# Patient Record
Sex: FEMALE | Race: WHITE | NOT HISPANIC OR LATINO | Employment: OTHER | URBAN - METROPOLITAN AREA
[De-identification: names, ages, dates, MRNs, and addresses within clinical notes are randomized per-mention and may not be internally consistent; named-entity substitution may affect disease eponyms.]

---

## 2017-01-01 ENCOUNTER — COMMUNICATION - HEALTHEAST (OUTPATIENT)
Dept: FAMILY MEDICINE | Facility: CLINIC | Age: 62
End: 2017-01-01

## 2017-01-01 ENCOUNTER — COMMUNICATION - HEALTHEAST (OUTPATIENT)
Dept: SCHEDULING | Facility: CLINIC | Age: 62
End: 2017-01-01

## 2017-01-03 ENCOUNTER — AMBULATORY - HEALTHEAST (OUTPATIENT)
Dept: PULMONOLOGY | Facility: OTHER | Age: 62
End: 2017-01-03

## 2017-01-03 ENCOUNTER — COMMUNICATION - HEALTHEAST (OUTPATIENT)
Dept: FAMILY MEDICINE | Facility: CLINIC | Age: 62
End: 2017-01-03

## 2017-01-04 ENCOUNTER — COMMUNICATION - HEALTHEAST (OUTPATIENT)
Dept: FAMILY MEDICINE | Facility: CLINIC | Age: 62
End: 2017-01-04

## 2017-01-05 ENCOUNTER — AMBULATORY - HEALTHEAST (OUTPATIENT)
Dept: PULMONOLOGY | Facility: OTHER | Age: 62
End: 2017-01-05

## 2017-01-05 ENCOUNTER — COMMUNICATION - HEALTHEAST (OUTPATIENT)
Dept: FAMILY MEDICINE | Facility: CLINIC | Age: 62
End: 2017-01-05

## 2017-01-06 ENCOUNTER — OFFICE VISIT - HEALTHEAST (OUTPATIENT)
Dept: PULMONOLOGY | Facility: OTHER | Age: 62
End: 2017-01-06

## 2017-01-06 DIAGNOSIS — J44.9 CHRONIC OBSTRUCTIVE PULMONARY DISEASE, UNSPECIFIED COPD TYPE (H): ICD-10-CM

## 2017-01-06 DIAGNOSIS — R06.09 DYSPNEA ON EXERTION: ICD-10-CM

## 2017-01-06 DIAGNOSIS — G47.34 NOCTURNAL HYPOXIA: ICD-10-CM

## 2017-01-06 DIAGNOSIS — K21.9 GASTROESOPHAGEAL REFLUX DISEASE WITHOUT ESOPHAGITIS: ICD-10-CM

## 2017-01-12 ENCOUNTER — COMMUNICATION - HEALTHEAST (OUTPATIENT)
Dept: FAMILY MEDICINE | Facility: CLINIC | Age: 62
End: 2017-01-12

## 2017-01-17 ENCOUNTER — COMMUNICATION - HEALTHEAST (OUTPATIENT)
Dept: RESPIRATORY THERAPY | Facility: HOSPITAL | Age: 62
End: 2017-01-17

## 2017-02-16 ENCOUNTER — COMMUNICATION - HEALTHEAST (OUTPATIENT)
Dept: RESPIRATORY THERAPY | Facility: HOSPITAL | Age: 62
End: 2017-02-16

## 2017-03-06 ENCOUNTER — OFFICE VISIT - HEALTHEAST (OUTPATIENT)
Dept: FAMILY MEDICINE | Facility: CLINIC | Age: 62
End: 2017-03-06

## 2017-03-06 ENCOUNTER — HOSPITAL ENCOUNTER (OUTPATIENT)
Dept: LAB | Age: 62
Setting detail: SPECIMEN
Discharge: HOME | End: 2017-03-06

## 2017-03-06 DIAGNOSIS — R35.0 URINARY FREQUENCY: ICD-10-CM

## 2017-03-06 DIAGNOSIS — N39.41 URGE INCONTINENCE: ICD-10-CM

## 2017-03-06 DIAGNOSIS — E11.9 TYPE 2 DIABETES MELLITUS (H): ICD-10-CM

## 2017-03-06 ASSESSMENT — MIFFLIN-ST. JEOR: SCORE: 1228.83

## 2017-03-08 ENCOUNTER — RECORDS - HEALTHEAST (OUTPATIENT)
Dept: ADMINISTRATIVE | Facility: OTHER | Age: 62
End: 2017-03-08

## 2017-03-17 ENCOUNTER — OFFICE VISIT - HEALTHEAST (OUTPATIENT)
Dept: FAMILY MEDICINE | Facility: CLINIC | Age: 62
End: 2017-03-17

## 2017-03-17 ENCOUNTER — COMMUNICATION - HEALTHEAST (OUTPATIENT)
Dept: RESPIRATORY THERAPY | Facility: HOSPITAL | Age: 62
End: 2017-03-17

## 2017-03-17 DIAGNOSIS — R39.15 URINARY URGENCY: ICD-10-CM

## 2017-03-17 DIAGNOSIS — B07.0 PLANTAR WART: ICD-10-CM

## 2017-03-17 DIAGNOSIS — E55.9 VITAMIN D DEFICIENCY: ICD-10-CM

## 2017-03-17 DIAGNOSIS — E11.9 TYPE 2 DIABETES MELLITUS WITHOUT COMPLICATION, WITHOUT LONG-TERM CURRENT USE OF INSULIN (H): ICD-10-CM

## 2017-03-17 DIAGNOSIS — Z00.00 HEALTHCARE MAINTENANCE: ICD-10-CM

## 2017-03-17 LAB — HBA1C MFR BLD: 6.3 % (ref 3.5–6)

## 2017-03-21 ENCOUNTER — COMMUNICATION - HEALTHEAST (OUTPATIENT)
Dept: FAMILY MEDICINE | Facility: CLINIC | Age: 62
End: 2017-03-21

## 2017-03-23 ENCOUNTER — HOSPITAL ENCOUNTER (OUTPATIENT)
Dept: MAMMOGRAPHY | Facility: HOSPITAL | Age: 62
Discharge: HOME | End: 2017-03-23
Attending: FAMILY MEDICINE

## 2017-03-23 DIAGNOSIS — Z00.00 HEALTHCARE MAINTENANCE: ICD-10-CM

## 2017-04-17 ENCOUNTER — COMMUNICATION - HEALTHEAST (OUTPATIENT)
Dept: RESPIRATORY THERAPY | Facility: HOSPITAL | Age: 62
End: 2017-04-17

## 2017-04-18 ENCOUNTER — COMMUNICATION - HEALTHEAST (OUTPATIENT)
Dept: RESPIRATORY THERAPY | Facility: HOSPITAL | Age: 62
End: 2017-04-18

## 2017-04-18 ENCOUNTER — COMMUNICATION - HEALTHEAST (OUTPATIENT)
Dept: SCHEDULING | Facility: CLINIC | Age: 62
End: 2017-04-18

## 2017-04-18 ENCOUNTER — COMMUNICATION - HEALTHEAST (OUTPATIENT)
Dept: FAMILY MEDICINE | Facility: CLINIC | Age: 62
End: 2017-04-18

## 2017-04-26 ENCOUNTER — OFFICE VISIT - HEALTHEAST (OUTPATIENT)
Dept: FAMILY MEDICINE | Facility: CLINIC | Age: 62
End: 2017-04-26

## 2017-04-26 DIAGNOSIS — J44.89 CHRONIC AIRWAY OBSTRUCTION, NOT ELSEWHERE CLASSIFIED: ICD-10-CM

## 2017-04-26 DIAGNOSIS — J44.9 COPD (CHRONIC OBSTRUCTIVE PULMONARY DISEASE) (H): ICD-10-CM

## 2017-05-08 ENCOUNTER — COMMUNICATION - HEALTHEAST (OUTPATIENT)
Dept: FAMILY MEDICINE | Facility: CLINIC | Age: 62
End: 2017-05-08

## 2017-05-17 ENCOUNTER — OFFICE VISIT - HEALTHEAST (OUTPATIENT)
Dept: FAMILY MEDICINE | Facility: CLINIC | Age: 62
End: 2017-05-17

## 2017-05-17 ENCOUNTER — COMMUNICATION - HEALTHEAST (OUTPATIENT)
Dept: SCHEDULING | Facility: CLINIC | Age: 62
End: 2017-05-17

## 2017-05-17 ENCOUNTER — COMMUNICATION - HEALTHEAST (OUTPATIENT)
Dept: RESPIRATORY THERAPY | Facility: HOSPITAL | Age: 62
End: 2017-05-17

## 2017-05-17 ENCOUNTER — RECORDS - HEALTHEAST (OUTPATIENT)
Dept: GENERAL RADIOLOGY | Facility: CLINIC | Age: 62
End: 2017-05-17

## 2017-05-17 DIAGNOSIS — J44.89 CHRONIC AIRWAY OBSTRUCTION, NOT ELSEWHERE CLASSIFIED: ICD-10-CM

## 2017-05-17 DIAGNOSIS — R53.83 OTHER FATIGUE: ICD-10-CM

## 2017-05-17 DIAGNOSIS — D72.829 LEUKOCYTOSIS: ICD-10-CM

## 2017-05-17 DIAGNOSIS — R13.10 DYSPHAGIA: ICD-10-CM

## 2017-05-17 DIAGNOSIS — R53.83 FATIGUE: ICD-10-CM

## 2017-05-17 DIAGNOSIS — I95.9 HYPOTENSION: ICD-10-CM

## 2017-05-17 DIAGNOSIS — I95.9 HYPOTENSION, UNSPECIFIED: ICD-10-CM

## 2017-05-17 DIAGNOSIS — R14.0 ABDOMINAL DISTENTION: ICD-10-CM

## 2017-05-17 ASSESSMENT — MIFFLIN-ST. JEOR: SCORE: 1244.43

## 2017-05-18 LAB
ATRIAL RATE - MUSE: 79 BPM
DIASTOLIC BLOOD PRESSURE - MUSE: NORMAL MMHG
INTERPRETATION ECG - MUSE: NORMAL
P AXIS - MUSE: 62 DEGREES
PR INTERVAL - MUSE: 142 MS
QRS DURATION - MUSE: 72 MS
QT - MUSE: 394 MS
QTC - MUSE: 451 MS
R AXIS - MUSE: 58 DEGREES
SYSTOLIC BLOOD PRESSURE - MUSE: NORMAL MMHG
T AXIS - MUSE: 56 DEGREES
VENTRICULAR RATE- MUSE: 79 BPM

## 2017-05-22 ENCOUNTER — AMBULATORY - HEALTHEAST (OUTPATIENT)
Dept: FAMILY MEDICINE | Facility: CLINIC | Age: 62
End: 2017-05-22

## 2017-05-22 DIAGNOSIS — J44.9 COPD (CHRONIC OBSTRUCTIVE PULMONARY DISEASE) (H): ICD-10-CM

## 2017-05-25 ENCOUNTER — COMMUNICATION - HEALTHEAST (OUTPATIENT)
Dept: SCHEDULING | Facility: CLINIC | Age: 62
End: 2017-05-25

## 2017-05-26 ENCOUNTER — COMMUNICATION - HEALTHEAST (OUTPATIENT)
Dept: FAMILY MEDICINE | Facility: CLINIC | Age: 62
End: 2017-05-26

## 2017-05-31 ENCOUNTER — COMMUNICATION - HEALTHEAST (OUTPATIENT)
Dept: SCHEDULING | Facility: CLINIC | Age: 62
End: 2017-05-31

## 2017-06-05 ENCOUNTER — RECORDS - HEALTHEAST (OUTPATIENT)
Dept: GENERAL RADIOLOGY | Facility: CLINIC | Age: 62
End: 2017-06-05

## 2017-06-05 ENCOUNTER — OFFICE VISIT - HEALTHEAST (OUTPATIENT)
Dept: FAMILY MEDICINE | Facility: CLINIC | Age: 62
End: 2017-06-05

## 2017-06-05 DIAGNOSIS — R14.0 ABDOMINAL BLOATING: ICD-10-CM

## 2017-06-05 DIAGNOSIS — R13.10 DYSPHAGIA: ICD-10-CM

## 2017-06-05 DIAGNOSIS — M54.50 LOW BACK PAIN: ICD-10-CM

## 2017-06-05 DIAGNOSIS — J44.9 COPD (CHRONIC OBSTRUCTIVE PULMONARY DISEASE) (H): ICD-10-CM

## 2017-06-07 ENCOUNTER — COMMUNICATION - HEALTHEAST (OUTPATIENT)
Dept: FAMILY MEDICINE | Facility: CLINIC | Age: 62
End: 2017-06-07

## 2017-06-09 ENCOUNTER — COMMUNICATION - HEALTHEAST (OUTPATIENT)
Dept: FAMILY MEDICINE | Facility: CLINIC | Age: 62
End: 2017-06-09

## 2017-06-09 DIAGNOSIS — R13.10 DYSPHAGIA: ICD-10-CM

## 2017-06-16 ENCOUNTER — COMMUNICATION - HEALTHEAST (OUTPATIENT)
Dept: FAMILY MEDICINE | Facility: CLINIC | Age: 62
End: 2017-06-16

## 2017-06-17 ENCOUNTER — COMMUNICATION - HEALTHEAST (OUTPATIENT)
Dept: SCHEDULING | Facility: CLINIC | Age: 62
End: 2017-06-17

## 2017-06-17 DIAGNOSIS — E11.9 TYPE 2 DIABETES MELLITUS (H): ICD-10-CM

## 2017-06-20 ENCOUNTER — COMMUNICATION - HEALTHEAST (OUTPATIENT)
Dept: SCHEDULING | Facility: CLINIC | Age: 62
End: 2017-06-20

## 2017-06-20 ENCOUNTER — AMBULATORY - HEALTHEAST (OUTPATIENT)
Dept: FAMILY MEDICINE | Facility: CLINIC | Age: 62
End: 2017-06-20

## 2017-06-20 DIAGNOSIS — E11.9 TYPE 2 DIABETES MELLITUS (H): ICD-10-CM

## 2017-06-21 ENCOUNTER — COMMUNICATION - HEALTHEAST (OUTPATIENT)
Dept: RESPIRATORY THERAPY | Facility: HOSPITAL | Age: 62
End: 2017-06-21

## 2017-06-23 ENCOUNTER — COMMUNICATION - HEALTHEAST (OUTPATIENT)
Dept: SCHEDULING | Facility: CLINIC | Age: 62
End: 2017-06-23

## 2017-06-23 ENCOUNTER — COMMUNICATION - HEALTHEAST (OUTPATIENT)
Dept: FAMILY MEDICINE | Facility: CLINIC | Age: 62
End: 2017-06-23

## 2017-06-25 ENCOUNTER — COMMUNICATION - HEALTHEAST (OUTPATIENT)
Dept: SCHEDULING | Facility: CLINIC | Age: 62
End: 2017-06-25

## 2017-06-30 ENCOUNTER — OFFICE VISIT - HEALTHEAST (OUTPATIENT)
Dept: PULMONOLOGY | Facility: OTHER | Age: 62
End: 2017-06-30

## 2017-06-30 DIAGNOSIS — K21.9 GASTROESOPHAGEAL REFLUX DISEASE WITHOUT ESOPHAGITIS: ICD-10-CM

## 2017-06-30 DIAGNOSIS — G47.33 OSA (OBSTRUCTIVE SLEEP APNEA): ICD-10-CM

## 2017-06-30 DIAGNOSIS — J44.9 CHRONIC OBSTRUCTIVE PULMONARY DISEASE, UNSPECIFIED COPD TYPE (H): ICD-10-CM

## 2017-07-06 ENCOUNTER — AMBULATORY - HEALTHEAST (OUTPATIENT)
Dept: PULMONOLOGY | Facility: OTHER | Age: 62
End: 2017-07-06

## 2017-07-07 ENCOUNTER — RECORDS - HEALTHEAST (OUTPATIENT)
Dept: ADMINISTRATIVE | Facility: OTHER | Age: 62
End: 2017-07-07

## 2017-07-07 ENCOUNTER — AMBULATORY - HEALTHEAST (OUTPATIENT)
Dept: INTENSIVE CARE | Facility: CLINIC | Age: 62
End: 2017-07-07

## 2017-07-07 ENCOUNTER — COMMUNICATION - HEALTHEAST (OUTPATIENT)
Dept: FAMILY MEDICINE | Facility: CLINIC | Age: 62
End: 2017-07-07

## 2017-07-08 ENCOUNTER — COMMUNICATION - HEALTHEAST (OUTPATIENT)
Dept: SCHEDULING | Facility: CLINIC | Age: 62
End: 2017-07-08

## 2017-07-10 ENCOUNTER — RECORDS - HEALTHEAST (OUTPATIENT)
Dept: ADMINISTRATIVE | Facility: OTHER | Age: 62
End: 2017-07-10

## 2017-07-10 ENCOUNTER — OFFICE VISIT - HEALTHEAST (OUTPATIENT)
Dept: FAMILY MEDICINE | Facility: CLINIC | Age: 62
End: 2017-07-10

## 2017-07-10 DIAGNOSIS — B07.0 PLANTAR WART: ICD-10-CM

## 2017-07-10 DIAGNOSIS — R73.09 ELEVATED GLUCOSE: ICD-10-CM

## 2017-07-10 DIAGNOSIS — N39.0 UTI (URINARY TRACT INFECTION): ICD-10-CM

## 2017-07-10 DIAGNOSIS — J44.9 COPD (CHRONIC OBSTRUCTIVE PULMONARY DISEASE) (H): ICD-10-CM

## 2017-07-10 DIAGNOSIS — N90.7 LABIAL CYST: ICD-10-CM

## 2017-07-10 DIAGNOSIS — K59.00 CONSTIPATION: ICD-10-CM

## 2017-07-11 LAB — HBA1C MFR BLD: 7 % (ref 4.2–6.1)

## 2017-07-14 ENCOUNTER — RECORDS - HEALTHEAST (OUTPATIENT)
Dept: ADMINISTRATIVE | Facility: OTHER | Age: 62
End: 2017-07-14

## 2017-07-14 ENCOUNTER — AMBULATORY - HEALTHEAST (OUTPATIENT)
Dept: PULMONOLOGY | Facility: OTHER | Age: 62
End: 2017-07-14

## 2017-07-15 ENCOUNTER — COMMUNICATION - HEALTHEAST (OUTPATIENT)
Dept: SCHEDULING | Facility: CLINIC | Age: 62
End: 2017-07-15

## 2017-07-16 ENCOUNTER — COMMUNICATION - HEALTHEAST (OUTPATIENT)
Dept: SCHEDULING | Facility: CLINIC | Age: 62
End: 2017-07-16

## 2017-07-21 ENCOUNTER — COMMUNICATION - HEALTHEAST (OUTPATIENT)
Dept: FAMILY MEDICINE | Facility: CLINIC | Age: 62
End: 2017-07-21

## 2017-07-25 ENCOUNTER — COMMUNICATION - HEALTHEAST (OUTPATIENT)
Dept: PULMONOLOGY | Facility: OTHER | Age: 62
End: 2017-07-25

## 2017-07-25 ENCOUNTER — RECORDS - HEALTHEAST (OUTPATIENT)
Dept: ADMINISTRATIVE | Facility: OTHER | Age: 62
End: 2017-07-25

## 2017-07-28 ENCOUNTER — AMBULATORY - HEALTHEAST (OUTPATIENT)
Dept: PULMONOLOGY | Facility: OTHER | Age: 62
End: 2017-07-28

## 2017-08-01 ENCOUNTER — RECORDS - HEALTHEAST (OUTPATIENT)
Dept: ADMINISTRATIVE | Facility: OTHER | Age: 62
End: 2017-08-01

## 2017-08-02 ENCOUNTER — AMBULATORY - HEALTHEAST (OUTPATIENT)
Dept: PULMONOLOGY | Facility: OTHER | Age: 62
End: 2017-08-02

## 2017-08-10 ENCOUNTER — COMMUNICATION - HEALTHEAST (OUTPATIENT)
Dept: OBGYN | Facility: HOSPITAL | Age: 62
End: 2017-08-10

## 2017-08-10 ENCOUNTER — COMMUNICATION - HEALTHEAST (OUTPATIENT)
Dept: SCHEDULING | Facility: CLINIC | Age: 62
End: 2017-08-10

## 2017-08-10 ENCOUNTER — RECORDS - HEALTHEAST (OUTPATIENT)
Dept: ADMINISTRATIVE | Facility: OTHER | Age: 62
End: 2017-08-10

## 2017-08-13 ENCOUNTER — COMMUNICATION - HEALTHEAST (OUTPATIENT)
Dept: SCHEDULING | Facility: CLINIC | Age: 62
End: 2017-08-13

## 2017-08-14 ENCOUNTER — RECORDS - HEALTHEAST (OUTPATIENT)
Dept: ADMINISTRATIVE | Facility: OTHER | Age: 62
End: 2017-08-14

## 2017-08-15 ENCOUNTER — COMMUNICATION - HEALTHEAST (OUTPATIENT)
Dept: FAMILY MEDICINE | Facility: CLINIC | Age: 62
End: 2017-08-15

## 2017-08-17 ENCOUNTER — COMMUNICATION - HEALTHEAST (OUTPATIENT)
Dept: RESPIRATORY THERAPY | Facility: CLINIC | Age: 62
End: 2017-08-17

## 2017-08-20 ENCOUNTER — RECORDS - HEALTHEAST (OUTPATIENT)
Dept: ADMINISTRATIVE | Facility: OTHER | Age: 62
End: 2017-08-20

## 2017-08-21 ENCOUNTER — COMMUNICATION - HEALTHEAST (OUTPATIENT)
Dept: RESPIRATORY THERAPY | Facility: HOSPITAL | Age: 62
End: 2017-08-21

## 2017-08-29 ENCOUNTER — OFFICE VISIT - HEALTHEAST (OUTPATIENT)
Dept: PULMONOLOGY | Facility: OTHER | Age: 62
End: 2017-08-29

## 2017-08-29 DIAGNOSIS — G47.33 OSA ON CPAP: ICD-10-CM

## 2017-08-29 DIAGNOSIS — J44.1 COPD EXACERBATION (H): ICD-10-CM

## 2017-08-29 DIAGNOSIS — R53.81 PHYSICAL DECONDITIONING: ICD-10-CM

## 2017-08-29 DIAGNOSIS — E66.9 OBESITY (BMI 30-39.9): ICD-10-CM

## 2017-09-07 ENCOUNTER — OFFICE VISIT - HEALTHEAST (OUTPATIENT)
Dept: PHYSICAL THERAPY | Facility: REHABILITATION | Age: 62
End: 2017-09-07

## 2017-09-07 DIAGNOSIS — R53.81 PHYSICAL DECONDITIONING: ICD-10-CM

## 2017-09-07 DIAGNOSIS — M62.81 MUSCLE WEAKNESS (GENERALIZED): ICD-10-CM

## 2017-09-10 ENCOUNTER — COMMUNICATION - HEALTHEAST (OUTPATIENT)
Dept: SCHEDULING | Facility: CLINIC | Age: 62
End: 2017-09-10

## 2017-09-12 ENCOUNTER — RECORDS - HEALTHEAST (OUTPATIENT)
Dept: GENERAL RADIOLOGY | Facility: CLINIC | Age: 62
End: 2017-09-12

## 2017-09-12 ENCOUNTER — OFFICE VISIT - HEALTHEAST (OUTPATIENT)
Dept: FAMILY MEDICINE | Facility: CLINIC | Age: 62
End: 2017-09-12

## 2017-09-12 ENCOUNTER — COMMUNICATION - HEALTHEAST (OUTPATIENT)
Dept: FAMILY MEDICINE | Facility: CLINIC | Age: 62
End: 2017-09-12

## 2017-09-12 DIAGNOSIS — J44.9 CHRONIC OBSTRUCTIVE PULMONARY DISEASE, UNSPECIFIED (H): ICD-10-CM

## 2017-09-12 DIAGNOSIS — R06.02 SHORTNESS OF BREATH: ICD-10-CM

## 2017-09-12 DIAGNOSIS — R53.83 FATIGUE: ICD-10-CM

## 2017-09-12 DIAGNOSIS — J44.9 COPD (CHRONIC OBSTRUCTIVE PULMONARY DISEASE) (H): ICD-10-CM

## 2017-09-12 DIAGNOSIS — R53.83 OTHER FATIGUE: ICD-10-CM

## 2017-09-12 LAB — HBA1C MFR BLD: 6.6 % (ref 3.5–6)

## 2017-09-13 LAB
ATRIAL RATE - MUSE: 87 BPM
DIASTOLIC BLOOD PRESSURE - MUSE: NORMAL MMHG
INTERPRETATION ECG - MUSE: NORMAL
P AXIS - MUSE: 56 DEGREES
PR INTERVAL - MUSE: 136 MS
QRS DURATION - MUSE: 68 MS
QT - MUSE: 362 MS
QTC - MUSE: 435 MS
R AXIS - MUSE: 58 DEGREES
SYSTOLIC BLOOD PRESSURE - MUSE: NORMAL MMHG
T AXIS - MUSE: 58 DEGREES
VENTRICULAR RATE- MUSE: 87 BPM

## 2017-09-15 ENCOUNTER — OFFICE VISIT - HEALTHEAST (OUTPATIENT)
Dept: PHYSICAL THERAPY | Facility: REHABILITATION | Age: 62
End: 2017-09-15

## 2017-09-15 DIAGNOSIS — R53.81 PHYSICAL DECONDITIONING: ICD-10-CM

## 2017-09-15 DIAGNOSIS — M62.81 MUSCLE WEAKNESS (GENERALIZED): ICD-10-CM

## 2017-09-18 ENCOUNTER — AMBULATORY - HEALTHEAST (OUTPATIENT)
Dept: FAMILY MEDICINE | Facility: CLINIC | Age: 62
End: 2017-09-18

## 2017-09-18 ENCOUNTER — COMMUNICATION - HEALTHEAST (OUTPATIENT)
Dept: FAMILY MEDICINE | Facility: CLINIC | Age: 62
End: 2017-09-18

## 2017-09-18 DIAGNOSIS — R31.9 HEMATURIA: ICD-10-CM

## 2017-09-18 DIAGNOSIS — R53.83 FATIGUE: ICD-10-CM

## 2017-09-19 ENCOUNTER — OFFICE VISIT - HEALTHEAST (OUTPATIENT)
Dept: FAMILY MEDICINE | Facility: CLINIC | Age: 62
End: 2017-09-19

## 2017-09-19 DIAGNOSIS — R53.83 FATIGUE: ICD-10-CM

## 2017-09-19 DIAGNOSIS — Z00.00 HEALTHCARE MAINTENANCE: ICD-10-CM

## 2017-09-19 ASSESSMENT — MIFFLIN-ST. JEOR: SCORE: 1215.73

## 2017-09-26 ENCOUNTER — OFFICE VISIT - HEALTHEAST (OUTPATIENT)
Dept: PULMONOLOGY | Facility: OTHER | Age: 62
End: 2017-09-26

## 2017-09-26 ENCOUNTER — OFFICE VISIT - HEALTHEAST (OUTPATIENT)
Dept: CARDIOLOGY | Facility: CLINIC | Age: 62
End: 2017-09-26

## 2017-09-26 ENCOUNTER — AMBULATORY - HEALTHEAST (OUTPATIENT)
Dept: PULMONOLOGY | Facility: OTHER | Age: 62
End: 2017-09-26

## 2017-09-26 ENCOUNTER — HOSPITAL ENCOUNTER (OUTPATIENT)
Dept: CT IMAGING | Facility: HOSPITAL | Age: 62
Discharge: HOME | End: 2017-09-26
Attending: INTERNAL MEDICINE

## 2017-09-26 DIAGNOSIS — G47.33 OBSTRUCTIVE SLEEP APNEA SYNDROME: ICD-10-CM

## 2017-09-26 DIAGNOSIS — E11.9 TYPE 2 DIABETES MELLITUS (H): ICD-10-CM

## 2017-09-26 DIAGNOSIS — R06.02 SOB (SHORTNESS OF BREATH): ICD-10-CM

## 2017-09-26 DIAGNOSIS — I42.9 CARDIOMYOPATHY (H): ICD-10-CM

## 2017-09-26 DIAGNOSIS — M79.89 LEG SWELLING: ICD-10-CM

## 2017-09-26 DIAGNOSIS — R07.89 ATYPICAL CHEST PAIN: ICD-10-CM

## 2017-09-26 DIAGNOSIS — G47.33 OSA ON CPAP: ICD-10-CM

## 2017-09-26 DIAGNOSIS — E66.9 OBESITY: ICD-10-CM

## 2017-09-26 DIAGNOSIS — J96.21 ACUTE ON CHRONIC RESPIRATORY FAILURE WITH HYPOXIA (H): ICD-10-CM

## 2017-09-26 DIAGNOSIS — R60.9 EDEMA, UNSPECIFIED TYPE: ICD-10-CM

## 2017-09-26 DIAGNOSIS — J44.9 CHRONIC OBSTRUCTIVE PULMONARY DISEASE, UNSPECIFIED COPD TYPE (H): ICD-10-CM

## 2017-09-26 ASSESSMENT — MIFFLIN-ST. JEOR: SCORE: 1216.64

## 2017-10-02 ENCOUNTER — COMMUNICATION - HEALTHEAST (OUTPATIENT)
Dept: PULMONOLOGY | Facility: OTHER | Age: 62
End: 2017-10-02

## 2017-10-04 ENCOUNTER — COMMUNICATION - HEALTHEAST (OUTPATIENT)
Dept: PULMONOLOGY | Facility: OTHER | Age: 62
End: 2017-10-04

## 2017-10-04 ENCOUNTER — AMBULATORY - HEALTHEAST (OUTPATIENT)
Dept: PULMONOLOGY | Facility: OTHER | Age: 62
End: 2017-10-04

## 2017-10-04 ENCOUNTER — OFFICE VISIT - HEALTHEAST (OUTPATIENT)
Dept: PHYSICAL THERAPY | Facility: REHABILITATION | Age: 62
End: 2017-10-04

## 2017-10-04 DIAGNOSIS — R53.81 PHYSICAL DECONDITIONING: ICD-10-CM

## 2017-10-04 DIAGNOSIS — M62.81 MUSCLE WEAKNESS (GENERALIZED): ICD-10-CM

## 2017-10-05 ENCOUNTER — COMMUNICATION - HEALTHEAST (OUTPATIENT)
Dept: PULMONOLOGY | Facility: OTHER | Age: 62
End: 2017-10-05

## 2017-10-06 ENCOUNTER — AMBULATORY - HEALTHEAST (OUTPATIENT)
Dept: PULMONOLOGY | Facility: OTHER | Age: 62
End: 2017-10-06

## 2017-10-10 ENCOUNTER — RECORDS - HEALTHEAST (OUTPATIENT)
Dept: ADMINISTRATIVE | Facility: OTHER | Age: 62
End: 2017-10-10

## 2017-10-12 ENCOUNTER — COMMUNICATION - HEALTHEAST (OUTPATIENT)
Dept: CARDIOLOGY | Facility: CLINIC | Age: 62
End: 2017-10-12

## 2017-10-13 ENCOUNTER — COMMUNICATION - HEALTHEAST (OUTPATIENT)
Dept: SCHEDULING | Facility: CLINIC | Age: 62
End: 2017-10-13

## 2017-10-16 ENCOUNTER — RECORDS - HEALTHEAST (OUTPATIENT)
Dept: ADMINISTRATIVE | Facility: OTHER | Age: 62
End: 2017-10-16

## 2017-10-17 ENCOUNTER — COMMUNICATION - HEALTHEAST (OUTPATIENT)
Dept: PULMONOLOGY | Facility: OTHER | Age: 62
End: 2017-10-17

## 2017-10-17 ENCOUNTER — HOSPITAL ENCOUNTER (OUTPATIENT)
Dept: CT IMAGING | Facility: CLINIC | Age: 62
Discharge: HOME | End: 2017-10-17
Attending: INTERNAL MEDICINE

## 2017-10-17 ENCOUNTER — AMBULATORY - HEALTHEAST (OUTPATIENT)
Dept: PULMONOLOGY | Facility: OTHER | Age: 62
End: 2017-10-17

## 2017-10-17 ENCOUNTER — HOSPITAL ENCOUNTER (OUTPATIENT)
Dept: CARDIOLOGY | Facility: CLINIC | Age: 62
Discharge: HOME | End: 2017-10-17
Attending: INTERNAL MEDICINE

## 2017-10-17 DIAGNOSIS — R07.89 ATYPICAL CHEST PAIN: ICD-10-CM

## 2017-10-17 LAB
AORTIC ROOT: 2.8 CM
AORTIC VALVE MEAN VELOCITY: 93.8 CM/S
AV DIMENSIONLESS INDEX VTI: 0.8
AV MEAN GRADIENT: 4 MMHG
AV PEAK GRADIENT: 8.6 MMHG
AV VALVE AREA: 2.2 CM2
AV VELOCITY RATIO: 0.8
BSA FOR ECHO PROCEDURE: 1.79 M2
BSA FOR ECHO PROCEDURE: 1.8 M2
CV BLOOD PRESSURE: NORMAL MMHG
CV CALCIUM SCORE AGATSTON LM: 0
CV CALCIUM SCORING AGATSON LAD: 0
CV CALCIUM SCORING AGATSTON CX: 0
CV CALCIUM SCORING AGATSTON RCA: 0
CV CALCIUM SCORING AGATSTON TOTAL: 0
CV ECHO HEIGHT: 55.3 IN
CV ECHO WEIGHT: 181 LBS
DOP CALC AO PEAK VEL: 147 CM/S
DOP CALC AO VTI: 30 CM
DOP CALC LVOT AREA: 2.54 CM2
DOP CALC LVOT DIAMETER: 1.8 CM
DOP CALC LVOT PEAK VEL: 116 CM/S
DOP CALC LVOT STROKE VOLUME: 64.6 CM3
DOP CALCLVOT PEAK VEL VTI: 25.4 CM
EJECTION FRACTION: 63 % (ref 55–75)
FRACTIONAL SHORTENING: 30.4 % (ref 28–44)
INTERVENTRICULAR SEPTUM IN END DIASTOLE: 0.87 CM (ref 0.6–0.9)
IVS/PW RATIO: 0.9
LA AREA 1: 9.38 CM2
LA AREA 2: 12.7 CM2
LEFT ATRIUM LENGTH: 3.88 CM
LEFT ATRIUM SIZE: 2.8 CM
LEFT ATRIUM VOLUME INDEX: 14.6 ML/M2
LEFT ATRIUM VOLUME: 26.1 CM3
LEFT VENTRICLE CARDIAC INDEX: 2.6 L/MIN/M2
LEFT VENTRICLE CARDIAC OUTPUT: 4.7 L/MIN
LEFT VENTRICLE DIASTOLIC VOLUME INDEX: 19.6 CM3/M2 (ref 34–74)
LEFT VENTRICLE DIASTOLIC VOLUME: 35 CM3 (ref 46–106)
LEFT VENTRICLE HEART RATE: 70 BPM
LEFT VENTRICLE HEART RATE: 72 BPM
LEFT VENTRICLE MASS INDEX: 74.3 G/M2
LEFT VENTRICLE SYSTOLIC VOLUME INDEX: 7.3 CM3/M2 (ref 11–31)
LEFT VENTRICLE SYSTOLIC VOLUME: 13 CM3 (ref 14–42)
LEFT VENTRICULAR INTERNAL DIMENSION IN DIASTOLE: 4.41 CM (ref 3.8–5.2)
LEFT VENTRICULAR INTERNAL DIMENSION IN SYSTOLE: 3.07 CM (ref 2.2–3.5)
LEFT VENTRICULAR MASS: 133.1 G
LEFT VENTRICULAR OUTFLOW TRACT MEAN GRADIENT: 3 MMHG
LEFT VENTRICULAR OUTFLOW TRACT MEAN VELOCITY: 73.1 CM/S
LEFT VENTRICULAR OUTFLOW TRACT PEAK GRADIENT: 5 MMHG
LEFT VENTRICULAR POSTERIOR WALL IN END DIASTOLE: 0.97 CM (ref 0.6–0.9)
LV STROKE VOLUME INDEX: 36.1 ML/M2
MITRAL VALVE E/A RATIO: 1
MV AVERAGE E/E' RATIO: 7.9 CM/S
MV DECELERATION TIME: 211 MS
MV E'TISSUE VEL-LAT: 11.6 CM/S
MV E'TISSUE VEL-MED: 7.7 CM/S
MV LATERAL E/E' RATIO: 6.6
MV MEDIAL E/E' RATIO: 9.9
MV PEAK A VELOCITY: 79.5 CM/S
MV PEAK E VELOCITY: 76 CM/S
NUC REST DIASTOLIC VOLUME INDEX: 2896 LBS
NUC REST SYSTOLIC VOLUME INDEX: 55.25 IN
TRICUSPID REGURGITATION PEAK PRESSURE GRADIENT: 23 MMHG
TRICUSPID VALVE ANULAR PLANE SYSTOLIC EXCURSION: 2.4 CM
TRICUSPID VALVE PEAK REGURGITANT VELOCITY: 240 CM/S

## 2017-10-17 ASSESSMENT — MIFFLIN-ST. JEOR
SCORE: 1220.04
SCORE: 1212.1

## 2017-10-19 ENCOUNTER — COMMUNICATION - HEALTHEAST (OUTPATIENT)
Dept: FAMILY MEDICINE | Facility: CLINIC | Age: 62
End: 2017-10-19

## 2017-11-05 ENCOUNTER — COMMUNICATION - HEALTHEAST (OUTPATIENT)
Dept: SCHEDULING | Facility: CLINIC | Age: 62
End: 2017-11-05

## 2017-11-06 ENCOUNTER — RECORDS - HEALTHEAST (OUTPATIENT)
Dept: ADMINISTRATIVE | Facility: OTHER | Age: 62
End: 2017-11-06

## 2017-11-13 ENCOUNTER — OFFICE VISIT - HEALTHEAST (OUTPATIENT)
Dept: FAMILY MEDICINE | Facility: CLINIC | Age: 62
End: 2017-11-13

## 2017-11-13 DIAGNOSIS — J44.1 COPD EXACERBATION (H): ICD-10-CM

## 2017-12-06 ENCOUNTER — COMMUNICATION - HEALTHEAST (OUTPATIENT)
Dept: SCHEDULING | Facility: CLINIC | Age: 62
End: 2017-12-06

## 2017-12-06 ENCOUNTER — OFFICE VISIT - HEALTHEAST (OUTPATIENT)
Dept: FAMILY MEDICINE | Facility: CLINIC | Age: 62
End: 2017-12-06

## 2017-12-06 DIAGNOSIS — M54.50 LUMBAGO: ICD-10-CM

## 2017-12-06 DIAGNOSIS — E66.9 OBESITY (BMI 30-39.9): ICD-10-CM

## 2017-12-06 DIAGNOSIS — F31.62 BIPOLAR DISORDER, CURRENT EPISODE MIXED, MODERATE (H): ICD-10-CM

## 2017-12-06 DIAGNOSIS — G47.33 OBSTRUCTIVE SLEEP APNEA SYNDROME: ICD-10-CM

## 2017-12-06 DIAGNOSIS — J44.9 COPD (CHRONIC OBSTRUCTIVE PULMONARY DISEASE) (H): ICD-10-CM

## 2017-12-06 DIAGNOSIS — E11.9 TYPE 2 DIABETES MELLITUS (H): ICD-10-CM

## 2017-12-08 ENCOUNTER — COMMUNICATION - HEALTHEAST (OUTPATIENT)
Dept: NURSING | Facility: CLINIC | Age: 62
End: 2017-12-08

## 2017-12-17 ENCOUNTER — COMMUNICATION - HEALTHEAST (OUTPATIENT)
Dept: SCHEDULING | Facility: CLINIC | Age: 62
End: 2017-12-17

## 2017-12-20 ENCOUNTER — COMMUNICATION - HEALTHEAST (OUTPATIENT)
Dept: NURSING | Facility: CLINIC | Age: 62
End: 2017-12-20

## 2017-12-31 ENCOUNTER — COMMUNICATION - HEALTHEAST (OUTPATIENT)
Dept: SCHEDULING | Facility: CLINIC | Age: 62
End: 2017-12-31

## 2018-01-05 ENCOUNTER — COMMUNICATION - HEALTHEAST (OUTPATIENT)
Dept: NURSING | Facility: CLINIC | Age: 63
End: 2018-01-05

## 2018-01-07 ENCOUNTER — COMMUNICATION - HEALTHEAST (OUTPATIENT)
Dept: SCHEDULING | Facility: CLINIC | Age: 63
End: 2018-01-07

## 2018-01-08 ENCOUNTER — RECORDS - HEALTHEAST (OUTPATIENT)
Dept: ADMINISTRATIVE | Facility: OTHER | Age: 63
End: 2018-01-08

## 2018-01-08 ENCOUNTER — AMBULATORY - HEALTHEAST (OUTPATIENT)
Dept: CARE COORDINATION | Facility: CLINIC | Age: 63
End: 2018-01-08

## 2018-01-08 ENCOUNTER — COMMUNICATION - HEALTHEAST (OUTPATIENT)
Dept: NURSING | Facility: CLINIC | Age: 63
End: 2018-01-08

## 2018-01-13 ENCOUNTER — COMMUNICATION - HEALTHEAST (OUTPATIENT)
Dept: SCHEDULING | Facility: CLINIC | Age: 63
End: 2018-01-13

## 2018-01-13 ENCOUNTER — OFFICE VISIT - HEALTHEAST (OUTPATIENT)
Dept: FAMILY MEDICINE | Facility: CLINIC | Age: 63
End: 2018-01-13

## 2018-01-13 DIAGNOSIS — R45.89 EMOTIONAL UPSET: ICD-10-CM

## 2018-01-13 DIAGNOSIS — M79.605 LEFT LEG PAIN: ICD-10-CM

## 2018-01-16 ENCOUNTER — COMMUNICATION - HEALTHEAST (OUTPATIENT)
Dept: FAMILY MEDICINE | Facility: CLINIC | Age: 63
End: 2018-01-16

## 2018-01-18 ENCOUNTER — COMMUNICATION - HEALTHEAST (OUTPATIENT)
Dept: SCHEDULING | Facility: CLINIC | Age: 63
End: 2018-01-18

## 2018-01-23 ENCOUNTER — COMMUNICATION - HEALTHEAST (OUTPATIENT)
Dept: NURSING | Facility: CLINIC | Age: 63
End: 2018-01-23

## 2018-02-05 ENCOUNTER — COMMUNICATION - HEALTHEAST (OUTPATIENT)
Dept: SCHEDULING | Facility: CLINIC | Age: 63
End: 2018-02-05

## 2018-02-06 ENCOUNTER — OFFICE VISIT - HEALTHEAST (OUTPATIENT)
Dept: FAMILY MEDICINE | Facility: CLINIC | Age: 63
End: 2018-02-06
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
## 2018-02-06 DIAGNOSIS — M54.6 THORACIC BACK PAIN: ICD-10-CM

## 2018-02-06 DIAGNOSIS — S43.402A SPRAIN OF LEFT SHOULDER: ICD-10-CM

## 2018-02-09 ENCOUNTER — COMMUNICATION - HEALTHEAST (OUTPATIENT)
Dept: NURSING | Facility: CLINIC | Age: 63
End: 2018-02-09

## 2018-02-12 ENCOUNTER — COMMUNICATION - HEALTHEAST (OUTPATIENT)
Dept: SCHEDULING | Facility: CLINIC | Age: 63
End: 2018-02-12

## 2018-02-12 ENCOUNTER — COMMUNICATION - HEALTHEAST (OUTPATIENT)
Dept: NURSING | Facility: CLINIC | Age: 63
End: 2018-02-12

## 2018-02-12 ENCOUNTER — COMMUNICATION - HEALTHEAST (OUTPATIENT)
Dept: CARE COORDINATION | Facility: CLINIC | Age: 63
End: 2018-02-12

## 2018-02-13 ENCOUNTER — COMMUNICATION - HEALTHEAST (OUTPATIENT)
Dept: SCHEDULING | Facility: CLINIC | Age: 63
End: 2018-02-13

## 2018-02-15 ENCOUNTER — COMMUNICATION - HEALTHEAST (OUTPATIENT)
Dept: NURSING | Facility: CLINIC | Age: 63
End: 2018-02-15

## 2018-02-19 ENCOUNTER — AMBULATORY - HEALTHEAST (OUTPATIENT)
Dept: CARE COORDINATION | Facility: CLINIC | Age: 63
End: 2018-02-19

## 2018-02-19 ENCOUNTER — COMMUNICATION - HEALTHEAST (OUTPATIENT)
Dept: FAMILY MEDICINE | Facility: CLINIC | Age: 63
End: 2018-02-19

## 2018-02-19 ENCOUNTER — COMMUNICATION - HEALTHEAST (OUTPATIENT)
Dept: CARE COORDINATION | Facility: CLINIC | Age: 63
End: 2018-02-19

## 2018-02-19 ENCOUNTER — OFFICE VISIT - HEALTHEAST (OUTPATIENT)
Dept: FAMILY MEDICINE | Facility: CLINIC | Age: 63
End: 2018-02-19

## 2018-02-19 DIAGNOSIS — J18.9 PNEUMONIA: ICD-10-CM

## 2018-02-19 DIAGNOSIS — E11.9 TYPE 2 DIABETES MELLITUS (H): ICD-10-CM

## 2018-02-19 DIAGNOSIS — D72.829 LEUKOCYTOSIS: ICD-10-CM

## 2018-02-19 LAB
BASOPHILS # BLD AUTO: 0.1 THOU/UL (ref 0–0.2)
BASOPHILS NFR BLD AUTO: 1 % (ref 0–2)
CHOLEST SERPL-MCNC: 151 MG/DL
CREAT UR-MCNC: 81.2 MG/DL
EOSINOPHIL # BLD AUTO: 0.3 THOU/UL (ref 0–0.4)
EOSINOPHIL NFR BLD AUTO: 2 % (ref 0–6)
ERYTHROCYTE [DISTWIDTH] IN BLOOD BY AUTOMATED COUNT: 12 % (ref 11–14.5)
FASTING STATUS PATIENT QL REPORTED: YES
HBA1C MFR BLD: 7 % (ref 3.5–6)
HCT VFR BLD AUTO: 48.2 % (ref 35–47)
HDLC SERPL-MCNC: 32 MG/DL
HGB BLD-MCNC: 15.3 G/DL (ref 12–16)
LDLC SERPL CALC-MCNC: 48 MG/DL
LYMPHOCYTES # BLD AUTO: 3.2 THOU/UL (ref 0.8–4.4)
LYMPHOCYTES NFR BLD AUTO: 24 % (ref 20–40)
MCH RBC QN AUTO: 29.4 PG (ref 27–34)
MCHC RBC AUTO-ENTMCNC: 31.8 G/DL (ref 32–36)
MCV RBC AUTO: 92 FL (ref 80–100)
MICROALBUMIN UR-MCNC: 1.15 MG/DL (ref 0–1.99)
MICROALBUMIN/CREAT UR: 14.2 MG/G
MONOCYTES # BLD AUTO: 0.9 THOU/UL (ref 0–0.9)
MONOCYTES NFR BLD AUTO: 6 % (ref 2–10)
NEUTROPHILS # BLD AUTO: 8.9 THOU/UL (ref 2–7.7)
NEUTROPHILS NFR BLD AUTO: 67 % (ref 50–70)
PLATELET # BLD AUTO: 223 THOU/UL (ref 140–440)
PMV BLD AUTO: 8.7 FL (ref 7–10)
RBC # BLD AUTO: 5.22 MILL/UL (ref 3.8–5.4)
TRIGL SERPL-MCNC: 356 MG/DL
WBC: 13.4 THOU/UL (ref 4–11)

## 2018-02-20 ENCOUNTER — COMMUNICATION - HEALTHEAST (OUTPATIENT)
Dept: NURSING | Facility: CLINIC | Age: 63
End: 2018-02-20

## 2018-02-27 ENCOUNTER — COMMUNICATION - HEALTHEAST (OUTPATIENT)
Dept: NURSING | Facility: CLINIC | Age: 63
End: 2018-02-27

## 2018-02-28 ENCOUNTER — COMMUNICATION - HEALTHEAST (OUTPATIENT)
Dept: FAMILY MEDICINE | Facility: CLINIC | Age: 63
End: 2018-02-28

## 2018-03-05 ENCOUNTER — OFFICE VISIT - HEALTHEAST (OUTPATIENT)
Dept: FAMILY MEDICINE | Facility: CLINIC | Age: 63
End: 2018-03-05

## 2018-03-05 DIAGNOSIS — Z12.31 VISIT FOR SCREENING MAMMOGRAM: ICD-10-CM

## 2018-03-05 DIAGNOSIS — Z12.11 COLON CANCER SCREENING: ICD-10-CM

## 2018-03-05 DIAGNOSIS — Z78.0 POST-MENOPAUSAL: ICD-10-CM

## 2018-03-05 DIAGNOSIS — E11.9 DM2 (DIABETES MELLITUS, TYPE 2) (H): ICD-10-CM

## 2018-03-05 DIAGNOSIS — Z00.00 HEALTH MAINTENANCE EXAMINATION: ICD-10-CM

## 2018-03-05 ASSESSMENT — MIFFLIN-ST. JEOR: SCORE: 1201.56

## 2018-03-06 LAB
HPV SOURCE: ABNORMAL
HUMAN PAPILLOMA VIRUS 16 DNA: NEGATIVE
HUMAN PAPILLOMA VIRUS 18 DNA: NEGATIVE
HUMAN PAPILLOMA VIRUS FINAL DIAGNOSIS: ABNORMAL
HUMAN PAPILLOMA VIRUS OTHER HR: POSITIVE
SPECIMEN DESCRIPTION: ABNORMAL

## 2018-03-13 ENCOUNTER — RECORDS - HEALTHEAST (OUTPATIENT)
Dept: ADMINISTRATIVE | Facility: OTHER | Age: 63
End: 2018-03-13

## 2018-03-15 LAB
BKR LAB AP ABNORMAL BLEEDING: NO
BKR LAB AP BIRTH CONTROL/HORMONES: NORMAL
BKR LAB AP CERVICAL APPEARANCE: NORMAL
BKR LAB AP GYN ADEQUACY: NORMAL
BKR LAB AP GYN INTERPRETATION: NORMAL
BKR LAB AP HPV REFLEX: NORMAL
BKR LAB AP LMP: NORMAL
BKR LAB AP PATIENT STATUS: NORMAL
BKR LAB AP PREVIOUS ABNORMAL: NO
BKR LAB AP PREVIOUS NORMAL: NORMAL
HIGH RISK?: NO
PATH REPORT.COMMENTS IMP SPEC: NORMAL
RESULT FLAG (HE HISTORICAL CONVERSION): NORMAL

## 2018-03-18 ENCOUNTER — AMBULATORY - HEALTHEAST (OUTPATIENT)
Dept: FAMILY MEDICINE | Facility: CLINIC | Age: 63
End: 2018-03-18

## 2018-03-18 DIAGNOSIS — R87.810 CERVICAL HIGH RISK HPV (HUMAN PAPILLOMAVIRUS) TEST POSITIVE: ICD-10-CM

## 2018-03-19 ENCOUNTER — COMMUNICATION - HEALTHEAST (OUTPATIENT)
Dept: FAMILY MEDICINE | Facility: CLINIC | Age: 63
End: 2018-03-19

## 2018-03-20 ENCOUNTER — COMMUNICATION - HEALTHEAST (OUTPATIENT)
Dept: FAMILY MEDICINE | Facility: CLINIC | Age: 63
End: 2018-03-20

## 2018-03-27 ENCOUNTER — COMMUNICATION - HEALTHEAST (OUTPATIENT)
Dept: NURSING | Facility: CLINIC | Age: 63
End: 2018-03-27

## 2018-03-28 ENCOUNTER — COMMUNICATION - HEALTHEAST (OUTPATIENT)
Dept: SCHEDULING | Facility: CLINIC | Age: 63
End: 2018-03-28

## 2018-03-28 ENCOUNTER — AMBULATORY - HEALTHEAST (OUTPATIENT)
Dept: PULMONOLOGY | Facility: OTHER | Age: 63
End: 2018-03-28

## 2018-03-28 DIAGNOSIS — J44.1 COPD EXACERBATION (H): ICD-10-CM

## 2018-03-29 ENCOUNTER — OFFICE VISIT - HEALTHEAST (OUTPATIENT)
Dept: FAMILY MEDICINE | Facility: CLINIC | Age: 63
End: 2018-03-29

## 2018-03-29 DIAGNOSIS — S39.012A LOW BACK STRAIN, INITIAL ENCOUNTER: ICD-10-CM

## 2018-04-06 ENCOUNTER — COMMUNICATION - HEALTHEAST (OUTPATIENT)
Dept: SCHEDULING | Facility: CLINIC | Age: 63
End: 2018-04-06

## 2018-04-10 ENCOUNTER — COMMUNICATION - HEALTHEAST (OUTPATIENT)
Dept: FAMILY MEDICINE | Facility: CLINIC | Age: 63
End: 2018-04-10

## 2018-04-17 ENCOUNTER — OFFICE VISIT - HEALTHEAST (OUTPATIENT)
Dept: FAMILY MEDICINE | Facility: CLINIC | Age: 63
End: 2018-04-17

## 2018-04-17 ENCOUNTER — COMMUNICATION - HEALTHEAST (OUTPATIENT)
Dept: SCHEDULING | Facility: CLINIC | Age: 63
End: 2018-04-17

## 2018-04-17 ENCOUNTER — COMMUNICATION - HEALTHEAST (OUTPATIENT)
Dept: FAMILY MEDICINE | Facility: CLINIC | Age: 63
End: 2018-04-17

## 2018-04-17 DIAGNOSIS — L08.9 SKIN INFECTION: ICD-10-CM

## 2018-04-17 DIAGNOSIS — T14.8XXA PUNCTURE WOUND: ICD-10-CM

## 2018-04-27 ENCOUNTER — COMMUNICATION - HEALTHEAST (OUTPATIENT)
Dept: NURSING | Facility: CLINIC | Age: 63
End: 2018-04-27

## 2018-05-04 ENCOUNTER — COMMUNICATION - HEALTHEAST (OUTPATIENT)
Dept: SCHEDULING | Facility: CLINIC | Age: 63
End: 2018-05-04

## 2018-05-14 ENCOUNTER — COMMUNICATION - HEALTHEAST (OUTPATIENT)
Dept: SCHEDULING | Facility: CLINIC | Age: 63
End: 2018-05-14

## 2018-05-22 ENCOUNTER — OFFICE VISIT - HEALTHEAST (OUTPATIENT)
Dept: PULMONOLOGY | Facility: OTHER | Age: 63
End: 2018-05-22

## 2018-05-22 DIAGNOSIS — J44.9 CHRONIC OBSTRUCTIVE PULMONARY DISEASE, UNSPECIFIED COPD TYPE (H): ICD-10-CM

## 2018-05-22 DIAGNOSIS — R06.09 DYSPNEA ON EXERTION: ICD-10-CM

## 2018-05-22 DIAGNOSIS — R53.81 PHYSICAL DECONDITIONING: ICD-10-CM

## 2018-05-22 DIAGNOSIS — J96.11 CHRONIC RESPIRATORY FAILURE WITH HYPOXIA (H): ICD-10-CM

## 2018-05-22 DIAGNOSIS — G47.33 OSA ON CPAP: ICD-10-CM

## 2018-05-22 DIAGNOSIS — E66.813 CLASS 3 OBESITY WITH ALVEOLAR HYPOVENTILATION, SERIOUS COMORBIDITY, AND BODY MASS INDEX (BMI) OF 40.0 TO 44.9 IN ADULT (H): ICD-10-CM

## 2018-05-22 DIAGNOSIS — E66.2 CLASS 3 OBESITY WITH ALVEOLAR HYPOVENTILATION, SERIOUS COMORBIDITY, AND BODY MASS INDEX (BMI) OF 40.0 TO 44.9 IN ADULT (H): ICD-10-CM

## 2018-06-03 ENCOUNTER — COMMUNICATION - HEALTHEAST (OUTPATIENT)
Dept: SCHEDULING | Facility: CLINIC | Age: 63
End: 2018-06-03

## 2018-06-04 ENCOUNTER — OFFICE VISIT - HEALTHEAST (OUTPATIENT)
Dept: FAMILY MEDICINE | Facility: CLINIC | Age: 63
End: 2018-06-04

## 2018-06-04 DIAGNOSIS — E11.9 TYPE 2 DIABETES MELLITUS WITHOUT COMPLICATION, WITHOUT LONG-TERM CURRENT USE OF INSULIN (H): ICD-10-CM

## 2018-06-04 DIAGNOSIS — G47.00 INSOMNIA: ICD-10-CM

## 2018-06-04 DIAGNOSIS — H91.90 HEARING LOSS: ICD-10-CM

## 2018-06-04 DIAGNOSIS — R42 VERTIGO: ICD-10-CM

## 2018-06-04 DIAGNOSIS — E11.9 DM2 (DIABETES MELLITUS, TYPE 2) (H): ICD-10-CM

## 2018-06-04 DIAGNOSIS — H93.19 TINNITUS: ICD-10-CM

## 2018-06-04 LAB — HBA1C MFR BLD: 7.2 % (ref 3.5–6)

## 2018-06-05 ENCOUNTER — COMMUNICATION - HEALTHEAST (OUTPATIENT)
Dept: FAMILY MEDICINE | Facility: CLINIC | Age: 63
End: 2018-06-05

## 2018-06-06 ENCOUNTER — COMMUNICATION - HEALTHEAST (OUTPATIENT)
Dept: SCHEDULING | Facility: CLINIC | Age: 63
End: 2018-06-06

## 2018-06-10 ENCOUNTER — COMMUNICATION - HEALTHEAST (OUTPATIENT)
Dept: SCHEDULING | Facility: CLINIC | Age: 63
End: 2018-06-10

## 2018-06-12 ENCOUNTER — COMMUNICATION - HEALTHEAST (OUTPATIENT)
Dept: SCHEDULING | Facility: CLINIC | Age: 63
End: 2018-06-12

## 2018-06-20 ENCOUNTER — COMMUNICATION - HEALTHEAST (OUTPATIENT)
Dept: FAMILY MEDICINE | Facility: CLINIC | Age: 63
End: 2018-06-20

## 2018-06-20 ENCOUNTER — COMMUNICATION - HEALTHEAST (OUTPATIENT)
Dept: SCHEDULING | Facility: CLINIC | Age: 63
End: 2018-06-20

## 2018-06-20 DIAGNOSIS — J30.89 ENVIRONMENTAL AND SEASONAL ALLERGIES: ICD-10-CM

## 2018-06-24 ENCOUNTER — COMMUNICATION - HEALTHEAST (OUTPATIENT)
Dept: SCHEDULING | Facility: CLINIC | Age: 63
End: 2018-06-24

## 2018-06-26 ENCOUNTER — COMMUNICATION - HEALTHEAST (OUTPATIENT)
Dept: SCHEDULING | Facility: CLINIC | Age: 63
End: 2018-06-26

## 2018-06-26 DIAGNOSIS — J44.1 COPD WITH EXACERBATION (H): ICD-10-CM

## 2018-06-27 ENCOUNTER — OFFICE VISIT - HEALTHEAST (OUTPATIENT)
Dept: FAMILY MEDICINE | Facility: CLINIC | Age: 63
End: 2018-06-27

## 2018-06-27 DIAGNOSIS — J44.1 COPD EXACERBATION (H): ICD-10-CM

## 2018-06-27 DIAGNOSIS — R05.9 COUGH: ICD-10-CM

## 2018-06-28 ENCOUNTER — COMMUNICATION - HEALTHEAST (OUTPATIENT)
Dept: FAMILY MEDICINE | Facility: CLINIC | Age: 63
End: 2018-06-28

## 2018-07-04 ENCOUNTER — COMMUNICATION - HEALTHEAST (OUTPATIENT)
Dept: SCHEDULING | Facility: CLINIC | Age: 63
End: 2018-07-04

## 2018-07-10 ENCOUNTER — COMMUNICATION - HEALTHEAST (OUTPATIENT)
Dept: ADMINISTRATIVE | Facility: CLINIC | Age: 63
End: 2018-07-10

## 2018-08-09 ENCOUNTER — COMMUNICATION - HEALTHEAST (OUTPATIENT)
Dept: SCHEDULING | Facility: CLINIC | Age: 63
End: 2018-08-09

## 2018-08-09 ENCOUNTER — RECORDS - HEALTHEAST (OUTPATIENT)
Dept: GENERAL RADIOLOGY | Facility: CLINIC | Age: 63
End: 2018-08-09

## 2018-08-09 ENCOUNTER — OFFICE VISIT - HEALTHEAST (OUTPATIENT)
Dept: FAMILY MEDICINE | Facility: CLINIC | Age: 63
End: 2018-08-09

## 2018-08-09 DIAGNOSIS — R06.02 SOB (SHORTNESS OF BREATH): ICD-10-CM

## 2018-08-09 DIAGNOSIS — F31.62 BIPOLAR DISORDER, CURRENT EPISODE MIXED, MODERATE (H): ICD-10-CM

## 2018-08-09 DIAGNOSIS — B37.31 YEAST VAGINITIS: ICD-10-CM

## 2018-08-09 DIAGNOSIS — E11.9 TYPE 2 DIABETES MELLITUS (H): ICD-10-CM

## 2018-08-09 DIAGNOSIS — M54.6 MIDLINE THORACIC BACK PAIN: ICD-10-CM

## 2018-08-09 DIAGNOSIS — I42.9 CARDIOMYOPATHY (H): ICD-10-CM

## 2018-08-09 DIAGNOSIS — I42.9 CARDIOMYOPATHY, UNSPECIFIED TYPE (H): ICD-10-CM

## 2018-08-09 DIAGNOSIS — Z76.89 ESTABLISHING CARE WITH NEW DOCTOR, ENCOUNTER FOR: ICD-10-CM

## 2018-08-09 DIAGNOSIS — M41.20 SCOLIOSIS (AND KYPHOSCOLIOSIS), IDIOPATHIC: ICD-10-CM

## 2018-08-09 DIAGNOSIS — J44.1 COPD WITH EXACERBATION (H): ICD-10-CM

## 2018-08-09 DIAGNOSIS — M54.6 PAIN IN THORACIC SPINE: ICD-10-CM

## 2018-08-09 DIAGNOSIS — J44.9 COPD (CHRONIC OBSTRUCTIVE PULMONARY DISEASE) (H): ICD-10-CM

## 2018-08-15 ENCOUNTER — OFFICE VISIT - HEALTHEAST (OUTPATIENT)
Dept: FAMILY MEDICINE | Facility: CLINIC | Age: 63
End: 2018-08-15

## 2018-08-15 ENCOUNTER — COMMUNICATION - HEALTHEAST (OUTPATIENT)
Dept: FAMILY MEDICINE | Facility: CLINIC | Age: 63
End: 2018-08-15

## 2018-08-15 DIAGNOSIS — E11.9 TYPE 2 DIABETES MELLITUS WITHOUT COMPLICATION, WITHOUT LONG-TERM CURRENT USE OF INSULIN (H): ICD-10-CM

## 2018-08-15 DIAGNOSIS — F06.8 COGNITIVE DYSFUNCTION IN BIPOLAR DISORDER (H): ICD-10-CM

## 2018-08-15 DIAGNOSIS — E66.01 MORBID OBESITY (H): ICD-10-CM

## 2018-08-15 DIAGNOSIS — J44.9 CHRONIC OBSTRUCTIVE PULMONARY DISEASE, UNSPECIFIED COPD TYPE (H): ICD-10-CM

## 2018-08-15 DIAGNOSIS — F31.9 COGNITIVE DYSFUNCTION IN BIPOLAR DISORDER (H): ICD-10-CM

## 2018-08-15 DIAGNOSIS — I42.9 CARDIOMYOPATHY, UNSPECIFIED TYPE (H): ICD-10-CM

## 2018-08-15 DIAGNOSIS — Z91.148 NONCOMPLIANCE WITH MEDICATIONS: ICD-10-CM

## 2018-08-15 ASSESSMENT — MIFFLIN-ST. JEOR: SCORE: 1162.1

## 2018-08-17 ENCOUNTER — COMMUNICATION - HEALTHEAST (OUTPATIENT)
Dept: FAMILY MEDICINE | Facility: CLINIC | Age: 63
End: 2018-08-17

## 2018-08-17 DIAGNOSIS — J44.9 CHRONIC OBSTRUCTIVE PULMONARY DISEASE, UNSPECIFIED COPD TYPE (H): ICD-10-CM

## 2018-08-17 DIAGNOSIS — R26.81 UNSTABLE GAIT: ICD-10-CM

## 2018-08-17 DIAGNOSIS — M54.50 LUMBAGO: ICD-10-CM

## 2018-08-17 DIAGNOSIS — E11.9 TYPE 2 DIABETES MELLITUS (H): ICD-10-CM

## 2018-08-17 DIAGNOSIS — I42.9 CARDIOMYOPATHY, UNSPECIFIED TYPE (H): ICD-10-CM

## 2018-08-20 ENCOUNTER — RECORDS - HEALTHEAST (OUTPATIENT)
Dept: ADMINISTRATIVE | Facility: OTHER | Age: 63
End: 2018-08-20

## 2018-08-23 ENCOUNTER — COMMUNICATION - HEALTHEAST (OUTPATIENT)
Dept: SCHEDULING | Facility: CLINIC | Age: 63
End: 2018-08-23

## 2018-08-23 ENCOUNTER — OFFICE VISIT - HEALTHEAST (OUTPATIENT)
Dept: FAMILY MEDICINE | Facility: CLINIC | Age: 63
End: 2018-08-23

## 2018-08-23 DIAGNOSIS — F31.62 BIPOLAR DISORDER, CURRENT EPISODE MIXED, MODERATE (H): ICD-10-CM

## 2018-08-23 DIAGNOSIS — F31.9 COGNITIVE DYSFUNCTION IN BIPOLAR DISORDER (H): ICD-10-CM

## 2018-08-23 DIAGNOSIS — Z99.81 DEPENDENCE ON CONTINUOUS SUPPLEMENTAL OXYGEN: ICD-10-CM

## 2018-08-23 DIAGNOSIS — F06.8 COGNITIVE DYSFUNCTION IN BIPOLAR DISORDER (H): ICD-10-CM

## 2018-08-23 DIAGNOSIS — E66.01 MORBID OBESITY (H): ICD-10-CM

## 2018-08-23 DIAGNOSIS — M54.50 LUMBAGO: ICD-10-CM

## 2018-08-23 DIAGNOSIS — R26.81 UNSTABLE GAIT: ICD-10-CM

## 2018-08-23 DIAGNOSIS — J44.9 CHRONIC OBSTRUCTIVE PULMONARY DISEASE, UNSPECIFIED COPD TYPE (H): ICD-10-CM

## 2018-08-23 DIAGNOSIS — I42.9 CARDIOMYOPATHY, UNSPECIFIED TYPE (H): ICD-10-CM

## 2018-09-07 ENCOUNTER — COMMUNICATION - HEALTHEAST (OUTPATIENT)
Dept: FAMILY MEDICINE | Facility: CLINIC | Age: 63
End: 2018-09-07

## 2018-09-09 ENCOUNTER — COMMUNICATION - HEALTHEAST (OUTPATIENT)
Dept: SCHEDULING | Facility: CLINIC | Age: 63
End: 2018-09-09

## 2018-09-11 ENCOUNTER — COMMUNICATION - HEALTHEAST (OUTPATIENT)
Dept: SCHEDULING | Facility: CLINIC | Age: 63
End: 2018-09-11

## 2018-09-11 ENCOUNTER — COMMUNICATION - HEALTHEAST (OUTPATIENT)
Dept: FAMILY MEDICINE | Facility: CLINIC | Age: 63
End: 2018-09-11

## 2018-10-09 ENCOUNTER — COMMUNICATION - HEALTHEAST (OUTPATIENT)
Dept: FAMILY MEDICINE | Facility: CLINIC | Age: 63
End: 2018-10-09

## 2018-10-10 ENCOUNTER — COMMUNICATION - HEALTHEAST (OUTPATIENT)
Dept: SCHEDULING | Facility: CLINIC | Age: 63
End: 2018-10-10

## 2018-10-11 ENCOUNTER — COMMUNICATION - HEALTHEAST (OUTPATIENT)
Dept: FAMILY MEDICINE | Facility: CLINIC | Age: 63
End: 2018-10-11

## 2018-10-17 ENCOUNTER — COMMUNICATION - HEALTHEAST (OUTPATIENT)
Dept: SCHEDULING | Facility: CLINIC | Age: 63
End: 2018-10-17

## 2018-10-19 ENCOUNTER — OFFICE VISIT - HEALTHEAST (OUTPATIENT)
Dept: FAMILY MEDICINE | Facility: CLINIC | Age: 63
End: 2018-10-19

## 2018-10-19 DIAGNOSIS — F31.62 BIPOLAR DISORDER, CURRENT EPISODE MIXED, MODERATE (H): ICD-10-CM

## 2018-10-19 DIAGNOSIS — J44.1 COPD EXACERBATION (H): ICD-10-CM

## 2018-10-19 DIAGNOSIS — M54.9 BACK PAIN: ICD-10-CM

## 2018-10-19 DIAGNOSIS — E11.9 TYPE 2 DIABETES MELLITUS (H): ICD-10-CM

## 2018-10-19 LAB — HBA1C MFR BLD: 6.9 % (ref 3.5–6)

## 2018-10-19 ASSESSMENT — MIFFLIN-ST. JEOR: SCORE: 1198.39

## 2018-10-25 ENCOUNTER — COMMUNICATION - HEALTHEAST (OUTPATIENT)
Dept: SCHEDULING | Facility: CLINIC | Age: 63
End: 2018-10-25

## 2018-10-27 ENCOUNTER — COMMUNICATION - HEALTHEAST (OUTPATIENT)
Dept: SCHEDULING | Facility: CLINIC | Age: 63
End: 2018-10-27

## 2018-11-02 ENCOUNTER — COMMUNICATION - HEALTHEAST (OUTPATIENT)
Dept: SCHEDULING | Facility: CLINIC | Age: 63
End: 2018-11-02

## 2018-11-05 ENCOUNTER — COMMUNICATION - HEALTHEAST (OUTPATIENT)
Dept: FAMILY MEDICINE | Facility: CLINIC | Age: 63
End: 2018-11-05

## 2018-11-05 DIAGNOSIS — J44.9 COPD (CHRONIC OBSTRUCTIVE PULMONARY DISEASE) (H): ICD-10-CM

## 2018-11-05 DIAGNOSIS — Z99.81 CHRONIC RESPIRATORY FAILURE WITH HYPOXIA, ON HOME OXYGEN THERAPY (H): ICD-10-CM

## 2018-11-05 DIAGNOSIS — J96.11 CHRONIC RESPIRATORY FAILURE WITH HYPOXIA, ON HOME OXYGEN THERAPY (H): ICD-10-CM

## 2018-11-05 DIAGNOSIS — R26.81 UNSTABLE GAIT: ICD-10-CM

## 2018-11-08 ENCOUNTER — COMMUNICATION - HEALTHEAST (OUTPATIENT)
Dept: SCHEDULING | Facility: CLINIC | Age: 63
End: 2018-11-08

## 2018-11-08 ENCOUNTER — OFFICE VISIT - HEALTHEAST (OUTPATIENT)
Dept: FAMILY MEDICINE | Facility: CLINIC | Age: 63
End: 2018-11-08

## 2018-11-08 ENCOUNTER — COMMUNICATION - HEALTHEAST (OUTPATIENT)
Dept: FAMILY MEDICINE | Facility: CLINIC | Age: 63
End: 2018-11-08

## 2018-11-08 DIAGNOSIS — E11.9 TYPE 2 DIABETES MELLITUS (H): ICD-10-CM

## 2018-11-08 DIAGNOSIS — F31.62 BIPOLAR DISORDER, CURRENT EPISODE MIXED, MODERATE (H): ICD-10-CM

## 2018-11-08 DIAGNOSIS — J44.9 CHRONIC OBSTRUCTIVE PULMONARY DISEASE, UNSPECIFIED COPD TYPE (H): ICD-10-CM

## 2018-11-08 DIAGNOSIS — G47.00 INSOMNIA: ICD-10-CM

## 2018-11-10 ENCOUNTER — COMMUNICATION - HEALTHEAST (OUTPATIENT)
Dept: SCHEDULING | Facility: CLINIC | Age: 63
End: 2018-11-10

## 2018-11-13 ENCOUNTER — COMMUNICATION - HEALTHEAST (OUTPATIENT)
Dept: SCHEDULING | Facility: CLINIC | Age: 63
End: 2018-11-13

## 2018-11-13 ENCOUNTER — COMMUNICATION - HEALTHEAST (OUTPATIENT)
Dept: FAMILY MEDICINE | Facility: CLINIC | Age: 63
End: 2018-11-13

## 2018-11-14 ENCOUNTER — COMMUNICATION - HEALTHEAST (OUTPATIENT)
Dept: FAMILY MEDICINE | Facility: CLINIC | Age: 63
End: 2018-11-14

## 2018-11-14 ENCOUNTER — COMMUNICATION - HEALTHEAST (OUTPATIENT)
Dept: SCHEDULING | Facility: CLINIC | Age: 63
End: 2018-11-14

## 2018-11-15 ENCOUNTER — OFFICE VISIT - HEALTHEAST (OUTPATIENT)
Dept: FAMILY MEDICINE | Facility: CLINIC | Age: 63
End: 2018-11-15

## 2018-11-15 DIAGNOSIS — E66.01 MORBID OBESITY (H): ICD-10-CM

## 2018-11-15 DIAGNOSIS — F31.62 BIPOLAR DISORDER, CURRENT EPISODE MIXED, MODERATE (H): ICD-10-CM

## 2018-11-15 DIAGNOSIS — R11.0 NAUSEA: ICD-10-CM

## 2018-11-15 DIAGNOSIS — M79.89 LEG SWELLING: ICD-10-CM

## 2018-11-15 DIAGNOSIS — R68.89 FEELING UNWELL: ICD-10-CM

## 2018-11-15 DIAGNOSIS — F41.1 ANXIETY STATE: ICD-10-CM

## 2018-11-17 ENCOUNTER — COMMUNICATION - HEALTHEAST (OUTPATIENT)
Dept: SCHEDULING | Facility: CLINIC | Age: 63
End: 2018-11-17

## 2018-11-24 ENCOUNTER — COMMUNICATION - HEALTHEAST (OUTPATIENT)
Dept: SCHEDULING | Facility: CLINIC | Age: 63
End: 2018-11-24

## 2018-11-25 ENCOUNTER — OFFICE VISIT - HEALTHEAST (OUTPATIENT)
Dept: FAMILY MEDICINE | Facility: CLINIC | Age: 63
End: 2018-11-25

## 2018-11-25 DIAGNOSIS — F31.4 BIPOLAR DISORDER, CURRENT EPISODE DEPRESSED, SEVERE, WITHOUT PSYCHOTIC FEATURES (H): ICD-10-CM

## 2018-11-26 ENCOUNTER — OFFICE VISIT - HEALTHEAST (OUTPATIENT)
Dept: FAMILY MEDICINE | Facility: CLINIC | Age: 63
End: 2018-11-26

## 2018-11-26 DIAGNOSIS — L72.11 PILAR CYST: ICD-10-CM

## 2018-11-26 DIAGNOSIS — E11.9 TYPE 2 DIABETES MELLITUS (H): ICD-10-CM

## 2018-11-26 DIAGNOSIS — F31.62 BIPOLAR DISORDER, CURRENT EPISODE MIXED, MODERATE (H): ICD-10-CM

## 2018-11-27 ENCOUNTER — RECORDS - HEALTHEAST (OUTPATIENT)
Dept: ADMINISTRATIVE | Facility: OTHER | Age: 63
End: 2018-11-27

## 2018-11-27 ENCOUNTER — COMMUNICATION - HEALTHEAST (OUTPATIENT)
Dept: SCHEDULING | Facility: CLINIC | Age: 63
End: 2018-11-27

## 2018-11-30 ENCOUNTER — COMMUNICATION - HEALTHEAST (OUTPATIENT)
Dept: FAMILY MEDICINE | Facility: CLINIC | Age: 63
End: 2018-11-30

## 2018-12-03 ENCOUNTER — OFFICE VISIT - HEALTHEAST (OUTPATIENT)
Dept: FAMILY MEDICINE | Facility: CLINIC | Age: 63
End: 2018-12-03

## 2018-12-03 ENCOUNTER — COMMUNICATION - HEALTHEAST (OUTPATIENT)
Dept: FAMILY MEDICINE | Facility: CLINIC | Age: 63
End: 2018-12-03

## 2018-12-03 DIAGNOSIS — F31.62 BIPOLAR DISORDER, CURRENT EPISODE MIXED, MODERATE (H): ICD-10-CM

## 2018-12-03 DIAGNOSIS — F41.1 ANXIETY STATE: ICD-10-CM

## 2018-12-03 DIAGNOSIS — R53.83 FATIGUE, UNSPECIFIED TYPE: ICD-10-CM

## 2018-12-03 DIAGNOSIS — E66.01 MORBID OBESITY (H): ICD-10-CM

## 2018-12-05 ENCOUNTER — COMMUNICATION - HEALTHEAST (OUTPATIENT)
Dept: SCHEDULING | Facility: CLINIC | Age: 63
End: 2018-12-05

## 2018-12-09 ENCOUNTER — COMMUNICATION - HEALTHEAST (OUTPATIENT)
Dept: SCHEDULING | Facility: CLINIC | Age: 63
End: 2018-12-09

## 2018-12-12 ENCOUNTER — COMMUNICATION - HEALTHEAST (OUTPATIENT)
Dept: FAMILY MEDICINE | Facility: CLINIC | Age: 63
End: 2018-12-12

## 2018-12-12 ENCOUNTER — COMMUNICATION - HEALTHEAST (OUTPATIENT)
Dept: SCHEDULING | Facility: CLINIC | Age: 63
End: 2018-12-12

## 2018-12-13 ENCOUNTER — COMMUNICATION - HEALTHEAST (OUTPATIENT)
Dept: SCHEDULING | Facility: CLINIC | Age: 63
End: 2018-12-13

## 2018-12-14 ENCOUNTER — COMMUNICATION - HEALTHEAST (OUTPATIENT)
Dept: SCHEDULING | Facility: CLINIC | Age: 63
End: 2018-12-14

## 2018-12-14 ENCOUNTER — OFFICE VISIT - HEALTHEAST (OUTPATIENT)
Dept: FAMILY MEDICINE | Facility: CLINIC | Age: 63
End: 2018-12-14

## 2018-12-14 DIAGNOSIS — M54.50 ACUTE RIGHT-SIDED LOW BACK PAIN WITHOUT SCIATICA: ICD-10-CM

## 2018-12-14 DIAGNOSIS — T88.7XXA MEDICATION SIDE EFFECTS: ICD-10-CM

## 2018-12-15 ENCOUNTER — COMMUNICATION - HEALTHEAST (OUTPATIENT)
Dept: SCHEDULING | Facility: CLINIC | Age: 63
End: 2018-12-15

## 2018-12-16 ENCOUNTER — COMMUNICATION - HEALTHEAST (OUTPATIENT)
Dept: SCHEDULING | Facility: CLINIC | Age: 63
End: 2018-12-16

## 2018-12-16 DIAGNOSIS — F41.1 ANXIETY STATE: ICD-10-CM

## 2018-12-16 DIAGNOSIS — F31.62 BIPOLAR DISORDER, CURRENT EPISODE MIXED, MODERATE (H): ICD-10-CM

## 2018-12-16 DIAGNOSIS — F31.9 COGNITIVE DYSFUNCTION IN BIPOLAR DISORDER (H): ICD-10-CM

## 2018-12-16 DIAGNOSIS — F06.8 COGNITIVE DYSFUNCTION IN BIPOLAR DISORDER (H): ICD-10-CM

## 2018-12-17 ENCOUNTER — COMMUNICATION - HEALTHEAST (OUTPATIENT)
Dept: SCHEDULING | Facility: CLINIC | Age: 63
End: 2018-12-17

## 2018-12-18 ENCOUNTER — COMMUNICATION - HEALTHEAST (OUTPATIENT)
Dept: SCHEDULING | Facility: CLINIC | Age: 63
End: 2018-12-18

## 2018-12-19 ENCOUNTER — COMMUNICATION - HEALTHEAST (OUTPATIENT)
Dept: NURSING | Facility: CLINIC | Age: 63
End: 2018-12-19

## 2018-12-20 ENCOUNTER — OFFICE VISIT - HEALTHEAST (OUTPATIENT)
Dept: FAMILY MEDICINE | Facility: CLINIC | Age: 63
End: 2018-12-20

## 2018-12-20 ENCOUNTER — COMMUNICATION - HEALTHEAST (OUTPATIENT)
Dept: SCHEDULING | Facility: CLINIC | Age: 63
End: 2018-12-20

## 2018-12-20 DIAGNOSIS — F31.62 BIPOLAR DISORDER, CURRENT EPISODE MIXED, MODERATE (H): ICD-10-CM

## 2018-12-21 ENCOUNTER — AMBULATORY - HEALTHEAST (OUTPATIENT)
Dept: FAMILY MEDICINE | Facility: CLINIC | Age: 63
End: 2018-12-21

## 2018-12-21 DIAGNOSIS — F41.1 ANXIETY STATE: ICD-10-CM

## 2018-12-21 DIAGNOSIS — Z71.89 ENCOUNTER FOR COUNSELING FOR CARE MANAGEMENT OF PATIENT WITH CHRONIC CONDITIONS AND COMPLEX HEALTH NEEDS USING NURSE-BASED MODEL: ICD-10-CM

## 2018-12-21 DIAGNOSIS — F31.62 BIPOLAR DISORDER, CURRENT EPISODE MIXED, MODERATE (H): ICD-10-CM

## 2018-12-26 ENCOUNTER — COMMUNICATION - HEALTHEAST (OUTPATIENT)
Dept: NURSING | Facility: CLINIC | Age: 63
End: 2018-12-26

## 2018-12-26 ENCOUNTER — COMMUNICATION - HEALTHEAST (OUTPATIENT)
Dept: SCHEDULING | Facility: CLINIC | Age: 63
End: 2018-12-26

## 2018-12-26 ENCOUNTER — COMMUNICATION - HEALTHEAST (OUTPATIENT)
Dept: FAMILY MEDICINE | Facility: CLINIC | Age: 63
End: 2018-12-26

## 2018-12-27 ENCOUNTER — COMMUNICATION - HEALTHEAST (OUTPATIENT)
Dept: PHYSICAL MEDICINE AND REHAB | Facility: CLINIC | Age: 63
End: 2018-12-27

## 2018-12-30 ENCOUNTER — OFFICE VISIT - HEALTHEAST (OUTPATIENT)
Dept: FAMILY MEDICINE | Facility: CLINIC | Age: 63
End: 2018-12-30

## 2018-12-30 ENCOUNTER — COMMUNICATION - HEALTHEAST (OUTPATIENT)
Dept: SCHEDULING | Facility: CLINIC | Age: 63
End: 2018-12-30

## 2018-12-30 DIAGNOSIS — F41.9 ANXIETY: ICD-10-CM

## 2018-12-30 DIAGNOSIS — F31.70 BIPOLAR AFFECTIVE DISORDER IN REMISSION (H): ICD-10-CM

## 2019-01-02 ENCOUNTER — COMMUNICATION - HEALTHEAST (OUTPATIENT)
Dept: NURSING | Facility: CLINIC | Age: 64
End: 2019-01-02

## 2019-01-04 ENCOUNTER — COMMUNICATION - HEALTHEAST (OUTPATIENT)
Dept: NURSING | Facility: CLINIC | Age: 64
End: 2019-01-04

## 2019-01-11 ENCOUNTER — COMMUNICATION - HEALTHEAST (OUTPATIENT)
Dept: SCHEDULING | Facility: CLINIC | Age: 64
End: 2019-01-11

## 2019-01-14 ENCOUNTER — OFFICE VISIT - HEALTHEAST (OUTPATIENT)
Dept: FAMILY MEDICINE | Facility: CLINIC | Age: 64
End: 2019-01-14

## 2019-01-14 ENCOUNTER — COMMUNICATION - HEALTHEAST (OUTPATIENT)
Dept: SCHEDULING | Facility: CLINIC | Age: 64
End: 2019-01-14

## 2019-01-14 DIAGNOSIS — F31.62 BIPOLAR DISORDER, CURRENT EPISODE MIXED, MODERATE (H): ICD-10-CM

## 2019-01-16 ENCOUNTER — COMMUNICATION - HEALTHEAST (OUTPATIENT)
Dept: FAMILY MEDICINE | Facility: CLINIC | Age: 64
End: 2019-01-16

## 2019-01-16 DIAGNOSIS — F31.62 BIPOLAR DISORDER, CURRENT EPISODE MIXED, MODERATE (H): ICD-10-CM

## 2019-01-21 ENCOUNTER — COMMUNICATION - HEALTHEAST (OUTPATIENT)
Dept: SCHEDULING | Facility: CLINIC | Age: 64
End: 2019-01-21

## 2019-01-21 ENCOUNTER — RECORDS - HEALTHEAST (OUTPATIENT)
Dept: SCHEDULING | Facility: CLINIC | Age: 64
End: 2019-01-21

## 2019-01-24 ENCOUNTER — COMMUNICATION - HEALTHEAST (OUTPATIENT)
Dept: NURSING | Facility: CLINIC | Age: 64
End: 2019-01-24

## 2019-01-28 ENCOUNTER — COMMUNICATION - HEALTHEAST (OUTPATIENT)
Dept: FAMILY MEDICINE | Facility: CLINIC | Age: 64
End: 2019-01-28

## 2019-01-28 ENCOUNTER — COMMUNICATION - HEALTHEAST (OUTPATIENT)
Dept: NURSING | Facility: CLINIC | Age: 64
End: 2019-01-28

## 2019-02-05 ENCOUNTER — COMMUNICATION - HEALTHEAST (OUTPATIENT)
Dept: NURSING | Facility: CLINIC | Age: 64
End: 2019-02-05

## 2019-02-19 ENCOUNTER — COMMUNICATION - HEALTHEAST (OUTPATIENT)
Dept: SCHEDULING | Facility: CLINIC | Age: 64
End: 2019-02-19

## 2019-03-02 ENCOUNTER — COMMUNICATION - HEALTHEAST (OUTPATIENT)
Dept: SCHEDULING | Facility: CLINIC | Age: 64
End: 2019-03-02

## 2019-03-15 ENCOUNTER — AMBULATORY - HEALTHEAST (OUTPATIENT)
Dept: PHARMACY | Facility: CLINIC | Age: 64
End: 2019-03-15

## 2019-03-15 DIAGNOSIS — J45.909 ASTHMA: ICD-10-CM

## 2019-03-15 DIAGNOSIS — E11.9 TYPE 2 DIABETES MELLITUS WITHOUT COMPLICATION, WITHOUT LONG-TERM CURRENT USE OF INSULIN (H): ICD-10-CM

## 2019-03-15 DIAGNOSIS — F31.30 BIPOLAR I DISORDER, MOST RECENT EPISODE DEPRESSED (H): ICD-10-CM

## 2019-03-15 DIAGNOSIS — J45.901 ASTHMA WITH ACUTE EXACERBATION, UNSPECIFIED ASTHMA SEVERITY, UNSPECIFIED WHETHER PERSISTENT: ICD-10-CM

## 2019-03-15 DIAGNOSIS — K59.01 SLOW TRANSIT CONSTIPATION: ICD-10-CM

## 2019-03-18 ENCOUNTER — OFFICE VISIT - HEALTHEAST (OUTPATIENT)
Dept: FAMILY MEDICINE | Facility: CLINIC | Age: 64
End: 2019-03-18

## 2019-03-18 DIAGNOSIS — M54.6 CHRONIC MIDLINE THORACIC BACK PAIN: ICD-10-CM

## 2019-03-18 DIAGNOSIS — F31.9 COGNITIVE DYSFUNCTION IN BIPOLAR DISORDER (H): ICD-10-CM

## 2019-03-18 DIAGNOSIS — E66.01 MORBID OBESITY (H): ICD-10-CM

## 2019-03-18 DIAGNOSIS — J44.9 CHRONIC OBSTRUCTIVE PULMONARY DISEASE, UNSPECIFIED COPD TYPE (H): ICD-10-CM

## 2019-03-18 DIAGNOSIS — Z12.31 ENCOUNTER FOR SCREENING MAMMOGRAM FOR BREAST CANCER: ICD-10-CM

## 2019-03-18 DIAGNOSIS — G89.29 CHRONIC MIDLINE THORACIC BACK PAIN: ICD-10-CM

## 2019-03-18 DIAGNOSIS — F06.8 COGNITIVE DYSFUNCTION IN BIPOLAR DISORDER (H): ICD-10-CM

## 2019-03-18 DIAGNOSIS — E11.9 TYPE 2 DIABETES MELLITUS WITHOUT COMPLICATION, WITHOUT LONG-TERM CURRENT USE OF INSULIN (H): ICD-10-CM

## 2019-03-18 DIAGNOSIS — M41.20 SCOLIOSIS (AND KYPHOSCOLIOSIS), IDIOPATHIC: ICD-10-CM

## 2019-03-18 DIAGNOSIS — Z09 HOSPITAL DISCHARGE FOLLOW-UP: ICD-10-CM

## 2019-03-18 DIAGNOSIS — Z99.81 DEPENDENCE ON CONTINUOUS SUPPLEMENTAL OXYGEN: ICD-10-CM

## 2019-03-18 DIAGNOSIS — F31.30 BIPOLAR I DISORDER, MOST RECENT EPISODE DEPRESSED (H): ICD-10-CM

## 2019-03-18 DIAGNOSIS — R09.89 RUNNY NOSE: ICD-10-CM

## 2019-03-25 ENCOUNTER — COMMUNICATION - HEALTHEAST (OUTPATIENT)
Dept: FAMILY MEDICINE | Facility: CLINIC | Age: 64
End: 2019-03-25

## 2019-03-25 DIAGNOSIS — J43.2 CENTRILOBULAR EMPHYSEMA (H): ICD-10-CM

## 2019-03-25 DIAGNOSIS — M41.20 SCOLIOSIS (AND KYPHOSCOLIOSIS), IDIOPATHIC: ICD-10-CM

## 2019-03-25 DIAGNOSIS — G89.29 CHRONIC LOW BACK PAIN, UNSPECIFIED BACK PAIN LATERALITY, WITH SCIATICA PRESENCE UNSPECIFIED: ICD-10-CM

## 2019-03-25 DIAGNOSIS — M54.5 CHRONIC LOW BACK PAIN, UNSPECIFIED BACK PAIN LATERALITY, WITH SCIATICA PRESENCE UNSPECIFIED: ICD-10-CM

## 2019-03-25 DIAGNOSIS — E66.01 MORBID OBESITY (H): ICD-10-CM

## 2019-03-25 DIAGNOSIS — F31.30 BIPOLAR I DISORDER, MOST RECENT EPISODE DEPRESSED (H): ICD-10-CM

## 2019-03-30 ENCOUNTER — COMMUNICATION - HEALTHEAST (OUTPATIENT)
Dept: SCHEDULING | Facility: CLINIC | Age: 64
End: 2019-03-30

## 2019-03-30 DIAGNOSIS — R09.89 RUNNY NOSE: ICD-10-CM

## 2019-04-01 ENCOUNTER — RECORDS - HEALTHEAST (OUTPATIENT)
Dept: ADMINISTRATIVE | Facility: OTHER | Age: 64
End: 2019-04-01

## 2019-04-16 ENCOUNTER — COMMUNICATION - HEALTHEAST (OUTPATIENT)
Dept: FAMILY MEDICINE | Facility: CLINIC | Age: 64
End: 2019-04-16

## 2019-04-24 ENCOUNTER — COMMUNICATION - HEALTHEAST (OUTPATIENT)
Dept: FAMILY MEDICINE | Facility: CLINIC | Age: 64
End: 2019-04-24

## 2019-04-29 ENCOUNTER — RECORDS - HEALTHEAST (OUTPATIENT)
Dept: ADMINISTRATIVE | Facility: OTHER | Age: 64
End: 2019-04-29

## 2019-05-22 ENCOUNTER — COMMUNICATION - HEALTHEAST (OUTPATIENT)
Dept: FAMILY MEDICINE | Facility: CLINIC | Age: 64
End: 2019-05-22

## 2019-05-22 ENCOUNTER — OFFICE VISIT - HEALTHEAST (OUTPATIENT)
Dept: FAMILY MEDICINE | Facility: CLINIC | Age: 64
End: 2019-05-22

## 2019-05-22 DIAGNOSIS — M54.6 ACUTE LEFT-SIDED THORACIC BACK PAIN: ICD-10-CM

## 2019-05-23 ENCOUNTER — HOSPITAL ENCOUNTER (OUTPATIENT)
Dept: PHYSICAL MEDICINE AND REHAB | Facility: CLINIC | Age: 64
Discharge: HOME | End: 2019-05-23
Attending: PAIN MEDICINE

## 2019-05-23 DIAGNOSIS — R26.9 GAIT ABNORMALITY: ICD-10-CM

## 2019-05-23 DIAGNOSIS — M79.18 MYOFASCIAL PAIN: ICD-10-CM

## 2019-05-23 DIAGNOSIS — M41.25 OTHER IDIOPATHIC SCOLIOSIS, THORACOLUMBAR REGION: ICD-10-CM

## 2019-05-23 DIAGNOSIS — R29.2 HYPERREFLEXIA: ICD-10-CM

## 2019-05-23 ASSESSMENT — MIFFLIN-ST. JEOR: SCORE: 1142.13

## 2019-06-11 ENCOUNTER — RECORDS - HEALTHEAST (OUTPATIENT)
Dept: ADMINISTRATIVE | Facility: OTHER | Age: 64
End: 2019-06-11

## 2019-06-17 ENCOUNTER — AMBULATORY - HEALTHEAST (OUTPATIENT)
Dept: CARE COORDINATION | Facility: CLINIC | Age: 64
End: 2019-06-17

## 2019-06-17 ENCOUNTER — COMMUNICATION - HEALTHEAST (OUTPATIENT)
Dept: FAMILY MEDICINE | Facility: CLINIC | Age: 64
End: 2019-06-17

## 2019-06-17 ENCOUNTER — COMMUNICATION - HEALTHEAST (OUTPATIENT)
Dept: NURSING | Facility: CLINIC | Age: 64
End: 2019-06-17

## 2019-06-17 DIAGNOSIS — Z91.148 NONCOMPLIANCE WITH MEDICATIONS: ICD-10-CM

## 2019-06-17 DIAGNOSIS — J18.9 PNEUMONIA OF RIGHT LOWER LOBE DUE TO INFECTIOUS ORGANISM: ICD-10-CM

## 2019-06-17 DIAGNOSIS — E11.9 TYPE 2 DIABETES MELLITUS WITHOUT COMPLICATION, WITHOUT LONG-TERM CURRENT USE OF INSULIN (H): ICD-10-CM

## 2019-06-18 ENCOUNTER — AMBULATORY - HEALTHEAST (OUTPATIENT)
Dept: PHARMACY | Facility: CLINIC | Age: 64
End: 2019-06-18

## 2019-06-18 DIAGNOSIS — F31.62 BIPOLAR DISORDER, CURRENT EPISODE MIXED, MODERATE (H): ICD-10-CM

## 2019-06-18 DIAGNOSIS — J44.9 CHRONIC OBSTRUCTIVE PULMONARY DISEASE, UNSPECIFIED COPD TYPE (H): ICD-10-CM

## 2019-06-18 DIAGNOSIS — E11.9 TYPE 2 DIABETES MELLITUS WITHOUT COMPLICATION, WITHOUT LONG-TERM CURRENT USE OF INSULIN (H): ICD-10-CM

## 2019-06-18 DIAGNOSIS — F31.30 BIPOLAR I DISORDER, MOST RECENT EPISODE DEPRESSED (H): ICD-10-CM

## 2019-06-19 ENCOUNTER — COMMUNICATION - HEALTHEAST (OUTPATIENT)
Dept: FAMILY MEDICINE | Facility: CLINIC | Age: 64
End: 2019-06-19

## 2019-06-19 ENCOUNTER — OFFICE VISIT - HEALTHEAST (OUTPATIENT)
Dept: FAMILY MEDICINE | Facility: CLINIC | Age: 64
End: 2019-06-19

## 2019-06-19 DIAGNOSIS — J44.1 COPD EXACERBATION (H): ICD-10-CM

## 2019-06-19 DIAGNOSIS — J18.9 PNEUMONIA OF RIGHT LOWER LOBE DUE TO INFECTIOUS ORGANISM: ICD-10-CM

## 2019-06-19 LAB
ANION GAP SERPL CALCULATED.3IONS-SCNC: 14 MMOL/L (ref 5–18)
BASOPHILS # BLD AUTO: 0.1 THOU/UL (ref 0–0.2)
BASOPHILS NFR BLD AUTO: 0 % (ref 0–2)
BUN SERPL-MCNC: 9 MG/DL (ref 8–22)
CALCIUM SERPL-MCNC: 9.4 MG/DL (ref 8.5–10.5)
CHLORIDE BLD-SCNC: 100 MMOL/L (ref 98–107)
CO2 SERPL-SCNC: 27 MMOL/L (ref 22–31)
CREAT SERPL-MCNC: 0.77 MG/DL (ref 0.6–1.1)
EOSINOPHIL # BLD AUTO: 0.2 THOU/UL (ref 0–0.4)
EOSINOPHIL NFR BLD AUTO: 1 % (ref 0–6)
ERYTHROCYTE [DISTWIDTH] IN BLOOD BY AUTOMATED COUNT: 13.1 % (ref 11–14.5)
GFR SERPL CREATININE-BSD FRML MDRD: >60 ML/MIN/1.73M2
GLUCOSE BLD-MCNC: 132 MG/DL (ref 70–125)
HCT VFR BLD AUTO: 50.4 % (ref 35–47)
HGB BLD-MCNC: 15.8 G/DL (ref 12–16)
LYMPHOCYTES # BLD AUTO: 2.8 THOU/UL (ref 0.8–4.4)
LYMPHOCYTES NFR BLD AUTO: 15 % (ref 20–40)
MCH RBC QN AUTO: 29.6 PG (ref 27–34)
MCHC RBC AUTO-ENTMCNC: 31.3 G/DL (ref 32–36)
MCV RBC AUTO: 94 FL (ref 80–100)
MONOCYTES # BLD AUTO: 0.9 THOU/UL (ref 0–0.9)
MONOCYTES NFR BLD AUTO: 5 % (ref 2–10)
NEUTROPHILS # BLD AUTO: 14.4 THOU/UL (ref 2–7.7)
NEUTROPHILS NFR BLD AUTO: 79 % (ref 50–70)
PLATELET # BLD AUTO: 280 THOU/UL (ref 140–440)
PMV BLD AUTO: 10.5 FL (ref 8.5–12.5)
POTASSIUM BLD-SCNC: 4.3 MMOL/L (ref 3.5–5)
RBC # BLD AUTO: 5.34 MILL/UL (ref 3.8–5.4)
SODIUM SERPL-SCNC: 141 MMOL/L (ref 136–145)
WBC: 18.5 THOU/UL (ref 4–11)

## 2019-06-19 ASSESSMENT — MIFFLIN-ST. JEOR: SCORE: 1126.49

## 2019-06-20 ENCOUNTER — COMMUNICATION - HEALTHEAST (OUTPATIENT)
Dept: FAMILY MEDICINE | Facility: CLINIC | Age: 64
End: 2019-06-20

## 2019-06-24 ENCOUNTER — COMMUNICATION - HEALTHEAST (OUTPATIENT)
Dept: NURSING | Facility: CLINIC | Age: 64
End: 2019-06-24

## 2019-06-27 ENCOUNTER — COMMUNICATION - HEALTHEAST (OUTPATIENT)
Dept: FAMILY MEDICINE | Facility: CLINIC | Age: 64
End: 2019-06-27

## 2019-06-27 ENCOUNTER — COMMUNICATION - HEALTHEAST (OUTPATIENT)
Dept: NURSING | Facility: CLINIC | Age: 64
End: 2019-06-27

## 2019-07-01 ENCOUNTER — COMMUNICATION - HEALTHEAST (OUTPATIENT)
Dept: SCHEDULING | Facility: CLINIC | Age: 64
End: 2019-07-01

## 2019-07-01 ENCOUNTER — COMMUNICATION - HEALTHEAST (OUTPATIENT)
Dept: FAMILY MEDICINE | Facility: CLINIC | Age: 64
End: 2019-07-01

## 2019-07-01 DIAGNOSIS — J44.9 CHRONIC OBSTRUCTIVE PULMONARY DISEASE, UNSPECIFIED COPD TYPE (H): ICD-10-CM

## 2019-07-03 ENCOUNTER — COMMUNICATION - HEALTHEAST (OUTPATIENT)
Dept: NURSING | Facility: CLINIC | Age: 64
End: 2019-07-03

## 2019-07-03 ENCOUNTER — OFFICE VISIT - HEALTHEAST (OUTPATIENT)
Dept: FAMILY MEDICINE | Facility: CLINIC | Age: 64
End: 2019-07-03

## 2019-07-03 DIAGNOSIS — E11.9 TYPE 2 DIABETES MELLITUS WITHOUT COMPLICATION, WITHOUT LONG-TERM CURRENT USE OF INSULIN (H): ICD-10-CM

## 2019-07-03 DIAGNOSIS — J44.9 CHRONIC OBSTRUCTIVE PULMONARY DISEASE, UNSPECIFIED COPD TYPE (H): ICD-10-CM

## 2019-07-03 DIAGNOSIS — F31.30 BIPOLAR I DISORDER, MOST RECENT EPISODE DEPRESSED (H): ICD-10-CM

## 2019-07-03 DIAGNOSIS — Z87.01 HISTORY OF PNEUMONIA: ICD-10-CM

## 2019-07-03 LAB
BASOPHILS # BLD AUTO: 0.1 THOU/UL (ref 0–0.2)
BASOPHILS NFR BLD AUTO: 1 % (ref 0–2)
EOSINOPHIL # BLD AUTO: 0.3 THOU/UL (ref 0–0.4)
EOSINOPHIL NFR BLD AUTO: 2 % (ref 0–6)
ERYTHROCYTE [DISTWIDTH] IN BLOOD BY AUTOMATED COUNT: 12.4 % (ref 11–14.5)
HCT VFR BLD AUTO: 47 % (ref 35–47)
HGB BLD-MCNC: 15.2 G/DL (ref 12–16)
LYMPHOCYTES # BLD AUTO: 3.1 THOU/UL (ref 0.8–4.4)
LYMPHOCYTES NFR BLD AUTO: 26 % (ref 20–40)
MCH RBC QN AUTO: 29.6 PG (ref 27–34)
MCHC RBC AUTO-ENTMCNC: 32.4 G/DL (ref 32–36)
MCV RBC AUTO: 91 FL (ref 80–100)
MONOCYTES # BLD AUTO: 0.8 THOU/UL (ref 0–0.9)
MONOCYTES NFR BLD AUTO: 7 % (ref 2–10)
NEUTROPHILS # BLD AUTO: 7.6 THOU/UL (ref 2–7.7)
NEUTROPHILS NFR BLD AUTO: 65 % (ref 50–70)
PLATELET # BLD AUTO: 209 THOU/UL (ref 140–440)
PMV BLD AUTO: 7.9 FL (ref 7–10)
RBC # BLD AUTO: 5.15 MILL/UL (ref 3.8–5.4)
WBC: 11.8 THOU/UL (ref 4–11)

## 2019-07-03 ASSESSMENT — MIFFLIN-ST. JEOR: SCORE: 1144.64

## 2019-07-05 ENCOUNTER — COMMUNICATION - HEALTHEAST (OUTPATIENT)
Dept: FAMILY MEDICINE | Facility: CLINIC | Age: 64
End: 2019-07-05

## 2019-07-12 ENCOUNTER — RECORDS - HEALTHEAST (OUTPATIENT)
Dept: ADMINISTRATIVE | Facility: OTHER | Age: 64
End: 2019-07-12

## 2019-07-15 ENCOUNTER — COMMUNICATION - HEALTHEAST (OUTPATIENT)
Dept: NURSING | Facility: CLINIC | Age: 64
End: 2019-07-15

## 2019-07-17 ENCOUNTER — COMMUNICATION - HEALTHEAST (OUTPATIENT)
Dept: NURSING | Facility: CLINIC | Age: 64
End: 2019-07-17

## 2019-07-17 ENCOUNTER — RECORDS - HEALTHEAST (OUTPATIENT)
Dept: ADMINISTRATIVE | Facility: OTHER | Age: 64
End: 2019-07-17

## 2019-08-16 ENCOUNTER — RECORDS - HEALTHEAST (OUTPATIENT)
Dept: ADMINISTRATIVE | Facility: OTHER | Age: 64
End: 2019-08-16

## 2019-08-20 ENCOUNTER — RECORDS - HEALTHEAST (OUTPATIENT)
Dept: ADMINISTRATIVE | Facility: OTHER | Age: 64
End: 2019-08-20

## 2019-08-28 ENCOUNTER — COMMUNICATION - HEALTHEAST (OUTPATIENT)
Dept: SCHEDULING | Facility: CLINIC | Age: 64
End: 2019-08-28

## 2019-08-30 ENCOUNTER — OFFICE VISIT - HEALTHEAST (OUTPATIENT)
Dept: PULMONOLOGY | Facility: OTHER | Age: 64
End: 2019-08-30

## 2019-08-30 DIAGNOSIS — E66.01 MORBID OBESITY (H): ICD-10-CM

## 2019-08-30 DIAGNOSIS — R53.81 PHYSICAL DECONDITIONING: ICD-10-CM

## 2019-08-30 DIAGNOSIS — K21.9 GASTROESOPHAGEAL REFLUX DISEASE WITHOUT ESOPHAGITIS: ICD-10-CM

## 2019-08-30 DIAGNOSIS — R06.00 DYSPNEA, UNSPECIFIED TYPE: ICD-10-CM

## 2019-08-30 DIAGNOSIS — J44.9 CHRONIC OBSTRUCTIVE PULMONARY DISEASE, UNSPECIFIED COPD TYPE (H): ICD-10-CM

## 2019-08-30 DIAGNOSIS — G47.33 OSA (OBSTRUCTIVE SLEEP APNEA): ICD-10-CM

## 2019-10-28 ENCOUNTER — COMMUNICATION - HEALTHEAST (OUTPATIENT)
Dept: SCHEDULING | Facility: CLINIC | Age: 64
End: 2019-10-28

## 2019-10-28 DIAGNOSIS — E11.69 TYPE 2 DIABETES MELLITUS WITH OTHER SPECIFIED COMPLICATION, WITHOUT LONG-TERM CURRENT USE OF INSULIN (H): ICD-10-CM

## 2019-11-21 ENCOUNTER — OFFICE VISIT - HEALTHEAST (OUTPATIENT)
Dept: FAMILY MEDICINE | Facility: CLINIC | Age: 64
End: 2019-11-21

## 2019-11-21 DIAGNOSIS — J44.9 CHRONIC OBSTRUCTIVE PULMONARY DISEASE, UNSPECIFIED COPD TYPE (H): ICD-10-CM

## 2019-11-21 DIAGNOSIS — R05.9 COUGH: ICD-10-CM

## 2019-11-25 ENCOUNTER — RECORDS - HEALTHEAST (OUTPATIENT)
Dept: ADMINISTRATIVE | Facility: OTHER | Age: 64
End: 2019-11-25

## 2019-11-25 LAB — RETINOPATHY: NEGATIVE

## 2019-12-26 ENCOUNTER — RECORDS - HEALTHEAST (OUTPATIENT)
Dept: HEALTH INFORMATION MANAGEMENT | Facility: CLINIC | Age: 64
End: 2019-12-26

## 2020-02-28 ENCOUNTER — OFFICE VISIT - HEALTHEAST (OUTPATIENT)
Dept: PULMONOLOGY | Facility: OTHER | Age: 65
End: 2020-02-28

## 2020-02-28 DIAGNOSIS — G47.33 OSA (OBSTRUCTIVE SLEEP APNEA): ICD-10-CM

## 2020-02-28 DIAGNOSIS — E66.01 MORBID OBESITY (H): ICD-10-CM

## 2020-02-28 DIAGNOSIS — N39.0 URINARY TRACT INFECTION WITHOUT HEMATURIA, SITE UNSPECIFIED: ICD-10-CM

## 2020-02-28 DIAGNOSIS — R53.81 PHYSICAL DECONDITIONING: ICD-10-CM

## 2020-02-28 DIAGNOSIS — J44.9 CHRONIC OBSTRUCTIVE PULMONARY DISEASE, UNSPECIFIED COPD TYPE (H): ICD-10-CM

## 2020-02-28 ASSESSMENT — MIFFLIN-ST. JEOR: SCORE: 1167.32

## 2020-03-11 ENCOUNTER — OFFICE VISIT - HEALTHEAST (OUTPATIENT)
Dept: PHYSICAL THERAPY | Facility: REHABILITATION | Age: 65
End: 2020-03-11

## 2020-03-11 ENCOUNTER — AMBULATORY - HEALTHEAST (OUTPATIENT)
Dept: LAB | Facility: CLINIC | Age: 65
End: 2020-03-11

## 2020-03-11 DIAGNOSIS — F31.30 BIPOLAR I DISORDER, MOST RECENT EPISODE DEPRESSED (H): ICD-10-CM

## 2020-03-11 DIAGNOSIS — M62.81 GENERALIZED MUSCLE WEAKNESS: ICD-10-CM

## 2020-03-11 DIAGNOSIS — R53.81 PHYSICAL DECONDITIONING: ICD-10-CM

## 2020-03-11 LAB
ALBUMIN SERPL-MCNC: 3.8 G/DL (ref 3.5–5)
ALP SERPL-CCNC: 83 U/L (ref 45–120)
ALT SERPL W P-5'-P-CCNC: 21 U/L (ref 0–45)
ANION GAP SERPL CALCULATED.3IONS-SCNC: 11 MMOL/L (ref 5–18)
AST SERPL W P-5'-P-CCNC: 17 U/L (ref 0–40)
BASOPHILS # BLD AUTO: 0.1 THOU/UL (ref 0–0.2)
BASOPHILS NFR BLD AUTO: 1 % (ref 0–2)
BILIRUB SERPL-MCNC: 0.5 MG/DL (ref 0–1)
BUN SERPL-MCNC: 9 MG/DL (ref 8–22)
CALCIUM SERPL-MCNC: 9.1 MG/DL (ref 8.5–10.5)
CHLORIDE BLD-SCNC: 106 MMOL/L (ref 98–107)
CO2 SERPL-SCNC: 24 MMOL/L (ref 22–31)
CREAT SERPL-MCNC: 0.74 MG/DL (ref 0.6–1.1)
EOSINOPHIL # BLD AUTO: 0.2 THOU/UL (ref 0–0.4)
EOSINOPHIL NFR BLD AUTO: 2 % (ref 0–6)
ERYTHROCYTE [DISTWIDTH] IN BLOOD BY AUTOMATED COUNT: 12.1 % (ref 11–14.5)
GFR SERPL CREATININE-BSD FRML MDRD: >60 ML/MIN/1.73M2
GLUCOSE BLD-MCNC: 99 MG/DL (ref 70–125)
HBA1C MFR BLD: 6.3 %
HCT VFR BLD AUTO: 48.7 % (ref 35–47)
HGB BLD-MCNC: 16.1 G/DL (ref 12–16)
LYMPHOCYTES # BLD AUTO: 2.7 THOU/UL (ref 0.8–4.4)
LYMPHOCYTES NFR BLD AUTO: 26 % (ref 20–40)
MCH RBC QN AUTO: 30.1 PG (ref 27–34)
MCHC RBC AUTO-ENTMCNC: 33.1 G/DL (ref 32–36)
MCV RBC AUTO: 91 FL (ref 80–100)
MONOCYTES # BLD AUTO: 0.5 THOU/UL (ref 0–0.9)
MONOCYTES NFR BLD AUTO: 5 % (ref 2–10)
NEUTROPHILS # BLD AUTO: 6.8 THOU/UL (ref 2–7.7)
NEUTROPHILS NFR BLD AUTO: 66 % (ref 50–70)
PLATELET # BLD AUTO: 218 THOU/UL (ref 140–440)
PMV BLD AUTO: 8.7 FL (ref 7–10)
POTASSIUM BLD-SCNC: 4.6 MMOL/L (ref 3.5–5)
PROT SERPL-MCNC: 6.5 G/DL (ref 6–8)
RBC # BLD AUTO: 5.35 MILL/UL (ref 3.8–5.4)
SODIUM SERPL-SCNC: 141 MMOL/L (ref 136–145)
WBC: 10.3 THOU/UL (ref 4–11)

## 2020-03-18 ENCOUNTER — COMMUNICATION - HEALTHEAST (OUTPATIENT)
Dept: PHYSICAL THERAPY | Facility: REHABILITATION | Age: 65
End: 2020-03-18

## 2020-04-15 ENCOUNTER — COMMUNICATION - HEALTHEAST (OUTPATIENT)
Dept: PHYSICAL THERAPY | Facility: REHABILITATION | Age: 65
End: 2020-04-15

## 2020-07-23 ENCOUNTER — COMMUNICATION - HEALTHEAST (OUTPATIENT)
Dept: SCHEDULING | Facility: CLINIC | Age: 65
End: 2020-07-23

## 2020-07-23 ENCOUNTER — OFFICE VISIT - HEALTHEAST (OUTPATIENT)
Dept: FAMILY MEDICINE | Facility: CLINIC | Age: 65
End: 2020-07-23

## 2020-07-23 DIAGNOSIS — M75.82 TENDINITIS OF LEFT ROTATOR CUFF: ICD-10-CM

## 2020-07-23 DIAGNOSIS — R35.0 URINE FREQUENCY: ICD-10-CM

## 2020-07-23 LAB
ALBUMIN UR-MCNC: NEGATIVE MG/DL
AMORPH CRY #/AREA URNS HPF: ABNORMAL /[HPF]
APPEARANCE UR: CLEAR
BACTERIA #/AREA URNS HPF: ABNORMAL HPF
BILIRUB UR QL STRIP: NEGATIVE
COLOR UR AUTO: YELLOW
GLUCOSE UR STRIP-MCNC: NEGATIVE MG/DL
HGB UR QL STRIP: NEGATIVE
KETONES UR STRIP-MCNC: NEGATIVE MG/DL
LEUKOCYTE ESTERASE UR QL STRIP: ABNORMAL
NITRATE UR QL: NEGATIVE
PH UR STRIP: 7 [PH] (ref 5–8)
RBC #/AREA URNS AUTO: ABNORMAL HPF
SP GR UR STRIP: 1.02 (ref 1–1.03)
SQUAMOUS #/AREA URNS AUTO: ABNORMAL LPF
TRANS CELLS #/AREA URNS HPF: ABNORMAL LPF
UROBILINOGEN UR STRIP-ACNC: ABNORMAL
WBC #/AREA URNS AUTO: ABNORMAL HPF

## 2020-07-23 ASSESSMENT — PATIENT HEALTH QUESTIONNAIRE - PHQ9: SUM OF ALL RESPONSES TO PHQ QUESTIONS 1-9: 12

## 2020-07-23 ASSESSMENT — MIFFLIN-ST. JEOR: SCORE: 1186.59

## 2020-07-24 ENCOUNTER — COMMUNICATION - HEALTHEAST (OUTPATIENT)
Dept: FAMILY MEDICINE | Facility: CLINIC | Age: 65
End: 2020-07-24

## 2020-07-24 LAB — BACTERIA SPEC CULT: NO GROWTH

## 2020-08-07 ENCOUNTER — AMBULATORY - HEALTHEAST (OUTPATIENT)
Dept: LAB | Facility: CLINIC | Age: 65
End: 2020-08-07

## 2020-08-07 DIAGNOSIS — F06.8 COGNITIVE DYSFUNCTION IN BIPOLAR DISORDER (H): ICD-10-CM

## 2020-08-07 DIAGNOSIS — F31.9 COGNITIVE DYSFUNCTION IN BIPOLAR DISORDER (H): ICD-10-CM

## 2020-08-13 ENCOUNTER — OFFICE VISIT - HEALTHEAST (OUTPATIENT)
Dept: PHYSICAL THERAPY | Facility: REHABILITATION | Age: 65
End: 2020-08-13

## 2020-08-13 DIAGNOSIS — M77.8 SHOULDER TENDONITIS, LEFT: ICD-10-CM

## 2020-08-13 DIAGNOSIS — M25.512 ACUTE PAIN OF LEFT SHOULDER: ICD-10-CM

## 2020-08-25 ENCOUNTER — OFFICE VISIT - HEALTHEAST (OUTPATIENT)
Dept: PULMONOLOGY | Facility: OTHER | Age: 65
End: 2020-08-25

## 2020-08-25 DIAGNOSIS — R09.02 HYPOXIA: ICD-10-CM

## 2020-08-25 DIAGNOSIS — J44.9 CHRONIC OBSTRUCTIVE PULMONARY DISEASE, UNSPECIFIED COPD TYPE (H): ICD-10-CM

## 2020-08-25 DIAGNOSIS — R06.00 DYSPNEA, UNSPECIFIED TYPE: ICD-10-CM

## 2020-08-25 DIAGNOSIS — G47.33 OSA (OBSTRUCTIVE SLEEP APNEA): ICD-10-CM

## 2020-08-25 DIAGNOSIS — J30.9 ALLERGIC RHINITIS, UNSPECIFIED SEASONALITY, UNSPECIFIED TRIGGER: ICD-10-CM

## 2020-08-25 ASSESSMENT — MIFFLIN-ST. JEOR: SCORE: 1185.46

## 2020-11-18 ENCOUNTER — COMMUNICATION - HEALTHEAST (OUTPATIENT)
Dept: FAMILY MEDICINE | Facility: CLINIC | Age: 65
End: 2020-11-18

## 2020-11-18 DIAGNOSIS — E11.9 TYPE 2 DIABETES MELLITUS WITHOUT COMPLICATION, WITHOUT LONG-TERM CURRENT USE OF INSULIN (H): ICD-10-CM

## 2020-11-23 ENCOUNTER — OFFICE VISIT - HEALTHEAST (OUTPATIENT)
Dept: FAMILY MEDICINE | Facility: CLINIC | Age: 65
End: 2020-11-23

## 2020-11-23 DIAGNOSIS — R53.83 FATIGUE, UNSPECIFIED TYPE: ICD-10-CM

## 2020-11-23 DIAGNOSIS — G89.29 CHRONIC LEFT SHOULDER PAIN: ICD-10-CM

## 2020-11-23 DIAGNOSIS — J96.11 CHRONIC RESPIRATORY FAILURE WITH HYPOXIA (H): ICD-10-CM

## 2020-11-23 DIAGNOSIS — F06.8 COGNITIVE DYSFUNCTION IN BIPOLAR DISORDER (H): ICD-10-CM

## 2020-11-23 DIAGNOSIS — Z12.31 VISIT FOR SCREENING MAMMOGRAM: ICD-10-CM

## 2020-11-23 DIAGNOSIS — M25.512 CHRONIC LEFT SHOULDER PAIN: ICD-10-CM

## 2020-11-23 DIAGNOSIS — R35.0 INCREASED URINARY FREQUENCY: ICD-10-CM

## 2020-11-23 DIAGNOSIS — F31.9 COGNITIVE DYSFUNCTION IN BIPOLAR DISORDER (H): ICD-10-CM

## 2020-11-23 DIAGNOSIS — F31.30 BIPOLAR I DISORDER, MOST RECENT EPISODE DEPRESSED (H): ICD-10-CM

## 2020-11-23 DIAGNOSIS — E11.8 TYPE 2 DIABETES MELLITUS WITH UNSPECIFIED COMPLICATIONS (H): ICD-10-CM

## 2020-11-23 DIAGNOSIS — Z11.59 ENCOUNTER FOR HEPATITIS C SCREENING TEST FOR LOW RISK PATIENT: ICD-10-CM

## 2020-11-23 LAB
ALBUMIN SERPL-MCNC: 3.9 G/DL (ref 3.5–5)
ALBUMIN UR-MCNC: NEGATIVE MG/DL
ALP SERPL-CCNC: 85 U/L (ref 45–120)
ALT SERPL W P-5'-P-CCNC: 18 U/L (ref 0–45)
ANION GAP SERPL CALCULATED.3IONS-SCNC: 9 MMOL/L (ref 5–18)
APPEARANCE UR: CLEAR
AST SERPL W P-5'-P-CCNC: 15 U/L (ref 0–40)
BACTERIA #/AREA URNS HPF: ABNORMAL HPF
BASOPHILS # BLD AUTO: 0.1 THOU/UL (ref 0–0.2)
BASOPHILS NFR BLD AUTO: 1 % (ref 0–2)
BILIRUB SERPL-MCNC: 0.7 MG/DL (ref 0–1)
BILIRUB UR QL STRIP: NEGATIVE
BUN SERPL-MCNC: 10 MG/DL (ref 8–22)
CALCIUM SERPL-MCNC: 9.2 MG/DL (ref 8.5–10.5)
CHLORIDE BLD-SCNC: 103 MMOL/L (ref 98–107)
CHOLEST SERPL-MCNC: 163 MG/DL
CO2 SERPL-SCNC: 29 MMOL/L (ref 22–31)
COLOR UR AUTO: YELLOW
CREAT SERPL-MCNC: 0.76 MG/DL (ref 0.6–1.1)
CREAT UR-MCNC: 72.6 MG/DL
EOSINOPHIL # BLD AUTO: 0.2 THOU/UL (ref 0–0.4)
EOSINOPHIL NFR BLD AUTO: 2 % (ref 0–6)
ERYTHROCYTE [DISTWIDTH] IN BLOOD BY AUTOMATED COUNT: 12.6 % (ref 11–14.5)
FASTING STATUS PATIENT QL REPORTED: YES
FERRITIN SERPL-MCNC: 284 NG/ML (ref 10–130)
GFR SERPL CREATININE-BSD FRML MDRD: >60 ML/MIN/1.73M2
GLUCOSE BLD-MCNC: 139 MG/DL (ref 70–125)
GLUCOSE UR STRIP-MCNC: NEGATIVE MG/DL
HBA1C MFR BLD: 6.3 %
HCT VFR BLD AUTO: 45.7 % (ref 35–47)
HDLC SERPL-MCNC: 38 MG/DL
HGB BLD-MCNC: 14.6 G/DL (ref 12–16)
HGB UR QL STRIP: NEGATIVE
IRON SATN MFR SERPL: 36 % (ref 20–50)
IRON SERPL-MCNC: 90 UG/DL (ref 42–175)
KETONES UR STRIP-MCNC: NEGATIVE MG/DL
LDLC SERPL CALC-MCNC: 84 MG/DL
LEUKOCYTE ESTERASE UR QL STRIP: ABNORMAL
LYMPHOCYTES # BLD AUTO: 2.2 THOU/UL (ref 0.8–4.4)
LYMPHOCYTES NFR BLD AUTO: 20 % (ref 20–40)
MCH RBC QN AUTO: 29.8 PG (ref 27–34)
MCHC RBC AUTO-ENTMCNC: 32 G/DL (ref 32–36)
MCV RBC AUTO: 93 FL (ref 80–100)
MICROALBUMIN UR-MCNC: 1.21 MG/DL (ref 0–1.99)
MICROALBUMIN/CREAT UR: 16.7 MG/G
MONOCYTES # BLD AUTO: 0.5 THOU/UL (ref 0–0.9)
MONOCYTES NFR BLD AUTO: 5 % (ref 2–10)
NEUTROPHILS # BLD AUTO: 7.7 THOU/UL (ref 2–7.7)
NEUTROPHILS NFR BLD AUTO: 73 % (ref 50–70)
NITRATE UR QL: NEGATIVE
PH UR STRIP: 7 [PH] (ref 5–8)
PLATELET # BLD AUTO: 161 THOU/UL (ref 140–440)
PMV BLD AUTO: 8.8 FL (ref 7–10)
POTASSIUM BLD-SCNC: 4.2 MMOL/L (ref 3.5–5)
PROT SERPL-MCNC: 6.4 G/DL (ref 6–8)
RBC # BLD AUTO: 4.92 MILL/UL (ref 3.8–5.4)
RBC #/AREA URNS AUTO: ABNORMAL HPF
SODIUM SERPL-SCNC: 141 MMOL/L (ref 136–145)
SP GR UR STRIP: 1.02 (ref 1–1.03)
SQUAMOUS #/AREA URNS AUTO: ABNORMAL LPF
TIBC SERPL-MCNC: 247 UG/DL (ref 313–563)
TRANSFERRIN SERPL-MCNC: 197 MG/DL (ref 212–360)
TRIGL SERPL-MCNC: 205 MG/DL
TSH SERPL DL<=0.005 MIU/L-ACNC: 0.92 UIU/ML (ref 0.3–5)
UROBILINOGEN UR STRIP-ACNC: ABNORMAL
WBC #/AREA URNS AUTO: ABNORMAL HPF
WBC: 10.6 THOU/UL (ref 4–11)

## 2020-11-23 ASSESSMENT — MIFFLIN-ST. JEOR: SCORE: 1180.02

## 2020-11-24 LAB
BACTERIA SPEC CULT: NO GROWTH
HCV AB SERPL QL IA: NEGATIVE

## 2020-11-27 ENCOUNTER — COMMUNICATION - HEALTHEAST (OUTPATIENT)
Dept: FAMILY MEDICINE | Facility: CLINIC | Age: 65
End: 2020-11-27

## 2020-11-30 ENCOUNTER — RECORDS - HEALTHEAST (OUTPATIENT)
Dept: ADMINISTRATIVE | Facility: OTHER | Age: 65
End: 2020-11-30

## 2020-12-04 ENCOUNTER — RECORDS - HEALTHEAST (OUTPATIENT)
Dept: ADMINISTRATIVE | Facility: OTHER | Age: 65
End: 2020-12-04

## 2021-02-26 ENCOUNTER — OFFICE VISIT - HEALTHEAST (OUTPATIENT)
Dept: PULMONOLOGY | Facility: OTHER | Age: 66
End: 2021-02-26

## 2021-02-26 DIAGNOSIS — E66.01 CLASS 3 SEVERE OBESITY DUE TO EXCESS CALORIES WITH SERIOUS COMORBIDITY AND BODY MASS INDEX (BMI) OF 40.0 TO 44.9 IN ADULT (H): ICD-10-CM

## 2021-02-26 DIAGNOSIS — E66.813 CLASS 3 SEVERE OBESITY DUE TO EXCESS CALORIES WITH SERIOUS COMORBIDITY AND BODY MASS INDEX (BMI) OF 40.0 TO 44.9 IN ADULT (H): ICD-10-CM

## 2021-02-26 DIAGNOSIS — J44.9 CHRONIC OBSTRUCTIVE PULMONARY DISEASE, UNSPECIFIED COPD TYPE (H): ICD-10-CM

## 2021-02-26 DIAGNOSIS — R53.81 PHYSICAL DECONDITIONING: ICD-10-CM

## 2021-02-26 DIAGNOSIS — J30.9 ALLERGIC RHINITIS, UNSPECIFIED SEASONALITY, UNSPECIFIED TRIGGER: ICD-10-CM

## 2021-02-26 DIAGNOSIS — G89.29 CHRONIC LEFT-SIDED LOW BACK PAIN WITHOUT SCIATICA: ICD-10-CM

## 2021-02-26 DIAGNOSIS — M54.50 CHRONIC LEFT-SIDED LOW BACK PAIN WITHOUT SCIATICA: ICD-10-CM

## 2021-02-26 RX ORDER — ALBUTEROL SULFATE 90 UG/1
2 AEROSOL, METERED RESPIRATORY (INHALATION) EVERY 6 HOURS PRN
Qty: 1 INHALER | Refills: 11 | Status: SHIPPED | OUTPATIENT
Start: 2021-02-26 | End: 2022-03-28

## 2021-02-26 ASSESSMENT — MIFFLIN-ST. JEOR: SCORE: 1194.53

## 2021-03-01 ENCOUNTER — AMBULATORY - HEALTHEAST (OUTPATIENT)
Dept: PULMONOLOGY | Facility: OTHER | Age: 66
End: 2021-03-01

## 2021-03-01 DIAGNOSIS — J44.9 CHRONIC OBSTRUCTIVE PULMONARY DISEASE, UNSPECIFIED COPD TYPE (H): ICD-10-CM

## 2021-04-15 ENCOUNTER — RECORDS - HEALTHEAST (OUTPATIENT)
Dept: ADMINISTRATIVE | Facility: OTHER | Age: 66
End: 2021-04-15

## 2021-04-19 ENCOUNTER — COMMUNICATION - HEALTHEAST (OUTPATIENT)
Dept: FAMILY MEDICINE | Facility: CLINIC | Age: 66
End: 2021-04-19

## 2021-04-23 ENCOUNTER — OFFICE VISIT - HEALTHEAST (OUTPATIENT)
Dept: FAMILY MEDICINE | Facility: CLINIC | Age: 66
End: 2021-04-23

## 2021-04-23 DIAGNOSIS — J44.1 COPD EXACERBATION (H): ICD-10-CM

## 2021-04-24 ENCOUNTER — COMMUNICATION - HEALTHEAST (OUTPATIENT)
Dept: SCHEDULING | Facility: CLINIC | Age: 66
End: 2021-04-24

## 2021-04-26 ENCOUNTER — OFFICE VISIT - HEALTHEAST (OUTPATIENT)
Dept: FAMILY MEDICINE | Facility: CLINIC | Age: 66
End: 2021-04-26

## 2021-04-26 DIAGNOSIS — J18.9 PNEUMONIA OF RIGHT LOWER LOBE DUE TO INFECTIOUS ORGANISM: ICD-10-CM

## 2021-04-26 DIAGNOSIS — E11.8 TYPE 2 DIABETES MELLITUS WITH UNSPECIFIED COMPLICATIONS (H): ICD-10-CM

## 2021-04-26 DIAGNOSIS — J44.1 COPD EXACERBATION (H): ICD-10-CM

## 2021-04-26 DIAGNOSIS — Z09 HOSPITAL DISCHARGE FOLLOW-UP: ICD-10-CM

## 2021-04-26 DIAGNOSIS — F31.30 BIPOLAR I DISORDER, MOST RECENT EPISODE DEPRESSED (H): ICD-10-CM

## 2021-04-26 DIAGNOSIS — R35.0 INCREASED URINARY FREQUENCY: ICD-10-CM

## 2021-04-26 DIAGNOSIS — J96.11 CHRONIC RESPIRATORY FAILURE WITH HYPOXIA (H): ICD-10-CM

## 2021-04-26 DIAGNOSIS — N39.41 URGE INCONTINENCE: ICD-10-CM

## 2021-04-26 LAB
ALBUMIN SERPL-MCNC: 3.7 G/DL (ref 3.5–5)
ALBUMIN UR-MCNC: NEGATIVE G/DL
ALP SERPL-CCNC: 80 U/L (ref 45–120)
ALT SERPL W P-5'-P-CCNC: 10 U/L (ref 0–45)
ANION GAP SERPL CALCULATED.3IONS-SCNC: 12 MMOL/L (ref 5–18)
APPEARANCE UR: CLEAR
AST SERPL W P-5'-P-CCNC: 10 U/L (ref 0–40)
BACTERIA #/AREA URNS HPF: ABNORMAL /[HPF]
BASOPHILS # BLD AUTO: 0 THOU/UL (ref 0–0.2)
BASOPHILS NFR BLD AUTO: 0 % (ref 0–2)
BILIRUB SERPL-MCNC: 0.3 MG/DL (ref 0–1)
BILIRUB UR QL STRIP: NEGATIVE
BUN SERPL-MCNC: 16 MG/DL (ref 8–22)
CALCIUM SERPL-MCNC: 9 MG/DL (ref 8.5–10.5)
CHLORIDE BLD-SCNC: 105 MMOL/L (ref 98–107)
CO2 SERPL-SCNC: 25 MMOL/L (ref 22–31)
COLOR UR AUTO: YELLOW
CREAT SERPL-MCNC: 0.71 MG/DL (ref 0.6–1.1)
EOSINOPHIL # BLD AUTO: 0 THOU/UL (ref 0–0.4)
EOSINOPHIL NFR BLD AUTO: 0 % (ref 0–6)
ERYTHROCYTE [DISTWIDTH] IN BLOOD BY AUTOMATED COUNT: 12.9 % (ref 11–14.5)
GFR SERPL CREATININE-BSD FRML MDRD: >60 ML/MIN/1.73M2
GLUCOSE BLD-MCNC: 75 MG/DL (ref 70–125)
GLUCOSE UR STRIP-MCNC: NEGATIVE MG/DL
HCT VFR BLD AUTO: 46.5 % (ref 35–47)
HGB BLD-MCNC: 14.4 G/DL (ref 12–16)
HGB UR QL STRIP: ABNORMAL
IMM GRANULOCYTES # BLD: 0 THOU/UL
IMM GRANULOCYTES NFR BLD: 0 %
KETONES UR STRIP-MCNC: NEGATIVE MG/DL
LEUKOCYTE ESTERASE UR QL STRIP: ABNORMAL
LYMPHOCYTES # BLD AUTO: 2.5 THOU/UL (ref 0.8–4.4)
LYMPHOCYTES NFR BLD AUTO: 14 % (ref 20–40)
MCH RBC QN AUTO: 29.4 PG (ref 27–34)
MCHC RBC AUTO-ENTMCNC: 31 G/DL (ref 32–36)
MCV RBC AUTO: 95 FL (ref 80–100)
MONOCYTES # BLD AUTO: 1.1 THOU/UL (ref 0–0.9)
MONOCYTES NFR BLD AUTO: 6 % (ref 2–10)
NEUTROPHILS # BLD AUTO: 14 THOU/UL (ref 2–7.7)
NEUTROPHILS NFR BLD AUTO: 80 % (ref 50–70)
NITRATE UR QL: NEGATIVE
PH UR STRIP: 6 [PH] (ref 5–8)
PLATELET # BLD AUTO: 212 THOU/UL (ref 140–440)
PMV BLD AUTO: 10.9 FL (ref 7–10)
POTASSIUM BLD-SCNC: 4.1 MMOL/L (ref 3.5–5)
PROT SERPL-MCNC: 6.3 G/DL (ref 6–8)
RBC # BLD AUTO: 4.89 MILL/UL (ref 3.8–5.4)
RBC #/AREA URNS AUTO: ABNORMAL HPF
SODIUM SERPL-SCNC: 142 MMOL/L (ref 136–145)
SP GR UR STRIP: 1.02 (ref 1–1.03)
SQUAMOUS #/AREA URNS AUTO: ABNORMAL LPF
UROBILINOGEN UR STRIP-ACNC: ABNORMAL
WBC #/AREA URNS AUTO: ABNORMAL HPF
WBC: 17.6 THOU/UL (ref 4–11)
YEAST #/AREA URNS HPF: ABNORMAL HPF

## 2021-04-26 ASSESSMENT — ANXIETY QUESTIONNAIRES
4. TROUBLE RELAXING: SEVERAL DAYS
7. FEELING AFRAID AS IF SOMETHING AWFUL MIGHT HAPPEN: NOT AT ALL
6. BECOMING EASILY ANNOYED OR IRRITABLE: NOT AT ALL
3. WORRYING TOO MUCH ABOUT DIFFERENT THINGS: NOT AT ALL
IF YOU CHECKED OFF ANY PROBLEMS ON THIS QUESTIONNAIRE, HOW DIFFICULT HAVE THESE PROBLEMS MADE IT FOR YOU TO DO YOUR WORK, TAKE CARE OF THINGS AT HOME, OR GET ALONG WITH OTHER PEOPLE: SOMEWHAT DIFFICULT
2. NOT BEING ABLE TO STOP OR CONTROL WORRYING: NOT AT ALL
1. FEELING NERVOUS, ANXIOUS, OR ON EDGE: SEVERAL DAYS
5. BEING SO RESTLESS THAT IT IS HARD TO SIT STILL: SEVERAL DAYS
GAD7 TOTAL SCORE: 3

## 2021-04-26 ASSESSMENT — PATIENT HEALTH QUESTIONNAIRE - PHQ9: SUM OF ALL RESPONSES TO PHQ QUESTIONS 1-9: 5

## 2021-04-27 LAB — BACTERIA SPEC CULT: NORMAL

## 2021-04-30 ENCOUNTER — OFFICE VISIT - HEALTHEAST (OUTPATIENT)
Dept: FAMILY MEDICINE | Facility: CLINIC | Age: 66
End: 2021-04-30

## 2021-04-30 DIAGNOSIS — H81.12 BENIGN PAROXYSMAL POSITIONAL VERTIGO OF LEFT EAR: ICD-10-CM

## 2021-04-30 ASSESSMENT — MIFFLIN-ST. JEOR: SCORE: 1241.02

## 2021-05-17 ENCOUNTER — OFFICE VISIT - HEALTHEAST (OUTPATIENT)
Dept: FAMILY MEDICINE | Facility: CLINIC | Age: 66
End: 2021-05-17

## 2021-05-17 DIAGNOSIS — J44.9 CHRONIC OBSTRUCTIVE PULMONARY DISEASE, UNSPECIFIED COPD TYPE (H): ICD-10-CM

## 2021-05-17 DIAGNOSIS — R53.83 OTHER FATIGUE: ICD-10-CM

## 2021-05-17 DIAGNOSIS — G47.01 INSOMNIA DUE TO MEDICAL CONDITION: ICD-10-CM

## 2021-05-17 DIAGNOSIS — Z65.9: ICD-10-CM

## 2021-05-21 ENCOUNTER — OFFICE VISIT - HEALTHEAST (OUTPATIENT)
Dept: FAMILY MEDICINE | Facility: CLINIC | Age: 66
End: 2021-05-21

## 2021-05-21 ENCOUNTER — COMMUNICATION - HEALTHEAST (OUTPATIENT)
Dept: CARE COORDINATION | Facility: CLINIC | Age: 66
End: 2021-05-21

## 2021-05-21 DIAGNOSIS — E11.8 TYPE 2 DIABETES MELLITUS WITH UNSPECIFIED COMPLICATIONS (H): ICD-10-CM

## 2021-05-21 DIAGNOSIS — Z99.81 DEPENDENCE ON CONTINUOUS SUPPLEMENTAL OXYGEN: ICD-10-CM

## 2021-05-21 DIAGNOSIS — Z12.31 VISIT FOR SCREENING MAMMOGRAM: ICD-10-CM

## 2021-05-21 DIAGNOSIS — Z12.4 SCREENING FOR CERVICAL CANCER: ICD-10-CM

## 2021-05-21 DIAGNOSIS — G47.30 SLEEP APNEA, UNSPECIFIED TYPE: ICD-10-CM

## 2021-05-21 DIAGNOSIS — J96.11 CHRONIC RESPIRATORY FAILURE WITH HYPOXIA (H): ICD-10-CM

## 2021-05-21 DIAGNOSIS — Z00.01 ENCOUNTER FOR GENERAL ADULT MEDICAL EXAMINATION WITH ABNORMAL FINDINGS: ICD-10-CM

## 2021-05-21 DIAGNOSIS — E66.01 MORBID OBESITY (H): ICD-10-CM

## 2021-05-21 DIAGNOSIS — Z78.0 POSTMENOPAUSAL STATUS: ICD-10-CM

## 2021-05-21 DIAGNOSIS — Z13.5 SCREENING FOR EYE CONDITION: ICD-10-CM

## 2021-05-21 DIAGNOSIS — J44.9 CHRONIC OBSTRUCTIVE PULMONARY DISEASE, UNSPECIFIED COPD TYPE (H): ICD-10-CM

## 2021-05-21 DIAGNOSIS — B37.31 YEAST INFECTION OF THE VAGINA: ICD-10-CM

## 2021-05-21 DIAGNOSIS — Z12.11 SCREEN FOR COLON CANCER: ICD-10-CM

## 2021-05-21 DIAGNOSIS — F31.30 BIPOLAR I DISORDER, MOST RECENT EPISODE DEPRESSED (H): ICD-10-CM

## 2021-05-21 RX ORDER — ALBUTEROL SULFATE 0.83 MG/ML
2.5 SOLUTION RESPIRATORY (INHALATION) EVERY 4 HOURS PRN
Qty: 30 VIAL | Refills: 0 | Status: SHIPPED | OUTPATIENT
Start: 2021-05-21 | End: 2021-08-12

## 2021-05-21 ASSESSMENT — PATIENT HEALTH QUESTIONNAIRE - PHQ9: SUM OF ALL RESPONSES TO PHQ QUESTIONS 1-9: 12

## 2021-05-24 LAB
HPV SOURCE: NORMAL
HUMAN PAPILLOMA VIRUS 16 DNA: NEGATIVE
HUMAN PAPILLOMA VIRUS 18 DNA: NEGATIVE
HUMAN PAPILLOMA VIRUS FINAL DIAGNOSIS: NORMAL
HUMAN PAPILLOMA VIRUS OTHER HR: NEGATIVE
SPECIMEN DESCRIPTION: NORMAL

## 2021-05-26 ENCOUNTER — HOSPITAL ENCOUNTER (OUTPATIENT)
Dept: MAMMOGRAPHY | Facility: CLINIC | Age: 66
Discharge: HOME | End: 2021-05-26
Attending: FAMILY MEDICINE
Payer: MEDICARE

## 2021-05-26 ENCOUNTER — RECORDS - HEALTHEAST (OUTPATIENT)
Dept: ADMINISTRATIVE | Facility: CLINIC | Age: 66
End: 2021-05-26

## 2021-05-26 DIAGNOSIS — Z12.31 VISIT FOR SCREENING MAMMOGRAM: ICD-10-CM

## 2021-05-27 ENCOUNTER — RECORDS - HEALTHEAST (OUTPATIENT)
Dept: ADMINISTRATIVE | Facility: CLINIC | Age: 66
End: 2021-05-27

## 2021-05-27 ENCOUNTER — RECORDS - HEALTHEAST (OUTPATIENT)
Dept: ADMINISTRATIVE | Facility: OTHER | Age: 66
End: 2021-05-27

## 2021-05-27 VITALS
HEART RATE: 82 BPM | DIASTOLIC BLOOD PRESSURE: 76 MMHG | SYSTOLIC BLOOD PRESSURE: 120 MMHG | TEMPERATURE: 98.1 F | RESPIRATION RATE: 32 BRPM | OXYGEN SATURATION: 88 %

## 2021-05-27 LAB — RETINOPATHY: NEGATIVE

## 2021-05-27 ASSESSMENT — PATIENT HEALTH QUESTIONNAIRE - PHQ9
SUM OF ALL RESPONSES TO PHQ QUESTIONS 1-9: 12
SUM OF ALL RESPONSES TO PHQ QUESTIONS 1-9: 5

## 2021-05-27 NOTE — TELEPHONE ENCOUNTER
Who is calling:  Patient   Reason for Call:  The patient is waiting for nasal spray that has not arrived at [facility]? Please advise at the number provided.  Okay to leave a detailed message: Yes

## 2021-05-27 NOTE — TELEPHONE ENCOUNTER
Orders being requested:  Occupational Therapy    Reason service is needed/diagnosis: Evaluate & treat,    When are orders needed by: TODAY- asap  Where to send Orders: Fax: ATTN_ SUNNY   [phone] _  [facility]  Okay to leave detailed message?  Yes      PLEASE FAX below ORDERS OT order TO SUNNY  @ [facility].

## 2021-05-27 NOTE — TELEPHONE ENCOUNTER
Orders being requested: skilled nursing  Reason service is needed/diagnosis: assessment  When are orders needed by: ASAP  Where to send Orders: [facility]  Okay to leave detailed message?  No

## 2021-05-27 NOTE — TELEPHONE ENCOUNTER
OT order faxed to Christa at [facility] at 10:27 am.  KAYCEE TovarA

## 2021-05-27 NOTE — TELEPHONE ENCOUNTER
Pt calling that she was seen in clinic on 3/18/19 and for runny nose given Rx Fluticasone.  Pt states this has not helped, her runny nose continues.    Is there something different that she can take.  Please advise.  Hilda Macedo RN, [facility] RN Triage      Reason for Disposition    [1] Nasal discharge AND [2] present > 10 days    Protocols used: COMMON COLD-A-AH

## 2021-05-27 NOTE — TELEPHONE ENCOUNTER
Medication Question or Clarification  Who is calling: Patient  What medication are you calling about? (include dose and sig)    Disp Refills Start End    azelastine (ASTEPRO) 0.15 % (205.5 mcg) Spry nasal spray 30 mL 2 4/1/2019     Sig - Route: Apply 1 spray into each nostril 2 (two) times a day. - Each Nare    Sent to pharmacy as: azelastine (ASTEPRO) 0.15 % (205.5 mcg) Spry nasal spray    E-Prescribing Status: Receipt confirmed by pharmacy (4/1/2019  1:09 PM CDT)      Who prescribed the medication?: Rema Whiting MD  What is your question/concern?: Patient stated this is the second nasal spray she has tried that is not helping with her runny nose. Patient stated the azelestine nasal spray is making the runny nose worse. Patient would like an alternative.  Pharmacy: [facility]a to leave a detailed message?: Yes  [phone]  Site CMT - Please call the pharmacy to obtain any additional needed information.

## 2021-05-27 NOTE — TELEPHONE ENCOUNTER
Orders being requested: Occupational therapy   Reason service is needed/diagnosis: evaluate and treat  When are orders needed by: ASAP  Where to send Orders: Fax: [phone]  Okay to leave detailed message?  No

## 2021-05-27 NOTE — TELEPHONE ENCOUNTER
Please call patient: I have given her antihistamine nasal spray azelastine 1 spray each nostril two times a day. She can use azelastine instead of Flonase.

## 2021-05-27 NOTE — TELEPHONE ENCOUNTER
Left message to call back for: Orders  Information to relay to patient:  LM for [facility] at 9:07 am giving verbal order from Dr Whiting ok 'ing skilled nursing order.  Mary Velasco, KAYCEEA

## 2021-05-27 NOTE — TELEPHONE ENCOUNTER
Who is calling:  Patient  Reason for Call: checking status on below messages. Please call patient and advise    Date of last appointment with primary care:   Has the patient been recently seen:  Yes  Okay to leave a detailed message: Yes

## 2021-05-27 NOTE — TELEPHONE ENCOUNTER
Left message to call back for: Orders  Information to relay to patient:  LM for [facility] at 9:07 am giving verbal order ok' ing OT per Dr Whiting.  Mary Velasco, NRCHARLOTTEA

## 2021-05-28 LAB
BKR LAB AP ABNORMAL BLEEDING: NO
BKR LAB AP BIRTH CONTROL/HORMONES: NORMAL
BKR LAB AP CERVICAL APPEARANCE: NORMAL
BKR LAB AP GYN ADEQUACY: NORMAL
BKR LAB AP GYN INTERPRETATION: NORMAL
BKR LAB AP HPV REFLEX: NORMAL
BKR LAB AP LMP: NORMAL
BKR LAB AP PATIENT STATUS: NORMAL
BKR LAB AP PREVIOUS ABNORMAL: NORMAL
BKR LAB AP PREVIOUS NORMAL: NORMAL
HIGH RISK?: YES
PATH REPORT.COMMENTS IMP SPEC: NORMAL
RESULT FLAG (HE HISTORICAL CONVERSION): NORMAL

## 2021-05-28 ASSESSMENT — ANXIETY QUESTIONNAIRES: GAD7 TOTAL SCORE: 3

## 2021-05-28 ASSESSMENT — ASTHMA QUESTIONNAIRES
ACT_TOTALSCORE: 17
ACT_TOTALSCORE: 17

## 2021-05-28 NOTE — TELEPHONE ENCOUNTER
Left message to call back for: Message from PCP  Information to relay to patient:  Attempted to call patient back and phone number listed is currently not in service.  [employer] message sent to patient with Dr. Whiting' recommendations.

## 2021-05-28 NOTE — TELEPHONE ENCOUNTER
Please call patient:  She can try to add on Zyrtec or Allegra from over the counter to see if this helps her runny nose  Continue using azelastin I prescribed  Rema Whiting

## 2021-05-29 NOTE — PROGRESS NOTES
MTM Transition of Care Encounter  Assessment & Plan                                                     Post Discharge Medication Reconciliation Status: discharge medications reconciled and changed, per note/orders (see AVS)    1. Chronic obstructive pulmonary disease  Recently hospitalized for exacerbation and pneumonia.  Finishing treatment with antibiotic and steroids.  Noncompliance with ICS/LABA inhaler, but so education provided and refill sent for Symbicort as well as rescue inhaler albuterol.  Also sent refill of her previous albuterol nebulizer dose.  Inhaler education provided over the phone, but did also recommend patient review with pharmacist at her pharmacy regarding dust inhalation technique.  Refills:   - budesonide-formoterol (SYMBICORT) 160-4.5 mcg/actuation inhaler; Inhale 2 puffs 2 (two) times a day.  Dispense: 1 Inhaler; Refill: 12  - albuterol (PROAIR HFA;PROVENTIL HFA;VENTOLIN HFA) 90 mcg/actuation inhaler; Inhale 2 puffs every 6 (six) hours as needed for wheezing or shortness of breath.  Dispense: 1 Inhaler; Refill: 11  - albuterol (PROVENTIL) 2.5 mg /3 mL (0.083 %) nebulizer solution; Take 3 mL (2.5 mg total) by nebulization every 4 (four) hours as needed for wheezing or shortness of breath.  Dispense: 90 mL; Refill: 5    2. Type 2 diabetes mellitus   Most recent A1c well controlled at 6.2%, within goal of less than 7% per ADA guidelines.  Questionable compliance with metformin.  Patient is requesting refill, which was sent to her pharmacy.  Blood pressure is well controlled and meeting goal of <140/90 mm Hg per JNC-8 hypertension guidelines. Not on medication. No microalbuminuria.   Not currently on statin, but most recent LDL well controlled at 48 mg/dl.   Refill:  - metFORMIN (GLUCOPHAGE XR) 500 MG 24 hr tablet; Take 2 tablets (1,000 mg total) by mouth daily with breakfast.  Dispense: 180 tablet; Refill: 3    3. Bipolar I disorder, most recent episode depressed   Stable, but questionable  medication compliance.  Reviewed medications with patient that she is prescribed for mood disorder and refills sent to her pharmacy.  Refills:  - buPROPion (WELLBUTRIN) 75 MG tablet; Take 1 tablet (75 mg total) by mouth daily.  Dispense: 90 tablet; Refill: 3  - ARIPiprazole (ABILIFY) 20 MG tablet; Take 1 tablet (20 mg total) by mouth daily.  Dispense: 90 tablet; Refill: 3      Follow Up  PRN with MTM    Subjective & Objective                                                       Arvind Leong is a 64 y.o. female called for a transitions of care visit. she was discharged from [facility] on 6/15/19 for pneumonia; COPD exacerbation.     Chief Complaint: Hospital discharge med review  Medication Adherence/Access: History of medication non-compliance. Out of several medications today and in need of refills.     Patient was recently hospitalized for COPD exacerbation and community-acquired pneumonia.  She is finishing course of Augmentin as well as prednisone.  Some loose stool with Augmentin, but otherwise tolerating. At home she does use oxygen.  Quit smoking several years ago.  She is complaining about the refill of albuterol nebulizer that was sent to her pharmacy, but the amount of liquid is much smaller than she used previously.  She states she used to be on an inhaler currently.  Does not have an albuterol rescue inhaler on hand either.  Overall she states she is feeling better, but just tired.    For bipolar, she takes Abilify 20 mg daily and bupropion 75 mg daily, although she is requesting refills of these medications.    For diabetes, she is prescribed metformin extended release 1000 mg daily, but again compliance is questionable and when I brought up the medication she said she is out and needs a refill.      Lab Results   Component Value Date    HGBA1C 6.2 (H) 06/12/2019    HGBA1C 6.6 (H) 03/04/2019    HGBA1C 6.9 (H) 10/19/2018     Lab Results   Component Value Date    MICROALBUR 1.15 02/19/2018     LDLCALC 48 2018    CREATININE 0.84 06/15/2019     The 10-year ASCVD risk score (Rapeljetaylor JIMÉNEZ Jr., et al., 2013) is: 6%    Values used to calculate the score:      Age: 64 years      Sex: Female      Is Non- : No      Diabetic: Yes      Tobacco smoker: No      Systolic Blood Pressure: 100 mmHg      Is BP treated: No      HDL Cholesterol: 32 mg/dL      Total Cholesterol: 151 mg/dL    PMH: reviewed in EPIC   Allergies/ADRs: reviewed in EPIC   Alcohol: yes, occasional   Tobacco:   Social History     Tobacco Use   Smoking Status Former Smoker     Packs/day: 1.00     Years: 40.00     Pack years: 40.00     Last attempt to quit: 2017     Years since quittin.3   Smokeless Tobacco Never Used     Recent Vitals:   BP Readings from Last 3 Encounters:   06/15/19 100/56   19 113/54   19 106/70      Wt Readings from Last 3 Encounters:   19 166 lb 3.2 oz (75.4 kg)   19 160 lb (72.6 kg)   19 164 lb 11.2 oz (74.7 kg)     ----------------    The patient was given a summary of these recommendations via [employer]    I spent 30 minutes with this patient today;  . All changes were made via collaborative practice agreement with Rema Whiting MD. A copy of the visit note was provided to the patient's provider.     Ashely Cota, PharmD, BCACP  Medication Management Pharmacist  [facility]          Current Outpatient Medications   Medication Sig Dispense Refill     albuterol (PROVENTIL) 5 mg/mL nebulizer solution Take 0.5 mL (2.5 mg total) by nebulization every 6 (six) hours as needed for wheezing. 20 mL 0     amoxicillin-clavulanate (AUGMENTIN) 875-125 mg per tablet Take 1 tablet by mouth 2 (two) times a day for 6 days. 12 tablet 0     ARIPiprazole (ABILIFY) 20 MG tablet Take 1 tablet (20 mg total) by mouth daily. 30 tablet 0     azelastine (ASTEPRO) 0.15 % (205.5 mcg) Spry nasal spray Apply 1 spray into each nostril 2 (two) times a day. (Patient  taking differently: Apply 1 spray into each nostril 2 (two) times a day as needed.       ) 30 mL 2     blood glucose test (CONTOUR NEXT TEST STRIPS) strips Use to check blood sugar once daily. 100 strip 1     BLOOD-GLUCOSE METER (CONTOUR NEXT METER MISC) Use As Directed.       buPROPion (WELLBUTRIN) 75 MG tablet Take 1 tablet (75 mg total) by mouth daily. 30 tablet 0     metFORMIN (GLUCOPHAGE XR) 500 MG 24 hr tablet Take 2 tablets (1,000 mg total) by mouth daily with breakfast. 180 tablet 3     predniSONE (DELTASONE) 10 mg tablet Take 20 mg by mouth daily with breakfast for 2 days, THEN 10 mg daily with breakfast for 2 days. 6 tablet 0     senna (SENOKOT) 8.6 mg tablet Take 1 tablet by mouth as needed for constipation. 30 tablet 0     sodium chloride (OCEAN) 0.65 % nasal spray As needed four times a day, each nare (Patient taking differently: 1 spray into each nostril 4 (four) times a day as needed. As needed four times a day, each nare      ) 15 mL 1     No current facility-administered medications for this visit.

## 2021-05-29 NOTE — TELEPHONE ENCOUNTER
----- Message from Jamilah Quintana CNP sent at 6/20/2019  5:38 AM CDT -----  Please call and let her know her WBC is high again. I don't think her pneomonia is gone yet. I want her to stop taking augmentin and start taking Levofloxacin once daily 750 for 5 days. Then follow up with PCP early next week for close monitoring.     Also I had initially ordered 500 mg of Levofloxacin but changed it to 750 mg once daily for 5 days. Can you call pharmacy and cancel the 500 mg dosing. Thanks Jamilah

## 2021-05-29 NOTE — TELEPHONE ENCOUNTER
Patient Returning Call  Reason for call:  Return call  Information relayed to patient:  There is no information in the chart to relay.  Patient has additional questions:  No  If YES, what are your questions/concerns:  n/a  Okay to leave a detailed message?: No   [phone]

## 2021-05-29 NOTE — PATIENT INSTRUCTIONS - HE
Discussed the importance of core strengthening, ROM, stretching exercises with the patient and how each of these entities is important in decreasing pain.  Explained to the patient that the purpose of physical therapy is to teach the patient a home exercise program.  These exercises need to be performed every day in order to decrease pain and prevent future occurrences of pain.        I have ordered an MRI of your cervical spine(neck).  Once I reviewed the images I will have 1 of our nurses give you a call with results and recommendations.    Please continue to wear your lidocaine patches.    ~Please call Nurse Navigation line [phone] with any questions or concerns about your treatment plan, if symptoms worsen and you would like to be seen urgently, or if you have problems controlling bladder and bowel function.

## 2021-05-29 NOTE — PROGRESS NOTES
ASSESSMENT: Arvind Leong is a 64 y.o. female who presents for consultation at the request of HE PCP Rema Whiting MD, with a past medical history significant for scoliosis, morbid obesity, lower back pain, asthma, urge incontinence of urine, COPD, hyperlipidemia, adult sleep apnea, anxiety, vertigo, type 2 diabetes, cognitive dysfunction and bipolar disorder, noncompliant with medications, dependence of continuous supplemental oxygen, who presents today for new patient evaluation of left-sided thoracic back pain:    --Does appear that much of her pain is muscular or myofascial in nature.  She is seeing great improvement with lidocaine patch that she first started using today.  She does have hyperreflexia in her upper and lower extremities as well as gait imbalance.  I have ordered a cervical MRI to further investigate.    Patient is neurologically intact on exam. No myelopathic or red flag symptoms.     CALIXTO Score: 36    WHO 5: 10       Diagnoses and all orders for this visit:    Myofascial pain  -     Ambulatory referral to Physical Therapy    Other idiopathic scoliosis, thoracolumbar region  -     Ambulatory referral to Physical Therapy    Hyperreflexia  -     MR Cervical Spine With Without Contrast  -     diazePAM (VALIUM) 5 MG tablet; Take 1-2 tablets (5-10 mg total) by mouth once in imaging for anxiety.  Dispense: 2 tablet; Refill: 0    Gait abnormality  -     MR Cervical Spine With Without Contrast  -     diazePAM (VALIUM) 5 MG tablet; Take 1-2 tablets (5-10 mg total) by mouth once in imaging for anxiety.  Dispense: 2 tablet; Refill: 0      PLAN:  Reviewed spine anatomy and disease process. Discussed diagnosis and treatment options with the patient today. A shared decision making model was used.  The patient's values and choices were respected. The following represents what was discussed and decided upon by the provider and the patient.      -DIAGNOSTIC TESTS:  Images were personally reviewed and  interpreted and explained to patient today using spine model.   --Chest x-ray dated 5/22/2019 personally reviewed and images interpreted and shown to the patient.  It does show a significant S shaped thoracolumbar scoliosis.  --Ordered an MRI of her cervical spine secondary to hyperreflexia and gait imbalance.    -PHYSICAL THERAPY: Ordered physical therapy for her to work on core strengthening as well as relieving type exercises.  I like her to have an exercise program that she can do on a daily basis.    Discussed the importance of core strengthening, ROM, stretching exercises with the patient and how each of these entities is important in decreasing pain.  Explained to the patient that the purpose of physical therapy is to teach the patient a home exercise program.  These exercises need to be performed every day in order to decrease pain and prevent future occurrences of pain.        -MEDICATIONS: I recommend that she continue with her lidocaine patches.  She can continue with naproxen if she would like as needed.  -  Discussed multiple medication options today with patient. Discussed risks, side effects, and proper use of medications. Patient verbalized understanding.    -INTERVENTIONS: No interventions at this time.  Discussed risks and benefits of injections with patient today.    -PATIENT EDUCATION: We discussed treatment of pain in a multimodal fashion including physical therapy, medication management.  We also discussed reasoning for cervical MRI.    -FOLLOW-UP:   Patient will follow-up in 4 weeks.    Advised patient to call the [facility] if symptoms worsen or you have problems controlling bladder and bowel function.   ______________________________________________________________________    SUBJECTIVE:  HPI:  Arvind Leong  Is a 64 y.o. female who presents today for new patient evaluation of low back pain on the left.  Patient reports that she has had left-sided low back pain for 4 weeks.  Pain is  worse with twisting.  Pain is improved with lidocaine patch.  She started using a lidocaine patch this morning and found that it took her pain from a 5/10 to a 0/10.  She denies any fevers or chills or any unintentional weight loss.  She does note some urinary and bowel retention.  She also notes some imbalance with gait as well as mild hand weakness.  She describes the pain in her low back as achy in nature.  There is no radiation down her legs.    -Treatment to Date: Chest x-ray dated 5/22/2019.    -Medications:    Current Outpatient Medications on File Prior to Encounter   Medication Sig Dispense Refill     albuterol (PROVENTIL) 2.5 mg /3 mL (0.083 %) nebulizer solution Take 3 mL (2.5 mg total) by nebulization every 4 (four) hours as needed. 60 vial 0     ARIPiprazole (ABILIFY) 20 MG tablet Take 1 tablet (20 mg total) by mouth daily. 30 tablet 0     azelastine (ASTEPRO) 0.15 % (205.5 mcg) Spry nasal spray Apply 1 spray into each nostril 2 (two) times a day. 30 mL 2     blood glucose test (CONTOUR NEXT TEST STRIPS) strips Use to check blood sugar once daily. 100 strip 1     BLOOD-GLUCOSE METER (CONTOUR NEXT METER MISC) Use As Directed.       buPROPion (WELLBUTRIN) 75 MG tablet Take 1 tablet (75 mg total) by mouth daily. 30 tablet 0     metFORMIN (GLUCOPHAGE XR) 500 MG 24 hr tablet Take 2 tablets (1,000 mg total) by mouth daily with breakfast. 180 tablet 3     naproxen (NAPROSYN) 500 MG tablet Take 1 tablet (500 mg total) by mouth 2 (two) times a day with meals. 30 tablet 0     sodium chloride (OCEAN) 0.65 % nasal spray As needed four times a day, each nare 15 mL 1     traZODone (DESYREL) 50 MG tablet Take 0.5 tablets (25 mg total) by mouth at bedtime as needed, may repeat once for sleep. 30 tablet 0     senna (SENOKOT) 8.6 mg tablet Take 1 tablet by mouth as needed for constipation. 30 tablet 0     tiZANidine (ZANAFLEX) 2 MG tablet Take 1 tablet (2 mg total) by mouth every 6 (six) hours as needed. 30 tablet 0      No current facility-administered medications on file prior to encounter.        Allergies   Allergen Reactions     Lurasidone Unknown and Other (See Comments)     Face turned red  (Brand name = Latuda) Face turned red  Face turned red       Past Medical History:   Diagnosis Date     Acute on chronic respiratory failure with hypoxia (H) 11/15/2016     Bipolar 1 disorder (H)      CAP (community acquired pneumonia) 11/15/2016     COPD with exacerbation (H) 11/15/2016     Diabetes mellitus, type II (H)      Hearing loss      Meniere's disease         Patient Active Problem List   Diagnosis     Scoliosis     Morbid obesity (H)     Lower Back Pain     Asthma     Urge Incontinence Of Urine     COPD (chronic obstructive pulmonary disease) (H)     Hyperlipidemia     Adult Sleep Apnea     Type 2 diabetes mellitus (H)     Anxiety     Vertigo     Cognitive dysfunction in bipolar disorder (H)     Noncompliance with medications     Dependence on continuous supplemental oxygen     Bipolar I disorder, most recent episode depressed (H)     Runny nose       Past Surgical History:   Procedure Laterality Date     CYST REMOVAL  2001     MS REMOVAL ANAL FISTULA,SUBCUTANEOUS      Description: Fistula-in-ano Repair;  Recorded: 2010; x2     TONSILLECTOMY         Family History   Problem Relation Age of Onset     Aneurysm Father      Heart disease Father 70        bypass in 70s, AAA rupture at age 77      Ulcers Father 76     Pacemaker Mother      Bipolar disorder Brother      Breast cancer Maternal Grandmother 51     Kidney failure Maternal Grandmother      Cancer Paternal Grandmother         ?? lung     Cancer Paternal Uncle         ?? lung     Mental illness Maternal Grandfather      Heart disease Other         uncle     No Medical Problems Sister         two siblings abuse chemicals     No Medical Problems Brother      Bipolar disorder Nephew        Reviewed past medical, surgical, and family history with patient  "found on new patient intake packet located in EMR Media tab.     SOCIAL HX: She denies smoking or drinking alcohol or using recreational drugs.    ROS: Specifically negative for headache, dizziness, foot drop, fevers, chills, appetite changes, nausea/vomiting, unexplained weight loss. Otherwise 13 systems reviewed are negative. Please see the patient's intake questionnaire from today for details.    OBJECTIVE:  /70 (Patient Site: Right Arm, Patient Position: Sitting, Cuff Size: Adult Regular)   Pulse 73   Temp 97.7  F (36.5  C) (Oral)   Resp 19   Ht 4' 7.5\" (1.41 m)   Wt 164 lb 11.2 oz (74.7 kg)   SpO2 93%   BMI 37.59 kg/m      PHYSICAL EXAMINATION:    --CONSTITUTIONAL:  Vital signs as above.  No acute distress.  The patient is well nourished and well groomed.  --PSYCHIATRIC:  Appropriate mood and affect. The patient is awake, alert, oriented to person, place, time and answering questions appropriately with clear speech.    --SKIN:  Skin over the face, bilateral lower extremities, and posterior torso is clean, dry, intact without rashes.    --RESPIRATORY: Normal rhythm and effort. No abnormal accessory muscle breathing patterns noted.   --ABDOMINAL:  Soft, non-distended, non-tender throughout all quadrants.   --STANDING EXAMINATION: Lumbar scoliosis.  --MUSCULOSKELETAL:  Range of motion is not limited in lumbar flexion, extension, lateral rotation.  She has tenderness palpation over the left quadratus lumborum.  Straight leg raising in the supine position is negative to radicular pain.   --SACROILIAC JOINT: Negative distraction.  Negative Deondre's with reproduction of pain to affected extremity.  One Finger point test negative.  --GROSS MOTOR: Gait is non-antalgic. Easily arises from a seated position. Toe walking and heel walking are normal without significant difficulty.    --LOWER EXTREMITY MOTOR TESTING:  Plantar flexion left 5/5, right 5/5   Dorsiflexion left 5/5, right 5/5   Great toe MTP extension " left 5/5, right 5/5  Knee flexion left 5/5, right 5/5  Knee extension left 5/5, right 5/5   Hip flexion left 5/5, right 5/5  Hip abduction left 5/5, right 5/5  Hip adduction left 5/5, right 5/5   --HIPS: Full range of motion bilaterally.  Central test is negative bilaterally.  --NEUROLOGICAL: 3/4 patellar, medial hamstring, and achilles reflexes bilaterally.  Biceps, brachioradialis, triceps is also 3/4 sensation to light touch is intact in the bilateral L4, L5, and S1 dermatomes. Babinski is negative. No clonus.  Negative Akhil reflex bilaterally.  --VASCULAR:  2/4 dorsalis pedis and posterior tibialsi pulses bilaterally.  Bilateral lower extremities are warm.  There is no pitting edema of the bilateral lower extremities.    RESULTS: Prior medical records from [facility] primary care provider were reviewed today.    Imaging: Lumbar spine Imaging was personally reviewed and interpreted today. The images were shown to the patient and the findings were explained using a spine model.  Thanks appreciate    Xr Chest 2 Views    Result Date: 5/22/2019  EXAM: XR CHEST 2 VIEWS LOCATION: [facility] DATE/TIME: 5/22/2019 11:11 AM INDICATION: Pain in thoracic spine COMPARISON: 02/07/2019 and older studies FINDINGS: Lungs are clear. Heart and pulmonary vascularity are normal. No signs of acute disease. Significant S-shaped thoracolumbar scoliosis with the thoracic curve convex to the right.

## 2021-05-29 NOTE — TELEPHONE ENCOUNTER
Reason contacted:  Results  Information relayed:  Informed patient that Jamilah had left her a detailed voicemail that her XR showed scoliosis, so she has placed a referral for patient to see spine care to discuss if this is what is causing her back pain. Pt voiced understanding.  Additional questions:  No  Further follow-up needed:  No

## 2021-05-29 NOTE — TELEPHONE ENCOUNTER
Prior Authorization Request  Who s requesting:  Patient  Pharmacy Name and Location: [facility]  Medication Name: budesonide-formoterol (SYMBICORT) 160-4.5 mcg/actuation inhaler  Insurance Plan: [employer]  Insurance Member ID Number: [id number]  Informed patient that prior authorizations can take up to 10 business days for response:   Yes  Okay to leave a detailed message: Yes

## 2021-05-29 NOTE — PROGRESS NOTES
Assessment/Plan:   Arvind was seen today for left sided mid thoracic back pain.    Diagnoses and all orders for this visit:    Acute left-sided thoracic back pain  -     XR Chest 2 Views  -     tiZANidine (ZANAFLEX) 2 MG tablet; Take 1 tablet (2 mg total) by mouth every 6 (six) hours as needed.  -     naproxen (NAPROSYN) 500 MG tablet; Take 1 tablet (500 mg total) by mouth 2 (two) times a day with meals.       DD: Chest wall pain, rib pain, myofascial pain, respiratory etiology, consider spine care referral for her pain and x-ray. Called patient with results of x-ray; likely the scoliosis is not a new issue so I am not sure it it would be causing her left sided thoracic pain.     EXAM: XR CHEST 2 VIEWS  LOCATION: [facility]  DATE/TIME: 5/22/2019 11:11 AM     INDICATION: Pain in thoracic spine  COMPARISON: 02/07/2019 and older studies     FINDINGS: Lungs are clear. Heart and pulmonary vascularity are normal. No signs of acute disease. Significant S-shaped thoracolumbar scoliosis with the thoracic curve convex to the right    Return in about 2 weeks (around 6/5/2019) for Recheck.    Jamilah Randall, APRN, CNP    Subjective:  Arvind Leong is a 64 y.o. year old female who presents with left mid thoroacic back pain. The pain started 1 month(s) ago none known. No known trauma or injury. 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 She is tired after she works but states that it is an easy job.  Since then the pain has worsened slightly. The pain is located in the left mid back and does not radiate. Pain is rated 7/10 and is described at aching and dull.  Pain is associated with no other symptoms. Alleviating factors include: none. Aggravating factors include: lying down. Denies paresthesias, radiculopathy, loss of bowel or bladder function, recent fevers, history of cancer.     History of COPD, denies that it is worse today. No worsened cough or  shortness of breath, no chest pain  Or lower leg edema.    Home treatment of symptoms includes none.    History of back injury: none recalled by the patient.     Social History     Tobacco Use   Smoking Status Former Smoker     Packs/day: 1.00     Years: 40.00     Pack years: 40.00     Last attempt to quit: 2017     Years since quittin.3   Smokeless Tobacco Never Used       Patient Active Problem List   Diagnosis     Scoliosis     Morbid obesity (H)     Lower Back Pain     Asthma     Urge Incontinence Of Urine     COPD (chronic obstructive pulmonary disease) (H)     Hyperlipidemia     Adult Sleep Apnea     Type 2 diabetes mellitus (H)     Anxiety     Vertigo     Cognitive dysfunction in bipolar disorder (H)     Noncompliance with medications     Dependence on continuous supplemental oxygen     Bipolar I disorder, most recent episode depressed (H)     Runny nose     Past Medical History:   Diagnosis Date     Acute on chronic respiratory failure with hypoxia (H) 11/15/2016     Bipolar 1 disorder (H)      CAP (community acquired pneumonia) 11/15/2016     COPD with exacerbation (H) 11/15/2016     Diabetes mellitus, type II (H)      Hearing loss      Meniere's disease      Allergies   Allergen Reactions     Lurasidone Unknown and Other (See Comments)     Face turned red  (Brand name = Latuda) Face turned red  Face turned red     Current Outpatient Medications on File Prior to Visit   Medication Sig Dispense Refill     albuterol (PROVENTIL) 2.5 mg /3 mL (0.083 %) nebulizer solution Take 3 mL (2.5 mg total) by nebulization every 4 (four) hours as needed. 60 vial 0     ARIPiprazole (ABILIFY) 20 MG tablet Take 1 tablet (20 mg total) by mouth daily. 30 tablet 0     azelastine (ASTEPRO) 0.15 % (205.5 mcg) Spry nasal spray Apply 1 spray into each nostril 2 (two) times a day. 30 mL 2     blood glucose test (CONTOUR NEXT TEST STRIPS) strips Use to check blood sugar once daily. 100 strip 1     BLOOD-GLUCOSE METER (CONTOUR  NEXT METER MISC) Use As Directed.       buPROPion (WELLBUTRIN) 75 MG tablet Take 1 tablet (75 mg total) by mouth daily. 30 tablet 0     metFORMIN (GLUCOPHAGE XR) 500 MG 24 hr tablet Take 2 tablets (1,000 mg total) by mouth daily with breakfast. 180 tablet 3     senna (SENOKOT) 8.6 mg tablet Take 1 tablet by mouth as needed for constipation. 30 tablet 0     sodium chloride (OCEAN) 0.65 % nasal spray As needed four times a day, each nare 15 mL 1     traZODone (DESYREL) 50 MG tablet Take 0.5 tablets (25 mg total) by mouth at bedtime as needed, may repeat once for sleep. 30 tablet 0     No current facility-administered medications on file prior to visit.        Objective: /64   Pulse 76   Temp 97.9  F (36.6  C) (Oral)   Resp 24   Wt 165 lb 3.2 oz (74.9 kg)   BMI 38.40 kg/m    General: Patient appears to be in no acute distress.  Lungs: Unlabored. clear breath sounds heard throughout lung fields.   Heart: Regular rate and rhythm.  Back: gait normal for patient. Spine is non tender; left midthoroacic paraspinal muscles are  tender to palpation vs rib pain. No ICS rib pain. Sensation intact at medial dorsal and lateral aspects of the feet. Strength equal 5/5 bilaterally in Upper and lower extremities. Range of motion is painful with extension, flexion, side bending and rotating in the left mid thoracic  back. DTRs + 2, bilateral brachioradialis and brachial bilateral knees and ankles.

## 2021-05-29 NOTE — TELEPHONE ENCOUNTER
New Appointment Needed  What is the reason for the visit:    Inpatient/ED Follow Up Appt Request  At what hospital or facility were you seen?: [facility]  What is the reason you were seen?: Pneumonia   What date were you admitted?: date: 6/11/19  What date were you discharged?: date: 6/15/19  What was the recommended timeframe for your follow up appointment?: 3 - 5 days  Provider Preference: The patients she will see anyone but Dr. Pittman.  How soon do you need to be seen?: 3-5 days   Waitlist offered?: No  Okay to leave a detailed message:  Yes

## 2021-05-29 NOTE — PROGRESS NOTES
Assessment/Plan:        Diagnoses and all orders for this visit:    COPD exacerbation (H)  -     HM1(CBC and Differential)  -     Basic Metabolic Panel  -     predniSONE (DELTASONE) 20 MG tablet; Take 20 mg by mouth daily.  Dispense: 10 tablet; Refill: 0  -     HM1 (CBC with Diff)    Pneumonia of right lower lobe due to infectious organism (H)  -     levoFLOXacin (LEVAQUIN) 750 MG tablet; Take 1 tablet (750 mg total) by mouth daily for 5 days.  Dispense: 5 tablet; Refill: 0   -discontinue Augmentin and start Levofloxacin.    -wbc is elevated 18 again with an elevated ANC level of 14. Concern for pneumonia returning while she is taking Augmentin. Clinically she looks pretty well today. Oxygen satuation is 95% on 2 L; has some wheezing in lungs and are diminished but ok. She is still fatigued. I will change to Levofloxacin and have her return with close follow up early next week for further evaluation.    Follow up with PCP or partner early next week continued follow up.          Subjective:    Patient ID: Arvind Leong is a 64 y.o. female.    HPI is here for hospital follow-up.  She was admitted into the hospital on June 11, 2018 For difficulty breathing, COPD exacerbation and right lower lobe pneumonia.  She was discharged on Светлана 15, 2019.  While in the hospital her labs are monitored she is found to have an elevated CBC and negative blood cultures and right lower lobe pneumonia found on x-ray negative swallow study which she was not aspirating.  While in the hospital she received steroids for her COPD exacerbation and then changed to p.o. she was on Zosyn while in the hospital and then changed to p.o. Augmentin.  And sent home on oxygen at 2 L.  Blood sugars were elevated while in the hospital likely due to both illness and the steroid medication she would been given.  They restarted her metformin which she states she is taking today.  She has bipolar and is taking her medications for that.  They are  "concerned about deconditioning and started PT OT while in the hospital.  Mischel states that she feels better however she is still tired.  She complains of continued wheezing.  Although she does not have a cough and does not feel short of breath is what she did.  Patient states that her oxygen saturation of 85% that they checked  she did not have her oxygen on at that time and was on room air.  We rechecked her oxygen saturation while she was on 2 L of oxygen and it was 95%.    The following portions of the patient's history were reviewed and updated as appropriate: allergies, current medications, past family history, past medical history, past social history, past surgical history and problem list.    Review of Systems   Constitutional: Positive for fatigue.   Respiratory: Positive for wheezing. Negative for cough, chest tightness and shortness of breath.    Cardiovascular: Negative.    All other systems reviewed and are negative.            Objective:    Physical Exam  /76 (Patient Site: Left Arm, Patient Position: Sitting, Cuff Size: Adult Regular)   Pulse 84   Ht 4' 7\" (1.397 m)   Wt 163 lb (73.9 kg)   SpO2 95% Comment: 2 L  BMI 37.88 kg/m    General: Patient appears to be in no acute distress.  Ears: No nodules, lesions, masses, discharge or tenderness in auricles or mastoid area. No cerumen in ear canals. Tympanic membranes pearly gray, normal bony landmarks and cone of light bilaterally, no bulges or perforations.   Oropharynx: Buccal mucosa pink, moist, no lesions. Tongue midline, spongy, pink. Uvula midline. Pharynx with out erythema, no exudates.  Lymph: bilateral submandibular Lymph nodes in neck,  are palpable and are not tender.  Lungs: Unlabored. Soft wheezing breath sounds heard throughout lung fields.   Heart: Regular rate and rhythm. No lower leg edema.          "

## 2021-05-29 NOTE — PROGRESS NOTES
Attempt 1: Care Guide called patient.  If this patient is returning my call, please transfer to Bhargavi at ext [phone].  LMTCB: New [facility] Referral/[employer] paperwork given to cg before Dr. Whiting went on maternity leave      New Referral Entered: 6/17/19    Provider: Dr. Whiting     DX:  Noncompliance with medications  Type 2 diabetes mellitus without complication, without long-term current use of insulin (H)  Pneumonia of right lower lobe due to infectious organism (H)    Comments from Provider:  Ptient was recently hospitalized at [facility] for treatment of a COPD exacerbation and pneumonia of right lower lobe. Patient was discharged to her home in fair condition. She declined home care. Patient has a history of COPD, home O2, CHF, ischemic cardiomyopathy, NSTEMI, chronic atrial fibrillation, S/P CABG, short of breath on exertion    Dr. Whiting left [employer] paperwork on cg desk before she went on maternity leave    Insurance: [employer]    Resources Needed:  SW/RN    Outreach Attempts: 6/17/19    Next Outreach: 6/21/19

## 2021-05-29 NOTE — TELEPHONE ENCOUNTER
Central PA team  [phone]  Pool: HE PA MED ([id number])          PA has been initiated.       PA form completed and faxed insurance via Cover My Meds     Key:  [id number]     Medication:  Symbicort 160-4.5MCG/ACT aerosol    Insurance:  [employer] [place]        Response will be received via fax and may take up to 5-10 business days depending on plan

## 2021-05-29 NOTE — TELEPHONE ENCOUNTER
Reason contacted:  Results  Information relayed:  Informed patient of Jamilah's recommendations below.  Pt voiced understanding. Scheduled with Jamilah 06/26/19 for follow up.  Additional questions:  No  Further follow-up needed:  Yes

## 2021-05-29 NOTE — PROGRESS NOTES
Attempt 2: Care Guide called patient.  If this patient is returning my call, please transfer to Bhargavi at ext [phone].  LMTCB: New [facility] Referral, st  social work appointment ([facility] Mob[facility] etc.)    Next Outreach: if no call back- meet with patient in clinic after Jamilah's appointment at 920am  
none

## 2021-05-30 ENCOUNTER — RECORDS - HEALTHEAST (OUTPATIENT)
Dept: ADMINISTRATIVE | Facility: CLINIC | Age: 66
End: 2021-05-30

## 2021-05-30 VITALS — WEIGHT: 177.4 LBS | BODY MASS INDEX: 38.39 KG/M2

## 2021-05-30 VITALS — HEIGHT: 57 IN | WEIGHT: 178.56 LBS | BODY MASS INDEX: 38.52 KG/M2

## 2021-05-30 VITALS — BODY MASS INDEX: 39.22 KG/M2 | WEIGHT: 181.25 LBS

## 2021-05-30 VITALS — WEIGHT: 180.6 LBS | BODY MASS INDEX: 39.08 KG/M2

## 2021-05-30 NOTE — TELEPHONE ENCOUNTER
----- Message from Jamilah Quintana CNP sent at 7/5/2019 11:35 AM CDT -----  Please call patient and let her know that her chest x-ray was clear, her WBC count has dropped to almost normal. May need to continue to monitor this; if she starts feeling worse again let us know, as she continues taking Symbacort 2 times a day; Her COPD should be under better control.

## 2021-05-30 NOTE — TELEPHONE ENCOUNTER
Pt asks that her currently refilled inhalers (Symbicort, albuterol and albuterol nebs) be re-transmitted to alternate pharmacy -> [facility] instead of [facility].  Done now.

## 2021-05-30 NOTE — PROGRESS NOTES
"The clinic Community Health Worker Bhargavi spoke with Arvind today at the request of the  Her PCP (Dr. Whiting)to discuss possible Clinic Care Coordination enrollment.  The service was described to the patient and immediate needs were discussed.   The patient declined enrollement at this time.      CHW reminded patient that I do have completed [employer] paperwork for her from Dr. Whiting-  Patient denies any struggles at this time. Declined [employer] \"I don't need that right now\"    CHW informed patient that CCC was always an option if she changed her mind in the future, and provided contact info.    *patient will be removed from WQ*  The PCP is encouraged to refer in the future if the patient's needs change.  "

## 2021-05-30 NOTE — TELEPHONE ENCOUNTER
Reason contacted:  Results  Information relayed:  MD message below  Additional questions:  No  Further follow-up needed:  No  Okay to leave a detailed message:  No

## 2021-05-30 NOTE — TELEPHONE ENCOUNTER
Orders being requested: OT evaluate and treat  Reason service is needed/diagnosis: Decline in ADL's/ increase endurance and activity level  When are orders needed by: As soon as provider can do  Where to send Orders: Verbal is ok  Okay to leave detailed message?  Yes

## 2021-05-30 NOTE — PROGRESS NOTES
Assessment/Plan:        Diagnoses and all orders for this visit:    History of pneumonia  -     XR Chest 2 Views  -     HM1(CBC and Differential)  -     HM1 (CBC with Diff)   Has finished taking abx; will recheck x-ray    Bipolar I disorder, most recent episode depressed (H)  -     buPROPion (WELLBUTRIN) 75 MG tablet; Take 1 tablet (75 mg total) by mouth daily.  Dispense: 90 tablet; Refill: 3    Type 2 diabetes mellitus without complication, without long-term current use of insulin (H)   Recommend LCHF diet for DMT2 and weight loss   Patient can follow up with me or referral for weight loss for better knowledge of what foods she can eat and what she should avoid   Hemoglobin A1c was 6.2 in June; pretty well controlled on Metformin, should consider LCHF diet for weight loss of central obesity.   info on resources given  Chronic obstructive pulmonary disease, unspecified COPD type (H)   Continue taking Symbicort 2 sprays 2 times a day (patient had been taking only 1/day)   Use albuterol as needed   2L of Oyxgen     Follow up in 1-3 months.            Subjective:    Patient ID: Arvind Leong is a 64 y.o. female.    HPI patient is here for follow-up of pneumonia.  And seen her several weeks ago for hospital follow-up after having an episode of pneumonia and being hospitalized for it.  At that time when I saw her she seemed to be doing better however her CBC had increased again her white count and so I switched her from oral Augmentin until levofloxacin and asked her to return early the next week for follow-up however she did not do that.  Today her oxygen saturation is 96-97% on her 2 L she did finally  her Symbicort and states that she has been using it for the past couple of days.  She complains of fatigue but this is a common complaint for her.  She states her lungs feel better to her.  Does say that she uses her CPAP at nighttime and states that she sleeps pretty hard.  We talked about how fatigue could  "be related to COPD, poor dietary habits, deconditioning.    She also asked about what types of food she should be eating.  We talked at length on eating a low carbohydrate/high fat diet to improve her diabetes.  She states that she felt that she is lost track of the types of foods she should be eating.  She eats a lot of sugar throughout her day.  She has concern for the weight that she has gained especially around her abdomen.  She was on several weeks of prednisone while she was sick with her COPD/Pnemonia.    The following portions of the patient's history were reviewed and updated as appropriate: allergies, current medications, past family history, past medical history, past social history, past surgical history and problem list.    Review of Systems   Constitutional: Positive for fatigue.   Respiratory: Positive for wheezing.    Cardiovascular: Negative for chest pain and leg swelling.   All other systems reviewed and are negative.            Objective:    Physical Exam  /74 (Patient Site: Left Arm, Patient Position: Sitting, Cuff Size: Adult Large)   Pulse 90   Ht 4' 7\" (1.397 m)   Wt 167 lb (75.8 kg)   SpO2 96%   BMI 38.81 kg/m    General: Patient appears to be in no acute distress.  Ears: No nodules, lesions, masses, discharge or tenderness in auricles or mastoid area. No cerumen in ear canals. Tympanic membranes pearly gray, normal bony landmarks and cone of light bilaterally, no bulges or perforations.   Oropharynx: Buccal mucosa pink, moist, no lesions. Tongue midline, spongy, pink. Uvula midline. Pharynx with out erythema, non exudates. Tonsils + 1.  Lymph:  Lymph nodes in neck, supraclavicular, and infraclavicular are not palpable and are not tender.  Lungs: Unlabored. clear breath sounds but diminished heard throughout lung fields. No wheezing or crackles  Heart: Regular rate and rhythm.          "

## 2021-05-30 NOTE — PROGRESS NOTES
Spoke with patient's mother today, was asked to call back tomorrow when patient is at home and available to discuss referral.  Mother was unsure when patient would be home today    Next attempt to discuss CCC Referral 7/16/19

## 2021-05-30 NOTE — TELEPHONE ENCOUNTER
"Pt states \"Why am I feeling so tired?\"  Discussed recent pneumonia episode -> resolved after Augmentin and Levaquin.  No current pneumonia symptoms.    Pt states \"I turned up my oxygen to 4 L.\"  Advised to immediately turn back to 2 L per her pulmonologist advice.  Discussed avoiding O2 toxicity.    When reviewing pt's medications, pt states \"I never got any of my inhalers.\"  States \"I have no inhalers in the house.\"  Needs to  Symbicort, albuterol nebs and albuterol inhaler and use these asap.  Asks that these be transmitted to alternate pharmacy ([facility] instead of [facility]).  This is completed in a separate 'Refill Med' encounter.    Pt agrees to schedule clinical f/u for current fatigue symptoms and medication review.  Pt specifically requests Dr Jamilah Quintana.  Warm transferred to a  for this purpose now.    Juliette Troncoso RN BSBA  Care Connection RN Triage     Reason for Disposition    Fatigue (i.e., tires easily, decreased energy) and persists > 1 week     Apparent COPD Flare    Protocols used: WEAKNESS (GENERALIZED) AND FATIGUE-A-OH      "

## 2021-05-30 NOTE — PROGRESS NOTES
Attempt 2: Care Guide called patient.  If this patient is returning my call, please transfer to Bhargavi at ext [phone].  LMTCB: [facility] Referral      Next Outreach planned 7/1/19

## 2021-05-30 NOTE — TELEPHONE ENCOUNTER
SURINDER for Lily at [facility] at 9:23 am and gave verbal order to evaluate and treat for OT per Dr Griggs.  Mary Velasco, KRISTINAA

## 2021-05-30 NOTE — PROGRESS NOTES
Care guide reached out to patient this morning to discuss [facility] referral that we have received. Arvind was not home at the time of call, Care guide spoke with patient's mother.  Patient will be in clinic today 7/3 @ 11 am to see Jamilah for a follow up visit. Care guide will not be available at that time to talk with patient due to another appointment.  Aster ortega will be calling patient back today this afternoon to discuss scheduling a time to meet for an assessment and to discuss her [employer] paperwork that Dr. Whiting had left with care guide before her leave.    Plan time for assessment with  and/or RN for 7/11    Next Outreach: 7/3 afternoon

## 2021-05-31 ENCOUNTER — RECORDS - HEALTHEAST (OUTPATIENT)
Dept: ADMINISTRATIVE | Facility: CLINIC | Age: 66
End: 2021-05-31

## 2021-05-31 VITALS — WEIGHT: 181.8 LBS | BODY MASS INDEX: 42.07 KG/M2 | HEIGHT: 55 IN

## 2021-05-31 VITALS — WEIGHT: 181 LBS | BODY MASS INDEX: 41.89 KG/M2 | HEIGHT: 55 IN

## 2021-05-31 VITALS — WEIGHT: 184 LBS | BODY MASS INDEX: 42.77 KG/M2

## 2021-05-31 VITALS — WEIGHT: 182 LBS | HEIGHT: 55 IN | BODY MASS INDEX: 42.12 KG/M2 | WEIGHT: 182 LBS | BODY MASS INDEX: 41.92 KG/M2

## 2021-05-31 VITALS — WEIGHT: 182 LBS | HEIGHT: 57 IN | BODY MASS INDEX: 39.26 KG/M2

## 2021-05-31 VITALS — BODY MASS INDEX: 41.51 KG/M2 | WEIGHT: 183.5 LBS

## 2021-05-31 VITALS — BODY MASS INDEX: 41.41 KG/M2 | WEIGHT: 183.06 LBS

## 2021-05-31 VITALS — WEIGHT: 184 LBS | BODY MASS INDEX: 39.82 KG/M2

## 2021-05-31 VITALS — WEIGHT: 182.6 LBS | BODY MASS INDEX: 39.51 KG/M2

## 2021-05-31 VITALS — BODY MASS INDEX: 39.6 KG/M2 | WEIGHT: 183 LBS

## 2021-05-31 VITALS — WEIGHT: 183 LBS | BODY MASS INDEX: 42.53 KG/M2

## 2021-05-31 VITALS — WEIGHT: 181 LBS | HEIGHT: 56 IN | BODY MASS INDEX: 40.72 KG/M2

## 2021-05-31 VITALS — BODY MASS INDEX: 39.17 KG/M2 | WEIGHT: 181 LBS

## 2021-05-31 NOTE — PATIENT INSTRUCTIONS - HE
1. Continue O2 supplementation 2 LPM with activities.   2. Sleep hygiene  3. Attempt to use CPAP  4. Continue Symbicort two puffs twice a daily , rinse mouth with water after each use  5. Albuterol inhaler 2 puffs up to 6 times a day as needed  6. Continue physical therapy  7. Weight loss, current weight 172 lbs (down 3 lbs)   8. Decrease salt intake.  9. Ranitidine 150 mg daily as needed for acid reflux symptoms  10. Follow up in 6 months

## 2021-05-31 NOTE — TELEPHONE ENCOUNTER
RN Triage:     Patient is calling back stating that she was having problems breathing last night she has a cough and feeling a gurgling in her chest. She is having a hard time breathing per patient report. She has COPD. Patient advised ED for cough with difficulty breathing. She stated she will go to [facility].   Lana Waldrop RN, BSN Care Connection Triage Nurse      Reason for Disposition    MODERATE difficulty breathing (e.g., speaks in phrases, SOB even at rest, pulse 100-120) of new onset or worse than normal    Protocols used: BREATHING DIFFICULTY-A-OH

## 2021-05-31 NOTE — TELEPHONE ENCOUNTER
Reason for Disposition    MODERATE difficulty breathing (e.g., speaks in phrases, SOB even at rest, pulse 100-120) of new onset or worse than normal    Protocols used: BREATHING DIFFICULTY-A-OH

## 2021-05-31 NOTE — PROGRESS NOTES
Oxygen saturation walk test    Patient oxygen saturation on RA at rest is 94%.  Oxygen saturation while ambulating 150ft on RA is 88%.    Oxygen pulse dose testing  While ambulating 150ft on 2LPM at pulse dose, oxygen saturation is 92%.      DME Provider: [facility]    Patient is ambulatory within his/her home.

## 2021-05-31 NOTE — PROGRESS NOTES
PULMONARY OUTPATIENT FOLLOW UP NOTE    Assessment:   1. Respiratory failure  Secondary to COPD, ABDIFATAH, morbid obesity  Re-testing O2 needs, adequate SpO2 at rest on RA but significant desaturation with activities. Continue O2 2 LPM with activities.   2. COPD on home O2  Recently started on treatment for COPD exacerbation.   Previous spirometry showed FEV1 0.52 L (31%), no significant post bronchodilator response.   Finish systemic steroids.   Continue LABA/ICS, albuterol HFA as needed.  O2 supplementation 2 LPM with activities.   3. ABDIFATAH  Sleep Study done on 2/17/2014 showed AHI 14.7, RDI 14.7, lateral , REM AHI 45, Supine AHI 14.3. PML index 21.7. Mean O2 sat 87%, desaturation heide 61%, Time of SpO2 < 89% 44 minutes out of 138 minutes of diagnostic time. CPAP 7 cmH20 was therapeutic.   Unfortunately, patient was not able to tolerating CPAP machine. Sleep hygiene was discussed. Patient will re-attempt to use CPAP.   4. GERD  5. Bipolar / Anxiety disorder  6. Physical deconditioning   7. Morbid obesity, BMI 40    Plan:   1. Continue O2 supplementation 2 LPM with activities.   2. Finish systemic steroids.   3. Continue Symbicort two puffs twice a daily , rinse mouth with water after each use  4. Albuterol inhaler 2 puffs up to 6 times a day as needed  5. Sleep hygiene  6. Attempt to use CPAP  7. Increase activity level   8. Weight loss, current weight 172 lbs  9. Ranitidine 150 mg daily as needed for acid reflux symptoms  10. Follow up in 6 months    Arnoldo Mansfield  Pulmonary / Critical Care  8/30/2019    CC:      Chief Complaint   Patient presents with     Follow Up     COPD/ABDIFATAH, she has good and bad days with sob       HPI:       Arvind Leong is an 64 y.o. female who presents for follow up.   Patient has history of severe COPD on home O2, ABDIFATAH, bipolar / anxiety disorder, Meniere disease.   Patient was recently seen by PCP for worsening shortness of breath, referred to ED for further  evaluation, CXR showed no pulmonary infiltrates, treated for COPD exacerbation and acute bronchitis. Reports feeling better since last visit. Has good and bad days.   Exertional dyspnea, able to walk over 1/2 block before stopping. Uses her O2 with activities.   Improvement of cough, occasional wheezes. No chest pain, decrease swelling of LEs. No orthopnea or PND.   Uses symbicort BID, not using albuterol.  Stopped using CPAP machine. But she is willing to start using it again.   Reports occasional heartburn, takes ranitidine as needed.   Increase weight over the last few months, up 10 lbs, unchanged weight when compared to a year ago.     Previous tobacco use 1/2 ppd for 47 years. Quit 2/2017.    Past Medical History:   Diagnosis Date     Acute on chronic respiratory failure with hypoxia (H) 11/15/2016     Bipolar 1 disorder (H)      CAP (community acquired pneumonia) 11/15/2016     COPD with exacerbation (H) 11/15/2016     Diabetes mellitus, type II (H)      Hearing loss      Meniere's disease      Medications:     Prior to Admission medications    Medication Sig Start Date End Date Taking? Authorizing Provider   albuterol (PROVENTIL HFA;VENTOLIN HFA) 90 mcg/actuation inhaler Inhale 2 puffs every 4 (four) hours as needed for wheezing or shortness of breath. 11/17/16 11/17/17 Yes Batsheva Day MD   albuterol (PROVENTIL) 2.5 mg /3 mL (0.083 %) nebulizer solution Take 3 mL (2.5 mg total) by nebulization every 4 (four) hours as needed. 2/16/16  Yes TOSHIA Grady   blood glucose test (CONTOUR NEXT STRIPS) strips Use to check blood sugar twice daily. 9/12/16  Yes Lily Alejandro MD   BLOOD-GLUCOSE METER (CONTOUR NEXT METER MISC) Use As Directed.   Yes PROVIDER, HISTORICAL   budesonide-formoterol (SYMBICORT) 160-4.5 mcg/actuation inhaler Inhale 2 puffs 2 (two) times a day. 11/17/16 11/17/17 Yes Batsheva Day MD   LANCETS MISC Use As Directed.   Yes PROVIDER, HISTORICAL   oxybutynin  (DITROPAN XL) 10 MG ER tablet Take 1 tablet (10 mg total) by mouth daily. 16  Yes Lily Alejandro MD   QUEtiapine (SEROQUEL) 25 MG tablet  17  Yes PROVIDER, HISTORICAL   QUEtiapine (SEROQUEL) 50 MG tablet 1 tablet daily. 16  Yes PROVIDER, HISTORICAL   risperiDONE (RISPERDAL) 1 MG tablet Take 1 mg by mouth 2 (two) times a day.    Yes PROVIDER, HISTORICAL   ranitidine (ZANTAC) 150 MG tablet Take 1 tablet (150 mg total) by mouth daily as needed for heartburn. 17   Arnoldo Mansfield MD     Social History     Socioeconomic History     Marital status:      Spouse name: Not on file     Number of children: Not on file     Years of education: Not on file     Highest education level: Not on file   Occupational History     Occupation: /[employer]- quit 2019   Social Needs     Financial resource strain: Not on file     Food insecurity:     Worry: Not on file     Inability: Not on file     Transportation needs:     Medical: Not on file     Non-medical: Not on file   Tobacco Use     Smoking status: Former Smoker     Packs/day: 1.00     Years: 40.00     Pack years: 40.00     Last attempt to quit: 2017     Years since quittin.5     Smokeless tobacco: Never Used   Substance and Sexual Activity     Alcohol use: Yes     Comment: Occasional      Drug use: No     Sexual activity: Never   Lifestyle     Physical activity:     Days per week: Not on file     Minutes per session: Not on file     Stress: Not on file   Relationships     Social connections:     Talks on phone: Not on file     Gets together: Not on file     Attends Taoist service: Not on file     Active member of club or organization: Not on file     Attends meetings of clubs or organizations: Not on file     Relationship status: Not on file     Intimate partner violence:     Fear of current or ex partner: Not on file     Emotionally abused: Not on file     Physically abused: Not on file     Forced sexual activity: Not  on file   Other Topics Concern     Not on file   Social History Narrative    2019The patient lives with her mother.; had worked at [employer] as  but quit 2019.     Quit smoking ; no etoh problems    / x 2 ; no kids     Family History   Problem Relation Age of Onset     Aneurysm Father      Heart disease Father 70        bypass in 70s, AAA rupture at age 77      Ulcers Father 76     Pacemaker Mother      Bipolar disorder Brother      Breast cancer Maternal Grandmother 51     Kidney failure Maternal Grandmother      Cancer Paternal Grandmother         ?? lung     Cancer Paternal Uncle         ?? lung     Mental illness Maternal Grandfather      Heart disease Other         uncle     No Medical Problems Sister         two siblings abuse chemicals     No Medical Problems Brother      Bipolar disorder Nephew      Review of Systems  A 12 point comprehensive review of systems was negative except as noted.      Objective:     Vitals:    19 0804   BP: 108/72   Pulse: 68   Resp: 24   SpO2: 97%   Weight: 172 lb 6.4 oz (78.2 kg)     Wt Readings from Last 3 Encounters:   19 172 lb 6.4 oz (78.2 kg)   19 160 lb (72.6 kg)   19 167 lb (75.8 kg)     Patient oxygen saturation on RA at rest is 94%.  Oxygen saturation while ambulating 150ft on RA is 88%.    Gen: obese, awake, alert, no distress  HEENT: pink conjunctiva, moist mucosa, Mallampati III/IV  Neck: no thyromegaly, masses or JVD  Lungs: clear  CV: regular, no murmurs or gallops appreciated  Abdomen: soft, distended, NT, BS wnl  Ext: trace edema  Neuro: CN II-XII intact, non focal      Diagnostic tests:     Sleep Study (2014)  AHI 14.7, RDI 14.7, lateral , REM AHI 45, Supine   PML index 21.7  Mean O2 sat 87%, desaturation heide 61%, Time of SpO2 < 89% 44 minutes out of 138 minutes of diagnostic time.   CPAP 7 cmH20 was therapeutic     PFT's 3/8/16  FEV1/FVC is 81  FEV1 is 0.52L (31%) predicted and is  reduced.  FVC is 0.64L (30%) predicted and reduced.  There was no improvement in spirometry after a single inhaled dose of bronchodilator.  TLC is 4.05L (118%) predicted and is normal.  RV is 3.26L (226%) predicted and is increased.  Flow volume loops indicate scooped expiratory limb.    XR CHEST PA AND LATERAL  5/17/2017 4:54 PM  INDICATION: Hypotension, unspecified  COMPARISON: 11/2/2016  FINDINGS: Marked scoliosis of the thoracic spine. Heart size upper limits of normal. Pulmonary vascular is normal. The lungs and pleural spaces are clear. Little change from previous.    CTA CHEST PE RUN 9/26/2017 12:03 PM  INDICATION: acute on chronic respiratory failure  COMPARISON: 10/31/2013  ANGIOGRAM CHEST: There is moderate motion artifact which results in regions of modeled vascular enhancement. In the highest degree of vascular blurring and mottled enhancement, it is impossible to exclude small emboli but no convincing evidence for emboli present. The overall appearance is very similar to the previous exam.  LUNGS AND PLEURA: Mild peripheral bilateral atelectasis or scar also similar to previous exam, no new pneumonia.  MEDIASTINUM: Small reactive lymph nodes within anterior mediastinum unchanged, no suspicious adenopathy. Heart size upper limits of normal. Tortuous thoracic aorta.  LIMITED UPPER ABDOMEN: Negative.  MUSCULOSKELETAL: S-shaped scoliosis of thoracic and lumbar spine unchanged.  CONCLUSION:  1.  No significant change. No convincing evidence of pulmonary embolism.  2.  Stable atelectasis or scar, left lung base. Scoliosis.    XR CHEST 2 VIEWS  DATE/TIME: 8/28/2019 3:12 PM  INDICATION: cough  COMPARISON: 7/3/2019    FINDINGS: No pleural fluid or pneumothorax. No consolidative airspace disease identified. There is interstitial edema. The cardiomediastinal silhouette is not well assessed. There is lateral curvature of the spine.     Echocardiogram 1/3/2014  EF 75%, IVSd 1.3  Normal RV  No significant valvular  disease.

## 2021-05-31 NOTE — TELEPHONE ENCOUNTER
"Has COPD    Thinks she has pneumonia, and feels gurgling in her chest.    Has been hospitalized before.    Does nebs when she can.  \"cannot be doing them 24 hours a day\".  Uses 2 neb vials as 1 does not work.  Suppose to do every 4 hours.  Gets congested, and cannot lay down and breathe now.  When she \"gulps she has a scratching sensation in throat\".    Has been feeling ill x 1.5 hours.  Went to [facility] yesterday evening.    Last neb this morning.  Uses oxygen.    Just got out of hospital for pneumonia in June.    Does not feel weak or tired.    Does not want to go to bed because she knows she cannot get comfortable.    Hurts the most in her back.    No fever.    \"what should I do\"?     Go to ED.  If your nebs are not lasting 4 hours and you cannot get comfortable you need to be seen.    Caller agrees with care advice given. Agreed to call back if patient has additional symptoms or questions.    Jacy Forbes, RN, [facility] Connection Nurse Triage/Med Refills RN           "

## 2021-06-01 ENCOUNTER — RECORDS - HEALTHEAST (OUTPATIENT)
Dept: ADMINISTRATIVE | Facility: CLINIC | Age: 66
End: 2021-06-01

## 2021-06-01 VITALS — WEIGHT: 175 LBS | BODY MASS INDEX: 41.42 KG/M2

## 2021-06-01 VITALS — BODY MASS INDEX: 41.96 KG/M2 | HEIGHT: 55 IN | WEIGHT: 181.3 LBS

## 2021-06-01 VITALS — HEIGHT: 55 IN | BODY MASS INDEX: 39.94 KG/M2 | WEIGHT: 172.6 LBS

## 2021-06-01 VITALS — BODY MASS INDEX: 38.52 KG/M2 | WEIGHT: 178 LBS

## 2021-06-01 VITALS — BODY MASS INDEX: 41.42 KG/M2 | WEIGHT: 175 LBS

## 2021-06-01 VITALS — BODY MASS INDEX: 41.85 KG/M2 | WEIGHT: 176.8 LBS

## 2021-06-01 VITALS — BODY MASS INDEX: 42.49 KG/M2 | WEIGHT: 179.5 LBS

## 2021-06-01 VITALS — WEIGHT: 171.8 LBS | BODY MASS INDEX: 40.67 KG/M2

## 2021-06-01 VITALS — BODY MASS INDEX: 43.15 KG/M2 | WEIGHT: 182.3 LBS

## 2021-06-02 ENCOUNTER — RECORDS - HEALTHEAST (OUTPATIENT)
Dept: ADMINISTRATIVE | Facility: CLINIC | Age: 66
End: 2021-06-02

## 2021-06-02 ENCOUNTER — RECORDS - HEALTHEAST (OUTPATIENT)
Dept: HEALTH INFORMATION MANAGEMENT | Facility: CLINIC | Age: 66
End: 2021-06-02

## 2021-06-02 VITALS — BODY MASS INDEX: 43.4 KG/M2 | WEIGHT: 180 LBS

## 2021-06-02 VITALS — BODY MASS INDEX: 39.2 KG/M2 | WEIGHT: 162.6 LBS

## 2021-06-02 VITALS — WEIGHT: 182.6 LBS | BODY MASS INDEX: 43.22 KG/M2

## 2021-06-02 VITALS — WEIGHT: 177.4 LBS | BODY MASS INDEX: 41.99 KG/M2

## 2021-06-02 VITALS — HEIGHT: 55 IN | BODY MASS INDEX: 41.8 KG/M2 | WEIGHT: 180.6 LBS

## 2021-06-02 VITALS — WEIGHT: 182.4 LBS | BODY MASS INDEX: 43.18 KG/M2

## 2021-06-02 VITALS — WEIGHT: 179 LBS | BODY MASS INDEX: 43.16 KG/M2

## 2021-06-02 VITALS — BODY MASS INDEX: 40.34 KG/M2 | WEIGHT: 167.31 LBS

## 2021-06-02 VITALS — WEIGHT: 181.6 LBS | BODY MASS INDEX: 43.79 KG/M2

## 2021-06-02 VITALS — BODY MASS INDEX: 36.86 KG/M2 | WEIGHT: 158.6 LBS

## 2021-06-02 VITALS — WEIGHT: 164.7 LBS | BODY MASS INDEX: 39.71 KG/M2

## 2021-06-02 NOTE — TELEPHONE ENCOUNTER
Referral Request  Type of referral: Eye exam, diabetic  Who s requesting: Patient  Why the request: is due to have exam  Have you been seen for this request: Yes  Does patient have a preference on a group/provider? [facility]  Okay to leave a detailed message?  Yes

## 2021-06-03 ENCOUNTER — RECORDS - HEALTHEAST (OUTPATIENT)
Dept: ADMINISTRATIVE | Facility: CLINIC | Age: 66
End: 2021-06-03

## 2021-06-03 VITALS — HEIGHT: 56 IN | BODY MASS INDEX: 37.05 KG/M2 | WEIGHT: 164.7 LBS

## 2021-06-03 VITALS — WEIGHT: 167 LBS | BODY MASS INDEX: 38.65 KG/M2 | HEIGHT: 55 IN

## 2021-06-03 VITALS — BODY MASS INDEX: 40.07 KG/M2 | WEIGHT: 172.4 LBS

## 2021-06-03 VITALS — HEIGHT: 55 IN | WEIGHT: 163 LBS | BODY MASS INDEX: 37.72 KG/M2

## 2021-06-03 VITALS — BODY MASS INDEX: 38.4 KG/M2 | WEIGHT: 165.2 LBS

## 2021-06-03 NOTE — PROGRESS NOTES
Walk In Care Note                                                                                 Date of Visit: 11/21/2019     Chief Complaint   Arvind Leong is a(n) 64 y.o. White or  female who presents to Walk [facility] with the following complaint(s):  Back Pain (lower back pains x 1 week )       Assessment and Plan   1. Chronic obstructive pulmonary disease, unspecified COPD type (H)  - predniSONE (DELTASONE) 20 MG tablet; Take 40 mg by mouth daily for 5 days.  Dispense: 10 tablet; Refill: 0  - albuterol (PROVENTIL) 2.5 mg /3 mL (0.083 %) nebulizer solution; Take 3 mL (2.5 mg total) by nebulization every 4 (four) hours as needed for wheezing or shortness of breath (or cough).  Dispense: 75 mL; Refill: 5    2. Cough  - XR Chest 2 Views      Patient with underlying asthma and COPD with oxygen dependence presenting with 1-month history of cough. Patient specifically requests that a chest x-ray be completed to rule out pneumonia. Chest x-ray shows no signs of pneumonia, fluid overload, or pneumothorax. Treating mild COPD exacerbation with prednisone as listed above. Patient verifies that she has tolerated this medication well in the past and denies experiencing significant mood disturbances with it. Refilled albuterol neb solution to be used as needed. Instructed patient to continue using Symbicort as directed.     Counseled patient regarding assessment and plan for evaluation and treatment. Questions were answered. See AVS for the specific written instructions and educational handout(s) regarding COPD flare that were provided at the conclusion of the visit.     Discussed signs / symptoms that warrant urgent / emergent medical attention.     Follow up within 1 week if symptoms have not improved.      History of Present Illness   Primary symptom: Cough  Onset: 1 month ago  Progression: Persisting  Description of cough: Dry  Sputum production: No  Hemoptysis: No  Shortness of breath: Stable  Chest wall  pain: No, but reports low back. Back pain is not related to coughing. Low back is painful when she lays down.   Upper respiratory symptoms: Some nasal congestion. Reports clear rhinorrhea when outdoors. No sore throat. No ear pain. Having bloody noses.   Fevers: No  Chills: No  Additional symptoms: Denies dysuria, polyuria, nocturia, and hematuria.   Home therapies utilized: Using Symbicort as directed. Using an albuterol neb at least once daily. Uses supplemental oxygen at 2 L/min.   History of asthma: Yes  History of COPD: Yes  Recent travel: No  Ill contacts: No  Tobacco use / exposure: Former     Review of Systems   Review of Systems   All other systems reviewed and are negative.       Physical Exam   Vitals:    11/21/19 1105   BP: 101/66   Pulse: 71   Resp: 16   Temp: 98.4  F (36.9  C)   TempSrc: Oral   SpO2: 91%   Weight: 167 lb 4.8 oz (75.9 kg)     Physical Exam  Vitals signs and nursing note reviewed.   Constitutional:       General: She is not in acute distress.     Appearance: She is well-developed and overweight. She is not ill-appearing or toxic-appearing.   HENT:      Head: Normocephalic and atraumatic.      Right Ear: Tympanic membrane, ear canal and external ear normal.      Left Ear: Tympanic membrane, ear canal and external ear normal.      Nose: No mucosal edema or rhinorrhea.      Mouth/Throat:      Mouth: Mucous membranes are moist. No oral lesions.      Pharynx: Uvula midline. No oropharyngeal exudate or posterior oropharyngeal erythema.   Eyes:      General: Lids are normal.      Conjunctiva/sclera: Conjunctivae normal.   Neck:      Musculoskeletal: Neck supple. No edema or erythema.   Cardiovascular:      Rate and Rhythm: Normal rate and regular rhythm.      Heart sounds: S1 normal and S2 normal. No murmur. No friction rub. No gallop.    Pulmonary:      Effort: Pulmonary effort is normal.      Breath sounds: Normal breath sounds. No stridor. No wheezing, rhonchi or rales.   Lymphadenopathy:       Cervical: No cervical adenopathy.   Skin:     General: Skin is warm and dry.      Coloration: Skin is not pale.      Findings: No rash.   Neurological:      General: No focal deficit present.      Mental Status: She is alert and oriented to person, place, and time.   Psychiatric:         Attention and Perception: Attention normal.         Mood and Affect: Mood normal.         Speech: Speech normal.         Behavior: Behavior normal.         Thought Content: Thought content normal.         Judgment: Judgment normal.          Diagnostic Studies   Laboratory:  N/A    Radiology:  EXAM DATE:         11/21/2019  EXAM: X-RAY CHEST, 2 VIEWS, FRONTAL AND LATERAL  LOCATION: [facility]  DATE/TIME: 11/21/2019 12:00 PM  INDICATION: Cough, COPD, R/O pneumonia  COMPARISON: 10/8/19.  IMPRESSION: No pleural fluid or pneumothorax. No airspace disease or edema.  Normal size of the heart. There is prominent mediastinal fat. There is lateral  curvature of the spine.    Electrocardiogram:  N/A     Procedure Note   N/A     Pertinent History   The following portions of the patient's history were reviewed and updated as appropriate: allergies, current medications, past family history, past medical history, past social history, past surgical history and problem list.    Patient has Scoliosis; Morbid obesity (H); Lower Back Pain; Asthma; Urge Incontinence Of Urine; COPD (chronic obstructive pulmonary disease) (H); Hyperlipidemia; Adult Sleep Apnea; Type 2 diabetes mellitus (H); Anxiety; Vertigo; Cognitive dysfunction in bipolar disorder (H); Noncompliance with medications; Dependence on continuous supplemental oxygen; Bipolar I disorder, most recent episode depressed (H); Runny nose; CAP (community acquired pneumonia) due to Chlamydia species; and Pneumonia of right lower lobe due to infectious organism (H) on their problem list.    Patient has a past medical history of Acute on chronic respiratory failure with hypoxia (H)  (11/15/2016), Bipolar 1 disorder (H), CAP (community acquired pneumonia) (11/15/2016), COPD with exacerbation (H) (11/15/2016), Diabetes mellitus, type II (H), Hearing loss, and Meniere's disease.    Patient has a past surgical history that includes pr removal anal fistula,subcutaneous; Tonsillectomy; and Cyst Removal (01/23/2001).    Patient's family history includes Aneurysm in her father; Bipolar disorder in her brother and nephew; Breast cancer (age of onset: 51) in her maternal grandmother; Cancer in her paternal grandmother and paternal uncle; Heart disease in an other family member; Heart disease (age of onset: 70) in her father; Kidney failure in her maternal grandmother; Mental illness in her maternal grandfather; No Medical Problems in her brother and sister; Pacemaker in her mother; Ulcers (age of onset: 76) in her father.    Patient reports that she quit smoking about 2 years ago. She has a 40.00 pack-year smoking history. She has never used smokeless tobacco. She reports current alcohol use. She reports that she does not use drugs.     Portions of this note have been dictated using voice recognition software. Any grammatical or context distortions are unintentional and inherent to the software.     Toby Shankar MD  [facility]

## 2021-06-04 VITALS
DIASTOLIC BLOOD PRESSURE: 69 MMHG | HEART RATE: 77 BPM | BODY MASS INDEX: 40.79 KG/M2 | WEIGHT: 176.25 LBS | TEMPERATURE: 97.9 F | HEIGHT: 55 IN | SYSTOLIC BLOOD PRESSURE: 127 MMHG | OXYGEN SATURATION: 91 %

## 2021-06-04 VITALS
HEIGHT: 55 IN | OXYGEN SATURATION: 92 % | BODY MASS INDEX: 40.73 KG/M2 | RESPIRATION RATE: 13 BRPM | HEART RATE: 73 BPM | DIASTOLIC BLOOD PRESSURE: 80 MMHG | WEIGHT: 176 LBS | SYSTOLIC BLOOD PRESSURE: 128 MMHG

## 2021-06-04 VITALS
DIASTOLIC BLOOD PRESSURE: 80 MMHG | SYSTOLIC BLOOD PRESSURE: 128 MMHG | BODY MASS INDEX: 39.81 KG/M2 | RESPIRATION RATE: 12 BRPM | HEIGHT: 55 IN | OXYGEN SATURATION: 90 % | WEIGHT: 172 LBS

## 2021-06-04 VITALS
OXYGEN SATURATION: 91 % | HEART RATE: 71 BPM | BODY MASS INDEX: 38.88 KG/M2 | SYSTOLIC BLOOD PRESSURE: 101 MMHG | DIASTOLIC BLOOD PRESSURE: 66 MMHG | RESPIRATION RATE: 16 BRPM | TEMPERATURE: 98.4 F | WEIGHT: 167.3 LBS

## 2021-06-05 VITALS
DIASTOLIC BLOOD PRESSURE: 72 MMHG | OXYGEN SATURATION: 94 % | SYSTOLIC BLOOD PRESSURE: 98 MMHG | HEIGHT: 55 IN | BODY MASS INDEX: 40.45 KG/M2 | HEART RATE: 77 BPM | WEIGHT: 174.8 LBS

## 2021-06-05 VITALS
OXYGEN SATURATION: 97 % | SYSTOLIC BLOOD PRESSURE: 112 MMHG | TEMPERATURE: 97.3 F | RESPIRATION RATE: 28 BRPM | BODY MASS INDEX: 42.07 KG/M2 | HEART RATE: 84 BPM | WEIGHT: 181 LBS | DIASTOLIC BLOOD PRESSURE: 77 MMHG

## 2021-06-05 VITALS
DIASTOLIC BLOOD PRESSURE: 86 MMHG | HEART RATE: 98 BPM | OXYGEN SATURATION: 86 % | TEMPERATURE: 98.2 F | BODY MASS INDEX: 41.43 KG/M2 | SYSTOLIC BLOOD PRESSURE: 122 MMHG | WEIGHT: 178.25 LBS

## 2021-06-05 VITALS
HEART RATE: 70 BPM | BODY MASS INDEX: 41.19 KG/M2 | HEIGHT: 55 IN | DIASTOLIC BLOOD PRESSURE: 62 MMHG | OXYGEN SATURATION: 98 % | SYSTOLIC BLOOD PRESSURE: 96 MMHG | WEIGHT: 178 LBS

## 2021-06-05 VITALS
DIASTOLIC BLOOD PRESSURE: 70 MMHG | WEIGHT: 187.38 LBS | BODY MASS INDEX: 43.36 KG/M2 | HEART RATE: 83 BPM | SYSTOLIC BLOOD PRESSURE: 119 MMHG | HEIGHT: 55 IN | OXYGEN SATURATION: 9 %

## 2021-06-06 NOTE — PROGRESS NOTES
PULMONARY OUTPATIENT FOLLOW UP NOTE    Assessment:   1. Respiratory failure  Secondary to COPD, ABDIFATAH, morbid obesity  Re-testing O2 needs, adequate SpO2 at rest on RA but significant desaturation with activities. Continue O2 2 LPM with activities.   2. COPD on home O2  Previous spirometry showed FEV1 0.52 L (31%), no significant post bronchodilator response.   Continue LABA/ICS, albuterol HFA as needed.  O2 supplementation 2 LPM with activities.   3. ABDIFATAH  Sleep Study done on 2/17/2014 showed AHI 14.7, RDI 14.7, lateral , REM AHI 45, Supine AHI 14.3. PML index 21.7. Mean O2 sat 87%, desaturation heide 61%, Time of SpO2 < 89% 44 minutes out of 138 minutes of diagnostic time. CPAP 7 cmH20 was therapeutic.   Unfortunately, patient was not able to tolerating CPAP machine. Sleep hygiene was discussed. Patient will re-attempt to use CPAP.   4. UTI  Bactrim for 5 days.   5. GERD  6. Bipolar / Anxiety disorder  7. Physical deconditioning   8. Morbid obesity    Plan:   1. Continue O2 supplementation 2 LPM with activities.   2. Sleep hygiene  3. Attempt to use CPAP  4. Bactrim 400/80 one tab two times a day for 5 days  5. Continue Symbicort two puffs twice a daily , rinse mouth with water after each use  6. Albuterol inhaler 2 puffs up to 6 times a day as needed  7. Continue physical therapy  8. Weight loss  9. Decrease salt intake.  10. Referral to physical therapy  11. Follow up in 6 months    Arnoldo Mansfield  Pulmonary / Critical Care  2/28/2020    CC:      Chief Complaint   Patient presents with     COPD     Follow Up       HPI:       Arvind Leong is an 64 y.o. female who presents for follow up.   Patient has history of severe COPD on home O2, ABDIFATAH, bipolar / anxiety disorder, Meniere disease.   Doing well since last visit, able to walk over 1/2 block before stopping. Uses her O2 with activities.   Improvement of cough, occasional wheezes. No chest pain, decrease swelling of LEs. No orthopnea or PND.    Uses symbicort BID, not using albuterol.  Stopped using CPAP machine. But she is willing to start using it again.   No acid reflux symptoms.    Reports increase urinary frequency, burning sensation while urinating. Denies fever, chills, night sweats. No back pain. No blood in urine.   Feels tired and fatigue in AM, she is willing to try to use CPAP machine.   Previous tobacco use 1/2 ppd for 47 years. Quit 2/2017.    Past Medical History:   Diagnosis Date     Acute on chronic respiratory failure with hypoxia (H) 11/15/2016     Bipolar 1 disorder (H)      CAP (community acquired pneumonia) 11/15/2016     COPD with exacerbation (H) 11/15/2016     Diabetes mellitus, type II (H)      Hearing loss      Meniere's disease      Medications:     Current Outpatient Medications:      albuterol (PROAIR HFA;PROVENTIL HFA;VENTOLIN HFA) 90 mcg/actuation inhaler, Inhale 2 puffs every 6 (six) hours as needed for wheezing or shortness of breath., Disp: 1 Inhaler, Rfl: 11     albuterol (PROVENTIL) 2.5 mg /3 mL (0.083 %) nebulizer solution, Take 3 mL (2.5 mg total) by nebulization every 4 (four) hours as needed for wheezing or shortness of breath (or cough)., Disp: 75 mL, Rfl: 5     ARIPiprazole (ABILIFY) 20 MG tablet, Take 1 tablet (20 mg total) by mouth daily., Disp: 90 tablet, Rfl: 3     blood glucose test (CONTOUR NEXT TEST STRIPS) strips, Use to check blood sugar once daily., Disp: 100 strip, Rfl: 1     budesonide-formoterol (SYMBICORT) 160-4.5 mcg/actuation inhaler, Inhale 2 puffs 2 (two) times a day., Disp: 1 Inhaler, Rfl: 12     buPROPion (WELLBUTRIN) 75 MG tablet, Take 1 tablet (75 mg total) by mouth daily., Disp: 90 tablet, Rfl: 3     metFORMIN (GLUCOPHAGE XR) 500 MG 24 hr tablet, Take 2 tablets (1,000 mg total) by mouth daily with breakfast., Disp: 180 tablet, Rfl: 3     OXYGEN-AIR DELIVERY SYSTEMS [facility], Use 2 L As Directed. 2L pulsing [facility], Disp: , Rfl:      senna (SENOKOT) 8.6 mg tablet, Take 1 tablet by mouth as  needed for constipation., Disp: 30 tablet, Rfl: 0    Social History     Socioeconomic History     Marital status:      Spouse name: Not on file     Number of children: Not on file     Years of education: Not on file     Highest education level: Not on file   Occupational History     Occupation: /[employer]- quit 2019   Social Needs     Financial resource strain: Not on file     Food insecurity     Worry: Not on file     Inability: Not on file     Transportation needs     Medical: Not on file     Non-medical: Not on file   Tobacco Use     Smoking status: Former Smoker     Packs/day: 1.00     Years: 40.00     Pack years: 40.00     Last attempt to quit: 2017     Years since quitting: 3.1     Smokeless tobacco: Never Used   Substance and Sexual Activity     Alcohol use: Yes     Comment: Occasional      Drug use: No     Sexual activity: Never   Lifestyle     Physical activity     Days per week: Not on file     Minutes per session: Not on file     Stress: Not on file   Relationships     Social connections     Talks on phone: Not on file     Gets together: Not on file     Attends Rastafarian service: Not on file     Active member of club or organization: Not on file     Attends meetings of clubs or organizations: Not on file     Relationship status: Not on file     Intimate partner violence     Fear of current or ex partner: Not on file     Emotionally abused: Not on file     Physically abused: Not on file     Forced sexual activity: Not on file   Other Topics Concern     Not on file   Social History Narrative    2019The patient lives with her mother.; had worked at [employer] as  but quit 2019.     Quit smoking ; no etoh problems    / x 2 ; no kids     Family History   Problem Relation Age of Onset     Aneurysm Father      Heart disease Father 70        bypass in 70s, AAA rupture at age 77      Ulcers Father 76     Pacemaker Mother      Bipolar disorder Brother      Breast  "cancer Maternal Grandmother 51     Kidney failure Maternal Grandmother      Cancer Paternal Grandmother         ?? lung     Cancer Paternal Uncle         ?? lung     Mental illness Maternal Grandfather      Heart disease Other         uncle     No Medical Problems Sister         two siblings abuse chemicals     No Medical Problems Brother      Bipolar disorder Nephew      Review of Systems  A 12 point comprehensive review of systems was negative except as noted.      Objective:     Vitals:    02/28/20 0904   BP: 128/80   Resp: 12   SpO2: 90%   Weight: 172 lb (78 kg)   Height: 4' 7\" (1.397 m)     Wt Readings from Last 3 Encounters:   02/28/20 172 lb (78 kg)   11/21/19 167 lb 4.8 oz (75.9 kg)   10/08/19 180 lb (81.6 kg)     Gen: obese, awake, alert, no distress  HEENT: pink conjunctiva, moist mucosa, Mallampati III/IV  Neck: no thyromegaly, masses or JVD  Lungs: clear  CV: regular, no murmurs or gallops appreciated  Abdomen: soft, distended, NT, BS wnl  Ext: trace edema  Neuro: CN II-XII intact, non focal      Diagnostic tests:     Sleep Study (2/17/2014)  AHI 14.7, RDI 14.7, lateral , REM AHI 45, Supine   PML index 21.7  Mean O2 sat 87%, desaturation heide 61%, Time of SpO2 < 89% 44 minutes out of 138 minutes of diagnostic time.   CPAP 7 cmH20 was therapeutic     PFT's 3/8/16  FEV1/FVC is 81  FEV1 is 0.52L (31%) predicted and is reduced.  FVC is 0.64L (30%) predicted and reduced.  There was no improvement in spirometry after a single inhaled dose of bronchodilator.  TLC is 4.05L (118%) predicted and is normal.  RV is 3.26L (226%) predicted and is increased.  Flow volume loops indicate scooped expiratory limb.    XR CHEST PA AND LATERAL  5/17/2017 4:54 PM  INDICATION: Hypotension, unspecified  COMPARISON: 11/2/2016  FINDINGS: Marked scoliosis of the thoracic spine. Heart size upper limits of normal. Pulmonary vascular is normal. The lungs and pleural spaces are clear. Little change from previous.    CTA " CHEST PE RUN 9/26/2017 12:03 PM  INDICATION: acute on chronic respiratory failure  COMPARISON: 10/31/2013  ANGIOGRAM CHEST: There is moderate motion artifact which results in regions of modeled vascular enhancement. In the highest degree of vascular blurring and mottled enhancement, it is impossible to exclude small emboli but no convincing evidence for emboli present. The overall appearance is very similar to the previous exam.  LUNGS AND PLEURA: Mild peripheral bilateral atelectasis or scar also similar to previous exam, no new pneumonia.  MEDIASTINUM: Small reactive lymph nodes within anterior mediastinum unchanged, no suspicious adenopathy. Heart size upper limits of normal. Tortuous thoracic aorta.  LIMITED UPPER ABDOMEN: Negative.  MUSCULOSKELETAL: S-shaped scoliosis of thoracic and lumbar spine unchanged.  CONCLUSION:  1.  No significant change. No convincing evidence of pulmonary embolism.  2.  Stable atelectasis or scar, left lung base. Scoliosis.    XR CHEST 2 VIEWS  DATE/TIME: 8/28/2019 3:12 PM  INDICATION: cough  COMPARISON: 7/3/2019    FINDINGS: No pleural fluid or pneumothorax. No consolidative airspace disease identified. There is interstitial edema. The cardiomediastinal silhouette is not well assessed. There is lateral curvature of the spine.     Echocardiogram 1/3/2014  EF 75%, IVSd 1.3  Normal RV  No significant valvular disease.

## 2021-06-06 NOTE — PATIENT INSTRUCTIONS - HE
1. Continue O2 supplementation 2 LPM with activities.   2. Sleep hygiene  3. Attempt to use CPAP  4. Bactrim 400/80 one tab two times a day for 5 days  5. Continue Symbicort two puffs twice a daily , rinse mouth with water after each use  6. Albuterol inhaler 2 puffs up to 6 times a day as needed  7. Continue physical therapy  8. Weight loss  9. Decrease salt intake.  10. Referral to physical therapy  11. Follow up in 6 months

## 2021-06-07 ENCOUNTER — COMMUNICATION - HEALTHEAST (OUTPATIENT)
Dept: FAMILY MEDICINE | Facility: CLINIC | Age: 66
End: 2021-06-07

## 2021-06-07 ENCOUNTER — COMMUNICATION - HEALTHEAST (OUTPATIENT)
Dept: OBGYN | Facility: CLINIC | Age: 66
End: 2021-06-07

## 2021-06-07 ENCOUNTER — HOSPITAL ENCOUNTER (OUTPATIENT)
Dept: MAMMOGRAPHY | Facility: CLINIC | Age: 66
Discharge: HOME | End: 2021-06-07
Attending: FAMILY MEDICINE
Payer: MEDICARE

## 2021-06-07 DIAGNOSIS — N64.89 BREAST ASYMMETRY: ICD-10-CM

## 2021-06-07 NOTE — TELEPHONE ENCOUNTER
Called patient and notified that her PT appointment on April 27th will be canceled due to covid-19. Notified patient that clinic will be closed until June and she should call back at that time to reschedule if she's still interested in PT.    Radha Townsend, PT, DPT

## 2021-06-08 NOTE — PROGRESS NOTES
Call from Arvind this morning asking for a return to work note.  She intends to return to work tomorrow.  Has been out over the weekend with possible exacerbation of COPD/head cold.   Not able to reach Arvind to let her know note is here in clinic for her.  Left VM messages on both home phone and mobile.

## 2021-06-08 NOTE — PROGRESS NOTES
PULMONARY OUTPATIENT FOLLOW UP NOTE    Assessment:   1. COPD on home O2  Previous spirometry showed FEV1 0.52 L (31%), no significant post bronchodilator response.   Stable, continue LABA/ICS, albuterol HFA as needed.  O2 supplementation 2 LPM with activities and at night  2. Mild ABDIFATAH  Unable to tolerate CPAP, on nocturnal O2  3. Cardiomyopathy  4. GERD  5. Bipolar / Anxiety disorder  6. Tobacco user  1/2 ppd for 47 years. Recently quit, smoking cessation counseling  7. Obesity     Plan:   1. Continue O2 supplementation 2 LPM at night and with activities.   2. Continue Symbicort two puffs twice a daily , rinse mouth with water after each use  3. Albuterol inhaler 2 puffs up to 6 times a day as needed  4. Increase physical therapy as tolerated  5. Weight loss  6. Ranitidine 150 mg daily as needed for acid reflux symptoms  7. Follow up in 6 months     Arnoldo Mansfield  Pulmonary / Critical Care  1/6/2017    CC:      Chief Complaint   Patient presents with     Follow Up     pt thinks she is here due to the cold weather, she is afraid to go outside because of her COPD-pt needs a date change on her letter to go back to work,       HPI:       Arvind Leong is an 61 y.o. female who presents for follow up.   Patient has history of severe COPD on home O2, ABDIFATAH, cardiomyopathy, bipolar / anxiety disorder, Meniere disease.   Patient was seen a month ago in the pulmonary clinic.   Patient contacted clinic and asked for an urgent appointment, states that her pulse oximetry showed low numbers. SpO2 in the low 80's. 82 to 84% on her oxygen 2 LPM . She will be returning to work and she is very anxious.   Patient denies any changes in her mild exertional dyspnea, no changes in her mild dry cough, no wheezes. No fever or chills.   Uses her O2 at night and with activities.   Reports occasional heartburn, takes ranitidine as needed.   Tobacco use 1/2 ppd for 47 years. Quit tobacco use.     Past Medical History    Diagnosis Date     Anxiety      Asthma      Bipolar 1 disorder      COPD (chronic obstructive pulmonary disease)      Diabetes mellitus      Hearing loss      Hyperlipemia      Meniere's disease      Sleep apnea      Medications:     Prior to Admission medications    Medication Sig Start Date End Date Taking? Authorizing Provider   albuterol (PROVENTIL HFA;VENTOLIN HFA) 90 mcg/actuation inhaler Inhale 2 puffs every 4 (four) hours as needed for wheezing or shortness of breath. 11/17/16 11/17/17 Yes Batsheva Day MD   albuterol (PROVENTIL) 2.5 mg /3 mL (0.083 %) nebulizer solution Take 3 mL (2.5 mg total) by nebulization every 4 (four) hours as needed. 2/16/16  Yes TOSHIA Grady   blood glucose test (CONTOUR NEXT STRIPS) strips Use to check blood sugar twice daily. 9/12/16  Yes Lily Alejandro MD   BLOOD-GLUCOSE METER (CONTOUR NEXT METER MISC) Use As Directed.   Yes PROVIDER, HISTORICAL   budesonide-formoterol (SYMBICORT) 160-4.5 mcg/actuation inhaler Inhale 2 puffs 2 (two) times a day. 11/17/16 11/17/17 Yes Batsheva Day MD   LANCETS MISC Use As Directed.   Yes PROVIDER, HISTORICAL   oxybutynin (DITROPAN XL) 10 MG ER tablet Take 1 tablet (10 mg total) by mouth daily. 12/8/16  Yes Lily Alejandro MD   QUEtiapine (SEROQUEL) 25 MG tablet  1/2/17  Yes PROVIDER, HISTORICAL   QUEtiapine (SEROQUEL) 50 MG tablet 1 tablet daily. 12/13/16  Yes PROVIDER, HISTORICAL   risperiDONE (RISPERDAL) 1 MG tablet Take 1 mg by mouth 2 (two) times a day.    Yes PROVIDER, HISTORICAL   ranitidine (ZANTAC) 150 MG tablet Take 1 tablet (150 mg total) by mouth daily as needed for heartburn. 1/6/17   Arnoldo Mansfield MD     Social History     Social History     Marital status:      Spouse name: N/A     Number of children: N/A     Years of education: N/A     Occupational History     Not on file.     Social History Main Topics     Smoking status: Current Some Day Smoker     Packs/day: 0.00      Last attempt to quit: 2/9/2016     Smokeless tobacco: Not on file     Alcohol use Yes      Comment: Occasional      Drug use: No     Sexual activity: No     Other Topics Concern     Not on file     Social History Narrative    07/17/2015: The patient lives with her mother.     Family History   Problem Relation Age of Onset     Aneurysm Father      Mental illness Brother      Breast cancer Maternal Grandmother 51     Cancer Paternal Grandmother      ?? lung     Cancer Paternal Uncle      ?? lung     Mental illness Maternal Grandfather      Review of Systems  A 12 point comprehensive review of systems was negative except as noted.      Objective:     Vitals:    01/06/17 1308   BP: 116/66   Pulse: 81   Resp: 16   SpO2: (!) 89%   Oxygen saturation on RA at rest is 93%.  Oxygen saturation after ambulating 150ft on RA is 85%.    Gen: obese, awake, alert, no distress  HEENT: pink conjunctiva, moist mucosa, Mallampati II/IV  Neck: no thyromegaly, masses or JVD  Lungs: clear  CV: regular, no murmurs or gallops appreciated  Abdomen: soft, NT, BS wnl  Ext: no edema  Neuro: CN II-XII intact, non focal      Diagnostic tests:     PFT's 3/8/16 (unchanged from before)  FEV1/FVC is 81% and is normal.  FEV1 is 0.52L (31%) predicted and is reduced.  FVC is 0.64L (30%) predicted and reduced.  There was no improvement in spirometry after a single inhaled dose of bronchodilator.  TLC is 4.05L (118%) predicted and is normal.  RV is 3.26L (226%) predicted and is increased.  Flow volume loops indicate scooped expiratory limb.    XR CHEST PA AND LATERAL 11/15/2016 10:44 AM  INDICATION: SOB, cough  COMPARISON: 6/27/2016.  FINDINGS:   Compared to the previous study there appears to be a new patchy alveolar type infiltrate in the right lung base. It is difficult to exclude a superimposed small pleural effusion.  Otherwise no significant interval change. Thoracolumbar scoliosis. Degenerative change involving the spine

## 2021-06-08 NOTE — PROGRESS NOTES
Multiple calls form Arvind this week.  She having difficulty keeping her saturations up--running 82-84% on RA when up.  With oxygen her sats are running 90-94%.  She has no other complaints such as fever, coughing, wheezing, congestion.  Has called several times this week and has not been able to work.  Is very anxious.  Does not know what to do.  Transferred to scheduling to get her in the schedule tomorrow to be seen in clinic.

## 2021-06-08 NOTE — PROGRESS NOTES
Patient oxygen saturation on RA at rest is 93%.  Oxygen saturation after ambulating 150ft on RA is 85%.

## 2021-06-09 NOTE — PROGRESS NOTES
[facility] Note    Name: Arvind Leong  : 1955   MRN: [id number]    Arvind Leong is a 65 y.o. female presenting to discuss the following:     CC:   Chief Complaint   Patient presents with     Shoulder Pain     Left shoulder       HPI:  URINE: Arvind is concerned about a bladder infection. Has been drinking too much caffeine and it goes right through her. Has frequency. No dysuria or hematuria. Can't stop peeing. Has leaking with coughing, laughing, sneezing, and walking.     SHOULDER: Not sure what happened. Has pain at lateral aspect of shoulder Hard to put on seatbelt due to pain. Only notices the pain with supination of left hand. Also notices when she lifts things above her head. Arvind works for [employer]. States she has been taking Aleve, twice daily     BREATHING: Hx of asthma and COPD, ACT . Trying to not use her oxygen but drops down. Sees Dr. Balbuena, margareth for appointment in 1 month.    Mental health prescribed by psychiatry.     ROS:   See HPI above.    PMH:   Patient Active Problem List   Diagnosis     Scoliosis     Morbid obesity (H)     Lower Back Pain     Asthma     Urge Incontinence Of Urine     COPD (chronic obstructive pulmonary disease) (H)     Hyperlipidemia     Adult Sleep Apnea     Type 2 diabetes mellitus (H)     Anxiety     Vertigo     Cognitive dysfunction in bipolar disorder (H)     Noncompliance with medications     Dependence on continuous supplemental oxygen     Bipolar I disorder, most recent episode depressed (H)     Runny nose     CAP (community acquired pneumonia) due to Chlamydia species     Pneumonia of right lower lobe due to infectious organism       Past Medical History:   Diagnosis Date     Acute on chronic respiratory failure with hypoxia (H) 11/15/2016     Bipolar 1 disorder (H)      CAP (community acquired pneumonia) 11/15/2016     COPD with exacerbation (H) 11/15/2016     Diabetes mellitus, type II (H)      Hearing loss      Meniere's  disease        PSH:   Past Surgical History:   Procedure Laterality Date     CYST REMOVAL  01/23/2001     NM REMOVAL ANAL FISTULA,SUBCUTANEOUS      Description: Fistula-in-ano Repair;  Recorded: 01/08/2010; x2     TONSILLECTOMY           MEDICATIONS:   Current Outpatient Medications on File Prior to Visit   Medication Sig Dispense Refill     albuterol (PROVENTIL) 2.5 mg /3 mL (0.083 %) nebulizer solution Take 3 mL (2.5 mg total) by nebulization every 4 (four) hours as needed for wheezing or shortness of breath (or cough). 75 mL 5     ARIPiprazole (ABILIFY) 20 MG tablet Take 1 tablet (20 mg total) by mouth daily. 90 tablet 3     blood glucose test (CONTOUR NEXT TEST STRIPS) strips Use to check blood sugar once daily. 100 strip 1     budesonide-formoterol (SYMBICORT) 160-4.5 mcg/actuation inhaler Inhale 2 puffs 2 (two) times a day. 1 Inhaler 12     buPROPion (WELLBUTRIN) 75 MG tablet Take 1 tablet (75 mg total) by mouth daily. 90 tablet 3     metFORMIN (GLUCOPHAGE XR) 500 MG 24 hr tablet Take 2 tablets (1,000 mg total) by mouth daily with breakfast. 180 tablet 3     OXYGEN-AIR DELIVERY SYSTEMS MISC Use 2 L As Directed. 2L pulsing  [facility]       albuterol (PROAIR HFA;PROVENTIL HFA;VENTOLIN HFA) 90 mcg/actuation inhaler Inhale 2 puffs every 6 (six) hours as needed for wheezing or shortness of breath. 1 Inhaler 11     senna (SENOKOT) 8.6 mg tablet Take 1 tablet by mouth as needed for constipation. 30 tablet 0     No current facility-administered medications on file prior to visit.        ALLERGIES:  Allergies   Allergen Reactions     Lurasidone Other (See Comments)     Face turned red  (Brand name = Latuda) Face turned red  Face turned red         SOCIAL HISTORY:   Social History     Tobacco Use     Smoking status: Former Smoker     Packs/day: 1.00     Years: 40.00     Pack years: 40.00     Last attempt to quit: 2/1/2017     Years since quitting: 3.4     Smokeless tobacco: Never Used   Substance Use Topics     Alcohol  "use: Yes     Comment: Occasional      Drug use: No         PHYSICAL EXAM:   /69   Pulse 77   Temp 97.9  F (36.6  C) (Oral)   Ht 4' 7\" (1.397 m)   Wt 176 lb 4 oz (79.9 kg)   BMI 40.96 kg/m     GENERAL: Patient is a pleasant, elderly, morbidly obese female, in no acute distress.  HEART: Regular rate and rhythm, no murmurs.  LUNGS: Clear to auscultation bilaterally, no wheezing.  Breath sounds are diminished bilaterally.  She is not tachypneic.  ABDOMEN: Soft, nontender to palpation.  No suprapubic pain with palpation.  MSK: Bilateral shoulder range of motion is decreased.  Pain encountered with active left shoulder abduction above 90 degrees.  Decreased flexion in left shoulder as well due to pain.  She has pain with empty can test.  No pain with resisted internal or external rotation of shoulder.    LABS:   Recent Results (from the past 24 hour(s))   Urinalysis-UC if Indicated   Result Value Ref Range    Color, UA Yellow Colorless, Yellow, Straw, Light Yellow    Clarity, UA Clear Clear    Glucose, UA Negative Negative    Bilirubin, UA Negative Negative    Ketones, UA Negative Negative    Specific Gravity, UA 1.025 1.005 - 1.030    Blood, UA Negative Negative    pH, UA 7.0 5.0 - 8.0    Protein, UA Negative Negative mg/dL    Urobilinogen, UA 0.2 E.U./dL 0.2 E.U./dL, 1.0 E.U./dL    Nitrite, UA Negative Negative    Leukocytes, UA Small (!) Negative    Bacteria, UA Few (!) None Seen hpf    RBC, UA None Seen None Seen, 0-2 hpf    WBC, UA 0-5 None Seen, 0-5 hpf    Squam Epithel, UA 0-5 None Seen, 0-5 lpf    Trans Epithel, UA 0-5 (!) None Seen lpf    Amorphous, UA Few (!) None Seen        ASSESSMENT & PLAN:   Arvind Leong is a 65 y.o. female presenting today for evaluation of left shoulder pain and urinary frequency.    1. Tendinitis of left rotator cuff  - Ambulatory referral to Adult PT- Internal  - methylPREDNISolone acetate injection 40 mg (DEPO-MEDROL)  - naproxen sodium (ALEVE) 220 MG tablet; Take 1 " tablet (220 mg total) by mouth 2 (two) times a day with meals.  Dispense: 60 tablet; Refill: 1    Merlyn reports left shoulder pain, worse with movement and work.  Denies any acute injury.  Exam is suspicious for rotator cuff tendinitis.  Discussed recommendation for scheduled NSAIDs.  Her kidney function is normal per last labs in March 2020.  Recommend physical therapy as well and decreasing activities that are bothersome to her shoulder.  She requests a steroid injection today, her that this may be helpful.  We discussed that this will not treat the underlying cause, but may help with some pain and inflammation.  Reinforced that the most important treatment is physical therapy and NSAIDs.  Obtained verbal consent to proceed with injection as she is interested in this option today.    PROCEDURE: Cleansed skin overlying the inferior lateral aspect of the acromion process with an alcohol swab.  Inserted needle and directed towards lateral shoulder.  Upon aspiration, no blood flash in syringe.  I was easily able to inject 1 cc Depo-Medrol 40 mg along with 1 cc lidocaine 1% without epinephrine without any resistance.  I withdrew the needle.  No bleeding post procedure.  Patient tolerated procedure well.    I provided anticipatory guidance that she may experience acute relief with lidocaine, and this pain relief may go away when lidocaine wears off.  I anticipate steroid to take effect within the next few days.    2. Urine frequency  - Urinalysis-UC if Indicated  - Culture, Urine     Patient was concerned about possible urinary frequency, however denies any burning or hematuria and states this may be due to drinking a lot of caffeine.  She states she is not able to decrease her caffeine intake.  Discussed natural diuresis of caffeine and this is anticipated effect.  Urine is not overwhelming for infection.  We will await culture results and decide whether or not to treat when we check back in with  patient.    HM:  Merlyn completed PHQ 9.  She does follow with a mental health specialist.  She will also completed ACT.  Breathing is not adequately controlled, however she has oxygen dependent COPD and follows with pulmonology.  She states she is taking her inhalers as prescribed.  She is not using her oxygen with activity as instructed.  I did encourage her to use her oxygen as prescribed as well.  She is due for follow-up with pulmonology in 1 month.    RTC: September 2020 - annual physical exam + diabetes follow up / sooner as needed    Conchita Saenz, DO

## 2021-06-09 NOTE — TELEPHONE ENCOUNTER
"\"My arm hurts really bad.\" Patient reporting left shoulder pain starting 1 month ago. Pain increases with movement and \"putting seat belt on.\" Reporting pain level is a \"10\" when putting seat belt on. \"Ok\" at rest.   Afebrile. Denies visible change to arm. Patient denies previous history. Denies injury.  Per guidelines to be seen with in 3 days.    Warm transferred to Central Scheduling.    COVID 19 Nurse Triage Plan/Patient Instructions    Please be aware that novel coronavirus (COVID-19) may be circulating in the community. If you develop symptoms such as fever, cough, or SOB or if you have concerns about the presence of another infection including coronavirus (COVID-19), please contact your health care provider or visit [email or web address].     Disposition/Instructions    In-Person Visit with provider recommended. Reference Visit Selection Guide.    Thank you for taking steps to prevent the spread of this virus.  o Limit your contact with others.  o Wear a simple mask to cover your cough.  o Wash your hands well and often.    Resources    [facility]: About COVID-19: [email or web address]    CDC: What to Do If You're Sick: www.cdc.gov/coronavirus/2019-ncov/about/steps-when-sick.html    CDC: Ending Home Isolation: www.cdc.gov/coronavirus/2019-ncov/hcp/disposition-in-home-patients.html     CDC: Caring for Someone: www.cdc.gov/coronavirus/2019-ncov/if-you-are-sick/care-for-someone.html     [facility]: Interim Guidance for Hospital Discharge to Home: www.health.[facility].[place]/diseases/coronavirus/hcp/hospdischarge.pdf    [facility] clinical trials (COVID-19 research studies): [email or web address]     Below are the COVID-19 hotlines at the [place] Department of Health ([facility]). Interpreters are available.   o For health questions: Call 690-256-6296 or 1-225.782.8407 (7 a.m. to 7 p.m.)  o For questions about schools and childcare: Call 265-434-3306 or 1-138.461.4636 (7 a.m. to 7 " p.m.)     Reason for Disposition    MODERATE pain (e.g., interferes with normal activities) and present > 3 days    Additional Information    Negative: Shock suspected (e.g., cold/pale/clammy skin, too weak to stand, low BP, rapid pulse)    Negative: Similar pain previously and it was from 'heart attack'    Negative: Similar pain previously and it was from 'angina' and not relieved by nitroglycerin    Negative: Sounds like a life-threatening emergency to the triager    Negative: Chest pain    Negative: Followed an injury to shoulder    Negative: Difficulty breathing or unusual sweating (e.g., sweating without exertion)    Negative: Pain lasting > 5 minutes and pain also present in chest (Exception: pain is clearly made worse by movement)    Negative: Age > 40 and no obvious cause and pain even when not moving the arm (Exception: pain is clearly made worse by moving arm or bending neck)    Negative: Red area or streak and fever    Negative: Swollen joint and fever    Negative: Entire arm is swollen    Negative: Patient sounds very sick or weak to the triager    Negative: SEVERE pain (e.g., excruciating, unable to do any normal activities)    Negative: Shoulder pains with exertion (e.g., walking) and pain goes away on resting and not present now    Negative: Painful rash with multiple small blisters grouped together (i.e., dermatomal distribution or 'band' or 'stripe')    Negative: Looks like a boil, infected sore, deep ulcer or other infected rash (spreading redness, pus)    Negative: Localized rash is very painful (no fever)    Negative: Weakness (i.e., loss of strength) in hand or fingers (Exception: not truly weak; hand feels weak because of pain)    Negative: Numbness (i.e., loss of sensation) in hand or fingers    Negative: Unable to use arm at all and because of shoulder pain or stiffness    Negative: Patient wants to be seen    Protocols used: SHOULDER PAIN-A-OH

## 2021-06-09 NOTE — PROGRESS NOTES
Please call Arvind. Her urine did not show an infection on culture so I do not think she needs antibiotics. I hope her shoulder starts to feel better.  Conchita Saenz, DO

## 2021-06-09 NOTE — TELEPHONE ENCOUNTER
Reason contacted:  Results  Information relayed:  Dr. Saenz's message  Additional questions:  No  Further follow-up needed:  No

## 2021-06-09 NOTE — TELEPHONE ENCOUNTER
----- Message from Conchita Saenz DO sent at 7/24/2020  2:10 PM CDT -----  Please call Arvind. Her urine did not show an infection on culture so I do not think she needs antibiotics. I hope her shoulder starts to feel better.  Conchita Saenz, DO

## 2021-06-09 NOTE — PROGRESS NOTES
SUBJECTIVE:   Chief Complaint   Patient presents with     Follow-up     OV on 3/6 at [facility]; pt reports she was seen for a runny nose and was prescribed flonase and claritin     Arvind Leong 61 y.o. female    Current Outpatient Prescriptions   Medication Sig Dispense Refill     albuterol (PROVENTIL HFA;VENTOLIN HFA) 90 mcg/actuation inhaler Inhale 2 puffs every 4 (four) hours as needed for wheezing or shortness of breath. 1 Inhaler 0     albuterol (PROVENTIL) 2.5 mg /3 mL (0.083 %) nebulizer solution Take 3 mL (2.5 mg total) by nebulization every 4 (four) hours as needed. 60 vial 0     blood glucose test (CONTOUR NEXT STRIPS) strips Use to check blood sugar twice daily. 200 each 2     BLOOD-GLUCOSE METER (CONTOUR NEXT METER MISC) Use As Directed.       budesonide-formoterol (SYMBICORT) 160-4.5 mcg/actuation inhaler Inhale 2 puffs 2 (two) times a day. 1 Inhaler 12     LANCETS MISC Use As Directed.       oxybutynin (DITROPAN XL) 10 MG ER tablet Take 1 tablet (10 mg total) by mouth daily. 30 tablet 2     QUEtiapine (SEROQUEL) 25 MG tablet   10     QUEtiapine (SEROQUEL) 50 MG tablet 1 tablet daily.  1     ranitidine (ZANTAC) 150 MG tablet Take 1 tablet (150 mg total) by mouth daily as needed for heartburn. 30 tablet 12     risperiDONE (RISPERDAL) 1 MG tablet Take 1 mg by mouth 2 (two) times a day.        No current facility-administered medications for this visit.      Allergies: Lurasidone   No LMP recorded. Patient is postmenopausal.    HPI:   Pleasant 61-year-old here for diabetic follow-up, also follow-up of urinary urgency    Type 2 diabetes: Patient is not taking any medication, states she has been off metformin for probably a year.  Reports blood sugars under 130 generally.  Due for urine microalbumin screen.  Last A1c in December was 6.8%, but was increasing compared to previous check.  Lipids up-to-date in July, at goal.  Renal function normal in November.    Urinary urgency and  incontinence: She was recently started on oxybutynin and she feels this is working.  She has noted that cutting out caffeine helps as well.  UTI was ruled out at her recent visit with negative culture.    Obesity: She wants to lose weight, wonders if slim fast is a good option    Wart: Wart on the bottom of the left foot, with significant overlying callus causing pressure and pain with walking.  She would like this treated today.  Has had it treated in the past with cryotherapy.  Tries to use a pumice stone to keep callus down    COPD: Follows with pulmonology, up-to-date, was seen there in January and recommended follow-up in 6 months.  She is supposed to wear oxygen, not wearing today.  She states she is generally compliant with this.  She states she is smoking occasionally.  Nose not to smoke while wearing oxygen.    ROS: as per HPI    OBJECTIVE:   Visit Vitals     BP 94/70 (Patient Site: Left Arm, Patient Position: Sitting, Cuff Size: Adult Large)     Pulse 72     Temp 98.6  F (37  C) (Oral)     Resp 16     Wt 180 lb 9.6 oz (81.9 kg)     BMI 39.08 kg/m2       General: Pleasant, well-appearing, in no acute distress.  HEENT: Pupils equal, round, reactive to light.  Oropharynx clear with moist mucous membranes.  Neck supple without lymphadenopathy.  Cardiovascular: Heart regular rate and rhythm, normal S1-S2, no murmurs, rubs, or gallops  Respiratory: Lungs are clear to auscultation bilaterally without wheezes or crackles.  Good air movement throughout.  No increased work of breathing.  Extremities: Warm and well perfused without edema.  Pedal pulses palpable.  Feet without deformities.  Nails are long.  Significant callus buildup over a plantars wart on the left plantar aspect of the foot.  Decreased monofilament sensation bilaterally, unclear if she understands testing  Skin: No jaundice or pallor.      ASSESSMENT/PLAN  1. Healthcare maintenance  She should check with insurance on Cologuard coverage, and if  positive test results, needs colonoscopy and she was informed of this  - Mammo Screening Bilateral; Future  - Cologuard    2. Type 2 diabetes mellitus without complication, without long-term current use of insulin  A1c today and follow-up based on results.  Urine for microalbumin screen.  Discussed monofilament sensation results of the feet appears abnormal.  Discussed the importance of good nail care, inspecting feet daily, risk of ulceration, infection.  - Glycosylated Hemoglobin A1c  - Microalbumin, Random Urine    3. Vitamin D deficiency  Recheck vitamin D level with her labs today  - Vitamin D, Total (25-Hydroxy)     4.  Urinary urgency  She states the oxybutynin helps.  Continue as prescribed.    5.  Plantars wart, left foot, cryotherapy treatment  She would like this treated today with callus removal and cryotherapy.  Significant amount of callus was removed with a 15 blade scalpel.  3 freeze thaw cycles of cryotherapy with nitrogen.  Aftercare discussed.

## 2021-06-09 NOTE — TELEPHONE ENCOUNTER
New Appointment Needed  What is the reason for the visit:    Same Date/Next Day Appt Request  What is the reason for your visit?: left shoulder pain x1 mo.    Provider Preference: PCP only or any available  How soon do you need to be seen?: today or tomorrow. Please call to go over options.  Waitlist offered?: No  Okay to leave a detailed message:  Yes

## 2021-06-09 NOTE — PROGRESS NOTES
Assessment / Impression     1. Urinary frequency  Urinalysis-UC if Indicated    Culture, Urine   2. Type 2 diabetes mellitus     3. Urge Incontinence Of Urine           Plan:     I reviewed the urinalysis results with her today in the clinic which did not clearly show evidence of a urinary tract infection.  We are going to send this for culture.  I recommended that she restart the oxybutynin to see if this will help improve her symptoms.  It sounds like her diabetes continues to be under good control based on the home blood sugar readings she has been given.  I recommended she schedule a follow-up appointment with her primary care provider as she is now due for her next diabetes follow-up exam.  She agrees with the plan.    Subjective:      HPI: Arvind Leong is a 61 y.o. female who presents to the clinic concerned that she has a urinary tract infection.  She describes having increase in urinary frequency over the past few weeks.  She denies dysuria or hematuria.  She is not having fevers.  She is not having abdominal or flank pain.  She describes having occasional lower back pain which she attributes to being overweight.  She denies having vaginal discharge or odor.  She feels like her diabetes is under good control.  She reports that her a.m. fasting readings are always under 1:30 and after meals her blood glucose readings are always under 160.  Her hemoglobin A1c was 6.8% on 12/5/16.        Review of Systems  All other systems reviewed and are negative.     History   Smoking Status     Former Smoker     Packs/day: 0.00     Quit date: 1/1/2017   Smokeless Tobacco     Not on file       Family History   Problem Relation Age of Onset     Aneurysm Father      Mental illness Brother      Breast cancer Maternal Grandmother 51     Cancer Paternal Grandmother      ?? lung     Cancer Paternal Uncle      ?? lung     Mental illness Maternal Grandfather        Objective:     Visit Vitals     /76 (Patient Site:  "Right Arm, Patient Position: Sitting, Cuff Size: Adult Large)     Pulse 72     Temp 97.7  F (36.5  C) (Oral)     Resp 20     Ht 4' 9\" (1.448 m)     Wt 178 lb 9 oz (81 kg)     BMI 38.64 kg/m2     Physical Examination: General appearance - alert, well appearing, and in no distress  Eyes: pupils equal and reactive, extraocular eye movements intact  Mouth: mucous membranes moist, pharynx normal without lesions  Neck: supple, no significant adenopathy  Lungs: clear to auscultation, no wheezes, rales or rhonchi, symmetric air entry  Heart: normal rate, regular rhythm, normal S1, S2, no murmurs, rubs, clicks or gallops  Back: No CVA tenderness  Abdomen: soft, nontender, nondistended, no masses or organomegaly  Neurological: alert, oriented, normal speech, no focal findings or movement disorder noted.    Extremities: No edema, no clubbing or cyanosis      Recent Results (from the past 168 hour(s))   Urinalysis-UC if Indicated   Result Value Ref Range    Color, UA Yellow Colorless, Yellow, Straw, Light Yellow    Clarity, UA Clear Clear    Glucose, UA Negative Negative    Bilirubin, UA Negative Negative    Ketones, UA Negative Negative    Specific Gravity, UA 1.015 1.005 - 1.030    Blood, UA Negative Negative    pH, UA 7.0 5.0 - 8.0    Protein, UA Negative Negative mg/dL    Urobilinogen, UA 0.2 E.U./dL 0.2 E.U./dL, 1.0 E.U./dL    Nitrite, UA Negative Negative    Leukocytes, UA Trace (!) Negative    Bacteria, UA Moderate (!) None Seen hpf    RBC, UA 0-2 None Seen, 0-2 hpf    WBC, UA 0-5 None Seen, 0-5 hpf    Squam Epithel, UA 0-5 None Seen, 0-5 lpf    WBC Clumps Present (!) None Seen    Mucus, UA Few (!) None Seen lpf       Current Outpatient Prescriptions   Medication Sig Note     albuterol (PROVENTIL HFA;VENTOLIN HFA) 90 mcg/actuation inhaler Inhale 2 puffs every 4 (four) hours as needed for wheezing or shortness of breath.      albuterol (PROVENTIL) 2.5 mg /3 mL (0.083 %) nebulizer solution Take 3 mL (2.5 mg total) by " nebulization every 4 (four) hours as needed.      blood glucose test (CONTOUR NEXT STRIPS) strips Use to check blood sugar twice daily.      BLOOD-GLUCOSE METER (CONTOUR NEXT METER MISC) Use As Directed.      budesonide-formoterol (SYMBICORT) 160-4.5 mcg/actuation inhaler Inhale 2 puffs 2 (two) times a day.      LANCETS MISC Use As Directed.      oxybutynin (DITROPAN XL) 10 MG ER tablet Take 1 tablet (10 mg total) by mouth daily.      QUEtiapine (SEROQUEL) 25 MG tablet  1/6/2017: Received from: External Pharmacy     QUEtiapine (SEROQUEL) 50 MG tablet 1 tablet daily. 12/15/2016: Received from: External Pharmacy Received Sig:      ranitidine (ZANTAC) 150 MG tablet Take 1 tablet (150 mg total) by mouth daily as needed for heartburn.      risperiDONE (RISPERDAL) 1 MG tablet Take 1 mg by mouth 2 (two) times a day.

## 2021-06-10 NOTE — PROGRESS NOTES
Oxygen saturation walk test    Patient oxygen saturation on RA at rest is 91%.  Oxygen saturation while ambulating 150ft on RA is 85%.       Oxygen continuous dose testing  While ambulating 150ft on 2LPM continuous dose, oxygen saturation is 92%.      DME Provider: [facility]     Patient is ambulatory within his/her home.

## 2021-06-10 NOTE — PATIENT INSTRUCTIONS - HE
1. Continue O2 supplementation 2 LPM with activities.   2. Sleep hygiene  3. CPAP at night and as needed  4. Continue Symbicort two puffs twice a daily , rinse mouth with water after each use  5. Albuterol inhaler 2 puffs up to 6 times a day as needed  6. Continue physical therapy  7. Weight loss  8. Decrease salt intake.  9. Follow up in 6 months

## 2021-06-10 NOTE — PROGRESS NOTES
Assessment/ Plan  1. Fatigue  Unclear etiology.  May be multifactorial with underlying severe COPD, obesity, sleep apnea.  Await additional labs.  Patient sure that we do not see dangerous cause currently.  Unfortunately I do not have a suggestion as to what to do.  - HM2(CBC w/o Differential)  - Comprehensive Metabolic Panel  - Thyroid Stimulating Hormone (TSH)  - Electrocardiogram Perform - Clinic  - Erythrocyte Sedimentation Rate  - Urinalysis-UC if Indicated  - XR Chest PA and Lateral; Future  - Culture, Urine    2. Hypotension  I was quite concerned about this initially but not appear to be in shock.  She has warm extremities, is alert and oriented with good color.  Blood pressures came up with recheck, see orthostatic.  - HM2(CBC w/o Differential)  - Comprehensive Metabolic Panel  - Thyroid Stimulating Hormone (TSH)  - Electrocardiogram Perform - Clinic  - Erythrocyte Sedimentation Rate  - Urinalysis-UC if Indicated  - XR Chest PA and Lateral; Future  - Culture, Urine    3. Dysphagia  For solid foods in an ex-smoker.  I think she needs an upper endoscopy and will be referred for this.    4. Chronic Obstructive Pulmonary Disease  Appears relatively well compensated at this point.  Await CO2 on CMP.    5. Abdominal distention  Did have imaging in 7/17/15.  No ascites is mentioned.?  Changed ?  I will leave this to her primary.      6. Leukocytosis  Appears to be chronic.  Possibly related to recent corticosteroid use    Body mass index is 39.38 kg/(m^2).    Subjective  CC:  Chief Complaint   Patient presents with     Fatigue     weakness,      HPI:  Fatigue  [id number]-old female with history of severe COPD, obesity, type 2 diabetes, cognitive dysfunction, bipolar disorder and urge incontinence presents for fatigue.  Dysphagia  Also complains of not eating well- hard time swallowing certain foods, especially meats.  Liquids are going down well.  1 month ago first episode- things got stuck and had to drink  to get the meat down.    ___________________________  Notes  This is copied from triage nurse  Phone note- endorsed by patient  ---------------------------------------  Patient complains of feeling tired and not wanting to do anything.  She has felt this way for a month.  Patient is diabetic, she is not on insulins or medications, and has not checked her blood sugar.  Denies weakness or numbness of the face, arm, or leg on one side of the body.  She has generalized weakness and numbness all over if sitting for a long time.  She has COPD, denies dyspnea outside her normal, but admits to being short of breath.  O2 91% when getting home from work  O2 goes up to 97% with o2 on.  Has palpitations occurring off and on when doing strenuous activities.  No fever.  Eats and drinks normally.  She is sleeping more.  She feels tired talking to me.  Everything is an effort for her.  Showering is an effort.  She is unstable at times.  I advised she go to ED now and she refuses. She does not think it's an emergency situation.  OV scheduled at patient's request only for today (5.17.17)  ---------------------------------------------  My notes  Duration/ Timing/ context-1 month- fairly sudden   difficulty getting started doing things? Yes,  Severity- severe- takes all day to get going    Associated symptoms or life context? irritable  Anything make it better?-no  Anything make it worse?-no  Comments? Pt continues to work as  and is doing fine    Wt stable  Using oxygen for 2 years- 2 l  Doesn't feel like getting anything out fo the machine  Works as a -     ROS  -neg constitutional  -card- no, some mild dizziness- had  Resp Sob- but not worse, feels like O2 not helping  2 times in past month had vomiting and diarrhea after eating  No melana.  Urinary- no- but h/o uti  No        Quit smoking about 3 months ago.    PFSH:  Current medications reviewed as follows:  Current Outpatient Prescriptions on File Prior to Visit    Medication Sig     albuterol (PROVENTIL HFA;VENTOLIN HFA) 90 mcg/actuation inhaler Inhale 2 puffs every 4 (four) hours as needed for wheezing or shortness of breath.     albuterol (PROVENTIL) 2.5 mg /3 mL (0.083 %) nebulizer solution Take 3 mL (2.5 mg total) by nebulization every 4 (four) hours as needed.     blood glucose test (CONTOUR NEXT STRIPS) strips Use to check blood sugar twice daily.     BLOOD-GLUCOSE METER (CONTOUR NEXT METER MISC) Use As Directed.     budesonide-formoterol (SYMBICORT) 160-4.5 mcg/actuation inhaler Inhale 2 puffs 2 (two) times a day.     oxybutynin (DITROPAN XL) 10 MG ER tablet Take 1 tablet (10 mg total) by mouth daily.     QUEtiapine (SEROQUEL) 25 MG tablet      QUEtiapine (SEROQUEL) 50 MG tablet 1 tablet daily.     risperiDONE (RISPERDAL) 1 MG tablet Take 1 mg by mouth 2 (two) times a day.      [DISCONTINUED] LANCETS MISC Use As Directed.     ranitidine (ZANTAC) 150 MG tablet Take 1 tablet (150 mg total) by mouth daily as needed for heartburn.     No current facility-administered medications on file prior to visit.      Patient Active Problem List    Diagnosis Date Noted     Cognitive dysfunction in bipolar disorder 11/17/2016     Bipolar disorder, current episode mixed, moderate 11/17/2016     Noncompliance with medications 11/17/2016     Acute on chronic respiratory failure with hypoxia 11/15/2016     COPD with exacerbation 11/15/2016     Tachycardia 11/15/2016     CAP (community acquired pneumonia) 11/15/2016     Smoking 06/27/2016     Bipolar affective disorder      Medication monitoring encounter 10/26/2015     Anxiety      Overview Note:     Created by Conversion    Replacement Utility updated for latest IMO load       Vertigo      Overview Note:     Created by Conversion         Bipolar Disorder      Overview Note:     Created by Conversion    Replacement Utility updated for latest IMO load       Scoliosis      Overview Note:     Created by Conversion         Warts       "Overview Note:     Created by Conversion    Replacement Utility updated for latest IMO load       Obesity      Overview Note:     Created by Conversion         Lower Back Pain      Overview Note:     Created by Conversion         Asthma      Overview Note:     Created by Conversion         Urge Incontinence Of Urine      Overview Note:     Created by Conversion         Nicotine Dependence      Overview Note:     Created by Conversion         Chronic Obstructive Pulmonary Disease      Overview Note:     Created by Conversion  [facility] Annotation: Jan 29 2013  1:24PM - Lily Alejandro: acute   respiratory failure 1/20/2013 (hospitalized 5 days at [facility])--discharged   with supplemental oxygen         Hyperlipidemia      Overview Note:     Created by Conversion         Adult Sleep Apnea      Overview Note:     Created by Conversion    Replacement Utility updated for latest IMO load       Type 2 diabetes mellitus      Overview Note:     Created by Conversion  [employer] Annotation: Dec 18 2012 11:25AM - Lily Alejandro: diagnosed with   a1c 6.7% 11/28/2012         History   Smoking Status     Former Smoker     Packs/day: 0.00     Quit date: 1/1/2017   Smokeless Tobacco     Former User     Social History     Social History Narrative    07/17/2015: The patient lives with her mother.     Patient Care Team:  Lily Alejandro MD as PCP - General (Family Medicine)  ROS  Full 10 system review including constitutional, respiratory, cardiac, gi, urinary, rheumatologic, neurologic, reproductive, dermatologic psychiatric is  performed (via questionnaire) and is negative except for what is mentioned above    Objective  Physical Exam  Vitals:    05/17/17 1612 05/17/17 1619   BP: (!) 76/60 (!) 68/52   Patient Site: Left Arm Right Arm   Patient Position: Sitting Sitting   Cuff Size: Adult Regular Adult Large   Pulse: 76    Resp: 20    Temp: 97.7  F (36.5  C)    TempSrc: Oral    Weight: 182 lb (82.6 kg)    Height: 4' 9\" (1.448 " m)     Lying down  -Blood pressure 103/72  -Pulse 72    Standing for 1 minute  -Blood pressure 106/72  -Pulse 78    Standing for 3 minutes  -Blood pressure 107/74  -Pulse 77  O2 sat 92%  Gen- alert, oriented/ appropriately responsive  HEENT- normal cephalic, atraumatic.   Chest- Normal inspiration and expiration.  Clear to ascultation.  No chest wall deformity or scar.  CV- Heart regular rate and rhythm, normal tones, no murmurs gallops or rubs.  Abdomen very distended and almost tense.  Dull to percussion.  Normal bowel sounds.  Nontender.  Ext- appear well perfused, 1+ lower extremity edema  Skin- warm and dry, no visualized rash    Diagnostics  Chest x-ray is personally reviewed and shows bibasilar fibrosis and scoliosis.  I do not see any acute infiltrates.  Marked improvement compared to last chest x-ray  Results for orders placed or performed in visit on 05/17/17   HM2(CBC w/o Differential)   Result Value Ref Range    WBC 12.2 (H) 4.0 - 11.0 thou/uL    RBC 5.18 3.80 - 5.40 mill/uL    Hemoglobin 15.4 12.0 - 16.0 g/dL    Hematocrit 46.3 35.0 - 47.0 %    MCV 89 80 - 100 fL    MCH 29.7 27.0 - 34.0 pg    MCHC 33.2 32.0 - 36.0 g/dL    RDW 11.7 11.0 - 14.5 %    Platelets 197 140 - 440 thou/uL    MPV 8.3 7.0 - 10.0 fL   Urinalysis-UC if Indicated   Result Value Ref Range    Color, UA Yellow Colorless, Yellow, Straw, Light Yellow    Clarity, UA Clear Clear    Glucose, UA Negative Negative    Bilirubin, UA Negative Negative    Ketones, UA Negative Negative    Specific Gravity, UA 1.015 1.005 - 1.030    Blood, UA Negative Negative    pH, UA 7.0 5.0 - 8.0    Protein, UA Negative Negative mg/dL    Urobilinogen, UA 0.2 E.U./dL 0.2 E.U./dL, 1.0 E.U./dL    Nitrite, UA Negative Negative    Leukocytes, UA Trace (!) Negative    Bacteria, UA Few (!) None Seen hpf    RBC, UA 0-2 None Seen, 0-2 hpf    WBC, UA 0-5 None Seen, 0-5 hpf    Squam Epithel, UA 0-5 None Seen, 0-5 lpf   Electrocardiogram Perform - Clinic   Result Value Ref  Range    SYSTOLIC BLOOD PRESSURE  mmHg    DIASTOLIC BLOOD PRESSURE  mmHg    VENTRICULAR RATE 79 BPM    ATRIAL RATE 79 BPM    P-R INTERVAL 142 ms    QRS DURATION 72 ms    Q-T INTERVAL 394 ms    QTC CALCULATION (BEZET) 451 ms    P Axis 62 degrees    R AXIS 58 degrees    T AXIS 56 degrees    MUSE DIAGNOSIS       Sinus rhythm with Premature atrial complexes  Low voltage QRS  Borderline ECG  When compared with ECG of 27-JUN-2016 14:15,  Premature atrial complexes are now Present      agree with this reading.  Patient continues to have quite low  Voltages globally    Please note: Voice recognition software was used in this dictation.  It may therefore contain typographical errors.

## 2021-06-10 NOTE — PROGRESS NOTES
ASSESSMENT AND PLAN:  1. Chronic Obstructive Pulmonary Disease wheezings noted.  Prednisone burst started she is a medication before side effects noted.      2. COPD (chronic obstructive pulmonary disease) she was unaware of the use of a spacer have given her one today.  Refilled her Symbicort she is used a total of 1 prescription last 6 months.  She needs to follow-up with her primary within 3 weeks.   budesonide-formoterol (SYMBICORT) 160-4.5 mcg/actuation inhaler       CHIEF COMPLAINT:  Chief Complaint   Patient presents with     running nose     ongoing for awhile per pt.       HISTORY OF PRESENT ILLNESS:  Arvind is a 62 y.o. female presenting with runny nose. She states that her runny nose has been present for the past month. She notes that she used Claritin for a short duration but did not improve her runny nose. She has intermittent headaches and itching over the ear.     Asthma: She states her breathing is not good and she becomes fatigued after walking a short distance. She has not been taking her Symbicort for the past few weeks but she is taking her albuterol nebulizer intermittently.     Diabetes: She states that she has not checked her fasting blood sugars for the past few months. She has not been taking metformin. Her last A1c from 3/17/2017 on 6.3    REVIEW OF SYSTEMS:   She cannot eat certain foods because they cause her to gag or choke.    She notes that she does not hear well.    All other 10 point review of systems are negative.    PFSH:  She has not smoked for the past month. She works at [employer].  Reviewed as below.     TOBACCO USE:  History   Smoking Status     Former Smoker     Packs/day: 0.00     Quit date: 1/1/2017   Smokeless Tobacco     Former User       VITALS:  Vitals:    04/26/17 1336   BP: 114/78   Patient Site: Left Arm   Patient Position: Sitting   Cuff Size: Adult Large   Pulse: 76   Resp: 20   Temp: 97.7  F (36.5  C)   TempSrc: Oral   Weight: 181 lb 4 oz (82.2 kg)     Wt Readings from  Last 3 Encounters:   04/26/17 181 lb 4 oz (82.2 kg)   03/17/17 180 lb 9.6 oz (81.9 kg)   03/06/17 178 lb 9 oz (81 kg)     Body mass index is 39.22 kg/(m^2).    PHYSICAL EXAM:  General: Alert, cooperative, no distress, appears stated age  Head: Normocephalic, without obvious abnormality, atraumatic  Ears: Normal TM's and external ear canals, both ears  Nose: Mild Left inferior turbinate hypertrophy present.  Throat: Lips, mucosa, and tongue normal; teeth and gums normal  Lungs: Expiratory wheezing over all lung fields.   Chest wall: No tenderness or deformity  Heart: Regular rate and rhythm, S1 and S2 normal, no murmur, rub, or gallop  Neurologic:  A & O x 3.  No tremor, no focal findings.      DATA REVIEWED:  Additional History from Old Records Summarized (2): None  Decision to Obtain Records (1): None  Radiology Tests Summarized or Ordered (1): None  Labs Reviewed or Ordered (1): Reviewed A1c from 3/17/2017.   Medicine Test Summarized or Ordered (1): None  Independent Review of EKG or X-RAY(2 each): None    The visit lasted a total of 13 minutes face to face with the patient. Over 50% of the time was spent counseling and educating the patient about asthma.     IJose Luis, am scribing for and in the presence of, Dr. Hannah.    IDr. Hannah, personally performed the services described in this documentation, as scribed by Jose Luis Griffin in my presence, and it is both accurate and complete.      MEDICATIONS:  Current Outpatient Prescriptions   Medication Sig Dispense Refill     albuterol (PROVENTIL HFA;VENTOLIN HFA) 90 mcg/actuation inhaler Inhale 2 puffs every 4 (four) hours as needed for wheezing or shortness of breath. 1 Inhaler 0     albuterol (PROVENTIL) 2.5 mg /3 mL (0.083 %) nebulizer solution Take 3 mL (2.5 mg total) by nebulization every 4 (four) hours as needed. 60 vial 0     blood glucose test (CONTOUR NEXT STRIPS) strips Use to check blood sugar twice daily. 200 each 2     BLOOD-GLUCOSE METER  (CONTOUR NEXT METER MISC) Use As Directed.       budesonide-formoterol (SYMBICORT) 160-4.5 mcg/actuation inhaler Inhale 2 puffs 2 (two) times a day. 1 Inhaler 12     LANCETS MISC Use As Directed.       oxybutynin (DITROPAN XL) 10 MG ER tablet Take 1 tablet (10 mg total) by mouth daily. 30 tablet 2     QUEtiapine (SEROQUEL) 25 MG tablet   10     QUEtiapine (SEROQUEL) 50 MG tablet 1 tablet daily.  1     ranitidine (ZANTAC) 150 MG tablet Take 1 tablet (150 mg total) by mouth daily as needed for heartburn. 30 tablet 12     risperiDONE (RISPERDAL) 1 MG tablet Take 1 mg by mouth 2 (two) times a day.        No current facility-administered medications for this visit.        Total Data Points: 1

## 2021-06-11 NOTE — PROGRESS NOTES
ASSESSMENT & PLAN:  1. Elevated glucose  - Glycosylated Hemoglobin A1c    2.  COPD and obstructive sleep apnea  Encouraged her to wear her oxygen.  She was given oxygen while in the clinic due to oxygen saturations 86-8% today.  Discussed her issues with CPAP over the weekend and she understands that she needs to contact the medical supplier to get this straightened out.  She will continue to follow with sleep medicine and pulmonology for ABDIFATAH and COPD    3.  Constipation  Currently controlled with MiraLAX as needed    4.  Genital cyst  No worrisome findings.  Patient was again advised that this would be better left alone.    5.  Bipolar disorder  She is more tearful today than she has been recently.  She reports compliance with her psychiatric medications and with therapy visits.  She states she had trouble scheduling her next therapy appointment due to work conflicts, but she was instructed to make the appointment a priority, and work could be excused if needed.    6.  Urinary urgency, recent UTI  She has followed with urology.  Recheck UA today.    We had also discussed potentially a left knee x-ray, although she does not have worrisome findings on exam and I suspect it would just show some arthritis.  I inadvertently forgot to place the order for the x-ray and so she left without having it done.  I do not think she needs to return specifically for this, as she had no injury or worrisome findings.     Patient Instructions   You can take Miralax every day; if your stool becomes too loose, you can decrease use.        Orders Placed This Encounter   Procedures     Glycosylated Hemoglobin A1c     Medications Discontinued During This Encounter   Medication Reason     predniSONE (DELTASONE) 20 MG tablet Therapy completed     oxybutynin (DITROPAN XL) 10 MG ER tablet Therapy completed       No Follow-up on file.     CHIEF COMPLAINT:  Chief Complaint   Patient presents with     Hospital Visit Follow Up     NO  6/17--Constipation?; patient is feeling very tired/worn out        HISTORY OF PRESENT ILLNESS:  Arvind is a 62 y.o. female presenting to the clinic today for an ED follow up, for evaluation of a urethral abscess, and for a wart treatment.     ED Visit/UTI/Genital Abscess: She was seen at [facility] ED on 6/17/2017 and she was diagnosed with a UTI; she notes she has them frequently and she does not know why. She has been seen by urology before. She is bothered by a pus-filled cyst around her urethral opening. She wanted to have it drained but no one would drain it. The doctor she was seen by at [facility] could not see the lesion. The lesion is itchy. Dr. Lemus drained the lesion once many years ago and then said she would leave it to Dr. Alejandro. Dr. Lemus numbed her up and squeezed one of the cysts, but there was a smaller one that has grown since. She would feel better if the cyst was drained or removed.  Not currently painful or draining, but itches    COPD/Hypoxia: She notes she is hypoxic. Her CPAP is not running the way she wants it to run; she called and she will need to deal with her medical supply company. She picked up the machine three days ago and she felt she was rushed out of there. She was told if she did not use the CPAP at least 4 hours per day for the first 8 weeks, then insurance would not pay for it. She was told by her pulmonologist that she would walk better and faster with weight loss. She has been wearing oxygen at home 16 hours per day. She is still using Symbicort, but not the other inhalers. She has not been smoking; she is wondering why her oxygen levels are so low or if her lungs will get better at all. Her lower back feels tight intermittently. She walks slowly and she cannot keep up with her mother.     Wart: She has a painful wart on the bottom of her left foot. She would like it treated again today.     Left Knee Pain: When she kneels on her left knee, it causes a lot of pain. Her  left knee is not as round as her right knee. She did not injure her knee that she is aware of. She is wondering if she needs a knee replacement. Her left knee hurts intermittently.     REVIEW OF SYSTEMS:   Mental Health: She is tolerating Seroquel 50 mg and risperidone 1 mg daily; her therapist said she needs to take the medications every day. She was last seen for therapy last week. She is going to [place] next week. She has a new boyfriend; he is a  from her place of employment. Her mother's friend told her that she does not want her to live with her mother anymore. She has not talked to her mother about this yet. She is not allowed to answer her bedroom phone at all because her mother thinks she is on the phone too much. She is very excited about her new boyfriend. She is upset today because of her mother and her friend. She is taking her medications regularly and they have been the same; she has not taken her morning medication yet.     Constipation: She had a bowel movement today. She is using Miralax with orange juice, but not every day.     Diabetes: She was last on prednisone about 1 month ago. Her blood sugar was 182 last night.     All other systems are negative.     PFSH:    History   Smoking Status     Former Smoker     Packs/day: 0.00     Quit date: 2/1/2017   Smokeless Tobacco     Former User     Family History   Problem Relation Age of Onset     Aneurysm Father      Mental illness Brother      Breast cancer Maternal Grandmother 51     Cancer Paternal Grandmother      ?? lung     Cancer Paternal Uncle      ?? lung     Mental illness Maternal Grandfather      Social History     Social History     Marital status:      Spouse name: N/A     Number of children: N/A     Years of education: N/A     Occupational History     Not on file.     Social History Main Topics     Smoking status: Former Smoker     Packs/day: 0.00     Quit date: 2/1/2017     Smokeless tobacco: Former User      Alcohol use Yes      Comment: Occasional      Drug use: No     Sexual activity: No     Other Topics Concern     Not on file     Social History Narrative    07/17/2015: The patient lives with her mother.     Past Surgical History:   Procedure Laterality Date     LA REMOVAL ANAL FISTULA,SUBCUTANEOUS      Description: Fistula-in-ano Repair;  Recorded: 01/08/2010;     TONSILLECTOMY       Allergies   Allergen Reactions     Lurasidone Unknown     (Brand name = Latuda) Face turned red     Active Ambulatory Problems     Diagnosis Date Noted     Bipolar Disorder      Scoliosis      Warts      Obesity      Lower Back Pain      Asthma      Urge Incontinence Of Urine      Nicotine Dependence      Chronic Obstructive Pulmonary Disease      Hyperlipidemia      Adult Sleep Apnea      Type 2 diabetes mellitus      Anxiety      Vertigo      Medication monitoring encounter 10/26/2015     Bipolar affective disorder      Smoking 06/27/2016     Acute on chronic respiratory failure with hypoxia 11/15/2016     COPD with exacerbation 11/15/2016     Tachycardia 11/15/2016     CAP (community acquired pneumonia) 11/15/2016     Cognitive dysfunction in bipolar disorder 11/17/2016     Bipolar disorder, current episode mixed, moderate 11/17/2016     Noncompliance with medications 11/17/2016     Resolved Ambulatory Problems     Diagnosis Date Noted     Cough      Urinary Incontinence      Upper Respiratory Infection      COPD exacerbation 06/09/2014     SOB (shortness of breath) 02/19/2016     Acute respiratory failure with hypoxia      COPD with acute exacerbation 06/27/2016     Obesity 06/27/2016     Hyperglycemia 06/27/2016     Uncontrolled diabetes mellitus      SIRS (systemic inflammatory response syndrome) 11/15/2016     Past Medical History:   Diagnosis Date     Anxiety      Asthma      Bipolar 1 disorder      COPD (chronic obstructive pulmonary disease)      Diabetes mellitus      Hearing loss      Hyperlipemia      Meniere's disease       Sleep apnea         VITALS:  Vitals:    07/10/17 0837   BP: 112/62   Patient Site: Left Arm   Patient Position: Sitting   Cuff Size: Adult Large   Pulse: 68   Resp: 24   SpO2: (!) 87%   Weight: 182 lb 9.6 oz (82.8 kg)     Wt Readings from Last 3 Encounters:   07/10/17 182 lb 9.6 oz (82.8 kg)   06/30/17 181 lb (82.1 kg)   06/05/17 184 lb (83.5 kg)     Body mass index is 39.51 kg/(m^2).  No LMP recorded. Patient is postmenopausal.      PHYSICAL EXAM:  GENERAL:  Reveals an alert 62 y.o. female in NAD.  Vitals:  Per nursing notes.  CARDIAC:  Regular rate and rhythm without murmurs, rubs, or gallops. Legs without edema. Carotids without bruits.   LUNGS: Clear.  Respiratory effort normal.  SKIN:   Left foot plantar wart with callus.  Callus pared down with a 15 blade scalpel.  Wart: See procedure note.   Genitalia: Normal external genitalia.  Lateral to the right labia minora, she has a very small firm nodule palpated just under the skin, initially difficult to find, without any overlying erythema, no tenderness, no skin changes  PSYCH: Frequently tearful.  Tangential.  Musculoskeletal: Exam of the left knee reveals no swelling, no focal warmth, no erythema.  Full range of motion.  ACL, PCL, MCL, LCL stable and intact.  No significant joint line tenderness.  No deformity.    QUALITY MEASURES:  The following are part of a depression follow up plan for the patient:  seen by mental health counselor, psychiatric follow-up and patient follow-up to return when and if necessary  The following high BMI interventions were performed this visit: encouragement to exercise and weight loss from baseline weight     DATA REVIEWED:  ADDITIONAL HISTORY SUMMARIZED (2): Reviewed ED note 6/17/2017.  DECISION TO OBTAIN EXTRA INFORMATION (1): None.   RADIOLOGY TESTS (1): None.  LABS (1): Reviewed and ordered labs.  MEDICINE TESTS (1): None.  INDEPENDENT REVIEW (2 each): None.      The visit lasted a total of 32 minutes face to face with the  patient. Over 50% of the time was spent counseling and educating the patient about abscesses, COPD, mental health, weight loss, and treating a wart per the procedure note.     I, Malaika Vasquez, am scribing for and in the presence of Dr. Alejandro.  I, Dr. Alejandro, personally performed the services described in this documentation, as scribed by Malaika Vasquez in my presence, and it is both accurate and complete.     MEDICATIONS:  Current Outpatient Prescriptions   Medication Sig Dispense Refill     albuterol (PROVENTIL HFA;VENTOLIN HFA) 90 mcg/actuation inhaler Inhale 2 puffs every 4 (four) hours as needed for wheezing or shortness of breath. 1 Inhaler 0     albuterol (PROVENTIL) 2.5 mg /3 mL (0.083 %) nebulizer solution Take 3 mL (2.5 mg total) by nebulization every 4 (four) hours as needed. 60 vial 0     blood glucose test (CONTOUR NEXT STRIPS) strips Use to check blood sugar once daily. 200 each 2     BLOOD-GLUCOSE METER (CONTOUR NEXT METER MISC) Use As Directed.       budesonide-formoterol (SYMBICORT) 160-4.5 mcg/actuation inhaler Inhale 2 puffs 2 (two) times a day. 1 Inhaler 12     generic lancets Use 1 each As Directed as needed. 100 each 11     polyethylene glycol (GLYCOLAX) 17 gram/dose powder Take 17 g by mouth daily as needed. 850 g 0     QUEtiapine (SEROQUEL) 50 MG tablet 1 tablet daily.  1     ranitidine (ZANTAC) 150 MG tablet Take 1 tablet (150 mg total) by mouth daily as needed for heartburn. 30 tablet 12     risperiDONE (RISPERDAL) 1 MG tablet Take 1 mg by mouth 2 (two) times a day.        No current facility-administered medications for this visit.         Total data points: 3

## 2021-06-11 NOTE — PROGRESS NOTES
PULMONARY OUTPATIENT FOLLOW UP NOTE    Assessment:   1. COPD on home O2  Previous spirometry showed FEV1 0.52 L (31%), no significant post bronchodilator response.   Stable, continue LABA/ICS, albuterol HFA as needed.  O2 supplementation 2 LPM with activities  2. ABDIFATAH  Sleep Study done on 2/17/2014 showed AHI 14.7, RDI 14.7, lateral , REM AHI 45, Supine AHI 14.3. PML index 21.7. Mean O2 sat 87%, desaturation heide 61%, Time of SpO2 < 89% 44 minutes out of 138 minutes of diagnostic time. CPAP 7 cmH20 was therapeutic.    Patient had difficulty tolerating CPAP machine in the past.   Reports increase daytime fatigue, [place] sleepiness scale 16/34. Unchanged weight since sleep study.   Recommend to resume CPAP machine 7 cmH20  3. GERD  4. Bipolar / Anxiety disorder  5. Tobacco user  1/2 ppd for 45 years, quit 2/2017. Smoking cessation counseling  6. Obesity     Plan:   1. Start CPAP 7 cmH2O at night  2. Continue O2 supplementation 2 LPM with activities.   3. Continue Symbicort two puffs twice a daily , rinse mouth with water after each use  4. Albuterol inhaler 2 puffs up to 6 times a day as needed  5. Increase physical therapy as tolerated  6. Weight loss  7. Ranitidine 150 mg daily as needed for acid reflux symptoms  8. Download CPAP compliance report in 6 weeks  9. Follow up in 3 months     Arnoldo Mansfield  Pulmonary / Critical Care  6/30/2017    CC:      Chief Complaint   Patient presents with     Follow Up     would like to be taken off O2     Fatigue     Sinus Problem     nasal discharge       HPI:       Arvind Leong is an 62 y.o. female who presents for follow up.   Patient has history of severe COPD on home O2, ABDIFATAH, bipolar / anxiety disorder, Meniere disease.   Patient reports unchanged exertional dyspnea, uses O2 with activity and at night, mild dry cough, occasional wheezes. Uses her LABA/ICS and occasionally albuterol HFA.  Complaints of worsening daytime fatigue, pt was diagnosed  with sleep apnea three years ago, had difficulty tolerating CPAP machine, she is willing to try CPAP.   Reports occasional heartburn, takes ranitidine as needed.   Tobacco use 1/2 ppd for 47 years. Quit 2/2017.    Past Medical History:   Diagnosis Date     Anxiety      Asthma      Bipolar 1 disorder      COPD (chronic obstructive pulmonary disease)      Diabetes mellitus      Hearing loss      Hyperlipemia      Meniere's disease      Sleep apnea      Medications:     Prior to Admission medications    Medication Sig Start Date End Date Taking? Authorizing Provider   albuterol (PROVENTIL HFA;VENTOLIN HFA) 90 mcg/actuation inhaler Inhale 2 puffs every 4 (four) hours as needed for wheezing or shortness of breath. 11/17/16 11/17/17 Yes Batsheva Day MD   albuterol (PROVENTIL) 2.5 mg /3 mL (0.083 %) nebulizer solution Take 3 mL (2.5 mg total) by nebulization every 4 (four) hours as needed. 2/16/16  Yes TOSHIA Grady   blood glucose test (CONTOUR NEXT STRIPS) strips Use to check blood sugar twice daily. 9/12/16  Yes Lily Alejandro MD   BLOOD-GLUCOSE METER (CONTOUR NEXT METER MISC) Use As Directed.   Yes PROVIDER, HISTORICAL   budesonide-formoterol (SYMBICORT) 160-4.5 mcg/actuation inhaler Inhale 2 puffs 2 (two) times a day. 11/17/16 11/17/17 Yes Batsheva Day MD   LANCETS MISC Use As Directed.   Yes PROVIDER, HISTORICAL   oxybutynin (DITROPAN XL) 10 MG ER tablet Take 1 tablet (10 mg total) by mouth daily. 12/8/16  Yes Lily Alejandro MD   QUEtiapine (SEROQUEL) 25 MG tablet  1/2/17  Yes PROVIDER, HISTORICAL   QUEtiapine (SEROQUEL) 50 MG tablet 1 tablet daily. 12/13/16  Yes PROVIDER, HISTORICAL   risperiDONE (RISPERDAL) 1 MG tablet Take 1 mg by mouth 2 (two) times a day.    Yes PROVIDER, HISTORICAL   ranitidine (ZANTAC) 150 MG tablet Take 1 tablet (150 mg total) by mouth daily as needed for heartburn. 1/6/17   Arnoldo Mansfield MD     Social History     Social History      Marital status:      Spouse name: N/A     Number of children: N/A     Years of education: N/A     Occupational History     Not on file.     Social History Main Topics     Smoking status: Former Smoker     Packs/day: 0.00     Quit date: 2/1/2017     Smokeless tobacco: Former User     Alcohol use Yes      Comment: Occasional      Drug use: No     Sexual activity: No     Other Topics Concern     Not on file     Social History Narrative    07/17/2015: The patient lives with her mother.     Family History   Problem Relation Age of Onset     Aneurysm Father      Mental illness Brother      Breast cancer Maternal Grandmother 51     Cancer Paternal Grandmother      ?? lung     Cancer Paternal Uncle      ?? lung     Mental illness Maternal Grandfather      Review of Systems  A 12 point comprehensive review of systems was negative except as noted.      Objective:     Vitals:    06/30/17 0828   BP: 108/68   Pulse: 81   Resp: 17   SpO2: 90%     Gen: obese, awake, alert, no distress  HEENT: pink conjunctiva, moist mucosa, Mallampati III/IV  Neck: no thyromegaly, masses or JVD  Lungs: decrease breath sounds at the bases otherwise clear  CV: regular, no murmurs or gallops appreciated  Abdomen: soft, NT, BS wnl  Ext: trace edema  Neuro: CN II-XII intact, non focal      Diagnostic tests:     Sleep Study (2/17/2014)  AHI 14.7, RDI 14.7, lateral , REM AHI 45, Supine   PML index 21.7  Mean O2 sat 87%, desaturation heide 61%, Time of SpO2 < 89% 44 minutes out of 138 minutes of diagnostic time.   CPAP 7 cmH20 was therapeutic     PFT's 3/8/16  FEV1/FVC is 81  FEV1 is 0.52L (31%) predicted and is reduced.  FVC is 0.64L (30%) predicted and reduced.  There was no improvement in spirometry after a single inhaled dose of bronchodilator.  TLC is 4.05L (118%) predicted and is normal.  RV is 3.26L (226%) predicted and is increased.  Flow volume loops indicate scooped expiratory limb.    XR CHEST PA AND LATERAL  5/17/2017  4:54 PM  INDICATION: Hypotension, unspecified  COMPARISON: 11/2/2016  FINDINGS: Marked scoliosis of the thoracic spine. Heart size upper limits of normal. Pulmonary vascular is normal. The lungs and pleural spaces are clear. Little change from previous.    Echocardiogram 1/3/2014  EF 75%, IVSd 1.3  Normal RV  No significant valvular disease.

## 2021-06-11 NOTE — PROGRESS NOTES
PULMONARY OUTPATIENT FOLLOW UP NOTE    Assessment:   1. COPD on home O2  Previous spirometry showed FEV1 0.52 L (31%), no significant post bronchodilator response.   Continue LABA/ICS, albuterol HFA as needed.  Significant desaturation with activities.   Continue O2 2 LPM with activities.   Continue PT.   2. ABDIFATAH  Sleep Study done on 2/17/2014 showed AHI 14.7, RDI 14.7, lateral , REM AHI 45, Supine AHI 14.3. PML index 21.7. Mean O2 sat 87%, desaturation heide 61%, Time of SpO2 < 89% 44 minutes out of 138 minutes of diagnostic time. CPAP 7 cmH20 was therapeutic.   Patent is using her  CPAP machine intermittently. Sleep hygiene was discussed. Sleep hygiene was discussed, CPAP at night.   3. GERD  4. Bipolar / Anxiety disorder  5. Physical deconditioning   6. Morbid obesity    Plan:   1. Continue O2 supplementation 2 LPM with activities.   2. Sleep hygiene  3. CPAP at night and as needed  4. Continue Symbicort two puffs twice a daily , rinse mouth with water after each use  5. Albuterol inhaler 2 puffs up to 6 times a day as needed  6. Continue physical therapy  7. Weight loss  8. Decrease salt intake.  9. Follow up in 6 months    Arnoldo Mansfield  Pulmonary / Critical Care  8/25/2020    CC:      Chief Complaint   Patient presents with     COPD     Follow Up       HPI:       Arvind Leong is an 65 y.o. female who presents for follow up.   Patient has history of severe COPD on home O2, ABDIFATAH, bipolar / anxiety disorder, Meniere disease.   Doing well since last visit, able to walk less than a block before stopping. Not using O2 with activities.    Occasional cough and wheezes. No chest pain, decrease swelling of LEs. No orthopnea or PND.   Intermittent use of symbicort, occasional use of albuterol.   Stopped using CPAP machine. But she is willing to start using it again.   No acid reflux symptoms.    Reports increase urinary frequency, burning sensation while urinating. Denies fever, chills, night  sweats. No back pain. No blood in urine.   Feels tired and fatigue in AM, uses intermittently her CPAP machine.   Previous tobacco use 1/2 ppd for 47 years. Quit 2/2017.    Past Medical History:   Diagnosis Date     Acute on chronic respiratory failure with hypoxia (H) 11/15/2016     Bipolar 1 disorder (H)      CAP (community acquired pneumonia) 11/15/2016     COPD with exacerbation (H) 11/15/2016     Diabetes mellitus, type II (H)      Hearing loss      Meniere's disease      Medications:     Current Outpatient Medications:      albuterol (PROAIR HFA;PROVENTIL HFA;VENTOLIN HFA) 90 mcg/actuation inhaler, Inhale 2 puffs every 6 (six) hours as needed for wheezing or shortness of breath., Disp: 1 Inhaler, Rfl: 11     albuterol (PROVENTIL) 2.5 mg /3 mL (0.083 %) nebulizer solution, Take 3 mL (2.5 mg total) by nebulization every 4 (four) hours as needed for wheezing or shortness of breath (or cough)., Disp: 75 mL, Rfl: 5     ARIPiprazole (ABILIFY) 20 MG tablet, Take 1 tablet (20 mg total) by mouth daily., Disp: 90 tablet, Rfl: 3     blood glucose test (CONTOUR NEXT TEST STRIPS) strips, Use to check blood sugar once daily., Disp: 100 strip, Rfl: 1     budesonide-formoterol (SYMBICORT) 160-4.5 mcg/actuation inhaler, Inhale 2 puffs 2 (two) times a day., Disp: 1 Inhaler, Rfl: 12     buPROPion (WELLBUTRIN) 75 MG tablet, Take 1 tablet (75 mg total) by mouth daily., Disp: 90 tablet, Rfl: 3     metFORMIN (GLUCOPHAGE XR) 500 MG 24 hr tablet, Take 2 tablets (1,000 mg total) by mouth daily with breakfast., Disp: 180 tablet, Rfl: 3     naproxen sodium (ALEVE) 220 MG tablet, Take 1 tablet (220 mg total) by mouth 2 (two) times a day with meals., Disp: 60 tablet, Rfl: 1     OXYGEN-AIR DELIVERY SYSTEMS [facility], Use 2 L As Directed. 2L pulsing [facility], Disp: , Rfl:      senna (SENOKOT) 8.6 mg tablet, Take 1 tablet by mouth as needed for constipation., Disp: 30 tablet, Rfl: 0     azelastine (ASTELIN) 137 mcg (0.1 %) nasal spray, 2 sprays into  each nostril 2 (two) times a day. Use in each nostril as directed, Disp: 30 mL, Rfl: 12    Social History     Socioeconomic History     Marital status:      Spouse name: Not on file     Number of children: Not on file     Years of education: Not on file     Highest education level: Not on file   Occupational History     Occupation: [employer]- quit 2019   Social Needs     Financial resource strain: Not on file     Food insecurity     Worry: Not on file     Inability: Not on file     Transportation needs     Medical: Not on file     Non-medical: Not on file   Tobacco Use     Smoking status: Former Smoker     Packs/day: 1.00     Years: 40.00     Pack years: 40.00     Last attempt to quit: 2017     Years since quitting: 3.6     Smokeless tobacco: Never Used   Substance and Sexual Activity     Alcohol use: Yes     Comment: Occasional      Drug use: No     Sexual activity: Never   Lifestyle     Physical activity     Days per week: Not on file     Minutes per session: Not on file     Stress: Not on file   Relationships     Social connections     Talks on phone: Not on file     Gets together: Not on file     Attends Gnosticist service: Not on file     Active member of club or organization: Not on file     Attends meetings of clubs or organizations: Not on file     Relationship status: Not on file     Intimate partner violence     Fear of current or ex partner: Not on file     Emotionally abused: Not on file     Physically abused: Not on file     Forced sexual activity: Not on file   Other Topics Concern     Not on file   Social History Narrative    2019The patient lives with her mother.; had worked at [employer] as [employer] but quit 2019.     Quit smoking ; no etoh problems    / x 2 ; no kids     Family History   Problem Relation Age of Onset     Aneurysm Father      Heart disease Father 70        bypass in 70s, AAA rupture at age 77      Ulcers Father 76     Pacemaker Mother       "Bipolar disorder Brother      Breast cancer Maternal Grandmother 51     Kidney failure Maternal Grandmother      Cancer Paternal Grandmother         ?? lung     Cancer Paternal Uncle         ?? lung     Mental illness Maternal Grandfather      Heart disease Other         uncle     No Medical Problems Sister         two siblings abuse chemicals     No Medical Problems Brother      Bipolar disorder Nephew      Review of Systems  A 12 point comprehensive review of systems was negative except as noted.      Objective:     Vitals:    08/25/20 0837   BP: 128/80   Pulse: 73   Resp: 13   SpO2: 92%   Weight: 176 lb (79.8 kg)   Height: 4' 7\" (1.397 m)     Wt Readings from Last 3 Encounters:   08/25/20 176 lb (79.8 kg)   07/23/20 176 lb 4 oz (79.9 kg)   02/28/20 172 lb (78 kg)   Patient oxygen saturation on RA at rest is 91%.  Oxygen saturation while ambulating 150ft on RA is 85%.  While ambulating 150ft on 2LPM continuous dose, SpO2 is 92%.    Gen: obese, awake, alert, no distress  HEENT: pink conjunctiva, moist mucosa, Mallampati III/IV  Neck: no thyromegaly, masses or JVD  Lungs: clear  CV: regular, no murmurs or gallops appreciated  Abdomen: soft, distended, NT, BS wnl  Ext: trace edema  Neuro: CN II-XII intact, non focal      Diagnostic tests:     Sleep Study (2/17/2014)  AHI 14.7, RDI 14.7, lateral , REM AHI 45, Supine   PML index 21.7  Mean O2 sat 87%, desaturation heide 61%, Time of SpO2 < 89% 44 minutes out of 138 minutes of diagnostic time.   CPAP 7 cmH20 was therapeutic     PFT's 3/8/16  FEV1/FVC is 81  FEV1 is 0.52L (31%) predicted and is reduced.  FVC is 0.64L (30%) predicted and reduced.  There was no improvement in spirometry after a single inhaled dose of bronchodilator.  TLC is 4.05L (118%) predicted and is normal.  RV is 3.26L (226%) predicted and is increased.  Flow volume loops indicate scooped expiratory limb.    XR CHEST PA AND LATERAL  5/17/2017 4:54 PM  INDICATION: Hypotension, " unspecified  COMPARISON: 11/2/2016  FINDINGS: Marked scoliosis of the thoracic spine. Heart size upper limits of normal. Pulmonary vascular is normal. The lungs and pleural spaces are clear. Little change from previous.    CTA CHEST PE RUN 9/26/2017 12:03 PM  INDICATION: acute on chronic respiratory failure  COMPARISON: 10/31/2013  ANGIOGRAM CHEST: There is moderate motion artifact which results in regions of modeled vascular enhancement. In the highest degree of vascular blurring and mottled enhancement, it is impossible to exclude small emboli but no convincing evidence for emboli present. The overall appearance is very similar to the previous exam.  LUNGS AND PLEURA: Mild peripheral bilateral atelectasis or scar also similar to previous exam, no new pneumonia.  MEDIASTINUM: Small reactive lymph nodes within anterior mediastinum unchanged, no suspicious adenopathy. Heart size upper limits of normal. Tortuous thoracic aorta.  LIMITED UPPER ABDOMEN: Negative.  MUSCULOSKELETAL: S-shaped scoliosis of thoracic and lumbar spine unchanged.  CONCLUSION:  1.  No significant change. No convincing evidence of pulmonary embolism.  2.  Stable atelectasis or scar, left lung base. Scoliosis.    XR CHEST 2 VIEWS  DATE/TIME: 8/28/2019 3:12 PM  INDICATION: cough  COMPARISON: 7/3/2019    FINDINGS: No pleural fluid or pneumothorax. No consolidative airspace disease identified. There is interstitial edema. The cardiomediastinal silhouette is not well assessed. There is lateral curvature of the spine.     Echocardiogram 1/3/2014  EF 75%, IVSd 1.3  Normal RV  No significant valvular disease.

## 2021-06-11 NOTE — PROGRESS NOTES
Called by triage nurse.  Arvind has called in 5 times due to CPAP concerns.  She had a sleep study in 2014.  She was prescribed a CPAP by Dr. Balbuena and received it today.  She could not get it to work and so she took it apart and now it continues to not function.    As she has not been on CPAP for several years, it is not an emergency for her to start today.  I advised the triage nurse to have her go to a [employer] company tomorrow to have them check it.  If she goes to an ER they will not have CPAP supplies.  Of course if there is a breathing emergency she should come in to be evaluated, but her CPAP will not be serviced at any ER or urgent care.    I will forward this to our clinic staff for Monday.    Duy Parish MD  ICU

## 2021-06-11 NOTE — PROGRESS NOTES
Wart Treatment Procedure Note     Assessment:      Warts (Verruca Vulgaris)      Plan:       1. Liquid nitrogen was applied to 1 wart(s) for three 10 second freeze/thaw cycles.  2. The patient will return at 2-4 week intervals for retreatment's as needed.     Subjective:       Arvind Leong is a 62 y.o. female who needs retreatment of warts.      Objective:      Skin: 1 wart(s) noted on left plantar foot. Size range is 1 cm.

## 2021-06-11 NOTE — PROGRESS NOTES
[facility] Discharge Summary  Patient Name: Arvind Leong  Date: 9/9/2020  Referral Diagnosis:Tendinitis of left rotator cuff    Referring provider: Conchita Saenz DO  Visit Diagnosis:   1. Acute pain of left shoulder     2. Shoulder tendonitis, left         Goals:  Pt. will demonstrate/verbalize independence in self-management of condition in : 12 weeks  Pt. will be independent with home exercise program in : 6 weeks  Patient will perform repetitive movement in : arm;for work;with no pain;in 12 weeks;Comment  Comment: able to perform tasks including reaching, bagging items for customers at work    Pt will: be able to dress lower body without shoulder pain in 8 weeks.  Pt will: be able to reach above shoulder height to reach into cupboard, wash hair without pain in 8 weeks.      Patient was seen for 1 visit on 8/13/20 with no missed appointments.  Patient received a home program for ROM.   The patient discontinued therapy, did not return.  Patient failed to schedule follow up appointments.    Patient's current objective and goal status is not known.       Therapy will be discontinued at this time.  The patient will need a new referral to resume.    Thank you for your referral.  Valentina Dickens  9/9/2020  10:13 AM

## 2021-06-11 NOTE — PROGRESS NOTES
SUBJECTIVE:   Chief Complaint   Patient presents with     Fatigue     c/o extreme fatigue; per pt she has no motivation to do anything     Arvind Lorenzoino 62 y.o. female    Current Outpatient Prescriptions   Medication Sig Dispense Refill     albuterol (PROVENTIL HFA;VENTOLIN HFA) 90 mcg/actuation inhaler Inhale 2 puffs every 4 (four) hours as needed for wheezing or shortness of breath. 1 Inhaler 0     albuterol (PROVENTIL) 2.5 mg /3 mL (0.083 %) nebulizer solution Take 3 mL (2.5 mg total) by nebulization every 4 (four) hours as needed. 60 vial 0     blood glucose test (CONTOUR NEXT STRIPS) strips Use to check blood sugar twice daily. 200 each 2     BLOOD-GLUCOSE METER (CONTOUR NEXT METER MISC) Use As Directed.       budesonide-formoterol (SYMBICORT) 160-4.5 mcg/actuation inhaler Inhale 2 puffs 2 (two) times a day. 1 Inhaler 12     generic lancets Use 1 each As Directed as needed. 100 each 11     QUEtiapine (SEROQUEL) 25 MG tablet   10     QUEtiapine (SEROQUEL) 50 MG tablet 1 tablet daily.  1     ranitidine (ZANTAC) 150 MG tablet Take 1 tablet (150 mg total) by mouth daily as needed for heartburn. 30 tablet 12     risperiDONE (RISPERDAL) 1 MG tablet Take 1 mg by mouth 2 (two) times a day.        oxybutynin (DITROPAN XL) 10 MG ER tablet Take 1 tablet (10 mg total) by mouth daily. 30 tablet 2     No current facility-administered medications for this visit.      Allergies: Lurasidone   No LMP recorded. Patient is postmenopausal.    HPI:   Pleasant 62-year-old with bipolar disorder here for multiple symptoms.  Her biggest concern is extreme fatigue.  Does not feel like doing anything.  She does have severe COPD, follows with pulmonology, last seen there in January.  She is on supplemental oxygen, states the recommendation was to wear it at least 16 hours a day but she is not wearing it to work.  She thinks this is fine because she wears it the rest of the day and night which is close to 16 hours.  Also not using  "Symbicort because she states it is \"not working\".  At best she takes it once to twice per week.  She thinks she has an appointment coming up with pulmonology July.  Patient was seen on April 26 for respiratory symptoms and diagnosed with COPD exacerbation, treated with prednisone.    She was seen a couple of weeks ago at another clinic for her fatigue, and lab work included negative/normal urine culture, thyroid cascade, sed rate, CRP, CMP except bicarb was elevated, once he was 6.4%, vitamin D mildly low at 28, white count slightly elevated at 12.2 but CBC otherwise normal.  She had mentioned food getting stuck when swallowing, which was not initially brought up today by the patient but when questioned, she states that meat and peanut butter often gets stuck in her chest when swallowing.    Patient is complaining of back pain.  Extremely difficult to get her to provide history.  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  Pain in the low back.  No pain radiating into the legs and no numbness tingling or weakness of the legs.  It sounds like this is relatively new pain but difficult to get her to give a clear timeline.    Abdominal pain and bloating.  Also complains of some right upper quadrant abdominal pain.  She has a very large, full appearing belly, she is not certain whether it has changed, but does think it feels more full.  Uncertain if related to meals or certain types of food.  Uncertain if related to bowel movements.  No change in urine or bowel movements.  Had colon cancer screening in 2000, but not since.  ROS: as per HPI    OBJECTIVE:   BP 90/62 (Patient Site: Right Arm, Patient Position: Sitting, Cuff Size: Adult Large)  Pulse 72  Temp 96.8  F (36  C) (Oral)   Resp 20  Wt 184 lb (83.5 kg)  BMI 39.82 kg/m2    General: Pleasant, well-appearing, in no acute distress.  HEENT: Pupils equal, round, reactive to light.  Oropharynx clear with moist " mucous membranes.   Neck supple without lymphadenopathy.  Cardiovascular: Heart regular rate and rhythm, normal S1-S2, no murmurs, rubs, or gallops  Respiratory: On auscultation, expiratory wheezes throughout.  No crackles, no focal findings.  Fair air movement throughout.  No increased work of breathing.  Abdomen: Soft, nontender, obese, protuberant, no masses appreciated, nonreproducible tenderness.  No guarding or rebound.  Extremities: Warm and well perfused without edema  Skin: No jaundice or pallor.  Musculoskeletal: Patient over the lumbar spine does elicit some discomfort but not reproducible consistently when retested  Psychiatric: Presents on time.  Well-groomed.  Tangential.  She is not tearful or crying today.      ASSESSMENT/PLAN  1. Abdominal bloating  - CT Abdomen Pelvis With Oral With IV Contrast; Future  - Comprehensive Metabolic Panel    2. Dysphagia  - Ambulatory Referral for Upper GI Endoscopy    3. Low back pain  - XR Lumbar Spine 2 or 3 VWS; Future    4.  Severe COPD  Instructed patient to wear her oxygen with activity, which would include at work.  She states she is not allowed.  When asked why she is not allowed she responds because she would look stupid.  I instructed her to wear her oxygen at work, instructed her on the importance of compliance with Symbicort, and follow-up with pulmonology.     25 minutes spent, greater than 50% in discussion and counseling regarding the above.

## 2021-06-11 NOTE — PROGRESS NOTES
Received information back from [facility]. They are not able to supply Arvind's CPAP.  They do not take her insurance and because she has not used her CPAP in the past she would require a new sleep study be done.   Faxed orders to [facility], she receives her O2 from them and should take her insurance.   Will update Arvind.

## 2021-06-12 NOTE — PROGRESS NOTES
PULMONARY OUTPATIENT FOLLOW UP NOTE    Assessment:   1. COPD on home O2, with acute exacerbation   Previous spirometry showed FEV1 0.52 L (31%), no significant post bronchodilator response.   Systemic steroids, continue LABA/ICS, albuterol HFA as needed.  O2 supplementation 2 LPM with activities  2. ABDIFATAH  Sleep Study done on 2/17/2014 showed AHI 14.7, RDI 14.7, lateral , REM AHI 45, Supine AHI 14.3. PML index 21.7. Mean O2 sat 87%, desaturation heide 61%, Time of SpO2 < 89% 44 minutes out of 138 minutes of diagnostic time. CPAP 7 cmH20 was therapeutic.    Compliance report 7/7 - 8/28/2017 , usage > 4 HR was 100%, residual AHI 2.2.   3. GERD  4. Bipolar / Anxiety disorder  5. Tobacco user  1/2 ppd for 45 years, quit 2/2017. Smoking cessation counseling  6. Deconditioning   Referral to physical therapy  7. Obesity  Weight is up in 8 lbs over one year.     Plan:   1. Prednisone 40 mg daily for 5 days   2. CPAP 7 cmH2O at night  3. Continue O2 supplementation 2 LPM with activities.   4. Continue Symbicort two puffs twice a daily , rinse mouth with water after each use  5. Albuterol inhaler 2 puffs up to 6 times a day as needed  6. Referral to physical therapy  7. Increase physical therapy as tolerated  8. Weight loss, (weight is up in 8 lbs over one year)  9. Ranitidine 150 mg daily as needed for acid reflux symptoms  10. Follow up in 6 months    Arnoldo Mansfield  Pulmonary / Critical Care  8/29/2017    CC:      Chief Complaint   Patient presents with     Follow Up     c-pap       HPI:       Arvind Leong is an 62 y.o. female who presents for follow up.   Patient has history of severe COPD on home O2, ABDIFATAH, bipolar / anxiety disorder, Meniere disease.   Reports increase shortness of breath, wheezes, mild dry cough, no fever or chills. Uses symbicort BID, albuterol 3 to 4 times a day.   Uses CPAP every day, refresh in AM.   Reports occasional heartburn, takes ranitidine as needed.   Weight is up in 8  lbs over one year.   Activity level has decreased.   Tobacco use 1/2 ppd for 47 years. Quit 2/2017.    Past Medical History:   Diagnosis Date     Anxiety      Asthma      Bipolar 1 disorder      COPD (chronic obstructive pulmonary disease)      Diabetes mellitus      Hearing loss      Hyperlipemia      Meniere's disease      Sleep apnea      Medications:     Prior to Admission medications    Medication Sig Start Date End Date Taking? Authorizing Provider   albuterol (PROVENTIL HFA;VENTOLIN HFA) 90 mcg/actuation inhaler Inhale 2 puffs every 4 (four) hours as needed for wheezing or shortness of breath. 11/17/16 11/17/17 Yes Batsheva Day MD   albuterol (PROVENTIL) 2.5 mg /3 mL (0.083 %) nebulizer solution Take 3 mL (2.5 mg total) by nebulization every 4 (four) hours as needed. 2/16/16  Yes TOSHIA Grady   blood glucose test (CONTOUR NEXT STRIPS) strips Use to check blood sugar twice daily. 9/12/16  Yes Lily Alejandro MD   BLOOD-GLUCOSE METER (CONTOUR NEXT METER MISC) Use As Directed.   Yes PROVIDER, HISTORICAL   budesonide-formoterol (SYMBICORT) 160-4.5 mcg/actuation inhaler Inhale 2 puffs 2 (two) times a day. 11/17/16 11/17/17 Yes Batsheva Day MD   LANCETS MISC Use As Directed.   Yes PROVIDER, HISTORICAL   oxybutynin (DITROPAN XL) 10 MG ER tablet Take 1 tablet (10 mg total) by mouth daily. 12/8/16  Yes Lily Alejandro MD   QUEtiapine (SEROQUEL) 25 MG tablet  1/2/17  Yes PROVIDER, HISTORICAL   QUEtiapine (SEROQUEL) 50 MG tablet 1 tablet daily. 12/13/16  Yes PROVIDER, HISTORICAL   risperiDONE (RISPERDAL) 1 MG tablet Take 1 mg by mouth 2 (two) times a day.    Yes PROVIDER, HISTORICAL   ranitidine (ZANTAC) 150 MG tablet Take 1 tablet (150 mg total) by mouth daily as needed for heartburn. 1/6/17   Arnoldo Mansfield MD     Social History     Social History     Marital status:      Spouse name: N/A     Number of children: N/A     Years of education: N/A      Occupational History     Not on file.     Social History Main Topics     Smoking status: Former Smoker     Packs/day: 0.00     Quit date: 2/1/2017     Smokeless tobacco: Former User     Alcohol use Yes      Comment: Occasional      Drug use: No     Sexual activity: No     Other Topics Concern     Not on file     Social History Narrative    07/17/2015: The patient lives with her mother.     Family History   Problem Relation Age of Onset     Aneurysm Father      Mental illness Brother      Breast cancer Maternal Grandmother 51     Cancer Paternal Grandmother      ?? lung     Cancer Paternal Uncle      ?? lung     Mental illness Maternal Grandfather      Review of Systems  A 12 point comprehensive review of systems was negative except as noted.      Objective:     Vitals:    08/29/17 0850   BP: 128/80   Pulse: 61   SpO2: 96%     Gen: obese, awake, alert, no distress  HEENT: pink conjunctiva, moist mucosa, Mallampati III/IV  Neck: no thyromegaly, masses or JVD  Lungs: discrete wheezes both HT  CV: regular, no murmurs or gallops appreciated  Abdomen: soft, NT, BS wnl  Ext: trace edema  Neuro: CN II-XII intact, non focal      Diagnostic tests:     Sleep Study (2/17/2014)  AHI 14.7, RDI 14.7, lateral , REM AHI 45, Supine   PML index 21.7  Mean O2 sat 87%, desaturation heide 61%, Time of SpO2 < 89% 44 minutes out of 138 minutes of diagnostic time.   CPAP 7 cmH20 was therapeutic     PFT's 3/8/16  FEV1/FVC is 81  FEV1 is 0.52L (31%) predicted and is reduced.  FVC is 0.64L (30%) predicted and reduced.  There was no improvement in spirometry after a single inhaled dose of bronchodilator.  TLC is 4.05L (118%) predicted and is normal.  RV is 3.26L (226%) predicted and is increased.  Flow volume loops indicate scooped expiratory limb.    XR CHEST PA AND LATERAL  5/17/2017 4:54 PM  INDICATION: Hypotension, unspecified  COMPARISON: 11/2/2016  FINDINGS: Marked scoliosis of the thoracic spine. Heart size upper limits  of normal. Pulmonary vascular is normal. The lungs and pleural spaces are clear. Little change from previous.    Echocardiogram 1/3/2014  EF 75%, IVSd 1.3  Normal RV  No significant valvular disease.

## 2021-06-12 NOTE — PROGRESS NOTES
[facility]   Initial Evaluation    Patient Name: Arvind Leong  Date of evaluation: 9/7/2017  Referral Diagnosis: Physical deconditioning  Referring provider: Arnoldo Russo  Visit Diagnosis:     ICD-10-CM    1. Muscle weakness (generalized) M62.81    2. Physical deconditioning R53.81        Assessment:     Arvind Leong is a 62 y.o. female who presents to therapy today with chief complaints of deconditioning since exacerbations of COPD and an increase in weight that she is concerned about. She has some pain generally but that is not her reason for coming to therapy. The patient presents late to her appointment and therefore there was limited test and measures taken but she did test below her age gender norms for gait speed, 30 sec sit to stands and 2 minute walk test. She did maintain her O2 sats above 90% with the activities throughout the session on 2L of O2. Patient will benefit from skilled PT intervention to increase strength, endurance to increase functional mobility and overall wellbeing.     Impairments in  pain, posture, ROM, joint mobility, strength  The POC is dynamic and will be modified on an ongoing basis.  Barriers to achieving goals as noted in the assessment section may affect outcome.  Prognosis to achieve goals is  fair   Pt. is appropriate for skilled PT intervention as outlined in the Plan of Care (POC).  Pt. is a good candidate for skilled PT services to improve pain levels and function.    Goals:  Pt. will be independent with home exercise program in : 6 weeks  Pt. will be able to walk : 20 minutes;30 minutes;with less difficulty;for household mobility;for community mobility;for work;in 6 weeks;for exercise/recreation  Pt will: increase distance on 2 minute walk test to >120 meters for increase in community ambulation and decrease in falls risk in 8 weeks  Pt will: increase number of reps with sit to stands to 15 reps for decrease in falls risk and increase in  overall functional strength and endurance in 8 weeks  Pt will: increase comfortable gait speed to >1.4 m/s for increase ease in community ambulation and decrease risk for falls in 8 weeks    Patient's expectations/goals are realistic.    Barriers to Learning or Achieving Goals:  Chronicity of the problem.  Co-morbidities or other medical factors.  COPD, DM type II - not well controlled per patinet report       Plan / Patient Instructions:        Plan of Care:   Authorization / Certification Start Date: 09/07/17  Communication with: Referral Source  Patient Related Instruction: Nature of Condition;Treatment plan and rationale;Self Care instruction;Basis of treatment;Body mechanics;Posture;Precautions;Next steps;Expected outcome  Times per Week: 1-2  Number of Weeks: 6-8  Number of Visits: up to 12 sessions   Therapeutic Exercise: ROM;Stretching;Strengthening  Neuromuscular Reeducation: posture;balance/proprioception;core  Manual Therapy: soft tissue mobilization;myofascial release;joint mobilization;muscle energy;strain counterstrain    Plan for next visit: NuStep, walking, begin LE strengthening, monitor HR, BP and O2 sats as able      Subjective:         Social information:   Living Situation:single family home and lives with others    Occupation:[employer]   Work Status:Working part time   Equipment Available: None    History of Present Illness:    Arvind is a 62 y.o. female who presents to therapy today with complaints of deconditioning  She was diagnosed with COPD recently but feels she had it for a long time. She states when she lived in [place] and was very sick all of the time in 2004 but she wasn't diagnosed with it until recently. She comes to PT because she doesn't like herself that much, she has gained weight and feels weak and out of breath. She states this has been ongoing for a while and feels like it is getting worse. She did want to go to the weight loss management clinic in [place] but states it  was too much money out of pocket for her. She is interested in getting stronger but also overall losing weight. She has multiple concerns about other medical issues and she is a little teary when talking about some frustrations she has when trying to see her primary MD and not being able to. She wanted to talk to the therapist about how been having a stomach - states she had a warm sensation in her abdomen and then had to use the restroom right away and also felt nauseous. She talks then a lot about the food she had been eating and then moves away from it as a cause of her discomfort - concerned generally as she has been looking up things on the internet and talking to people in the family about family medical history      Pain Rating:denies}  Pain rating at best: 0  Pain rating at worst: 0  Pain description: none    Functional limitations are described as occurring with:   ascending and descending stairs or curbs  performing routine daily activities  walking for more than a couple of minutes - did better at fair with 4WW  work standing as  - tired/fatigued       Objective:      Examination  1. Muscle weakness (generalized)     2. Physical deconditioning       Involved side: Bilateral  Posture Observation:      General sitting posture is  fair.  General standing posture is normal.    At rest: 93% O2 sats with 2L of O2  122/74 BP with HR 77 bpm    30 sec sit to stands: 11.5 reps  Age Gender Norms: 15 reps  O2 sats stayed at 92% during this       2 minute walk test: 90.5 meters  Age Gender Norms: 166.4 meters    O2 levels stayed 92-93 % during this task      Treatment Today   9/7/2017  TREATMENT MINUTES COMMENTS   Evaluation 40 Patient 15 min late to appointment and with paperwork time   Self-care/ Home management     Manual therapy     Neuromuscular Re-education     Therapeutic Activity     Therapeutic Exercises     Gait training     Modality__________________                Total 40    Blank areas are  intentional and mean the treatment did not include these items.   PT Evaluation Code: (Please list factors)  Patient History/Comorbidities: as above   Examination: as above   Clinical Presentation: stable   Clinical Decision Making: low     Patient History/  Comorbidities Examination  (body structures and functions, activity limitations, and/or participation restrictions) Clinical Presentation Clinical Decision Making (Complexity)   No documented Comorbidities or personal factors 1-2 Elements Stable and/or uncomplicated Low   1-2 documented comorbidities or personal factor 3 Elements Evolving clinical presentation with changing characteristics Moderate   3-4 documented comorbidities or personal factors 4 or more Unstable and unpredictable High   Bridgette Alvarez, PT, DPT   9/7/2017  8:14 AM

## 2021-06-12 NOTE — PROGRESS NOTES
Called Esa at [facility] and he said that the order for a portable oxygen concentrator was denied due to there not being an order to titrate oxygen into the patients CPAP machine.  A new order was written that included the titration of oxygen into the patients CPAP machine and has been re-faxed to [facility].  Currently the order for a portable oxygen concentrator has been denied by insurance.

## 2021-06-12 NOTE — PROGRESS NOTES
Spoke with Esa at [facility], he received and email on 08/21/17 that stated it had been ordered and should ship to [facility] in approximately 15-20 business days.  Once they get the portable oxygen concentrator they will be contacting the patient to set up a  time.

## 2021-06-12 NOTE — PROGRESS NOTES
Spoke with [employer] to make sure that they had received the new order that included the titration of oxygen into the patients CPAP machine, and they have.  They will re-submit the order to corporate to try and get approval.

## 2021-06-12 NOTE — PROGRESS NOTES
Spoke with Arvind. She was wanting clarification as to when she needs to wear her oxygen.  Is still battling with [employer] as to her POC.  Suggested she call her insurance to ask about what coverage she has with them for a POC since [employer] is not being helpful.  Instructed her that as of her last office visit with dr. Balbuena she was to use the oxygen at 2 LPM/NC with any activities.  If she is at rest (for example, just sitting and watching TV) she does not need the oxygen.  This can be re-evaluated when she comes back to see Dr. Balbuena in f/u.

## 2021-06-12 NOTE — PROGRESS NOTES
ASSESSMENT & PLAN:    1. Shortness of breath  Chronically short of breath, but she thinks this may have worsened over the last month.  No increased cough, no sputum production.  No fevers or chills.  X-ray negative for acute infiltrate by my review, radiology review pending.  She has some scattered wheezes on lung exam, and she describes tightness similar to previous COPD exacerbations.  Prednisone 40 mg daily for 5 days.  If not improved with this, needs to follow-up with pulmonology.  If worsening, recommend to be seen right away.  - XR Chest PA and Lateral; Future    2. Fatigue  Could be related to underlying COPD, depression, medications.  Labs as below to look for other causes.  Review of her chart shows evaluation in the ER for chest pain, possible EKG changes on the EKG report, but the provider had compared to previous and did not see significant changes.  EKG today by my review appears unchanged compared to July.  Official EKG report is pending.  She does have risk factors of smoking and type 2 diabetes, and would recommend additional cardiac workup if labs do not show cause for her symptoms.  - XR Chest PA and Lateral; Future  - Vitamin D, Total (25-Hydroxy)  - Comprehensive Metabolic Panel  - Glycosylated Hemoglobin A1c  - Thyroid Cascade  - HM1(CBC and Differential)  - HM1 (CBC with Diff)  - Urinalysis-UC if Indicated  - Electrocardiogram Perform and Read  - Culture, Urine        There are no Patient Instructions on file for this visit.    Orders Placed This Encounter   Procedures     Culture, Urine     XR Chest PA and Lateral     Standing Status:   Future     Number of Occurrences:   1     Standing Expiration Date:   9/13/2018     Order Specific Question:   Can the procedure be changed per Radiologist protocol?     Answer:   Yes     Vitamin D, Total (25-Hydroxy)     Comprehensive Metabolic Panel     Glycosylated Hemoglobin A1c     Thyroid Cascade     HM1 (CBC with Diff)     Urinalysis-UC if Indicated      Electrocardiogram Perform and Read     There are no discontinued medications.    No Follow-up on file.    CHIEF COMPLAINT:  Chief Complaint   Patient presents with     COPD       HISTORY OF PRESENT ILLNESS:  Arvind is a 62 y.o. female presenting to the clinic today with complaints of shortness of breath.     Shortness of Breath: She has had worsening fatigue and shortness of breath lately, which she has attributed to her COPD. She is on supplemental oxygen and brought in a portable tank today, but she does not think her oxygen level is as good on her portable tank as compared to her larger home tank. She is currently on 2 liters of oxygen. She last saw her pulmonologist two weeks ago, and she was given prednisone at that appointment because she was tight. Her chest feels tight again now, but it is not as bad as when she was at that appointment. She does not have much of a cough currently, but she does have a post nasal drip. She does not recall ever having seasonal allergies. She thinks she is using her Symbicort as prescribed, but she is using her albuterol inhaler infrequently. She thinks she needs to use her supplemental oxygen 24 hours per day. Her pulmonologist thought she could get away with only using it 16 hours per day, but she disagrees. She thinks her oxygen levels are fine when standing still, but she struggles any time she starts moving. She brings her oxygen to work. She does not want to do anything because she is too tired. She has not had a stress test in quite a while.     Diabetes: She has been checking her blood sugars on occasion but not as regularly as she should. The last time she checked her blood sugar, it was 157. That was prior to eating a meal. Her last hemoglobin A1c was 7.0% on 7/10/2017.     Bipolar Disorder: None of her psychiatric medications have changed recently. She saw her therapist yesterday. Follows with psychiatry as well for med management.     REVIEW OF SYSTEMS:   She is not  currently taking a vitamin D supplement. She has a history of recurrent UTI's, but she denies burning or pain with urination. All other systems are negative.    PFSH:  Her father had heart disease and underwent bypass surgery in his 70's. Her uncle also had heart disease.     TOBACCO USE:  History   Smoking Status     Former Smoker     Packs/day: 0.00     Quit date: 2/1/2017   Smokeless Tobacco     Former User       VITALS:  Vitals:    09/12/17 0934   BP: 104/70   Patient Site: Right Arm   Patient Position: Sitting   Cuff Size: Adult Regular   Pulse: 72   Resp: 24   Temp: 97.8  F (36.6  C)   TempSrc: Oral   SpO2: (!) 88%   Weight: 182 lb 9.6 oz (82.8 kg)     Wt Readings from Last 3 Encounters:   09/12/17 182 lb 9.6 oz (82.8 kg)   08/29/17 183 lb (83 kg)   07/22/17 180 lb (81.6 kg)       PHYSICAL EXAM:  GENERAL: Pleasant, well-appearing patient in no acute distress.  HEENT: Pupils equal round reactive to light.  Sclerae and conjunctivae clear. TMs are clear bilaterally.  Oropharynx is clear with  moist mucous membranes.  NECK: Supple without lymphadenopathy, no carotid bruits   CARDIOVASCULAR: Heart regular rate and rhythm without murmur normal S1-S2   LUNGS: Lungs fairly clear, no crackles, occasional mild scattered wheezes  EXTREMITIES Warm and well-perfused without edema. Pedal pulses palpable and symmetric bilaterally  NEURO: Alert and oriented. Grossly nonfocal.  PSYCHIATRIC: Presents on time and well groomed.  Normal speech and thought content.  Full affect.  No abnormal movements or behaviors noted.     DATA REVIEWED:   ADDITIONAL HISTORY SUMMARIZED (2): Reviewed 8/29/2017 pulmonology note regarding COPD. Reviewed 9/10/2017 nurse triage regarding shortness of breath.  DECISION TO OBTAIN EXTRA INFORMATION (1): None.   RADIOLOGY TESTS SUMMARIZED OR ORDERED (1): Chest X-ray ordered.   LABS REVIEWED OR ORDERED (1): Labs from 7/22/2017 reviewed. Labs ordered.   MEDICINE TESTS SUMMARIZED OR ORDERED (1): EKG from  7/22/2016 reviewed. EKG ordered.   INDEPENDENT REVIEW OF EKG OR X-RAY (2 each): None.     The visit lasted a total of 20 minutes face to face with the patient. Over 50% of the time was spent counseling and educating the patient about her shortness of breath.    IKayden, am scribing for and in the presence of, Dr. Alejandro.    I, Dr. Alejandro, personally performed the services described in this documentation, as scribed by Kayden Hairston in my presence, and it is both accurate and complete.    MEDICATIONS:  Current Outpatient Prescriptions   Medication Sig Dispense Refill     albuterol (PROVENTIL HFA;VENTOLIN HFA) 90 mcg/actuation inhaler Inhale 2 puffs every 4 (four) hours as needed for wheezing or shortness of breath. 1 Inhaler 0     albuterol (PROVENTIL) 2.5 mg /3 mL (0.083 %) nebulizer solution Take 3 mL (2.5 mg total) by nebulization every 4 (four) hours as needed. 60 vial 0     blood glucose test (CONTOUR NEXT STRIPS) strips Use to check blood sugar once daily. 200 each 2     BLOOD-GLUCOSE METER (CONTOUR NEXT METER MISC) Use As Directed.       budesonide-formoterol (SYMBICORT) 160-4.5 mcg/actuation inhaler Inhale 2 puffs 2 (two) times a day. 1 Inhaler 12     generic lancets Use 1 each As Directed as needed. 100 each 11     polyethylene glycol (GLYCOLAX) 17 gram/dose powder Take 17 g by mouth daily as needed. 850 g 0     QUEtiapine (SEROQUEL) 50 MG tablet 1 tablet daily.  1     ranitidine (ZANTAC) 150 MG tablet Take 1 tablet (150 mg total) by mouth daily as needed for heartburn. 30 tablet 12     risperiDONE (RISPERDAL) 1 MG tablet Take 1 mg by mouth 2 (two) times a day.        No current facility-administered medications for this visit.        Total Data Points: 5

## 2021-06-12 NOTE — PROGRESS NOTES
Spoke with Esa at [facility] and he said that it has been approved and it was just ordered on 08/14/17

## 2021-06-13 NOTE — TELEPHONE ENCOUNTER
----- Message from Rema Whiting MD sent at 11/25/2020 11:26 AM CST -----  Please call patient: I don't think she checks mychart. Lab looks good overall. Not sure why she was not feeling good. Iron study looks fine. Thyroid is normal. Nothing to explain her low BP. Make sure she stays well hydrated. Please update me on how she's feeling

## 2021-06-13 NOTE — PROGRESS NOTES
Talked to a Rep. from [facility], DME Will continue working with him to try and get a potable O2 concentrator for Arvind.

## 2021-06-13 NOTE — PROGRESS NOTES
Arvind walked over after her appointment in cardiology. Said she needed her lung Dr's permission to drive.  O2 sats at rest and on 2 liters = 86 % pulsating. Increased O2 to 3 liters pulsating and sats increased to 94 % at rest.  Ambulated 100 feet on the 3 liters and sats decreased to 85 %. Rested and increased O2 to 4 liters walked 150 feet and O2 sats = 93 %.  Will leave O2 at 4 liters with activity, Ct scan with contrast R/O PE, and will evaluate again after CT complete. Instructed to return to   Clinic after Ct scan,

## 2021-06-13 NOTE — TELEPHONE ENCOUNTER
Refill Request  Did you contact pharmacy: Yes  Medication name:   Requested Prescriptions     Pending Prescriptions Disp Refills     blood glucose test (CONTOUR NEXT TEST STRIPS) strips 100 strip 1     Sig: Use to check blood sugar once daily.     Who prescribed the medication: Rema Whiting MD    Requested Pharmacy: [facility]  Is patient out of medication: Yes  Patient notified refills processed in 3 business days:  yes  Okay to leave a detailed message: yes

## 2021-06-13 NOTE — PROGRESS NOTES
[facility] Discharge Summary  Patient Name: Arvind Leong  Date: 11/7/2017  Date of evaluation: 9/7/2017  Referral Diagnosis: Physical deconditioning  Referring provider: Arnoldo Russo  Visit Diagnosis:   1. Muscle weakness (generalized)     2. Physical deconditioning         Goals:  Pt. will be independent with home exercise program in : 6 weeks  Pt. will be able to walk : 20 minutes;30 minutes;with less difficulty;for household mobility;for community mobility;for work;in 6 weeks;for exercise/recreation  Pt will: increase distance on 2 minute walk test to >120 meters for increase in community ambulation and decrease in falls risk in 8 weeks  Pt will: increase number of reps with sit to stands to 15 reps for decrease in falls risk and increase in overall functional strength and endurance in 8 weeks  Pt will: increase comfortable gait speed to >1.4 m/s for increase ease in community ambulation and decrease risk for falls in 8 weeks    Patient was seen for 3 visits from 9/7/17 to 10/4/17 with 2 missed appointments.  The patient attended therapy initially, but did not finish the therapy sessions prescribed.  Goals were not fully achieved. Explanation for goals not achieved: Patient has not followed up in clinic since last session, she has beeen seen by multiple providers for fatigue but has not returned to therapy. Patient will now be discharged from therapy, see below for last known status.   The patient has not shown for their scheduled appointment(s) and has not called ot reschedule.  Goals were not met.  The patient discontinued therapy, did not return.  No further therapy is required at this time.    Therapy will be discontinued at this time.  The patient will need a new referral to resume.    Thank you for your referral.  Bridgette Alvarez  11/7/2017  2:17 PM      [facility] Daily Progress     Patient Name: Arvind Leong  Date: 10/4/2017  Visit #: 3  Date of evaluation:  9/7/2017  Referral Diagnosis: Physical deconditioning  Referring provider: Arnoldo Russo*  Visit Diagnosis:     ICD-10-CM    1. Muscle weakness (generalized) M62.81    2. Physical deconditioning R53.81      Initial Assessment:   Arvind Leong is a 62 y.o. female who presents to therapy today with chief complaints of deconditioning since exacerbations of COPD and an increase in weight that she is concerned about. She has some pain generally but that is not her reason for coming to therapy. The patient presents late to her appointment and therefore there was limited test and measures taken but she did test below her age gender norms for gait speed, 30 sec sit to stands and 2 minute walk test. She did maintain her O2 sats above 90% with the activities throughout the session on 2L of O2. Patient will benefit from skilled PT intervention to increase strength, endurance to increase functional mobility and overall wellbeing.    Assessment:     HEP/POC compliance is  good .  Patient demonstrates understanding/independence with home program.  Patient is benefitting from skilled physical therapy and is making steady progress toward functional goals.  Patient is appropriate to continue with skilled physical therapy intervention, as indicated by initial plan of care.    Goal Status:  Pt. will be independent with home exercise program in : 6 weeks  Pt. will be able to walk : 20 minutes;30 minutes;with less difficulty;for household mobility;for community mobility;for work;in 6 weeks;for exercise/recreation  Pt will: increase distance on 2 minute walk test to >120 meters for increase in community ambulation and decrease in falls risk in 8 weeks  Pt will: increase number of reps with sit to stands to 15 reps for decrease in falls risk and increase in overall functional strength and endurance in 8 weeks  Pt will: increase comfortable gait speed to >1.4 m/s for increase ease in community ambulation and decrease risk  "for falls in 8 weeks    Plan / Patient Education:     Continue with initial plan of care.  Progress with home program as tolerated.  Plan for next visit: continue with NuStep, walking, begin LE strengthening, monitor HR, BP and O2 sats as able   Subjective:     Pain Rating: denies pain   Patient reports she has been given some water pills recently, she has not been taking her metformin for her diabetes and wonders if she has to be taking it. Her sugars are running 150-175 or so, then goes up when she eats. She knows she is eating more ice cream and not eating natural sugars like apples and bananas.   She has come angio tests scheduled for next week. She states she has not been doing any of the exercises. She has started working  7pm - 1:!5 am at work. She was concerned and wanted to come back to PT due to the pulmonologist stating that her stomach \"was all fat\" and it hurt her, she wants to do PT to help loose the weight.       Objective:     Therapeutic exercise:   NuStep WL 3 x 10 minutes seat 1, arms 1 average spm 54,  Sit to stands 1 x 16 reps from standard chair  Step ups low step fwd x 10 reps each side   Side step ups on low step x 15 reps each side  Standing hip abduction 2 x 10 reps B  Standing hip extension 2 x 10 reps B  Heel raises 1 x 15 reps        Treatment Today   10/4/2017  TREATMENT MINUTES COMMENTS   Evaluation     Self-care/ Home management     Manual therapy     Neuromuscular Re-education     Therapeutic Activity     Therapeutic Exercises 30 See above   Gait training     Modality__________________                Total 30    Blank areas are intentional and mean the treatment did not include these items.     Bridgette Alvarez, PT, DPT   10/4/2017  "

## 2021-06-13 NOTE — PROGRESS NOTES
SUBJECTIVE:   Chief Complaint   Patient presents with     Follow-up     fatigue; 'feeling really tired'     Arvind Leong 62 y.o. female    Current Outpatient Prescriptions   Medication Sig Dispense Refill     albuterol (PROVENTIL HFA;VENTOLIN HFA) 90 mcg/actuation inhaler Inhale 2 puffs every 4 (four) hours as needed for wheezing or shortness of breath. 1 Inhaler 0     albuterol (PROVENTIL) 2.5 mg /3 mL (0.083 %) nebulizer solution Take 3 mL (2.5 mg total) by nebulization every 4 (four) hours as needed. 60 vial 0     blood glucose test (CONTOUR NEXT STRIPS) strips Use to check blood sugar once daily. 200 each 2     BLOOD-GLUCOSE METER (CONTOUR NEXT METER MISC) Use As Directed.       budesonide-formoterol (SYMBICORT) 160-4.5 mcg/actuation inhaler Inhale 2 puffs 2 (two) times a day. 1 Inhaler 12     generic lancets Use 1 each As Directed as needed. 100 each 11     polyethylene glycol (GLYCOLAX) 17 gram/dose powder Take 17 g by mouth daily as needed. 850 g 0     QUEtiapine (SEROQUEL) 50 MG tablet 1 tablet daily.  1     ranitidine (ZANTAC) 150 MG tablet Take 1 tablet (150 mg total) by mouth daily as needed for heartburn. 30 tablet 12     risperiDONE (RISPERDAL) 1 MG tablet Take 1 mg by mouth 2 (two) times a day.        No current facility-administered medications for this visit.      Allergies: Lurasidone   No LMP recorded. Patient is postmenopausal.    HPI:   Here for follow-up of fatigue.  Many visits over the last couple of months for fatigue.  Lab work, chest x-ray, EKG have revealed significant cause.  She feels that this fatigue is new over the summer and worsening.  She does have severe COPD, follows with pulmonology.  She reports compliance with oxygen and CPAP.  Former smoker, quit in February.  Borderline cardiomegaly on previous chest x-rays.  Recently treated for a COPD exacerbation, feels her breathing and tightness in her lungs has improved, but fatigue remains the same.  She also has a long history  "of significant mental health symptoms, struggles with bipolar disorder, treated by an outside therapist and psychiatrist.  Is tearful today, reporting that she thinks her fatigue is from a broken heart.  When asked, initially reports she is taking her Seroquel, but quickly admits that she has not been taking it consistently recently.  Also has not seen her therapist for 2 weeks, typically would be seen there weekly    ROS: as per HPI    OBJECTIVE:   /70 (Patient Site: Right Arm, Patient Position: Sitting, Cuff Size: Adult Regular)  Pulse 68  Temp 97.6  F (36.4  C) (Oral)   Ht 4' 7.25\" (1.403 m) Comment: with shoes on  Wt 181 lb 12.8 oz (82.5 kg)  BMI 41.87 kg/m2    General: Pleasant, well-appearing, in no acute distress.  HEENT: Sclerae clear.  Oropharynx with moist mucous membranes.  Cardiovascular: Heart regular rate and rhythm, normal S1-S2, no murmurs, rubs, or gallops  Respiratory: End expiratory wheezes, otherwise clear.  Good air movement throughout.  No increased work of breathing.  Abdomen: Soft, nontender, nondistended.  No guarding or rebound.  Extremities: Warm and well perfused without edema  Skin: No jaundice or pallor.  Psychiatric: Presents on time, well-groomed.  Tearful today about a personal relationship, only focused on this during the visit      ASSESSMENT/PLAN  1.  Immunization need  - Influenza, Seasonal,Quad Inj, 36+ MOS  - Td, Preservative Free (green label)     2.  Ongoing fatigue  Referral to cardiology to evaluate for potential cardiac cause.  We will schedule for patient    3.  Microscopic hematuria  Noted on recent UA.  Referral to urology placed.  We will schedule for patient.  Explained reasoning to patient why she needs to see urology    Discussed the importance of taking her psychiatric medications and following closely with her therapist.  Recommend she call right away to schedule with her therapist since she is overdue, typically seen there weekly and has not been there " a couple of weeks.

## 2021-06-13 NOTE — TELEPHONE ENCOUNTER
RN cannot approve Refill Request    RN can NOT refill this medication Protocol failed and NO refill given. Last office visit: 3/18/2019 Rema Whiting MD Last Physical: Visit date not found Last MTM visit: Visit date not found Last visit same specialty: 7/23/2020 Conchita Saenz DO.  Next visit within 3 mo: Visit date not found  Next physical within 3 mo: Visit date not found      Yeimi Cuellar, [facility] Triage/Med Refill 11/19/2020    Requested Prescriptions   Pending Prescriptions Disp Refills     blood glucose test (CONTOUR NEXT TEST STRIPS) strips 100 strip 1     Sig: Use to check blood sugar once daily.       Diabetic Supplies Refill Protocol Failed - 11/18/2020 12:30 PM        Failed - A1C in last 6 months     Hemoglobin A1c   Date Value Ref Range Status   03/11/2020 6.3 (H) <=5.6 % Final     Comment:     Prediabetes:   HBA1c       5.7 to 6.4%        Diabetes:        HBA1c        >=6.5%   Patients with Hgb F >5%, total bilirubin >10.0 mg/dL, abnormal red cell turnover, severe renal or hepatic disease or malignancy should not have this A1C method used to diagnose or monitor diabetes.                   Passed - Visit with PCP or prescribing provider visit in last 6 months     Last office visit with prescriber/PCP: 3/18/2019 Rema Whiting MD OR same dept: 7/23/2020 Conchita Saenz DO OR same specialty: 7/23/2020 Conchita Saenz DO  Last physical: Visit date not found Last MTM visit: Visit date not found   Next visit within 3 mo: Visit date not found  Next physical within 3 mo: Visit date not found  Prescriber OR PCP: Rema Whiting MD  Last diagnosis associated with med order: 1. Type 2 diabetes mellitus without complication, without long-term current use of insulin (H)  - blood glucose test (CONTOUR NEXT TEST STRIPS) strips; Use to check blood sugar once daily.  Dispense: 100 strip; Refill: 1    If protocol passes may refill for 12 months if within 3 months of last provider visit (or a total of  15 months).

## 2021-06-13 NOTE — PROGRESS NOTES
[facility] Daily Progress     Patient Name: Arvind Leong  Date: 9/15/2017  Visit #: 2  Date of evaluation: 9/7/2017  Referral Diagnosis: Physical deconditioning  Referring provider: Arnoldo Russo  Visit Diagnosis:     ICD-10-CM    1. Muscle weakness (generalized) M62.81    2. Physical deconditioning R53.81      Initial Assessment:   Arvind Leong is a 62 y.o. female who presents to therapy today with chief complaints of deconditioning since exacerbations of COPD and an increase in weight that she is concerned about. She has some pain generally but that is not her reason for coming to therapy. The patient presents late to her appointment and therefore there was limited test and measures taken but she did test below her age gender norms for gait speed, 30 sec sit to stands and 2 minute walk test. She did maintain her O2 sats above 90% with the activities throughout the session on 2L of O2. Patient will benefit from skilled PT intervention to increase strength, endurance to increase functional mobility and overall wellbeing.    Assessment:     HEP/POC compliance is  good .  Patient demonstrates understanding/independence with home program.  Patient is benefitting from skilled physical therapy and is making steady progress toward functional goals.  Patient is appropriate to continue with skilled physical therapy intervention, as indicated by initial plan of care.    Goal Status:  Pt. will be independent with home exercise program in : 6 weeks  Pt. will be able to walk : 20 minutes;30 minutes;with less difficulty;for household mobility;for community mobility;for work;in 6 weeks;for exercise/recreation  Pt will: increase distance on 2 minute walk test to >120 meters for increase in community ambulation and decrease in falls risk in 8 weeks  Pt will: increase number of reps with sit to stands to 15 reps for decrease in falls risk and increase in overall functional strength and endurance in  8 weeks  Pt will: increase comfortable gait speed to >1.4 m/s for increase ease in community ambulation and decrease risk for falls in 8 weeks    Plan / Patient Education:     Continue with initial plan of care.  Progress with home program as tolerated.  Plan for next visit: continue with NuStep, walking, begin LE strengthening, monitor HR, BP and O2 sats as able   Subjective:     Pain Rating: denies pain  Feeling ok, she is on predisone again and it does seem to help with tightness in her chest. She is on her third day. She is still fatigued but the medication seems to have made her feel a lot better, not all of the way better.       Objective:     Therapeutic exercise:   NuStep WL 3 x 8 minutes seat 1, arms 1 average spm 54, 433 total steps today  Sit to stands 2 x 10 reps  Standing hip abduction 2 x 10 reps B  Standing hip extension 2 x 10 reps B  Heel raises 2 x 15 reps        Treatment Today   9/15/2017  TREATMENT MINUTES COMMENTS   Evaluation     Self-care/ Home management     Manual therapy     Neuromuscular Re-education     Therapeutic Activity     Therapeutic Exercises 28 See above   Gait training     Modality__________________                Total 28    Blank areas are intentional and mean the treatment did not include these items.     Bridgette Alvarez, PT, DPT   9/15/2017

## 2021-06-13 NOTE — PROGRESS NOTES
ASSESSMENT/ PLAN    Arvind was seen today for follow-up.    Fatigue, unspecified type  -     Thyroid Cascade  -     XR Chest 2 Views  -     Iron and Transferrin Iron Binding Capacity  -     Ferritin    Bipolar I disorder, most recent episode depressed (H)  -     Thyroid Cascade    Type 2 diabetes mellitus with unspecified complications (H)  -     Thyroid Cascade  -     Microalbumin, Random Urine    Chronic respiratory failure with hypoxia (H)  -     Thyroid Cascade    Increased urinary frequency  -     Urinalysis-UC if Indicated    Encounter for hepatitis C screening test for low risk patient  -     Hepatitis C Antibody (Anti-HCV)    Visit for screening mammogram  -     Mammo Screening Bilateral; Future    Chronic left shoulder pain  -     Ambulatory referral to Orthopedics    Cognitive dysfunction in bipolar disorder (H)  -     Comprehensive Metabolic Panel  -     Glycosylated Hemoglobin A1C  -     Lipid Cascade  -     HM1 (CBC and Differential)    65-year-old female with a complex past medical history including diabetes, COPD on O2, ABDIFATAH on CPAP, bipolar morbid obesity who is here for a complaint of not feeling good and fatigue and frequent urination.  She appears well on exam except for mildly low blood pressure but otherwise pulmonary cardiac examination is unremarkable.  Obtained basic lab work as well as chest x-ray. CXR looks good per my review and I discussed finding with patient. Formal radiology also normal. WBC not elevated. Total neurophil % mildly elevated but ANC normal. Not sure why her BP mildly low. A1C stable at 6.3. Will await rest of other labs. Will send her to ortho as well for left shoulder pain - she got cortisone injection in 7/2020 and this did not help at all, she hasn't had any left shoulder XR done however ortho can do it there when she sees them.       This note was created using Dragon dictation software, spelling errors may occur.     MD ROSALIE Gage  Salo is a 65 y.o. old female who presented to clinic today for further evaluation of fatigue, feeling tired, going to bathroom a lot. Concerned about diabetes. H/o asthma/copd on O2 with activities seeing pulmonology, nicole on cpap., bipolar, diabetes and morbid obesity. She's not feeling good. No coughing, wheezing, fever. Does have a runny nose. Reports stress incontinence. Weight has been fluctuating 160-170's lbs. Works at [employer] as . Complains about left shoulder pain and limited ROM. Got cortisone injection in July which didn't work. Didn't go to PT. BP is low today.  She is also complaining of left shoulder pain.    Review of Systems:  Negative except as noted in HPI    The following portions of the patient's history were reviewed and updated as appropriate: past medical history, past surgical history, family history, allergies, current medications and problem list.    Medical History  Active Ambulatory (Non-Hospital) Problems    Diagnosis     CAP (community acquired pneumonia) due to Chlamydia species     Pneumonia of right lower lobe due to infectious organism     Runny nose     Bipolar I disorder, most recent episode depressed (H)     Dependence on continuous supplemental oxygen     Cognitive dysfunction in bipolar disorder (H)     Noncompliance with medications     Anxiety     Vertigo     Scoliosis     Morbid obesity (H)     Lower Back Pain     Asthma     Urge Incontinence Of Urine     COPD (chronic obstructive pulmonary disease) (H)     Hyperlipidemia     Adult Sleep Apnea     Type 2 diabetes mellitus (H)     Past Medical History:   Diagnosis Date     Acute on chronic respiratory failure with hypoxia (H) 11/15/2016     Bipolar 1 disorder (H)      CAP (community acquired pneumonia) 11/15/2016     COPD with exacerbation (H) 11/15/2016     Diabetes mellitus, type II (H)      Hearing loss      Meniere's disease        Surgical History  She  has a past surgical history that includes pr removal  "anal fistula,subcutaneous; Tonsillectomy; and Cyst Removal (01/23/2001).    Social History  Reviewed, and she  reports that she quit smoking about 3 years ago. She has a 40.00 pack-year smoking history. She has never used smokeless tobacco. She reports current alcohol use. She reports that she does not use drugs.    Family History  Reviewed, and family history includes Aneurysm in her father; Bipolar disorder in her brother and nephew; Breast cancer (age of onset: 51) in her maternal grandmother; Cancer in her paternal grandmother and paternal uncle; Heart disease in an other family member; Heart disease (age of onset: 70) in her father; Kidney failure in her maternal grandmother; Mental illness in her maternal grandfather; No Medical Problems in her brother and sister; Pacemaker in her mother; Ulcers (age of onset: 76) in her father.    Medications  Reviewed and reconciled    Allergies  Allergies   Allergen Reactions     Lurasidone Other (See Comments)     Face turned red  (Brand name = Latuda) Face turned red  Face turned red         OBJECTIVE  Physical Exam:  Vital signs: BP 98/72 (Patient Site: Right Arm, Patient Position: Sitting, Cuff Size: Adult Large)   Pulse 77   Ht 4' 7\" (1.397 m)   Wt 174 lb 12.8 oz (79.3 kg)   SpO2 94%   BMI 40.63 kg/m    Weight: 174 lb 12.8 oz (79.3 kg)    General appearance: pleasant, appears stated age, cooperative and in no distress  Respiratory: clear to auscultation bilaterally, good air movement throughout, no wheezing or crackles, speaking full sentences without difficulty, wearing oxygen cannula.   Cardiovascular: regular rate and rhythm, no murmur appreciated, no leg edema  Abdomen: active bowel sounds, soft, non-tender, non-distended  Skin: no rashes  MSK: Left Shoulder: skin without erythema, swelling or ecchymosis. No atrophy noted. tender to palpation over the SC joint, clavicle, AC joint, or bicipital groove. Decreased ROM. Rotator cuff strength testing 4/5 " bilaterally (abduction, external and internal rotation). Positive Neer's and Hawkin's.  Neuro: alert oriented x 3, grossly normal otherwise  Psych: normal affect, appropriate conversation

## 2021-06-13 NOTE — PROGRESS NOTES
PULMONARY OUTPATIENT FOLLOW UP NOTE    Assessment:   1. Acute on chronic respiratory failure  Increase O2 requirements. Stable COPD symptoms, increase swelling of LEs, weight is unchanged from last visit, but it is increased when compared to a year ago.    Chest CT scan PE protocol was negative for PE.   Likely volume up is a contributor in her increased O2 needs.   Start diuresis. Increase O2 supplementation in the meantime to 2 LPM continuously and 4 LPM with activities.   2. COPD on home O2  Previous spirometry showed FEV1 0.52 L (31%), no significant post bronchodilator response.   Continue LABA/ICS, albuterol HFA as needed.  O2 supplementation 2 LPM at rest and increase to 4 L with activities. Pt will benefit of a portable concentrator.   3. ABDIFATAH  Sleep Study done on 2/17/2014 showed AHI 14.7, RDI 14.7, lateral , REM AHI 45, Supine AHI 14.3. PML index 21.7. Mean O2 sat 87%, desaturation heide 61%, Time of SpO2 < 89% 44 minutes out of 138 minutes of diagnostic time. CPAP 7 cmH20 was therapeutic.    Good compliance with CPAP machine   4. GERD  5. Bipolar / Anxiety disorder  6. Tobacco user  1/2 ppd for 45 years, quit 2/2017. Smoking cessation counseling  7. Deconditioning   Referral to physical therapy  8. Obesity    Plan:   1. Continue O2 supplementation 2 LPM at rest and increase to 4 LPM with activities, keep O2 sat above 90%  2. CPAP 7 cmH2O at night  3. Continue Symbicort two puffs twice a daily , rinse mouth with water after each use  4. Albuterol inhaler 2 puffs up to 6 times a day as needed  5. Referral to physical therapy  6. Increase physical therapy as tolerated  7. Weight loss  8. Decrease salt intake.  9. Add lasix 10 mg every day    10. Ranitidine 150 mg daily as needed for acid reflux symptoms  11. Follow up in 1 month    Arnoldo Mansfield  Pulmonary / Critical Care  9/26/2017    CC:      Chief Complaint   Patient presents with     Follow Up     pt here for a f/u CT scan       HPI:        Arvind Leong is an 62 y.o. female who presents for follow up.   Patient has history of severe COPD on home O2, ABDIFATAH, bipolar / anxiety disorder, Meniere disease.   Patient was seen by cardiology for evaluation of exertional dyspnea and atypical chest pain.   Patient was hypoxic at rest, referred to pulmonary clinic for further evaluation.   Patient reports exertional dyspnea, able to walk less than 1/2 block, denies wheezes, no cough.   Reports 2 pillows orthopnea, no PND, increased swelling of LEs.   Uses symbicort BID, albuterol 3 times a day.   Uses CPAP every day, but feels tired in AM.   Reports occasional heartburn, takes ranitidine as needed.   Activity level has decreased. Weight is up   Tobacco use 1/2 ppd for 47 years. Quit 2/2017.    Past Medical History:   Diagnosis Date     Anxiety      Asthma      Bipolar 1 disorder      COPD (chronic obstructive pulmonary disease)      Diabetes mellitus      Hearing loss      Hyperlipemia      Meniere's disease      Sleep apnea      Medications:     Prior to Admission medications    Medication Sig Start Date End Date Taking? Authorizing Provider   albuterol (PROVENTIL HFA;VENTOLIN HFA) 90 mcg/actuation inhaler Inhale 2 puffs every 4 (four) hours as needed for wheezing or shortness of breath. 11/17/16 11/17/17 Yes Batsheva Day MD   albuterol (PROVENTIL) 2.5 mg /3 mL (0.083 %) nebulizer solution Take 3 mL (2.5 mg total) by nebulization every 4 (four) hours as needed. 2/16/16  Yes TOSHIA Grady   blood glucose test (CONTOUR NEXT STRIPS) strips Use to check blood sugar twice daily. 9/12/16  Yes Lily Alejandro MD   BLOOD-GLUCOSE METER (CONTOUR NEXT METER MISC) Use As Directed.   Yes PROVIDER, HISTORICAL   budesonide-formoterol (SYMBICORT) 160-4.5 mcg/actuation inhaler Inhale 2 puffs 2 (two) times a day. 11/17/16 11/17/17 Yes Batsheva Day MD   LANCETS MISC Use As Directed.   Yes PROVIDER, HISTORICAL   oxybutynin (DITROPAN XL) 10  MG ER tablet Take 1 tablet (10 mg total) by mouth daily. 12/8/16  Yes Lily Alejandro MD   QUEtiapine (SEROQUEL) 25 MG tablet  1/2/17  Yes PROVIDER, HISTORICAL   QUEtiapine (SEROQUEL) 50 MG tablet 1 tablet daily. 12/13/16  Yes PROVIDER, HISTORICAL   risperiDONE (RISPERDAL) 1 MG tablet Take 1 mg by mouth 2 (two) times a day.    Yes PROVIDER, HISTORICAL   ranitidine (ZANTAC) 150 MG tablet Take 1 tablet (150 mg total) by mouth daily as needed for heartburn. 1/6/17   Arnoldo Mansfield MD     Social History     Social History     Marital status:      Spouse name: N/A     Number of children: N/A     Years of education: N/A     Occupational History     Not on file.     Social History Main Topics     Smoking status: Former Smoker     Packs/day: 0.00     Quit date: 2/1/2017     Smokeless tobacco: Former User     Alcohol use Yes      Comment: Occasional      Drug use: No     Sexual activity: No     Other Topics Concern     Not on file     Social History Narrative    07/17/2015: The patient lives with her mother.     Family History   Problem Relation Age of Onset     Aneurysm Father      Mental illness Brother      Breast cancer Maternal Grandmother 51     Cancer Paternal Grandmother      ?? lung     Cancer Paternal Uncle      ?? lung     Mental illness Maternal Grandfather      Review of Systems  A 12 point comprehensive review of systems was negative except as noted.      Objective:     Vitals:    09/26/17 1310   BP: 108/62   Pulse: 83   Resp: 18   SpO2: (!) 89%     Gen: obese, awake, alert, no distress  HEENT: pink conjunctiva, moist mucosa, Mallampati III/IV  Neck: no thyromegaly, masses or JVD  Lungs: discrete wheezes both HT  CV: regular, no murmurs or gallops appreciated  Abdomen: soft, NT, BS wnl  Ext: trace edema  Neuro: CN II-XII intact, non focal      Diagnostic tests:     Sleep Study (2/17/2014)  AHI 14.7, RDI 14.7, lateral , REM AHI 45, Supine   PML index 21.7  Mean O2 sat 87%,  desaturation heide 61%, Time of SpO2 < 89% 44 minutes out of 138 minutes of diagnostic time.   CPAP 7 cmH20 was therapeutic     PFT's 3/8/16  FEV1/FVC is 81  FEV1 is 0.52L (31%) predicted and is reduced.  FVC is 0.64L (30%) predicted and reduced.  There was no improvement in spirometry after a single inhaled dose of bronchodilator.  TLC is 4.05L (118%) predicted and is normal.  RV is 3.26L (226%) predicted and is increased.  Flow volume loops indicate scooped expiratory limb.    XR CHEST PA AND LATERAL  5/17/2017 4:54 PM  INDICATION: Hypotension, unspecified  COMPARISON: 11/2/2016  FINDINGS: Marked scoliosis of the thoracic spine. Heart size upper limits of normal. Pulmonary vascular is normal. The lungs and pleural spaces are clear. Little change from previous.    CTA CHEST PE RUN 9/26/2017 12:03 PM  INDICATION: acute on chronic respiratory failure  COMPARISON: 10/31/2013  ANGIOGRAM CHEST: There is moderate motion artifact which results in regions of modeled vascular enhancement. In the highest degree of vascular blurring and mottled enhancement, it is impossible to exclude small emboli but no convincing evidence for emboli present. The overall appearance is very similar to the previous exam.  LUNGS AND PLEURA: Mild peripheral bilateral atelectasis or scar also similar to previous exam, no new pneumonia.  MEDIASTINUM: Small reactive lymph nodes within anterior mediastinum unchanged, no suspicious adenopathy. Heart size upper limits of normal. Tortuous thoracic aorta.  LIMITED UPPER ABDOMEN: Negative.  MUSCULOSKELETAL: S-shaped scoliosis of thoracic and lumbar spine unchanged.  CONCLUSION:  1.  No significant change. No convincing evidence of pulmonary embolism.  2.  Stable atelectasis or scar, left lung base. Scoliosis.    Echocardiogram 1/3/2014  EF 75%, IVSd 1.3  Normal RV  No significant valvular disease.

## 2021-06-13 NOTE — PROGRESS NOTES
CARDIOLOGY CLINIC CONSULT NOTE     Assessment/Plan:   1. Sleep apnea: she has significant fatigue and daytime sleepiness with episodes of difficulty staying awake while driving. She has tried using a CPAP machine for multiple months without improvement. Currently, it is dangerous for her to drive with her symptoms, and she is advised not to drive until she can see her pulmonologist again.   2. Atypical chest pain: She has had chest pain that she describes as chest tightness, it radiates to the right arm, and is not exacerbated by exertion. She does have significant risk factors for CAD, with a history of diabetes, COPD, and she is a former smoker. Due to her significant COPD, best assessment will be an echocardiogram as well as a CT angiogram calcium score.   3. Fatigue: Likely multifactorial. She has had fatigue for multiple months. She has a history of atypical chest pain, however the fatigue is less likely to be of cardiac etiology. She has COPD which is uncontrolled with at home oxygen therapy and Symbicort along with uncontrolled sleep apnea.     Follow up dependent upon testing results.      History of Present Illness:     It is my pleasure to see Arvind Leong at the [facility] clinic for evaluation of fatigue    Arvind Leong is a 62 y.o. female with a past medical history of COPD, sleep apnea, and diabetes. She comes to clinic today after having several months of fatigue. Along with this fatigue she is experiencing lightheadedness, and shortness of breath. She states that this fatigue started at the beginning of the summer, around 7/2017. Since then she has been diagnosed with sleep apnea and was started on a CPAP machine about two months ago. Since then she has been using the CPAP machine and her fatigue and daytime tiredness has not improved. She reports episodes of sleepiness while driving. She has been evaluated by her primary care provider for this fatigue, at that time she had an  ECG which showed multiple premature supraventricular complexes. Arvind also mentions that she is on home oxygen for her COPD, she uses it 16 hours per day, including during her job as she works as a  at [employer]. She states that she does not believe that her Symbicort helps to relieve her symptoms, although she does not use it everyday. Additionally, Arvind mentions that she has had intermittent episodes of chest pain. She describes her pain as chest tightness. This is similar to the chest tightness she has had in the past due to her COPD. She does not notice anything that makes it better or worse. She has had no exacerbation of her chest pain with exertion. She noticed chest pain that radiates to her right arm while driving as well. Arvind mentions that she has had diabetes for ten years, she was started on Metformin. However, around three years ago she stopped taking Metformin as she and her diabetes educator concluded that it was not necessary. She does still monitor her blood glucose levels regularly, her last one being 151. She is a former smoker and she reports that she quit smoking three years ago. She has also had a small weight gain in the past year, she denies any fever, vision changes, nausea, vomiting, or abdominal pain.     Past Medical History:     Patient Active Problem List   Diagnosis     Bipolar Disorder     Scoliosis     Warts     Obesity     Lower Back Pain     Asthma     Urge Incontinence Of Urine     Nicotine Dependence     Chronic Obstructive Pulmonary Disease     Hyperlipidemia     Adult Sleep Apnea     Type 2 diabetes mellitus     Anxiety     Vertigo     Medication monitoring encounter     Bipolar affective disorder     Smoking     Acute on chronic respiratory failure with hypoxia     COPD with exacerbation     Tachycardia     CAP (community acquired pneumonia)     Cognitive dysfunction in bipolar disorder     Bipolar disorder, current episode mixed, moderate     Noncompliance with  medications       Past Surgical History:     Past Surgical History:   Procedure Laterality Date     [place] REMOVAL ANAL FISTULA,SUBCUTANEOUS      Description: Fistula-in-ano Repair;  Recorded: 01/08/2010;     TONSILLECTOMY         Family History:     Family History   Problem Relation Age of Onset     Aneurysm Father      Mental illness Brother      Breast cancer Maternal Grandmother 51     Cancer Paternal Grandmother      ?? lung     Cancer Paternal Uncle      ?? lung     Mental illness Maternal Grandfather        Social History:    reports that she quit smoking about 3 years ago. She smoked 0.00 packs per day. She has quit using smokeless tobacco. She reports that she drinks alcohol. She reports that she does not use illicit drugs.      Sleep:  She snores and does not feel rested after sleeping.    Meds:     Current Outpatient Prescriptions   Medication Sig Dispense Refill     albuterol (PROVENTIL HFA;VENTOLIN HFA) 90 mcg/actuation inhaler Inhale 2 puffs every 4 (four) hours as needed for wheezing or shortness of breath. 1 Inhaler 0     albuterol (PROVENTIL) 2.5 mg /3 mL (0.083 %) nebulizer solution Take 3 mL (2.5 mg total) by nebulization every 4 (four) hours as needed. 60 vial 0     blood glucose test (CONTOUR NEXT STRIPS) strips Use to check blood sugar once daily. 200 each 2     BLOOD-GLUCOSE METER (CONTOUR NEXT METER MISC) Use As Directed.       budesonide-formoterol (SYMBICORT) 160-4.5 mcg/actuation inhaler Inhale 2 puffs 2 (two) times a day. 1 Inhaler 12     generic lancets Use 1 each As Directed as needed. 100 each 11     QUEtiapine (SEROQUEL) 50 MG tablet 1 tablet daily.  1     ranitidine (ZANTAC) 150 MG tablet Take 1 tablet (150 mg total) by mouth daily as needed for heartburn. 30 tablet 12     risperiDONE (RISPERDAL) 1 MG tablet Take 1 mg by mouth 2 (two) times a day.        polyethylene glycol (GLYCOLAX) 17 gram/dose powder Take 17 g by mouth daily as needed. 850 g 0     No current facility-administered  "medications for this visit.        Allergies:   Lurasidone    Review of Systems:     General: Weight Loss, Weight Gain  Eyes: Visual Distubance  Ears/Nose/Throat: Hearing Loss  Lungs: Shortness of Breath, Snoring  Heart: Chest Pain, Arm Pain, Irregular Heartbeat  Stomach: Constipation, Diarrhea, Heartburn, Nausea  Bladder: WNL  Muscle/Joints: WNL  Skin: WNL  Nervous System: WNL  Mental Health: Depression, Anxiety, Confusion     Blood: WNL        Objective:      Physical Exam  182 lb (82.6 kg)  4' 7.25\" (1.403 m)  Body mass index is 41.92 kg/(m^2).  BP (!) 82/58 (Patient Site: Left Arm, Patient Position: Sitting, Cuff Size: Adult Large)  Pulse 72 Comment: Irregalar  Resp 12  Ht 4' 7.25\" (1.403 m)  Wt 182 lb (82.6 kg)  BMI 41.92 kg/m2      General Appearance : Tearful, Awake, Alert, No acute distress  HEENT: No Scleral icterus; the mucous membranes were pink and moist.  Conjunctivae injected  Neck:  No cervical bruits, jugular venous distention, or thyromegaly  Chest: The spine was straight  Lungs: Respirations are labored. Decreased lung sounds bilaterally. No wheezing or crackles.   Cardiovascular:  Normal point of maximal impulse.  Auscultation reveals normal first and second heart sounds with no murmurs, rubs, or gallops.  Carotid, radial, and dorsalis pedal pulses and intact.  Abdomen: No organomegaly, masses, bruits, or tenderness. Bowels sounds are present. Obese, firm.   Extremities:  No extremity edema.   Skin: No xanthelasma. Warm, Dry.  Musculoskeletal: No tenderness.  Neurologic:  No tenderness.      EKG:  EKG from 9/10/17: Sinus rhythm with Premature supraventricular complexes and with occasional Premature ventricular complexes, low voltage.     Cardiac Imaging Studies:  No prior cardiac imaging studies.     Lab Review   Lab Results   Component Value Date     09/12/2017    K 4.1 09/12/2017     09/12/2017    CO2 34 (H) 09/12/2017    BUN 9 09/12/2017    CREATININE 0.83 09/12/2017    CALCIUM " 9.0 09/12/2017     Lab Results   Component Value Date    WBC 10.9 09/12/2017    WBC 11.3 (H) 06/10/2014    HGB 15.1 09/12/2017    HCT 48.1 (H) 09/12/2017    MCV 95 09/12/2017     09/12/2017     Lab Results   Component Value Date    CHOL 153 07/08/2016    TRIG 191 (H) 07/08/2016    HDL 36 (L) 07/08/2016    LDLCALC 79 07/08/2016     Lab Results   Component Value Date    TROPONINI <0.01 02/18/2016     Lab Results   Component Value Date    BNP 30 02/18/2016     Lab Results   Component Value Date    TSH 0.78 09/12/2017       Sylvain Pablo MD [facility]    His note created using Dragon voice recognition software. Sound alike errors may have escaped editing.

## 2021-06-13 NOTE — TELEPHONE ENCOUNTER
Informed patient of result message below from Dr. Whiting. Patient voiced understanding.  Patient states she is feeling better today.

## 2021-06-13 NOTE — PROGRESS NOTES
Call from Arvind stating that she received a call from [facility] stating that they were going to  her oxygen on Monday.    Placed call to [facility].  Was not able to reach.  Left VM message to please NOT  patient's oxygen on Monday. She is switching oxygen providers to [facility] and has NOT received her [facility] oxygen as of yet.  Arvind is requesting a pickup of her old oxygen by [facility] for October 19.  Stated in VM message that they could call Arvind to coordinate date/time of oxygen pickup.

## 2021-06-14 NOTE — PROGRESS NOTES
Subjective:      Patient ID: Arvind Leong is a 62 y.o. female.    Chief Complaint:    HPI Arvind Leong is a 62 y.o. female with COPD who presents today complaining of cough and chest tightness x 2 weeks. Patient states this time she has had a low grade 99 fever. Her cough productivity has increased dramatically and is thick and yellow. She is on 4L of oxygen at all times. She has not been taking her Symbicort regularly because she doesn't understand why she needs to take it every day. She also is not taking Lasix because she does not understand why she has to take it. She has been using her albuterol neb every once in a while.       Past Medical History:   Diagnosis Date     Anxiety      Asthma      Bipolar 1 disorder      COPD (chronic obstructive pulmonary disease)      Diabetes mellitus      Hearing loss      Hyperlipemia      Meniere's disease      Sleep apnea        Past Surgical History:   Procedure Laterality Date     [place] REMOVAL ANAL FISTULA,SUBCUTANEOUS      Description: Fistula-in-ano Repair;  Recorded: 01/08/2010;     TONSILLECTOMY         Family History   Problem Relation Age of Onset     Aneurysm Father      Mental illness Brother      Breast cancer Maternal Grandmother 51     Cancer Paternal Grandmother      ?? lung     Cancer Paternal Uncle      ?? lung     Mental illness Maternal Grandfather        Social History   Substance Use Topics     Smoking status: Former Smoker     Packs/day: 0.00     Quit date: 2/1/2017     Smokeless tobacco: Former User     Alcohol use Yes      Comment: Occasional      Clinical Decision Making:  Patient patient is not currently compliant with most of her medications.  Her symptoms are consistent with a COPD exacerbation.  I recommend that she follow-up with her primary care provider because she does not have a good understanding of why she needs to be taking her medications and what they are for.  I did spend time educating the patient on why her Lasix are  important and why Symbicort only works if she is taking it daily. Patient was started on Doxy and Prednisone for her exacerbation. She was in NAD and was vitally stable. At the end of the encounter, I discussed results, diagnosis, medications. Discussed red flags for immediate return to clinic/ER, as well as indications for follow up if no improvement. Patient understood and agreed to plan. Patient was stable for discharge.      Review of Systems    Objective:     /70  Temp 99  F (37.2  C) (Oral)   Resp 16  Wt 183 lb 8 oz (83.2 kg)  SpO2 98% Comment: 4 LITER OF O2  Breastfeeding? No  BMI 41.51 kg/m2    Physical Exam   Constitutional: Nasal cannula in place.   Patient uses a walker to ambulate, has an obese body habitus and is currently using supplemental oxygen.   HENT:   Head: Normocephalic and atraumatic.   Right Ear: External ear normal.   Left Ear: External ear normal.   Eyes: Conjunctivae are normal.   Cardiovascular: Normal rate, regular rhythm and normal heart sounds.  Exam reveals no gallop and no friction rub.    No murmur heard.  Pulmonary/Chest: Effort normal. No respiratory distress. She has wheezes.   Psychiatric: She has a normal mood and affect. Her speech is normal and behavior is normal. Thought content normal. Cognition and memory are impaired.   Patient had difficult time understanding the concept of her medications and what they are for. It took multiple time explaining why they are important and what they do.       Assessment:     Procedures    1. COPD exacerbation  predniSONE (DELTASONE) 20 MG tablet    doxycycline (MONODOX) 100 MG capsule    albuterol (ACCUNEB) 1.25 mg/3 mL nebulizer solution         Patient Instructions   1. Follow up with your primary care to ensure you are improving and to discuss your urinary issues and discuss which meds you are taking.  2. Take Two tablets of Prednisone daily for 5 days.   3. Take one tablet of Doxycycline twice daily for 10 days.   4. Use  albuterol neb as needed.   5. Use you Symbicort daily.

## 2021-06-14 NOTE — PROGRESS NOTES
ASSESSMENT/PLAN  1. Type 2 diabetes mellitus  Well-controlled per last A1c of 6.6%.  Encourage patient to monitor diet and work on decreasing carbs, increasing protein.  She should do Accu-Cheks routinely and schedule follow-up with Dr. Alejandro in March--sooner if concerns about elevated blood sugars.    2. Obesity (BMI 30-39.9)  Patient with overall deconditioning.  She is not following through in PT or home exercises we discussed the importance of this.    3. Lumbago  No change in symptoms, follow through on PT recommended--- will have /care guide help assist patient in follow through with this.    4. COPD (chronic obstructive pulmonary disease)  Continue with O2 and f/u with pulmonary--- use Symbicort and albuterol as prescribed.  She congratulated on smoking cessation earlier this year    5. Obstructive sleep apnea syndrome  Inconsistent use of CPAP may be contributing to patient's overall fatigue.  Strongly encourage her to use this nightly and follow-up with pulmonary if having struggles with that.    6. Bipolar disorder, current episode mixed, moderate  Patient unclear when next follow-up with psychiatry is.  No notes in chart for review.  We did try to Surgeon website for [facility] to find her practitioner she can only remember their first name, but this was not successful.  She reports she has the number at home.  Patient is not suicidal or homicidal.  Strongly encourage her to follow-up with psychiatry given significant depressive symptoms.      Review with patient her fatigue is likely multifactorial including her lower vitamin D, lack of regular use of her CPAP, chronic obstructive pulmonary disease.    The following high BMI interventions were performed this visit: encouragement to exercise and weight monitoring    Pt states an understanding and agrees to the above plan.          SUBJECTIVE:   Chief Complaint   Patient presents with     Back Pain     was seen at [facility] and  patient states she was given predisone.      Depression     feeling really run down and fatigued, not sure if it is because she is depressed or the weather      Blood Sugar Problem     high readings per patient 178 this morning. Pt states she was concerned since it was over 130      Arvind Leong 62 y.o. female    Current Outpatient Prescriptions   Medication Sig Dispense Refill     albuterol (ACCUNEB) 1.25 mg/3 mL nebulizer solution Take 3 mL (1.25 mg total) by nebulization every 6 (six) hours as needed for wheezing. 30 vial 1     blood glucose test (CONTOUR NEXT STRIPS) strips Use to check blood sugar once daily. 200 each 2     BLOOD-GLUCOSE METER (CONTOUR NEXT METER MISC) Use As Directed.       budesonide-formoterol (SYMBICORT) 160-4.5 mcg/actuation inhaler Inhale 2 puffs 2 (two) times a day. 1 Inhaler 12     fluticasone (FLONASE) 50 mcg/actuation nasal spray 2 sprays in each nostril nightly 16 g 12     generic lancets Use 1 each As Directed as needed. 100 each 11     polyethylene glycol (GLYCOLAX) 17 gram/dose powder Take 17 g by mouth daily as needed. 850 g 0     QUEtiapine (SEROQUEL) 50 MG tablet 1 tablet daily.  1     risperiDONE (RISPERDAL) 1 MG tablet Take 1 mg by mouth 2 (two) times a day.        albuterol (PROVENTIL HFA;VENTOLIN HFA) 90 mcg/actuation inhaler Inhale 2 puffs every 4 (four) hours as needed for wheezing or shortness of breath. 1 Inhaler 0     albuterol (PROVENTIL) 2.5 mg /3 mL (0.083 %) nebulizer solution Take 3 mL (2.5 mg total) by nebulization every 4 (four) hours as needed. 60 vial 0     furosemide (LASIX) 20 MG tablet Take 0.5 tablets (10 mg total) by mouth daily. 30 tablet 2     No current facility-administered medications for this visit.      Allergies: Lurasidone   No LMP recorded. Patient is postmenopausal.    HPI:   Merlyn is a 62-year-old female who comes in today at the urging of the triage nurses after calling in with several complaints.  Patient struggling with significant  fatigue, depressed mood, and complaint of back pain.  She reports history of elevated blood sugar this morning (179).  She is not routinely checking this and brings no sugar records with her.  Last A1c was done with Dr. Alejandro 9/17 and was 6.6%.  She is on no current diabetes medications.  She reports she is eating routinely.  Patient has history of bipolar disorder.  It is unclear whether follow-up with psychiatry is and she cannot recall exactly when her last visit was.  She reports no recent medication changes and compliance with her current meds.  She is not suicidal or homicidal but PHQ 9 today is 22.  Patient thinks she goes to the [facility] on [facility] but cannot recall her providers name.  We try to research this but cannot find her provider online.  Patient also has back pain.  This is somewhat chronic.  She stated stands for her work as a  at [employer].  She reports she has pain primarily at the end of the day.  Review of her chart shows that she was referred to physical therapy and last seen her in October.  Somewhat inconsistent with follow-through.  They diagnosed her with overall deconditioning and weakness.  Patient is not doing home exercises.  She denies the back pain is changed--no radiation of pain into her lower extremities, no change in bowel or bladder function, no numbness, tingling, or weakness of her lower extremities.  She denies urinary symptoms.    ROS: negative except as per HPI    OBJECTIVE:   The patient appears well, alert, oriented x 3, in no distress--- some difficulty with hearing.  /72 (Patient Site: Right Arm, Patient Position: Sitting, Cuff Size: Adult Large)  Pulse (!) 58  Temp 98.6  F (37  C) (Oral)   Resp 16  Wt 183 lb 1 oz (83 kg)  BMI 41.41 kg/m2    See PHQ 9=22    Labs reviewed from 10/14/2017 including normal CBC and BMP (mildly elevated white count)--done in ER when seen for fatigue, as well as labs from 9/12/17 showing normal  CMP, CBC, A1c and a slightly low vitamin D for which she was advised to take additional D.  Had an echocardiogram completed 9/17:    When compared to the previous study dated 1/3/2014, no significant change    Left ventricle ejection fraction is normal. The calculated left ventricular ejection fraction is 63%.    No significant valvular abnormalities

## 2021-06-14 NOTE — PROGRESS NOTES
The Clinic Care Guide called the patient  today at the request of the PCP to discuss possible clinic care coordination enrollment. Clinic care coordination was described to the patient and immediate needs were discussed. The patient agreed to enrollment in clinic care coordination and future appointments were scheduled for an RN care coordination assessment and an enrollment visit with the primary care physician and care guide. The patient was provided with contact information for the clinic care guide.    PCAM date 01/08/18 @8:00am      Patient FYI:  Oxygen tank ([employer] portable one)   Works @ [employer]  Fired from [employer] (will make apt in Jan)

## 2021-06-14 NOTE — PROGRESS NOTES
Attempt 1: Care Guide called patient.  If this patient is returning my call, please transfer to Heather at ext [phone].

## 2021-06-15 ENCOUNTER — OFFICE VISIT - HEALTHEAST (OUTPATIENT)
Dept: PULMONOLOGY | Facility: OTHER | Age: 66
End: 2021-06-15

## 2021-06-15 DIAGNOSIS — G47.30 SLEEP APNEA, UNSPECIFIED TYPE: ICD-10-CM

## 2021-06-15 DIAGNOSIS — J44.9 CHRONIC OBSTRUCTIVE PULMONARY DISEASE, UNSPECIFIED COPD TYPE (H): ICD-10-CM

## 2021-06-15 DIAGNOSIS — R06.00 DYSPNEA, UNSPECIFIED TYPE: ICD-10-CM

## 2021-06-15 DIAGNOSIS — R53.81 PHYSICAL DECONDITIONING: ICD-10-CM

## 2021-06-15 RX ORDER — TIOTROPIUM BROMIDE 18 UG/1
18 CAPSULE ORAL; RESPIRATORY (INHALATION) DAILY
Qty: 30 CAPSULE | Refills: 11 | Status: SHIPPED | OUTPATIENT
Start: 2021-06-15 | End: 2021-07-30

## 2021-06-15 ASSESSMENT — MIFFLIN-ST. JEOR: SCORE: 1193.96

## 2021-06-15 NOTE — PATIENT INSTRUCTIONS - HE
1. Continue O2 supplementation 2 LPM with activities.   2. Sleep hygiene  3. CPAP at night and as needed  4. Start INCRUSE one puff daily   5. Albuterol inhaler 2 puffs up to 6 times a day as needed  6. Astelin nasal spray , 2 sprays each nostril daily   7. Referral to physical therapy  8. Weight loss  9. Decrease salt intake.  10. Follow up in 6 months

## 2021-06-15 NOTE — PROGRESS NOTES
PULMONARY OUTPATIENT FOLLOW UP NOTE    Assessment:   1. COPD on home O2  Previous spirometry showed FEV1 0.52 L (31%), no significant post bronchodilator response.   Denies any benefit from LABA/ICS and stopped using it.   Trial of LAMA. Continue albuterol HFA as needed  Continue O2 2 LPM with activities.   Referral to PT/pulmonary rehab.   2. ABDIFATAH  Sleep Study done on 2/17/2014 showed AHI 14.7, RDI 14.7, lateral , REM AHI 45, Supine AHI 14.3. PML index 21.7. Mean O2 sat 87%, desaturation heide 61%, Time of SpO2 < 89% 44 minutes out of 138 minutes of diagnostic time. CPAP 7 cmH20 was therapeutic.   Patent is using her  CPAP machine intermittently. Sleep hygiene was discussed. Sleep hygiene was discussed, CPAP at night.   3. Allergic rhinitis  No benefit from nasal steroid spray. Start astelin   4. Bipolar / Anxiety disorder  5. Back pain  Secondary to thoracolumbar scoliosis and obesity.   Weight loss was discussed.   6. Physical deconditioning   7. Morbid obesity    Plan:   1. Continue O2 supplementation 2 LPM with activities.   2. Sleep hygiene  3. CPAP at night and as needed  4. Start INCRUSE one puff daily   5. Albuterol inhaler 2 puffs up to 6 times a day as needed  6. Astelin nasal spray , 2 sprays each nostril daily   7. Referral to physical therapy  8. Weight loss  9. Decrease salt intake.  10. Follow up in 6 months    Arnold[facility]  Pulmonary / Critical Care  2/26/2021    CC:      Chief Complaint   Patient presents with     COPD     Follow Up       HPI:       Arvind Leong is an 65 y.o. female who presents for follow up.   Patient has history of severe COPD on home O2, ABDIFATAH, bipolar / anxiety disorder, Meniere disease.   Doing well since last visit, major complaint is her back pain.   Unchanged exertional dyspnea, able to walk over one block, denies cough, occasional wheezes. Stopped using LABA/ICS, denies any changes in her breathing, reports occasional use of rescue inhaler.   No  chest pain, decrease swelling of LEs. No orthopnea or PND.   Stopped using CPAP machine. But she is willing to start using it again.   Reports nasal congestion and post nasal drip, denies any benefit from flonase.  No acid reflux symptoms.    Weight gain. 4 lbs since last visit.   Stop doing physical therapy.   Reports back pain worse with activities. Denies tingling or numbness sensation in legs. Denies skin rashes.   Feels tired and fatigue in AM, uses intermittently her CPAP machine.   Previous tobacco use 1/2 ppd for 47 years. Quit 2/2017.    Past Medical History:   Diagnosis Date     Acute on chronic respiratory failure with hypoxia (H) 11/15/2016     Bipolar 1 disorder (H)      CAP (community acquired pneumonia) 11/15/2016     COPD with exacerbation (H) 11/15/2016     Diabetes mellitus, type II (H)      Hearing loss      Meniere's disease      Medications:     Current Outpatient Medications:      ARIPiprazole (ABILIFY) 20 MG tablet, Take 1 tablet (20 mg total) by mouth daily., Disp: 90 tablet, Rfl: 3     OXYGEN-AIR DELIVERY SYSTEMS [facility], Use 2 L As Directed. 2L pulsing [facility], Disp: , Rfl:      albuterol (PROAIR HFA;PROVENTIL HFA;VENTOLIN HFA) 90 mcg/actuation inhaler, Inhale 2 puffs every 6 (six) hours as needed for wheezing or shortness of breath., Disp: 1 Inhaler, Rfl: 11     albuterol (PROVENTIL) 2.5 mg /3 mL (0.083 %) nebulizer solution, Take 3 mL (2.5 mg total) by nebulization every 4 (four) hours as needed for wheezing or shortness of breath (or cough)., Disp: 75 mL, Rfl: 5     azelastine (ASTELIN) 137 mcg (0.1 %) nasal spray, 2 sprays into each nostril 2 (two) times a day. Use in each nostril as directed, Disp: 30 mL, Rfl: 12     blood glucose test (CONTOUR NEXT TEST STRIPS) strips, Use to check blood sugar once daily., Disp: 100 strip, Rfl: 1     budesonide-formoterol (SYMBICORT) 160-4.5 mcg/actuation inhaler, Inhale 2 puffs 2 (two) times a day., Disp: 1 Inhaler, Rfl: 12     buPROPion (WELLBUTRIN) 75  MG tablet, Take 1 tablet (75 mg total) by mouth daily., Disp: 90 tablet, Rfl: 3     metFORMIN (GLUCOPHAGE XR) 500 MG 24 hr tablet, Take 2 tablets (1,000 mg total) by mouth daily with breakfast., Disp: 180 tablet, Rfl: 3     naproxen sodium (ALEVE) 220 MG tablet, Take 1 tablet (220 mg total) by mouth 2 (two) times a day with meals., Disp: 60 tablet, Rfl: 1     senna (SENOKOT) 8.6 mg tablet, Take 1 tablet by mouth as needed for constipation., Disp: 30 tablet, Rfl: 0    Social History     Socioeconomic History     Marital status:      Spouse name: Not on file     Number of children: Not on file     Years of education: Not on file     Highest education level: Not on file   Occupational History     Occupation: /[employer]- quit 2019   Social Needs     Financial resource strain: Not on file     Food insecurity     Worry: Not on file     Inability: Not on file     Transportation needs     Medical: Not on file     Non-medical: Not on file   Tobacco Use     Smoking status: Former Smoker     Packs/day: 1.00     Years: 40.00     Pack years: 40.00     Quit date: 2017     Years since quittin.0     Smokeless tobacco: Never Used   Substance and Sexual Activity     Alcohol use: Yes     Comment: Occasional      Drug use: No     Sexual activity: Never   Lifestyle     Physical activity     Days per week: Not on file     Minutes per session: Not on file     Stress: Not on file   Relationships     Social connections     Talks on phone: Not on file     Gets together: Not on file     Attends Sabianist service: Not on file     Active member of club or organization: Not on file     Attends meetings of clubs or organizations: Not on file     Relationship status: Not on file     Intimate partner violence     Fear of current or ex partner: Not on file     Emotionally abused: Not on file     Physically abused: Not on file     Forced sexual activity: Not on file   Other Topics Concern     Not on file   Social History  "Narrative    2019The patient lives with her mother.; had worked at [employer] as  but quit 2019.     Quit smoking ; no etoh problems    / x 2 ; no kids     Family History   Problem Relation Age of Onset     Aneurysm Father      Heart disease Father 70        bypass in 70s, AAA rupture at age 77      Ulcers Father 76     Pacemaker Mother      Bipolar disorder Brother      Breast cancer Maternal Grandmother 51     Kidney failure Maternal Grandmother      Cancer Paternal Grandmother         ?? lung     Cancer Paternal Uncle         ?? lung     Mental illness Maternal Grandfather      Heart disease Other         uncle     No Medical Problems Sister         two siblings abuse chemicals     No Medical Problems Brother      Bipolar disorder Nephew      Review of Systems  A 12 point comprehensive review of systems was negative except as noted.      Objective:     Vitals:    21 0842   BP: 96/62   Pulse: 70   SpO2: 98%   Weight: 178 lb (80.7 kg)   Height: 4' 7\" (1.397 m)     Wt Readings from Last 3 Encounters:   21 178 lb (80.7 kg)   20 174 lb 12.8 oz (79.3 kg)   20 176 lb (79.8 kg)       Gen: obese, awake, alert, no distress  HEENT: pink conjunctiva, moist mucosa, Mallampati III/IV  Neck: no thyromegaly, masses or JVD  Lungs: clear  CV: regular, no murmurs or gallops appreciated  Back scoliosis, pain paraspinal muscle close to T2/L1 area  Abdomen: soft, distended, NT, BS wnl  Ext: trace edema  Neuro: CN II-XII intact, non focal      Diagnostic tests:     Sleep Study (2014)  AHI 14.7, RDI 14.7, lateral , REM AHI 45, Supine   PML index 21.7  Mean O2 sat 87%, desaturation heide 61%, Time of SpO2 < 89% 44 minutes out of 138 minutes of diagnostic time.   CPAP 7 cmH20 was therapeutic     PFT's 3/8/16  FEV1/FVC is 81  FEV1 is 0.52L (31%) predicted and is reduced.  FVC is 0.64L (30%) predicted and reduced.  There was no improvement in spirometry after a " single inhaled dose of bronchodilator.  TLC is 4.05L (118%) predicted and is normal.  RV is 3.26L (226%) predicted and is increased.  Flow volume loops indicate scooped expiratory limb.    XR CHEST PA AND LATERAL  5/17/2017 4:54 PM  INDICATION: Hypotension, unspecified  COMPARISON: 11/2/2016  FINDINGS: Marked scoliosis of the thoracic spine. Heart size upper limits of normal. Pulmonary vascular is normal. The lungs and pleural spaces are clear. Little change from previous.    CTA CHEST PE RUN 9/26/2017 12:03 PM  INDICATION: acute on chronic respiratory failure  COMPARISON: 10/31/2013  ANGIOGRAM CHEST: There is moderate motion artifact which results in regions of modeled vascular enhancement. In the highest degree of vascular blurring and mottled enhancement, it is impossible to exclude small emboli but no convincing evidence for emboli present. The overall appearance is very similar to the previous exam.  LUNGS AND PLEURA: Mild peripheral bilateral atelectasis or scar also similar to previous exam, no new pneumonia.  MEDIASTINUM: Small reactive lymph nodes within anterior mediastinum unchanged, no suspicious adenopathy. Heart size upper limits of normal. Tortuous thoracic aorta.  LIMITED UPPER ABDOMEN: Negative.  MUSCULOSKELETAL: S-shaped scoliosis of thoracic and lumbar spine unchanged.  CONCLUSION:  1.  No significant change. No convincing evidence of pulmonary embolism.  2.  Stable atelectasis or scar, left lung base. Scoliosis.    XR CHEST 2 VIEWS  DATE/TIME: 8/28/2019 3:12 PM  INDICATION: cough  COMPARISON: 7/3/2019    FINDINGS: No pleural fluid or pneumothorax. No consolidative airspace disease identified. There is interstitial edema. The cardiomediastinal silhouette is not well assessed. There is lateral curvature of the spine.     Echocardiogram 1/3/2014  EF 75%, IVSd 1.3  Normal RV  No significant valvular disease.
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
## 2021-06-15 NOTE — PROGRESS NOTES
RN Recommendations and Referrals  Pharm-D consult/follow up: CG encourage and offer to schedule a visit for MTM.   [facility] : Social resources, community services ( [facility], ILS, Housing/).   [facility] RN: MEV scheduled.   Dental exam: CG encourage and offer to schedule exam.   Eye exam: CG encourage and offer to schedule exam.    Diabetes Nurse Educator- Care guide to assist with scheduling a visit.     Action Plan    RN Will  Schedule MEV (Medication Evaluation Visit)  Will add the patient to [facility] RN tracking list  Be available to the patient as nursing needs arise    Care Guide Will  Within 2 weeks from the RN assessment visit, by 1/25/18 : Help the patient coordinate goal setting visit if not already coordinated., At goal setting visit : Review goals set at PCAM visit and create or add to action plan. and update care plan ( [facility] staff name, number, and type of services and frequency.     Goals  Goals       Medication            Pt agreed to take Metformin 1000 mg bid as prescribed        Patient will have improve understanding with symptom management of her mental illness.             Action steps to achieve this goal  1.  Patient will attend Medication Education Visit with [facility] RN.   2.  Care guide will schedule an appointment for patient with [facility] SW.   3.  Patient will continue with MH support/ therapy at [facility] ([facility]) in [facility].   4. Care guide will discuss of patient's current community resources, services and frequency, and provide contact name and numbers for patient use. ( Update care plan).     Date goal set:  1/8/18        Diabetic population of intervention patient  A1C:6.6 on 9/18/17  Last Diabetic Eye Exam:Not sure  Last Diabetic Foot Exam: Not sure    1. What have blood sugars been running? 100's- 170's.   2. How often does the patient check blood sugars? Few times a week  3. Has the patient felt symptomatic with low readings?  Not sure  4. What does the patient do to feel better or  "raise blood sugars? Not sure.     Clinic Care Coordination RN Assessed Needs  Patient here for RN assessment for enrolling into Clinic Care Coordination (CCC). Patient did not put in her bilateral hearing aids. Working part time at [employer] in [facility] and planning to retire in April this year at the age of 63 years old. [place] mom [place]. States on waiting list for [facility] for senior apartment buildings in [place]. She states she is able to do her own and her mom's annual MA paper work. Patient discussed multiple concerns from not getting along with her mom and job stress with a \" aaron\" at work, recent phone chat line and talked with a aaron who \" he wanted phone sex\", questions about oxygen causing weight gain and whether or not she need to contact a union rep for recent \" missed work\" and did not get a written notice from the supervisor. Patient unable to tell me name of provider she sees,  frequency of visits, and last time she was at [facility] in [facility]. Patient signed LEMUEL for [facility] in [facility] and met with Care Guide.     Patient Centered Assessment Method-PCAM TOTAL SCORE: 35 (1/8/2018 11:20 AM)  Level 2:  A score of 25-36 indicates that the patient has a moderate initial need for RN or SW intervention at the discretion of the .  The RN will add this patient to her panel and follow closely in partnership with the care guide until stable.  She will reach out to the care guide for support in care coordination needs and graduate the patient to standard care guide outreach when appropriate.    PCAM (Patient Centered Assessment Method) HEALTH AND WELL-BEING  Physical Health Concerns: Diabetes  Other Physical Health Concerns:: COPD- oxygen dependent, Obese, sleep apnea, hypoxia.   RN Assessment: Physical Health Needs: Mod to severe symptoms or problems that impact on daily life  RN Assessment: Physical Health Problems: Mod to severe impact upon mental well-being and preventing enjoyment of usual activities  Mental " "Health Concerns: Severe Persistent Mental Illness, Anxiety, Depression  Other Mental Health Concerns:: Conencted with [facility] in [facility].   RN Assessment:Other Mental Well-Being Concern: Mild problems- don't interfere with function  Lifestyle/Habit Concerns: Diet, Exercise/Activity  Other Lifestyle/Habit Concerns:: Quit smoking feb [id number]  RN Assessment: Lifestyle Behaviors: Mod to severe impact on client's well-being, preventing enjoyment of usual activities  SOCIAL ENVIRONMENT  Other Home Environment Concerns:: Lives with he rmom in a trailer home. Ongoing issues with he rmom and them not getting along.   RN Assessment: Home Environment: Safe, stable, but with some inconsistency  Other Daily Activities Concerns:: Working part time at [employer] in [facility]. States not getting along with a \" aaron\" supervisor there. Planning to retire at age 63, in April 2018.   RN Assessment: Daily Activites: Restricted participation with some degree of social isolation  Other Social Network Concerns:: Have a a GF who lives at a [facility]. Patient is on waiting list with [facility]. [facility] and have missed work.   RN Assessment: Social Network: Restricted participation with some degree of social isolation  Other Financial Status and Service Concerns:: Have own care and drive. Have MA.   RN Assessment: Financial Resources: Financially insecure, some resource challenges  HEALTH LITERACY AND COMMUNICATION  Understanding of Health and Wellbeing Concerns: Little understanding which impacts on their ability to undertake better management  RN Assessment: Health Literacy: Little understanding which impacts on their ability to undertake better management  Engagement Concerns: Some difficulties in communication with or without moderate barriers  RN Assessment: Engagement: Some difficulties in communication with or without moderate barriers  Barriers to Compliance with Medical Recommendations: Literacy, Mental Illness  SERVICE " "COORDINATION  Other Services: Other care/services missing and/or fragmented  Coordination of Services: Required care/services in place with some coordination barriers  PCAM TOTAL SCORE: 35    Emergency Plan  Knowing When to Seek Treatment for Mental Health Concerns    What are the symptoms of a potential problem in an adult?  The following are the most common symptoms of a potential emotional, behavioral, and/or developmental problem in an adult, which necessitates a psychiatric evaluation. However, each individual may experience symptoms differently. Symptoms may include:  Significant decline in work performance, poor work attendance, and/or lack of productivity  Social withdrawal from activities, friends, and/or family  Substance (alcohol and drugs) abuse  Sleep disturbances (like persistent nightmares, insomnia, hypersomnia, or flashbacks)  Depression (poor mood, negativity, or mood swings)  Appetite changes (like significant weight gain or loss)  Continuous or frequent aggression  Continuous or frequent anger (for periods longer than 6 months)  Excessive worry and/or anxiety  Threats to self or others  Thoughts of death  Thoughts and/or talk of suicide  Destructive behaviors (like criminal activity, or stealing)  Sexually \"acting out\"  Lying and/or cheating  Many physical complaints, including being constantly tense and/or frequent aches and pains that cannot be traced to a physical cause or injury  Sudden feelings of panic, dizziness, or increased heartbeat  Increased feelings of guilt, helplessness, and/or hopelessness  Decreased energy  The symptoms of a potential emotional, behavioral, and/or developmental problem may look like other conditions. Always talk with your child's health care provider for a diagnosis.    [facility] Mental Health Crisis Lines:  [facility] [phone]  [facility] [phone]  [facility] [phone]  [facility] [phone]  Psychiatric, [facility] " [phone]  [facility], Children [phone]  [facility], Adults [phone]  [facility] [phone]    COPD    You have been diagnosed with chronic obstructive pulmonary disease (COPD). This is a name given to a group of diseases that limit the flow of air in and out of your lungs. This makes it harder to breathe. With COPD, you are also more likely to get lung infections. COPD includes chronic bronchitis and emphysema. COPD is most often caused by heavy, long-term cigarette smoking.    When to call your health care provider  Call your provider immediately if you have any of the following:   Shortness of breath, wheezing, or coughing   Increased mucus   Yellow, green, bloody, or smelly mucus   Fever or chills   Tightness in your chest that does not go away with rest or medication   An irregular heartbeat or a feeling that your heart is beating very fast   Swollen ankles

## 2021-06-16 ENCOUNTER — RECORDS - HEALTHEAST (OUTPATIENT)
Dept: FAMILY MEDICINE | Facility: CLINIC | Age: 66
End: 2021-06-16

## 2021-06-16 PROBLEM — E11.8 TYPE 2 DIABETES MELLITUS WITH UNSPECIFIED COMPLICATIONS (H): Status: ACTIVE | Noted: 2020-11-23

## 2021-06-16 PROBLEM — F31.30 BIPOLAR I DISORDER, MOST RECENT EPISODE DEPRESSED (H): Status: ACTIVE | Noted: 2019-03-04

## 2021-06-16 PROBLEM — J18.9 PNEUMONIA: Status: ACTIVE | Noted: 2021-04-23

## 2021-06-16 PROBLEM — J44.1 COPD EXACERBATION (H): Status: ACTIVE | Noted: 2021-04-24

## 2021-06-16 PROBLEM — J18.9 PNEUMONIA OF RIGHT LOWER LOBE DUE TO INFECTIOUS ORGANISM: Status: ACTIVE | Noted: 2019-06-11

## 2021-06-16 PROBLEM — J96.11 CHRONIC RESPIRATORY FAILURE WITH HYPOXIA (H): Status: ACTIVE | Noted: 2020-11-23

## 2021-06-16 PROBLEM — R09.89 RUNNY NOSE: Status: ACTIVE | Noted: 2019-03-18

## 2021-06-16 PROBLEM — Z99.81 DEPENDENCE ON CONTINUOUS SUPPLEMENTAL OXYGEN: Status: ACTIVE | Noted: 2018-08-23

## 2021-06-16 PROBLEM — J16.0 CAP (COMMUNITY ACQUIRED PNEUMONIA) DUE TO CHLAMYDIA SPECIES: Status: ACTIVE | Noted: 2019-06-11

## 2021-06-16 NOTE — PROGRESS NOTES
Patient recently in ED, follow up is already scheduled. Called patient to remind of CCC RN Medication Education Visit after appointment.

## 2021-06-16 NOTE — PROGRESS NOTES
Arvind called with complaint of having no energy and feeling run down. Asking for a pill for her COPD.  Will start her action plan, a Z -pac and prednisone ordered. Will call with an update next week.   Complaint of increased back pain as well, instructed to follow up with her primary Md.  Said she would.

## 2021-06-16 NOTE — PROGRESS NOTES
Care Guide Delegation:   1.  Due Date: Next outreach or by 3/1/18       Delegation: Assist with scheduling a Pharm-D appointment for MTM.   Attempt 2: Care Guide called patient.  If this patient is returning my call, please transfer to Heather at ext [phone].

## 2021-06-16 NOTE — PROGRESS NOTES
ASSESSMENT & PLAN:    1. Visit for screening mammogram  Advised screening mammogram  - Mammo Screening Bilateral; Future    2. Colon cancer screening  Strongly advised colon cancer screening. Refuses colonoscopy. Denies personal or family history of colon cancer or polyps. She is willing to do cologuard. Advised to check with insurance for coverage.  - Cologuard    3. Health maintenance examination  Pap with HPV today, although unable to isolate full cervix in view on exam due to patient discomfort. She agrees to hep c screen, recommended based on age.  - Hepatitis C Antibody (Anti-HCV); Future  - Gynecologic Cytology (PAP Smear)  - HPV High Risk DNA Cervical    4. DM2 (diabetes mellitus, type 2)  DM not addressed today, other than patient advised to schedule diabetic eye exam  - Ambulatory referral to Ophthalmology    5. Post-menopausal  Advised dexa  - DXA Bone Density Scan; Future      Patient Instructions   Check with insurance to see if cologuard is covered for colon cancer screening      Orders Placed This Encounter   Procedures     Mammo Screening Bilateral     Standing Status:   Future     Standing Expiration Date:   6/5/2019     Order Specific Question:   Patient's previous breast density:     Answer:   Scattered fibroglandular density [2]     Order Specific Question:   Reason for Exam (Describe Symptoms):     Answer:   Screening     Order Specific Question:   Can the procedure be changed per Radiologist protocol?     Answer:   Yes     Hepatitis C Antibody (Anti-HCV)     Standing Status:   Future     Standing Expiration Date:   3/5/2019     Cologuard     There are no discontinued medications.    No Follow-up on file.    CHIEF COMPLAINT:  Chief Complaint   Patient presents with     Annual Exam     fasting labs today; no concerns per pt       HISTORY OF PRESENT ILLNESS:  Arvind is a 62 y.o. female presenting to the clinic today for a physical examination.    Health Maintenance: She reports only ever having  one pap in the past with Dr. Lemus. After much discussion, she agrees to try a pap today. She declines to a colonoscopy; last was 5/23/2000, unable to complete - patient was agitated and pulling at scope. She denies any colon cancer in her family. She is due for a mammogram. She reports a family history of breast cancer in her aunt and grandmother.     Asthma: She continues to follow with pulmonology. She has not yet scheduled an appointment. She notes that she has been walking slower than normal. She has also been feeling very fatigued lately.    REVIEW OF SYSTEMS:   She reports urinary incontinence, she has not followed with urology. She also notes that she recently gained 9-10 lbs. Denies any vaginal bleeding. All other systems are negative.    PFSH:  Social: Not currently sexually active. Recently met someone online.    Social History     Social History     Marital status:      Spouse name: N/A     Number of children: N/A     Years of education: N/A     Occupational History     Not on file.     Social History Main Topics     Smoking status: Former Smoker     Packs/day: 1.00     Years: 40.00     Quit date: 2/1/2017     Smokeless tobacco: Never Used     Alcohol use Yes      Comment: Occasional      Drug use: No     Sexual activity: No     Other Topics Concern     Not on file     Social History Narrative    07/17/2015: The patient lives with her mother.       Past Medical History:   Diagnosis Date     Bipolar 1 disorder      Hearing loss      Meniere's disease        Past Surgical History:   Procedure Laterality Date     CYST REMOVAL  01/23/2001     WY REMOVAL ANAL FISTULA,SUBCUTANEOUS      Description: Fistula-in-ano Repair;  Recorded: 01/08/2010; x2     TONSILLECTOMY         Allergies   Allergen Reactions     Lurasidone Unknown     (Brand name = Latuda) Face turned red       Active Ambulatory Problems     Diagnosis Date Noted     Scoliosis      Obesity (BMI 30-39.9)      Lower Back Pain      Asthma       Urge Incontinence Of Urine      COPD (chronic obstructive pulmonary disease)      Hyperlipidemia      Adult Sleep Apnea      Type 2 diabetes mellitus      Anxiety      Vertigo      Acute on chronic respiratory failure with hypoxia 11/15/2016     COPD with exacerbation 11/15/2016     Tachycardia 11/15/2016     CAP (community acquired pneumonia) 11/15/2016     Cognitive dysfunction in bipolar disorder 2016     Bipolar disorder, current episode mixed, moderate 2016     Noncompliance with medications 2016     Atypical chest pain 2017     Resolved Ambulatory Problems     Diagnosis Date Noted     Bipolar Disorder      Warts      Nicotine Dependence      Cough      Urinary Incontinence      Upper Respiratory Infection      COPD exacerbation 2014     Medication monitoring encounter 10/26/2015     SOB (shortness of breath) 2016     Bipolar affective disorder      Acute respiratory failure with hypoxia      COPD with acute exacerbation 2016     Smoking 2016     Obesity 2016     Hyperglycemia 2016     Uncontrolled diabetes mellitus      SIRS (systemic inflammatory response syndrome) 11/15/2016     Past Medical History:   Diagnosis Date     Bipolar 1 disorder      Hearing loss      Meniere's disease        Family History   Problem Relation Age of Onset     Aneurysm Father      Heart disease Father 70     bypass in 70s, AAA rupture at age 77      Ulcers Father 76     Pacemaker Mother      Bipolar disorder Brother      Breast cancer Maternal Grandmother 51     Kidney failure Maternal Grandmother      Cancer Paternal Grandmother      ?? lung     Cancer Paternal Uncle      ?? lung     Mental illness Maternal Grandfather      Heart disease Other      uncle     No Medical Problems Sister      two siblings abuse chemicals     No Medical Problems Brother      Bipolar disorder Nephew        VITALS:  Vitals:    18 0826   BP: 116/58   Patient Site: Left Arm   Patient  "Position: Sitting   Cuff Size: Adult Large   Pulse: 76   Resp: 20   Temp: 98.5  F (36.9  C)   TempSrc: Oral   Weight: 181 lb 4.8 oz (82.2 kg)   Height: 4' 6.5\" (1.384 m)     Wt Readings from Last 3 Encounters:   03/05/18 181 lb 4.8 oz (82.2 kg)   02/19/18 178 lb (80.7 kg)   02/14/18 181 lb (82.1 kg)     PHYSICAL EXAM:  GENERAL: Pleasant, well-appearing woman in no acute distress.  VITAL SIGNS:  Reviewed.  HEENT: Pupils are equal, round, and reactive to light. Sclerae and  conjunctivae clear. TMs are clear bilaterally. Oropharynx is clear with moist mucous membranes.  NECK: Supple without lymphadenopathy or thyromegaly. No carotid bruits.  CARDIOVASCULAR:  Heart regular rate and rhythm without murmur. Normal S1 and S2.  LUNGS: Clear to auscultation bilaterally without wheezes or crackles. Decreased air movement.  BREASTS: Normal contours. No skin dimpling, nipple retraction or nipple discharge. Palpation reveals no masses, no supraclavicular or axillary lymphadenopathy.    ABDOMEN:  Soft, nontender, and nondistended without guarding or rebound. Obese.  PELVIS: Normal female external genitalia.  No skin lesions.  Speculum exam difficult for patient to tolerate. Due to patient discomfort, unable to isolate the cervix completely in view, but able to visualize a portion of the anterior lip.  Pap smear collected. No bimanual exam done, as patient refused.  EXTREMITIES: Warm and well perfused without edema. Dorsalis pedis pulses easily palpable and symmetric bilaterally.  NEURO: Alert and oriented. Grossly nonfocal.  PSYCHIATRIC: Presents on time and well groomed.  Normal speech and thought content. Full affect. No abnormal movements or behaviors noted.     DATA REVIEWED:  ADDITIONAL HISTORY SUMMARIZED (2): Reviewed 5/23/00 colonoscopy, unable to complete.  DECISION TO OBTAIN EXTRA INFORMATION (1): None.   RADIOLOGY TESTS SUMMARIZED OR ORDERED (1): Ordered mammogram and DXA today.  LABS REVIEWED OR ORDERED (1): Reviewed " labs from 2/19/18 and 2/14/18; A1c, BPM. Labs were ordered today.  MEDICINE TESTS SUMMARIZED OR ORDERED (1): None.  INDEPENDENT REVIEW OF EKG OR X-RAY (2 EACH): None.       The visit lasted a total of 40 minutes face to face with the patient. Over 50% of the time was spent counseling and educating the patient about health maintenance.    ILissa, am scribing for and in the presence of, Dr. Alejandro.    I, Dr. Alejandro, personally performed the services described in this documentation, as scribed by Lissa Reyes in my presence, and it is both accurate and complete.    MEDICATIONS:  Current Outpatient Prescriptions   Medication Sig Dispense Refill     blood glucose test (CONTOUR NEXT STRIPS) strips Use to check blood sugar once daily. 200 each 2     BLOOD-GLUCOSE METER (CONTOUR NEXT METER MISC) Use As Directed.       budesonide-formoterol (SYMBICORT) 160-4.5 mcg/actuation inhaler Inhale 2 puffs 2 (two) times a day. 1 Inhaler 12     furosemide (LASIX) 20 MG tablet Take 0.5 tablets (10 mg total) by mouth daily. 30 tablet 2     generic lancets Use 1 each As Directed as needed. 100 each 11     polyethylene glycol (GLYCOLAX) 17 gram/dose powder Take 17 g by mouth daily as needed. 850 g 0     risperiDONE (RISPERDAL) 1 MG tablet Take 1 mg by mouth 2 (two) times a day.        albuterol (PROAIR HFA;PROVENTIL HFA;VENTOLIN HFA) 90 mcg/actuation inhaler Inhale 2 puffs every 4 (four) hours as needed for wheezing or shortness of breath. 1 Inhaler 0     albuterol (PROVENTIL) 2.5 mg /3 mL (0.083 %) nebulizer solution Take 3 mL (2.5 mg total) by nebulization every 4 (four) hours as needed. 60 vial 0     fluticasone (FLONASE) 50 mcg/actuation nasal spray 2 sprays in each nostril nightly 16 g 12     meclizine (ANTIVERT) 25 mg tablet Take 1 tablet (25 mg total) by mouth 4 (four) times a day. 28 tablet 0     QUEtiapine (SEROQUEL) 50 MG tablet 1 tablet daily.  1     No current facility-administered medications for this visit.          Total  Data Points: 4

## 2021-06-16 NOTE — PROGRESS NOTES
Care Guide called patient.  If this patient is returning my call, please transfer to [facility] at ext [phone].  I have called Arvind and have been unsuccessful in reaching this patient for 2 months now.  This patient has also not returned any of my messages.  I will continue attempting to reach out to this patient in one month.  I will also check this patient's chart for upcoming appointments, ER reports that may contain a new phone number, or any other recent activity.         Left patient a message to call back, also left the phone number for [facility] for her to schedule asap. #[phone]

## 2021-06-16 NOTE — PROGRESS NOTES
"Impression:  COPD exacerbation    Plan:  Patient will need bronchodilators, steroids, and further evaluation.  Since she needs oxygen and nebulized bronchodilators we will have to send her over to the ER at [facility] for further treatment.  I called the emergency department and they will be expecting her      Chief Complaint:  Chief Complaint   Patient presents with     COPD     States that she is \"gurgling\" since this morning         HPI:   Arvind Leong is a 66 y.o. female who presents to this clinic for the evaluation of shortness of breath.  Patient complains of a 1 day history of increasing shortness of breath, wheezing and gurgling when she breathes, and cough.  No fever or chest pain.      PMH:   Past Medical History:   Diagnosis Date     Acute on chronic respiratory failure with hypoxia (H) 11/15/2016     Bipolar 1 disorder (H)      CAP (community acquired pneumonia) 11/15/2016     COPD with exacerbation (H) 11/15/2016     Diabetes mellitus, type II (H)      Hearing loss      Meniere's disease      Past Surgical History:   Procedure Laterality Date     CYST REMOVAL  01/23/2001     [place] REMOVAL ANAL FISTULA,SUBCUTANEOUS      Description: Fistula-in-ano Repair;  Recorded: 01/08/2010; x2     TONSILLECTOMY           ROS:  All other systems negative    Meds:    Current Outpatient Medications:      albuterol (PROAIR HFA;PROVENTIL HFA;VENTOLIN HFA) 90 mcg/actuation inhaler, Inhale 2 puffs every 6 (six) hours as needed for wheezing or shortness of breath., Disp: 1 Inhaler, Rfl: 11     albuterol (PROVENTIL) 2.5 mg /3 mL (0.083 %) nebulizer solution, Take 3 mL (2.5 mg total) by nebulization every 4 (four) hours as needed for wheezing or shortness of breath (or cough)., Disp: 75 mL, Rfl: 5     ARIPiprazole (ABILIFY) 20 MG tablet, Take 1 tablet (20 mg total) by mouth daily., Disp: 90 tablet, Rfl: 3     azelastine (ASTELIN) 137 mcg (0.1 %) nasal spray, 2 sprays into each nostril 2 (two) times a day. Use in each " nostril as directed, Disp: 30 mL, Rfl: 12     blood glucose test (CONTOUR NEXT TEST STRIPS) strips, Use to check blood sugar once daily., Disp: 100 strip, Rfl: 1     buPROPion (WELLBUTRIN) 75 MG tablet, Take 1 tablet (75 mg total) by mouth daily., Disp: 90 tablet, Rfl: 3     metFORMIN (GLUCOPHAGE XR) 500 MG 24 hr tablet, Take 2 tablets (1,000 mg total) by mouth daily with breakfast., Disp: 180 tablet, Rfl: 3     naproxen sodium (ALEVE) 220 MG tablet, Take 1 tablet (220 mg total) by mouth 2 (two) times a day with meals., Disp: 60 tablet, Rfl: 1     OXYGEN-AIR DELIVERY SYSTEMS [facility] Use 2 L As Directed. 2L pulsing [facility], Disp: , Rfl:      senna (SENOKOT) 8.6 mg tablet, Take 1 tablet by mouth as needed for constipation., Disp: 30 tablet, Rfl: 0     tiotropium (SPIRIVA) 18 mcg inhalation capsule, Place 1 capsule (2 puffs total) into inhaler and inhale daily., Disp: 30 capsule, Rfl: 11        Social:  Social History     Socioeconomic History     Marital status:      Spouse name: Not on file     Number of children: Not on file     Years of education: Not on file     Highest education level: Not on file   Occupational History     Occupation: /[employer]- quit 2019   Social Needs     Financial resource strain: Not on file     Food insecurity     Worry: Not on file     Inability: Not on file     Transportation needs     Medical: Not on file     Non-medical: Not on file   Tobacco Use     Smoking status: Former Smoker     Packs/day: 1.00     Years: 40.00     Pack years: 40.00     Quit date: 2017     Years since quittin.2     Smokeless tobacco: Never Used   Substance and Sexual Activity     Alcohol use: Yes     Comment: Occasional      Drug use: No     Sexual activity: Never   Lifestyle     Physical activity     Days per week: Not on file     Minutes per session: Not on file     Stress: Not on file   Relationships     Social connections     Talks on phone: Not on file     Gets together: Not on file      Attends Zoroastrianism service: Not on file     Active member of club or organization: Not on file     Attends meetings of clubs or organizations: Not on file     Relationship status: Not on file     Intimate partner violence     Fear of current or ex partner: Not on file     Emotionally abused: Not on file     Physically abused: Not on file     Forced sexual activity: Not on file   Other Topics Concern     Not on file   Social History Narrative    03/2019The patient lives with her mother.; had worked at [employer] as  but quit March 2019.     Quit smoking 2015; no etoh problems    / x 2 ; no kids         Physical Exam:  Vitals:    04/23/21 1826   BP: 122/86   Patient Site: Right Arm   Patient Position: Sitting   Cuff Size: Adult Large   Pulse: 98   Temp: 98.2  F (36.8  C)   TempSrc: Oral   SpO2: (!) 86%   Weight: 178 lb 4 oz (80.9 kg)      Patient is sitting in a chair appears in mild respiratory distress, slow to answer questions, obese, oxygen saturation is 85% on room air, she usually uses home oxygen but did not bring it with her  Eyes: PERRL, EOMI  Head: Atraumatic and normocephalic  Lungs: Diminished breath sounds bilaterally, prolonged expiratory phase with a few faint wheezes but not moving much air  Abdomen: Obese  Extremities: No tenderness or deformity  Skin: No lesions or rash  Neuro: Normal motor and sensory function in all extremities  Psych: Awake, alert, slow to answer questions      Results:    No results found for this or any previous visit (from the past 24 hour(s)).    No results found.      Chance Medina MD

## 2021-06-16 NOTE — TELEPHONE ENCOUNTER
Telephone Encounter by Nhi Bee at 6/20/2019  3:13 PM     Author: Nhi Bee Service: -- Author Type: --    Filed: 6/20/2019  3:13 PM Encounter Date: 6/19/2019 Status: Signed    : Nhi Bee APPROVED:    Approval start date:03/20/2019  Approval end date:06/20/2021    Pharmacy has been notified of approval and will contact patient when medication is ready for pickup.

## 2021-06-16 NOTE — PROGRESS NOTES
Patient did not bring her medications with her to appointment today. Discussed MTM with patient and she is agreeing for care guide to outreach and assist with scheduling a Pharm-D visit for MTM and return to see me for MEV as needed.    Care Guide Delegation:   1.  Due Date: Next outreach or by 3/1/18       Delegation: Assist with scheduling a Pharm-D appointment for MTM.

## 2021-06-16 NOTE — PROGRESS NOTES
ASSESSMENT & PLAN:  1. Type 2 diabetes mellitus  A1c discussed. Recheck in 3 months.  - Glycosylated Hemoglobin A1c  - Microalbumin, Random Urine  - Lipid Cascade    2. Leukocytosis  Recheck CBC today  - HM1(CBC and Differential)  - HM1 (CBC with Diff)    3. Bipolar  Follows with psychiatry and psychology    4. COPD, ABDIFATAH  Follows with pulmonology. Encouraged to wear CPAP.     5. Recent pneumonia  Resolved. Patient is back to baseline.     There are no Patient Instructions on file for this visit.    Orders Placed This Encounter   Procedures     Glycosylated Hemoglobin A1c     Microalbumin, Random Urine     There are no discontinued medications.    No Follow-up on file.    CHIEF COMPLAINT:  Chief Complaint   Patient presents with     Medication Management     Diabetes       HISTORY OF PRESENT ILLNESS:  Arvind is a 62 y.o. female presenting to the clinic today for medication check.     Pneumonia: She was seen on 2/14/18 in the ED for pneumonia and placed on prednisone and doxycycline. She reports being on oxygen 24/7. She continues to follows pulmonology; was last seen on 9/26/17 and prescribed Lasix and Zantac. She does not use Zantac as she does not have heartburn very often. She is frustrated that she gets pneumonia often. She reports using Symbicort and albuterol daily.    Sleep Apnea: She does not use the CPAP. She reports that it scares her and falls off her face at night. She explains that she was told to wear it during the day and night to get used to the mask. She explains that she does not get parts from the company in a timely manner.     Diabetes: Her A1c today is 7. She has been watching her sugar intake. She recalls that a few years ago getting sick from eating too much food. She can tell when she has eaten too much food.     Bipolar Disorder: She continues to follow a therapist and psychiatrist for bipolar disorder. She notes that her last therapist no longer sees adult patients.    REVIEW OF SYSTEMS:    She thinks the skin around her eyes is darker than usual. She notes that she is normally irritated and tired. She has not had an endoscopy before. All other systems are negative.    PFSH:  Social: She works at [employer]. She will be retiring in June.    TOBACCO USE:  History   Smoking Status     Former Smoker     Packs/day: 1.00     Years: 20.00     Quit date: 2/1/2017   Smokeless Tobacco     Never Used       VITALS:  Vitals:    02/19/18 0833   BP: 128/72   Patient Site: Right Arm   Patient Position: Sitting   Cuff Size: Adult Regular   Pulse: 68   Resp: 22   Temp: 98.5  F (36.9  C)   TempSrc: Oral   SpO2: 97%   Weight: 178 lb (80.7 kg)     Wt Readings from Last 3 Encounters:   02/19/18 178 lb (80.7 kg)   02/14/18 181 lb (82.1 kg)   02/13/18 182 lb (82.6 kg)     Body mass index is 38.52 kg/(m^2).    PHYSICAL EXAM:  GENERAL:  Pleasant, well-appearing patient in no acute distress.  VITAL SIGNS:  Reviewed.  HEENT:  Sclerae and conjunctivae clear.  Oropharynx is clear with moist mucous membranes.  NECK:  Supple without lymphadenopathy or thyromegaly.  No carotid bruits.  CARDIOVASCULAR:  Heart regular rate and rhythm without murmur.  Normal S1 and S2.  LUNGS:  Clear to auscultation bilaterally without wheezes or crackles.  Good air movement throughout.  FEET:  warm, without edema, no deformities, no skin lesions or skin breakdown, monofilament testing abnormal and pedal pulses palpable.  Previous wart of the left foot is gone.  NEURO: Alert and oriented.   PSYCHIATRIC: Presents on time and well groomed.  Normal speech and thought content.  Full affect.  No abnormal movements or behaviors noted.      ADDITIONAL HISTORY SUMMARIZED (2): Reviewed 2/14/18 note regarding discharge summary for pneumonia. Reviewed 9/26/17 pulmonology note regarding COPD.  DECISION TO OBTAIN EXTRA INFORMATION (1): None.   RADIOLOGY TESTS (1): None.  LABS (1): Labs were ordered today.  MEDICINE TESTS (1): None.  INDEPENDENT REVIEW (2 each): None.      The visit lasted a total of 21 minutes face to face with the patient. Over 50% of the time was spent counseling and educating the patient about diabetes and pneumonia.    I, Lissa Reyes, am scribing for and in the presence of, Dr. Alejandro.    I, Dr. Alejandro, personally performed the services described in this documentation, as scribed by Lissa Reyes in my presence, and it is both accurate and complete.    MEDICATIONS:  Current Outpatient Prescriptions   Medication Sig Dispense Refill     albuterol (PROAIR HFA;PROVENTIL HFA;VENTOLIN HFA) 90 mcg/actuation inhaler Inhale 2 puffs every 4 (four) hours as needed for wheezing or shortness of breath. 1 Inhaler 0     albuterol (PROVENTIL) 2.5 mg /3 mL (0.083 %) nebulizer solution Take 3 mL (2.5 mg total) by nebulization every 4 (four) hours as needed. 60 vial 0     blood glucose test (CONTOUR NEXT STRIPS) strips Use to check blood sugar once daily. 200 each 2     BLOOD-GLUCOSE METER (CONTOUR NEXT METER MISC) Use As Directed.       budesonide-formoterol (SYMBICORT) 160-4.5 mcg/actuation inhaler Inhale 2 puffs 2 (two) times a day. 1 Inhaler 12     fluticasone (FLONASE) 50 mcg/actuation nasal spray 2 sprays in each nostril nightly 16 g 12     furosemide (LASIX) 20 MG tablet Take 0.5 tablets (10 mg total) by mouth daily. 30 tablet 2     generic lancets Use 1 each As Directed as needed. 100 each 11     meclizine (ANTIVERT) 25 mg tablet Take 1 tablet (25 mg total) by mouth 4 (four) times a day. 28 tablet 0     polyethylene glycol (GLYCOLAX) 17 gram/dose powder Take 17 g by mouth daily as needed. 850 g 0     QUEtiapine (SEROQUEL) 50 MG tablet 1 tablet daily.  1     risperiDONE (RISPERDAL) 1 MG tablet Take 1 mg by mouth 2 (two) times a day.        doxycycline (VIBRA-TABS) 100 MG tablet Take 1 tablet (100 mg total) by mouth 2 (two) times a day for 10 days. 20 tablet 0     No current facility-administered medications for this visit.        Total data points: 3

## 2021-06-16 NOTE — PROGRESS NOTES
Care Guide Delegation:   1.  Due Date: Next outreach or by 3/1/18       Delegation: Assist with scheduling a Pharm-D appointment for MTM.   Attempt 3: Care Guide called patient.  If this patient is returning my call, please transfer to [place] at ext [phone].  I have called this patient 3 times over the past two weeks and have been unsuccessful in reaching her.  This patient has also not returned any of my messages.  I will continue attempting to reach out to this patient in one month.  I will also check this patient's chart for upcoming appointments, ER reports that may contain a new phone number, or any other recent activity.

## 2021-06-17 NOTE — PROGRESS NOTES
ASSESSMENT:  1. Skin infection  Tdap vaccine,  6yo or older,  IM    cephalexin (KEFLEX) 500 MG capsule   2. Puncture wound  Tdap vaccine,  6yo or older,  IM    cephalexin (KEFLEX) 500 MG capsule       PLAN:  Wound looks slightly infected today, swollen, red, warm.  We will trial Keflex.  Patient also due for tetanus shot today and that was given by nursing.  Follow-up if not improving as expected on antibiotics.    No problem-specific Assessment & Plan notes found for this encounter.      There are no Patient Instructions on file for this visit.    Orders Placed This Encounter   Procedures     Tdap vaccine,  6yo or older,  IM     There are no discontinued medications.    No Follow-up on file.    CHIEF COMPLAINT:  Chief Complaint   Patient presents with     Wound Check     c/o wound on right index finger. finger puncture. Red and swollen.        HISTORY OF PRESENT ILLNESS:  Arvind is a 63 y.o. female here today for a wound on the right index finger.  Patient was using a  on some cardboard, forgotten the  in the box and was punctured by it at the base of her right index finger.  She did clean the area very well and was unsure if the  was cleaned.  Now it is red and swollen.  Has been this way for a couple of days and is getting worse.  No drainage from the site.  She is also due for a tetanus shot.    REVIEW OF SYSTEMS:      Pertinent items are noted in HPI.  All other systems are negative  PFSH:  Reviewed, no changes      TOBACCO USE:  History   Smoking Status     Former Smoker     Packs/day: 1.00     Years: 40.00     Quit date: 2/1/2017   Smokeless Tobacco     Never Used       VITALS:  Vitals:    04/17/18 1110   BP: 132/72   Patient Site: Left Arm   Patient Position: Sitting   Cuff Size: Adult Regular   Pulse: 78   Resp: 18   SpO2: 98%   Weight: 179 lb 8 oz (81.4 kg)     Wt Readings from Last 3 Encounters:   04/17/18 179 lb 8 oz (81.4 kg)   03/29/18 182 lb 4.8 oz (82.7 kg)   03/05/18  181 lb 4.8 oz (82.2 kg)       PHYSICAL EXAM:   /72 (Patient Site: Left Arm, Patient Position: Sitting, Cuff Size: Adult Regular)  Pulse 78  Resp 18  Wt 179 lb 8 oz (81.4 kg)  SpO2 98% Comment: 4 L of Oxygen  BMI 42.49 kg/m2  General appearance: alert, appears stated age and cooperative  Extremities: Right hand and index finger slightly swollen.  There is a puncture present at the base of the right index finger from cuts, area around the site appears swollen, red, warm to touch.  Pain with movement of the finger.  Skin: Skin color, texture, turgor normal. No rashes or lesions  Psychologic: Mood and affect normal.      DATA REVIEWED:  Additional History from Old Records Summarized (2): 0  Decision to Obtain Records (1): 0  Radiology Tests Summarized or Ordered (1): 0  Labs Reviewed or Ordered (1): 0  Medicine Test Summarized or Ordered (1): 0  Independent Review of EKG or X-RAY(2 each): 0    The visit lasted a total of 20 minutes face to face with the patient. Over 50% of the time was spent counseling and educating the patient about plan of care.    MEDICATIONS:  Current Outpatient Prescriptions   Medication Sig Dispense Refill     albuterol (PROAIR HFA;PROVENTIL HFA;VENTOLIN HFA) 90 mcg/actuation inhaler Inhale 2 puffs every 4 (four) hours as needed for wheezing or shortness of breath. 1 Inhaler 0     albuterol (PROVENTIL) 2.5 mg /3 mL (0.083 %) nebulizer solution Take 3 mL (2.5 mg total) by nebulization every 4 (four) hours as needed. 60 vial 0     blood glucose test (CONTOUR NEXT STRIPS) strips Use to check blood sugar once daily. 200 each 2     BLOOD-GLUCOSE METER (CONTOUR NEXT METER MISC) Use As Directed.       budesonide-formoterol (SYMBICORT) 160-4.5 mcg/actuation inhaler Inhale 2 puffs 2 (two) times a day. 1 Inhaler 12     fluticasone (FLONASE) 50 mcg/actuation nasal spray 2 sprays in each nostril nightly 16 g 12     furosemide (LASIX) 20 MG tablet Take 0.5 tablets (10 mg total) by mouth daily. 30  tablet 2     generic lancets Use 1 each As Directed as needed. 100 each 11     meclizine (ANTIVERT) 25 mg tablet Take 1 tablet (25 mg total) by mouth 4 (four) times a day. 28 tablet 0     polyethylene glycol (GLYCOLAX) 17 gram/dose powder Take 17 g by mouth daily as needed. 850 g 0     QUEtiapine (SEROQUEL) 50 MG tablet 1 tablet daily.  1     risperiDONE (RISPERDAL) 1 MG tablet Take 1 mg by mouth 2 (two) times a day.        cephalexin (KEFLEX) 500 MG capsule Take 1 capsule (500 mg total) by mouth 2 (two) times a day for 10 days. 40 capsule 0     No current facility-administered medications for this visit.        This note has been dictated using voice recognition software. Any grammatical or context distortions are unintentional and inherent to the software

## 2021-06-17 NOTE — PROGRESS NOTES
Chief Complaint   Patient presents with     Fatigue     x3 days ago        ASSESSMENT & PLAN:   Diagnoses and all orders for this visit:    Insomnia due to medical condition    Chronic obstructive pulmonary disease, unspecified COPD type (H)    Impaired psychosocial skills    Other fatigue        Patient with a history of COPD.  Noted to be tachypneic at 32 breaths/min with wheezing.  Has not been taking any rescue inhalers does not think she has one at home.  Is not wearing her oxygen upon entry to the clinic.    Recommend 4 times daily albuterol inhalers with facemask spacer-patient informed she has to refill her own inhaler because she has 11 refills.  Patient seemed to have difficulty understanding that she had to do this herself, but did reiterate that I was not sending any additional orders to the pharmacy.    CPAP at night as she has not been using.    Hopefully she will sleep better once her breathing is under better control.  She has no acute pneumonia symptoms and no productive cough today.    I will CC her primary care provider and pulmonologist on this chart as she would likely benefit from a  or home care nurse to check in. Patient's mother was here and basic instructions including need for albuterol inhaler refill also discussed with her mother.    Patient informed that if she is not feeling much better with this plan that she should follow-up with pulmonology or her PCP in a few days.  As she has not been using albuterol and we cannot do nebs in the clinic due to Covid document and evaluate response, I will have her do a trial of albuterol and CPAP and oxygen to keep sats above 92% at home with close follow-up.     Had fatigue work-up with normal TSH and iron studies about 6 months ago.  Will not repeat today.  No other symptoms to explain fatigue other than uncontrolled COPD and potentially some associated insomnia.      Supportive care discussed.  See discharge instructions below for  specific recommendations given.    At the end of the encounter, I discussed results, diagnosis, medications with the patient and/or caregivers. Discussed red flags for immediate return to clinic/ER, as well as indications for follow up if no improvement. Patient and/or caregiver understood and agreed to plan. Patient was stable for discharge.    25 minutes spent on the date of the encounter doing chart review, review of test results, interpretation of tests, patient visit and documentation       SUBJECTIVE    HPI:  HPI  Arvind Leong presents to the walk-in clinic with   Chief Complaint   Patient presents with     Fatigue     x3 days ago      Patient with a history of COPD and recent admission for left lower lobe pneumonia presents to the walk-in clinic with fatigue with SPO2 found to be 88% on room air. Her oxygen tank is in the car.  Has a sat monitor at home.  Doesn't sleep well at night.  Doesn't use her CPAP mask, just nasal cannula.      Uses 2 L of O2 as needed. Took cefdinir and azithromycin for pneumonia after admission.     For her COPD, she is taking Spiriva only. Hasn't been using albuterol.  Has a face mask spacer.   Had a round of prednisone after leaving hospital.      Has not had any fevers recently.  No chest pain.     Had a fatigue work-up November 23, 2020.  Chart indicates difficulty with medication compliance.  Had typical labs drawn at this visit such as TSH and iron study.    Associated with: Quit smoking about 3 years ago.    See ROS for additional symptoms and/or pertinent negatives.       History obtained from the patient.      Review of Systems   Constitutional: Positive for fatigue. Negative for chills and fever.   Respiratory: Positive for shortness of breath and wheezing.    Cardiovascular: Negative for chest pain.   Neurological: Positive for weakness.       OBJECTIVE    Vitals:    05/17/21 1746 05/17/21 1809   BP: 120/76    Patient Site: Right Arm    Patient Position: Sitting     Cuff Size: Adult Regular    Pulse: 82    Resp: 10 (!) 32   Temp: 98.1  F (36.7  C)    TempSrc: Oral    SpO2: (!) 88% (!) 88%       Physical Exam  Constitutional:       General: She is not in acute distress.     Appearance: She is well-developed.   HENT:      Right Ear: External ear normal.      Left Ear: External ear normal.   Eyes:      General:         Right eye: No discharge.         Left eye: No discharge.      Conjunctiva/sclera: Conjunctivae normal.   Pulmonary:      Effort: Pulmonary effort is normal.      Breath sounds: Wheezing (diminished with exp wheezing, worse in upper lung fields.  No cough ) present. No rhonchi.      Comments: Tachypneic at 32 breaths/min  Musculoskeletal: Normal range of motion.   Skin:     General: Skin is warm and dry.      Capillary Refill: Capillary refill takes less than 2 seconds.   Neurological:      Mental Status: She is alert and oriented to person, place, and time.   Psychiatric:         Mood and Affect: Mood normal.         Behavior: Behavior normal.         Thought Content: Thought content normal.         Judgment: Judgment normal.         Labs/EKG:          Radiology:        PATIENT INSTRUCTIONS:   Patient Instructions   1. Wear your CPAP at night - this will keep you from waking up.    2. Refill your albuterol inhaler at your pharmacy - use with spacer   Use this inhaler 2 puffs up to 4 times daily as needed for wheezing and shortness of breath.    3. Wear your oxygen unless oxygen level is 92% or higher.      Recheck in your clinic later this week

## 2021-06-17 NOTE — PROGRESS NOTES
Assessment and Plan:     Patient has been advised of split billing requirements and indicates understanding: Yes  Arvind was seen today for annual wellness visit.    Encounter for general adult medical examination with abnormal findings  Immunizations reviewed --- needs PCV13   Colonosocopy reviewed-- attempted 2000 but was not done, discussed Cologuard.   Mammography reviewed--normal 2017, repeat this year  Pap reviewed --normal 2018 with positive HPV and was recommended colposcopy but didn't go.   Routine Dental and Eye care recommended  Discussed importance of regular exercise and appropriate calcium intake  Discussed Advance Directives--pt given copy of honoring choices.     -     Ambulatory referral to Care Management (Primary Care)    Type 2 diabetes mellitus with unspecified complications (H)  Recent a1c in good range, 6.3. not on any diabetes medications.  We will send patient to [facility] for annual eye exam.  Advised her to eat more fruits and vegetables.  She eats [employer] 3 times a day.  -     Ambulatory referral to Ophthalmology - [facility]  -     Ambulatory referral to Care Management (Primary Care)    Chronic respiratory failure with hypoxia (H)  Chronic obstructive pulmonary disease, unspecified COPD type (H)  Follows with pulmonary.  Updated PCV 13 today.  -     albuterol (PROVENTIL) 2.5 mg /3 mL (0.083 %) nebulizer solution; Take 3 mL (2.5 mg total) by nebulization every 4 (four) hours as needed for wheezing or shortness of breath (or cough).  -     Ambulatory referral to Care Management (Primary Care)    Dependence on continuous supplemental oxygen  -     Ambulatory referral to Care Management (Primary Care)    Morbid obesity (H)  Discussed eating more fruits and vegetables.  -     Ambulatory referral to Care Management (Primary Care)    Visit for screening mammogram  -     Mammo Screening Bilateral; Future    Postmenopausal status  -     DXA Bone Density Scan; Future    Screen  for colon cancer  -     Cologuard    Screening for eye condition  -     Ambulatory referral to Ophthalmology - [facility]    Screening for cervical cancer  -     Gynecologic Cytology (PAP Smear)    Yeast infection of the vagina  -     fluconazole (DIFLUCAN) 150 MG tablet; Take 1 tablet (150 mg total) by mouth once for 1 dose. Repeat the second dose in a week if symptoms persist    Sleep apnea, unspecified type  She says she is afraid of wearing the CPAP.  She thinks that the mask does not fit her well.  We will send her back to sleep medicine for further adjustment.  -     Ambulatory referral to Sleep Medicine    Bipolar I disorder, most recent episode depressed (H)  Seeing psychiatry.  -     Ambulatory referral to Care Management (Primary Care)    Other orders  -     Pneumococcal conjugate vaccine 13-valent 6wks-17yrs; >50yrs      The patient's current medical problems were reviewed.    I have had an Advance Directives discussion with the patient.  The following health maintenance schedule was reviewed with the patient and provided in printed form in the after visit summary:   Health Maintenance   Topic Date Due     COPD ACTION PLAN  Never done     DEXA SCAN  Never done     HIV SCREENING  Never done     ZOSTER VACCINES (1 of 2) Never done     DIABETIC FOOT EXAM  02/19/2019     MAMMOGRAM  03/23/2019     DIABETIC EYE EXAM  11/25/2020     Pneumococcal Vaccine: Pediatrics (0 to 5 Years) and At-Risk Patients (6 to 64 Years) (2 of 2) 04/12/2021     Pneumococcal Vaccine: 65+ Years (2 of 2) 04/12/2021     INFLUENZA VACCINE RULE BASED (Season Ended) 08/01/2021     A1C  10/24/2021     LIPID  11/23/2021     MICROALBUMIN  11/23/2021     BMP  04/26/2022     MEDICARE ANNUAL WELLNESS VISIT  05/21/2022     FALL RISK ASSESSMENT  05/21/2022     COLORECTAL CANCER SCREENING  09/03/2025     ADVANCE CARE PLANNING  05/21/2026     TD 18+ HE  04/17/2028     HEPATITIS C SCREENING  Completed     SPIROMETRY  Completed     TDAP  ADULT ONE TIME DOSE  Completed     COVID-19 Vaccine  Completed       Subjective:   Chief Complaint: Arvind Leong is an 66 y.o. female here for an Annual Wellness visit.   HPI:    Chief Complaint   Patient presents with     Annual Wellness Visit     Not fasting      She forgot her hearing aids.     Review of Systems:  negative  Please see above.  The rest of the review of systems are negative for all systems.    Patient Care Team:  Rema Whiting MD as PCP - General (Family Medicine)  Arnoldo Russo MD as Physician (Pulmonary Disease)  Ashely Cota, PharmD as Pharmacist (Pharmacist)  Rema Whiting MD as Assigned PCP  Arnoldo Russo MD as Assigned Pulmonology Provider     Patient Active Problem List   Diagnosis     Scoliosis     Morbid obesity (H)     Lower Back Pain     Asthma     Urge Incontinence Of Urine     COPD (chronic obstructive pulmonary disease) (H)     Hyperlipidemia     Adult Sleep Apnea     Type 2 diabetes mellitus (H)     Anxiety     Vertigo     Cognitive dysfunction in bipolar disorder (H)     Noncompliance with medications     Dependence on continuous supplemental oxygen     Bipolar I disorder, most recent episode depressed (H)     Runny nose     CAP (community acquired pneumonia) due to Chlamydia species     Pneumonia of right lower lobe due to infectious organism     Type 2 diabetes mellitus with unspecified complications (H)     Chronic respiratory failure with hypoxia (H)     Pneumonia     COPD exacerbation (H)     Past Medical History:   Diagnosis Date     Acute on chronic respiratory failure with hypoxia (H) 11/15/2016     Bipolar 1 disorder (H)      CAP (community acquired pneumonia) 11/15/2016     COPD with exacerbation (H) 11/15/2016     Diabetes mellitus, type II (H)      Hearing loss      Meniere's disease       Past Surgical History:   Procedure Laterality Date     CYST REMOVAL  01/23/2001     DC REMOVAL ANAL FISTULA,SUBCUTANEOUS       Description: Fistula-in-ano Repair;  Recorded: 2010; x2     TONSILLECTOMY        Family History   Problem Relation Age of Onset     Aneurysm Father      Heart disease Father 70        bypass in 70s, AAA rupture at age 77      Ulcers Father 76     Pacemaker Mother      Bipolar disorder Brother      Breast cancer Maternal Grandmother 51     Kidney failure Maternal Grandmother      Cancer Paternal Grandmother         ?? lung     Cancer Paternal Uncle         ?? lung     Mental illness Maternal Grandfather      Heart disease Other         uncle     No Medical Problems Sister         two siblings abuse chemicals     No Medical Problems Brother      Bipolar disorder Nephew       Social History     Socioeconomic History     Marital status:      Spouse name: Not on file     Number of children: Not on file     Years of education: Not on file     Highest education level: Not on file   Occupational History     Occupation: /[employer]- quit 2019   Social Needs     Financial resource strain: Not on file     Food insecurity     Worry: Not on file     Inability: Not on file     Transportation needs     Medical: Not on file     Non-medical: Not on file   Tobacco Use     Smoking status: Former Smoker     Packs/day: 1.00     Years: 40.00     Pack years: 40.00     Quit date: 2017     Years since quittin.3     Smokeless tobacco: Never Used   Substance and Sexual Activity     Alcohol use: Yes     Comment: Occasional      Drug use: No     Sexual activity: Never   Lifestyle     Physical activity     Days per week: Not on file     Minutes per session: Not on file     Stress: Not on file   Relationships     Social connections     Talks on phone: Not on file     Gets together: Not on file     Attends Gnosticist service: Not on file     Active member of club or organization: Not on file     Attends meetings of clubs or organizations: Not on file     Relationship status: Not on file     Intimate partner violence      Fear of current or ex partner: Not on file     Emotionally abused: Not on file     Physically abused: Not on file     Forced sexual activity: Not on file   Other Topics Concern     Not on file   Social History Narrative    03/2019The patient lives with her mother.; had worked at "[employer] as  but quit March 2019.     Quit smoking 2015; no etoh problems    / x 2 ; no kids      Current Outpatient Medications   Medication Sig Dispense Refill     albuterol (PROAIR HFA;PROVENTIL HFA;VENTOLIN HFA) 90 mcg/actuation inhaler Inhale 2 puffs every 6 (six) hours as needed for wheezing or shortness of breath. 1 Inhaler 11     albuterol (PROVENTIL) 2.5 mg /3 mL (0.083 %) nebulizer solution Take 3 mL (2.5 mg total) by nebulization every 4 (four) hours as needed for wheezing or shortness of breath (or cough). 30 vial 0     ARIPiprazole (ABILIFY) 10 MG tablet Take 10 mg by mouth daily.       azithromycin (ZITHROMAX) 250 MG tablet Take 1 tablet (250 mg total) by mouth daily. 3 tablet 0     buPROPion (WELLBUTRIN) 75 MG tablet Take 75 mg by mouth 2 (two) times a day.       meclizine (ANTIVERT) 25 mg tablet Take 1 tablet (25 mg total) by mouth 3 (three) times a day as needed for dizziness. 30 tablet 1     OXYGEN-AIR DELIVERY SYSTEMS MISC Use 2 L As Directed. 2L pulsing  [facility]       tiotropium (SPIRIVA) 18 mcg inhalation capsule Place 1 capsule (2 puffs total) into inhaler and inhale daily. 30 capsule 11     blood glucose test (CONTOUR NEXT TEST STRIPS) strips Use to check blood sugar once daily. 100 strip 1     No current facility-administered medications for this visit.       Objective:   Vital Signs:   Visit Vitals  /69 (Patient Site: Left Arm, Patient Position: Sitting, Cuff Size: Adult Large)   Pulse 78   Resp 16   Wt 178 lb (80.7 kg)   BMI 41.00 kg/m           VisionScreening:  No exam data present     PHYSICAL EXAM  GENERAL: Healthy, alert and no distress  EYES: Eyes grossly normal to inspection. No  discharge or erythema, or obvious scleral/conjunctival abnormalities.  RESP: No audible wheeze, cough, or visible cyanosis.  No visible retractions or increased work of breathing.     (female): vaginal skin findings: erythematous, speculum exam unremarkable, pap smear done.   NEURO: Cranial nerves grossly intact. Mentation and speech appropriate for age.  PSYCH: Mentation appears normal, affect normal/bright, judgement and insight intact, normal speech and appearance well-groomed      Assessment Results 5/21/2021   Activities of Daily Living No help needed   Instrumental Activities of Daily Living No help needed   Get Up and Go Score Less than 12 seconds   Mini Cog Total Score 5   Some recent data might be hidden     A Mini-Cog score of 0-2 suggests the possibility of dementia, score of 3-5 suggests no dementia    Identified Health Risks:     The patient was provided with suggestions to help her develop a healthy physical lifestyle.   She is at risk for lack of exercise and has been provided with information to increase physical activity for the benefit of her well-being.  The patient was counseled and encouraged to consider modifying their diet and eating habits. She was provided with information on recommended healthy diet options.  The patient was provided with written information regarding signs of hearing loss.  Information on urinary incontinence and treatment options given to patient.  The patient was provided with suggestions to help her develop a healthy emotional lifestyle.   The patient s PHQ-9 score is consistent with moderate depression.  She was provided with information regarding depression and was advised to schedule a follow up appointment in 12 weeks to further address this issue. She's seeing psychiatry.   Information regarding advance directives (living [place]), including where she can download the appropriate form, was provided to the patient via the AVS.

## 2021-06-17 NOTE — PROGRESS NOTES
Clinic Care Coordination Contact  [facility]/Voicemail       Clinical Data: Care Coordinator Outreach  Outreach attempted x 1.  Left message on patient's voicemail with call back information and requested return call.  Plan: are Coordinator will try to reach patient again in 1-2 business days.

## 2021-06-17 NOTE — PATIENT INSTRUCTIONS - HE
Patient Instructions by Toby Shankar MD at 11/21/2019 10:40 AM     Author: Toby Shankar MD Service: -- Author Type: Physician    Filed: 11/21/2019 12:46 PM Encounter Date: 11/21/2019 Status: Addendum    : Tboy Shankar MD (Physician)    Related Notes: Original Note by Toby Shankar MD (Physician) filed at 11/21/2019 12:45 PM       -Your chest x-ray shows no signs of pneumonia.  -Take prednisone as directed.  -Continue Symbicort as directed.  -Use albuterol as needed.  This was refilled today.  Patient Education     COPD Flare    You have had a flare-up of your COPD.  COPD, or chronic obstructive pulmonary disease, is a common lung disease. It causes your airways to become irritated and narrower. This makes it harder for you to breathe. Emphysema and chronic bronchitis are both types of COPD. This is a chronic condition, which means you always have it. Sometimes it gets worse. When this happens, it is called a flare-up.  Symptoms of COPD  People with COPD may have symptoms most of the time. In a flare-up, your symptoms get worse. These symptoms may mean you are having a flare-up:    Shortness of breath, shallow or rapid breathing, or wheezing that gets worse    Lung infection    Cough that gets worse    More mucus, thicker mucus or mucus of a different color    Tiredness, decreased energy, or trouble doing your usual activities    Fever    Chest tightness    Your symptoms dont get better even when you use your usual medicines, inhalers, and nebulizer    Trouble talking    You feel confused  Causes of flare-ups  Unfortunately, a flare-up can happen even though you did everything right, and you followed your doctors instructions. Some causes of flare-ups are:    Smoking or secondhand smoke    Colds, the flu, or respiratory infections    Air pollution    Sudden change in the weather    Dust, irritating chemicals, or strong fumes    Not taking your medicines as prescribed  Home care  Here  are some things you can do at home to treat a flare-up:    Try not to panic. This makes it harder to breathe, and keeps you from doing the right things.    Dont smoke or be around others who are smoking.    Try to drink more fluids than usual during a flare-up, unless your doctor has told you not to because of heart and kidney problems. More fluids can help loosen the mucus.    Use your inhalers and nebulizer, if you have one, as you have been told to.    If you were given antibiotics, take them until they are used up or your doctor tells you to stop. Its important to finish the antibiotics, even though you feel better. This will make sure the infection has cleared.    If you were given prednisone or another steroid, finish it even if you feel better.  Preventing a flare-up  Even though flare-ups happen, the best way to treat one is to prevent it before it starts. Here are some pointers:    Dont smoke or be around others who are smoking.    Take your medicines as you have been told.    Talk with your doctor about getting a flu shot every year. Also find out if you need a pneumonia shot.    If there is a weather advisory warning to stay indoors, try to stay inside when possible.    Try to eat healthy and get plenty of sleep.    Try to avoid things that usually set you off, like dust, chemical fumes, hairsprays, or strong perfumes.  Follow-up care  Follow up with your healthcare provider, or as advised.  If a culture was done, you will be told if your treatment needs to be changed. You can call as directed for the results.  If X-rays were done, you will be notified of any new findings that may affect your care.  Call 911  Call 911 if any of these occur:    You have trouble breathing    You feel confused or its difficult to wake you up    You faint or lose consciousness    You have a rapid heart rate    You have new pain in your chest, arm, shoulder, neck or upper back  When to seek medical advice  Call your healthcare  provider right away if any of these occur:    Wheezing or shortness of breath gets worse    You need to use your inhalers more often than usual without relief    Fever of 100.4 F (38 C) or higher, or as directed by your healthcare provider    Coughing up lots of dark-colored or bloody mucus (sputum)    Chest pain with each breath    You do not start to get better within 24 hours    Swelling of your ankles gets worse    Dizziness or weakness  Date Last Reviewed: 9/1/2016 2000-2017 [employer] [address] [place] [address]. All rights reserved. This information is not intended as a substitute for professional medical care. Always follow your healthcare professional's instructions.

## 2021-06-17 NOTE — PROGRESS NOTES
CC: Low back pain    HPI    Patient is here for 1.5 wks of moderate constant left lower back pain, aggravated by position changes (twisting of her back), without radiation. No injury. No fever, chills, bowel and bladder incontinence. No home remedies so far.       Note: she reported baseline respiratory status and denied any new respiratory symptoms. She was given Zithromax and Prednisone by Pulm yesterday. No cough, fever, more shortness of breath than baseline. She uses home O2 both at nigth and during the day 4 L.      ROS: Pertinent ROS noted in HPI.     Allergies   Allergen Reactions     Lurasidone Unknown     (Brand name = Latuda) Face turned red       Patient Active Problem List   Diagnosis     Scoliosis     Obesity (BMI 30-39.9)     Lower Back Pain     Asthma     Urge Incontinence Of Urine     COPD (chronic obstructive pulmonary disease)     Hyperlipidemia     Adult Sleep Apnea     Type 2 diabetes mellitus     Anxiety     Vertigo     Acute on chronic respiratory failure with hypoxia     COPD with exacerbation     Tachycardia     CAP (community acquired pneumonia)     Cognitive dysfunction in bipolar disorder     Bipolar disorder, current episode mixed, moderate     Noncompliance with medications     Atypical chest pain       Family History   Problem Relation Age of Onset     Aneurysm Father      Heart disease Father 70     bypass in 70s, AAA rupture at age 77      Ulcers Father 76     Pacemaker Mother      Bipolar disorder Brother      Breast cancer Maternal Grandmother 51     Kidney failure Maternal Grandmother      Cancer Paternal Grandmother      ?? lung     Cancer Paternal Uncle      ?? lung     Mental illness Maternal Grandfather      Heart disease Other      uncle     No Medical Problems Sister      two siblings abuse chemicals     No Medical Problems Brother      Bipolar disorder Nephew      Social History     Social History     Marital status:      Spouse name: N/A     Number of children:  N/A     Years of education: N/A     Occupational History     Not on file.     Social History Main Topics     Smoking status: Former Smoker     Packs/day: 1.00     Years: 40.00     Quit date: 2/1/2017     Smokeless tobacco: Never Used     Alcohol use Yes      Comment: Occasional      Drug use: No     Sexual activity: No     Other Topics Concern     Not on file     Social History Narrative    07/17/2015: The patient lives with her mother.       Objective:    Vitals:    03/29/18 1637   BP: 118/72   Pulse: 87   Resp: 18   SpO2: 93%       Objective:    Vitals:    03/29/18 1637   BP: 118/72   Pulse: 87   Resp: 18   SpO2: 93%       Gen: well appearing, speaking in complete sentences.   CV: RRR, normal S1S2, no M, R, G  Pulm: CTAB, normal effort  MSK: normal inspection of back. Mild tenderness to palpation at left lower para-thoracic area without midline tenderness.Full flexion of back. Full ROM of bilateral lower extremities with 5/5 strength. Negative straight leg raising bilaterally. Normal gait.   Neuro: 2+ patellar DTRs bilaterally.     Impression:    Low back strain (thoracic level) - no red flags. No imaging study recommended this time.     Plan:    OTC Acetaminophen PRN  F/u with PCP next week if no improvement

## 2021-06-17 NOTE — PROGRESS NOTES
Care Guide called patient.  If this patient is returning my call, please transfer to Heather at ext [phone].  I have called Arvind and have been unsuccessful in reaching this patient for 3 months now.  This patient has also not returned any of my messages. This patient has been sent a letter and unenrolled from the patient outreach list. Patient can be referred again if there is a need for care coordination in the future.

## 2021-06-17 NOTE — PATIENT INSTRUCTIONS - HE
1. Wear your CPAP at night - this will keep you from waking up.    2. Refill your albuterol inhaler at your pharmacy - use with spacer   Use this inhaler 2 puffs up to 4 times daily as needed for wheezing and shortness of breath.    3. Wear your oxygen unless oxygen level is 92% or higher.      Recheck in your clinic later this week

## 2021-06-17 NOTE — PROGRESS NOTES
Name: Arvind Leong  : 1955   MRN: [id number]    ASSESSMENT & PLAN:   Arvind Leong is a 66 y.o. female presenting today for evaluation of dizziness.     1. Benign paroxysmal positional vertigo of left ear  - meclizine (ANTIVERT) 25 mg tablet; Take 1 tablet (25 mg total) by mouth 3 (three) times a day as needed for dizziness.  Dispense: 30 tablet; Refill: 1  - Ambulatory referral to Adult PT- Internal    Symptoms are consistent with BPPV. Symptoms exacerbated by lateral eye movement. Recommend trial of meclizine and referral for Epley maneuvers.      Return in about 1 week (around 2021) for follow up if not improving.    Conchita Saenz DO  [facility]        Arvind Leong is a 66 y.o. female presenting to discuss the following:     CC:   Chief Complaint   Patient presents with     Dizziness       HPI:  Was hospitalized at  - for COPD exacerbation and pneumonia. Saw Dr. Whiting on  for hospital follow up. Just completed steroids and antibiotics.     Woke up this morning and felt dizzy, felt like she was unstable, felt like she was rocking back and forth.   Felt good last night night when she went to bed. Woke up at 7:45 AM. Symptoms come and go. Lasts for a few minutes per episode. Not sure of any trigger.     Thinks something is wrong with her breathing, gasping for cool air. This has been going on for a long time.     Using 2 L/min oxygen.     Denies palpitations or tachycardia.    ROS:   See HPI above.     PMH:   Patient Active Problem List   Diagnosis     Scoliosis     Morbid obesity (H)     Lower Back Pain     Asthma     Urge Incontinence Of Urine     COPD (chronic obstructive pulmonary disease) (H)     Hyperlipidemia     Adult Sleep Apnea     Type 2 diabetes mellitus (H)     Anxiety     Vertigo     Cognitive dysfunction in bipolar disorder (H)     Noncompliance with medications     Dependence on continuous supplemental oxygen     Bipolar I  disorder, most recent episode depressed (H)     Runny nose     CAP (community acquired pneumonia) due to Chlamydia species     Pneumonia of right lower lobe due to infectious organism     Type 2 diabetes mellitus with unspecified complications (H)     Chronic respiratory failure with hypoxia (H)     Pneumonia     COPD exacerbation (H)       Past Medical History:   Diagnosis Date     Acute on chronic respiratory failure with hypoxia (H) 11/15/2016     Bipolar 1 disorder (H)      CAP (community acquired pneumonia) 11/15/2016     COPD with exacerbation (H) 11/15/2016     Diabetes mellitus, type II (H)      Hearing loss      Meniere's disease        PSH:   Past Surgical History:   Procedure Laterality Date     CYST REMOVAL  01/23/2001     OK REMOVAL ANAL FISTULA,SUBCUTANEOUS      Description: Fist[place]-in-[place] Repair;  Recorded: 01/08/2010; x2     TONSILLECTOMY           MEDICATIONS:   Current Outpatient Medications on File Prior to Visit   Medication Sig Dispense Refill     albuterol (PROAIR HFA;PROVENTIL HFA;VENTOLIN HFA) 90 mcg/actuation inhaler Inhale 2 puffs every 6 (six) hours as needed for wheezing or shortness of breath. 1 Inhaler 11     albuterol (PROVENTIL) 2.5 mg /3 mL (0.083 %) nebulizer solution Take 3 mL (2.5 mg total) by nebulization every 4 (four) hours as needed for wheezing or shortness of breath (or cough). 30 vial 0     ARIPiprazole (ABILIFY) 10 MG tablet Take 10 mg by mouth daily.       azithromycin (ZITHROMAX) 250 MG tablet Take 1 tablet (250 mg total) by mouth daily. 3 tablet 0     blood glucose test (CONTOUR NEXT TEST STRIPS) strips Use to check blood sugar once daily. 100 strip 1     buPROPion (WELLBUTRIN) 75 MG tablet Take 75 mg by mouth 2 (two) times a day.       cefdinir (OMNICEF) 300 MG capsule Take 1 capsule (300 mg total) by mouth 2 (two) times a day for 6 days. 12 capsule 0     cefdinir (OMNICEF) 300 MG capsule Take 1 capsule (300 mg total) by mouth 2 (two) times a day for 6 days. 12 capsule  "0     OXYGEN-AIR DELIVERY SYSTEMS [facility] Use 2 L As Directed. 2L pulsing  [facility]       [] predniSONE (DELTASONE) 20 MG tablet Take 40 mg by mouth daily with breakfast for 4 days. 8 tablet 0     tiotropium (SPIRIVA) 18 mcg inhalation capsule Place 1 capsule (2 puffs total) into inhaler and inhale daily. 30 capsule 11     No current facility-administered medications on file prior to visit.        ALLERGIES:  Allergies   Allergen Reactions     Lurasidone Other (See Comments)     Face turned red  (Brand name = Latuda) Face turned red  Face turned red       FAMHx:  Family History   Problem Relation Age of Onset     Aneurysm Father      Heart disease Father 70        bypass in 70s, AAA rupture at age 77      Ulcers Father 76     Pacemaker Mother      Bipolar disorder Brother      Breast cancer Maternal Grandmother 51     Kidney failure Maternal Grandmother      Cancer Paternal Grandmother         ?? lung     Cancer Paternal Uncle         ?? lung     Mental illness Maternal Grandfather      Heart disease Other         uncle     No Medical Problems Sister         two siblings abuse chemicals     No Medical Problems Brother      Bipolar disorder Nephew        SOCIAL HISTORY:   Social History     Tobacco Use     Smoking status: Former Smoker     Packs/day: 1.00     Years: 40.00     Pack years: 40.00     Quit date: 2017     Years since quittin.2     Smokeless tobacco: Never Used   Substance Use Topics     Alcohol use: Yes     Comment: Occasional      Drug use: No         PHYSICAL EXAM:   /70   Pulse 83   Ht 4' 7.25\" (1.403 m)   Wt 187 lb 6 oz (85 kg)   BMI 43.16 kg/m     GENERAL: Arvind is a pleasant, well appearing female, in no acute distress.   HEENT: Sclera white, no cervical lymphadenopathy. Nystagmus present with lateral eye movements to the left.   HEART: Regular rate and rhythm, no murmurs.   LUNGS: On supplemental oxygen, crackles or wheezes, not tachypneic.       "

## 2021-06-17 NOTE — PROGRESS NOTES
Hospital Follow-up Visit:    Assessment/Plan:     Arvind was seen today for hospital visit follow up.    Hospital discharge follow-up  Reviewed chart. F/u in a month with me.   -     Comprehensive Metabolic Panel  -     HM1(CBC and Differential)    Pneumonia of right lower lobe due to infectious organism  Not feeling good. Hasn't picked up any meds on discharge. She appears well, stable VS. encouraged her to  her meds which my staff has called into the correct pharmacy for her. Lab today for monitoring. No indication to go back to ED.   -     HM1(CBC and Differential)    COPD exacerbation (H)  Chronic respiratory failure with hypoxia (H)  Vs stable. encouraged her to  meds (2 antbx and prednisone)  She needs to f/u with Dr. Balbuena with pulmonary    Bipolar I disorder, most recent episode depressed (H)  Stable.    Type 2 diabetes mellitus with unspecified complications (H)  a1c checked during hospitalization, a1c 6.3.     Increased urinary frequency  Urge Incontinence Of Urine  Ua/uc negative during hospitalization. She said she urinates all the time but no burning/dysuria. Recheck ua today.   -     Urinalysis-UC if Indicated           Subjective:     Arvind Leong is a 66 y.o. female who presents for a hospital discharge follow up.  Chief Complaint   Patient presents with     Hospital Visit Follow Up     [facility] 04/23/21-04/24/21 Pneumonia of left lower lobe due to infectious organism [facility] 04/24/21-04/25/21 COPD Exacerbation         Hospital/Nursing Home/IP Rehab Facility: [facility]  Date of Admission: 4/23/21  Date of Discharge:4/25/21  Reason(s) for Admission:sob, cough, found to have pneumonia and COPD exercerbation            Do you have any problems taking your medication regularly?  None       Have you had any changes in your medication since discharge? Discharged with z-pack and cefdinir and prednisone.        Have you had any difficulty following your  discharge or treatment plan?  YES - HAS NOT PICKED UP ANY OF HER ANTIBIOTICS/PREDNISONE.     Summary of hospitalization:  Hospital discharge summary reviewed     ED visit on 4/23/21: 66-year-old female with a history of COPD, hypertension, hyperlipidemia and chronic O2 supplementation presented to the ED with cough and shortness of breath worsened over the last few days.  Patient seen in clinic and referred here for evaluation due to worsening O2 demands.  Patient does use 2 L at baseline still on 2 L and O2 saturations in the mid 90s.  Patient with very labored breathing with coarse breath sounds diffusely suggesting infection possible viral or bacterial infectious process versus fluid overload.  Additionally lower concern acutely for COPD flare.  Patient has not noted on any diuretics at this time.     EKG on arrival noted sinus rhythm rate of 84, QRS 72, QTc 446.  Nonspecific findings of active acute ischemic evidence.  No significant change of concern compared to prior EKGs.     Lab studies noted with leukocytosis and mild acidemia with hypercarbia likely chronic to some degree.  Blood culture sent and pending.       Portable chest x-ray suggest pneumonia which is consistent with the patient's presenting symptoms.  Patient covered with Zosyn.      Patient given nebulizers with minimal improvement.    Ultimately she will require hospitalization due to work of breathing and increased O2 demands.      Discharge summary on 4/25/21: Arvind Leong is 66 y.o. female with past medical history of COPD on home oxygen 2 lit , diabetes mellitus type 2, bipolar disorder presented with shortness of breath, hypoxemia 85% room air, who was admitted on 4/24/2021 for COPD exacerbation. Chest x-ray showed left basilar atelectasis versus pneumonia.  She had leukocytosis of 14. She was treated with nebs, solumedrol, ceftriaxone, azithromycin. She was feeling improved. Her oxygen needs are at baseline. She is discharged home in  stable condition.     Diagnostic Tests/Treatments reviewed.  Follow up needed: None  Other Healthcare Providers Involved in Patient's Care: Patient Care Team:  Rema Whiting MD as PCP - General (Family Medicine)  Arnoldo Russo MD as Physician (Pulmonary Disease)  Ashely Cota, PharmD as Pharmacist (Pharmacist)  Rema Whiting MD as Assigned PCP  Arnoldo Russo MD as Assigned Pulmonology Provider      Update since discharge: NOT FEELING TOO GOOD TODAY. DID FEEL BETTER YESTERDAY ON DISCHARGE BUT TODAY ISN'T TOO GOOD. NOT TAKING ANY MEDS FROM HOSPITAL YET BECAUSE MEDS WERE SENT TO THE WRONG PHARMACY. FEELS THAT SHE CAN'T BREATHE AND SHE THINKS IT'S DUE TO THE CAT HER MOTHER HAS. SHE LIVES WITH HER MOTHER NOW. STILL WORKING AT [employer]. DENIES FEVER/CHILLS. DENIES COUGH, shortness of breath, CHEST PAIN, ABDOMINAL PAIN. HAS TO MAKE APPOINTMENT F/U WITH PULMONOLOGY. HER MOOD IS GOOD, DX OF BIPOBLAR, DENIES SI/HI.  .   Information from family, SNF, care coordination: patient     Post Discharge Medication Reconciliation: discharge medications reconciled, continue medications without change  Plan of care communicated with: patient    Objective:     Vitals:    04/26/21 1038   BP: 112/77   Pulse: 84   Resp: 28   Temp: 97.3  F (36.3  C)   TempSrc: Oral   SpO2: 97%   Weight: 181 lb (82.1 kg)     Physical Exam:  GENERAL: Healthy, alert and no distress  NECK: No asymmetry, masses or scars  RESP: expiratory wheezes bilaterally and inspiratory wheezes bilaterally  CV: regular rates and rhythm  NEURO: Cranial nerves grossly intact. Mentation and speech appropriate for age.  PSYCH: Mentation appears normal, affect normal/bright, judgement and insight intact, normal speech and appearance well-groomed        Coding guidelines for this visit:  Type of Medical   Decision Making Face-to-Face Visit       within 7 Days of discharge Face-to-Face Visit        within 14 days of discharge    Moderate Complexity [id number]   High Complexity [id number]       Electronically signed by Rema Whiting MD 04/26/21 10:36 AM

## 2021-06-17 NOTE — PROGRESS NOTES
Clinic Care Coordination Contact  [facility]/Voicemail    Clinical Data: Care Coordinator Outreach  Outreach attempted x 2.  Left message on patient's voicemail with call back information and requested return call.  Plan: Care Coordinator will send unable to contact letter with care coordinator contact information via [employer] Care Coordinator will do no further outreaches at this time.

## 2021-06-18 NOTE — PATIENT INSTRUCTIONS - HE
Patient Instructions by Radha Townsend PT at 3/11/2020  8:30 AM     Author: Radha Townsend PT Service: -- Author Type: Physical Therapist    Filed: 3/11/2020  9:06 AM Encounter Date: 3/11/2020 Status: Signed    : Radha Townsend PT (Physical Therapist)          Toe/Heel Raises    Gently rise up on toes and roll back on heels.    Perform       15-20 Reps     1-2 Times per day      Marching    Holding onto a heavy chair or counter, alternately lift knees, taking high steps.    Perform      1-2 Reps, hold for 5 seconds     1-2 Times per day        Side Leg Kicks    Kick leg out to the side and slowly bring back to center.    Perform       15-20 Reps, hold for 5 seconds     1-2 Times per day      Back Leg Kicks    Kick leg back as far as possible standing tall with your back upright.    Perform       15-20 Reps, hold for 5 seconds     1-2 Times per day      SIT TO STAND    From a regular chair, sit to stand as many times as you can without using your hands. Perform until fatigue. Do 1-2x/day

## 2021-06-18 NOTE — LETTER
Letter by Rema Whiting MD at      Author: Rema Whiting MD Service: -- Author Type: --    Filed:  Encounter Date: 1/28/2019 Status: (Other)       January 28, 2019     Patient: Arvind Leong   YOB: 1955   Date of Visit: 1/28/2019       To Whom It May Concern:    It is my medical opinion that Arvind Leong can return to work Friday 2/1/2019 without any restriction. Please excuse her from work on 01/26/19, 01/27/19 and 01/29/19 due to exacerbation of her medical condition.     If you have any questions or concerns, please don't hesitate to call.    Sincerely,        Electronically signed by Rema Whiting MD

## 2021-06-18 NOTE — PROGRESS NOTES
"Subjective: Arvind Leong is a 63 y.o. year old female who presents with cough. Symptoms started 4 days ago. Patient states that she has COPD, started with a cough,shortness of breath, occasional wheezing which feels like an exacerbation of her COPD several days ago. She states that she bought \"equaline\" over the counter medicine from [employer] and drank too much of it. She has been drowsy and out of it, she is unable to tell me what exactly \"equaline\" is but it is some sort of OTC cough syrup. She says she needs a note to be able to return to work; she is a  at [employer]. She denies malaise, fevers, body aches. She states that she did start taking prednisone yesterday; usually this is enough to improve her COPD.  Patient has a history of bipolar, cognitive dysfunction, COPD. She is home dependent on oxygen and states that 93% is typical for her.     ROS  General: No weight changes, fatigue, weaknesses, chills, fever, night sweats.  Ears: No hearing loss, tinnitus, vertigo (spinning), discharge, ear pain, ear pressure or fullness.   Nose/Sinuses: No rhinorrhea, nasal congestion, sneezing, itching, allergy, epistaxis, sinus pain/pressure.  Mouth/throat/neck: No bleeding gums, hoarseness, sore throat, swollen neck.   Resp: No ,hemoptysis, or pain with respiration.     History   Smoking Status     Former Smoker     Packs/day: 1.00     Years: 40.00     Quit date: 2/1/2017   Smokeless Tobacco     Never Used       Patient Active Problem List   Diagnosis     Scoliosis     Obesity (BMI 30-39.9)     Lower Back Pain     Asthma     Urge Incontinence Of Urine     COPD (chronic obstructive pulmonary disease) (H)     Hyperlipidemia     Adult Sleep Apnea     Type 2 diabetes mellitus (H)     Anxiety     Vertigo     Acute on chronic respiratory failure with hypoxia (H)     COPD with exacerbation (H)     Tachycardia     CAP (community acquired pneumonia)     Cognitive dysfunction in bipolar disorder (H)     Bipolar " disorder, current episode mixed, moderate (H)     Noncompliance with medications     Atypical chest pain     Past Medical History:   Diagnosis Date     Bipolar 1 disorder (H)      Hearing loss      Meniere's disease      Current Outpatient Prescriptions on File Prior to Visit   Medication Sig Dispense Refill     albuterol (PROAIR HFA;PROVENTIL HFA;VENTOLIN HFA) 90 mcg/actuation inhaler Inhale 2 puffs every 4 (four) hours as needed for wheezing or shortness of breath. 1 Inhaler 0     albuterol (PROVENTIL) 2.5 mg /3 mL (0.083 %) nebulizer solution Take 3 mL (2.5 mg total) by nebulization every 4 (four) hours as needed. 60 vial 0     blood glucose test (CONTOUR NEXT TEST STRIPS) strips Use to check blood sugar once daily. 100 strip 4     BLOOD-GLUCOSE METER (CONTOUR NEXT METER MISC) Use As Directed.       generic lancets Use 1 each As Directed as needed. 100 each 11     loratadine (CLARITIN) 10 mg tablet Take 10 mg by mouth daily.       predniSONE (DELTASONE) 20 MG tablet Take 1 tablet (20 mg total) by mouth 2 (two) times a day. 10 tablet 0     fluticasone (FLONASE) 50 mcg/actuation nasal spray 2 sprays in each nostril nightly 48 g 3     furosemide (LASIX) 20 MG tablet Take 0.5 tablets (10 mg total) by mouth daily. 30 tablet 2     No current facility-administered medications on file prior to visit.      Allergies   Allergen Reactions     Lurasidone Unknown     (Brand name = Latuda) Face turned red       Objective: /82  Pulse 76  Resp 22  Wt 171 lb 12.8 oz (77.9 kg)  SpO2 93% Comment: 3L O2  BMI 40.67 kg/m2  General: Patient appears to be in no acute distress. Patient is not short of breath or wheezing, she is awake from her story is convoluted and all over the place.  Appears to be a poor historian but is able to answer my questions fairly well when asked direct questions.   Ears: No nodules, lesions, masses, discharge or tenderness in auricles or mastoid area. No cerumen in ear canals. Tympanic membranes  pearly gray, normal bony landmarks and cone of light bilaterally, no bulges or perforations.   Oropharynx: Buccal mucosa pink, moist, no lesions. Tongue midline, spongy, pink. Uvula midline. Pharynx without erythema, without exudates. Tonsils + 1.   Lymph:  Lymph nodes in neck are not palpable and are not tender.  Lungs:  Unlabored, Clear but diminished breath sounds heard throughout lung fields.   Heart:  Regular rate and rhythm.     Assessment/Plan:  Arvind was seen today for estimated return to work.    Diagnoses and all orders for this visit:    COPD exacerbation (H)   Continue to take Prednisone   Do not take anymore Over the counter cough syrup   Guaifenesin (Muscinex) 600 mg every 12 hours for cough or congestion. Is ok to take   Follow up in clinic in a few days to recheck on your lungs.  -     benzonatate (TESSALON PERLES) 100 MG capsule; Take 1 capsule (100 mg total) by mouth every 8 (eight) hours as needed for cough.   Work letter written.     Follow up late this week or next week if no improvement in symptoms.    Jamilah Randall, DEANNE, CNP

## 2021-06-18 NOTE — PROGRESS NOTES
Oxygen saturation walk test    Oxygen pulse dose testing  While ambulating 150ft on 2LPM at pulse dose, oxygen saturation is 84-85%.  While ambulating 150ft on 3LPM at pulse dose, oxygen saturation is 85%.   While ambulating 150ft on 4LPM at pulse dose, oxygen saturation is 87%.  While ambulating 150ft on 5LPM at pulse dose, oxygen saturation is 88%.    Oxygen continuous dose testing  While ambulating 150ft on 2LPM continuous dose, oxygen saturation is 86%.  While ambulating 150ft on 3LPM continuous dose, oxygen saturation is 93%.    DME Provider: [facility] [employer]    Patient is ambulatory within his/her home.

## 2021-06-18 NOTE — PROGRESS NOTES
"ASSESSMENT & PLAN:  1. DM2 (diabetes mellitus, type 2)  Noted to be due for A1c, so obtained after visit today.  - Glycosylated Hemoglobin A1c  - blood glucose test (CONTOUR NEXT TEST STRIPS) strips; Use to check blood sugar once daily.  Dispense: 100 strip; Refill: 4    2. Tinnitus, hearing loss, vertigo  - Ambulatory referral to ENT    3. Fatigue, insomnia  Improved on its own. She thinks her insomnia improved after having someone to talk to (RN triage). She has established care with new therapist and has follow up appt scheduled in about 1-1/2 weeks. Reports compliance with antipsychotic medication.     There are no Patient Instructions on file for this visit.    Orders Placed This Encounter   Procedures     Glycosylated Hemoglobin A1c     Ambulatory referral to ENT     Referral Priority:   Routine     Referral Type:   Consultation     Referral Reason:   Evaluation and Treatment     Requested Specialty:   Otolaryngology     Number of Visits Requested:   1     Medications Discontinued During This Encounter   Medication Reason     blood glucose test (CONTOUR NEXT STRIPS) strips Reorder       No Follow-up on file.    CHIEF COMPLAINT:  Chief Complaint   Patient presents with     Fatigue     Medication Refill     Shortness of Breath     Nasal Congestion     wants to try something else for nasal congestion       HISTORY OF PRESENT ILLNESS:  Arvind is a 63 y.o. female presenting to the clinic today for fatigue and shortness of breath. She called triage on 6/3 as she was feeling \"closed in\" and could not sleep, also describing her diabetes as out of wack. Notes this is difficult to explain. She recently moved away from her mother into a new apartment. She reports sleeping better last night. Her FBG this morning was 151. She notes that she can control her blood sugar well at home. She has established care with a new therapist.    Vertigo/ Ear Issue: She notes that she was diagnosed with vertigo approximately 1 year ago. " Last episode in January or February. She notes that her ears are constantly ringing, painful, and itchy. She thinks she was told in the past she has Ménière's disease. Would like a referral to have her ears checked.    REVIEW OF SYSTEMS:   She is drinking plenty of fluids. She takes daily vitamins. She has been seeing a chiropractor every other week which has been helping with her back pain. All other systems are negative.    PFSH:  Reviewed as below.     TOBACCO USE:  History   Smoking Status     Former Smoker     Packs/day: 1.00     Years: 40.00     Quit date: 2/1/2017   Smokeless Tobacco     Never Used       VITALS:  Vitals:    06/04/18 0833   BP: 100/62   Patient Site: Left Arm   Patient Position: Sitting   Cuff Size: Adult Regular   Pulse: 72   Resp: 16   Temp: 98.2  F (36.8  C)   TempSrc: Oral   Weight: 176 lb 12.8 oz (80.2 kg)     Wt Readings from Last 3 Encounters:   06/04/18 176 lb 12.8 oz (80.2 kg)   05/22/18 175 lb (79.4 kg)   04/17/18 179 lb 8 oz (81.4 kg)     Body mass index is 41.85 kg/(m^2).    PHYSICAL EXAM:  GENERAL: Pleasant, well-appearing patient in no acute distress.   CARDIOVASCULAR: Heart regular rate and rhythm without murmur normal S1-S2   LUNGS: Clear to auscultation bilaterally, decreased air movement throughout.  NEURO: Alert and oriented. Grossly nonfocal.   PSYCHIATRIC: Presents on time and well groomed. Normal speech and thought content. Somewhat labile affect, upbeat one minutes, tearful the next. No abnormal movements or behaviors noted.      ADDITIONAL HISTORY SUMMARIZED (2): None.  DECISION TO OBTAIN EXTRA INFORMATION (1): None.   RADIOLOGY TESTS (1): None.  LABS (1): Labs were ordered today.   MEDICINE TESTS (1): None.  INDEPENDENT REVIEW (2 each): None.       The visit lasted a total of 15 minutes face to face with the patient. Over 50% of the time was spent counseling and educating the patient about insomnia and diabetes.    ILissa, am scribing for and in the presence of,  Dr. Alejandro.    I, Dr. Alejandro, personally performed the services described in this documentation, as scribed by Lissa Reyes in my presence, and it is both accurate and complete.    MEDICATIONS:  Current Outpatient Prescriptions   Medication Sig Dispense Refill     albuterol (PROAIR HFA;PROVENTIL HFA;VENTOLIN HFA) 90 mcg/actuation inhaler Inhale 2 puffs every 4 (four) hours as needed for wheezing or shortness of breath. 1 Inhaler 0     albuterol (PROVENTIL) 2.5 mg /3 mL (0.083 %) nebulizer solution Take 3 mL (2.5 mg total) by nebulization every 4 (four) hours as needed. 60 vial 0     blood glucose test (CONTOUR NEXT TEST STRIPS) strips Use to check blood sugar once daily. 100 strip 4     BLOOD-GLUCOSE METER (CONTOUR NEXT METER MISC) Use As Directed.       fluticasone (FLONASE) 50 mcg/actuation nasal spray 2 sprays in each nostril nightly 16 g 12     furosemide (LASIX) 20 MG tablet Take 0.5 tablets (10 mg total) by mouth daily. 30 tablet 2     generic lancets Use 1 each As Directed as needed. 100 each 11     loratadine (CLARITIN) 10 mg tablet Take 10 mg by mouth daily.       polyethylene glycol (GLYCOLAX) 17 gram/dose powder Take 17 g by mouth daily as needed. 850 g 0     No current facility-administered medications for this visit.        Total data points: 1

## 2021-06-18 NOTE — PATIENT INSTRUCTIONS - HE
Patient Instructions by Conchita Saenz DO at 4/30/2021 10:40 AM     Author: Conchita Saenz DO Service: -- Author Type: Physician    Filed: 4/30/2021 11:49 AM Encounter Date: 4/30/2021 Status: Signed    : Conchita Saenz DO (Physician)         Patient Education     Benign Paroxysmal Positional Vertigo    Benign paroxysmal positional vertigo is a common condition. You feel as if the room is spinning after changing position, moving your head quickly, or even just rolling over in bed.  Vertigo is a false feeling of motion plus disorientation that makes it seem as though the room is spinning. A vertigo attack may cause sudden nausea, vomiting, and heavy sweating. Severe vertigo causes a loss of balance. You may even fall down.  Vertigo is caused by a problem with the inner ear. The inner ear is located behind the middle ear. It is a part of the balance center of the body. It contains small calcium particles within fluid-filled canals (semi-circular canals). These particles can move out of position. This may happen as a result of aging, head injury, or disease of the inner ear. Once that happens, moving your head in certain ways may cause the particles to stimulate the inner ear. This creates the feeling of vertigo.  An episode of vertigo may last seconds, minutes, or hours. Once you are over the first episode of vertigo, it may never return. Sometimes symptoms return off and on for several weeks or longer.  Home care  Follow these guidelines when caring for yourself at home:    Rest quietly in bed if your symptoms are severe. Change position slowly. There is usually 1 position that will feel best. This might be lying on 1 side or lying on your back with your head slightly raised on pillows. Until you have no symptoms, you are at a higher risk of falling. Let someone help you when you get up. Get rid of home hazards such as loose electrical cords and throw rugs. Dont walk in unfamiliar areas that are not lighted.  Use night lights in bathrooms and kitchen areas.    Do not drive or work with dangerous machinery for 1 week after symptoms go away. This is in case symptoms return suddenly.    Take medicine as prescribed to relieve your symptoms. Unless another medicine was prescribed for nausea, vomiting, and vertigo, you may use over-the-counter motion sickness medicine. Examples of this include meclizine and dimenhydrinate.  Follow-up care  Follow up with your healthcare provider, or as directed. Tell your provider about any ringing in your ear or hearing loss.  If you had a CT or MRI scan, a specialist will review it. You will be told of any new findings that may affect your care.  When to seek medical advice  Call your healthcare provider right away if any of these occur:    Vertigo gets worse even after taking prescribed medicine    Repeated vomiting even after taking prescribed medicine    Weakness that gets worse    Fainting    Severe headache or unusual drowsiness or confusion    Weakness of an arm or leg or 1 side of the face    Trouble walking    Trouble with speech or vision    Seizure    Trouble hearing    Fever of 100.4 F (38 C) or higher, or as directed by your healthcare provider    Fast heart rate    Chest pain   Date Last Reviewed: 11/1/2017 2000-2017 [employer]. [address] [place] [address], [place] [address]. All rights reserved. This information is not intended as a substitute for professional medical care. Always follow your healthcare professional's instructions.

## 2021-06-18 NOTE — PROGRESS NOTES
PULMONARY OUTPATIENT FOLLOW UP NOTE    Assessment:   1. Chronic respiratory failure  Secondary to COPD, ABDIFATAH.   Continue O2 supplementation 2 LPM at rest and increase to 3 LPM with activities   2. COPD on home O2  Previous spirometry showed FEV1 0.52 L (31%), no significant post bronchodilator response.   Continue LABA/ICS, albuterol HFA as needed.  O2 supplementation 2 LPM at rest and increase to 3 LPM with activities.   3. ABDIFATAH  Sleep Study done on 2/17/2014 showed AHI 14.7, RDI 14.7, lateral , REM AHI 45, Supine AHI 14.3. PML index 21.7. Mean O2 sat 87%, desaturation heide 61%, Time of SpO2 < 89% 44 minutes out of 138 minutes of diagnostic time. CPAP 7 cmH20 was therapeutic.    Sleep hygiene was discussed. Resume CPAP machine   4. GERD  5. Bipolar / Anxiety disorder  6. Deconditioning   7. Obesity  Lost 7 lbs over the last 2 months.     Plan:   1. Continue O2 supplementation 2 LPM at rest and increase to 3 LPM with activities.   2. Sleep hygiene, CPAP 7 cmH2O at night  3. Continue Symbicort two puffs twice a daily , rinse mouth with water after each use  4. Albuterol inhaler 2 puffs up to 6 times a day as needed  5. Continue physical therapy  6. Weight loss, current weight 175 lbs (down 7 lbs)   7. Decrease salt intake.  8. Ranitidine 150 mg daily as needed for acid reflux symptoms  9. Follow up in 6 months    Arnoldo Mansfield  Pulmonary / Critical Care  5/22/2018    CC:      Chief Complaint   Patient presents with     Follow Up       HPI:       Arvind Leong is an 63 y.o. female who presents for follow up.   Patient has history of severe COPD on home O2, ABDIFATAH, bipolar / anxiety disorder, Meniere disease.   Patient is doing ok since las visit. Activity level has improved with PT. Patient is thinking about joining a GYM.   She wants to do pool therapy exercises.   Denies cough, wheezes. No chest pain, decrease swelling of LEs. No orthopnea or PND.   Uses symbicort BID, not using  albuterol.  Stopped using CPAP machine. But she is planning to start using it again.   Reports occasional heartburn, takes ranitidine as needed.   Lost 7 lbs over the last 2 months.   Previous tobacco use 1/2 ppd for 47 years. Quit 2/2017.    Past Medical History:   Diagnosis Date     Bipolar 1 disorder      Hearing loss      Meniere's disease      Medications:     Prior to Admission medications    Medication Sig Start Date End Date Taking? Authorizing Provider   albuterol (PROVENTIL HFA;VENTOLIN HFA) 90 mcg/actuation inhaler Inhale 2 puffs every 4 (four) hours as needed for wheezing or shortness of breath. 11/17/16 11/17/17 Yes Batsheva Day MD   albuterol (PROVENTIL) 2.5 mg /3 mL (0.083 %) nebulizer solution Take 3 mL (2.5 mg total) by nebulization every 4 (four) hours as needed. 2/16/16  Yes TOSHIA Grady   blood glucose test (CONTOUR NEXT STRIPS) strips Use to check blood sugar twice daily. 9/12/16  Yes Lily Alejandro MD   BLOOD-GLUCOSE METER (CONTOUR NEXT METER MISC) Use As Directed.   Yes PROVIDER, HISTORICAL   budesonide-formoterol (SYMBICORT) 160-4.5 mcg/actuation inhaler Inhale 2 puffs 2 (two) times a day. 11/17/16 11/17/17 Yes Batsheva Day MD   LANCETS MISC Use As Directed.   Yes PROVIDER, HISTORICAL   oxybutynin (DITROPAN XL) 10 MG ER tablet Take 1 tablet (10 mg total) by mouth daily. 12/8/16  Yes Lily Alejandro MD   QUEtiapine (SEROQUEL) 25 MG tablet  1/2/17  Yes PROVIDER, HISTORICAL   QUEtiapine (SEROQUEL) 50 MG tablet 1 tablet daily. 12/13/16  Yes PROVIDER, HISTORICAL   risperiDONE (RISPERDAL) 1 MG tablet Take 1 mg by mouth 2 (two) times a day.    Yes PROVIDER, HISTORICAL   ranitidine (ZANTAC) 150 MG tablet Take 1 tablet (150 mg total) by mouth daily as needed for heartburn. 1/6/17   Arnoldo Mansfield MD     Social History     Social History     Marital status:      Spouse name: N/A     Number of children: N/A     Years of education: N/A      Occupational History     Not on file.     Social History Main Topics     Smoking status: Former Smoker     Packs/day: 1.00     Years: 40.00     Quit date: 2017     Smokeless tobacco: Never Used     Alcohol use Yes      Comment: Occasional      Drug use: No     Sexual activity: No     Other Topics Concern     Not on file     Social History Narrative    2015: The patient lives with her mother.     Family History   Problem Relation Age of Onset     Aneurysm Father      Heart disease Father 70     bypass in 70s, AAA rupture at age 77      Ulcers Father 76     Pacemaker Mother      Bipolar disorder Brother      Breast cancer Maternal Grandmother 51     Kidney failure Maternal Grandmother      Cancer Paternal Grandmother      ?? lung     Cancer Paternal Uncle      ?? lung     Mental illness Maternal Grandfather      Heart disease Other      uncle     No Medical Problems Sister      two siblings abuse chemicals     No Medical Problems Brother      Bipolar disorder Nephew      Review of Systems  A 12 point comprehensive review of systems was negative except as noted.      Objective:     Vitals:    18 0818   BP: 108/66   Pulse: 78   Resp: 17   SpO2: 96%   Weight: 175 lb (79.4 kg)     Wt Readings from Last 3 Encounters:   18 175 lb (79.4 kg)   18 179 lb 8 oz (81.4 kg)   18 182 lb 4.8 oz (82.7 kg)     While ambulating 150ft on 2LPM continuous dose, oxygen saturation is 86%.  While ambulating 150ft on 3LPM continuous dose, oxygen saturation is 93%.    Gen: obese, awake, alert, no distress  HEENT: pink conjunctiva, moist mucosa, Mallampati III/IV  Neck: no thyromegaly, masses or JVD  Lungs: discrete wheezes both HT  CV: regular, no murmurs or gallops appreciated  Abdomen: soft, NT, BS wnl  Ext: trace edema  Neuro: CN II-XII intact, non focal      Diagnostic tests:     Sleep Study (2014)  AHI 14.7, RDI 14.7, lateral , REM AHI 45, Supine   PML index 21.7  Mean O2 sat  87%, desaturation heide 61%, Time of SpO2 < 89% 44 minutes out of 138 minutes of diagnostic time.   CPAP 7 cmH20 was therapeutic     PFT's 3/8/16  FEV1/FVC is 81  FEV1 is 0.52L (31%) predicted and is reduced.  FVC is 0.64L (30%) predicted and reduced.  There was no improvement in spirometry after a single inhaled dose of bronchodilator.  TLC is 4.05L (118%) predicted and is normal.  RV is 3.26L (226%) predicted and is increased.  Flow volume loops indicate scooped expiratory limb.    XR CHEST PA AND LATERAL  5/17/2017 4:54 PM  INDICATION: Hypotension, unspecified  COMPARISON: 11/2/2016  FINDINGS: Marked scoliosis of the thoracic spine. Heart size upper limits of normal. Pulmonary vascular is normal. The lungs and pleural spaces are clear. Little change from previous.    CTA CHEST PE RUN 9/26/2017 12:03 PM  INDICATION: acute on chronic respiratory failure  COMPARISON: 10/31/2013  ANGIOGRAM CHEST: There is moderate motion artifact which results in regions of modeled vascular enhancement. In the highest degree of vascular blurring and mottled enhancement, it is impossible to exclude small emboli but no convincing evidence for emboli present. The overall appearance is very similar to the previous exam.  LUNGS AND PLEURA: Mild peripheral bilateral atelectasis or scar also similar to previous exam, no new pneumonia.  MEDIASTINUM: Small reactive lymph nodes within anterior mediastinum unchanged, no suspicious adenopathy. Heart size upper limits of normal. Tortuous thoracic aorta.  LIMITED UPPER ABDOMEN: Negative.  MUSCULOSKELETAL: S-shaped scoliosis of thoracic and lumbar spine unchanged.  CONCLUSION:  1.  No significant change. No convincing evidence of pulmonary embolism.  2.  Stable atelectasis or scar, left lung base. Scoliosis.    Echocardiogram 1/3/2014  EF 75%, IVSd 1.3  Normal RV  No significant valvular disease.

## 2021-06-19 NOTE — PROGRESS NOTES
ASSESSMENT/ PLAN    1. Establishing care with new doctor, encounter for  Chart review. Previous patient of Dr. Alejandro. Patient is new to me.     2. COPD (chronic obstructive pulmonary disease) (H)  3. SOB (shortness of breath)  4. COPD with exacerbation (H)  Seeing pulmonology. Reviewed last visit on 5/22/18. On Symbicort. I think she has exacerbation of her COPD today based on exam and history. Will give her 5 day course of prednisone 40 mg daily. Patient is a former smoker and did quit.     5. Type 2 diabetes mellitus (H)  Last a1c 7.2 in 6/2018. Not on any diabetes medication. Will need to check in a month.    6. Bipolar disorder, current episode mixed, moderate (H)  Not on any psych medication. Seeing counselor    7. Cardiomyopathy, unspecified type (H)  Recent echo 9/2017 with normal EF. Continue with Lasix.   - furosemide (LASIX) 20 MG tablet; Take 0.5 tablets (10 mg total) by mouth daily.  Dispense: 30 tablet; Refill: 2    9. Yeast vaginitis  Did not appreciate any cyst or lump but external genitalia was erythematous and pruritic.   - nystatin (MYCOSTATIN) powder; Apply topically 3 (three) times a day.  Dispense: 60 g; Refill: 3    10. Midline thoracic back pain  11. Scoliosis  XR obtained today with scoliosis and DJD but otherwise unremarkable. Will have her see spine.   - Ambulatory referral to Spine Care      Greater than 45 minutes was spent today in interview and examination with patient with more than 50% of that time in counseling and coordination of care and chart review.      This note was created using Dragon dictation software, spelling errors may occur.     Rema Whiting MD        SUBJECTIVE   Arvind Leong is a 63 y.o. old female with a past medical history of bipolar disorder, COPD, CAD, type 2 diabetes, sleep apnea who presented to clinic today for further evaluation of cough and shortness of breath.  She is also here to establish care with a new physician as her primary care physician  previously with Dr. Alejandro who is no longer at the clinic.  She is requesting prednisone today for her COPD.  Feels that her COPD is getting worse.  She is on oxygen supplementation.  She is also concerned about back pain.  She has a history of scoliosis.  She feels that COPD exacerbation gives her some back pain when she lies down. She has not been sleeping well because of shortness of breath and has to wake up at night time for albuterol inhaler. Symptoms have been going on for a month. She feels good during the day but at night time is when she doesn't feel good and she thinks it's due to shortness of breath and back pain. Her pulmonologist with Dr. Balbuena and last visit was 5/22/2018. She denies any leg swelling. Her exercise capacity is worsening. She quit smoking 3 years ago but she is around smoke still at her apartment complex.  Also she wants me to look at a pustule in her vaginal area. She has been seen by cardiology 9/2017 and normal cardiac work up. She denies any fever or chills. She denies cough. She uses the rescue inhaler every night to help with her shortness of breath. She denies fever/chills, unintentional weight loss, night sweat.       Review of Systems:  Negative except as noted in HPI    The following portions of the patient's history were reviewed and updated as appropriate: past medical history, past surgical history, family history, allergies, current medications and problem list.    Medical History  Active Ambulatory (Non-Hospital) Problems    Diagnosis     Atypical chest pain     Cognitive dysfunction in bipolar disorder (H)     Bipolar disorder, current episode mixed, moderate (H)     Noncompliance with medications     Acute on chronic respiratory failure with hypoxia (H)     COPD with exacerbation (H)     Tachycardia     CAP (community acquired pneumonia)     Anxiety     Vertigo     Scoliosis     Obesity (BMI 30-39.9)     Lower Back Pain     Asthma     Urge Incontinence Of Urine      COPD (chronic obstructive pulmonary disease) (H)     Hyperlipidemia     Adult Sleep Apnea     Type 2 diabetes mellitus (H)     Past Medical History:   Diagnosis Date     Bipolar 1 disorder (H)      Hearing loss      Meniere's disease        Surgical History  She  has a past surgical history that includes pr removal anal fistula,subcutaneous; Tonsillectomy; and Cyst Removal (01/23/2001).    Social History  Reviewed, and she  reports that she quit smoking about 18 months ago. She has a 40.00 pack-year smoking history. She has never used smokeless tobacco. She reports that she drinks alcohol. She reports that she does not use illicit drugs.    Family History  Reviewed, and family history includes Aneurysm in her father; Bipolar disorder in her brother and nephew; Breast cancer (age of onset: 51) in her maternal grandmother; Cancer in her paternal grandmother and paternal uncle; Heart disease in her other; Heart disease (age of onset: 70) in her father; Kidney failure in her maternal grandmother; Mental illness in her maternal grandfather; No Medical Problems in her brother and sister; Pacemaker in her mother; Ulcers (age of onset: 76) in her father.    Medications  Reviewed and reconciled    Allergies  Allergies   Allergen Reactions     Lurasidone Unknown     (Brand name = Latuda) Face turned red         OBJECTIVE  Physical Exam:  Vital signs: /68 (Patient Site: Left Arm, Patient Position: Sitting, Cuff Size: Adult Regular)  Pulse 69  Temp 97.5  F (36.4  C) (Oral)   Resp 20  Wt 175 lb (79.4 kg)  SpO2 94%  BMI 41.42 kg/m2  Weight: 175 lb (79.4 kg)    General appearance: pleasant, appears stated age, cooperative and in no distress, on nasal cannula for oxygen, morbidly obese.   Eyes: EOMs intact, PERRL, conjunctivae normal.   ENT: moist oral mucosa, posterior oropharynx normal,   Lymph: no cervical/supraclavicular adenopathy  Respiratory: reduced air movement throughout all lung fields, no wheezing or  crackles.   Cardiovascular: regular rate and rhythm, no murmur appreciated, no leg edema  Abdomen: active bowel sounds, soft, non-tender, non-distended  : erythematous external genitalia, no cyst or lump or lesion appreciated.   Skin: no rashes  MSK: scoliosis appreciated, midline tenderness appreciated in the thoracic region  Neuro: alert oriented x 3, grossly normal otherwise  Psych: normal affect, appropriate conversation
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
## 2021-06-21 NOTE — PROGRESS NOTES
ASSESSMENT/ PLAN    1. Insomnia  2. Bipolar disorder, current episode mixed, moderate (H)  Insomnia related to bipolar. Can start OTC melatonin for her sleep. She's starting Risperdal. Encouraged patient to keep her psychiatry appointment on 11/20/18.   - melatonin 3 mg Tab tablet; Take 1 tablet (3 mg total) by mouth at bedtime as needed.  Dispense: 90 tablet; Refill: 0    3. Type 2 diabetes mellitus (H)  A1C 6.7. Discussed diet/exercise and metformin and patient would like to take metformin again (she was on this before). Discussed dietary principle extensively with patient. She likes to drink juice which I recommended her to not drink and encouraged her to eat more protein, less carbs. She works at [employer] so she can try to walk around the store with her walker as much as possible. RTC in Jan 2019 for recheck of diabetes.   - metFORMIN (GLUCOPHAGE) 500 MG tablet; Take 1 tablet (500 mg total) by mouth daily with breakfast.  Dispense: 90 tablet; Refill: 1    4. Chronic obstructive pulmonary disease, unspecified COPD type (H)  Was treated for exacerbation about 3 weeks ago. Advised pulmonary rehab to help with her breathing and endurance. F/u in Jan 2019.   - Ambulatory Referral to Pulmonary Rehab        This note was created using Dragon dictation software, spelling errors may occur.     Rema Whiting MD        SUBJECTIVE   Arvind Leong is a 63 y.o. old female with a past medical history of morbid obesity, COPD on chronic oxygen supplementation, diabetes, bipolar disorder who presented to clinic today for further evaluation of insomnia and dark circles under her eyes.  Patient is very concerned about the dark circles underneath her eyes.  She has not been able to sleep.  She is very anxious.  She is worried about her work, her finances, her transportation.  She is also worried about her weight and being on oxygen chronically.  She feels embarrassed about these issues.  She is not suicidal.  She does have  appointment with psychiatry on 11/20/2018.  She has been working with a therapist.  She is starting Risperdal which was prescribed by her psychiatric team however has not picked up this medication yet.  She is concerned because she is going out on the date with someone she met on the Internet and she is worried about her appearance and her weight.     Review of Systems:  Negative except as noted in HPI    The following portions of the patient's history were reviewed and updated as appropriate: past medical history, past surgical history, family history, allergies, current medications and problem list.    Medical History  Active Ambulatory (Non-Hospital) Problems    Diagnosis     Dependence on continuous supplemental oxygen     Cardiomyopathy, unspecified type (H)     Atypical chest pain     Cognitive dysfunction in bipolar disorder (H)     Bipolar disorder, current episode mixed, moderate (H)     Noncompliance with medications     Tachycardia     Anxiety     Vertigo     Scoliosis     Morbid obesity (H)     Lower Back Pain     Asthma     Urge Incontinence Of Urine     COPD (chronic obstructive pulmonary disease) (H)     Hyperlipidemia     Adult Sleep Apnea     Type 2 diabetes mellitus (H)     Past Medical History:   Diagnosis Date     Acute on chronic respiratory failure with hypoxia (H) 11/15/2016     Bipolar 1 disorder (H)      CAP (community acquired pneumonia) 11/15/2016     COPD with exacerbation (H) 11/15/2016     Hearing loss      Meniere's disease        Surgical History  She  has a past surgical history that includes pr removal anal fistula,subcutaneous; Tonsillectomy; and Cyst Removal (01/23/2001).    Social History  Reviewed, and she  reports that she quit smoking about 21 months ago. She has a 40.00 pack-year smoking history. She has never used smokeless tobacco. She reports that she drinks alcohol. She reports that she does not use illicit drugs.    Family History  Reviewed, and family history includes  Aneurysm in her father; Bipolar disorder in her brother and nephew; Breast cancer (age of onset: 51) in her maternal grandmother; Cancer in her paternal grandmother and paternal uncle; Heart disease in her other; Heart disease (age of onset: 70) in her father; Kidney failure in her maternal grandmother; Mental illness in her maternal grandfather; No Medical Problems in her brother and sister; Pacemaker in her mother; Ulcers (age of onset: 76) in her father.    Medications  Reviewed and reconciled    Allergies  Allergies   Allergen Reactions     Lurasidone Unknown and Other (See Comments)     Face turned red  (Brand name = Latuda) Face turned red         OBJECTIVE  Physical Exam:  Vital signs: /68 (Patient Site: Left Arm, Patient Position: Sitting, Cuff Size: Adult Large)  Pulse 87  Temp 99  F (37.2  C) (Oral)   Resp 20  Wt 182 lb 9.6 oz (82.8 kg)  SpO2 93%  BMI 43.22 kg/m2  Weight: 182 lb 9.6 oz (82.8 kg)    General appearance: pleasant, appears stated age, cooperative and in no distress, relating with a walker.  Eyes: EOMs intact, PERRL, conjunctivae normal.   ENT: moist oral mucosa, posterior oropharynx normal, wearing nasal cannula  Lymph: no cervical/supraclavicular adenopathy  Respiratory: clear to auscultation bilaterally, good air movement throughout, no wheezing or crackles, speaking full sentences without difficulty  Cardiovascular: regular rate and rhythm, no murmur appreciated, no leg edema  Abdomen: active bowel sounds, soft, non-tender, non-distended  Skin: no rashes  Neuro: alert oriented x 3, grossly normal otherwise  Psych: Pressured speech, anxious and tearful affect, change from topic to topic, tangential thought.

## 2021-06-21 NOTE — LETTER
Letter by Rema Whiting MD at      Author: Rema Whiting MD Service: -- Author Type: --    Filed:  Encounter Date: 2021 Status: (Other)         Arvind Leong  [address] [place] [id number]      2021      Dear Arvind Leong,   : 1955      This letter is in regards to the appointment that you had scheduled on 2021 at the [facility] with Dr. Whiting.     The [facility] strives to see all patients in a timely manner and we need your help to achieve this.  The above-mentioned appointment was missed and we do not have record of a cancellation by you.  Whenever possible, we request appointment cancellations at least 72 hours in advance.  This time allows us to offer the appointment to another patient in need.      If you feel you have received this letter in error, or if you need to reschedule this appointment, please call our office so that we may update our records.      Sincerely,    [facility]

## 2021-06-21 NOTE — LETTER
Letter by Renetta Gaytan CHW at      Author: Renetta Gaytan CHW Service: -- Author Type: --    Filed:  Encounter Date: 5/21/2021 Status: (Other)       CARE COORDINATION  [address]  [facility] [id number]  May 24, 2021    Arvind Leong  [address] [facility]   [place] [address]      Dear Arvind,    I am a clinic care coordinator who works with Rema Whiting MD at [facility]. I have been trying to reach you recently to introduce Clinic Care Coordination and to see if there was anything I could assist you with.  Below is a description of clinic care coordination and how I can further assist you.      The clinic care coordination team is made up of a registered nurse,  and community health worker who understand the health care system. The goal of clinic care coordination is to help you manage your health and improve access to the health care system in the most efficient manner. The team can assist you in meeting your health care goals by providing education, coordinating services, strengthening the communication among your providers and supporting you with any resource needs.    Please feel free to contact me at [phone] with any questions or concerns. We are focused on providing you with the highest-quality healthcare experience possible and that all starts with you.     Sincerely,     Christina Kaufman CTA

## 2021-06-21 NOTE — PROGRESS NOTES
ASSESSMENT/ PLAN    1. Feeling unwell  2. Nausea  Came on the day she was started on metformin and Risperdal and also got a flu shot. Reviewed ED visit that happened on 11/10/2018. Random glucose was 209 during that visit but no other lab work or imaging obtained. She has not been taking Risperdal since ER visit but has been tolerating metformin without any side effects. As a matter of fact, she likes metformin because it has caused her to have BM more often which she likes, no diarrhea however. No lab work and imaging needed today based on my evaluation of her.     3. Bipolar disorder, current episode mixed, moderate (H)  4. Anxiety  Worsening. Tearful today in clinic. Social trigger identified. Reviewed triage notes. I advised her she can start Risperdal again as patient tells me that she felt a lot better when she started taking the Risperdal.  She can give me a call tomorrow to see if she again has any side effect from Risperdal.  She does have a psychiatric appointment on 11/20/2018.    5. Leg swelling  Varicose veins and 1+ pitting edema just above bilateral ankles identified in clinic today.  Most likely secondary to weight and venous stasis.  Advised compression stocking and weight loss. F/u as scheduled on 11/26/2018  - Compression stockings 20/30 mmHg; Knee    6. Morbid obesity (H)  Discussed weight today. Lost almost 5-6 lbs as she has not been eating due to symptoms of nausea/ abdominal discomfort she experienced a few days ago. Reinforced dietary principles of low carbs, high protein diet. F/u as scheduled 11/26/2018.     This note was created using Dragon dictation software, spelling errors may occur.     Rema Whiting MD        SUBJECTIVE   Arvind Leong is a 63 y.o. old female with a past medical history of morbid obesity, bipolar, type 2 diabetes who presented to clinic today for further evaluation of nausea, abdominal discomfort feeling unwell the day she got the flu shot and started her  metformin and Risperdal. She had nausea, feeling queasy, and had abdominal discomfort. She then went to the ED on 11/10/2018 and was told to stop the Risperdal. Since then has only been taking metformin. She's worried that it was the flu shot that gave her the symptoms. She's feeling fine now. 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  She took a few days off from work and would like for me to write a work note stating that she is okay to return to work tomorrow. She has psychiatry appointment on 11/20. She feels that since stopping the Risperdal her depression worsened a little bit. She's also concerned about her weight and nutrition and diet. She wants to lose the weight.       Review of Systems:  Negative except as noted in HPI    The following portions of the patient's history were reviewed and updated as appropriate: past medical history, past surgical history, family history, allergies, current medications and problem list.    Medical History  Active Ambulatory (Non-Hospital) Problems    Diagnosis     Dependence on continuous supplemental oxygen     Cardiomyopathy, unspecified type (H)     Atypical chest pain     Cognitive dysfunction in bipolar disorder (H)     Bipolar disorder, current episode mixed, moderate (H)     Noncompliance with medications     Tachycardia     Anxiety     Vertigo     Scoliosis     Morbid obesity (H)     Lower Back Pain     Asthma     Urge Incontinence Of Urine     COPD (chronic obstructive pulmonary disease) (H)     Hyperlipidemia     Adult Sleep Apnea     Type 2 diabetes mellitus (H)     Past Medical History:   Diagnosis Date     Acute on chronic respiratory failure with hypoxia (H) 11/15/2016     Bipolar 1 disorder (H)      CAP (community acquired pneumonia) 11/15/2016     COPD with exacerbation (H) 11/15/2016     Hearing loss      Meniere's disease        Surgical  History  She  has a past surgical history that includes pr removal anal fistula,subcutaneous; Tonsillectomy; and Cyst Removal (01/23/2001).    Social History  Reviewed, and she  reports that she quit smoking about 21 months ago. She has a 40.00 pack-year smoking history. she has never used smokeless tobacco. She reports that she drinks alcohol. She reports that she does not use drugs.    Family History  Reviewed, and family history includes Aneurysm in her father; Bipolar disorder in her brother and nephew; Breast cancer (age of onset: 51) in her maternal grandmother; Cancer in her paternal grandmother and paternal uncle; Heart disease in an other family member; Heart disease (age of onset: 70) in her father; Kidney failure in her maternal grandmother; Mental illness in her maternal grandfather; No Medical Problems in her brother and sister; Pacemaker in her mother; Ulcers (age of onset: 76) in her father.    Medications  Reviewed and reconciled    Allergies  Allergies   Allergen Reactions     Lurasidone Unknown and Other (See Comments)     Face turned red  (Brand name = Latuda) Face turned red         OBJECTIVE  Physical Exam:  Vital signs: /77 (Patient Site: Left Arm, Patient Position: Sitting, Cuff Size: Adult Regular)   Pulse 83   Temp 97.4  F (36.3  C) (Oral)   Resp 16   Wt 177 lb 6.4 oz (80.5 kg)   SpO2 99%   BMI 41.99 kg/m    Weight: 177 lb 6.4 oz (80.5 kg)    General appearance: pleasant, appears stated age, cooperative and in no distress  Eyes: EOMs intact, PERRL, conjunctivae normal.   ENT: moist oral mucosa, posterior oropharynx normal.Thyroid normal to palpation, no lump or mass or tenderness  Lymph: no cervical/supraclavicular adenopathy  Respiratory: clear to auscultation bilaterally, good air movement throughout, no wheezing or crackles, speaking full sentences without difficulty  Cardiovascular: regular rate and rhythm, no murmur appreciated, 1+ pitting edema bilateral legs up to just  above the ankles.  Skin: spider veins on both legs seen.   Neuro: alert oriented x 3, grossly normal otherwise  Psych: tearful affect, some pressured spech, appropriate conversation

## 2021-06-21 NOTE — PROGRESS NOTES
ASSESSMENT/ PLAN    1. Back pain  Ongoing chronic back pain. I recently obtain x-ray thoracic which showed scoliosis, degeneration of the thoracic spine, degenerative changes throughout the lumbar spine, degenerative facet arthropathy in the lumbar spine.  Will obtain MRI of her thoracic and lumbar spine for further evaluation.  Patient would like an open MRI so I will have her work with my specialty  to help her schedule this.  She has not been seen by spine specialist, depending on her MRI, she may need to go see spine specialist for further evaluation.  - MR Thoracic Spine Without Contrast; Future  - MR Lumbar Spine Without Contrast; Future    2. Type 2 diabetes mellitus (H)  A1c was 7.2 about 3 months ago.  Will recheck today. Not on any diabetes medication.   - Glycosylated Hemoglobin A1c    3. COPD exacerbation (H)  Wheezing throughout lungs. Last prednisone about 2 months ago. She needs to see pulmonology in a month.   - predniSONE (DELTASONE) 20 MG tablet; Take 2 tablets (40 mg total) by mouth daily for 5 days.  Dispense: 10 tablet; Refill: 0  - azithromycin (ZITHROMAX Z-KRYS) 250 MG tablet; Take 2 tablets (500 mg) on  Day 1,  followed by 1 tablet (250 mg) once daily on Days 2 through 5.  Dispense: 6 tablet; Refill: 0    4. Bipolar disorder, current episode mixed, moderate (H)  Pressured speech. Has been referred to psychiatry in the past but patient never gone. She's stable today. Will need to establish care with psychiatry. Not sure what medication she was on before for bipolar. She's safe to herself.   - Ambulatory referral to Psychiatry    RTC in 1 month for follow up with me.       This note was created using Dragon dictation software, spelling errors may occur.     Rema Whiting MD        SUBJECTIVE   Arvind Leong is a 63 y.o. old female with a past medical history of COPD, oxygen dependent,  who presented to clinic today for further evaluation of multiple issues. Back pain is bad. In  the lower back region. Hard to sleep at night. Exacerbated with movement. She feels that this could be arthritis. Denies any weakness.    She's also feeling strange, not like herself. She feels like she's not thinking clearly. She feels that she's confused on different things. She wants to go back to see psychiatrist. She's worried about what people are thinking about her. She got upset that when she talked to her creditor they thought she was yelling at them. She's tearful today. She recently had problems with the furniture delivery people. She's perseverating about how her furniture didn't match her house so she returned the furniture and was treated badly so she left the store crying. However she's now happy that she got some other furniture for a cheaper price.     Review of Systems:  Negative except as noted in HPI      The following portions of the patient's history were reviewed and updated as appropriate: past medical history, past surgical history, family history, allergies, current medications and problem list.    Medical History  Active Ambulatory (Non-Hospital) Problems    Diagnosis     Dependence on continuous supplemental oxygen     Cardiomyopathy, unspecified type (H)     Atypical chest pain     Cognitive dysfunction in bipolar disorder (H)     Bipolar disorder, current episode mixed, moderate (H)     Noncompliance with medications     Tachycardia     Anxiety     Vertigo     Scoliosis     Morbid obesity (H)     Lower Back Pain     Asthma     Urge Incontinence Of Urine     COPD (chronic obstructive pulmonary disease) (H)     Hyperlipidemia     Adult Sleep Apnea     Type 2 diabetes mellitus (H)     Past Medical History:   Diagnosis Date     Acute on chronic respiratory failure with hypoxia (H) 11/15/2016     Bipolar 1 disorder (H)      CAP (community acquired pneumonia) 11/15/2016     COPD with exacerbation (H) 11/15/2016     Hearing loss      Meniere's disease        Surgical History  She  has a past  "surgical history that includes pr removal anal fistula,subcutaneous; Tonsillectomy; and Cyst Removal (01/23/2001).    Social History  Reviewed, and she  reports that she quit smoking about 20 months ago. She has a 40.00 pack-year smoking history. She has never used smokeless tobacco. She reports that she drinks alcohol. She reports that she does not use illicit drugs.    Family History  Reviewed, and family history includes Aneurysm in her father; Bipolar disorder in her brother and nephew; Breast cancer (age of onset: 51) in her maternal grandmother; Cancer in her paternal grandmother and paternal uncle; Heart disease in her other; Heart disease (age of onset: 70) in her father; Kidney failure in her maternal grandmother; Mental illness in her maternal grandfather; No Medical Problems in her brother and sister; Pacemaker in her mother; Ulcers (age of onset: 76) in her father.    Medications  Reviewed and reconciled    Allergies  Allergies   Allergen Reactions     Lurasidone Unknown and Other (See Comments)     Face turned red  (Brand name = Latuda) Face turned red         OBJECTIVE  Physical Exam:  Vital signs: /63 (Patient Site: Left Arm, Patient Position: Sitting, Cuff Size: Adult Large)  Pulse 83  Temp 97.5  F (36.4  C) (Oral)   Ht 4' 6.5\" (1.384 m)  Wt 180 lb 9.6 oz (81.9 kg)  SpO2 92%  BMI 42.75 kg/m2  Weight: 180 lb 9.6 oz (81.9 kg)    General appearance: pleasant, appears stated age, cooperative and in no distress  Eyes: EOMs intact, PERRL, conjunctivae normal.   ENT: moist oral mucosa, posterior oropharynx normal, wearing nasal cannula   Lymph: no cervical/supraclavicular adenopathy  Respiratory: wheezing throughout, on oxygen, speaking full sentences without difficulty  Cardiovascular: regular rate and rhythm, no murmur appreciated, no leg edema  Abdomen: active bowel sounds, soft, non-tender, non-distended  Musculoskeletal: warm and well perfused, strong and symmetric dorsalis pedal pulses, " strength 5/5 and equal bilaterally, tenderness to palpation in the lumbar region, straight leg raise negative.   Skin: no rashes  Neuro: alert oriented x 3, grossly normal otherwise  Psych: upset, tearful affect, appropriate conversation, perseverating about her furniture, tangential thoughts, pressured speech although this is her normal.

## 2021-06-22 NOTE — PROGRESS NOTES
Attempt 3: Care Guide called patient, after receiving message from Clinic Care guide from [place]. Patient was seen by Dr. Cottrell in walk in care, doctor expressed some concerns and thought that this patient could benefit from Care Coordination. I will attempt to call this patient one more time next week, as all of my attempts have been unsuccessful so far. If this patient is returning my call, please transfer to Bhargavi Holbrook at ext [phone].  I have called this patient 3 times over the past two weeks and have been unsuccessful in reaching her.  This patient has also not returned any of my messages.  I will continue attempting to reach out to this patient in next week and then try yet again a month from next weeks date.  I will also check this patient's chart for upcoming appointments, ER reports that may contain a new phone number, or any other recent activity.

## 2021-06-22 NOTE — PROGRESS NOTES
Attempt 1: Care Guide called patient.  If this patient is returning my call, please transfer to Bhargavi Fink at ext [phone].

## 2021-06-22 NOTE — PROGRESS NOTES
Attempt 2: Care Guide called patient.  If this patient is returning my call, please transfer to Bhargavi Fink at ext [phone].

## 2021-06-22 NOTE — PROGRESS NOTES
ASSESSMENT & PLAN:  1. Acute right-sided low back pain without sciatica  Recommend she see spine clinic. PCP had previously ordered MRI lumbar and thoracic spine, but she states she is claustrophobic and I am very doubtful she would be able to cooperate with those exams. I would recommend starting with spine care consult and patient is agreeable. She can use tylenol as needed for pain. She has 500mg tablets and was advised she can take 2 every 8 hours.   - Ambulatory referral to [facility]    2. New medication, having side effects  Advised patient she can try taking 1/2 tab trazodone if she feels that a whole tab is causing side effects. She was advised to let her psychiatrist know. She was encouraged to keep her appointment tomorrow with her mental health provider.     Review of chart shows patient will be due to DM check in about one month.     Patient Instructions   If trazodone makes you too tired or groggy in the morning, you can try taking half tablet before bedtime.     You can take TWO of your tylenol tablets every 8 hours.     I think it will be very difficult for you to get through 2 MRI tests for your back. Instead, I have referred you to the Spine clinic.       Orders Placed This Encounter   Procedures     Ambulatory referral to Spine Care     Referral Priority:   Routine     Referral Type:   Consultation     Referral Reason:   Evaluation and Treatment     Number of Visits Requested:   1     There are no discontinued medications.    Return in about 5 weeks (around 1/18/2019) for Diabetes, multiple issues, 40 min Highness.    CHIEF COMPLAINT:  Chief Complaint   Patient presents with     Abdominal Pain     [facility]        HISTORY OF PRESENT ILLNESS:  Arvind is a 63 y.o. female presenting to the clinic today for right sided low back pain. Initially was describing it as right sided abdominal pain, and was in ER for this yesterday. Had unremarkable lab work and CT abd, and pain was felt to be secondary  to back pain radiating to her right abdomen. She also expressed concern that she meet with a  in ER due to stressors at her apartment, but had to leave before one was available.     Since yesterday, her pain is now mainly in her right low back. No leg weakness, just feels weak all over. States she recently was started on trazodone 50mg at night and thinks it is making her too tired, groggy, and weak during the day. She also was supposed to meet a new psychiatrist but felt too tired and cancelled. She states there is some type of home care mental health worker coming to her house tomorrow.     REVIEW OF SYSTEMS:   All other systems are negative.    PFSH:  Reviewed in EHR    TOBACCO USE:  Social History     Tobacco Use   Smoking Status Former Smoker     Packs/day: 1.00     Years: 40.00     Pack years: 40.00     Last attempt to quit: 2017     Years since quittin.8   Smokeless Tobacco Never Used       VITALS:  Vitals:    18 1529   BP: 98/55   Patient Site: Left Arm   Patient Position: Sitting   Cuff Size: Adult Regular   Pulse: 76   Temp: 98.6  F (37  C)   TempSrc: Oral   Weight: 180 lb (81.6 kg)     Wt Readings from Last 3 Encounters:   18 180 lb (81.6 kg)   18 179 lb (81.2 kg)   18 179 lb (81.2 kg)     Body mass index is 43.4 kg/m .    PHYSICAL EXAM:  GENERAL: Pleasant, well-appearing patient in no acute distress.   HEENT: Pupils equal round reactive to light. Sclerae and conjunctivae clear. Oropharynx is clear with moist mucous membranes.   NECK: Supple without lymphadenopathy   CARDIOVASCULAR: Heart regular rate and rhythm without murmur normal S1-S2   ABDOMEN: Soft, nontender, nondistended. Obese, with significant central obesity.  No guarding or rebound.  No organomegaly or masses appreciated.  LUNGS: Clear to auscultation bilaterally. Decreased air movement throughout. Wearing her O2 by NC.   EXTREMITIES Warm and well-perfused without edema.    NEURO: Alert and  oriented. Grossly nonfocal.   PSYCHIATRIC: Presents on time and well groomed. Normal speech and thought content. Full affect. No abnormal movements or behaviors noted.  MSK: Tender to palpation in area of right SI joint. No midline lumbar spine tenderness.       MEDICATIONS:  Current Outpatient Medications   Medication Sig Dispense Refill     albuterol (PROAIR HFA;PROVENTIL HFA;VENTOLIN HFA) 90 mcg/actuation inhaler Inhale 2 puffs every 4 (four) hours as needed for wheezing or shortness of breath. 1 Inhaler 0     albuterol (PROVENTIL) 2.5 mg /3 mL (0.083 %) nebulizer solution Take 3 mL (2.5 mg total) by nebulization every 4 (four) hours as needed. 60 vial 0     ARIPiprazole (ABILIFY) 15 MG tablet Take 1 tablet (15 mg total) by mouth daily. 30 tablet 0     blood glucose test (CONTOUR NEXT TEST STRIPS) strips Use to check blood sugar once daily. 100 strip 4     BLOOD-GLUCOSE METER (CONTOUR NEXT METER MISC) Use As Directed.       budesonide-formoterol (SYMBICORT) 160-4.5 mcg/actuation inhaler Inhale 2 puffs 2 (two) times a day.       melatonin 3 mg Tab tablet Take 1 tablet (3 mg total) by mouth at bedtime as needed. 90 tablet 0     metFORMIN (GLUCOPHAGE) 500 MG tablet Take 1 tablet (500 mg total) by mouth 2 (two) times a day with meals. 90 tablet 1     nystatin (MYCOSTATIN) powder Apply topically 3 (three) times a day. 60 g 3     traZODone (DESYREL) 50 MG tablet   1     No current facility-administered medications for this visit.

## 2021-06-22 NOTE — PROGRESS NOTES
Name: Arvind Leong  Age: 63 y.o.  Gender: female  : 1955  Date of Encounter: 2018      HPI:  Arvind Leong is a 63 y.o.  female with history of bipolar disorder, COPD on O2 and DM type 2 who presents to the clinic with concerns of anxiety and not being able to go to work today.  Patient is a poor historian. Patient describes her anxiety as feeling she is going to fall over the edge.  When asked to explain that she describes feeling like she will fall over like falling off the edge of a cherry.  She complains of being dizzy and instability when standing.  She reports most of her feeling of anxiety revolve about her move which is to happen in the next several weeks.  She is moving out of her current living situation and moving in with her mother.  She describes her current landlord as causing her anxiety by wanting her to fill out paperwork which she does not understand.  Patient reports she is having difficulty sleeping and then feeling tired during the day.  She denies thoughts of hurting herself or others.  Denies auditory and visual hallucinations.  Reviewed patient's chart today.  She was recently seen in ED  for dizziness and work up included normal CBC,  CMP and CT head without contrast revealed no intracranial bleed or mass effect.  Also seen by her primary care on  with complaints of anxiety and dizziness.  At that time her Abilify was increased from 15-20 mg daily.  Patient reports picking up the prescription yesterday but not taking it. She is still taking the lower dose.  She was also instructed to take trazodone at bedtime for sleep but reports she has not taken her trazodone at night as recommended.  She is concerned about it making her tired during the day.  Also reports not having scheduled a [facility]up [facility].  She shows me a clinic card but no appointment  information on the card. Patient also confused about next  follow up  appointment scheduled with her PCP.   Patient wants a note for work today because she does not feel with her anxiety level she can function at work.    Current Medication:   Medications reviewed and updated.    Current Outpatient Medications:      albuterol (PROAIR HFA;PROVENTIL HFA;VENTOLIN HFA) 90 mcg/actuation inhaler, Inhale 2 puffs every 4 (four) hours as needed for wheezing or shortness of breath., Disp: 1 Inhaler, Rfl: 0     albuterol (PROVENTIL) 2.5 mg /3 mL (0.083 %) nebulizer solution, Take 3 mL (2.5 mg total) by nebulization every 4 (four) hours as needed., Disp: 60 vial, Rfl: 0     ARIPiprazole (ABILIFY) 20 MG tablet, Take 1 tablet (20 mg total) by mouth daily., Disp: 30 tablet, Rfl: 0     blood glucose test (CONTOUR NEXT TEST STRIPS) strips, Use to check blood sugar once daily., Disp: 100 strip, Rfl: 4     BLOOD-GLUCOSE METER (CONTOUR NEXT METER MISC), Use As Directed., Disp: , Rfl:      budesonide-formoterol (SYMBICORT) 160-4.5 mcg/actuation inhaler, Inhale 2 puffs 2 (two) times a day., Disp: , Rfl:      melatonin 3 mg Tab tablet, Take 1 tablet (3 mg total) by mouth at bedtime as needed., Disp: 90 tablet, Rfl: 0     metFORMIN (GLUCOPHAGE) 500 MG tablet, Take 1 tablet (500 mg total) by mouth 2 (two) times a day with meals., Disp: 90 tablet, Rfl: 1     nystatin (MYCOSTATIN) powder, Apply topically 3 (three) times a day., Disp: 60 g, Rfl: 3     traZODone (DESYREL) 50 MG tablet, Take 1 tablet (50 mg total) by mouth at bedtime as needed for sleep., Disp: 30 tablet, Rfl: 0    Past Med / Surg History:  Past Medical History:   Diagnosis Date     Acute on chronic respiratory failure with hypoxia (H) 11/15/2016     Bipolar 1 disorder (H)      CAP (community acquired pneumonia) 11/15/2016     COPD with exacerbation (H) 11/15/2016     Diabetes mellitus, type II (H)      Hearing loss      Meniere's disease      Past Surgical History:   Procedure Laterality Date     CYST REMOVAL  01/23/2001     IN REMOVAL ANAL  FISTULA,SUBCUTANEOUS      Description: [place]-in[place] Repair;  Recorded: 2010; x2     TONSILLECTOMY         Fam / Soc History:  Family History   Problem Relation Age of Onset     Aneurysm Father      Heart disease Father 70        bypass in 70s, AAA rupture at age 77      Ulcers Father 76     Pacemaker Mother      Bipolar disorder Brother      Breast cancer Maternal Grandmother 51     Kidney failure Maternal Grandmother      Cancer Paternal Grandmother         ?? lung     Cancer Paternal Uncle         ?? lung     Mental illness Maternal Grandfather      Heart disease Other         uncle     No Medical Problems Sister         two siblings abuse chemicals     No Medical Problems Brother      Bipolar disorder Nephew      Social History     Socioeconomic History     Marital status:      Spouse name: Not on file     Number of children: Not on file     Years of education: Not on file     Highest education level: Not on file   Social Needs     Financial resource strain: Not on file     Food insecurity - worry: Not on file     Food insecurity - inability: Not on file     Transportation needs - medical: Not on file     Transportation needs - non-medical: Not on file   Occupational History     Not on file   Tobacco Use     Smoking status: Former Smoker     Packs/day: 1.00     Years: 40.00     Pack years: 40.00     Last attempt to quit: 2017     Years since quittin.9     Smokeless tobacco: Never Used   Substance and Sexual Activity     Alcohol use: Yes     Comment: Occasional      Drug use: No     Sexual activity: No   Other Topics Concern     Not on file   Social History Narrative    2015: The patient lives with her mother.       ROS:  14 point review of systems unremarkable except as mentioned in HPI  Review of Systems    Objective:  Vitals: /80 (Patient Site: Right Arm, Patient Position: Sitting, Cuff Size: Adult Regular)   Pulse 77   Temp 98.5  F (36.9  C) (Oral)   Wt 164 lb 11.2 oz  (74.7 kg)   SpO2 96%   Breastfeeding? No   BMI 39.71 kg/m      Gen: Alert, awake, well appearing intermittently anxious. Uses O2  and a walker without difficulty.  HEENT: NC, AT,  Lungs: unlabored, distant breath sounds clear to auscultation   Heart:  Regular rate and rhythm without murmur or extra sounds.   Mental Status: Alert, oriented.  Makes good eye contact is appropriately dressed and groomed.  Mood and affect labile during clinic visit going from talking normally to crying and tearful.      Pertinent results / imaging:  none    Assessment: Bipolar disorder with anxiety.      Plan: Clarified and told to keep follow up appointment with Dr Whiting on 01/04/19.  A printed documented given to patient.  Instructed to discontinue Abilify 15 mg and start Abilify 20 mg daily  Take Trazadone 50 mg at bed time to help with sleep  Instructed to call [facility] to schedule follow up appointment ASAP  Work excuse provided today.  Reviewed reason to present emergently to ED for further evaluation.      Bridgette Cottrell MD  12/30/2018

## 2021-06-22 NOTE — PROGRESS NOTES
Attempt 3: Care Guide called patient.  If this patient is returning my call, please transfer to Bhargavi at ext [phone].  I have called this patient 3 times over the past two weeks and have been unsuccessful in reaching her.  This patient has also not returned any of my messages.  I will continue attempting to reach out to this patient in one month.  I will also check this patient's chart for upcoming appointments, ER reports that may contain a new phone number, or any other recent activity.

## 2021-06-22 NOTE — PROGRESS NOTES
ASSESSMENT/ PLAN    1. Bipolar disorder, current episode mixed, moderate (H)  2. Fatigue, unspecified type  Not feeling well on medications trazodone and Abilify started by her psychiatrist about a week ago.  I advised patient to not resume trazodone as this could contribute to her fatigue by stressed with patient that Abilify is important for bipolar.  I will obtain release of records from a psychiatrist and review what the recommendation is and what the follow-up interval would be. I told her I will call her to discuss after I review psychiatry notes. Patient is getting some help from home care nurse and  but again I do not have any note with regards to this.  Patient will request her home care team to send me a status update.    3. Morbid obesity (H)  Again patient is very worried about her weight.  I advised her to continue with not drinking pop/soda or juice which she has been doing.  She will follow-up in about 2 months for recheck of her diabetes and follow-up of weight.          This note was created using Dragon dictation software, spelling errors may occur.     Rema Whiting MD        SUBJECTIVE   Arvind Leong is a 63 y.o. old female with a past medical history of DM2, COPD, Bipolar, dependence on O2  who presented to clinic today for further evaluation of bipolar, stress and anxiety.  I saw her last about a week ago when I started her on Abilify 15 mg daily and recommended her to go see her psychiatrist.  Also increase her metformin from 500 once a day to 500 twice a day for diabetes.  Today she returns to report that her psychiatrist has started her on trazodone 50 mg and adjusted her Abilify to 10 mg daily.  Patient thinks that this is too much.  She feels that she is not happy with her psychiatrist.  Still feels very stressed out.  She reports that she is having a horrible stomachache due to stress. Both of these new medications have made her very tired. Her psychiatrist is   Eduard Rosales with [facility]. She will be seeing a psychologist Mary Weeks at [facility]. Home care nurse and  have already gone in to see her and they are coming again this week. Per patient, she will be getting help and she said it's going to be long term. She's moving into a trailer home because it's more affordable for her and her mother is going to help her a little bit financially     Review of Systems:  Negative except as noted in HPI    The following portions of the patient's history were reviewed and updated as appropriate: past medical history, past surgical history, family history, allergies, current medications and problem list.    Medical History  Active Ambulatory (Non-Hospital) Problems    Diagnosis     Leg swelling     Dependence on continuous supplemental oxygen     Cardiomyopathy, unspecified type (H)     Atypical chest pain     Cognitive dysfunction in bipolar disorder (H)     Bipolar disorder, current episode mixed, moderate (H)     Noncompliance with medications     Anxiety     Vertigo     Scoliosis     Morbid obesity (H)     Lower Back Pain     Asthma     Urge Incontinence Of Urine     COPD (chronic obstructive pulmonary disease) (H)     Hyperlipidemia     Adult Sleep Apnea     Type 2 diabetes mellitus (H)     Past Medical History:   Diagnosis Date     Acute on chronic respiratory failure with hypoxia (H) 11/15/2016     Bipolar 1 disorder (H)      CAP (community acquired pneumonia) 11/15/2016     COPD with exacerbation (H) 11/15/2016     Diabetes mellitus, type II (H)      Hearing loss      Meniere's disease        Surgical History  She  has a past surgical history that includes pr removal anal fistula,subcutaneous; Tonsillectomy; and Cyst Removal (01/23/2001).    Social History  Reviewed, and she  reports that she quit smoking about 22 months ago. She has a 40.00 pack-year smoking history. she has never used smokeless tobacco. She  reports that she drinks alcohol. She reports that she does not use drugs.    Family History  Reviewed, and family history includes Aneurysm in her father; Bipolar disorder in her brother and nephew; Breast cancer (age of onset: 51) in her maternal grandmother; Cancer in her paternal grandmother and paternal uncle; Heart disease in an other family member; Heart disease (age of onset: 70) in her father; Kidney failure in her maternal grandmother; Mental illness in her maternal grandfather; No Medical Problems in her brother and sister; Pacemaker in her mother; Ulcers (age of onset: 76) in her father.    Medications  Reviewed and reconciled    Allergies  Allergies   Allergen Reactions     Lurasidone Unknown and Other (See Comments)     Face turned red  (Brand name = Latuda) Face turned red         OBJECTIVE  Physical Exam:  Vital signs: /66 (Patient Site: Left Arm, Patient Position: Sitting, Cuff Size: Adult Regular)   Pulse 83   Temp 98.5  F (36.9  C) (Oral)   Wt 179 lb (81.2 kg)   SpO2 98% Comment: with oxygen  BMI 43.16 kg/m    Weight: 179 lb (81.2 kg)    General appearance: pleasant, appears stated age, cooperative and in no distress  Neuro: alert oriented x 3, grossly normal otherwise  Psych: anxious, tearful affect, mood and affect congruent, appropriate conversation

## 2021-06-22 NOTE — PROGRESS NOTES
"Subjective:   Arvind Leong is a(n) 63 y.o. White or  female who presents to [facility] with the following complaint(s):  Anxiety (stating having issues for a while) and Dizziness (unable to be stable while standing)    History of Present Illness:  Reports severe anxiety for several weeks, ever since her landlord \"yelled\" at her about her rent going up. Describes her landlord as \"a high Hitler person.\" Reports that she cannot relax and cannot think. Is anxious about submitting her notice of vacating her apartment tomorrow; will be leaving the apartment on 1/29/2019. Reports that she will be moving in with her mother in a trailer home and that she plans to eventually purchase her own trailer home. Has had difficulty sleeping. Has had sleepless nights but denies decreased need for sleep. Reports that she last slept for a few hours sometime last week. Denies excessive spending or gambling. Admits to feeling overly energized. Is currently taking aripiprazole 15 mg daily. Reports that she has been on this medication for less than 1 month. Cancelled her appointment with Psychiatry last week due to having abdominal pain that day. Denies suicidal ideation and homicidal ideation. Denies auditory and visual hallucinations. Requests a note to miss her shift at [employer] [facility] [employer] this afternoon.     The following portions of the patient's history were reviewed and updated as appropriate: allergies, current medications, past family history, past medical history, past social history, past surgical history and problem list.    Review of Systems:   Review of Systems   All other systems reviewed and are negative.    Objective:     Vitals:    12/20/18 1452   BP: 128/60   Patient Site: Right Arm   Patient Position: Sitting   Cuff Size: Adult Large   Pulse: 85   Resp: 18   Temp: 98.6  F (37  C)   TempSrc: Oral   SpO2: 97%   Weight: 167 lb 5 oz (75.9 kg)     Physical Exam   Constitutional: She is oriented to person, place, and time. " She appears well-developed. She is cooperative.  Non-toxic appearance. She does not appear ill. No distress.   Obese.    Cardiovascular: Normal rate, regular rhythm, S1 normal and S2 normal. Exam reveals no gallop and no friction rub.   No murmur heard.  1+ edema at the ankles bilaterally   Pulmonary/Chest: Effort normal and breath sounds normal. No stridor. She has no wheezes. She has no rhonchi. She has no rales.   Wearing supplemental oxygen.    Neurological: She is alert and oriented to person, place, and time. She has normal strength. No cranial nerve deficit or sensory deficit.   Skin: Skin is warm and dry. No rash noted. No pallor.   Psychiatric: Her behavior is normal. Her mood appears anxious. Her speech is tangential. Thought content is not paranoid. She expresses no homicidal and no suicidal ideation.   Nursing note and vitals reviewed.    VIOLET 7 Total Score: 21 (12/20/2018  3:00 PM)    Laboratory:  N/A    Radiology:  N/A    Electrocardiogram:  N/A    Assessment/Plan   1. Bipolar disorder, current episode mixed, moderate (H)  - ARIPiprazole (ABILIFY) 20 MG tablet; Take 1 tablet (20 mg total) by mouth daily.  Dispense: 30 tablet; Refill: 0  - traZODone (DESYREL) 50 MG tablet; Take 1 tablet (50 mg total) by mouth at bedtime as needed for sleep.  Dispense: 30 tablet; Refill: 0    - Chart reviewed; patient has been calling [facility] on an almost daily basis with various physical and psychiatric complaints. Patient has been seen in the ED twice within the past month. Vitals are currently stable. Patient does not appear to be a danger to herself or others. Recommend increasing Abilify dose from 15 mg daily to 20 mg daily. Patient has been prescribed trazodone to be used as needed for sleep difficulty; refill provided per patient request since she is not certain if she has this medication on-hand at this time. Work excuse was provided for today.   - Counseled patient regarding assessment and plan for  evaluation and treatment. Questions were answered. See AVS for the specific written instructions and educational handout(s) regarding bipolar disorder and anxiety that were provided at the conclusion of the visit.   - Discussed signs / symptoms that warrant urgent / emergent medical attention.   - Follow up with PCP within 1-2 weeks. Instructed patient to reschedule her appointment with Psychiatry as soon as possible.     Toby Shankar MD

## 2021-06-23 ENCOUNTER — COMMUNICATION - HEALTHEAST (OUTPATIENT)
Dept: SCHEDULING | Facility: CLINIC | Age: 66
End: 2021-06-23

## 2021-06-23 NOTE — TELEPHONE ENCOUNTER
"Spoke to pt to see what else was needed as letter states durations already. Pt stated \"cub doesn't tell you nothing\" and said it should be fine.   "

## 2021-06-23 NOTE — PROGRESS NOTES
I have called Arvind several times over the past month after referral to [facility] was placed.  This patient has not returned any of my messages.  Per [facility] Policy I will be removing this order and  I will discontinue outreach to the patient at this time.   If the provider feels this patient is a good fit in the future I have asked them to resend to the [facility] referral bucket.

## 2021-06-23 NOTE — TELEPHONE ENCOUNTER
Pt calling.  She states she has been very tired lately.  Was seen at [facility] on 12/30 d/t issues with insomnia and anxiety.  [facility] Provider advised Pt to continue taking Abilify 20 mg at bedtime and to begin taking Trazodone  50 mg that PCP had previously prescribed but Pt has not started taking.  Was last seen on 12/30 @ [facility].  Pt states [facility] Provider told her to take both medications before bed to help with insomnia.  Pt states since she has been taking both medications she is tired all the time.    PLAN  Writer informed Pt SIG for Trazodone states to take medication PRN.  Advised Pt to try taking trazodone every other night and see if she is less tired during the but still able to sleep at night.    Pt has a follow-up with PCP on 1/14/19 @ 4:50.  And should discuss with PCP during visit.  Tish Trevizo, RN, [facility] RN Triage/Med Refills    Reason for Disposition    Caller has medication question only, adult not sick, and triager answers question    Protocols used: MEDICATION QUESTION CALL-A-

## 2021-06-23 NOTE — PROGRESS NOTES
"  ASSESSMENT/ PLAN    1. Bipolar disorder, current episode mixed, moderate (H)  Crying, pacing in her apartment, acutely depressed. H/o bipolar. Has been going to walk in clinic, [facility] once a week for the last few weeks for crying, depression, difficulty sleeping. Her mother and brother are here and I shared my recommendation of going to [facility] for acute evaluation of her depression, bipolar, anxiety. She's been having difficult social situation (work and housing). I spent some time explaining my concern of getting her mood stabilized and improved. Her brother doesn't want the patient to see any psychiatrist and doesn't want her to take any medication. No suicidal ideation however. Family agreed to take her to [facility] ER.   I called [facility] ER and talked to Dr. Lam to expect her.     Greater than 45 minutes was spent today with patient ,more than 50% of time was counseling and coordination of care on the above issues      This note was created using Dragon dictation software, spelling errors may occur.     Rema Whiting MD        SUBJECTIVE   Arvind Leong is a 63 y.o. old female with a past medical history of bipolar, insomnia, diabetes, COPD on chronic oxygen supplementation, morbid obesity who presented to clinic today for further evaluation of her mental health disorder.  She sees [facility] but has decided to switch to a different psych clinic. [facility] appointment [facility]. She has not been sleeping well. Has all these bills hanging over her. Has to move back home because she's no longer able to afford her rent. She continues to work at [employer]. She feels \"bigheaded\" - \"stubborn\" but cannot explain further. She feels very depressed. She's been crying. She \"wants to live a normal life.\" she wants to be admitted to the hospital at [facility]. She's pacing at her apartment every day and not sleeping. She has Abilify 20 mg daily and " trazodone to take as needed but not effective.     Review of Systems:  Negative except as noted in HPI    The following portions of the patient's history were reviewed and updated as appropriate: past medical history, past surgical history, family history, allergies, current medications and problem list.    Medical History  Active Ambulatory (Non-Hospital) Problems    Diagnosis     Leg swelling     Dependence on continuous supplemental oxygen     Cardiomyopathy, unspecified type (H)     Atypical chest pain     Cognitive dysfunction in bipolar disorder (H)     Bipolar disorder, current episode mixed, moderate (H)     Noncompliance with medications     Anxiety     Vertigo     Scoliosis     Morbid obesity (H)     Lower Back Pain     Asthma     Urge Incontinence Of Urine     COPD (chronic obstructive pulmonary disease) (H)     Hyperlipidemia     Adult Sleep Apnea     Type 2 diabetes mellitus (H)     Past Medical History:   Diagnosis Date     Acute on chronic respiratory failure with hypoxia (H) 11/15/2016     Bipolar 1 disorder (H)      CAP (community acquired pneumonia) 11/15/2016     COPD with exacerbation (H) 11/15/2016     Diabetes mellitus, type II (H)      Hearing loss      Meniere's disease        Surgical History  She  has a past surgical history that includes pr removal anal fistula,subcutaneous; Tonsillectomy; and Cyst Removal (01/23/2001).    Social History  Reviewed, and she  reports that she quit smoking about 1 years ago. She has a 40.00 pack-year smoking history. she has never used smokeless tobacco. She reports that she drinks alcohol. She reports that she does not use drugs.    Family History  Reviewed, and family history includes Aneurysm in her father; Bipolar disorder in her brother and nephew; Breast cancer (age of onset: 51) in her maternal grandmother; Cancer in her paternal grandmother and paternal uncle; Heart disease in an other family member; Heart disease (age of onset: 70) in her father;  Kidney failure in her maternal grandmother; Mental illness in her maternal grandfather; No Medical Problems in her brother and sister; Pacemaker in her mother; Ulcers (age of onset: 76) in her father.    Medications  Reviewed and reconciled    Allergies  Allergies   Allergen Reactions     Lurasidone Unknown and Other (See Comments)     Face turned red  (Brand name = Latuda) Face turned red         OBJECTIVE  Physical Exam:  Vital signs: /69 (Patient Site: Left Arm, Patient Position: Sitting, Cuff Size: Adult Regular)   Pulse 99   Temp 99.5  F (37.5  C) (Oral)   Wt 162 lb 9.6 oz (73.8 kg)   SpO2 (!) 89%   BMI 39.20 kg/m    Weight: 162 lb 9.6 oz (73.8 kg)    General appearance: pleasant, appears stated age, cooperative and in no distress  Neuro: alert oriented x 3, grossly normal otherwise  Psych: crying affect, mood and affect congruent, depressed.

## 2021-06-23 NOTE — TELEPHONE ENCOUNTER
"Pt calls and states \"I need to know why I'm walking so slow.\"  \"Got up extra early due to a  appt\" ->  coming to the house to assess repairs.    Oral intake so far today includes cereal with milk.  No nausea.  \"I'm just tired and have to push myself to do anything.\"  Has taken all meds today \"with just a sip of water.\"    Asked pt to check her current blood glucose ....  183.  Advised pt to drink generous water.    Pt states \"It was a busy weekend.\"  States \"Maybe I need a nap.\"  Pt intends to take a nap and call back if no improvement.    Juliette Troncoso RN BSBA  [facility] RN Triage     Reason for Disposition    Poor fluid intake probably causing dizziness     No severe symptoms assessed -> primary symptom appears to be fatigue due to \"busy weekend and getting up extra early to greet the .\"    Protocols used: DIZZINESS-A-OH      "

## 2021-06-23 NOTE — TELEPHONE ENCOUNTER
"Caller back to pt per pt's request ....    Pt asks \"How much water should I drink before I feel better?\"  Advised one 8-oz cup per hour as feasible.  However reminded pt to allow time for a nap as previously discussed.  Pt verbalizes understanding.  Agrees to plan.    Pt states \"I'm looking forward to babysitting my mother's cat over the next couple of days.\"    Will f/u as needed.    "

## 2021-06-23 NOTE — TELEPHONE ENCOUNTER
Who is calling:   patient  Reason for Call:   Patient states there was supposed to be a letter that was needed to be sent to Her employer [employer], stating bathrooms breaks and standing, the letter did not have a time frame or dure ation on it and will need this please add to letter and resend to [employer] in [place]  Date of last appointment with primary care:  1/14/2018  Has the patient been recently seen:  Yes  Okay to leave a detailed message: No

## 2021-06-23 NOTE — TELEPHONE ENCOUNTER
Patient is wanting Dr Whiting to write a note excusing her from work on 01/26/19, 01/27/19 and 01/29/19 due to she called into work due to her COPD. Patient states her next day back to work is Friday 02/01/19 and needs to give her employer the note at that time. Please call patient back at [phone].    Thank you!

## 2021-06-23 NOTE — TELEPHONE ENCOUNTER
RN Assessment/Reason for Call:   Okay to leave Detailed Message  Patient calling in, had spoken to  Triage Gretta; nurse told her to drink more water.  Drank more liquids.   Continues to feel dizzy  Wonders if she needs prednisone?    RN Action/Disposition:  Offered [facility] wants appt with PCP.  Appt PCP 1/28/19 3:10pm.  Call back if worse symptoms  Discussed home care measures.  Agrees to plan.     Merlyn Perez RN    [facility] Triage/med refill  1/21/2019  12:19 PM

## 2021-06-23 NOTE — TELEPHONE ENCOUNTER
RN cannot approve Refill Request    RN can NOT refill this medication medication last prescribed by [facility]. PCP to review.     Last office visit: 1/14/2019 Rema Whiting MD Last Physical: Visit date not found Last MTM visit: Visit date not found Last visit same specialty: 1/14/2019 Rema Whiting MD.  Next visit within 3 mo: Visit date not found  Next physical within 3 mo: Visit date not found      Jasiel Malone, [facility] Connection Triage/Med Refill 1/16/2019    Requested Prescriptions   Pending Prescriptions Disp Refills     ARIPiprazole (ABILIFY) 20 MG tablet 30 tablet 0     Sig: Take 1 tablet (20 mg total) by mouth daily.    There is no refill protocol information for this order        traZODone (DESYREL) 50 MG tablet 30 tablet 0     Sig: Take 1 tablet (50 mg total) by mouth at bedtime as needed for sleep.    Tricyclics/Misc Antidepressant/Antianxiety Meds Refill Protocol Passed - 1/16/2019  9:35 AM       Passed - PCP or prescribing provider visit in last year    Last office visit with prescriber/PCP: 1/14/2019 Rema Whiting MD OR same dept: 1/14/2019 Rema Whiting MD OR same specialty: 1/14/2019 Rema Whiting MD  Last physical: Visit date not found Last MTM visit: Visit date not found   Next visit within 3 mo: Visit date not found  Next physical within 3 mo: Visit date not found  Prescriber OR PCP: Rema Whiting MD  Last diagnosis associated with med order: 1. Bipolar disorder, current episode mixed, moderate (H)  - ARIPiprazole (ABILIFY) 20 MG tablet; Take 1 tablet (20 mg total) by mouth daily.  Dispense: 30 tablet; Refill: 0  - traZODone (DESYREL) 50 MG tablet; Take 1 tablet (50 mg total) by mouth at bedtime as needed for sleep.  Dispense: 30 tablet; Refill: 0    If protocol passes may refill for 12 months if within 3 months of last provider visit (or a total of 15 months).

## 2021-06-23 NOTE — PROGRESS NOTES
Received a message that patient wanted to cancel her clinic appointment with [facility] RN Jazmin for Thursday 1/31/18.  Left voicemail to return my call to reschedule this appointment as she expressed many concerns

## 2021-06-23 NOTE — PROGRESS NOTES
"The Clinic Care Guide called the patient  today at the request of the PCP to discuss possible clinic care coordination enrollment. Clinic care coordination was described to the patient and immediate needs were discussed. The patient agreed to enrollment in clinic care coordination and future appointments were scheduled for an RN care coordination assessment and an enrollment visit with the primary care physician and care guide. The patient was provided with contact information for the clinic care guide.    Spoke to patient today in length about Clinic Care Coordination, Patient broke down during our conversation due to how overwhelmed she is and has been recently. She stated there is so much stress in her life she doesn't know what to do or where to go for help.  She is having a difficulty with her medications as some of the ones she is taking together as prescribed make her \"whole body feel like she is floating\" She wonders is that normal.  Arvind does feel like CCC would be a great option for her as she did not know it existed. I did schedule her a RN assessment for 1/31/19 She feel like she would benefit for a reminder call on 1/29. Arvind will make a list of itemst she believes she needs help with as she was not able to go into much detail during out conversation- Her main concern was her medication    PCAM date 1/31/19 @ 2pm  "

## 2021-06-24 ENCOUNTER — COMMUNICATION - HEALTHEAST (OUTPATIENT)
Dept: FAMILY MEDICINE | Facility: CLINIC | Age: 66
End: 2021-06-24

## 2021-06-24 NOTE — TELEPHONE ENCOUNTER
Pt calling that she needs helps really bad.  Pt states she is out of medication and is mentally off balance.  Pt states she feels safe but wants to be seen.  Pt lives with her Mother - Mother states she will take pt to 's ED.  Hilda Macedo RN, [facility] Connection RN Triage    Reason for Disposition    Patient sounds very upset or troubled to the triager    Protocols used: ANXIETY AND PANIC ATTACK-A-AH

## 2021-06-24 NOTE — TELEPHONE ENCOUNTER
"Pt calling as her BG said \"HI\" on her glucometer right before she called in today  Last time she checked it prior was maybe a month ago  She couldn't find her glucometer with her move so the last time she checked it was about a month ago  Pt feels nervous about driving and feels shaky all over  She takes one metformin two times a day and she will take it  She says she andrea sorta has been taking it and not towards the end of the month  Drank a lot of water today  Today she has ate an omelet this am and 2 pieces of bread  She says she feels she is breathing rapid (?)  She feels confused that her bg was hi but not really confused and feels like she shouldn't go to work today. Pt has bipolar  She has to leave for work in another hour  Feels cold and shaking  Pt has a cold in her nose  Blown out mucous  Not much coughing  BG now says 188  Pt says her breathing is ok  She feels like sometimes she is not getting enough oxygen and her eyes becomes dark when she is   She feels good and not good at times  She says she is nervous about going to work and when she gets in the car she is ok.  Her mother went to work at [employer] and will take her at times to work. Pt says she lives with her Mother whom is 84    Zakia Stock, RN [facility] RN Triage    Lab Results   Component Value Date    HGBA1C 6.9 (H) 10/19/2018       Reason for Disposition    Blood glucose > 240 mg/dl (13 mmol/l)    Protocols used: DIABETES - HIGH BLOOD SUGAR-A-OH      "

## 2021-06-25 NOTE — PROGRESS NOTES
ASSESSMENT/ PLAN    1. Hospital discharge follow-up  2. Bipolar I disorder, most recent episode depressed (H)  3. Cognitive dysfunction in bipolar disorder (H)  Reconciled medications. Patient appears to understand how to take her medications. She knows she has an upcoming psychiatry appointment with [facility] on 3/29/19. Is seeing her therapist at [facility] every Saturday. She said she's not feeling any better and still feeling anxious and overwhelmed. She is living with her mother. She is not suicidal. Reassured patient.     4. Chronic obstructive pulmonary disease, unspecified COPD type (H)  Stable. On 2L oxygen.     5. Dependence on continuous supplemental oxygen    6. Type 2 diabetes mellitus without complication, without long-term current use of insulin (H)  a1c checked during hospitalization. 6.6. Improving. Continue with current regimen of metformin.    7. Morbid obesity (H)  Patient lost some weight. Reinforced high protein, low carbs diet. F/u in 6 months during annual physical.    8. Runny nose  Doesn't appear to have a uri. Will treat empirically with flonase.   - fluticasone (FLONASE) 50 mcg/actuation nasal spray; 1-2 sprays into each nostril daily.  Dispense: 16 g; Refill: 1    9. Scoliosis  10. Chronic midline thoracic back pain  ttp thoracic region on exam today. Hasn't had MRI. Will send her to Spine for further eval. She has been sent before but hasn't gone as she was dealing with psych.   - Ambulatory referral to [facility]    11. Encounter for screening mammogram for breast cancer  - Mammo Screening Bilateral; Future    Return in 6 months for annual physical.     This note was created using Dragon dictation software, spelling errors may occur.     Rema Whiting MD        SUBJECTIVE   Arvind Leong is a 63 y.o. old female with a past medical history of bipolar depression, obesity, DM2, COPD on continous O2 supplementation who presented to clinic today for further evaluation of  "hospital discharge follow up. She was hospitalized for psych issues 3/2/19 to 3/12/19 for exacerbation of bipolar depression. Medication change includes adding Wellbutrin 75 mg daily to her medication regimen. I reviewed her hospitalization in its entirety including notes, lab work and recommendation.     Today patient is telling me that she is Not feeling any better compared to when she was discharged from [facility]. She just quit her job at [employer]. She has a lot of anxiety in her still. She's not sure what she's anxious about. She has an upcoming appointment with psychiatry and doesn't know what she's going to tell her psychiatrist. The appointment is 3/29/19 at [facility]. She feels anxious not all the time but periodically. She stands and sits at home and can't get comfortable. Not sure what she's anxious about. She already quit her job at [employer] and wants another job but she hasn't started looking yet. She's living with her mother now. She gets social security for financial support. Her mother helps her out financially as well. She doesn't feel that her medications are working for her mood. She felt good when she got out of the hospital but now she feels \"crabby\" again. \"crabby\" means \"super tired\" and \"anxious to do something.\" she is \"bored\" at home. She is living in her mother's trailer. Her uncle is staying with her and her mother in the trailer as he's going through an operation and needing help for the next week then he will go back to [place]. She feels crowded in the trailer. All she does is play cards and watch TV. It's safe for walking. Seeing therapist weekly (Arti Grant at [facility] in [place]).       Review of Systems:  Negative except as noted in HPI    The following portions of the patient's history were reviewed and updated as appropriate: past medical history, past surgical history, family history, allergies, current medications and problem list.    Medical History  Active " Ambulatory (Non-Hospital) Problems    Diagnosis     Bipolar I disorder, most recent episode depressed (H)     Dependence on continuous supplemental oxygen     Cognitive dysfunction in bipolar disorder (H)     Noncompliance with medications     Anxiety     Vertigo     Scoliosis     Morbid obesity (H)     Lower Back Pain     Asthma     Urge Incontinence Of Urine     COPD (chronic obstructive pulmonary disease) (H)     Hyperlipidemia     Adult Sleep Apnea     Type 2 diabetes mellitus (H)     Past Medical History:   Diagnosis Date     Acute on chronic respiratory failure with hypoxia (H) 11/15/2016     Bipolar 1 disorder (H)      CAP (community acquired pneumonia) 11/15/2016     COPD with exacerbation (H) 11/15/2016     Diabetes mellitus, type II (H)      Hearing loss      Meniere's disease        Surgical History  She  has a past surgical history that includes pr removal anal fistula,subcutaneous; Tonsillectomy; and Cyst Removal (01/23/2001).    Social History  Reviewed, and she  reports that she quit smoking about 2 years ago. She has a 40.00 pack-year smoking history. she has never used smokeless tobacco. She reports that she drinks alcohol. She reports that she does not use drugs.    Family History  Reviewed, and family history includes Aneurysm in her father; Bipolar disorder in her brother and nephew; Breast cancer (age of onset: 51) in her maternal grandmother; Cancer in her paternal grandmother and paternal uncle; Heart disease in an other family member; Heart disease (age of onset: 70) in her father; Kidney failure in her maternal grandmother; Mental illness in her maternal grandfather; No Medical Problems in her brother and sister; Pacemaker in her mother; Ulcers (age of onset: 76) in her father.    Medications  Reviewed and reconciled    Allergies  Allergies   Allergen Reactions     Lurasidone Unknown and Other (See Comments)     Face turned red  (Brand name = Latuda) Face turned red  Face turned red          OBJECTIVE  Physical Exam:  Vital signs: BP (!) 86/50 (Patient Site: Left Arm, Patient Position: Sitting, Cuff Size: Adult Regular)   Pulse 66   Temp 97.9  F (36.6  C) (Oral)   Wt 158 lb 9.6 oz (71.9 kg)   SpO2 95%   BMI 36.86 kg/m    Weight: 158 lb 9.6 oz (71.9 kg)    General appearance: pleasant, appears stated age, cooperative and in no distress, on O2 supplementation at 2L.   Eyes: EOMs intact, PERRL, conjunctivae normal.   ENT: moist oral mucosa, posterior oropharynx normal, Thyroid normal to palpation, no lump or mass or tenderness  Lymph: no cervical/supraclavicular adenopathy  Respiratory: clear to auscultation bilaterally, good air movement throughout, no wheezing or crackles, speaking full sentences without difficulty  Cardiovascular: regular rate and rhythm, no murmur appreciated, no leg edema  Musculoskeletal: TTP of the midline thoracic region.   Skin: no rashes  Neuro: alert oriented x 3, grossly normal otherwise  Psych: anxious and depressed affect, tearful at times, appropriate conversation, insight poor.

## 2021-06-25 NOTE — TELEPHONE ENCOUNTER
Who is calling:  Arvind    Reason for Call:  Pt is requesting meds for a UTI.  Date of last appointment with primary care:   Okay to leave a detailed message: Yes

## 2021-06-25 NOTE — PATIENT INSTRUCTIONS - HE
Walk twice a day for 30 minutes each with your walker.   Keep your appointment on 3/29/2019 with the psychiatrist.     Please see spine specialist for your thoracic back pain    Eat LESS bread, rice, pasta, potatoes, pop/soda, sweet - these foods make your diabetes worse and hard for you to lose weight.      Eat MORE protein (chicken, fish, steak, nuts, beans, sausage, eggs) - these foods help you stay full longer and help with weight loss and diabetes

## 2021-06-25 NOTE — PROGRESS NOTES
"MTM Transition of Care Encounter  Assessment & Plan                                                     Post Discharge Medication Reconciliation Status: discharge medications reconciled and changed, per note/orders (see AVS)    1. Bipolar I disorder, most recent episode depressed (H)  Stable per patient report, however not adherent to bupropion as she had poor understanding of the indication of this medication.  Provided education that because this could potentially give her more energy and help with depression she should does this every morning.  She is going to take it now immediately after our discussion.  -Educated to restart bupropion and encourage adherence    2. Type 2 diabetes mellitus without complication, without long-term current use of insulin (H)  Noted nonadherence to metformin.  To improve the ease of administration I will switch her metformin immediate release to extended release so that she can dose most of the pills daily in the morning to improve adherence.  -Switch metformin immediate release to extended release    3. Asthma  Stable with as needed use of albuterol nebulizer.     4. Slow transit constipation  Stable with as needed use of senna.     Follow Up  Return in about 3 days (around 3/18/2019) for with PCP.    Subjective & Objective                                                       Arvind Leong is a 63 y.o. female called for a transitions of care visit. she was discharged from [facility] on March 12, 2019 for a polar 1 disorder, most recent episode depressed.  Notes that about a month ago she moved in with her mom, previously she was living independently in an apartment.  States that this is going well.    Chief Complaint: Hospital follow-up    Medication Adherence/Access: Some medication nonadherence due to misunderstanding.    Bipolar 1: She is taking her 20 mg of Abilify every morning, however states that she has not been taking bupropion because this is \"for pain.\"  " Reviewed indication for bupropion, and states that she will start this again did not realize what it was for.  Notes that her mood has been good since getting home from the hospital.  Denies signs or symptoms of adverse effects.    Diabetes: Continues to take metformin 1 tablet twice daily, however she does forget her second dose at times.  She would be interested in a regimen where she could take both tablets at the same time every day.  She does check her blood sugars every morning.  This morning was 140, however in general her blood sugars are in the 120s-130s fasting.  Lab Results   Component Value Date    HGBA1C 6.6 (H) 2019    HGBA1C 6.9 (H) 10/19/2018    HGBA1C 7.2 (H) 2018     Lab Results   Component Value Date    MICROALBUR 1.15 2018    LDLCALC 48 2018    CREATININE 0.75 2019     Asthma: She notes that her breathing has been okay, she has not been using her nebulizer because in the move she did not get this set up at her mother's house.  However uses her nebulizers as needed.    Slow transit constipation: Only uses senna as needed.    PMH: reviewed in EPIC   Allergies/ADRs: reviewed in EPIC   Alcohol: reviewed in EPIC   Tobacco:   Social History     Tobacco Use   Smoking Status Former Smoker     Packs/day: 1.00     Years: 40.00     Pack years: 40.00     Last attempt to quit: 2017     Years since quittin.1   Smokeless Tobacco Never Used     Recent Vitals:   BP Readings from Last 3 Encounters:   19 104/55   19 136/88   19 118/55      Wt Readings from Last 3 Encounters:   19 160 lb (72.6 kg)   19 150 lb (68 kg)   19 170 lb (77.1 kg)     ----------------    Much or all of the text in this note was generated through the use of Dragon Dictate voice-to-text software. Errors in spelling or words which seem out of context are unintentional. Sound alike errors, in particular, may have escaped editing.    The patient declined an after visit  summary    I spent 15 minutes with this patient today;  . All changes were made via collaborative practice agreement with Rema Whiting MD. A copy of the visit note was provided to the patient's provider.     Marcell Saez, PharmD, BCACP  Medication Management (MTM) Pharmacist  [facility]    Current Outpatient Medications   Medication Sig Dispense Refill     albuterol (PROVENTIL) 2.5 mg /3 mL (0.083 %) nebulizer solution Take 3 mL (2.5 mg total) by nebulization every 4 (four) hours as needed. 60 vial 0     ARIPiprazole (ABILIFY) 20 MG tablet Take 1 tablet (20 mg total) by mouth daily. 30 tablet 0     blood glucose test (CONTOUR NEXT TEST STRIPS) strips Use to check blood sugar once daily. 100 strip 1     BLOOD-GLUCOSE METER (CONTOUR NEXT METER MISC) Use As Directed.       buPROPion (WELLBUTRIN) 75 MG tablet Take 1 tablet (75 mg total) by mouth daily. 30 tablet 0     sodium chloride (OCEAN) 0.65 % nasal spray As needed four times a day, each nare 15 mL 1     traZODone (DESYREL) 50 MG tablet Take 0.5 tablets (25 mg total) by mouth at bedtime as needed, may repeat once for sleep. 30 tablet 0     metFORMIN (GLUCOPHAGE XR) 500 MG 24 hr tablet Take 2 tablets (1,000 mg total) by mouth daily with breakfast. 180 tablet 3     senna (SENOKOT) 8.6 mg tablet Take 1 tablet by mouth as needed for constipation. 30 tablet 0     No current facility-administered medications for this visit.

## 2021-06-25 NOTE — TELEPHONE ENCOUNTER
Left message for pt letting her know she would need an appt. Told her she could do e-visit or call back to schedule OV

## 2021-06-25 NOTE — TELEPHONE ENCOUNTER
PCP out of the office today. I would recommend at minimum and e-visit with a urine sample.  Conchita Saenz, DO

## 2021-06-26 ENCOUNTER — HEALTH MAINTENANCE LETTER (OUTPATIENT)
Age: 66
End: 2021-06-26

## 2021-06-26 NOTE — PATIENT INSTRUCTIONS - HE
1. Continue O2 supplementation 2 LPM with activities.   2. Sleep hygiene  3. CPAP at night and as needed  4. Referral to sleep center  5. INCRUSE one puff daily   6. Albuterol inhaler 2 puffs up to 6 times a day as needed  7. Referral to physical therapy  8. Weight loss  9. Decrease salt intake.  10. Follow up in 6 months

## 2021-06-26 NOTE — TELEPHONE ENCOUNTER
Patient is feeling a bit anxious about going to sleep b/c it's warm and she feels like she won't wake up if she uses it. She has her c-pap going but says is afraid to use it. Patient says her blood sugar is also 224. Advised she should use the c-pap since it is helping her with her breathing when she sleeps. Patient says she will use it.         Additional Information    Negative: RN needs further essential information from caller in order to complete triage    Negative: Requesting regular office appointment    Negative: [1] Caller requesting NON-URGENT health information AND [2] PCP's office is the best resource    Health Information question, no triage required and triager able to answer question    Protocols used: INFORMATION ONLY CALL-A-

## 2021-06-26 NOTE — TELEPHONE ENCOUNTER
Incoming call from pt requesting refill on test strips. Not on current med list for some reason. Rx pended. Pt has the contour next machine

## 2021-06-28 NOTE — PROGRESS NOTES
Progress Notes by Radha Townsend PT at 3/11/2020  8:30 AM     Author: Radha Townsend PT Service: -- Author Type: Physical Therapist    Filed: 3/11/2020 10:38 AM Encounter Date: 3/11/2020 Status: Attested    : Radha Townsend PT (Physical Therapist) Cosigner: Arnoldo Russo MD at 3/14/2020 12:22 AM    Attestation signed by Arnoldo Russo MD at 3/14/2020 12:22 AM    Agree with above assessment and plan.  Continue with physical therapy.     Arnoldo Balbuena MD  3/12/2020                    [facility] Certification Request    March 11, 2020      Patient: Arvind Leong  MR Number: [id number]  YOB: 1955  Date of Visit: 3/11/2020      Dear Dr. Mansfield,    Thank you for this referral.   We are seeing Arvind Leong for Physical Therapy of physical deconditioning.    Medicare and/or Medicaid requires physician review and approval of the treatment plan. Please review the plan of care and verify that you agree with the therapy plan of care by co-signing this note.      Plan of Care  Authorization / Certification Start Date: 03/11/20  Authorization / Certification End Date: 06/15/20  Authorization / Certification Number of Visits: 11  Communication with: Referral Source  Patient Related Instruction: Nature of Condition;Expected outcome;Treatment plan and rationale;Self Care instruction;Basis of treatment;Body mechanics;Posture;Precautions;Next steps  Times per Week: 1-2  Number of Weeks: 12  Number of Visits: 12  Discharge Planning: when PT goals are met  Precautions / Restrictions : see PMH  Therapeutic Exercise: ROM;Stretching;Strengthening  Neuromuscular Reeducation: balance/proprioception  Equipment: theraband      Goals:  Pt. will demonstrate/verbalize independence in self-management of condition in : 6 weeks  Pt. will be independent with home exercise program in : 6 weeks    Pt will: be able to perform 2' walk test with less difficulty and walk 100  yards within 8 weeks in order to improve her endurance and functional mobility.        If you have any questions or concerns, please don't hesitate to call.    Sincerely,      Radha Townsend, PT, DPT        Physician recommendation:     ___ Follow therapist's recommendation        ___ Modify therapy      *Physician co-signature indicates they certify the need for these services furnished within this plan and while under their care.      [facility]  Initial Evaluation    Patient Name: Arvind Leong  Date of evaluation: 3/11/2020  Referral Diagnosis: physical deconditioning  Referring provider: Arnoldo Russo*  Visit Diagnosis:     ICD-10-CM    1. Physical deconditioning  R53.81    2. Generalized muscle weakness  M62.81        Past Medical History:   Diagnosis Date   ? Acute on chronic respiratory failure with hypoxia (H) 11/15/2016   ? Bipolar 1 disorder (H)    ? CAP (community acquired pneumonia) 11/15/2016   ? COPD with exacerbation (H) 11/15/2016   ? Diabetes mellitus, type II (H)    ? Hearing loss    ? Meniere's disease        Assessment:      Arvind Leong is a 64 y.o. female who presents to therapy today with chief complaints of physical deconditioning. Symptoms began years ago. Difficulty with walking, standing, transfers, house work due to weakness and fatiguing easily. PT POC and goals have been discussed with patient and She  is agreeable to these. Patient is appropriate for skilled therapy services.    The POC is dynamic and will be modified on an ongoing basis.  Barriers to achieving goals as noted in the assessment section may affect outcome.  Prognosis to achieve goals is  fair   Pt. is appropriate for skilled PT intervention as outlined in the Plan of Care (POC).  Pt. is a good candidate for skilled PT services to improve pain levels and function.    Goals:  Pt. will demonstrate/verbalize independence in self-management of condition in : 6 weeks  Pt. will be  independent with home exercise program in : 6 weeks    Pt will: be able to perform 2' walk test with less difficulty and walk 100 yards within 8 weeks in order to improve her endurance and functional mobility.      Patient's expectations/goals are realistic.    Barriers to Learning or Achieving Goals:  Chronicity of the problem.  Co-morbidities or other medical factors.  see PM       Plan / Patient Instructions:      Plan of Care:   Authorization / Certification Start Date: 03/11/20  Authorization / Certification End Date: 06/15/20  Authorization / Certification Number of Visits: 11  Communication with: Referral Source  Patient Related Instruction: Nature of Condition;Expected outcome;Treatment plan and rationale;Self Care instruction;Basis of treatment;Body mechanics;Posture;Precautions;Next steps  Times per Week: 1-2  Number of Weeks: 12  Number of Visits: 12  Discharge Planning: when PT goals are met  Precautions / Restrictions : see PMH  Therapeutic Exercise: ROM;Stretching;Strengthening  Neuromuscular Reeducation: balance/proprioception  Equipment: theraband      POC and pathology of condition were reviewed with patient.  Pt. is in agreement with the Plan of Care  A Home Exercise Program (HEP) was initiated today.  Pt. was instructed in exercises by PT and patient was given a handout with detailed instructions.    Exercises:  Exercise #1: standing hip flex, hip abd, hip ext, heel/toe raises  Comment #1: sit to stand without use UE's  Exercise #2: walking program 2' daily walks- add 1' each week      Plan for next visit: give lori LOVE' step, balance and LE strengthening. Step test      Subjective:       Social information:   Occupation: [employer]   Work Status:Working part time    Hobbies: [employer]   Patient drives     History of Present Illness:    Arvind Leong is a 64 y.o. female who presents to therapy today with complaints of deconditioning. She doesn't feel motivated but wants to lose 40  "pounds or more. She has had therapy in the past but isn't doing those exercises. Sometimes has to use O2, but didn't bring it with her today. Usually runs around 90-92% O2 while at rest. Uses O2 as much as possible at home.      Functional limitations are described as occurring with: walking, standing, transfers, house work         Objective:      Note: Items left blank indicates the item was not performed or not indicated at the time of the evaluation.    Examination  1. Physical deconditioning     2. Generalized muscle weakness         Involved side: Bilateral  Posture Observation: scoliosis noted with R shoulder higher than L  Some difficulty following 1 and 2 step commands  Sensation: intact per subjective  Strength: 4/5   Sit to stand: 10x/30 seconds with use of UE's  Sit to stand: 12x/30 seconds with use of UE's  O2 at rest: 90%, HR: 84 BPM  2' walk test: 89m w/o AD. O2 and HR: 93% and 93 BPM. Patient reports fatigue at 1' 15\"  TU', 12', 15' without balance difficulties  No difficulty with rhomberg  Some decreased coordination with turning in a [place]      After testing, patient asked how she felt: \"I feel bloated from eating too much food\".       Treatment Today     TREATMENT MINUTES COMMENTS   Evaluation 20 Discussed PT POC and pathology of condition.    Self-care/ Home management 8 Answered patient questions regarding management of condition. Educated patient that weight loss primarily comes from diet changes and recommend that she f/u with a dietician if she would like to work on weight loss. Instructed patient to bring O2 to next session. Instructed patient to start walking program at home by starting with 2' walks daily around her home or outdoors and then adding 1' each week to her walks.   Manual therapy     Neuromuscular Re-education     Therapeutic Activity     Therapeutic Exercises 10 Began HEP-see flowsheet.    Gait training     Modality__________________                Total 38    Blank areas " are intentional and mean the treatment did not include these items.          PT Evaluation Code: (Please list factors)  Patient History/Comorbidities: see PMH  Examination: physical deconditioning  Clinical Presentation: stable  Clinical Decision Making: low    Patient History/  Comorbidities Examination  (body structures and functions, activity limitations, and/or participation restrictions) Clinical Presentation Clinical Decision Making (Complexity)   No documented Comorbidities or personal factors 1-2 Elements Stable and/or uncomplicated Low   1-2 documented comorbidities or personal factor 3 Elements Evolving clinical presentation with changing characteristics Moderate   3-4 documented comorbidities or personal factors 4 or more Unstable and unpredictable High              Radha Townsend, PT, DPT  3/11/2020  8:33 AM

## 2021-06-29 NOTE — PROGRESS NOTES
Progress Notes by Valentina Dickens PT at 8/13/2020  8:00 AM     Author: Valentina Dickens PT Service: -- Author Type: Physical Therapist    Filed: 8/13/2020  9:17 AM Encounter Date: 8/13/2020 Status: Attested    : Valentina Dickens PT (Physical Therapist) Cosigner: Conchita Saenz DO at 8/13/2020  9:48 AM    Attestation signed by Conchita Saenz DO at 8/13/2020  9:48 AM    Agree with plan.  Conchita Saenz DO                    [facility] Certification Request    August 13, 2020      Patient: Arvind Leong  MR Number: [id number]  YOB: 1955  Date of Visit: 8/13/2020      Dear Dr. Saenz:    Thank you for this referral.   We are seeing Arvind Leong for Physical Therapy of Tendinitis of left rotator cuff .    Medicare and/or Medicaid requires physician review and approval of the treatment plan. Please review the plan of care and verify that you agree with the therapy plan of care by co-signing this note.      Plan of Care  Authorization / Certification Start Date: 08/13/20  Authorization / Certification End Date: 11/11/20  Authorization / Certification Number of Visits: blue + adv/mn care  cert req  Patient Related Instruction: Nature of Condition;Treatment plan and rationale;Self Care instruction;Basis of treatment;Posture;Next steps  Times per Week: 1-2  Number of Weeks: 6-12  Number of Visits: up to 12 PRN  Discharge Planning: HEP, self management  Precautions / Restrictions : none  Therapeutic Exercise: ROM;Stretching;Strengthening  Neuromuscular Reeducation: posture;kinesio tape  Manual Therapy: myofascial release;strain counterstrain      Goals:  Pt. will demonstrate/verbalize independence in self-management of condition in : 12 weeks  Pt. will be independent with home exercise program in : 6 weeks  Patient will perform repetitive movement in : arm;for work;with no pain;in 12 weeks;Comment  Comment: able to perform tasks including reaching, bagging items for customers at work    Pt  will: be able to dress lower body without shoulder pain in 8 weeks.  Pt will: be able to reach above shoulder height to reach into cupboard, wash hair without pain in 8 weeks.        If you have any questions or concerns, please don't hesitate to call.    Sincerely,      Valentina Dickens, PT        Physician recommendation:     ___ Follow therapist's recommendation        ___ Modify therapy      *Physician co-signature indicates they certify the need for these services furnished within this plan and while under their care.    [facility]   Shoulder Initial Evaluation    Patient Name: Arvind Leong  Date of evaluation: 8/13/2020  Visit #1/up to 12 (cert to 11/11/20)  Referral Diagnosis: Tendinitis of left rotator cuff   Referring provider: Conchita Saenz DO  Visit Diagnosis:     ICD-10-CM    1. Acute pain of left shoulder  M25.512    2. Shoulder tendonitis, left  M75.82        Assessment:   Arvind Leong is a 65 y.o. female who presents to therapy today with chief complaints of (L) shoulder and arm pain. Onset date of sx was 7/20/20.  Functional impairments include reaching, lifting, work duties, dressing lower body, grooming.  Clinical findings include scoliosis with resulting trunk/shoulder asymmetry, decreased shoulder A/PROM with pain, pain/weakness with resisted testing, tenderness of (L) rotator cuff, (+) impingement test. Patient was unable to lie supine for any testing due to COPD.        Pt. is appropriate for skilled PT intervention as outlined in the Plan of Care (POC).    Goals:  Pt. will demonstrate/verbalize independence in self-management of condition in : 12 weeks  Pt. will be independent with home exercise program in : 6 weeks  Patient will perform repetitive movement in : arm;for work;with no pain;in 12 weeks;Comment  Comment: able to perform tasks including reaching, bagging items for customers at work    Pt will: be able to dress lower body without shoulder pain in 8 weeks.  Pt  will: be able to reach above shoulder height to reach into cupboard, wash hair without pain in 8 weeks.      Patient's expectations/goals are realistic.    Barriers to Learning or Achieving Goals:  Hearing loss.       Plan / Patient Instructions:        Plan of Care:   Authorization / Certification Start Date: 20  Authorization / Certification End Date: 20  Authorization / Certification Number of Visits: blue + adv/mn care  cert req  Patient Related Instruction: Nature of Condition;Treatment plan and rationale;Self Care instruction;Basis of treatment;Posture;Next steps  Times per Week: 1-2  Number of Weeks: 6-12  Number of Visits: up to 12 PRN  Discharge Planning: HEP, self management  Precautions / Restrictions : none  Therapeutic Exercise: ROM;Stretching;Strengthening  Neuromuscular Reeducation: posture;kinesio tape  Manual Therapy: myofascial release;strain counterstrain      POC and pathology of condition were reviewed with patient.  Pt. is in agreement with the Plan of Care  A Home Exercise Program (HEP) was initiated today.  Plan for next visit: manual therapy, kinesio tape, progression of home program.   .   Subjective:       Social information:   Living Situation:   Occupation:[employer]   Work Status:Working part time   Equipment Available: None    History of Present Illness:    Arvind is a 65 y.o. female who presents to therapy today with complaints of (L) shoulder and arm pain. Date of onset/duration of symptoms is 20. Onset was insidious. Symptoms are intermittent. She denies history of similar symptoms. She describes their previous level of function as not limited  She reports pain with movement, reaching in to refrigerator, behind back, overhead, donning pants. Pain with lifting, movements at work. She is (R) handed.      Pain Ratin  Pain description: pain    Functional limitations are described as occurring with:   personal cares dressing lower body and washing  hair  reaching at shoulder height and overhead  performing routine daily activities  reaching behind back  Reaching/for/buckling seat belt    Patient reports benefit from:  heat    Past Medical History:   Diagnosis Date   ? Acute on chronic respiratory failure with hypoxia (H) 11/15/2016   ? Bipolar 1 disorder (H)    ? CAP (community acquired pneumonia) 11/15/2016   ? COPD with exacerbation (H) 11/15/2016   ? Diabetes mellitus, type II (H)    ? Hearing loss    ? Meniere's disease      Past Surgical History:   Procedure Laterality Date   ? CYST REMOVAL  01/23/2001   ? OH REMOVAL ANAL FISTULA,SUBCUTANEOUS      Description: Fist[place]-in-ano Repair;  Recorded: 01/08/2010; x2   ? TONSILLECTOMY       Patient Active Problem List   Diagnosis   ? Scoliosis   ? Morbid obesity (H)   ? Lower Back Pain   ? Asthma   ? Urge Incontinence Of Urine   ? COPD (chronic obstructive pulmonary disease) (H)   ? Hyperlipidemia   ? Adult Sleep Apnea   ? Type 2 diabetes mellitus (H)   ? Anxiety   ? Vertigo   ? Cognitive dysfunction in bipolar disorder (H)   ? Noncompliance with medications   ? Dependence on continuous supplemental oxygen   ? Bipolar I disorder, most recent episode depressed (H)   ? Runny nose   ? CAP (community acquired pneumonia) due to Chlamydia species   ? Pneumonia of right lower lobe due to infectious organism          Objective:      Note: Items left blank indicates the item was not performed or not indicated at the time of the evaluation.    Patient Outcome Measures :    Shoulder Pain and Disability Index (SPADI) in %: 94 (113/120)     Scores range from 0-100%, where a score of 0% represents minimal pain and maximal function. The minimal clinically important difference is a score reduction of 10%.    Shoulder Examination  1. Acute pain of left shoulder     2. Shoulder tendonitis, left       Precautions/Restrictions: None  Involved side: Left  Posture Observation:   Significant scoliosis with posture asymmetries.   Side  bent to (L).    Shoulder/Elbow ROM   *Pt unable to lie supine due to COPD*  Date:      Shoulder and Elbow ROM ( )   AROM in degrees AROM in degrees AROM in degrees    Right Left Right Left Right Left   Shoulder Flexion (0-180 )  92       Shoulder Abduction (0-180 )  65 (+)       Shoulder Extension (0-60 )  32 (+)       Shoulder ER (0-90 )         Shoulder IR (0-70 )         Shoulder IR/Ext  To lat glut (+)       Elbow Flexion (150 )         Elbow Extension (0 )          PROM in degrees PROM in degrees PROM in degrees   *Performed in sitting* Right Left Right Left Right Left   Shoulder Flexion (0-180 )  114 (+)       Shoulder Abduction (0-180 )  101 (+)       Shoulder Extension (0-60 )         Shoulder ER (0-90 )  45 (+)       Shoulder IR (0-70 )  NA       Elbow Flexion (150 )         Elbow Extension (0 )           Shoulder/Elbow Strength  Date:      Shoulder/Elbow Strength (/5)  Manual Muscle Test (MMT) MMT MMT MMT    Right Left Right Left Right Left   Shoulder Flexion  4+       Supraspinatus  4-       Shoulder Abduction  4- (+)       Shoulder Extension         Shoulder External Rotation  4 (+) mild       Shoulder Internal Rotation  4+       Elbow Flexion  5- (+) mild       Elbow Extension  5- (+) mild       Other:         Other:           Shoulder Special Tests     Impingement Cluster Right (+/-) Left (+/-) Rotator Cuff Tests Right (+/-) Left (+/-)   Hooper-Shahid  pos Drop Arm Sign     Painful Arc  neg Hornblowers     Infraspinatus Test   ERLS     AC Tests Right (+/-) Left (+/-) IRLS     Active Compression   Labral Tests Right (+/-) Left (+/-)   Crossbody Adduction   Biceps Load Test II     AC Resisted Extension   Jerk Test     GH Instability Tests Right (+/-) Left (+/-) Lashonda Test     Sulcus Sign   Biceps Tests Right (+/-) Left (+/-)   Apprehension   Speed     Relocation   Yergasons     Other:   Other:     Other:   Other:       Flexibility:     Palpation:  Mod/major tenderness of (L) supraspinatus, (L)  "infraspinatus, teres rasta/minor tendons      Treatment Today    TREATMENT MINUTES COMMENTS   Evaluation 35 Plan of care and goals developed in collaboration with patient.   Discussed findings/condition, related anatomy.   Self-care/ Home management     Manual therapy 8 Induction, indirect, direct techniques utilized as appropriate for optimal tissue release.   MFR/SCS - (L) supraspinatus, (L) teres minor   Neuromuscular Re-education     Therapeutic Activity     Therapeutic Exercises 15 Instruct in icing vs. Heat for home.   .kinesio tape - (L) rotator cuff. Patient educated in tape wear and issued handout.   Exercises per flow sheet.   Exercises:  Exercise #1: standing flex w/wand  Comment #1: 10 x 5\"  Exercise #2: standing abd w/wand  Comment #2: 10 x 5\"  Exercise #3: standing ext w/wand   Comment #3: 10 x 5\"     Gait training     Modality__________________                Total 58    Blank areas are intentional and mean the treatment did not include these items.     PT Evaluation Code: (Please list factors)  Patient History/Comorbidities: mult/ see PMH, prob list  Examination: 2  Clinical Presentation: stable  Clinical Decision Making: low    Patient History/  Comorbidities Examination  (body structures and functions, activity limitations, and/or participation restrictions) Clinical Presentation Clinical Decision Making (Complexity)   No documented Comorbidities or personal factors 1-2 Elements Stable and/or uncomplicated Low   1-2 documented comorbidities or personal factor 3 Elements Evolving clinical presentation with changing characteristics Moderate   3-4 documented comorbidities or personal factors 4 or more Unstable and unpredictable High                Valentina Dickens PT  8/13/2020                        "

## 2021-07-03 NOTE — ADDENDUM NOTE
Addendum Note by Rema Whiting MD at 4/1/2019  9:35 AM     Author: Rema Whiting MD Service: -- Author Type: Physician    Filed: 4/1/2019  9:35 AM Encounter Date: 3/30/2019 Status: Signed    : Rema Whiting MD (Physician)    Addended by: REMA WHITING on: 4/1/2019 09:35 AM        Modules accepted: Orders

## 2021-07-03 NOTE — ADDENDUM NOTE
Addendum Note by Rema Whiting MD at 3/27/2019  9:56 PM     Author: Rema Whiting MD Service: -- Author Type: Physician    Filed: 3/27/2019  9:56 PM Encounter Date: 3/25/2019 Status: Signed    : Rema Whiting MD (Physician)    Addended by: REMA WHITING on: 3/27/2019 09:56 PM        Modules accepted: Orders

## 2021-07-03 NOTE — ADDENDUM NOTE
Addendum Note by Lily Alejandro MD at 9/12/2017  1:24 PM     Author: Lily Alejandro MD Service: -- Author Type: Physician    Filed: 9/12/2017  1:24 PM Encounter Date: 9/12/2017 Status: Signed    : Lily Alejandro MD (Physician)    Addended by: LILY ALEJANDRO on: 9/12/2017 01:24 PM        Modules accepted: Orders

## 2021-07-03 NOTE — ADDENDUM NOTE
Addendum Note by Lily Alejandro MD at 6/7/2017  5:06 PM     Author: Lily Alejandro MD Service: -- Author Type: Physician    Filed: 6/7/2017  5:06 PM Encounter Date: 6/5/2017 Status: Signed    : Lily Alejandro MD (Physician)    Addended by: LILY ALEJANDRO on: 6/7/2017 05:06 PM        Modules accepted: Orders

## 2021-07-03 NOTE — ADDENDUM NOTE
Addendum Note by Rema Whiting MD at 12/16/2018  8:14 PM     Author: Rema Whiting MD Service: -- Author Type: Physician    Filed: 12/16/2018  8:14 PM Encounter Date: 12/16/2018 Status: Signed    : Rema Whiting MD (Physician)    Addended by: REMA WHITING on: 12/16/2018 08:14 PM        Modules accepted: Orders

## 2021-07-03 NOTE — ADDENDUM NOTE
Addendum Note by Rema Whiting MD at 11/5/2018  2:56 PM     Author: Rema Whiting MD Service: -- Author Type: Physician    Filed: 11/5/2018  2:56 PM Encounter Date: 11/5/2018 Status: Signed    : Rema Whiting MD (Physician)    Addended by: REMA WHITING on: 11/5/2018 02:56 PM        Modules accepted: Orders

## 2021-07-03 NOTE — ADDENDUM NOTE
Addendum Note by Rema Whiting MD at 4/1/2019  1:09 PM     Author: Rema Whiting MD Service: -- Author Type: Physician    Filed: 4/1/2019  1:09 PM Encounter Date: 3/30/2019 Status: Signed    : Rema Whiting MD (Physician)    Addended by: REMA WHITING on: 4/1/2019 01:09 PM        Modules accepted: Orders

## 2021-07-04 NOTE — PROGRESS NOTES
Progress Notes by Arnoldo Russo MD at 6/15/2021  8:30 AM     Author: Arnoldo Russo MD Service: -- Author Type: Physician    Filed: 7/3/2021 11:15 PM Encounter Date: 6/15/2021 Status: Signed    : Arnoldo Russo MD (Physician)       PULMONARY OUTPATIENT FOLLOW UP NOTE    Assessment:   1. COPD on home O2  Spirometry showed FEV1 0.52 L (31%), no significant post bronchodilator response.   Continue LAMA daily. Continue albuterol HFA as needed  Continue O2 2 LPM with activities.   Referral to PT/pulmonary rehab.   2. ABDIFATAH  Sleep Study done on 2/17/2014 showed AHI 14.7, RDI 14.7, lateral , REM AHI 45, Supine AHI 14.3. PML index 21.7. Mean O2 sat 87%, desaturation heide 61%, Time of SpO2 < 89% 44 minutes out of 138 minutes of diagnostic time. CPAP 7 cmH20 was therapeutic.   Patent is using her CPAP machine intermittently.   Sleep hygiene was discussed, increase compliance of CPAP at night.   Referral to sleep clinic    3. Bipolar / Anxiety disorder  4. Back pain  Secondary to thoracolumbar scoliosis and obesity.   Weight loss was discussed.   5. Physical deconditioning   6. Morbid obesity Body mass index is 40.77 kg/m .      Plan:   1. Continue O2 supplementation 2 LPM with activities.   2. Sleep hygiene  3. CPAP at night and as needed  4. Referral to sleep center  5. INCRUSE one puff daily   6. Albuterol inhaler 2 puffs up to 6 times a day as needed  7. Referral to physical therapy  8. Weight loss  9. Decrease salt intake.  10. Follow up in 6 months    Arnoldo Mansfield  Pulmonary / Critical Care  6/15/2021    CC:      Chief Complaint   Patient presents with   ? COPD   ? Follow Up     Trouble sleeping       HPI:       Arvind Leong is an 66 y.o. female who presents for follow up.   Patient has history of severe COPD on home O2, ABDIFATAH, bipolar / anxiety disorder, Meniere disease.   Reports doing well since last visit.   Unchanged exertional dyspnea,  able to walk over one block, denies cough, occasional wheezes. Uses LAMA daily, albuterol HFA as needed.   No chest pain, decrease swelling of LEs. No orthopnea or PND.   Stopped using CPAP machine.   No acid reflux symptoms.     Goes to bed at 10 PM, gets up at 5:45 am. Feels tired and fatigue in AM, uses intermittently her CPAP machine. One hour nap.   Previous tobacco use 1/2 ppd for 47 years. Quit 2/2017.    Past Medical History:   Diagnosis Date   ? Acute on chronic respiratory failure with hypoxia (H) 11/15/2016   ? Bipolar 1 disorder (H)    ? CAP (community acquired pneumonia) 11/15/2016   ? Cervical high risk HPV (human papillomavirus) test positive 3/5/2018    3/5/18 NIL pap, +HR HPV (not 16/18) 5/21/21 NIL pap, neg HPV. Plan: await provider   ? COPD with exacerbation (H) 11/15/2016   ? Diabetes mellitus, type II (H)    ? Hearing loss    ? Meniere's disease      Medications:     Current Outpatient Medications:   ?  albuterol (PROAIR HFA;PROVENTIL HFA;VENTOLIN HFA) 90 mcg/actuation inhaler, Inhale 2 puffs every 6 (six) hours as needed for wheezing or shortness of breath., Disp: 1 Inhaler, Rfl: 11  ?  albuterol (PROVENTIL) 2.5 mg /3 mL (0.083 %) nebulizer solution, Take 3 mL (2.5 mg total) by nebulization every 4 (four) hours as needed for wheezing or shortness of breath (or cough)., Disp: 30 vial, Rfl: 0  ?  ARIPiprazole (ABILIFY) 10 MG tablet, Take 10 mg by mouth daily., Disp: , Rfl:   ?  buPROPion (WELLBUTRIN) 75 MG tablet, Take 75 mg by mouth 2 (two) times a day., Disp: , Rfl:   ?  OXYGEN-AIR DELIVERY SYSTEMS [facility], Use 2 L As Directed. 2L pulsing [facility], Disp: , Rfl:   ?  tiotropium (SPIRIVA) 18 mcg inhalation capsule, Place 1 capsule (2 puffs total) into inhaler and inhale daily., Disp: 30 capsule, Rfl: 11    Social History     Socioeconomic History   ? Marital status:      Spouse name: Not on file   ? Number of children: Not on file   ? Years of education: Not on file   ? Highest education  level: Not on file   Occupational History   ? Occupation: [employer]- quit 2019   Social Needs   ? Financial resource strain: Not on file   ? Food insecurity     Worry: Not on file     Inability: Not on file   ? Transportation needs     Medical: Not on file     Non-medical: Not on file   Tobacco Use   ? Smoking status: Former Smoker     Packs/day: 1.00     Years: 40.00     Pack years: 40.00     Quit date: 2017     Years since quittin.3   ? Smokeless tobacco: Never Used   Substance and Sexual Activity   ? Alcohol use: Yes     Comment: Occasional    ? Drug use: No   ? Sexual activity: Never   Lifestyle   ? Physical activity     Days per week: Not on file     Minutes per session: Not on file   ? Stress: Not on file   Relationships   ? Social connections     Talks on phone: Not on file     Gets together: Not on file     Attends Taoism service: Not on file     Active member of club or organization: Not on file     Attends meetings of clubs or organizations: Not on file     Relationship status: Not on file   ? Intimate partner violence     Fear of current or ex partner: Not on file     Emotionally abused: Not on file     Physically abused: Not on file     Forced sexual activity: Not on file   Other Topics Concern   ? Not on file   Social History Narrative    2019The patient lives with her mother.; had worked at [employer] as [employer] but quit 2019.     Quit smoking ; no etoh problems    / x 2 ; no kids     Family History   Problem Relation Age of Onset   ? Aneurysm Father    ? Heart disease Father 70        bypass in 70s, AAA rupture at age 77    ? Ulcers Father 76   ? Pacemaker Mother    ? Bipolar disorder Brother    ? Breast cancer Maternal Grandmother 51   ? Kidney failure Maternal Grandmother    ? Cancer Paternal Grandmother         ?? lung   ? Cancer Paternal Uncle         ?? lung   ? Mental illness Maternal Grandfather    ? Heart disease Other         uncle   ? No Medical  "Problems Sister         two siblings abuse chemicals   ? No Medical Problems Brother    ? Bipolar disorder Nephew      Review of Systems  A 12 point comprehensive review of systems was negative except as noted.      Objective:     Vitals:    06/15/21 0839   BP: 104/62   Pulse: 70   SpO2: 96%   Weight: 177 lb (80.3 kg)   Height: 4' 7.25\" (1.403 m)     Wt Readings from Last 3 Encounters:   06/15/21 177 lb (80.3 kg)   05/21/21 178 lb (80.7 kg)   04/30/21 187 lb 6 oz (85 kg)       Gen: obese, awake, alert, no distress  HEENT: pink conjunctiva, moist mucosa, Mallampati III/IV  Neck: no thyromegaly, masses or JVD  Lungs: clear  CV: regular, no murmurs or gallops appreciated  Back scoliosis, pain paraspinal muscle close to T2/L1 area  Abdomen: soft, distended, NT, BS wnl  Ext: trace edema  Neuro: CN II-XII intact, non focal      Diagnostic tests:     Sleep Study (2/17/2014)  AHI 14.7, RDI 14.7, lateral , REM AHI 45, Supine   PML index 21.7  Mean O2 sat 87%, desaturation heide 61%, Time of SpO2 < 89% 44 minutes out of 138 minutes of diagnostic time.   CPAP 7 cmH20 was therapeutic     PFT's 3/8/16  FEV1/FVC is 81  FEV1 is 0.52L (31%) predicted and is reduced.  FVC is 0.64L (30%) predicted and reduced.  There was no improvement in spirometry after a single inhaled dose of bronchodilator.  TLC is 4.05L (118%) predicted and is normal.  RV is 3.26L (226%) predicted and is increased.  Flow volume loops indicate scooped expiratory limb.    XR CHEST PA AND LATERAL  5/17/2017 4:54 PM  INDICATION: Hypotension, unspecified  COMPARISON: 11/2/2016  FINDINGS: Marked scoliosis of the thoracic spine. Heart size upper limits of normal. Pulmonary vascular is normal. The lungs and pleural spaces are clear. Little change from previous.    CTA CHEST PE RUN 9/26/2017 12:03 PM  INDICATION: acute on chronic respiratory failure  COMPARISON: 10/31/2013  ANGIOGRAM CHEST: There is moderate motion artifact which results in regions of " modeled vascular enhancement. In the highest degree of vascular blurring and mottled enhancement, it is impossible to exclude small emboli but no convincing evidence for emboli present. The overall appearance is very similar to the previous exam.  LUNGS AND PLEURA: Mild peripheral bilateral atelectasis or scar also similar to previous exam, no new pneumonia.  MEDIASTINUM: Small reactive lymph nodes within anterior mediastinum unchanged, no suspicious adenopathy. Heart size upper limits of normal. Tortuous thoracic aorta.  LIMITED UPPER ABDOMEN: Negative.  MUSCULOSKELETAL: S-shaped scoliosis of thoracic and lumbar spine unchanged.  CONCLUSION:  1.  No significant change. No convincing evidence of pulmonary embolism.  2.  Stable atelectasis or scar, left lung base. Scoliosis.    XR CHEST 2 VIEWS  DATE/TIME: 8/28/2019 3:12 PM  INDICATION: cough  COMPARISON: 7/3/2019    FINDINGS: No pleural fluid or pneumothorax. No consolidative airspace disease identified. There is interstitial edema. The cardiomediastinal silhouette is not well assessed. There is lateral curvature of the spine.     Echocardiogram 1/3/2014  EF 75%, IVSd 1.3  Normal RV  No significant valvular disease.

## 2021-07-04 NOTE — LETTER
Letter by Arlette Shaikh RN at      Author: Arlette Shaikh RN Service: -- Author Type: --    Filed:  Encounter Date: 6/7/2021 Status: (Other)         Arvind Leong  [address] [place] [address] [address] [place] [place] [address]             June 7, 2021         Dear Ms. Leong,    We are happy to inform you that your recent Pap smear and Human Papillomavirus (HPV) test results are normal and negative.    It is recommended that you have your next Pap smear and Human Papillomavirus (HPV) test in 1 year. You will also need to return to the clinic every year for an annual wellness visit.    If you have additional questions regarding this result, please contact our office and we will be happy to assist you.      Sincerely,    [facility] Team

## 2021-07-06 VITALS
RESPIRATION RATE: 16 BRPM | HEART RATE: 78 BPM | DIASTOLIC BLOOD PRESSURE: 69 MMHG | BODY MASS INDEX: 41 KG/M2 | WEIGHT: 178 LBS | SYSTOLIC BLOOD PRESSURE: 102 MMHG

## 2021-07-06 VITALS
HEIGHT: 55 IN | BODY MASS INDEX: 40.96 KG/M2 | OXYGEN SATURATION: 96 % | SYSTOLIC BLOOD PRESSURE: 104 MMHG | WEIGHT: 177 LBS | DIASTOLIC BLOOD PRESSURE: 62 MMHG | HEART RATE: 70 BPM

## 2021-07-06 ASSESSMENT — PATIENT HEALTH QUESTIONNAIRE - PHQ9: SUM OF ALL RESPONSES TO PHQ QUESTIONS 1-9: 12

## 2021-07-07 NOTE — TELEPHONE ENCOUNTER
Left message to call back for: Refill  Information to relay to patient:  MD message below. Pt does NOT need to check blood sugars per MD

## 2021-07-07 NOTE — TELEPHONE ENCOUNTER
Please call patient: She does need to check her blood sugar.  Please follow-up with me in 3 months from the last visit for diabetes recheck.

## 2021-07-08 ASSESSMENT — ASTHMA QUESTIONNAIRES: ACT_TOTALSCORE: 16

## 2021-07-14 PROBLEM — F32.A DEPRESSION: Status: RESOLVED | Noted: 2019-03-02 | Resolved: 2019-03-03

## 2021-07-14 PROBLEM — F29 PSYCHOSIS, UNSPECIFIED PSYCHOSIS TYPE (H): Status: RESOLVED | Noted: 2019-03-02 | Resolved: 2019-03-04

## 2021-07-30 ENCOUNTER — OFFICE VISIT (OUTPATIENT)
Dept: FAMILY MEDICINE | Facility: CLINIC | Age: 66
End: 2021-07-30
Payer: COMMERCIAL

## 2021-07-30 VITALS
HEIGHT: 55 IN | HEART RATE: 79 BPM | BODY MASS INDEX: 41.42 KG/M2 | WEIGHT: 179 LBS | OXYGEN SATURATION: 90 % | TEMPERATURE: 97.6 F | DIASTOLIC BLOOD PRESSURE: 69 MMHG | SYSTOLIC BLOOD PRESSURE: 117 MMHG

## 2021-07-30 DIAGNOSIS — H25.9 AGE-RELATED CATARACT OF BOTH EYES, UNSPECIFIED AGE-RELATED CATARACT TYPE: ICD-10-CM

## 2021-07-30 DIAGNOSIS — Z01.818 PREOP GENERAL PHYSICAL EXAM: Primary | ICD-10-CM

## 2021-07-30 DIAGNOSIS — E11.8 TYPE 2 DIABETES MELLITUS WITH UNSPECIFIED COMPLICATIONS (H): ICD-10-CM

## 2021-07-30 DIAGNOSIS — J96.11 CHRONIC RESPIRATORY FAILURE WITH HYPOXIA (H): ICD-10-CM

## 2021-07-30 DIAGNOSIS — Z99.81 DEPENDENCE ON CONTINUOUS SUPPLEMENTAL OXYGEN: ICD-10-CM

## 2021-07-30 DIAGNOSIS — F31.30 BIPOLAR I DISORDER, MOST RECENT EPISODE DEPRESSED (H): ICD-10-CM

## 2021-07-30 DIAGNOSIS — J44.9 CHRONIC OBSTRUCTIVE PULMONARY DISEASE, UNSPECIFIED COPD TYPE (H): ICD-10-CM

## 2021-07-30 DIAGNOSIS — E66.01 MORBID OBESITY (H): ICD-10-CM

## 2021-07-30 DIAGNOSIS — I42.9 CARDIOMYOPATHY, UNSPECIFIED TYPE (H): ICD-10-CM

## 2021-07-30 DIAGNOSIS — G47.30 SLEEP APNEA, UNSPECIFIED TYPE: ICD-10-CM

## 2021-07-30 PROBLEM — J44.1 COPD EXACERBATION (H): Status: RESOLVED | Noted: 2021-04-24 | Resolved: 2021-07-30

## 2021-07-30 PROBLEM — J18.9 PNEUMONIA OF RIGHT LOWER LOBE DUE TO INFECTIOUS ORGANISM: Status: RESOLVED | Noted: 2019-06-11 | Resolved: 2021-07-30

## 2021-07-30 PROBLEM — J18.9 PNEUMONIA: Status: RESOLVED | Noted: 2021-04-23 | Resolved: 2021-07-30

## 2021-07-30 LAB
ALBUMIN SERPL-MCNC: 3.7 G/DL (ref 3.5–5)
ALP SERPL-CCNC: 91 U/L (ref 45–120)
ALT SERPL W P-5'-P-CCNC: 15 U/L (ref 0–45)
ANION GAP SERPL CALCULATED.3IONS-SCNC: 13 MMOL/L (ref 5–18)
AST SERPL W P-5'-P-CCNC: 16 U/L (ref 0–40)
BILIRUB SERPL-MCNC: 0.4 MG/DL (ref 0–1)
BUN SERPL-MCNC: 12 MG/DL (ref 8–22)
CALCIUM SERPL-MCNC: 9.3 MG/DL (ref 8.5–10.5)
CHLORIDE BLD-SCNC: 101 MMOL/L (ref 98–107)
CO2 SERPL-SCNC: 32 MMOL/L (ref 22–31)
CREAT SERPL-MCNC: 0.79 MG/DL (ref 0.6–1.1)
ERYTHROCYTE [DISTWIDTH] IN BLOOD BY AUTOMATED COUNT: 12.4 % (ref 10–15)
GFR SERPL CREATININE-BSD FRML MDRD: 79 ML/MIN/1.73M2
GLUCOSE BLD-MCNC: 114 MG/DL (ref 70–125)
HBA1C MFR BLD: 6.8 % (ref 0–5.6)
HCT VFR BLD AUTO: 48.8 % (ref 35–47)
HGB BLD-MCNC: 14.8 G/DL (ref 11.7–15.7)
MCH RBC QN AUTO: 28.6 PG (ref 26.5–33)
MCHC RBC AUTO-ENTMCNC: 30.3 G/DL (ref 31.5–36.5)
MCV RBC AUTO: 94 FL (ref 78–100)
PLATELET # BLD AUTO: 180 10E3/UL (ref 150–450)
POTASSIUM BLD-SCNC: 4.8 MMOL/L (ref 3.5–5)
PROT SERPL-MCNC: 6.8 G/DL (ref 6–8)
RBC # BLD AUTO: 5.18 10E6/UL (ref 3.8–5.2)
SODIUM SERPL-SCNC: 146 MMOL/L (ref 136–145)
WBC # BLD AUTO: 10.7 10E3/UL (ref 4–11)

## 2021-07-30 PROCEDURE — 36415 COLL VENOUS BLD VENIPUNCTURE: CPT | Performed by: FAMILY MEDICINE

## 2021-07-30 PROCEDURE — 85027 COMPLETE CBC AUTOMATED: CPT | Performed by: FAMILY MEDICINE

## 2021-07-30 PROCEDURE — 83036 HEMOGLOBIN GLYCOSYLATED A1C: CPT | Performed by: FAMILY MEDICINE

## 2021-07-30 PROCEDURE — 80053 COMPREHEN METABOLIC PANEL: CPT | Performed by: FAMILY MEDICINE

## 2021-07-30 PROCEDURE — 99214 OFFICE O/P EST MOD 30 MIN: CPT | Performed by: FAMILY MEDICINE

## 2021-07-30 ASSESSMENT — MIFFLIN-ST. JEOR: SCORE: 1203.03

## 2021-07-30 NOTE — PATIENT INSTRUCTIONS
Preparing for Your Surgery  Getting started  A nurse will call you to review your health history and instructions. They will give you an arrival time based on your scheduled surgery time.  Please be ready to share the following:    Your doctor's clinic name and phone number    Your medical, surgical and anesthesia history    A list of allergies and sensitivities    A list of medicines, including herbal treatments and over-the-counter drugs    Whether the patient has a legal guardian (ask how to send us the papers in advance)  If you have a child who's having surgery, please ask for a copy of Preparing for Your Child's Surgery.    Preparing for surgery    Within 30 days of surgery: Have a pre-op exam (sometimes called an H&P, or History and Physical). This can be done at a clinic or pre-operative center.  ? If you're having a , you may not need this exam. Talk to your care team    At your pre-op exam, talk to your care team about all medicines you take. If you need to stop any medicines before surgery, ask when to start taking them again.  ? We do this for your safety. Many medicines can make you bleed too much during surgery. Some change how well surgery (anesthesia) drugs work.    Call your insurance company to let them know you're having surgery. (If you don't have insurance, call [phone].)    Call your clinic if there's any change in your health. This includes signs of a cold or flu (sore throat, runny nose, cough, rash, fever). It also includes a scrape or scratch near the surgery site.    If you have questions on the day of surgery, call your hospital or surgery center.  Eating and drinking guidelines  For your safety: Unless your surgeon tells you otherwise, follow the guidelines below.    Eat and drink as usual until 8 hours before surgery. After that, no food or milk.    Drink clear liquids until 2 hours before surgery. These are liquids you can see through, like water, Gatorade and Propel  Water. You may also have black coffee and tea (no cream or milk).    Nothing by mouth within 2 hours of surgery. This includes gum, candy and breath mints.    If you drink, stop drinking alcohol the night before surgery.    If your care team tells you to take medicine on the morning of surgery, it's okay to take it with a sip of water.  Preventing infection    Shower or bathe the night before and morning of your surgery. Follow the instructions your clinic gave you. (If no instructions, use regular soap.)    Don't shave or clip hair near your surgery site. We'll remove the hair if needed.    Don't smoke or vape the morning of surgery. You may chew nicotine gum up to 2 hours before surgery. A nicotine patch is okay.  ? Note: Some surgeries require you to completely quit smoking and nicotine. Check with your surgeon.    Your care team will make every effort to keep you safe from infection. We will:  ? Clean our hands often with soap and water (or an alcohol-based hand rub).  ? Clean the skin at your surgery site with a special soap that kills germs.  ? Give you a special gown to keep you warm. (Cold raises the risk of infection.)  ? Wear special hair covers, masks, gowns and gloves during surgery.  ? Give antibiotic medicine, if prescribed. Not all surgeries need antibiotics.  What to bring on the day of surgery    Photo ID and insurance card    Copy of your health care directive, if you have one    Glasses and hearing aides (bring cases)  ? You can't wear contacts during surgery    Inhaler and eye drops, if you use them (tell us about these when you arrive)    CPAP machine or breathing device, if you use them    A few personal items, if spending the night    If you have . . .  ? A pacemaker or ICD (cardiac defibrillator): Bring the ID card.  ? An implanted stimulator: Bring the remote control.  ? A legal guardian: Bring a copy of the certified (court-stamped) guardianship papers.  Please remove any jewelry, including  body piercings. Leave jewelry and other valuables at home.  If you're going home the day of surgery  Important: If you don't follow the rules below, we must cancel your surgery.     Arrange for someone to drive you home after surgery. You may not drive, take a taxi or take public transportation by yourself (unless you'll have local anesthesia only).    Arrange for a responsible adult to stay with you overnight. If you don't, we may keep you in the hospital overnight, and you may need to pay the costs yourself.  Questions?   If you have any questions for your care team, list them here: _________________________________________________________________________________________________________________________________________________________________________________________________________________________________________________________________________________________________________________________  For informational purposes only. Not to replace the advice of your health care provider. Copyright   2003, 2019 [employer]. All rights reserved. Clinically reviewed by Destiny Taylor MD. SM[employer]works 551723 - REV 4/20.    Before Your Procedure or Hospital Admission  Testing for COVID-19 (Coronavirus)  Thank you for choosing [facility] for your health care needs. This is a very challenging time for everyone. The World Health Organization and the State [facility] [place] have declared the COVID-19 virus a pandemic.   Our goal is to keep you and our team here at [facility] safe and healthy. We've taken several steps to make this happen. For example:    We screen our staff, care teams and patients for COVID-19.    Everyone at [facility] must wear a mask and stay 6 feet apart.    We are limiting hospital and clinic visitors.  Before you come in  All patients must get tested for COVID-19. Your test needs to happen 2 to 4 days before you check in to the hospital or surgery site.   A clinic scheduler will call  "about a week in advance to set up a testing time at one of our labs where we'll take a swab of your nose or throat.  Note: If you go to a clinic or pharmacy like [facility] or [facility] for your test, make sure it's a \"RT-PCR\" test, not a \"rapid\" COVID-19 test. (See Questions and Answers below.)  After the test, please stay at home and stay out of contact with other people. It will be harder for you to recover if you get COVID-19 before your treatment.  Please follow all current safety guidelines, including:    Limit trips outside your home.    Limit the number of people you see.    Always wear a mask outside your home.    Use social distancing. (Stay 6 feet away from others whenever you can.)    Wash your hands often.  If your test shows you have COVID-19  If your test is positive, we'll let you know. A positive test means that you have the virus.   We'll probably have to postpone your admission, surgery or procedure. Your doctor will discuss this with you. After that, we'll let you know what to do and when you can reschedule.   We may need to cancel your treatment on short notice for other reasons, too.  If your test shows you DON'T have COVID-19  Even if your test is negative, you may still get COVID-19. It's rare but, sometimes, the test result is wrong. You could also catch the virus after taking the test.   There's a very small chance that you could catch COVID-19 in the hospital or surgery center. [facility] has taken many steps to prevent this from happening.   Day of your surgery or procedure    Please come wearing a mask or something else that covers both your nose and mouth.    When you arrive, we'll ask you some questions to find out if you have any signs or symptoms of COVID-19.    Ask your care team if you can have visitors. All visitors must wear masks and will be screened for signs of COVID-19.  ? Even if no visitors are allowed, you can still have with you:    Your legal guardian or legal decision " "maker    A parent and one other visitor, if you are younger than 18 years old    A partner and a , if you are in labor  ? We might need to teach you about taking care of yourself after surgery. If so, a visitor can come into the hospital to learn about it, too.  ? The rules for visitors change often, depending on how much the virus is spreading. To learn more, see Visiting a Loved One in the Hospital during the COVID-19 Outbreak.  Please call your care team, hospital or surgery center if you have any questions. We thank you for your understanding and for choosing [facility] for your care.   Questions and Answers  Does it matter where I get tested for COVID-19?  Yes. We urge you to get tested at one of our [facility] COVID-19 testing sites. We process these tests in our lab and can get the results quickly. Your [facility] care team needs to get your results before you check in.  What should I do if I can't get tested at [facility]?  You can get tested somewhere else, but you'll need to take these extra steps:  1. Contact your family doctor or clinic to arrange your test.  2. Take the test within 4 days of your surgery or procedure. We can't accept tests older than 4 days.  3. Make sure your doctor or clinic faxes your results to [facility] at [phone].  If we don't get your results in time, we may have to postpone or cancel your treatment.  Ask if you're getting a \"RT-PCR\" COVID-19 test. It should NOT be a \"rapid\" COVID-19 test. Many drug stores use \"rapid\" tests, but they may not be as accurate. We don't accept the results of \"rapid\" tests.  For informational purposes only. Not to replace the advice of your health care provider. Copyright   2020 [facility] [employer]. All rights reserved. Clinically reviewed by [facility] and the [facility] COVID-19 Clinical Team. [employer] 783004 - REV 10/20.    How to Take Your Medication Before Surgery  - Take all " of your medications before surgery as usual

## 2021-07-30 NOTE — PROGRESS NOTES
[facility]  [address] [facility] [address]  Phone: [phone]  Fax: [phone]  Primary Provider: Rema Whiting  Pre-op Performing Provider: REMA WHITING      PREOPERATIVE EVALUATION:  Today's date: 7/30/2021    Arvind Leong is a 66 year old female who presents for a preoperative evaluation.    Surgical Information:  Surgery/Procedure: Cataract  Surgery Location: [facility]  Surgeon: Dr. Mars  Surgery Date: 8/5/21 right eye and 8/27/21 left eye  Time of Surgery: TBD  Where patient plans to recover: At home with family  Fax number for surgical facility: [phone]    Type of Anesthesia Anticipated: Local with MAC    Assessment & Plan     The proposed surgical procedure is considered LOW risk.    Preop general physical exam  Age related cataract of both eyes closed, unspecified age-related cataract type   Cleared for surgery.   - CBC with platelets  - Comprehensive metabolic panel (BMP + Alb, Alk Phos, ALT, AST, Total. Bili, TP)  - Hemoglobin A1c  - CBC with platelets  - Comprehensive metabolic panel (BMP + Alb, Alk Phos, ALT, AST, Total. Bili, TP)  - Hemoglobin A1c    Cardiomyopathy, unspecified type (H)  Asymptomatic. Last Echo 2017 and EV normal.     Chronic obstructive pulmonary disease, unspecified COPD type (H)  Stable. On O2 prn.     Sleep apnea, unspecified type  On CPAP although compliance not great.    Dependence on continuous supplemental oxygen  Chronic respiratory failure with hypoxia (H)  Stable. O2 sat without oxygen was 90%.     Morbid obesity (H)  Stable. Eating too much [employer] per patient.   - Comprehensive metabolic panel (BMP + Alb, Alk Phos, ALT, AST, Total. Bili, TP)  - Comprehensive metabolic panel (BMP + Alb, Alk Phos, ALT, AST, Total. Bili, TP)    Type 2 diabetes mellitus with unspecified complications (H)  Lab today. Has been well controlled without meds in the past. A1C is 6.8 today. Cleared for surgery. Will need  to discuss diabetes management next visit which is in couple weeks  - Comprehensive metabolic panel (BMP + Alb, Alk Phos, ALT, AST, Total. Bili, TP)  - Hemoglobin A1c  - Comprehensive metabolic panel (BMP + Alb, Alk Phos, ALT, AST, Total. Bili, TP)  - Hemoglobin A1c    Bipolar I disorder, most recent episode depressed (H)  Stable. On antipsychotics.       Possible Sleep Apnea: has ABDIFATAH on CPAP       Risks and Recommendations:  The patient has the following additional risks and recommendations for perioperative complications:   - No identified additional risk factors other than previously addressed    Medication Instructions:  Patient is to take all scheduled medications on the day of surgery    RECOMMENDATION:  APPROVAL GIVEN to proceed with proposed procedure pending review of diagnostic evaluation.      Subjective     HPI related to upcoming procedure:   Chief Complaint   Patient presents with     Pre-Op Exam     cataract, 8/5, [facility], Dr. Mars       Preop Questions 7/30/2021   1. Have you ever had a heart attack or stroke? No   2. Have you ever had surgery on your heart or blood vessels, such as a stent placement, a coronary artery bypass, or surgery on an artery in your head, neck, heart, or legs? No   3. Do you have chest pain with activity? No   4. Do you have a history of  heart failure? No   5. Do you currently have a cold, bronchitis or symptoms of other infection? No   6. Do you have a cough, shortness of breath, or wheezing? No   7. Do you or anyone in your family have previous history of blood clots? No   8. Do you or does anyone in your family have a serious bleeding problem such as prolonged bleeding following surgeries or cuts? No   9. Have you ever had problems with anemia or been told to take iron pills? No   10. Have you had any abnormal blood loss such as black, tarry or bloody stools, or abnormal vaginal bleeding? No   11. Have you ever had a blood transfusion? No   12. Are you willing to  have a blood transfusion if it is medically needed before, during, or after your surgery? Yes   13. Have you or any of your relatives ever had problems with anesthesia? No   14. Do you have sleep apnea, excessive snoring or daytime drowsiness? YES - wears CPAP off and on.    14a. Do you have a CPAP machine? Yes   15. Do you have any artifical heart valves or other implanted medical devices like a pacemaker, defibrillator, or continuous glucose monitor? No   16. Do you have artificial joints? No   17. Are you allergic to latex? No       Health Care Directive:  Patient does not have a Health Care Directive or Living Will: Discussed advance care planning with patient; information given to patient to review.    Preoperative Review of :   reviewed - no record of controlled substances prescribed.      Status of Chronic Conditions:  COPD - Patient has a longstanding history of moderate-severe COPD . Patient has been doing well overall noting NO SYMPTOMS and continues on medication regimen consisting of spiriva and albuterol prn without adverse reactions or side effects.    DIABETES - Patient has a longstanding history of DiabetesType Type II . Patient is being treated with none and diet and denies significant side effects. Control has been good. Complicating factors include but are not limited to: hypertension, hyperlipidemia and morbid obesity .     HYPERLIPIDEMIA - Patient has a long history of significant Hyperlipidemia requiring medication for treatment with recent good control. Not on any medication    HYPERTENSION - Patient has longstanding history of HTN , currently denies any symptoms referable to elevated blood pressure. Specifically denies chest pain, palpitations, dyspnea, orthopnea, PND or peripheral edema. Blood pressure readings have been in normal range. Current medication regimen is NONE.     SLEEP PROBLEM - Patient has a longstanding history of snoring.. Patient has tried OTC medications with limited  success. On ABDIFATAH.     BIPOLAR - well controlled on antipsychotics.    Review of Systems  CONSTITUTIONAL: NEGATIVE for fever, chills, change in weight  INTEGUMENTARY/SKIN: NEGATIVE for worrisome rashes, moles or lesions  EYES: NEGATIVE for vision changes or irritation  ENT/MOUTH: NEGATIVE for ear, mouth and throat problems  RESP: NEGATIVE for significant cough or SOB  CV: NEGATIVE for chest pain, palpitations or peripheral edema  GI: NEGATIVE for nausea, abdominal pain, heartburn, or change in bowel habits  : NEGATIVE for frequency, dysuria, or hematuria  MUSCULOSKELETAL: NEGATIVE for significant arthralgias or myalgia  NEURO: NEGATIVE for weakness, dizziness or paresthesias  ENDOCRINE: NEGATIVE for temperature intolerance, skin/hair changes  HEME: NEGATIVE for bleeding problems  PSYCHIATRIC: NEGATIVE for changes in mood or affect    Patient Active Problem List    Diagnosis Date Noted     COPD exacerbation (H) 04/24/2021     Priority: Medium     Pneumonia 04/23/2021     Priority: Medium     Type 2 diabetes mellitus with unspecified complications (H) 11/23/2020     Priority: Medium     Chronic respiratory failure with hypoxia (H) 11/23/2020     Priority: Medium     CAP (community acquired pneumonia) due to Chlamydia species 06/11/2019     Priority: Medium     Pneumonia of right lower lobe due to infectious organism 06/11/2019     Priority: Medium     Runny nose 03/18/2019     Priority: Medium     Bipolar I disorder, most recent episode depressed (H) 03/04/2019     Priority: Medium     COPD (chronic obstructive pulmonary disease) (H)      Priority: Medium     Created by Conversion  [facility] Annotation: Jan 29 2013  1:24PM - Lily Alejandro: acute   respiratory failure 1/20/2013 (hospitalized 5 days at [facility])--discharged   with supplemental oxygen         Adult Sleep Apnea      Priority: Medium     Created by Conversion  Replacement Utility updated for latest IMO load         Anxiety      Priority: Medium      Created by Conversion  Replacement Utility updated for latest IMO load         Dependence on continuous supplemental oxygen 08/23/2018     Priority: Medium     Morbid obesity (H)      Priority: Medium     Created by Conversion         Cervical high risk HPV (human papillomavirus) test positive 03/05/2018     Priority: Medium     3/5/18 NIL pap, +HR HPV (not 16/18)  5/21/21 NIL pap, neg HPV. Plan: cotest in 1 year per provider         Cognitive dysfunction in bipolar disorder (H) 11/17/2016     Priority: Medium     Noncompliance with medications 11/17/2016     Priority: Medium     Asthma      Priority: Medium     Created by Conversion         Vertigo      Priority: Medium     Created by Conversion         Scoliosis      Priority: Medium     Created by Conversion         Lower Back Pain      Priority: Medium     Created by Conversion         Urge Incontinence Of Urine      Priority: Medium     Created by Conversion         Hyperlipidemia      Priority: Medium     Created by Conversion          Past Medical History:   Diagnosis Date     Acute on chronic respiratory failure with hypoxia (H) 11/15/2016     Bipolar 1 disorder (H)      CAP (community acquired pneumonia) 11/15/2016     Cervical high risk HPV (human papillomavirus) test positive 3/5/2018    3/5/18 NIL pap, +HR HPV (not 16/18) 5/21/21 NIL pap, neg HPV. Plan: await provider     COPD with exacerbation (H) 11/15/2016     Diabetes mellitus, type II (H)      Hearing loss      Meniere's disease      Past Surgical History:   Procedure Laterality Date     CYST REMOVAL  01/23/2001     [place] REMOVAL ANAL FISTULA,SUBCUTANEOUS      Description: Fistula-in-ano Repair;  Recorded: 01/08/2010; x2     TONSILLECTOMY       Current Outpatient Medications   Medication Sig Dispense Refill     albuterol (PROAIR HFA;PROVENTIL HFA;VENTOLIN HFA) 90 mcg/actuation inhaler [ALBUTEROL (PROAIR HFA;PROVENTIL HFA;VENTOLIN HFA) 90 MCG/ACTUATION INHALER] Inhale 2 puffs every 6 (six) hours as  needed for wheezing or shortness of breath. 1 Inhaler 11     ARIPiprazole (ABILIFY) 10 MG tablet [ARIPIPRAZOLE (ABILIFY) 10 MG TABLET] Take 10 mg by mouth daily.       buPROPion (WELLBUTRIN) 75 MG tablet [BUPROPION (WELLBUTRIN) 75 MG TABLET] Take 75 mg by mouth 2 (two) times a day.       OXYGEN-AIR DELIVERY SYSTEMS MISC [OXYGEN-AIR DELIVERY SYSTEMS MISC] Use 2 L As Directed. 2L pulsing  [facility]       albuterol (PROVENTIL) 2.5 mg /3 mL (0.083 %) nebulizer solution [ALBUTEROL (PROVENTIL) 2.5 MG /3 ML (0.083 %) NEBULIZER SOLUTION] Take 3 mL (2.5 mg total) by nebulization every 4 (four) hours as needed for wheezing or shortness of breath (or cough). 30 vial 0     tiotropium (SPIRIVA) 18 mcg inhalation capsule [TIOTROPIUM (SPIRIVA) 18 MCG INHALATION CAPSULE] Place 1 capsule (2 puffs total) into inhaler and inhale daily. (Patient not taking: Reported on 2021) 30 capsule 11       Allergies   Allergen Reactions     Lurasidone Other (See Comments)     Face turned red, (Brand name = Latuda) Face turned red, Face turned red        Social History     Tobacco Use     Smoking status: Former Smoker     Packs/day: 1.00     Years: 40.00     Pack years: 40.00     Quit date: 2017     Years since quittin.4     Smokeless tobacco: Never Used   Substance Use Topics     Alcohol use: Yes     Comment: Alcoholic Drinks/day: Occasional      Family History   Problem Relation Age of Onset     Aneurysm Father      Heart Disease Father 70.00        bypass in 70s, AAA rupture at age 77      Ulcers Father 76.00     Pacemaker Mother      Bipolar Disorder Brother      Breast Cancer Maternal Grandmother 51.00     Kidney failure Maternal Grandmother      Cancer Paternal Grandmother         ?? lung     Cancer Paternal Uncle         ?? lung     Mental Illness Maternal Grandfather      Heart Disease Other         uncle     No Known Problems Sister         two siblings abuse chemicals     No Known Problems Brother      Bipolar Disorder  "Nephew      History   Drug Use No         Objective     /69   Pulse 79   Temp 97.6  F (36.4  C) (Oral)   Ht 1.403 m (4' 7.25\")   Wt 81.2 kg (179 lb)   SpO2 90%   BMI 41.23 kg/m      Physical Exam    GENERAL APPEARANCE: healthy, alert and no distress, obese      EYES: EOMI, PERRL     HENT: Sleep normal     NECK: no adenopathy, no asymmetry, masses, or scars and thyroid normal to palpation     RESP: lungs clear to auscultation - no rales, rhonchi or wheezes     CV: regular rates and rhythm, normal S1 S2, no S3 or S4 and no murmur, click or rub     ABDOMEN:  soft, nontender, no HSM or masses and bowel sounds normal     MS: extremities normal- no gross deformities noted, no evidence of inflammation in joints, FROM in all extremities.     SKIN: no suspicious lesions or rashes     NEURO: Normal strength and tone, sensory exam grossly normal, mentation intact and speech normal     PSYCH: mentation appears normal. and affect normal/bright     LYMPHATICS: No cervical adenopathy    Recent Labs   Lab Test 07/07/21  1041 04/26/21  1106 04/24/21  0319 04/23/21  2143 11/23/20  1144   HGB 14.7 14.4 13.6   < > 14.6    212 192   < > 161    142 142  --  141   POTASSIUM 4.5 4.1 4.1  --  4.2   CR 0.79 0.71 0.82  --  0.76   A1C  --   --  6.3*  --  6.3*    < > = values in this interval not displayed.        Diagnostics:  Labs pending at this time.  Results will be reviewed when available.  No results found for this or any previous visit (from the past 48 hour(s)).   No EKG required for low risk surgery (cataract, skin procedure, breast biopsy, etc).    Revised Cardiac Risk Index (RCRI):  The patient has the following serious cardiovascular risks for perioperative complications:   - No serious cardiac risks = 0 points     RCRI Interpretation: 0 points: Class I (very low risk - 0.4% complication rate)           Signed Electronically by: Rema Whiting MD  Copy of this evaluation report is provided to " requesting physician.

## 2021-08-03 PROBLEM — I42.9 CARDIOMYOPATHY, UNSPECIFIED TYPE (H): Status: RESOLVED | Noted: 2018-08-09 | Resolved: 2019-03-03

## 2021-08-06 NOTE — PATIENT INSTRUCTIONS - HE
Patient Instructions by Rema Whiting MD at 5/21/2021  8:30 AM     Author: Rema Whiting MD Service: -- Author Type: Physician    Filed: 5/21/2021  8:51 AM Encounter Date: 5/21/2021 Status: Addendum    : Rema Whiting MD (Physician)    Related Notes: Original Note by Rema Whiting MD (Physician) filed at 5/21/2021  8:43 AM       Please go get your hearing aids fixed.         Patient Education     Your Health Risk Assessment indicates you feel you are not in good physical health.    A healthy lifestyle helps keep the body fit and the mind alert. It helps protect you from disease, helps you fight disease, and helps prevent chronic disease (disease that doesn't go away) from getting worse. This is important as you get older and begin to notice twinges in muscles and joints and a decline in the strength and stamina you once took for granted. A healthy lifestyle includes good healthcare, good nutrition, weight control, recreation, and regular exercise. Avoid harmful substances and do what you can to keep safe. Another part of a healthy lifestyle is stay mentally active and socially involved.    Good healthcare     Have a wellness visit every year.     If you have new symptoms, let us know right away. Don't wait until the next checkup.     Take medicines exactly as prescribed and keep your medicines in a safe place. Tell us if your medicine causes problems.   Healthy diet and weight control     Eat 3 or 4 small, nutritious, low-fat, high-fiber meals a day. Include a variety of fruits, vegetables, and whole-grain foods.     Make sure you get enough calcium in your diet. Calcium, vitamin D, and exercise help prevent osteoporosis (bone thinning).     If you live alone, try eating with others when you can. That way you get a good meal and have company while you eat it.     Try to keep a healthy weight. If you eat more calories than your body uses for energy, it will be stored as fat and you will gain  weight.     Recreation   Recreation is not limited to sports and team events. It includes any activity that provides relaxation, interest, enjoyment, and exercise. Recreation provides an outlet for physical, mental, and social energy. It can give a sense of worth and achievement. It can help you stay healthy.       Patient Education     Exercise for a Healthier Heart  You may wonder how you can improve the health of your heart. If youre thinking about exercise, youre on the right track. You dont need to become an athlete, but you do need a certain amount of brisk exercise to help strengthen your heart. If you have been diagnosed with a heart condition, your doctor may recommend exercise to help stabilize your condition. To help make exercise a habit, choose safe, fun activities.       Be sure to check with your health care provider before starting an exercise program.    Why exercise?  Exercising regularly offers many healthy rewards. It can help you do all of the following:    Improve your blood cholesterol levels to help prevent further heart trouble    Lower your blood pressure to help prevent a stroke or heart attack    Control diabetes, or reduce your risk of getting this disease    Improve your heart and lung function    Reach and maintain a healthy weight    Make your muscles stronger and more limber so you can stay active    Prevent falls and fractures by slowing the loss of bone mass (osteoporosis)    Manage stress better  Exercise tips  Ease into your routine. Set small goals. Then build on them.  Exercise on most days. Aim for a total of 150 or more minutes of moderate to  vigorous intensity activity each week. Consider 40 minutes, 3 to 4 times a week. For best results, activity should last for 40 minutes on average. It is OK to work up to the 40 minute period over time. Examples of moderate-intensity activity is walking one mile in 15 minutes or 30 to 45 minutes of yard work.  Step up your daily activity  level. Along with your exercise program, try being more active throughout the day. Walk instead of drive. Do more household tasks or yard work.  Choose one or more activities you enjoy. Walking is one of the easiest things you can do. You can also try swimming, riding a bike, or taking an exercise class.  Stop exercising and call your doctor if you:    Have chest pain or feel dizzy or lightheaded    Feel burning, tightness, pressure, or heaviness in your chest, neck, shoulders, back, or arms    Have unusual shortness of breath    Have increased joint or muscle pain    Have palpitations or an irregular heartbeat      0231-7707 [employer]. [address] [place] [address]. All rights reserved. This information is not intended as a substitute for professional medical care. Always follow your healthcare professional's instructions.         Patient Education   Exercise: The Rewards of Fitness  Do you need to be convinced that exercise is a good idea? Exercise and fitness offer you all kinds of rewards. Think about your goals. Can exercise help you achieve some of them?       A Stronger and Happier You  Even before you see the difference, you will feel the difference. Here are three ways you will benefit from more activity:  Physical fitness. Youll have more stamina. Youll also enjoy recreation more. And youll keep your strength and independence as you age.  Mental fitness. Youll manage stress better, be less tense, think more clearly, and maybe even sleep better.  Long-term health. Your risk of some diseases may go down. This includes heart disease, osteoporosis (thinning bones), some cancers, high blood pressure, and diabetes.  Check Your Health First  If you answer yes to any of the questions below, you should talk to your doctor before beginning a fitness program:  1. Has a doctor ever said you have heart trouble?  2. Do you ever have chest pains?  3. Do you often feel faint or have dizzy  eloisa?  4. Has a doctor ever said your blood pressure is too high?  5. Has a doctor ever said that you have a bone or joint problem that could be made worse by exercise?  6. Do you take any prescription medications for problems such as diabetes or asthma?    Will I Lose Weight?  Many of us would like to lose or keep off a few pounds. Being more active each day and building muscle can help. Heres how:    Being active burns calories. You burn nearly twice as many calories just walking slowly as you do sitting.    Muscle burns more calories than fat. So the more muscle you build up from activity, the more calories you burn.    If you add more muscle, youll use more calories even when youre inactive.    Being active helps you retain more muscle as you age. More muscle means itll be easier to control your weight.      0695-2589 [employer]. [address] [place] [address]. All rights reserved. This information is not intended as a substitute for professional medical care. Always follow your healthcare professional's instructions.      Patient Education   Understanding [employer] MyPlate  The USDA ([place] Department of Agriculture) has guidelines to help you make healthy food choices. These are called MyPlate. MyPlate shows the food groups that make up healthy meals using the image of a place setting. Before you eat, think about the healthiest choices for what to put onto your plate or into your cup or bowl. To learn more about building a healthy plate, visit www.choosemyplate.gov.       The Food Groups    Fruits: Any fruit or 100% fruit juice counts as part of the Fruit Group. Fruits may be fresh, canned, frozen, or dried, and may be whole, cut-up, or pureed. Make half your plate fruits and vegetables.    Vegetables: Any vegetable or 100% vegetable juice counts as a member of the Vegetable Group. Vegetables may be fresh, frozen, canned, or dried. They can be served raw or cooked and may be whole, cut-up,  or mashed. Make half your plate fruits and vegetables.     Grains: All foods made from grains are part of the Grains Group. These include wheat, rice, oats, cornmeal, and barley such as bread, pasta, oatmeal, cereal, tortillas, and grits. Grains should be no more than a quarter of your plate. At least half of your grains should be whole grains.    Protein: This group includes meat, poultry, seafood, beans and peas, eggs, processed soy products (like tofu), nuts (including nut butters), and seeds. Make protein choices no more than a quarter of your plate. Meat and poultry choices should be lean or low fat.    Dairy: All fluid milk products and foods made from milk that contain calcium, like yogurt and cheese are part of the Dairy Group. (Foods that have little calcium, such as cream, butter, and cream cheese, are not part of the group.) Most dairy choices should be low-fat or fat-free.    Oils: These are fats that are liquid at room temperature. They include canola, corn, olive, soybean, and sunflower oil. Foods that are mainly oil include mayonnaise, certain salad dressings, and soft margarines. You should have only 5 to 7 teaspoons of oils a day. You probably already get this much from the food you eat.  Use [employer] to Help Build Your Meals  The SuperTracker can help you plan and track your meals and activity. You can look up individual foods to see or compare their nutritional value. You can get guidelines for what and how much you should eat. You can compare your food choices. And you can assess personal physical activities and see ways you can improve. Go to www.Changoplate.gov/supertracker/.    1555-8229 [employer]. [address] [place] [address], [place] [address]. All rights reserved. This information is not intended as a substitute for professional medical care. Always follow your healthcare professional's instructions.           Patient Education   Signs of Hearing Loss  Hearing loss is a  problem shared by many people. In fact, it is one of the most common health conditions, particularly as people age. Most people over age 65 have some hearing loss, and by age 80, almost everyone does. Because hearing loss usually occurs slowly over the years, you may not realize your hearing ability has gotten worse.       Have your hearing checked  Contact your [facility] care provider if you:    Have to strain to hear normal conversation.    Have to watch other peoples faces very carefully to follow what theyre saying.    Need to ask people to repeat what theyve said.    Often misunderstand what people are saying.    Turn the volume of the television or radio up so high that others complain.    Feel that people are mumbling when theyre talking to you.    Find that the effort to hear leaves you feeling tired and irritated.    Notice, when using the phone, that you hear better with 1 ear than the other.    6855-1289 [employer]. [address] [place] [address]. All rights reserved. This information is not intended as a substitute for professional medical care. Always follow your healthcare professional's instructions.         Patient Education   Urinary Incontinence, Female (Adult)  Urinary incontinence means loss of control of the bladder. This problem affects many women, especially as they get older. If you have incontinence, you may be embarrassed to ask for help. But know that this problem can be treated.  Types of Incontinence  There are different types of incontinence. Two of the main types are described here. You can have more than one type.    Stress incontinence. With this type, urine leaks when pressure (stress) is put on the bladder. This may happen when you cough, sneeze, or laugh. Stress incontinence most often occurs because the pelvic floor muscles that support the bladder and urethra are weak. This can happen after pregnancy and vaginal childbirth or a hysterectomy. It can also be  due to excess body weight or hormone changes.    Urge incontinence (also called overactive bladder). With this type, a sudden urge to urinate is felt often. This may happen even though there may not be much urine in the bladder. The need to urinate often during the night is common. Urge incontinence most often occurs because of bladder spasms. This may be due to bladder irritation or infection. Damage to bladder nerves or pelvic muscles, constipation, and certain medicines can also lead to urge incontinence.  Treatment of urinary incontinence depends on the cause. Further evaluation is needed to find the type you have. This will likely include an exam and certain tests. Based on the results, you and your healthcare provider can then plan treatment. Until a diagnosis is made, the home care tips below can help relieve symptoms.  Home care    Do pelvic floor muscle exercises, if they are prescribed. The pelvic floor muscles help support the bladder and urethra. Many women find that their symptoms improve when doing special exercises that strengthen these muscles. To do the exercises contract the muscles you would use to stop your stream of urine, but do this when youre not urinating. Hold for 10 seconds, then relax. Repeat 10 to 20 times in a row, at least 3 times a day. Your provider may give you other instructions for how to do the exercises and how often.    Keep a bladder diary. This helps track how often and how much you urinate over a set period of time. Bring this diary with you to your next visit with the provider. The information can help your provider learn more about your bladder problem.    Lose weight, if advised to by your provider. Excess weight puts pressure on the bladder. Your provider can help you create a weight-loss plan thats right for you. This may include exercising more and making certain diet changes.    Don't consume foods and drinks that may irritate the bladder. These can include alcohol  and caffeinated drinks.    Quit smoking. Smoking and other tobacco use can lead to chronic cough that strains the pelvic floor muscles. Smoking may also damage the bladder and urethra. Talk with your provider about treatments or methods you can use to quit smoking.    If drinking large amounts of fluid causes you to have symptoms, you may be advised to limit your fluid intake. You may also be advised to drink most of your fluids during the day and to limit fluids at night.    If youre worried about urine leakage or accidents, you may wear absorbent pads to catch urine. Change the pads often. This helps reduce discomfort. It may also reduce the risk of skin or bladder infections.  Follow-up care  Follow up with your healthcare provider, or as directed. It may take some to find the right treatment for your problem. Your treatment plan may include special therapies or medicines. Certain procedures or surgery may also be options. Be sure to discuss any questions you have with your provider.  When to seek medical advice  Call the healthcare provider right away if any of these occur:    Fever of 100.4 F (38 C) or higher, or as directed by your provider    Bladder pain or fullness    Abdominal swelling    Nausea or vomiting    Back pain    Weakness, dizziness or fainting  Date Last Reviewed: 10/1/2017    4473-3242 [employer]. [address] [place] [address]. All rights reserved. This information is not intended as a substitute for professional medical care. Always follow your healthcare professional's instructions.     Patient Education   Treating Incontinence in Women: Nonsurgical Methods    The best treatment for you will depend on the type of incontinence you have. Your symptoms, age, and any underlying problems that are found also affect your treatment. While some types of incontinence may eventually require surgery, nonsurgical treatments may be effective in many cases. Nonsurgical treatments  include lifestyle changes, muscle-strengthening exercises, and medicines.  Nonsurgical Treatments  Treatment for stress urinary incontinence includes:    Bladder training    Lifestyle changes such as weight loss and increased activity if incontinence is due to being overweight    Medicines, if bladder training has not helped    Pelvic floor muscle exercises  Lifestyle changes    Losing weight. Excess weight puts extra pressure on the pelvic floor muscles. Exercising and eating right can help you lose weight. This helps other treatments work better.    Making certain diet changes. Some foods may make you need to urinate more, so it may be good to avoid them. These include caffeinated drinks and alcohol. Ask your healthcare provider whether these or other diet changes might be helpful.    Quitting smoking. Smoking can lead to a chronic cough that strains pelvic floor muscles. Smoking may also damage the bladder and urethra.  Pelvic floor muscle exercises  There are exercises you can do to help strengthen your pelvic floor muscles. The pelvic floor muscles act as a sling to help hold the bladder and urethra in place. These muscles also help keep the urethra closed. Weak pelvic floor muscles may allow urine to leak. To strengthen the pelvic floor muscles, do the exercises daily. In a few months, the muscles will be stronger and tighter. This can help prevent urine leakage.  Date Last Reviewed: 1/1/2017 2000-2017 [employer]. [address] [place] [address], [place] [address]. All rights reserved. This information is not intended as a substitute for professional medical care. Always follow your healthcare professional's instructions.     Patient Education   Treating Incontinence in Women: Special Therapies     During biofeedback, sensors send signals from your pelvic floor muscles to a computer screen.     Your doctor will discuss your options for treating your urinary incontinence. These depend on the cause of  your problem and any other health issues you have. Usually behavioral changes are tried first, followed by various medicines. If these methods are unsuccessful, one or more of the therapies described below may be part of your treatment plan.  Biofeedback  This technique is taught by a nurse or physical therapist. During the therapy, a small sensor is placed in your vagina or rectum. Another sensor is placed on your stomach. Other types of sensors are also available. These sensors read signals from the pelvic floor muscles. When you contract or relax your muscles, these signals are shown as images on a computer screen. Using the images, you can learn to relax or contract certain muscles. This can help you strengthen and better control these muscles. And it can help you learn pelvic floor muscle exercises.  Electrical stimulation  This is a painless therapy that uses a tiny amount of electric current. It helps strengthen very weak or damaged pelvic floor muscles. The electric current is sent through the muscles of the pelvic floor and bladder. This causes the muscles to contract. In time, this helps make the muscles stronger.  Stimulator implants  This technique is used to treat urge incontinence. A small device is implanted under the skin near the stomach. This device gives off mild electrical signals. These block extra signals that are being sent to the bladder muscle. This helps the bladder work more normally.  Date Last Reviewed: 1/1/2017 2000-2017 [employer]. [address] [place] [address]. All rights reserved. This information is not intended as a substitute for professional medical care. Always follow your healthcare professional's instructions.     Patient Education   Treating Incontinence in Women with Medicine    Urinary incontinence is the leaking of urine from the bladder. In some cases, medicine can reduce or stop the leaking. It is mainly given for urge incontinence. Your  healthcare provider will talk with you about your options. Make sure to ask what side effects to expect.  Below are some types of medicines that may help with urge incontinence.  Types of medicine    Anticholinergics. These may increase how much urine the bladder can hold. They may also help relax bladder muscles.    Estrogen. This may help improve muscle tone in the urethra and bladder.    Antibiotics. These are used to treat urinary tract infections.    Botulinum toxin. Injection of botulinum toxin into the bladder muscle is an option when other medicines are not effective.  Tips for taking medicine    Take your medicine on time and as your healthcare provider tell you to.    Tell your healthcare provider if you have any side effects, your dosage may be adjusted if necessary.    Be patient. It may take time to find the right dose for you.    Keep a list of the medicines you take. Show it to your healthcare provider and pharmacist before you buy over-the-counter medicines.   Date Last Reviewed: 1/1/2017 2000-2017 [employer]. [address] [place] [address]. All rights reserved. This information is not intended as a substitute for professional medical care. Always follow your healthcare professional's instructions.     Patient Education     Kegel Exercises  Kegel exercises dont need special clothing or equipment. Theyre easy to learn and simple to do. And if you do them right, no one can tell youre doing them, so they can be done almost anywhere. Your healthcare provider, nurse, or physical therapist can answer any questions you have and help you get started.    A weak pelvic floor  The pelvic floor muscles may weaken due to aging, pregnancy and vaginal childbirth, injury, surgery, chronic cough, or lack of exercise. If the pelvic floor is weak, your bladder and other pelvic organs may sag out of place. The urethra may also open too easily and allow urine to leak out. Kegel exercises can  help you strengthen your pelvic floor muscles. Then they can better support the pelvic organs and control urine flow.  How Kegel exercises are done  Try each of the Kegel exercises described below. When youre doing them, try not to move your leg, buttock, or stomach muscles:    Contract as if you were stopping your urine stream. But do it when youre not urinating.    Tighten your rectum as if trying not to pass gas. Contract your anus, but dont move your buttocks.    You may place a finger or 2 in the vagina and squeeze your finger with your vagina to learn which muscles to tighten.  Try to hold each Kegel for a slow count to 5. You probably wont be able to hold them for that long at first. But keep practicing. It will get easier as your pelvic floor gets stronger. Eventually, special weights that you place in your vagina may be recommended to help make your Kegels even more effective. Visit your healthcare provider if you have difficulties doing Kegel exercises.  Helpful hints  Here are some tips to follow:    Do your Kegels as often as you can. The more you do them, the faster youll feel the results.    Pick an activity you do often as a reminder. For instance, do your Kegels every time you sit down.    Tighten your pelvic floor before you sneeze, get up from a chair, cough, laugh, or lift. This protects your pelvic floor from injury and can help prevent urine leakage.   Date Last Reviewed: 10/1/2017    1322-2742 [employer]. [address] [place] [address]. All rights reserved. This information is not intended as a substitute for professional medical care. Always follow your healthcare professional's instructions.     Patient Education   Your Health Risk Assessment indicates you feel you are not in good emotional health.    Recreation   Recreation is not limited to sports and team events. It includes any activity that provides relaxation, interest, enjoyment, and exercise. Recreation  provides an outlet for physical, mental, and social energy. It can give a sense of worth and achievement. It can help you stay healthy.    Mental Exercise and Social Involvement  Mental and emotional health is as important as physical health. Keep in touch with friends and family. Stay as active as possible. Continue to learn and challenge yourself.   Things you can do to stay mentally active are:    Learn something new, like a foreign language or musical instrument.     Play SCRABBLE or do crossword puzzles. If you cannot find people to play these games with you at home, you can play them with others on your computer through the Internet.     Join a games club--anything from card games to chess or checkers or lawn bowling.     Start a new hobby.     Go back to school.     Volunteer.     Read.     Keep up with world events.       Patient Education   Depression and Suicide in Older Adults  Nearly 2 million older Americans have some type of depression. Sadly, some of them even take their own lives. Yet depression among older adults is often ignored. Learn the warning signs. You may help spare a loved one needless pain. You may also save a life.       What Is Depression?  Depression is a mood disorder that affects the way you think and feel. The most common symptom is a feeling of deep sadness. People who are depressed also may seem tired and listless. And nothing seems to give them pleasure. Its normal to grieve or be sad sometimes. But sadness lessens or passes with time. Depression rarely goes away or improves on its own. Other symptoms of depression are:    Sleeping more or less than normal    Eating more or less than normal    Having headaches, stomachaches, or other pains that dont go away    Feeling nervous, empty, or worthless    Crying a great deal    Thinking or talking about suicide or death    Feeling confused or forgetful  What Causes It?  The causes of depression arent fully known. Certain chemicals in  the brain play a role. Depression does run in families. And life stresses can also trigger depression in some people. That may be the case with older adults. They often face great burdens, such as the death of friends or a spouse. They may have failing health. And they are more likely to be alone, lonely, or poor.  How You Can Help  Often, depressed people may not want to ask for help. When they do, they may be ignored. Or, they may receive the wrong treatment. You can help by showing parents and older friends love and support. If they seem depressed, help them find the right treatment. Talk to your doctor. Or contact a local mental health center, social service agency, or hospital. With modern treatment, no one has to suffer from depression.  Resources:    National [place] of Mental Health  304.619.7583  www.nimh.nih.gov    National [place] on Mental Illness  505.791.4309  www.devika.org    Mental Health [place]  949.153.2411  www.[facility].org    National Suicide Hotline  562.835.1099 (800-SUICIDE)      9302-0569 [employer]. [address] [place] [address]. All rights reserved. This information is not intended as a substitute for professional medical care. Always follow your healthcare professional's instructions.         Patient Education   Understanding Advance Care Planning  Advance care planning is the process of deciding ones own future medical care. It helps ensure that if you cant speak for yourself, your wishes can still be carried out. The plan is a series of legal documents that note a persons wishes. The documents vary by state. Advance care planning may be done when a person has a serious illness that is expected to get worse. It may be done before major surgery. And it can help you and your family be prepared in case of a major illness or injury. Advance care planning helps with making decisions at these times.       A health care proxy is a person who acts as the voice of a  patient when the patient cant speak for himself or herself. The name of this role varies by state. It may be called a Durable Medical Power of  or Durable Power of  for Healthcare. It may be called an agent, surrogate, or advocate. Or it may be called a representative or decision maker. It is an official duty that is identified by a legal document. The document also varies by state.    Why Is Advance Care Planning Important?  If a person communicates their healthcare wishes:    They will be given medical care that matches their values and goals.    Their family members will not be forced to make decisions in a crisis with no guidance.  Creating a Plan  Making an advance care plan is often done in 3 steps:    Thinking about ones wishes. To create an advance care plan, you should think about what kind of medical treatment you would want if you lose the ability to communicate. Are there any situations in which you would refuse or stop treatment? Are there therapies you would want or not want? And whom do you want to make decisions for you? There are many places to learn more about how to plan for your care. Ask your doctor or  for resources.    Picking a health care proxy. This means choosing a trusted person to speak for you only when you cant speak for yourself. When you cannot make medical decisions, your proxy makes sure the instructions in your advance care plan are followed. A proxy does not make decisions based on his or her own opinions. They must put aside those opinions and values if needed, and carry out your wishes.    Filling out the legal documents. There are several kinds of legal documents for advance care planning. Each one tells health care providers your wishes. The documents may vary by state. They must be signed and may need to be witnessed or notarized. You can cancel or change them whenever you wish. Depending on your state, the documents may include a Healthcare  Proxy form, Living Will, Durable Medical Power of , Advance Directive, or others.  The Familys Role  The best help a family can give is to support their loved ones wishes. Open and honest communication is vital. Family should express any concerns they have about the patients choices while the patient can still make decisions.    9469-5503 [employer]. [address] [place] [address]. All rights reserved. This information is not intended as a substitute for professional medical care. Always follow your healthcare professional's instructions.         Also, [facility] offers a free, downloadable health care directive that allows you to share your treatment choices and personal preferences if you cannot communicate your wishes. It also allows you to appoint another person (called a health care agent) to make health care decisions if you are unable to do so. You can download an advance directive by going here: [email or web address]     Patient Education   Personalized Prevention Plan  You are due for the preventive services outlined below.  Your care team is available to assist you in scheduling these services.  If you have already completed any of these items, please share that information with your care team to update in your medical record.  Health Maintenance Due   Topic Date Due   ? COPD ACTION PLAN  Never done   ? DEXA SCAN  Never done   ? HIV SCREENING  Never done   ? ZOSTER VACCINES (1 of 2) Never done   ? DIABETIC FOOT EXAM  02/19/2019   ? MAMMOGRAM  03/23/2019   ? DIABETIC EYE EXAM  11/25/2020   ? Pneumococcal Vaccine: Pediatrics (0 to 5 Years) and At-Risk Patients (6 to 64 Years) (2 of 2) 04/12/2021   ? Pneumococcal Vaccine: 65+ Years (2 of 2) 04/12/2021

## 2021-08-12 ENCOUNTER — OFFICE VISIT (OUTPATIENT)
Dept: FAMILY MEDICINE | Facility: CLINIC | Age: 66
End: 2021-08-12
Payer: COMMERCIAL

## 2021-08-12 ENCOUNTER — NURSE TRIAGE (OUTPATIENT)
Dept: NURSING | Facility: CLINIC | Age: 66
End: 2021-08-12

## 2021-08-12 VITALS
RESPIRATION RATE: 28 BRPM | OXYGEN SATURATION: 90 % | BODY MASS INDEX: 41.69 KG/M2 | HEART RATE: 87 BPM | SYSTOLIC BLOOD PRESSURE: 100 MMHG | DIASTOLIC BLOOD PRESSURE: 66 MMHG | WEIGHT: 181 LBS | TEMPERATURE: 97.7 F

## 2021-08-12 DIAGNOSIS — N39.41 URGENCY INCONTINENCE: Primary | ICD-10-CM

## 2021-08-12 PROCEDURE — 99215 OFFICE O/P EST HI 40 MIN: CPT | Performed by: FAMILY MEDICINE

## 2021-08-12 RX ORDER — FLUCONAZOLE 150 MG/1
TABLET ORAL
COMMUNITY
Start: 2021-05-21 | End: 2021-09-24

## 2021-08-12 RX ORDER — KETOROLAC TROMETHAMINE 5 MG/ML
SOLUTION OPHTHALMIC
COMMUNITY
Start: 2021-08-05 | End: 2021-09-24

## 2021-08-12 RX ORDER — OXYBUTYNIN CHLORIDE 5 MG/1
5 TABLET ORAL 3 TIMES DAILY
Qty: 30 TABLET | Refills: 0 | Status: SHIPPED | OUTPATIENT
Start: 2021-08-12 | End: 2022-09-22

## 2021-08-12 RX ORDER — AZELASTINE 1 MG/ML
SPRAY, METERED NASAL
COMMUNITY
Start: 2021-02-26 | End: 2021-12-09

## 2021-08-12 RX ORDER — PREDNISOLONE ACETATE 10 MG/ML
SUSPENSION/ DROPS OPHTHALMIC
COMMUNITY
Start: 2021-08-05 | End: 2021-09-24

## 2021-08-12 RX ORDER — MECLIZINE HYDROCHLORIDE 25 MG/1
TABLET ORAL
COMMUNITY
Start: 2021-06-17 | End: 2021-09-24

## 2021-08-12 ASSESSMENT — ASTHMA QUESTIONNAIRES
QUESTION_3 LAST FOUR WEEKS HOW OFTEN DID YOUR ASTHMA SYMPTOMS (WHEEZING, COUGHING, SHORTNESS OF BREATH, CHEST TIGHTNESS OR PAIN) WAKE YOU UP AT NIGHT OR EARLIER THAN USUAL IN THE MORNING: FOUR OR MORE NIGHTS A WEEK
ACT_TOTALSCORE: 7
QUESTION_1 LAST FOUR WEEKS HOW MUCH OF THE TIME DID YOUR ASTHMA KEEP YOU FROM GETTING AS MUCH DONE AT WORK, SCHOOL OR AT HOME: ALL OF THE TIME
QUESTION_4 LAST FOUR WEEKS HOW OFTEN HAVE YOU USED YOUR RESCUE INHALER OR NEBULIZER MEDICATION (SUCH AS ALBUTEROL): TWO OR THREE TIMES PER WEEK
EMERGENCY_ROOM_LAST_YEAR_TOTAL: ONE
QUESTION_5 LAST FOUR WEEKS HOW WOULD YOU RATE YOUR ASTHMA CONTROL: NOT CONTROLLED AT ALL
QUESTION_2 LAST FOUR WEEKS HOW OFTEN HAVE YOU HAD SHORTNESS OF BREATH: MORE THAN ONCE A DAY
HOSPITALIZATION_OVERNIGHT_LAST_YEAR_TOTAL: ONE

## 2021-08-12 NOTE — Clinical Note
Patient came to see me, scheduled for a COPD exacerbation concerned.  In reality she had no breathing concerns but has many concerns about her urinary incontinence.  This looks to have been a longstanding issue.  I did start a medication for her (oxybutynin) but did  her about frequent side effects and recommended [place].  Thanks, petrona

## 2021-08-12 NOTE — TELEPHONE ENCOUNTER
Can add on to my schedule next week Monday, reserved spot ok. If she's more short of breath please go into walk in clinic.

## 2021-08-12 NOTE — TELEPHONE ENCOUNTER
COPD    Walking slow because she is overweight.  She reports she is out of breath more lately.  Patient states she does not thinks she is getting enough O2 at work.    Also complaining of not being able to swallow normally., She states her flapper   In her throat is not working correctly.   And when she is eating peanut butter, it gets stuck in her throat, and will not go down.    She states this has been going on for awhile , about 1.5 years, and she has been afraid   To tell anyone.    She was advised to make an appointment with PCP or other, and call was sent to [facility].    Helen Woods, RN RN  [facility] Triage/refill nurse      Additional Information    Difficulty breathing, severe    Protocols used: RESPIRATORY MULTIPLE SYMPTOMS - GUIDELINE [id number]

## 2021-08-13 ASSESSMENT — ASTHMA QUESTIONNAIRES: ACT_TOTALSCORE: 7

## 2021-08-14 NOTE — PROGRESS NOTES
Subjective:  66 year old female with concerns COPD but in reality she does not have any breathing concerns at this time.  She is more concerned about urinary incontinence and reports that these problems have been present for 1-1/2 years at that minimum.  In chart review it looks like given the present for quite a bit longer than that.  She does not usually come to this clinic but was provided with an urgent visit here based on the COPD concerns.  She states that whenever she drinks tea she needs to urinate very quickly.  Does not matter what kind of liquid she is drinking.  She had leaks and has accidents.  She is also worried about some kind of cyst on the follow-up.  She says these have been addressed and removed previously.  She would like this done again.  She reports that it causes no pain.    Past Medical History:   Diagnosis Date     Acute on chronic respiratory failure with hypoxia (H) 11/15/2016     Bipolar 1 disorder (H)      CAP (community acquired pneumonia) 11/15/2016     Cervical high risk HPV (human papillomavirus) test positive 3/5/2018    3/5/18 NIL pap, +HR HPV (not 16/18) 5/21/21 NIL pap, neg HPV. Plan: await provider     COPD exacerbation (H) 4/24/2021     COPD with exacerbation (H) 11/15/2016     Diabetes mellitus, type II (H)      Hearing loss      Meniere's disease      Pneumonia 4/23/2021     Pneumonia of right lower lobe due to infectious organism 6/11/2019        Outpatient Medications Prior to Visit   Medication Sig Dispense Refill     albuterol (PROAIR HFA;PROVENTIL HFA;VENTOLIN HFA) 90 mcg/actuation inhaler [ALBUTEROL (PROAIR HFA;PROVENTIL HFA;VENTOLIN HFA) 90 MCG/ACTUATION INHALER] Inhale 2 puffs every 6 (six) hours as needed for wheezing or shortness of breath. 1 Inhaler 11     ARIPiprazole (ABILIFY) 10 MG tablet [ARIPIPRAZOLE (ABILIFY) 10 MG TABLET] Take 10 mg by mouth daily.       azelastine (ASTELIN) 0.1 % nasal spray USE 2 SPRAYS IN EACH NOSTRIL TWICE DAILY AS DIRECTED        buPROPion (WELLBUTRIN) 75 MG tablet [BUPROPION (WELLBUTRIN) 75 MG TABLET] Take 75 mg by mouth 2 (two) times a day.       CONTOUR NEXT TEST test strip daily       fluconazole (DIFLUCAN) 150 MG tablet        ketorolac (ACULAR) 0.5 % ophthalmic solution        meclizine (ANTIVERT) 25 MG tablet        OXYGEN-AIR DELIVERY SYSTEMS MISC [OXYGEN-AIR DELIVERY SYSTEMS MISC] Use 2 L As Directed. 2L pulsing  [facility]       prednisoLONE acetate (PRED FORTE) 1 % ophthalmic suspension        albuterol (PROVENTIL) 2.5 mg /3 mL (0.083 %) nebulizer solution [ALBUTEROL (PROVENTIL) 2.5 MG /3 ML (0.083 %) NEBULIZER SOLUTION] Take 3 mL (2.5 mg total) by nebulization every 4 (four) hours as needed for wheezing or shortness of breath (or cough). 30 vial 0     No facility-administered medications prior to visit.      History   Smoking Status     Former Smoker     Packs/day: 1.00     Years: 40.00     Quit date: 2/1/2017   Smokeless Tobacco     Never Used      Objective:  /66 (BP Location: Left arm, Patient Position: Sitting, Cuff Size: Adult Large)   Pulse 87   Temp 97.7  F (36.5  C) (Temporal)   Resp 28   Wt 82.1 kg (181 lb)   SpO2 90%   BMI 41.69 kg/m    GENERAL: alert, not distressed  CHEST: clear, no rales, rhonchi, or wheezes  CARDIAC: regular without murmur, gallop, or rub  ABDOMEN: soft, non tender, non distended, normal bowel sounds   external exam performed only.  Medical assistant Payal Middleton present for the entirety of the pelvic exam.  No labial lesions or cysts are identified.    Assessment and Plan:   1. Urgency incontinence  Apparently patient has been on bladder muscle medications in the past but is not currently taking any.  She is somewhat skeptical that they will work but it is reasonable to retry one of these.  She may have also had urodynamic studies done in the past but no records are available for review.  She needs to see either urology or urogynecology.  She would like to go back to [facility]  urology is reasonable choice.  We will try to help facilitate that as well as a follow-up appointment with her primary care physician.  Potential side effects of medication discussed.  - Adult Urology Referral; Future  - oxybutynin (DITROPAN) 5 MG tablet; Take 1 tablet (5 mg) by mouth 3 times daily  Dispense: 30 tablet; Refill: 0     Total time on the day of service including record review, examination room time, and documentation exceeded 50 minutes.

## 2021-09-10 NOTE — TELEPHONE ENCOUNTER
Called patient and canceled her appts thru 3/27 due to her safety with covid-19. Patient was understanding of this.    Radha Townsend, PT, DPT   W Plasty Text: The lesion was extirpated to the level of the fat with a #15 scalpel blade.  Given the location of the defect, shape of the defect and the proximity to free margins a W-plasty was deemed most appropriate for repair.  Using a sterile surgical marker, the appropriate transposition arms of the W-plasty were drawn incorporating the defect and placing the expected incisions within the relaxed skin tension lines where possible.    The area thus outlined was incised deep to adipose tissue with a #15 scalpel blade.  The skin margins were undermined to an appropriate distance in all directions utilizing iris scissors.  The opposing transposition arms were then transposed into place in opposite direction and anchored with interrupted buried subcutaneous sutures.

## 2021-09-23 ENCOUNTER — HOSPITAL ENCOUNTER (OUTPATIENT)
Facility: HOSPITAL | Age: 66
Setting detail: OBSERVATION
Discharge: HOME | End: 2021-09-25
Attending: EMERGENCY MEDICINE | Admitting: HOSPITALIST
Payer: COMMERCIAL

## 2021-09-23 ENCOUNTER — OFFICE VISIT (OUTPATIENT)
Dept: FAMILY MEDICINE | Facility: CLINIC | Age: 66
End: 2021-09-23
Payer: COMMERCIAL

## 2021-09-23 ENCOUNTER — APPOINTMENT (OUTPATIENT)
Dept: RADIOLOGY | Facility: HOSPITAL | Age: 66
End: 2021-09-23
Attending: STUDENT IN AN ORGANIZED HEALTH CARE EDUCATION/TRAINING PROGRAM
Payer: COMMERCIAL

## 2021-09-23 ENCOUNTER — NURSE TRIAGE (OUTPATIENT)
Dept: NURSING | Facility: CLINIC | Age: 66
End: 2021-09-23

## 2021-09-23 VITALS
RESPIRATION RATE: 32 BRPM | DIASTOLIC BLOOD PRESSURE: 78 MMHG | SYSTOLIC BLOOD PRESSURE: 147 MMHG | TEMPERATURE: 98.3 F | WEIGHT: 179.9 LBS | BODY MASS INDEX: 41.44 KG/M2 | OXYGEN SATURATION: 91 % | HEART RATE: 78 BPM

## 2021-09-23 DIAGNOSIS — J44.1 COPD EXACERBATION (H): ICD-10-CM

## 2021-09-23 DIAGNOSIS — R06.82 TACHYPNEA: ICD-10-CM

## 2021-09-23 DIAGNOSIS — J44.9 CHRONIC OBSTRUCTIVE PULMONARY DISEASE, UNSPECIFIED COPD TYPE (H): Primary | ICD-10-CM

## 2021-09-23 DIAGNOSIS — R06.2 WHEEZING: ICD-10-CM

## 2021-09-23 DIAGNOSIS — E11.69 TYPE 2 DIABETES MELLITUS WITH OTHER SPECIFIED COMPLICATION, WITHOUT LONG-TERM CURRENT USE OF INSULIN (H): ICD-10-CM

## 2021-09-23 DIAGNOSIS — J44.1 COPD EXACERBATION (H): Primary | ICD-10-CM

## 2021-09-23 LAB
ANION GAP SERPL CALCULATED.3IONS-SCNC: 7 MMOL/L (ref 5–18)
BNP SERPL-MCNC: 44 PG/ML (ref 0–106)
BUN SERPL-MCNC: 12 MG/DL (ref 8–22)
CALCIUM SERPL-MCNC: 9.3 MG/DL (ref 8.5–10.5)
CHLORIDE BLD-SCNC: 103 MMOL/L (ref 98–107)
CO2 SERPL-SCNC: 34 MMOL/L (ref 22–31)
CREAT SERPL-MCNC: 0.89 MG/DL (ref 0.6–1.1)
GFR SERPL CREATININE-BSD FRML MDRD: 68 ML/MIN/1.73M2
GLUCOSE BLD-MCNC: 120 MG/DL (ref 70–125)
HOLD SPECIMEN: NORMAL
POTASSIUM BLD-SCNC: 4.2 MMOL/L (ref 3.5–5)
SARS-COV-2 RNA RESP QL NAA+PROBE: NEGATIVE
SODIUM SERPL-SCNC: 144 MMOL/L (ref 136–145)
TROPONIN I SERPL-MCNC: <0.01 NG/ML (ref 0–0.29)

## 2021-09-23 PROCEDURE — 80048 BASIC METABOLIC PNL TOTAL CA: CPT | Performed by: EMERGENCY MEDICINE

## 2021-09-23 PROCEDURE — C9803 HOPD COVID-19 SPEC COLLECT: HCPCS

## 2021-09-23 PROCEDURE — 93005 ELECTROCARDIOGRAM TRACING: CPT | Performed by: EMERGENCY MEDICINE

## 2021-09-23 PROCEDURE — 36415 COLL VENOUS BLD VENIPUNCTURE: CPT | Performed by: STUDENT IN AN ORGANIZED HEALTH CARE EDUCATION/TRAINING PROGRAM

## 2021-09-23 PROCEDURE — 87635 SARS-COV-2 COVID-19 AMP PRB: CPT | Performed by: EMERGENCY MEDICINE

## 2021-09-23 PROCEDURE — 83880 ASSAY OF NATRIURETIC PEPTIDE: CPT | Performed by: EMERGENCY MEDICINE

## 2021-09-23 PROCEDURE — 99285 EMERGENCY DEPT VISIT HI MDM: CPT | Mod: 25

## 2021-09-23 PROCEDURE — 94640 AIRWAY INHALATION TREATMENT: CPT

## 2021-09-23 PROCEDURE — 71046 X-RAY EXAM CHEST 2 VIEWS: CPT

## 2021-09-23 PROCEDURE — 85025 COMPLETE CBC W/AUTO DIFF WBC: CPT | Performed by: EMERGENCY MEDICINE

## 2021-09-23 PROCEDURE — 80048 BASIC METABOLIC PNL TOTAL CA: CPT | Performed by: STUDENT IN AN ORGANIZED HEALTH CARE EDUCATION/TRAINING PROGRAM

## 2021-09-23 PROCEDURE — 99214 OFFICE O/P EST MOD 30 MIN: CPT | Performed by: NURSE PRACTITIONER

## 2021-09-23 PROCEDURE — 250N000009 HC RX 250: Performed by: EMERGENCY MEDICINE

## 2021-09-23 PROCEDURE — 83036 HEMOGLOBIN GLYCOSYLATED A1C: CPT | Performed by: HOSPITALIST

## 2021-09-23 PROCEDURE — 84484 ASSAY OF TROPONIN QUANT: CPT | Performed by: EMERGENCY MEDICINE

## 2021-09-23 RX ORDER — ALBUTEROL SULFATE 0.83 MG/ML
2.5 SOLUTION RESPIRATORY (INHALATION)
Status: DISCONTINUED | OUTPATIENT
Start: 2021-09-23 | End: 2021-09-25 | Stop reason: HOSPADM

## 2021-09-23 RX ORDER — IPRATROPIUM BROMIDE AND ALBUTEROL SULFATE 2.5; .5 MG/3ML; MG/3ML
3 SOLUTION RESPIRATORY (INHALATION) ONCE
Status: COMPLETED | OUTPATIENT
Start: 2021-09-23 | End: 2021-09-23

## 2021-09-23 RX ORDER — METHYLPREDNISOLONE SODIUM SUCCINATE 125 MG/2ML
125 INJECTION, POWDER, LYOPHILIZED, FOR SOLUTION INTRAMUSCULAR; INTRAVENOUS ONCE
Status: COMPLETED | OUTPATIENT
Start: 2021-09-23 | End: 2021-09-24

## 2021-09-23 RX ADMIN — IPRATROPIUM BROMIDE AND ALBUTEROL SULFATE 3 ML: .5; 3 SOLUTION RESPIRATORY (INHALATION) at 23:43

## 2021-09-23 ASSESSMENT — MIFFLIN-ST. JEOR: SCORE: 1199.07

## 2021-09-23 ASSESSMENT — ENCOUNTER SYMPTOMS: BACK PAIN: 1

## 2021-09-23 NOTE — PROGRESS NOTES
Assessment & Plan     Wheezing      Tachypnea      COPD exacerbation (H)       Pt with tachypnea and tight, wheezy breath sounds with history of COPD, cardiomyopathy, and obstructive sleep apnea.    Very vague historian and does not know her medications.  She indicated she has had no nebulizers nor inhalers today.  Unable to give details of a possible respiratory illness that triggered this.     Recommended emergency room for monitoring and treatment of COPD.  Patient wheeled to the lobby where her mother is here and will drive her to the emergency room.  Patient offered ambulance and she declined.     Recommend  to ensure patient is following up and using/refilling medications appropriately.      Spoke to Dr. Herrera at [facility] emergency department.            No follow-ups on file.    [facility] Lem[facility]    Wendy Samuels is a 66 year old female who presents to clinic today for the following health issues:  Chief Complaint   Patient presents with     Breathing Problem     patient has been sob when not wearing her oxygen tank; hard time breathing. Denies any other symptoms     HPI    Patient who is O2 dependent with history of COPD with worsening breathing.  Is on 2 L.  Sats are 91%.  Patient is unsure what her normal oxygen saturation on 2 L as.  Has been wheezing.  Thinks her breathing has been bad for at least 1 to 2 months.  I saw her with a very similar complaint on May 21 and she had not been using her inhalers did not understand the concept of refills at that time.      Has a cognitive disorder.  Lives with her elderly mother who also brought her today.    Patient has not used any inhalers nor nebulizers today.  Indicates she has 2 inhalers, but do not see a controller listed on her medication list and she does not know the names of her inhalers.    Has had cough and congestion.  Is vaccinated for Covid.    Sats were 91% on 2 L here and respiratory  rate was 32.     Has not been able to lie flat to sleep for several nights due to shortness of breath.  Her upper back is also painful.      Review of Systems   Musculoskeletal: Positive for back pain (upper ).           Objective    BP (!) 147/78 (BP Location: Right arm, Patient Position: Chair, Cuff Size: Adult Regular)   Pulse 78   Temp 98.3  F (36.8  C) (Oral)   Resp (!) 32   Wt 81.6 kg (179 lb 14.4 oz)   SpO2 91%   BMI 41.44 kg/m    Physical Exam  Constitutional:       General: She is not in acute distress.     Appearance: She is well-developed.   HENT:      Nose:      Comments: No obvious congestion or cough noted today.  Eyes:      General:         Right eye: No discharge.         Left eye: No discharge.      Conjunctiva/sclera: Conjunctivae normal.   Pulmonary:      Effort: Pulmonary effort is normal.      Breath sounds: Wheezing (Tight expiratory wheezing throughout) present.      Comments: Wearing nasal cannula set to 2 L  Musculoskeletal:         General: Normal range of motion.   Skin:     General: Skin is warm and dry.      Capillary Refill: Capillary refill takes less than 2 seconds.   Neurological:      Mental Status: She is alert and oriented to person, place, and time.   Psychiatric:         Mood and Affect: Mood normal.         Behavior: Behavior normal.         Thought Content: Thought content normal.         Judgment: Judgment normal.

## 2021-09-23 NOTE — PATIENT INSTRUCTIONS
Please go to [facility] ER for treatment of severe breathing issues now.    I will call to say you're coming.

## 2021-09-23 NOTE — TELEPHONE ENCOUNTER
"Pt called in states she has body weakness.  The Pt able to stand walk.  Feels weak.  Pt going to bath room a lot.  The symptom started for a month ago.  No fever, no chest pain,   No cough.  Has runny nose.  No shortness of breathing on and off.  The Pt has COPD  Pt is on O2 at home.  Pt is using 2 lit O2.  No vomit, no diarrhea.  No bleeding.  No pain.  The disposition is to be seen with in 24 hours.  Care advice given per protocol.  Patient agrees with care advice given.   Agreed to call back if he has additional symptoms or questions.      Amaury Fiore [place] Nurse Advisor 9/23/2021 5:00 PM        Reason for Disposition    [1] MODERATE weakness (i.e., interferes with work, school, normal activities) AND [2] persists > 3 days    Additional Information    Negative: Severe difficulty breathing (e.g., struggling for each breath, speaks in single words)    Negative: Shock suspected (e.g., cold/pale/clammy skin, too weak to stand, low BP, rapid pulse)    Negative: Difficult to awaken or acting confused (e.g., disoriented, slurred speech)    Negative: [1] Fainted > 15 minutes ago AND [2] still feels too weak or dizzy to stand    Negative: [1] SEVERE weakness (i.e., unable to walk or barely able to walk, requires support) AND [2] new onset or worsening    Negative: Sounds like a life-threatening emergency to the triager    Negative: Weakness of the face, arm or leg on one side of the body    Negative: [1] Has diabetes (diabetes mellitus) AND [2] weakness from low blood sugar (i.e., < 60 mg/dl or 3.5 mmol/l)    Negative: Heat exhaustion suspected (i.e., dehydration from heat exposure)    Negative: Chest pain    Negative: Vomiting is main symptom    Negative: Diarrhea is main symptom    Negative: Difficulty breathing    Negative: Heart beating < 50 beats per minute OR > 140 beats per minute    Negative: Extra heart beats OR irregular heart beating   (i.e., \"palpitations\")    Negative: Follows bleeding (e.g., from " vomiting, rectum, vagina; Exception: small brief weakness from sight of a small amount blood)    Negative: Black or tarry bowel movements    Negative: [1] Drinking very little AND [2] dehydration suspected (e.g., no urine > 12 hours, very dry mouth, very lightheaded)    Negative: Patient sounds very sick or weak to the triager    Negative: [1] MODERATE weakness (i.e., interferes with work, school, normal activities) AND [2] cause unknown  (Exceptions: weakness with acute minor illness, or weakness from poor fluid intake)    Negative: [1] MODERATE weakness AND [2] from poor fluid intake AND [3] no improvement after 2 hours of rest and fluids    Negative: [1] Fever > 103 F (39.4 C) AND [2] not able to get the fever down using Fever Care Advice    Negative: [1] Fever > 101 F (38.3 C) AND [2] age > 60    Negative: [1] Fever > 100.0 F (37.8 C) AND [2] bedridden (e.g., nursing home patient, CVA, chronic illness, recovering from surgery)    Negative: [1] Fever > 100.0 F (37.8 C) AND [2] diabetes mellitus or weak immune system (e.g., HIV positive, cancer chemo, splenectomy, organ transplant, chronic steroids)    Negative: Pale skin (pallor)    Protocols used: WEAKNESS (GENERALIZED) AND FATIGUE-A-AH

## 2021-09-23 NOTE — ED PROVIDER NOTES
"Expected Patient Referral to ED  6:12 PM    Referring Clinic/Provider:  Walk in clinic    Reason for referral/Clinical facts:  65 y/o f hx of copd 2l home o2  Sounds tight  rr 32  Cognitive d/o, noncompliant w meds  No meds/inhaler today  Sick x1mo w wheezing/sob  Didn't even know what an \"refill was\" or if she had them    Recommendations provided:  none    Caller was informed that this institution does  possess the capabilities and/or resources to provide for patient and should be transferred to our institution.    Based on the information provided, discussed that this patient likely is nota good candidate for direct admission to this institution and that provider could proceed as such.  If however direct admit is sought and any hurdles encountered, this ED would be happy to see the patient and evaluate.    Informed caller that recommendations provided are recommendations based only on the facts provided and that they responsible to accept or reject the advice, or to seek a formal in person consultation as needed and that this ED will see/treat patient should they arrive.      Shanta Herrera MD  Emergency Medicine  [facility] EMERGENCY DEPARTMENT  [address] [place] [id number]  [phone]     Shanta Herrera MD  09/23/21 1814    "

## 2021-09-24 PROBLEM — J44.1 COPD EXACERBATION (H): Status: ACTIVE | Noted: 2021-09-24

## 2021-09-24 LAB
ALBUMIN UR-MCNC: NEGATIVE MG/DL
AMORPH CRY #/AREA URNS HPF: ABNORMAL /HPF
APPEARANCE UR: ABNORMAL
ATRIAL RATE - MUSE: 89 BPM
BASOPHILS # BLD AUTO: 0 10E3/UL (ref 0–0.2)
BASOPHILS NFR BLD AUTO: 0 %
BILIRUB UR QL STRIP: NEGATIVE
COLOR UR AUTO: ABNORMAL
DIASTOLIC BLOOD PRESSURE - MUSE: NORMAL MMHG
EOSINOPHIL # BLD AUTO: 0.2 10E3/UL (ref 0–0.7)
EOSINOPHIL NFR BLD AUTO: 2 %
ERYTHROCYTE [DISTWIDTH] IN BLOOD BY AUTOMATED COUNT: 13 % (ref 10–15)
GLUCOSE BLDC GLUCOMTR-MCNC: 224 MG/DL (ref 70–99)
GLUCOSE BLDC GLUCOMTR-MCNC: 229 MG/DL (ref 70–99)
GLUCOSE BLDC GLUCOMTR-MCNC: 277 MG/DL (ref 70–99)
GLUCOSE BLDC GLUCOMTR-MCNC: 281 MG/DL (ref 70–99)
GLUCOSE BLDC GLUCOMTR-MCNC: 299 MG/DL (ref 70–99)
GLUCOSE UR STRIP-MCNC: >1000 MG/DL
HBA1C MFR BLD: 6.6 %
HCT VFR BLD AUTO: 49.1 % (ref 35–47)
HGB BLD-MCNC: 14.8 G/DL (ref 11.7–15.7)
HGB UR QL STRIP: ABNORMAL
HYALINE CASTS: 1 /LPF
IMM GRANULOCYTES # BLD: 0 10E3/UL
IMM GRANULOCYTES NFR BLD: 0 %
INTERPRETATION ECG - MUSE: NORMAL
KETONES UR STRIP-MCNC: NEGATIVE MG/DL
LACTATE SERPL-SCNC: 2.8 MMOL/L (ref 0.7–2)
LEUKOCYTE ESTERASE UR QL STRIP: ABNORMAL
LYMPHOCYTES # BLD AUTO: 2.9 10E3/UL (ref 0.8–5.3)
LYMPHOCYTES NFR BLD AUTO: 24 %
MCH RBC QN AUTO: 28.8 PG (ref 26.5–33)
MCHC RBC AUTO-ENTMCNC: 30.1 G/DL (ref 31.5–36.5)
MCV RBC AUTO: 96 FL (ref 78–100)
MONOCYTES # BLD AUTO: 0.8 10E3/UL (ref 0–1.3)
MONOCYTES NFR BLD AUTO: 7 %
NEUTROPHILS # BLD AUTO: 8.2 10E3/UL (ref 1.6–8.3)
NEUTROPHILS NFR BLD AUTO: 67 %
NITRATE UR QL: NEGATIVE
NRBC # BLD AUTO: 0 10E3/UL
NRBC BLD AUTO-RTO: 0 /100
P AXIS - MUSE: 69 DEGREES
PH UR STRIP: 7.5 [PH] (ref 5–7)
PLATELET # BLD AUTO: 187 10E3/UL (ref 150–450)
PR INTERVAL - MUSE: 154 MS
QRS DURATION - MUSE: 64 MS
QT - MUSE: 378 MS
QTC - MUSE: 459 MS
R AXIS - MUSE: 58 DEGREES
RBC # BLD AUTO: 5.13 10E6/UL (ref 3.8–5.2)
RBC URINE: 1 /HPF
SP GR UR STRIP: 1.01 (ref 1–1.03)
SQUAMOUS EPITHELIAL: 1 /HPF
SYSTOLIC BLOOD PRESSURE - MUSE: NORMAL MMHG
T AXIS - MUSE: 59 DEGREES
UROBILINOGEN UR STRIP-MCNC: <2 MG/DL
VENTRICULAR RATE- MUSE: 89 BPM
WBC # BLD AUTO: 12.2 10E3/UL (ref 4–11)
WBC URINE: 15 /HPF

## 2021-09-24 PROCEDURE — 96366 THER/PROPH/DIAG IV INF ADDON: CPT

## 2021-09-24 PROCEDURE — 96365 THER/PROPH/DIAG IV INF INIT: CPT

## 2021-09-24 PROCEDURE — 96375 TX/PRO/DX INJ NEW DRUG ADDON: CPT

## 2021-09-24 PROCEDURE — 999N000157 HC STATISTIC RCP TIME EA 10 MIN

## 2021-09-24 PROCEDURE — 36415 COLL VENOUS BLD VENIPUNCTURE: CPT | Performed by: HOSPITALIST

## 2021-09-24 PROCEDURE — 96372 THER/PROPH/DIAG INJ SC/IM: CPT | Mod: XU | Performed by: HOSPITALIST

## 2021-09-24 PROCEDURE — 81001 URINALYSIS AUTO W/SCOPE: CPT | Performed by: HOSPITALIST

## 2021-09-24 PROCEDURE — 99220 PR INITIAL OBSERVATION CARE,LEVEL III: CPT | Performed by: HOSPITALIST

## 2021-09-24 PROCEDURE — 87086 URINE CULTURE/COLONY COUNT: CPT | Performed by: HOSPITALIST

## 2021-09-24 PROCEDURE — 250N000009 HC RX 250: Performed by: HOSPITALIST

## 2021-09-24 PROCEDURE — G0378 HOSPITAL OBSERVATION PER HR: HCPCS

## 2021-09-24 PROCEDURE — 96372 THER/PROPH/DIAG INJ SC/IM: CPT

## 2021-09-24 PROCEDURE — 96372 THER/PROPH/DIAG INJ SC/IM: CPT | Mod: 59 | Performed by: HOSPITALIST

## 2021-09-24 PROCEDURE — 250N000013 HC RX MED GY IP 250 OP 250 PS 637: Performed by: EMERGENCY MEDICINE

## 2021-09-24 PROCEDURE — 83605 ASSAY OF LACTIC ACID: CPT | Performed by: HOSPITALIST

## 2021-09-24 PROCEDURE — 94640 AIRWAY INHALATION TREATMENT: CPT | Mod: 76

## 2021-09-24 PROCEDURE — 99207 PR CDG-CODE CATEGORY CHANGED: CPT | Performed by: HOSPITALIST

## 2021-09-24 PROCEDURE — 250N000009 HC RX 250: Performed by: INTERNAL MEDICINE

## 2021-09-24 PROCEDURE — 250N000011 HC RX IP 250 OP 636: Performed by: EMERGENCY MEDICINE

## 2021-09-24 PROCEDURE — 250N000009 HC RX 250: Performed by: EMERGENCY MEDICINE

## 2021-09-24 PROCEDURE — 250N000013 HC RX MED GY IP 250 OP 250 PS 637: Performed by: HOSPITALIST

## 2021-09-24 PROCEDURE — 250N000013 HC RX MED GY IP 250 OP 250 PS 637: Mod: GY | Performed by: INTERNAL MEDICINE

## 2021-09-24 PROCEDURE — 250N000012 HC RX MED GY IP 250 OP 636 PS 637: Performed by: HOSPITALIST

## 2021-09-24 RX ORDER — DEXTROSE MONOHYDRATE 25 G/50ML
25-50 INJECTION, SOLUTION INTRAVENOUS
Status: DISCONTINUED | OUTPATIENT
Start: 2021-09-24 | End: 2021-09-25 | Stop reason: HOSPADM

## 2021-09-24 RX ORDER — BUPROPION HYDROCHLORIDE 75 MG/1
75 TABLET ORAL 2 TIMES DAILY
Status: DISCONTINUED | OUTPATIENT
Start: 2021-09-24 | End: 2021-09-25 | Stop reason: HOSPADM

## 2021-09-24 RX ORDER — ARIPIPRAZOLE 10 MG/1
10 TABLET ORAL DAILY
Status: DISCONTINUED | OUTPATIENT
Start: 2021-09-24 | End: 2021-09-25 | Stop reason: HOSPADM

## 2021-09-24 RX ORDER — ALBUTEROL SULFATE 0.83 MG/ML
2.5 SOLUTION RESPIRATORY (INHALATION)
Status: DISCONTINUED | OUTPATIENT
Start: 2021-09-24 | End: 2021-09-25 | Stop reason: HOSPADM

## 2021-09-24 RX ORDER — ONDANSETRON 4 MG/1
4 TABLET, ORALLY DISINTEGRATING ORAL EVERY 6 HOURS PRN
Status: DISCONTINUED | OUTPATIENT
Start: 2021-09-24 | End: 2021-09-25 | Stop reason: HOSPADM

## 2021-09-24 RX ORDER — OXYBUTYNIN CHLORIDE 5 MG/1
5 TABLET ORAL 3 TIMES DAILY
Status: DISCONTINUED | OUTPATIENT
Start: 2021-09-24 | End: 2021-09-25 | Stop reason: HOSPADM

## 2021-09-24 RX ORDER — ACETAMINOPHEN 650 MG/1
650 SUPPOSITORY RECTAL EVERY 6 HOURS PRN
Status: DISCONTINUED | OUTPATIENT
Start: 2021-09-24 | End: 2021-09-25 | Stop reason: HOSPADM

## 2021-09-24 RX ORDER — NICOTINE POLACRILEX 4 MG
15-30 LOZENGE BUCCAL
Status: DISCONTINUED | OUTPATIENT
Start: 2021-09-24 | End: 2021-09-25 | Stop reason: HOSPADM

## 2021-09-24 RX ORDER — ALBUTEROL SULFATE 90 UG/1
2 AEROSOL, METERED RESPIRATORY (INHALATION) EVERY 6 HOURS PRN
Status: DISCONTINUED | OUTPATIENT
Start: 2021-09-24 | End: 2021-09-25 | Stop reason: HOSPADM

## 2021-09-24 RX ORDER — PREDNISONE 20 MG/1
40 TABLET ORAL DAILY
Status: DISCONTINUED | OUTPATIENT
Start: 2021-09-24 | End: 2021-09-25 | Stop reason: HOSPADM

## 2021-09-24 RX ORDER — IPRATROPIUM BROMIDE AND ALBUTEROL SULFATE 2.5; .5 MG/3ML; MG/3ML
3 SOLUTION RESPIRATORY (INHALATION)
Status: DISCONTINUED | OUTPATIENT
Start: 2021-09-24 | End: 2021-09-25 | Stop reason: HOSPADM

## 2021-09-24 RX ORDER — DOXYCYCLINE 100 MG/1
100 CAPSULE ORAL 2 TIMES DAILY
Status: DISCONTINUED | OUTPATIENT
Start: 2021-09-24 | End: 2021-09-25 | Stop reason: HOSPADM

## 2021-09-24 RX ORDER — IPRATROPIUM BROMIDE AND ALBUTEROL SULFATE 2.5; .5 MG/3ML; MG/3ML
3 SOLUTION RESPIRATORY (INHALATION)
Status: DISCONTINUED | OUTPATIENT
Start: 2021-09-24 | End: 2021-09-24

## 2021-09-24 RX ORDER — LIDOCAINE 40 MG/G
CREAM TOPICAL
Status: DISCONTINUED | OUTPATIENT
Start: 2021-09-24 | End: 2021-09-25 | Stop reason: HOSPADM

## 2021-09-24 RX ORDER — ONDANSETRON 2 MG/ML
4 INJECTION INTRAMUSCULAR; INTRAVENOUS EVERY 6 HOURS PRN
Status: DISCONTINUED | OUTPATIENT
Start: 2021-09-24 | End: 2021-09-25 | Stop reason: HOSPADM

## 2021-09-24 RX ORDER — MAGNESIUM SULFATE HEPTAHYDRATE 40 MG/ML
2 INJECTION, SOLUTION INTRAVENOUS ONCE
Status: COMPLETED | OUTPATIENT
Start: 2021-09-24 | End: 2021-09-24

## 2021-09-24 RX ORDER — DOXYCYCLINE 100 MG/1
100 CAPSULE ORAL ONCE
Status: COMPLETED | OUTPATIENT
Start: 2021-09-24 | End: 2021-09-24

## 2021-09-24 RX ORDER — ACETAMINOPHEN 325 MG/1
650 TABLET ORAL EVERY 6 HOURS PRN
Status: DISCONTINUED | OUTPATIENT
Start: 2021-09-24 | End: 2021-09-25 | Stop reason: HOSPADM

## 2021-09-24 RX ADMIN — METHYLPREDNISOLONE SODIUM SUCCINATE 125 MG: 125 INJECTION, POWDER, FOR SOLUTION INTRAMUSCULAR; INTRAVENOUS at 00:31

## 2021-09-24 RX ADMIN — DOXYCYCLINE 100 MG: 100 CAPSULE ORAL at 19:50

## 2021-09-24 RX ADMIN — OMEPRAZOLE 20 MG: 20 CAPSULE, DELAYED RELEASE ORAL at 12:27

## 2021-09-24 RX ADMIN — IPRATROPIUM BROMIDE AND ALBUTEROL SULFATE 3 ML: .5; 3 SOLUTION RESPIRATORY (INHALATION) at 20:18

## 2021-09-24 RX ADMIN — BUPROPION HYDROCHLORIDE 75 MG: 75 TABLET, FILM COATED ORAL at 12:27

## 2021-09-24 RX ADMIN — OXYBUTYNIN CHLORIDE 5 MG: 5 TABLET ORAL at 19:50

## 2021-09-24 RX ADMIN — METFORMIN HYDROCHLORIDE 500 MG: 500 TABLET, FILM COATED ORAL at 19:34

## 2021-09-24 RX ADMIN — INSULIN ASPART 4 UNITS: 100 INJECTION, SOLUTION INTRAVENOUS; SUBCUTANEOUS at 08:30

## 2021-09-24 RX ADMIN — INSULIN ASPART 2 UNITS: 100 INJECTION, SOLUTION INTRAVENOUS; SUBCUTANEOUS at 19:35

## 2021-09-24 RX ADMIN — DOXYCYCLINE 100 MG: 100 CAPSULE ORAL at 02:31

## 2021-09-24 RX ADMIN — IPRATROPIUM BROMIDE AND ALBUTEROL SULFATE 3 ML: .5; 3 SOLUTION RESPIRATORY (INHALATION) at 13:36

## 2021-09-24 RX ADMIN — PREDNISONE 40 MG: 20 TABLET ORAL at 08:30

## 2021-09-24 RX ADMIN — ALBUTEROL SULFATE 2.5 MG: 2.5 SOLUTION RESPIRATORY (INHALATION) at 04:07

## 2021-09-24 RX ADMIN — DOXYCYCLINE 100 MG: 100 CAPSULE ORAL at 09:31

## 2021-09-24 RX ADMIN — MAGNESIUM SULFATE HEPTAHYDRATE 2 G: 40 INJECTION, SOLUTION INTRAVENOUS at 02:49

## 2021-09-24 RX ADMIN — METFORMIN HYDROCHLORIDE 500 MG: 500 TABLET, FILM COATED ORAL at 12:26

## 2021-09-24 RX ADMIN — INSULIN ASPART 3 UNITS: 100 INJECTION, SOLUTION INTRAVENOUS; SUBCUTANEOUS at 12:25

## 2021-09-24 RX ADMIN — IPRATROPIUM BROMIDE AND ALBUTEROL SULFATE 3 ML: .5; 3 SOLUTION RESPIRATORY (INHALATION) at 08:00

## 2021-09-24 RX ADMIN — OXYBUTYNIN CHLORIDE 5 MG: 5 TABLET ORAL at 14:08

## 2021-09-24 RX ADMIN — ARIPIPRAZOLE 10 MG: 10 TABLET ORAL at 12:27

## 2021-09-24 RX ADMIN — INSULIN ASPART 1 UNITS: 100 INJECTION, SOLUTION INTRAVENOUS; SUBCUTANEOUS at 23:27

## 2021-09-24 RX ADMIN — BUPROPION HYDROCHLORIDE 75 MG: 75 TABLET, FILM COATED ORAL at 21:44

## 2021-09-24 ASSESSMENT — ENCOUNTER SYMPTOMS
MYALGIAS: 0
FEVER: 0
CHEST TIGHTNESS: 1
CHILLS: 0
WHEEZING: 1
COUGH: 0
SHORTNESS OF BREATH: 1

## 2021-09-24 ASSESSMENT — ACTIVITIES OF DAILY LIVING (ADL)
FALL_HISTORY_WITHIN_LAST_SIX_MONTHS: NO
EQUIPMENT_CURRENTLY_USED_AT_HOME: WALKER, ROLLING
DEPENDENT_IADLS:: INDEPENDENT

## 2021-09-24 ASSESSMENT — MIFFLIN-ST. JEOR: SCORE: 1224.92

## 2021-09-24 NOTE — PLAN OF CARE
"PRIMARY DIAGNOSIS: \"GENERIC\" NURSING  OUTPATIENT/OBSERVATION GOALS TO BE MET BEFORE DISCHARGE:  ADLs back to baseline: Yes    Activity and level of assistance: Up with standby assistance.    Pain status: Pain free.    Return to near baseline physical activity: No, increased oxygen needs     Discharge Planner Nurse   Safe discharge environment identified: Yes  Barriers to discharge: Yes, increased oxygen needs        Entered by: Samira Subramanian 09/24/2021 2:41 PM     Please review provider order for any additional goals.   Nurse to notify provider when observation goals have been met and patient is ready for discharge.  "

## 2021-09-24 NOTE — PROVIDER NOTIFICATION
09/24/21 1358   RCAT Assessment   Reason for Assessment COPD   Pulmonary Status 4   Surgical Status 0   Chest X-ray 2   Respiratory Pattern 2   Mental Status 0   Breath Sounds 2   Cough Effectiveness 0   Level of Activity 1   O2 Required for SpO2>=92% 2   Acuity Level (points) 13   Acuity Level  3   Re-eval Interval Guideline Daily   Re-evaluation Date 09/25/21   Clinical Indications/Symptoms   Aerosol Therapy RCAT protocol;History of bronchospasm     RCAT done per protocol, acuity 3. BS diminished, nebulizer treatment given as scheduled, 3-4L NC sats 88-94%. RT will change Duonebs from Q6 to QID, RCAT re-eval 9/25    Chitra Stinson, RT

## 2021-09-24 NOTE — PROGRESS NOTES
Pt A&ox4, using call light frequently. Up to bathroom x2 than purwick placed. Pt complains of back pain but falls asleep after adjustment in bed. titrating Oxygen to meet O2 sat of 88 per DR Claros.     Savannah Maharaj RN

## 2021-09-24 NOTE — PLAN OF CARE
"PRIMARY DIAGNOSIS: \"GENERIC\" NURSING  OUTPATIENT/OBSERVATION GOALS TO BE MET BEFORE DISCHARGE:  ADLs back to baseline: Yes    Activity and level of assistance: Up with standby assistance.    Pain status: Pain free.    Return to near baseline physical activity: oxygen needs still increased from home oxygen use     Discharge Planner Nurse   Safe discharge environment identified: Yes  Barriers to discharge: Yes, oxygen needs still increased.        Entered by: Samira Subramanian 09/24/2021 1:47 PM     Please review provider order for any additional goals.   Nurse to notify provider when observation goals have been met and patient is ready for discharge.  "

## 2021-09-24 NOTE — H&P
[facility]    History and Physical - Hospitalist Service       Date of Admission:  9/23/2021    Assessment & Plan      Arvind Leong is a 66 year old female admitted on 9/23/2021. She was brought to the emergency department for evaluation of progressive dyspnea on exertion.    1.  COPD exacerbation  Symptoms improving after treatment in the ED  Continue scheduled duo nebs and albuterol nebulizer as needed  We will switch from Solu-Medrol to prednisone  Continue doxycycline  Might be able to discharge home later today if continues to improve throughout the day    2.  Dysuria  Check UA    3.  Chronic respiratory failure with hypoxia  Continue oxygen via nasal cannula    4.  ABDIFATAH  CPAP for sleep as needed    5.  Type 2 diabetes mellitus, without long-term insulin  Monitor with insulin sliding scale    6.  Bipolar disorder with cognitive disorder  Has history of noncompliance with treatment  Reconcile PTA medications     Diet: Combination Diet Consistent Carb 60 Grams CHO per Meal Diet; Low Saturated Fat Na <2400mg Diet    DVT Prophylaxis: Ambulate every shift  Lara Catheter: Not present  Central Lines: None  Code Status:  Full code    Clinically Significant Risk Factors Present on Admission                   Disposition Plan   Expected discharge:  1 day recommended to prior living arrangement once Respiratory symptoms remain stable and improving.     The patient's care was discussed with the Patient.    Ilia Claros MD  [facility]  Securely message with the [employer] Web Console (learn more here)  Text page via [employer] Paging/Directory      ______________________________________________________________________    Chief Complaint   Shortness of breath    History is obtained from the patient, electronic health record and emergency department physician    History of Present Illness   Arvind Leong is a 66 year old female who was sent from the urgent care to the ED  for evaluation of COPD exacerbation.  Patient is a poor historian due to cognitive disorder related to bipolar disorder.  She has history of noncompliance and is supposed to use 2 L oxygen via nasal cannula continuously as well as CPAP for sleep.  Patient described intermittent shortness of breath throughout the summer but progressive symptoms over the last 3 days.  She denies fevers, chills, sore throat, productive cough but her main complaint is dyspnea on exertion and unable to sleep at nighttime.  She denies increasing lower extremities edema or chest pain.  She was treated in the ED with Solu-Medrol nebulizer and felt better, however upon ambulation she was noted short of breath.  She complains of dysuria and thinks might have UTI but denies hematuria.    Review of Systems    The 10 point Review of Systems is negative other than noted in the HPI or here.     Past Medical History    I have reviewed this patient's medical history and updated it with pertinent information if needed.   Past Medical History:   Diagnosis Date     Acute on chronic respiratory failure with hypoxia (H) 11/15/2016     Bipolar 1 disorder (H)      CAP (community acquired pneumonia) 11/15/2016     Cervical high risk HPV (human papillomavirus) test positive 3/5/2018    3/5/18 NIL pap, +HR HPV (not 16/18) 5/21/21 NIL pap, neg HPV. Plan: await provider     COPD exacerbation (H) 4/24/2021     COPD with exacerbation (H) 11/15/2016     Diabetes mellitus, type II (H)      Hearing loss      Meniere's disease      Pneumonia 4/23/2021     Pneumonia of right lower lobe due to infectious organism 6/11/2019       Past Surgical History   I have reviewed this patient's surgical history and updated it with pertinent information if needed.  Past Surgical History:   Procedure Laterality Date     CYST REMOVAL  01/23/2001     HI REMOVAL ANAL FISTULA,SUBCUTANEOUS      Description: Fistula-in-ano Repair;  Recorded: 01/08/2010; x2     TONSILLECTOMY         Social  History   I have reviewed this patient's social history and updated it with pertinent information if needed.  Social History     Tobacco Use     Smoking status: Former Smoker     Packs/day: 1.00     Years: 40.00     Pack years: 40.00     Quit date: 2017     Years since quittin.6     Smokeless tobacco: Never Used   Substance Use Topics     Alcohol use: Yes     Comment: Alcoholic Drinks/day: Occasional      Drug use: No       Family History   I have reviewed this patient's family history and updated it with pertinent information if needed.  Family History   Problem Relation Age of Onset     Aneurysm Father      Heart Disease Father 70.00        bypass in 70s, AAA rupture at age 77      Ulcers Father 76.00     Pacemaker Mother      Bipolar Disorder Brother      Breast Cancer Maternal Grandmother 51.00     Kidney failure Maternal Grandmother      Cancer Paternal Grandmother         ?? lung     Cancer Paternal Uncle         ?? lung     Mental Illness Maternal Grandfather      Heart Disease Other         uncle     No Known Problems Sister         two siblings abuse chemicals     No Known Problems Brother      Bipolar Disorder Nephew        Prior to Admission Medications   Prior to Admission Medications   Prescriptions Last Dose Informant Patient Reported? Taking?   ARIPiprazole (ABILIFY) 10 MG tablet   Yes No   Sig: [ARIPIPRAZOLE (ABILIFY) 10 MG TABLET] Take 10 mg by mouth daily.   CONTOUR NEXT TEST test strip   Yes No   Sig: daily   OXYGEN-AIR DELIVERY SYSTEMS MISC   Yes No   Sig: [OXYGEN-AIR DELIVERY SYSTEMS MISC] Use 2 L As Directed. 2L pulsing  [facility]   albuterol (PROAIR HFA;PROVENTIL HFA;VENTOLIN HFA) 90 mcg/actuation inhaler   No No   Sig: [ALBUTEROL (PROAIR HFA;PROVENTIL HFA;VENTOLIN HFA) 90 MCG/ACTUATION INHALER] Inhale 2 puffs every 6 (six) hours as needed for wheezing or shortness of breath.   azelastine (ASTELIN) 0.1 % nasal spray   Yes No   Sig: USE 2 SPRAYS IN EACH NOSTRIL TWICE DAILY AS DIRECTED    buPROPion (WELLBUTRIN) 75 MG tablet   Yes No   Sig: [BUPROPION (WELLBUTRIN) 75 MG TABLET] Take 75 mg by mouth 2 (two) times a day.   fluconazole (DIFLUCAN) 150 MG tablet   Yes No   ketorolac (ACULAR) 0.5 % ophthalmic solution   Yes No   meclizine (ANTIVERT) 25 MG tablet   Yes No   oxybutynin (DITROPAN) 5 MG tablet   No No   Sig: Take 1 tablet (5 mg) by mouth 3 times daily   prednisoLONE acetate (PRED FORTE) 1 % ophthalmic suspension   Yes No      Facility-Administered Medications: None     Allergies   Allergies   Allergen Reactions     Lurasidone Other (See Comments)     Face turned red, (Brand name = Latuda) Face turned red, Face turned red       Physical Exam   Vital Signs: Temp: 98.1  F (36.7  C) Temp src: Oral BP: (!) 147/78 Pulse: 93   Resp: (!) 46 SpO2: 91 % O2 Device: Nasal cannula Oxygen Delivery: 2 LPM  Weight: 179 lbs 0 oz    Constitutional: awake, alert, cooperative, no apparent distress, and appears stated age  Respiratory: no increased work of breathing and diminished breath sounds right base and left base  Cardiovascular: regular rate and rhythm and normal S1 and S2  GI: normal bowel sounds, non-distended and non-tender  Skin: no bruising or bleeding and no redness, warmth, or swelling  Musculoskeletal: There is trace ankle edema, adequate muscle tone,  there is no redness, warmth, or swelling of the joints  Neurologic: Mental Status Exam:  Level of Alertness:   awake  Orientation:   person, place, time  Fund of Knowledge:  abnormal -   Motor Exam:  moves all extremities well and symmetrically  Neuropsychiatric: General: normal, calm and normal eye contact  Level of consciousness: alert / normal  Affect: normal    Data   Data reviewed today: I reviewed all medications, new labs and imaging results over the last 24 hours. I personally reviewed the EKG tracing showing Sinus rhythm with PVCs at 89 bpm, nonspecific ST waves.    Recent Labs   Lab 09/23/21  2222   WBC 12.2*   HGB 14.8   MCV 96          POTASSIUM 4.2   CHLORIDE 103   CO2 34*   BUN 12   CR 0.89   ANIONGAP 7   ARINA 9.3        Recent Results (from the past 24 hour(s))   XR Chest 2 Views    Narrative    EXAM: XR CHEST 2 VW  LOCATION: [facility]  DATE/TIME: 9/23/2021 8:34 PM    INDICATION: wheezing  COMPARISON: 04/23/2021      Impression    IMPRESSION: Cardiomegaly. Pulmonary vascularity normal. Severe thoracolumbar scoliosis. Hazy densities both lower lung fields favored to represent superimposed soft tissue, although subtle infiltrates not entirely excluded. No effusions. Hypertrophic   changes thoracic spine.

## 2021-09-24 NOTE — PHARMACY-ADMISSION MEDICATION HISTORY
Pharmacy Note - Admission Medication History    Pertinent Provider Information: Patient states that she only takes her Oxybutnin when she needs it     ______________________________________________________________________    Prior To Admission (PTA) med list completed and updated in EMR.       PTA Med List   Medication Sig Last Dose     albuterol (PROAIR HFA;PROVENTIL HFA;VENTOLIN HFA) 90 mcg/actuation inhaler [ALBUTEROL (PROAIR HFA;PROVENTIL HFA;VENTOLIN HFA) 90 MCG/ACTUATION INHALER] Inhale 2 puffs every 6 (six) hours as needed for wheezing or shortness of breath. Past Week at Unknown time     ARIPiprazole (ABILIFY) 10 MG tablet [ARIPIPRAZOLE (ABILIFY) 10 MG TABLET] Take 10 mg by mouth daily. 9/23/2021 at Unknown time     azelastine (ASTELIN) 0.1 % nasal spray USE 2 SPRAYS IN EACH NOSTRIL TWICE DAILY AS DIRECTED Past Month at Unknown time     buPROPion (WELLBUTRIN) 75 MG tablet [BUPROPION (WELLBUTRIN) 75 MG TABLET] Take 75 mg by mouth 2 (two) times a day. 9/23/2021 at Unknown time     CONTOUR NEXT TEST test strip daily      oxybutynin (DITROPAN) 5 MG tablet Take 1 tablet (5 mg) by mouth 3 times daily Past Week at Unknown time       Information source(s): Patient  Method of interview communication: in-person    Summary of Changes to PTA Med List  New: n/a  Discontinued: prednisolone eye drops, ketorolac eye drops  Changed: oxybutnin-pt takes only when she needs it    Patient was asked about OTC/herbal products specifically.  PTA med list reflects this.    In the past week, patient estimated taking medication this percent of the time:  50-90% due to unknown.    Allergies were reviewed, assessed, and updated with the patient.      Patient did not bring any medications to the hospital and can't retrieve from home. No multi-dose medications are available for use during hospital stay.     The information provided in this note is only as accurate as the sources available at the time of the update(s).    Thank you for the  opportunity to participate in the care of this patient.    Cynthia Mathews [facility]  9/24/2021 9:06 AM

## 2021-09-24 NOTE — CONSULTS
Care Management Initial Consult    General Information  Assessment completed with: Patient, pt  Type of CM/SW Visit: Initial Assessment    Primary Care Provider verified and updated as needed: Yes   Readmission within the last 30 days: no previous admission in last 30 days   Return Category: Progression of disease  Reason for Consult: discharge planning  Advance Care Planning: Advance Care Planning Reviewed: no concerns identified          Communication Assessment  Patient's communication style: spoken language (English or Bilingual)    Hearing Difficulty or Deaf: yes   Wear Glasses or Blind: yes    Cognitive  Cognitive/Neuro/Behavioral: WDL                      Living Environment:   People in home: parent(s)     Current living Arrangements:        Able to return to prior arrangements: yes       Family/Social Support:  Care provided by: self  Provides care for: no one     Parent(s)          Description of Support System: Supportive    Support Assessment: Adequate family and caregiver support    Current Resources:   Patient receiving home care services: No     Community Resources: DME  Equipment currently used at home: walker, standard, other (see comments) (glasses, Hearing aids)  Supplies currently used at home: Oxygen Tubing/Supplies, Hearing Aid Batteries (home oxygen)    Employment/Financial:  Employment Status:          Financial Concerns: No concerns identified   Referral to Financial Counselor: No       Lifestyle & Psychosocial Needs:  Social Determinants of Health     Tobacco Use: Medium Risk     Smoking Tobacco Use: Former Smoker     Smokeless Tobacco Use: Never Used   Alcohol Use:      Frequency of Alcohol Consumption:      Average Number of Drinks:      Frequency of Binge Drinking:    Financial Resource Strain:      Difficulty of Paying Living Expenses:    Food Insecurity:      Worried About Running Out of Food in the Last Year:      Ran Out of Food in the Last Year:    Transportation Needs:      Lack of  Transportation (Medical):      Lack of Transportation (Non-Medical):    Physical Activity:      Days of Exercise per Week:      Minutes of Exercise per Session:    Stress:      Feeling of Stress :    Social Connections:      Frequency of Communication with Friends and Family:      Frequency of Social Gatherings with Friends and Family:      Attends Nondenominational Services:      Active Member of Clubs or Organizations:      Attends Club or Organization Meetings:      Marital Status:    Intimate Partner Violence:      Fear of Current or Ex-Partner:      Emotionally Abused:      Physically Abused:      Sexually Abused:    Depression: At risk     PHQ-2 Score: 3   Housing Stability:      Unable to Pay for Housing in the Last Year:      Number of Places Lived in the Last Year:      Unstable Housing in the Last Year:        Functional Status:  Prior to admission patient needed assistance:   Dependent ADLs:: Ambulation-walker  Dependent IADLs:: Independent  Assesssment of Functional Status: At functional baseline    Mental Health Status:  Mental Health Status: No Current Concerns       Chemical Dependency Status:                Values/Beliefs:  Spiritual, Cultural Beliefs, Nondenominational Practices, Values that affect care:                 Additional Information:  Assessed, lives w/her mom, no svcs except for home oxygen, uses walker and independent. Discussed [facility]. May need transport.      Patito Lan RN

## 2021-09-24 NOTE — ED PROVIDER NOTES
NAME: Arvind Leong  AGE: 66 year old female  YOB: 1955  MRN: [id number]  EVALUATION DATE & TIME: 9/23/2021 10:50 PM    PCP: Rema Whiting    ED PROVIDER: Joss Romo M.D.      Chief Complaint   Patient presents with     Shortness of Breath     FINAL IMPRESSION:  1. COPD exacerbation (H)      MEDICAL DECISION MAKING:    10:53PM I met with the patient, obtained history, performed an initial exam, and discussed options and plan for diagnostics and treatment here in the ED.   12:56AM I reassessed the patient and updated her on the results.   1:12AM Nurse updated me after ambulation trial.   1:13AM I reassessed patient after ambulation trial. Discussed option for discharge vs admission, patient opting for admission. All questions answered fully.   2:33 AM I spoke with Dr. Clarso, hospitalist who agrees to admit the patient.     Patient was clinically assessed and consented to treatment. After assessment, medical decision making and workup were discussed with the patient. The patient was agreeable to plan for testing, workup, and treatment.  Pertinent Labs & Imaging studies reviewed. (See chart for details)         Arvind Leong is a 66 year oldfemale who presents with shortness of breath.   Differential diagnosis includes but not limited to shortness of breath, COPD exacerbation, pneumonia, bronchitis, COVID-19, hypoxemia, CHF exacerbation, acute coronary syndrome.  Patient with a history of COPD and likely current exacerbation.  Patient is intermittent with the usage of her inhalers and oxygen.  Patient does not appear in acute distress here in bed however does report worsening with exertion.  EKG done on arrival showed no acute ischemic findings.  Labs were obtained showed negative troponin with normal BNP.  Chest x-ray did show some bibasilar findings which unlikely infiltrate but likely more atelectasis from chronic inflammation from uncontrolled COPD.  Patient on 3 L oxygen  which is up from her normal 2 L at home.  Nebulized treatments and Solu-Medrol given with improvement patient was feeling much better.  Patient did have slight leukocytosis and did consider starting antibiotics however will trial patient on ambulation.  Patient did not wish to try to go home however she reports with exertion this was a big problem at home.  Patient maintained on 3 L oxygen while ambulating around the department admitted one third to nursing home around the nursing station before becoming short of breath.  Patient was able to complete the ring however was more winded for the second half of the roughly 100 foot walk.  Given patient's symptoms repeat neb was given and we discussed possibility of admission.  Patient comfortable with this plan as she is requiring increased oxygen and likely has had ongoing exacerbation which likely will require further treatment until fully improved and back to baseline and able to go home.  Patient comfortable with plan and will be given dose of doxycycline here as well as plan for admission for COPD exacerbation.    The importance of close follow up was discussed. We reviewed warning signs and symptoms, and I instructed Ms. Leong to return to the emergency department immediately if she develops any new or worsening symptoms. I provided additional verbal discharge instructions. Ms. Leong expressed understanding and agreement with this plan of care, her questions were answered, and she was discharged in stable condition.       0 minutes of critical care time    MEDICATIONS GIVEN IN THE EMERGENCY:  Medications   albuterol (PROVENTIL) neb solution 2.5 mg (2.5 mg Nebulization Given 9/24/21 3217)   acetaminophen (TYLENOL) solution 650 mg (has no administration in time range)   glucose gel 15-30 g (has no administration in time range)     Or   dextrose 50 % injection 25-50 mL (has no administration in time range)     Or   glucagon injection 1 mg (has no administration in  time range)   ipratropium - albuterol 0.5 mg/2.5 mg/3 mL (DUONEB) neb solution 3 mL (has no administration in time range)   acetaminophen (TYLENOL) tablet 650 mg (has no administration in time range)     Or   acetaminophen (TYLENOL) Suppository 650 mg (has no administration in time range)   ondansetron (ZOFRAN-ODT) ODT tab 4 mg (has no administration in time range)     Or   ondansetron (ZOFRAN) injection 4 mg (has no administration in time range)   insulin aspart (NovoLOG) injection (RAPID ACTING) (has no administration in time range)   insulin aspart (NovoLOG) injection (RAPID ACTING) (has no administration in time range)   predniSONE (DELTASONE) tablet 40 mg (has no administration in time range)   doxycycline hyclate (VIBRAMYCIN) capsule 100 mg (has no administration in time range)   albuterol (PROVENTIL) neb solution 2.5 mg (has no administration in time range)   ipratropium - albuterol 0.5 mg/2.5 mg/3 mL (DUONEB) neb solution 3 mL (3 mLs Nebulization Given 9/23/21 2343)   methylPREDNISolone sodium succinate (solu-MEDROL) injection 125 mg (125 mg Intravenous Given 9/24/21 0031)   doxycycline hyclate (VIBRAMYCIN) capsule 100 mg (100 mg Oral Given 9/24/21 0231)   magnesium sulfate 2 g in water intermittent infusion (0 g Intravenous Stopped 9/24/21 0449)       NEW PRESCRIPTIONS STARTED AT TODAY'S ER VISIT:  New Prescriptions    No medications on file          =================================================================    HPI    Patient information was obtained from: patient     Use of : N/A         Arvind Leong is a 66 year old female with a past medical history of COPD, pneumonia, asthma, and chronic respiratory failure with hypoxia on continuous O2, who presents to this ED brought in by friend for evaluation of shortness of breath.    Patient has a history of COPD and is supposed to be on 2L O2 continuously, although she is not always great about using her home O2. States that she had  several episodes of shortness of breath over the summer, which she believes were COPD exacerbations. About 1 week ago, she went to work without her O2 and felt fine throughout the day, but developed chest tightness and wheezing that evening, used her home nebs, and had no relief. Shortness of breath has persisted since then but has been progressively getting worse over the past ~3 days, especially with exertion, noting that she gets winded after walking short distances. She has not increased/decreased her O2 at all. Reports associated chest tightness when she gets short of breath, which she attributes to COPD, but denies any chest pain. Otherwise denies fever, chills, cough, or any other complaints at this time. No known sick contacts or Covid-19 exposure.       REVIEW OF SYSTEMS   Review of Systems   Constitutional: Negative for chills and fever.   Respiratory: Positive for chest tightness (secondary to SOB), shortness of breath and wheezing. Negative for cough.    Cardiovascular: Negative for chest pain.   Musculoskeletal: Negative for myalgias.   All other systems reviewed and are negative.     PAST MEDICAL HISTORY:  Past Medical History:   Diagnosis Date     Acute on chronic respiratory failure with hypoxia (H) 11/15/2016     Bipolar 1 disorder (H)      CAP (community acquired pneumonia) 11/15/2016     Cervical high risk HPV (human papillomavirus) test positive 3/5/2018    3/5/18 NIL pap, +HR HPV (not 16/18) 5/21/21 NIL pap, neg HPV. Plan: await provider     COPD exacerbation (H) 4/24/2021     COPD with exacerbation (H) 11/15/2016     Diabetes mellitus, type II (H)      Hearing loss      Meniere's disease      Pneumonia 4/23/2021     Pneumonia of right lower lobe due to infectious organism 6/11/2019       PAST SURGICAL HISTORY:  Past Surgical History:   Procedure Laterality Date     CYST REMOVAL  01/23/2001     SC REMOVAL ANAL FISTULA,SUBCUTANEOUS      Description: Fistula-in-ano Repair;  Recorded: 01/08/2010;  x2     TONSILLECTOMY         CURRENT MEDICATIONS:      Current Facility-Administered Medications:      acetaminophen (TYLENOL) solution 650 mg, 650 mg, Oral, Q4H PRN, Ilia Claros MD     acetaminophen (TYLENOL) tablet 650 mg, 650 mg, Oral, Q6H PRN **OR** acetaminophen (TYLENOL) Suppository 650 mg, 650 mg, Rectal, Q6H PRN, Ilia Claros MD     albuterol (PROVENTIL) neb solution 2.5 mg, 2.5 mg, Nebulization, Q2H PRN, Ilia Claros MD     albuterol (PROVENTIL) neb solution 2.5 mg, 2.5 mg, Nebulization, Q20 Min PRN, Joss Romo MD, 2.5 mg at 09/24/21 0407     glucose gel 15-30 g, 15-30 g, Oral, Q15 Min PRN **OR** dextrose 50 % injection 25-50 mL, 25-50 mL, Intravenous, Q15 Min PRN **OR** glucagon injection 1 mg, 1 mg, Subcutaneous, Q15 Min PRN, Ilia Claros MD     doxycycline hyclate (VIBRAMYCIN) capsule 100 mg, 100 mg, Oral, BID, Ilia Claros MD     insulin aspart (NovoLOG) injection (RAPID ACTING), 1-7 Units, Subcutaneous, TID AC, Ilia Claros MD     insulin aspart (NovoLOG) injection (RAPID ACTING), 1-5 Units, Subcutaneous, At Bedtime, Iila Claros MD     ipratropium - albuterol 0.5 mg/2.5 mg/3 mL (DUONEB) neb solution 3 mL, 3 mL, Nebulization, Q6H, Ilia Claros MD     ondansetron (ZOFRAN-ODT) ODT tab 4 mg, 4 mg, Oral, Q6H PRN **OR** ondansetron (ZOFRAN) injection 4 mg, 4 mg, Intravenous, Q6H PRN, Ilia Claros MD     predniSONE (DELTASONE) tablet 40 mg, 40 mg, Oral, Daily, Ilia Claros MD    Current Outpatient Medications:      albuterol (PROAIR HFA;PROVENTIL HFA;VENTOLIN HFA) 90 mcg/actuation inhaler, [ALBUTEROL (PROAIR HFA;PROVENTIL HFA;VENTOLIN HFA) 90 MCG/ACTUATION INHALER] Inhale 2 puffs every 6 (six) hours as needed for wheezing or shortness of breath., Disp: 1 Inhaler, Rfl: 11     ARIPiprazole (ABILIFY) 10 MG tablet, [ARIPIPRAZOLE (ABILIFY) 10 MG TABLET] Take 10 mg by mouth daily., Disp: , Rfl:      azelastine (ASTELIN) 0.1 %  nasal spray, USE 2 SPRAYS IN EACH NOSTRIL TWICE DAILY AS DIRECTED, Disp: , Rfl:      buPROPion (WELLBUTRIN) 75 MG tablet, [BUPROPION (WELLBUTRIN) 75 MG TABLET] Take 75 mg by mouth 2 (two) times a day., Disp: , Rfl:      CONTOUR NEXT TEST test strip, daily, Disp: , Rfl:      fluconazole (DIFLUCAN) 150 MG tablet, , Disp: , Rfl:      ketorolac (ACULAR) 0.5 % ophthalmic solution, , Disp: , Rfl:      meclizine (ANTIVERT) 25 MG tablet, , Disp: , Rfl:      oxybutynin (DITROPAN) 5 MG tablet, Take 1 tablet (5 mg) by mouth 3 times daily, Disp: 30 tablet, Rfl: 0     OXYGEN-AIR DELIVERY SYSTEMS MISC, [OXYGEN-AIR DELIVERY SYSTEMS MISC] Use 2 L As Directed. 2L pulsing  [facility], Disp: , Rfl:      prednisoLONE acetate (PRED FORTE) 1 % ophthalmic suspension, , Disp: , Rfl:     ALLERGIES:  Allergies   Allergen Reactions     Lurasidone Other (See Comments)     Face turned red, (Brand name = Latuda) Face turned red, Face turned red       FAMILY HISTORY:  Family History   Problem Relation Age of Onset     Aneurysm Father      Heart Disease Father 70.00        bypass in 70s, AAA rupture at age 77      Ulcers Father 76.00     Pacemaker Mother      Bipolar Disorder Brother      Breast Cancer Maternal Grandmother 51.00     Kidney failure Maternal Grandmother      Cancer Paternal Grandmother         ?? lung     Cancer Paternal Uncle         ?? lung     Mental Illness Maternal Grandfather      Heart Disease Other         uncle     No Known Problems Sister         two siblings abuse chemicals     No Known Problems Brother      Bipolar Disorder Nephew        SOCIAL HISTORY:   Social History     Socioeconomic History     Marital status:      Spouse name: Not on file     Number of children: Not on file     Years of education: Not on file     Highest education level: Not on file   Occupational History     Not on file   Tobacco Use     Smoking status: Former Smoker     Packs/day: 1.00     Years: 40.00     Pack years: 40.00     Quit date:  "2017     Years since quittin.6     Smokeless tobacco: Never Used   Substance and Sexual Activity     Alcohol use: Yes     Comment: Alcoholic Drinks/day: Occasional      Drug use: No     Sexual activity: Never   Other Topics Concern     Not on file   Social History Narrative    2019The patient lives with her mother.; had worked at [employer] as  but quit 2019.   Quit smoking ; no etoh problems  / x 2 ; no kids     Social Determinants of Health     Financial Resource Strain:      Difficulty of Paying Living Expenses:    Food Insecurity:      Worried About Running Out of Food in the Last Year:      Ran Out of Food in the Last Year:    Transportation Needs:      Lack of Transportation (Medical):      Lack of Transportation (Non-Medical):    Physical Activity:      Days of Exercise per Week:      Minutes of Exercise per Session:    Stress:      Feeling of Stress :    Social Connections:      Frequency of Communication with Friends and Family:      Frequency of Social Gatherings with Friends and Family:      Attends Tenriism Services:      Active Member of Clubs or Organizations:      Attends Club or Organization Meetings:      Marital Status:    Intimate Partner Violence:      Fear of Current or Ex-Partner:      Emotionally Abused:      Physically Abused:      Sexually Abused:        PHYSICAL EXAM:    Vitals: BP (!) 147/78   Pulse 93   Temp 98.1  F (36.7  C) (Oral)   Resp (!) 46   Ht 1.397 m (4' 7\")   Wt 81.2 kg (179 lb)   SpO2 91%   BMI 41.60 kg/m     Physical Exam  Vitals and nursing note reviewed.   Constitutional:       General: She is not in acute distress.     Appearance: She is well-developed and normal weight. She is not ill-appearing or toxic-appearing.      Interventions: She is not intubated.  HENT:      Head: Normocephalic.      Mouth/Throat:      Mouth: Mucous membranes are moist.   Neck:      Vascular: No JVD.   Cardiovascular:      Rate and Rhythm: Normal rate " and regular rhythm.      Heart sounds: Normal heart sounds.   Pulmonary:      Effort: Pulmonary effort is normal. No tachypnea, bradypnea or respiratory distress. She is not intubated.      Breath sounds: Examination of the right-middle field reveals decreased breath sounds. Examination of the left-middle field reveals decreased breath sounds. Examination of the right-lower field reveals decreased breath sounds. Examination of the left-lower field reveals decreased breath sounds. Decreased breath sounds present. No wheezing, rhonchi or rales.   Chest:      Chest wall: No tenderness.   Abdominal:      Palpations: Abdomen is soft.      Tenderness: There is no abdominal tenderness.   Musculoskeletal:      Right lower leg: No tenderness. No edema.      Left lower leg: No tenderness. No edema.   Skin:     General: Skin is warm and dry.   Neurological:      General: No focal deficit present.      Mental Status: She is alert.   Psychiatric:         Mood and Affect: Mood normal.           LAB:  All pertinent labs reviewed and interpreted.  Labs Ordered and Resulted from Time of ED Arrival Up to the Time of Departure from the ED   BASIC METABOLIC PANEL - Abnormal; Notable for the following components:       Result Value    Carbon Dioxide (CO2) 34 (*)     All other components within normal limits   CBC WITH PLATELETS AND DIFFERENTIAL - Abnormal; Notable for the following components:    WBC Count 12.2 (*)     Hematocrit 49.1 (*)     MCHC 30.1 (*)     All other components within normal limits   B-TYPE NATRIURETIC PEPTIDE ( EAST ONLY) - Normal   TROPONIN I - Normal   COVID-19 VIRUS (CORONAVIRUS) BY PCR - Normal    Narrative:     Testing was performed using the steve  SARS-CoV-2 & Influenza A/B Assay on the steve  Mis  System.  This test should be ordered for the detection of SARS-COV-2 in individuals who meet SARS-CoV-2 clinical and/or epidemiological criteria. Test performance is unknown in asymptomatic patients.  This test  is for in vitro diagnostic use under the FDA EUA for laboratories certified under CLIA to perform moderate and/or high complexity testing. This test has not been FDA cleared or approved.  A negative test does not rule out the presence of PCR inhibitors in the specimen or target RNA in concentration below the limit of detection for the assay. The possibility of a false negative should be considered if the patient's recent exposure or clinical presentation suggests COVID-19.  [facility] are certified under the Clinical Laboratory Improvement Amendments of 1988 (CLIA-88) as qualified to perform moderate and/or high complexity laboratory testing.   EXTRA BLUE TOP TUBE   UA MACROSCOPIC WITH REFLEX TO MICRO AND CULTURE   HEMOGLOBIN A1C   GLUCOSE MONITOR NURSING POCT   GLUCOSE MONITOR NURSING POCT   GLUCOSE MONITOR NURSING POCT   GLUCOSE MONITOR NURSING POCT   PERIPHERAL IV CATHETER   PERIPHERAL IV CATHETER   CARDIAC CONTINUOUS MONITORING   PULSE OXIMETRY NURSING   CALL   CBC WITH PLATELETS & DIFFERENTIAL    Narrative:     The following orders were created for panel order CBC with Platelets & Differential.  Procedure                               Abnormality         Status                     ---------                               -----------         ------                     CBC with platelets and d...[[id number]]  Abnormal            Final result                 Please view results for these tests on the individual orders.   EXTRA TUBE    Narrative:     The following orders were created for panel order [place] Draw.  Procedure                               Abnormality         Status                     ---------                               -----------         ------                     Extra Blue Top Tube[[id number]]                              Final result                 Please view results for these tests on the individual orders.       RADIOLOGY:  XR Chest 2 Views   Final Result   IMPRESSION:  Cardiomegaly. Pulmonary vascularity normal. Severe thoracolumbar scoliosis. Hazy densities both lower lung fields favored to represent superimposed soft tissue, although subtle infiltrates not entirely excluded. No effusions. Hypertrophic    changes thoracic spine.          EKG:   Performed at: 12:05 AM on September 24, 2021.  Impression: Sinus rhythm with frequent PVCs and no signs of acute ST elevation ischemia.  Rate: 89 bpm  Rhythm: Sinus rhythm with frequent PVCs.  QRS Interval: 64 ms  QTc Interval: 459 ms  Comparison: Comparison to prior EKG from July 7, 2021 similar rhythm with frequent PVCs and no signs of acute ST elevation ischemia.  I have independently reviewed and interpreted the EKG(s) documented above.     PROCEDURES:   Procedures       I, Randa Bradford, am serving as a scribe to document services personally performed by Dr. Joss Romo  based on my observation and the provider's statements to me. I, Joss Romo MD attest that Randa Bradford is acting in a scribe capacity, has observed my performance of the services and has documented them in accordance with my direction.      Joss Romo M.D.  Emergency Medicine  [facility] EMERGENCY DEPARTMENT  [facility] [facility] [address]  [phone]  Dept: [phone]     Joss Romo MD  09/24/21 0618

## 2021-09-24 NOTE — PROGRESS NOTES
Patient is seen and examined.  Chart is reviewed.  Medication reconciliation is done.  Patient admitted early this morning. please refer to Dr. Claros's H&P for details  66 year old female with a past medical history of obesity, COPD on home oxygen and depression/anxiety who was sent from the urgent care to the ED for evaluation of COPD exacerbation, patient still requires more oxygen than her baseline.   Continue prednisone and nebs and monitoring oxygen saturation.  Blood sugar is elevated, secondary to prednisone.  A1c is 6.6 so will initiate Metformin  Discussed with patient, family, nursing staff and discharge planner    Wil Mills MD  [facility] Medicine Service  [phone]

## 2021-09-24 NOTE — ED NOTES
"[facility] ED Handoff Report    ED Chief Complaint:     ED Diagnosis:  (J44.1) COPD exacerbation (H)  Comment: NA  Plan: admission       PMH:    Past Medical History:   Diagnosis Date     Acute on chronic respiratory failure with hypoxia (H) 11/15/2016     Bipolar 1 disorder (H)      CAP (community acquired pneumonia) 11/15/2016     Cervical high risk HPV (human papillomavirus) test positive 3/5/2018    3/5/18 NIL pap, +HR HPV (not 16/18) 5/21/21 NIL pap, neg HPV. Plan: await provider     COPD exacerbation (H) 4/24/2021     COPD with exacerbation (H) 11/15/2016     Diabetes mellitus, type II (H)      Hearing loss      Meniere's disease      Pneumonia 4/23/2021     Pneumonia of right lower lobe due to infectious organism 6/11/2019        Code Status:  Full Code     Falls Risk: Yes Band: Applied    Current Living Situation/Residence: lives in a house     Elimination Status: Continent: Yes     Activity Level: SBA w/ walker    Patients Preferred Language:  English     Needed: No    Vital Signs:  /58 (BP Location: Left arm)   Pulse 101   Temp 98  F (36.7  C) (Oral)   Resp 22   Ht 1.397 m (4' 7\")   Wt 81.2 kg (179 lb)   SpO2 94%   BMI 41.60 kg/m       Cardiac Rhythm: NSR    Pain Score: 1/10    Is the Patient Confused:  No    Last Food or Drink: 09/24/21 at 1200    Focused Assessment:  66 F increased sob uses home O2 at 2L. sats in the 80's at home. Pt lives with her Mother who turned her O2 up to 3L. Currently on 3L with sats 93%. Currently being tx for bronchitis. On prednisone last  at noon. Using both metformin and insulin while inpt. She reports trouble swallowing big pills and requests pills in applesauce and not crushed.    Tests Performed: Done: Labs and Imaging    Treatments Provided:  O2 , prednisone, antibiotics.    Family Dynamics/Concerns: No     Family Updated On Visitor Policy: Yes    Plan of Care Communicated to Family: Yes    Who Was Updated about Plan of Care: pts " Mother at bedside  Pts.Mother    Belongings Checklist Done and Signed by Patient: Yes    Covid: symptomatic, negative    Additional Information: NA    RN: Claire Carias   9/24/2021 3:43 PM

## 2021-09-25 VITALS
HEART RATE: 78 BPM | BODY MASS INDEX: 42.74 KG/M2 | OXYGEN SATURATION: 95 % | RESPIRATION RATE: 18 BRPM | WEIGHT: 184.7 LBS | TEMPERATURE: 98.2 F | HEIGHT: 55 IN | DIASTOLIC BLOOD PRESSURE: 55 MMHG | SYSTOLIC BLOOD PRESSURE: 107 MMHG

## 2021-09-25 LAB
BACTERIA UR CULT: NO GROWTH
GLUCOSE BLDC GLUCOMTR-MCNC: 129 MG/DL (ref 70–99)
GLUCOSE BLDC GLUCOMTR-MCNC: 178 MG/DL (ref 70–99)
LACTATE SERPL-SCNC: 1.4 MMOL/L (ref 0.7–2)

## 2021-09-25 PROCEDURE — G0378 HOSPITAL OBSERVATION PER HR: HCPCS

## 2021-09-25 PROCEDURE — 83605 ASSAY OF LACTIC ACID: CPT | Performed by: INTERNAL MEDICINE

## 2021-09-25 PROCEDURE — 250N000013 HC RX MED GY IP 250 OP 250 PS 637: Performed by: INTERNAL MEDICINE

## 2021-09-25 PROCEDURE — 36415 COLL VENOUS BLD VENIPUNCTURE: CPT | Performed by: INTERNAL MEDICINE

## 2021-09-25 PROCEDURE — 250N000013 HC RX MED GY IP 250 OP 250 PS 637: Mod: GY | Performed by: HOSPITALIST

## 2021-09-25 PROCEDURE — 94640 AIRWAY INHALATION TREATMENT: CPT

## 2021-09-25 PROCEDURE — 999N000157 HC STATISTIC RCP TIME EA 10 MIN

## 2021-09-25 PROCEDURE — 250N000012 HC RX MED GY IP 250 OP 636 PS 637: Performed by: HOSPITALIST

## 2021-09-25 PROCEDURE — 99217 PR OBSERVATION CARE DISCHARGE: CPT | Mod: GC | Performed by: INTERNAL MEDICINE

## 2021-09-25 PROCEDURE — 250N000009 HC RX 250: Performed by: INTERNAL MEDICINE

## 2021-09-25 RX ORDER — DOXYCYCLINE 100 MG/1
100 CAPSULE ORAL 2 TIMES DAILY
Qty: 14 CAPSULE | Refills: 0 | Status: SHIPPED | OUTPATIENT
Start: 2021-09-25 | End: 2021-10-02

## 2021-09-25 RX ORDER — PREDNISONE 20 MG/1
40 TABLET ORAL DAILY
Qty: 10 TABLET | Refills: 0 | Status: SHIPPED | OUTPATIENT
Start: 2021-09-26 | End: 2021-10-01

## 2021-09-25 RX ADMIN — DOXYCYCLINE 100 MG: 100 CAPSULE ORAL at 08:43

## 2021-09-25 RX ADMIN — PREDNISONE 40 MG: 20 TABLET ORAL at 08:43

## 2021-09-25 RX ADMIN — IPRATROPIUM BROMIDE AND ALBUTEROL SULFATE 3 ML: .5; 3 SOLUTION RESPIRATORY (INHALATION) at 07:39

## 2021-09-25 RX ADMIN — BUPROPION HYDROCHLORIDE 75 MG: 75 TABLET, FILM COATED ORAL at 08:43

## 2021-09-25 RX ADMIN — OMEPRAZOLE 20 MG: 20 CAPSULE, DELAYED RELEASE ORAL at 07:02

## 2021-09-25 RX ADMIN — OXYBUTYNIN CHLORIDE 5 MG: 5 TABLET ORAL at 08:43

## 2021-09-25 RX ADMIN — ARIPIPRAZOLE 10 MG: 10 TABLET ORAL at 11:11

## 2021-09-25 NOTE — PLAN OF CARE
Patient VSS. Patient stable for discharge home, hospitalist discharging patient today  Problem: Adult Inpatient Plan of Care  Goal: Plan of Care Review  9/25/2021 1318 by Theresa Forman RN  Outcome: Adequate for Discharge  9/25/2021 0745 by Theresa Forman RN  Outcome: Improving  Flowsheets (Taken 9/25/2021 0745)  Plan of Care Reviewed With: patient  Outcome Summary: improve breathing  Progress: improving  Goal: Patient-Specific Goal (Individualized)  9/25/2021 1318 by Theresa Forman RN  Outcome: Adequate for Discharge  9/25/2021 0745 by Theresa Forman RN  Outcome: Improving  Goal: Absence of Hospital-Acquired Illness or Injury  Outcome: Adequate for Discharge  Intervention: Identify and Manage Fall Risk  Recent Flowsheet Documentation  Taken 9/25/2021 0800 by Theresa Forman RN  Safety Promotion/Fall Prevention: activity supervised  Intervention: Prevent Skin Injury  Recent Flowsheet Documentation  Taken 9/25/2021 0800 by Theresa Forman RN  Body Position: position changed independently  Goal: Optimal Comfort and Wellbeing  Outcome: Adequate for Discharge  Goal: Readiness for Transition of Care  Outcome: Adequate for Discharge     Problem: Discharge Planning  Goal: Discharge Planning (Adult, OB, Behavioral, Peds)  Outcome: Adequate for Discharge

## 2021-09-25 NOTE — PROGRESS NOTES
Nurse reported lactic acid came back at 2.8, elevated.    Will hold metformin   Recheck lactic in am  Defer to am rounder for further orders

## 2021-09-25 NOTE — PLAN OF CARE
Patient's assessment and vitals are with in normal limits for patient(see documentation for detail). Patient doing well on 2 Liters oxygen. Patient pain is being controlled well by current interventions. Patient up and ambulating with assistance of staff. Patient  Provider and RT following patient(see provider's note for details). Will continue to monitor and report changes to provider.     Problem: Adult Inpatient Plan of Care  Goal: Plan of Care Review  Outcome: Improving  Flowsheets (Taken 9/25/2021 8542)  Plan of Care Reviewed With: patient  Outcome Summary: improve breathing  Progress: improving  Goal: Patient-Specific Goal (Individualized)  Outcome: Improving

## 2021-09-25 NOTE — PLAN OF CARE
"PRIMARY DIAGNOSIS: \"GENERIC\" NURSING  OUTPATIENT/OBSERVATION GOALS TO BE MET BEFORE DISCHARGE:  ADLs back to baseline: Yes    Activity and level of assistance:Monitor with stand by assist    Pain status: Pt denies      Return to near baseline physical activity: Yes     Discharge Planner Nurse   Safe discharge environment identified: Yes  Barriers to discharge: Yes Monitoring on tele, continuous pulse ox, O2 per NC       Entered by: Robert José 09/24/2021 9:39 PM     Please review provider order for any additional goals.   Nurse to notify provider when observation goals have been met and patient is ready for discharge.  "

## 2021-09-25 NOTE — PLAN OF CARE
"PRIMARY DIAGNOSIS: \"GENERIC\" NURSING  OUTPATIENT/OBSERVATION GOALS TO BE MET BEFORE DISCHARGE:  ADLs back to baseline: Yes    Activity and level of assistance: Up with standby assistance.    Pain status: Pain free.    Return to near baseline physical activity: Yes     Discharge Planner Nurse   Safe discharge environment identified: Yes  Barriers to discharge: No       Entered by: Scarlett Tapia 09/25/2021 5:54 AM     Please review provider order for any additional goals.   Nurse to notify provider when observation goals have been met and patient is ready for discharge.  "

## 2021-09-25 NOTE — PLAN OF CARE
PRIMARY DIAGNOSIS: GENERALIZED WEAKNESS    OUTPATIENT/OBSERVATION GOALS TO BE MET BEFORE DISCHARGE  1. Orthostatic performed: N/A    2. Tolerating PO medications: Yes    3. Return to near baseline physical activity: Yes    4. Cleared for discharge by consultants (if involved): N/A    Discharge Planner Nurse   Safe discharge environment identified: Yes  Barriers to discharge: No       Entered by: Scarlett Tapia 09/25/2021 12:55 AM     Please review provider order for any additional goals.   Nurse to notify provider when observation goals have been met and patient is ready for discharge.

## 2021-09-25 NOTE — PROGRESS NOTES
RESPIRATORY CARE NOTE     Patient Name: Arvind Leong  Today's Date: 9/24/2021       Pt continues to receive QID Duo. BS are decreased. Pt is on 2 lpm of oxygen via NC, SpO2 is 95%.  RT will continue to monitor and assess.

## 2021-09-25 NOTE — PLAN OF CARE
"PRIMARY DIAGNOSIS: \"GENERIC\" NURSING  OUTPATIENT/OBSERVATION GOALS TO BE MET BEFORE DISCHARGE:  ADLs back to baseline: Yes    Activity and level of assistance: Standby assist    Pain status: denies    Return to near baseline physical activity: Yes     Discharge Planner Nurse   Safe discharge environment identified: Yes  Barriers to discharge: No       Entered by: Robert José 09/24/2021 11:55 PM   Admit this dulce from ER. Pt alert and oriented. Ate dinner. VSS.   Please review provider order for any additional goals.   Nurse to notify provider when observation goals have been met and patient is ready for discharge.  "

## 2021-09-25 NOTE — PLAN OF CARE
Care Management Discharge Note    Discharge Date: 09/25/2021       Discharge Disposition: Home    Discharge Services: None    Discharge DME: Oxygen    Discharge Transportation:      Private pay costs discussed: Not applicable    PAS Confirmation Code:    Patient/family educated on Medicare website which has current facility and service quality ratings: no    Education Provided on the Discharge Plan:    Persons Notified of Discharge Plans: pt.  Patient/Family in Agreement with the Plan: yes    Handoff Referral Completed: No    Additional Information:  Home today with no CM needs.      Ida Hernandez RN

## 2021-09-25 NOTE — DISCHARGE SUMMARY
[facility]  Hospitalist Discharge Summary      Date of Admission:  9/23/2021  Date of Discharge:  9/25/2021  Discharging Provider: Wil Mills MD      Discharge Diagnoses   COPD cerebration, improving  Abnormal UA  Obstructive sleep apnea  Diabetes mellitus type 2  Bipolar disorder  Morbid obesity    Follow-ups Needed After Discharge   Follow-up Appointments     Follow-up and recommended labs and tests       Follow up with primary care provider, Rema Whiting, within 7 days   for hospital follow- up.  No follow up labs or test are needed.    Follow up with pulmonology as scheduled             Unresulted Labs Ordered in the Past 30 Days of this Admission     No orders found from 8/24/2021 to 9/24/2021.      These results will be followed up by PCP    Discharge Disposition   Discharged to home  Condition at discharge: Stable    eHospital Course   66 year old female with a past medical history of obesity, COPD on home oxygen and depression/anxiety who was sent from the urgent care to the ED for evaluation of COPD exacerbation, she was admitted, please refer to H&P for details.      Acute pulmonary insufficiency  - Secondary to COPD exacerbation  - Improving, back to her oxygen baseline which is 2 L  - Continue steroid    COPD exacerbation   - Negative Covid test  - Improving with steroid  - Back to her home oxygen level  - Continue prednisone on discharge  - Resume home inhalers  - Continue doxycycline empirically    Diabetes mellitus type 2  - Patient is not taking any medications  - Hemoglobin A1c 6.6  - Start low-dose Metformin  - Follow-up with PCP    Bipolar disorder/anxiety  - Restart home medications    Consultations This Hospital Stay   IP RESPIRATORY CARE CHRONIC PULMONARY DISEASE SPECIALIST  SOCIAL WORK IP CONSULT  IP RESPIRATORY CARE CHRONIC PULMONARY DISEASE SPECIALIST    Code Status   Full Code    Time Spent on this Encounter   IWil MD, personally saw the  patient today and spent greater than 30 minutes discharging this patient.       Wil Mills MD  [address] [address]  Phone: [phone]  Fax: [phone]  ______________________________________________________________________    Physical Exam   Vital Signs: Temp: 98.2  F (36.8  C) Temp src: Oral BP: 107/55 Pulse: 78   Resp: 18 SpO2: 95 % O2 Device: Nasal cannula Oxygen Delivery: 2 LPM  Weight: 184 lbs 11.2 oz  Constitutional: awake, alert, cooperative, no apparent distress, and appears stated age  Respiratory: no increased work of breathing and diminished breath sounds right base and left base  Cardiovascular: regular rate and rhythm and normal S1 and S2  GI: normal bowel sounds, non-distended and non-tender  Skin: no bruising or bleeding and no redness, warmth, or swelling  Musculoskeletal: There is trace ankle edema, adequate muscle tone,  there is no redness, warmth, or swelling of the joints  Neurologic: Mental Status Exam:  Level of Alertness:   awake  Orientation:   person, place, time  Fund of Knowledge:  abnormal -   Motor Exam:  moves all extremities well and symmetrically  Neuropsychiatric: General: normal, calm and normal eye contact  Level of consciousness: alert / normal  Affect: normal       Primary Care Physician   eRma Whiting    Discharge Orders      Care Coordination Referral      Reason for your hospital stay    Copd exacerbation     Follow-up and recommended labs and tests     Follow up with primary care provider, Rema Whiting, within 7 days for hospital follow- up.  No follow up labs or test are needed.    Follow up with pulmonology as scheduled     Activity    Your activity upon discharge: activity as tolerated     Diet    Follow this diet upon discharge: Orders Placed This Encounter      Combination Diet Consistent Carb 60 Grams CHO per Meal Diet; Low Saturated Fat Na <2400mg Diet       Significant Results and  Procedures   Most Recent 3 CBC's:Recent Labs   Lab Test 09/23/21 2222 07/30/21  1102 07/07/21  1041   WBC 12.2* 10.7 8.9   HGB 14.8 14.8 14.7   MCV 96 94 95    180 188     Most Recent 3 BMP's:Recent Labs   Lab Test 09/25/21  1110 09/25/21  0833 09/24/21  2322 09/24/21  0813 09/23/21  2222 07/30/21  1102 07/30/21  1102 07/07/21  1041 07/07/21  1041   NA  --   --   --   --  144  --  146*  --  143   POTASSIUM  --   --   --   --  4.2  --  4.8  --  4.5   CHLORIDE  --   --   --   --  103  --  101  --  104   CO2  --   --   --   --  34*  --  32*  --  33*   BUN  --   --   --   --  12  --  12  --  10   CR  --   --   --   --  0.89  --  0.79  --  0.79   ANIONGAP  --   --   --   --  7  --  13  --  6   AIRNA  --   --   --   --  9.3  --  9.3  --  9.2   * 129* 224*   < > 120   < > 114   < > 173*    < > = values in this interval not displayed.   ,   Results for orders placed or performed during the hospital encounter of 09/23/21   XR Chest 2 Views    Narrative    EXAM: XR CHEST 2 VW  LOCATION: [facility]  DATE/TIME: 9/23/2021 8:34 PM    INDICATION: wheezing  COMPARISON: 04/23/2021      Impression    IMPRESSION: Cardiomegaly. Pulmonary vascularity normal. Severe thoracolumbar scoliosis. Hazy densities both lower lung fields favored to represent superimposed soft tissue, although subtle infiltrates not entirely excluded. No effusions. Hypertrophic   changes thoracic spine.       Discharge Medications   Current Discharge Medication List      START taking these medications    Details   doxycycline hyclate (VIBRAMYCIN) 100 MG capsule Take 1 capsule (100 mg) by mouth 2 times daily for 7 days  Qty: 14 capsule, Refills: 0    Associated Diagnoses: Chronic obstructive pulmonary disease, unspecified COPD type (H)      metFORMIN (GLUCOPHAGE) 500 MG tablet Take 1 tablet (500 mg) by mouth daily (with breakfast)  Qty: 30 tablet, Refills: 0    Associated Diagnoses: Type 2 diabetes mellitus with other specified  complication, without long-term current use of insulin (H)      predniSONE (DELTASONE) 20 MG tablet Take 2 tablets (40 mg) by mouth daily for 5 days  Qty: 10 tablet, Refills: 0    Associated Diagnoses: Chronic obstructive pulmonary disease, unspecified COPD type (H)         CONTINUE these medications which have NOT CHANGED    Details   albuterol (PROAIR HFA;PROVENTIL HFA;VENTOLIN HFA) 90 mcg/actuation inhaler [ALBUTEROL (PROAIR HFA;PROVENTIL HFA;VENTOLIN HFA) 90 MCG/ACTUATION INHALER] Inhale 2 puffs every 6 (six) hours as needed for wheezing or shortness of breath.  Qty: 1 Inhaler, Refills: 11    Comments: May substitute the equivalent medication per insurance preference.  Associated Diagnoses: Chronic obstructive pulmonary disease, unspecified COPD type (H)      ARIPiprazole (ABILIFY) 10 MG tablet [ARIPIPRAZOLE (ABILIFY) 10 MG TABLET] Take 10 mg by mouth daily.      azelastine (ASTELIN) 0.1 % nasal spray USE 2 SPRAYS IN EACH NOSTRIL TWICE DAILY AS DIRECTED      buPROPion (WELLBUTRIN) 75 MG tablet [BUPROPION (WELLBUTRIN) 75 MG TABLET] Take 75 mg by mouth 2 (two) times a day.      CONTOUR NEXT TEST test strip daily      oxybutynin (DITROPAN) 5 MG tablet Take 1 tablet (5 mg) by mouth 3 times daily  Qty: 30 tablet, Refills: 0    Associated Diagnoses: Urgency incontinence           Allergies   Allergies   Allergen Reactions     Lurasidone Other (See Comments)     Face turned red, (Brand name = Latuda) Face turned red, Face turned red

## 2021-09-27 ENCOUNTER — PATIENT OUTREACH (OUTPATIENT)
Dept: CARE COORDINATION | Facility: CLINIC | Age: 66
End: 2021-09-27

## 2021-09-27 NOTE — LETTER
[facility] CARE COORDINATION  [address]  [place] [address]    September 28, 2021    Arvind Leong  [address] [place] [address]      Dear Arvind,    I am a clinic community health worker who works with Rema Whiting MD at [facility]. I have been trying to reach you recently to introduce Clinic Care Coordination and to see if there was anything I could assist you with.  Below is a description of clinic care coordination and how I can further assist you.      The clinic care coordination team is made up of a registered nurse,  and community health worker who understand the health care system. The goal of clinic care coordination is to help you manage your health and improve access to the health care system in the most efficient manner. The team can assist you in meeting your health care goals by providing education, coordinating services, strengthening the communication among your providers and supporting you with any resource needs.    Please feel free to contact me at [phone] with any questions or concerns. We are focused on providing you with the highest-quality healthcare experience possible and that all starts with you.     Sincerely,     Kathy Zhang  Community Health Worker  [facility] Care Coordination   Office: [phone]

## 2021-09-27 NOTE — PROGRESS NOTES
Clinic Care Coordination Contact  [facility]/Voicemail    Clinical Data: Care Coordinator Outreach  Outreach attempted x 1.  Left message on patient's voicemail with call back information and requested return call.  Plan: Care Coordinator will try to reach patient again in 1-2 business days.    TCM Referral

## 2021-09-28 NOTE — PROGRESS NOTES
Clinic Care Coordination Contact  [facility]/Voicemail    Clinical Data: Care Coordinator Outreach  Outreach attempted x 2.  Left message on patient's voicemail with call back information and requested return call.  Plan: Care Coordinator sent care coordination introduction letter on 9/28/21 via [employer]. Care Coordinator will do no further outreaches at this time.

## 2021-10-16 ENCOUNTER — HEALTH MAINTENANCE LETTER (OUTPATIENT)
Age: 66
End: 2021-10-16

## 2021-11-08 ENCOUNTER — APPOINTMENT (OUTPATIENT)
Dept: RADIOLOGY | Facility: HOSPITAL | Age: 66
End: 2021-11-08
Payer: COMMERCIAL

## 2021-11-08 ENCOUNTER — HOSPITAL ENCOUNTER (EMERGENCY)
Facility: HOSPITAL | Age: 66
Discharge: HOME | End: 2021-11-08
Attending: STUDENT IN AN ORGANIZED HEALTH CARE EDUCATION/TRAINING PROGRAM | Admitting: STUDENT IN AN ORGANIZED HEALTH CARE EDUCATION/TRAINING PROGRAM
Payer: COMMERCIAL

## 2021-11-08 VITALS
RESPIRATION RATE: 39 BRPM | HEIGHT: 55 IN | HEART RATE: 83 BPM | BODY MASS INDEX: 42.58 KG/M2 | OXYGEN SATURATION: 95 % | SYSTOLIC BLOOD PRESSURE: 140 MMHG | WEIGHT: 184 LBS | DIASTOLIC BLOOD PRESSURE: 68 MMHG | TEMPERATURE: 97.6 F

## 2021-11-08 DIAGNOSIS — J44.9 CHRONIC OBSTRUCTIVE PULMONARY DISEASE, UNSPECIFIED COPD TYPE (H): ICD-10-CM

## 2021-11-08 LAB
ANION GAP SERPL CALCULATED.3IONS-SCNC: 4 MMOL/L (ref 5–18)
ATRIAL RATE - MUSE: 80 BPM
BNP SERPL-MCNC: 31 PG/ML (ref 0–106)
BUN SERPL-MCNC: 11 MG/DL (ref 8–22)
CALCIUM SERPL-MCNC: 9.3 MG/DL (ref 8.5–10.5)
CHLORIDE BLD-SCNC: 100 MMOL/L (ref 98–107)
CO2 SERPL-SCNC: 38 MMOL/L (ref 22–31)
CREAT SERPL-MCNC: 0.8 MG/DL (ref 0.6–1.1)
DIASTOLIC BLOOD PRESSURE - MUSE: NORMAL MMHG
ERYTHROCYTE [DISTWIDTH] IN BLOOD BY AUTOMATED COUNT: 13.1 % (ref 10–15)
GFR SERPL CREATININE-BSD FRML MDRD: 78 ML/MIN/1.73M2
GLUCOSE BLD-MCNC: 122 MG/DL (ref 70–125)
HCT VFR BLD AUTO: 49.3 % (ref 35–47)
HGB BLD-MCNC: 15 G/DL (ref 11.7–15.7)
INTERPRETATION ECG - MUSE: NORMAL
MCH RBC QN AUTO: 28.8 PG (ref 26.5–33)
MCHC RBC AUTO-ENTMCNC: 30.4 G/DL (ref 31.5–36.5)
MCV RBC AUTO: 95 FL (ref 78–100)
P AXIS - MUSE: 69 DEGREES
PLATELET # BLD AUTO: 211 10E3/UL (ref 150–450)
POTASSIUM BLD-SCNC: 4.1 MMOL/L (ref 3.5–5)
PR INTERVAL - MUSE: 146 MS
QRS DURATION - MUSE: 72 MS
QT - MUSE: 398 MS
QTC - MUSE: 459 MS
R AXIS - MUSE: 50 DEGREES
RBC # BLD AUTO: 5.2 10E6/UL (ref 3.8–5.2)
SARS-COV-2 RNA RESP QL NAA+PROBE: NEGATIVE
SODIUM SERPL-SCNC: 142 MMOL/L (ref 136–145)
SYSTOLIC BLOOD PRESSURE - MUSE: NORMAL MMHG
T AXIS - MUSE: 55 DEGREES
TROPONIN I SERPL-MCNC: <0.01 NG/ML (ref 0–0.29)
VENTRICULAR RATE- MUSE: 80 BPM
WBC # BLD AUTO: 11.9 10E3/UL (ref 4–11)

## 2021-11-08 PROCEDURE — C9803 HOPD COVID-19 SPEC COLLECT: HCPCS

## 2021-11-08 PROCEDURE — 250N000011 HC RX IP 250 OP 636: Performed by: STUDENT IN AN ORGANIZED HEALTH CARE EDUCATION/TRAINING PROGRAM

## 2021-11-08 PROCEDURE — 99285 EMERGENCY DEPT VISIT HI MDM: CPT | Mod: 25

## 2021-11-08 PROCEDURE — 71045 X-RAY EXAM CHEST 1 VIEW: CPT

## 2021-11-08 PROCEDURE — 83880 ASSAY OF NATRIURETIC PEPTIDE: CPT | Performed by: PHYSICIAN ASSISTANT

## 2021-11-08 PROCEDURE — 36415 COLL VENOUS BLD VENIPUNCTURE: CPT | Performed by: PHYSICIAN ASSISTANT

## 2021-11-08 PROCEDURE — 85014 HEMATOCRIT: CPT | Performed by: PHYSICIAN ASSISTANT

## 2021-11-08 PROCEDURE — 93005 ELECTROCARDIOGRAM TRACING: CPT | Performed by: STUDENT IN AN ORGANIZED HEALTH CARE EDUCATION/TRAINING PROGRAM

## 2021-11-08 PROCEDURE — 84484 ASSAY OF TROPONIN QUANT: CPT | Performed by: STUDENT IN AN ORGANIZED HEALTH CARE EDUCATION/TRAINING PROGRAM

## 2021-11-08 PROCEDURE — 87635 SARS-COV-2 COVID-19 AMP PRB: CPT | Performed by: STUDENT IN AN ORGANIZED HEALTH CARE EDUCATION/TRAINING PROGRAM

## 2021-11-08 PROCEDURE — 250N000009 HC RX 250: Performed by: STUDENT IN AN ORGANIZED HEALTH CARE EDUCATION/TRAINING PROGRAM

## 2021-11-08 PROCEDURE — 80048 BASIC METABOLIC PNL TOTAL CA: CPT | Performed by: PHYSICIAN ASSISTANT

## 2021-11-08 RX ORDER — PREDNISONE 20 MG/1
TABLET ORAL
Qty: 10 TABLET | Refills: 0 | Status: SHIPPED | OUTPATIENT
Start: 2021-11-08 | End: 2021-12-09

## 2021-11-08 RX ORDER — IPRATROPIUM BROMIDE AND ALBUTEROL SULFATE 2.5; .5 MG/3ML; MG/3ML
3 SOLUTION RESPIRATORY (INHALATION) ONCE
Status: COMPLETED | OUTPATIENT
Start: 2021-11-08 | End: 2021-11-08

## 2021-11-08 RX ORDER — METHYLPREDNISOLONE SODIUM SUCCINATE 125 MG/2ML
125 INJECTION, POWDER, LYOPHILIZED, FOR SOLUTION INTRAMUSCULAR; INTRAVENOUS ONCE
Status: COMPLETED | OUTPATIENT
Start: 2021-11-08 | End: 2021-11-08

## 2021-11-08 RX ORDER — ALBUTEROL SULFATE 0.83 MG/ML
2.5 SOLUTION RESPIRATORY (INHALATION) EVERY 4 HOURS PRN
Qty: 3 ML | Refills: 0 | Status: ON HOLD | OUTPATIENT
Start: 2021-11-08 | End: 2021-12-10

## 2021-11-08 RX ADMIN — METHYLPREDNISOLONE SODIUM SUCCINATE 125 MG: 125 INJECTION, POWDER, FOR SOLUTION INTRAMUSCULAR; INTRAVENOUS at 14:08

## 2021-11-08 RX ADMIN — IPRATROPIUM BROMIDE AND ALBUTEROL SULFATE 3 ML: .5; 3 SOLUTION RESPIRATORY (INHALATION) at 13:27

## 2021-11-08 ASSESSMENT — ENCOUNTER SYMPTOMS
BACK PAIN: 1
COUGH: 0
WHEEZING: 0
SHORTNESS OF BREATH: 1
FEVER: 0

## 2021-11-08 ASSESSMENT — MIFFLIN-ST. JEOR: SCORE: 1221.75

## 2021-11-08 NOTE — ED PROVIDER NOTES
EMERGENCY DEPARTMENT ENCOUNTER      NAME: Arvind Leong  AGE: 66 year old female  YOB: 1955  MRN: [id number]  EVALUATION DATE & TIME: No admission date for patient encounter.    PCP: Rema Whiting    ED PROVIDER: Abdiaziz Chahal M.D.      Chief Complaint   Patient presents with     Shortness of Breath         FINAL IMPRESSION:  1. Chronic obstructive pulmonary disease, unspecified COPD type (H)          ED COURSE & MEDICAL DECISION MAKING:    Pertinent Labs & Imaging studies reviewed. (See chart for details)  66 year old female presents to the Emergency Department for evaluation of shortness of breath.  Patient feels as if the symptoms are similar to her previous episodes of COPD.  Here in the ER she has a nonspecific EKG.  Troponin is negative.  Chest x-ray shows no pneumonia.  Covid test was negative.  Patient received some prednisone and a nebulizer here in the ER and her shortness of breath improved significantly.  Will discharge the patient home with steroids nebulizers and follow-up.    10:41 AM I met with the patient, obtained history, performed an initial exam, and discussed options and plan for diagnostics and treatment here in the ED.   3:25 PM Checked in on and updated patient. Feeling much better. I discussed the plan for discharge with the patient, and patient is agreeable.  We discussed supportive cares at home and reasons for return to the ER including new or worsening symptoms - all questions and concerns addressed.  Patient to be discharged by RN.     At the conclusion of the encounter I discussed the results of all of the tests and the disposition. The questions were answered. The patient or family acknowledged understanding and was agreeable with the care plan.           MEDICATIONS GIVEN IN THE EMERGENCY:  Medications   methylPREDNISolone sodium succinate (solu-MEDROL) injection 125 mg (125 mg Intravenous Given 11/8/21 1408)   ipratropium - albuterol 0.5 mg/2.5 mg/3 mL  (DUONEB) neb solution 3 mL (3 mLs Nebulization Given 11/8/21 1327)       NEW PRESCRIPTIONS STARTED AT TODAY'S ER VISIT  Discharge Medication List as of 11/8/2021  3:40 PM      START taking these medications    Details   albuterol (PROVENTIL) (2.5 MG/3ML) 0.083% neb solution Take 1 vial (2.5 mg) by nebulization every 4 hours as needed for shortness of breath / dyspnea, Disp-3 mL, R-0, Local Print      predniSONE (DELTASONE) 20 MG tablet Take two tablets (= 40mg) each day for 5 (five) days, Disp-10 tablet, R-0, Local Print                =================================================================    HPI    Patient information was obtained from: patient    Use of : N/A         Arvind Leong is a 66 year old female with a pertinent history of COPD, diabetes mellitus type 2, morbid obesity, and hyperlipidemia who presents to this ED by walk in for evaluation of shortness of breath.     This morning, patient awoke and felt short of breath which she recognized as COPD exacerbation. Patient also felt like she was walking slower because of her shortness of breath. Endorses painful sneezing as well. Patient has nebulizer treatment at home, but did not use it this morning. Patient is usually on 2L of oxygen at home and denies having to increase this.     Patient also reports ongoing back pain that makes it hard for her to sleep at night. Patient also endorses having excess urine for the past two days. Patient denies checking her blood glucose regularly. She takes Metformin.     Reports that she was hospitalized a month ago and was given prednisone there, but does not take prednisone now. Patient denies wheezing, chest pain, fever, cough, or any other complaints at this time.       REVIEW OF SYSTEMS   Review of Systems   Constitutional: Negative for fever.   HENT: Positive for sneezing (painful).    Respiratory: Positive for shortness of breath. Negative for cough and wheezing.    Cardiovascular: Negative  for chest pain.   Genitourinary:        Positive for excess urine.   Musculoskeletal: Positive for back pain.   All other systems reviewed and are negative.      PAST MEDICAL HISTORY:  Past Medical History:   Diagnosis Date     Acute on chronic respiratory failure with hypoxia (H) 11/15/2016     Bipolar 1 disorder (H)      CAP (community acquired pneumonia) 11/15/2016     Cervical high risk HPV (human papillomavirus) test positive 3/5/2018    3/5/18 NIL pap, +HR HPV (not 16/18) 21 NIL pap, neg HPV. Plan: await provider     COPD exacerbation (H) 2021     COPD with exacerbation (H) 11/15/2016     Diabetes mellitus, type II (H)      Hearing loss      Meniere's disease      Pneumonia 2021     Pneumonia of right lower lobe due to infectious organism 2019       PAST SURGICAL HISTORY:  Past Surgical History:   Procedure Laterality Date     CYST REMOVAL  2001     MD REMOVAL ANAL FISTULA,SUBCUTANEOUS      Description: Fistula-in-ano Repair;  Recorded: 2010; x2     TONSILLECTOMY             CURRENT MEDICATIONS:    albuterol (PROVENTIL) (2.5 MG/3ML) 0.083% neb solution  predniSONE (DELTASONE) 20 MG tablet  albuterol (PROAIR HFA;PROVENTIL HFA;VENTOLIN HFA) 90 mcg/actuation inhaler  ARIPiprazole (ABILIFY) 10 MG tablet  azelastine (ASTELIN) 0.1 % nasal spray  buPROPion (WELLBUTRIN) 75 MG tablet  CONTOUR NEXT TEST test strip  metFORMIN (GLUCOPHAGE) 500 MG tablet  oxybutynin (DITROPAN) 5 MG tablet        ALLERGIES:  Allergies   Allergen Reactions     Lurasidone Other (See Comments)     Face turned red, (Brand name = Latuda) Face turned red, Face turned red       FAMILY HISTORY:  Family History   Problem Relation Age of Onset     Aneurysm Father      Heart Disease Father 70.00        bypass in 70s, AAA rupture at age 77      Ulcers Father 76.00     Pacemaker Mother      Bipolar Disorder Brother      Breast Cancer Maternal Grandmother 51.00     Kidney failure Maternal Grandmother      Cancer  "Paternal Grandmother         ?? lung     Cancer Paternal Uncle         ?? lung     Mental Illness Maternal Grandfather      Heart Disease Other         uncle     No Known Problems Sister         two siblings abuse chemicals     No Known Problems Brother      Bipolar Disorder Nephew        SOCIAL HISTORY:   Social History     Socioeconomic History     Marital status:      Spouse name: Not on file     Number of children: Not on file     Years of education: Not on file     Highest education level: Not on file   Occupational History     Not on file   Tobacco Use     Smoking status: Former Smoker     Packs/day: 1.00     Years: 40.00     Pack years: 40.00     Quit date: 2017     Years since quittin.7     Smokeless tobacco: Never Used   Substance and Sexual Activity     Alcohol use: Yes     Comment: Alcoholic Drinks/day: Occasional      Drug use: No     Sexual activity: Never   Other Topics Concern     Not on file   Social History Narrative    2019The patient lives with her mother.; had worked at [employer] as  but quit 2019.   Quit smoking ; no etoh problems  / x 2 ; no kids     Social Determinants of Health     Financial Resource Strain: Not on file   Food Insecurity: Not on file   Transportation Needs: Not on file   Physical Activity: Not on file   Stress: Not on file   Social Connections: Not on file   Intimate Partner Violence: Not on file   Housing Stability: Not on file       VITALS:  BP (!) 140/68   Pulse 83   Temp 97.6  F (36.4  C)   Resp (!) 39   Ht 1.397 m (4' 7\")   Wt 83.5 kg (184 lb)   SpO2 95%   BMI 42.77 kg/m        PHYSICAL EXAM    Constitutional: Well developed, Well nourished, NAD, GCS 15  HENT: Normocephalic, Atraumatic, Bilateral external ears normal, Oropharynx normal, mucous membranes moist, Nose normal. Neck-  Normal range of motion, No tenderness, Supple, No stridor.  Eyes: PERRL, EOMI, Conjunctiva normal, No discharge.   Respiratory: Normal breath " sounds, No respiratory distress, No wheezing, Speaks full sentences easily. No cough.  Cardiovascular: Normal heart rate, Regular rhythm, No murmurs, No rubs, No gallops. Chest wall nontender.  GI:Soft, No tenderness, No masses, No flank tenderness. No rebound or guarding.   Musculoskeletal: 2+ DP pulses. No edema.No cyanosis, No clubbing. Good range of motion in all major joints. No tenderness to palpation or major deformities noted.   Integument: Warm, Dry, No erythema, No rash. No petechiae.   Neurologic: Alert & oriented x 3,  CN 3-12 intact Normal motor function, Normal sensory function, No focal deficits noted. Normal gait. Normal finger to nose bilaterally  Psychiatric: Affect normal, Judgment normal, Mood normal. Cooperative.          LAB:  All pertinent labs reviewed and interpreted.  Labs Ordered and Resulted from Time of ED Arrival to Time of ED Departure   CBC WITH PLATELETS - Abnormal       Result Value    WBC Count 11.9 (*)     RBC Count 5.20      Hemoglobin 15.0      Hematocrit 49.3 (*)     MCV 95      MCH 28.8      MCHC 30.4 (*)     RDW 13.1      Platelet Count 211     BASIC METABOLIC PANEL - Abnormal    Sodium 142      Potassium 4.1      Chloride 100      Carbon Dioxide (CO2) 38 (*)     Anion Gap 4 (*)     Urea Nitrogen 11      Creatinine 0.80      Calcium 9.3      Glucose 122      GFR Estimate 78     B-TYPE NATRIURETIC PEPTIDE ( EAST ONLY) - Normal    BNP 31     COVID-19 VIRUS (CORONAVIRUS) BY PCR - Normal    SARS CoV2 PCR Negative     TROPONIN I - Normal    Troponin I <0.01         RADIOLOGY:  Reviewed all pertinent imaging. Please see official radiology report.  XR Chest Port 1 View   Final Result   IMPRESSION: No pleural fluid or pneumothorax. No airspace disease or edema. The cardiomediastinal silhouette is mildly enlarged.       Chest XR,  PA & LAT    (Results Pending)       EKG:    Performed at: 12:47    Impression: Sinus rhythm.    Rate: 80 bpm  Rhythm: sinus rhythm.  Axis: 50  DE  Interval: 146 ms  QRS Interval: 72 ms  QTc Interval: 459 ms  ST Changes: None.  Comparison: None.     I have independently reviewed and interpreted the EKG(s) documented above.    PROCEDURES:         I, Melodie Roland, am serving as a scribe to document services personally performed by Dr. Abdiaziz Chahal based on my observation and the provider's statements to me. I, Abdiaziz Chahal MD attest that Melodie Roland is acting in a scribe capacity, has observed my performance of the services and has documented them in accordance with my direction.    Abdiaziz Chahal M.D.  Emergency Medicine  [facility] EMERGENCY DEPARTMENT  [facility] [facility] [address] [phone]  Dept: [phone]     Abdiaziz Chahal MD  11/16/21 0994

## 2021-11-08 NOTE — ED TRIAGE NOTES
Pt with c/o sob and not being able to get comfortable/sleep since discharged from hospital Sept/October.   Pt has hx of copd, denies fevers/chills no cp.

## 2021-11-30 ENCOUNTER — TELEPHONE (OUTPATIENT)
Dept: FAMILY MEDICINE | Facility: CLINIC | Age: 66
End: 2021-11-30
Payer: COMMERCIAL

## 2021-11-30 ENCOUNTER — NURSE TRIAGE (OUTPATIENT)
Dept: NURSING | Facility: CLINIC | Age: 66
End: 2021-11-30
Payer: COMMERCIAL

## 2021-11-30 NOTE — TELEPHONE ENCOUNTER
Jeri dropped off paperwork to be done for her daughter. Would like a call when these are completed so she can pick them up. Handing off to [facility] today.

## 2021-12-01 NOTE — TELEPHONE ENCOUNTER
Patient calling with concerns of:    Dizziness accompanied with nausea started today-  Patient states she was told 'she had meniere's and would have to learn to live with it'   Nasal congestion     Patient Denies:    Severe difficulty sleeping   Too weak on one side of body   Numbness    According to the protocol, patient should see PCP within 3 days .  Care advice given. Patient verbalizes understanding and agrees with plan of care. Transferred to scheduling.  Call dropped. Was unable to connect patient to .  Called patient to remind her to call back for an appointment. Left message on her private cell.    Palak Dai RN, Nurse Advisor 8:50 PM 11/30/2021    COVID 19 Nurse Triage Plan/Patient Instructions    Please be aware that novel coronavirus (COVID-19) may be circulating in the community. If you develop symptoms such as fever, cough, or SOB or if you have concerns about the presence of another infection including coronavirus (COVID-19), please contact your health care provider or visit [email or web address].     Disposition/Instructions    In-Person Visit with provider recommended. Reference Visit Selection Guide.    Thank you for taking steps to prevent the spread of this virus.  o Limit your contact with others.  o Wear a simple mask to cover your cough.  o Wash your hands well and often.    Resources    [facility]: About COVID-19: [email or web address]    CDC: What to Do If You're Sick: www.cdc.gov/coronavirus/2019-ncov/about/steps-when-sick.html    CDC: Ending Home Isolation: www.cdc.gov/coronavirus/2019-ncov/hcp/disposition-in-home-patients.html     CDC: Caring for Someone: www.cdc.gov/coronavirus/2019-ncov/if-you-are-sick/care-for-someone.html     [facility]: Interim Guidance for Hospital Discharge to Home: www.health.[facility].[place]/diseases/coronavirus/hcp/hospdischarge.pdf    [facility] clinical trials (COVID-19 research studies):  clinicalaffairs.[facility]-clinical-trials     Below are the COVID-19 hotlines at the [place] Department of [facility] ([facility]). Interpreters are available.   o For health questions: Call 843-136-9289 or 1-423.384.4228 (7 a.m. to 7 p.m.)  o For questions about schools and childcare: Call 027-862-1153 or 1-125.982.5504 (7 a.m. to 7 p.m.)       Reason for Disposition    [1] MODERATE dizziness (e.g., vertigo; feels very unsteady, interferes with normal activities) AND [2] has been evaluated by physician for this    Additional Information    Negative: [1] Weakness (i.e., paralysis, loss of muscle strength) of the face, arm or leg on one side of the body AND [2] sudden onset AND [3] present now    Negative: [1] Numbness (i.e., loss of sensation) of the face, arm or leg on one side of the body AND [2] sudden onset AND [3] present now    Negative: [1] Loss of speech or garbled speech AND [2] sudden onset AND [3] present now    Negative: Difficult to awaken or acting confused (e.g., disoriented, slurred speech)    Negative: Sounds like a life-threatening emergency to the triager    Negative: Followed a head injury    Negative: Followed an ear injury    Negative: Localized weakness or numbness is main symptom    Negative: Dizziness relates to riding in a car, going to an amusement park, etc.    Negative: [1] Dizziness is main symptom AND [2] NO spinning sensation (i.e., vertigo)    Negative: [1] MODERATE dizziness (e.g., vertigo; feels very unsteady, interferes with normal activities) AND [2] has NOT been evaluated by physician for this    Negative: Earache    Negative: Vomiting occurs with dizziness    Negative: SEVERE dizziness (vertigo) (e.g., unable to walk without assistance)    Negative: [1] Dizziness (vertigo) present now AND [2] one or more stroke risk factors (i.e., hypertension, diabetes, prior stroke/TIA, heart attack)  (Exception: prior physician evaluation for this AND no different/worse than usual)    Negative: [1]  Dizziness (vertigo) present now AND [2] age > 60  (Exception: prior physician evaluation for this AND no different/worse than usual)    Negative: Severe headache (e.g., excruciating)  (Exception: similar to previous migraines)    Negative: Patient sounds very sick or weak to the triager    Negative: Taking a medicine that could cause dizziness (e.g., phenytoin [Dilantin], carbamazepine [Tegretol], primidone [Mysoline])    Protocols used: DIZZINESS - VERTIGO-A-AH

## 2021-12-02 ENCOUNTER — HOSPITAL ENCOUNTER (EMERGENCY)
Facility: HOSPITAL | Age: 66
Discharge: HOME | End: 2021-12-02
Attending: EMERGENCY MEDICINE | Admitting: EMERGENCY MEDICINE
Payer: COMMERCIAL

## 2021-12-02 ENCOUNTER — APPOINTMENT (OUTPATIENT)
Dept: RADIOLOGY | Facility: HOSPITAL | Age: 66
End: 2021-12-02
Payer: COMMERCIAL

## 2021-12-02 VITALS
SYSTOLIC BLOOD PRESSURE: 118 MMHG | TEMPERATURE: 98.4 F | HEART RATE: 72 BPM | BODY MASS INDEX: 42.77 KG/M2 | WEIGHT: 184 LBS | RESPIRATION RATE: 20 BRPM | DIASTOLIC BLOOD PRESSURE: 60 MMHG | OXYGEN SATURATION: 98 %

## 2021-12-02 DIAGNOSIS — J18.9 PNEUMONIA OF BOTH LUNGS DUE TO INFECTIOUS ORGANISM, UNSPECIFIED PART OF LUNG: ICD-10-CM

## 2021-12-02 LAB
ANION GAP SERPL CALCULATED.3IONS-SCNC: 7 MMOL/L (ref 5–18)
ATRIAL RATE - MUSE: 78 BPM
BASOPHILS # BLD AUTO: 0 10E3/UL (ref 0–0.2)
BASOPHILS NFR BLD AUTO: 0 %
BNP SERPL-MCNC: 24 PG/ML (ref 0–106)
BUN SERPL-MCNC: 9 MG/DL (ref 8–22)
CALCIUM SERPL-MCNC: 9.5 MG/DL (ref 8.5–10.5)
CHLORIDE BLD-SCNC: 100 MMOL/L (ref 98–107)
CO2 SERPL-SCNC: 35 MMOL/L (ref 22–31)
CREAT SERPL-MCNC: 0.77 MG/DL (ref 0.6–1.1)
DIASTOLIC BLOOD PRESSURE - MUSE: NORMAL MMHG
EOSINOPHIL # BLD AUTO: 0.2 10E3/UL (ref 0–0.7)
EOSINOPHIL NFR BLD AUTO: 2 %
ERYTHROCYTE [DISTWIDTH] IN BLOOD BY AUTOMATED COUNT: 12.9 % (ref 10–15)
FLUAV RNA SPEC QL NAA+PROBE: NEGATIVE
FLUBV RNA RESP QL NAA+PROBE: NEGATIVE
GFR SERPL CREATININE-BSD FRML MDRD: 81 ML/MIN/1.73M2
GLUCOSE BLD-MCNC: 173 MG/DL (ref 70–125)
HCT VFR BLD AUTO: 47.2 % (ref 35–47)
HGB BLD-MCNC: 14.6 G/DL (ref 11.7–15.7)
IMM GRANULOCYTES # BLD: 0 10E3/UL
IMM GRANULOCYTES NFR BLD: 0 %
INTERPRETATION ECG - MUSE: NORMAL
LYMPHOCYTES # BLD AUTO: 2.5 10E3/UL (ref 0.8–5.3)
LYMPHOCYTES NFR BLD AUTO: 25 %
MCH RBC QN AUTO: 29.3 PG (ref 26.5–33)
MCHC RBC AUTO-ENTMCNC: 30.9 G/DL (ref 31.5–36.5)
MCV RBC AUTO: 95 FL (ref 78–100)
MONOCYTES # BLD AUTO: 0.7 10E3/UL (ref 0–1.3)
MONOCYTES NFR BLD AUTO: 7 %
NEUTROPHILS # BLD AUTO: 6.7 10E3/UL (ref 1.6–8.3)
NEUTROPHILS NFR BLD AUTO: 66 %
NRBC # BLD AUTO: 0 10E3/UL
NRBC BLD AUTO-RTO: 0 /100
P AXIS - MUSE: 59 DEGREES
PLATELET # BLD AUTO: 179 10E3/UL (ref 150–450)
POTASSIUM BLD-SCNC: 4.5 MMOL/L (ref 3.5–5)
PR INTERVAL - MUSE: 148 MS
QRS DURATION - MUSE: 76 MS
QT - MUSE: 374 MS
QTC - MUSE: 426 MS
R AXIS - MUSE: 80 DEGREES
RBC # BLD AUTO: 4.99 10E6/UL (ref 3.8–5.2)
SARS-COV-2 RNA RESP QL NAA+PROBE: NEGATIVE
SODIUM SERPL-SCNC: 142 MMOL/L (ref 136–145)
SYSTOLIC BLOOD PRESSURE - MUSE: NORMAL MMHG
T AXIS - MUSE: 67 DEGREES
TROPONIN I SERPL-MCNC: <0.01 NG/ML (ref 0–0.29)
VENTRICULAR RATE- MUSE: 78 BPM
WBC # BLD AUTO: 10 10E3/UL (ref 4–11)

## 2021-12-02 PROCEDURE — 85025 COMPLETE CBC W/AUTO DIFF WBC: CPT | Performed by: PHYSICIAN ASSISTANT

## 2021-12-02 PROCEDURE — 94640 AIRWAY INHALATION TREATMENT: CPT

## 2021-12-02 PROCEDURE — 36415 COLL VENOUS BLD VENIPUNCTURE: CPT | Performed by: PHYSICIAN ASSISTANT

## 2021-12-02 PROCEDURE — 96365 THER/PROPH/DIAG IV INF INIT: CPT

## 2021-12-02 PROCEDURE — 84484 ASSAY OF TROPONIN QUANT: CPT | Performed by: PHYSICIAN ASSISTANT

## 2021-12-02 PROCEDURE — C9803 HOPD COVID-19 SPEC COLLECT: HCPCS

## 2021-12-02 PROCEDURE — 93005 ELECTROCARDIOGRAM TRACING: CPT | Performed by: PHYSICIAN ASSISTANT

## 2021-12-02 PROCEDURE — 250N000012 HC RX MED GY IP 250 OP 636 PS 637: Performed by: EMERGENCY MEDICINE

## 2021-12-02 PROCEDURE — 83880 ASSAY OF NATRIURETIC PEPTIDE: CPT | Performed by: PHYSICIAN ASSISTANT

## 2021-12-02 PROCEDURE — 250N000009 HC RX 250: Performed by: EMERGENCY MEDICINE

## 2021-12-02 PROCEDURE — 250N000013 HC RX MED GY IP 250 OP 250 PS 637: Performed by: EMERGENCY MEDICINE

## 2021-12-02 PROCEDURE — 71045 X-RAY EXAM CHEST 1 VIEW: CPT

## 2021-12-02 PROCEDURE — 99285 EMERGENCY DEPT VISIT HI MDM: CPT | Mod: 25

## 2021-12-02 PROCEDURE — 87636 SARSCOV2 & INF A&B AMP PRB: CPT | Performed by: PHYSICIAN ASSISTANT

## 2021-12-02 PROCEDURE — 80048 BASIC METABOLIC PNL TOTAL CA: CPT | Performed by: PHYSICIAN ASSISTANT

## 2021-12-02 RX ORDER — ALBUTEROL SULFATE 90 UG/1
6 AEROSOL, METERED RESPIRATORY (INHALATION) ONCE
Status: COMPLETED | OUTPATIENT
Start: 2021-12-02 | End: 2021-12-02

## 2021-12-02 RX ORDER — DOXYCYCLINE 100 MG/1
100 CAPSULE ORAL 2 TIMES DAILY
Qty: 10 CAPSULE | Refills: 0 | Status: SHIPPED | OUTPATIENT
Start: 2021-12-02 | End: 2021-12-07

## 2021-12-02 RX ORDER — DOXYCYCLINE 100 MG/10ML
100 INJECTION, POWDER, LYOPHILIZED, FOR SOLUTION INTRAVENOUS ONCE
Status: COMPLETED | OUTPATIENT
Start: 2021-12-02 | End: 2021-12-02

## 2021-12-02 RX ORDER — PREDNISONE 20 MG/1
40 TABLET ORAL ONCE
Status: COMPLETED | OUTPATIENT
Start: 2021-12-02 | End: 2021-12-02

## 2021-12-02 RX ADMIN — AMOXICILLIN AND CLAVULANATE POTASSIUM 1 TABLET: 875; 125 TABLET, FILM COATED ORAL at 10:23

## 2021-12-02 RX ADMIN — ALBUTEROL SULFATE 6 PUFF: 90 AEROSOL, METERED RESPIRATORY (INHALATION) at 10:22

## 2021-12-02 RX ADMIN — PREDNISONE 40 MG: 20 TABLET ORAL at 10:23

## 2021-12-02 RX ADMIN — DOXYCYCLINE 100 MG: 100 INJECTION, POWDER, LYOPHILIZED, FOR SOLUTION INTRAVENOUS at 10:21

## 2021-12-02 NOTE — ED NOTES
Discharge instructions reviewed with patient and she verbalized understanding.  Skin appears pink, warm, and dry.  Respirations even and unlabored- has chronic supplemental oxygen on without signs of distress or increased work of breathing.

## 2021-12-02 NOTE — ED TRIAGE NOTES
Patient has a history of COPD, on 2L per NC at baseline, presents with shortness of breath that has been increasing for the past two days. States that it feels similar to having pneumonia in the past. No acute distress, patient is saturating well on baseline oxygen. Afebrile. Received Covid 19 vaccine.

## 2021-12-02 NOTE — DISCHARGE INSTRUCTIONS
You have been prescribed Augmentin and doxycycline for pneumonia.  If your symptoms do not improve follow-up with your doctor or return to the emergency department if your symptoms worsen

## 2021-12-02 NOTE — ED PROVIDER NOTES
EMERGENCY DEPARTMENT ENCOUNTER            IMPRESSION:  Dyspnea -probable pneumonia and COPD      MEDICAL DECISION MAKING:  Patient evaluated for worsening dyspnea.  She has a history of COPD on home oxygen.    On exam her vital signs are normal.  She is able to speak in full sentences.  Lungs have some scattered crackles.    She was given steroids and albuterol inhaler treatment for COPD    Chest x-ray showed some perihilar infiltrates suggestive of pneumonia    Her BNP is normal therefore I doubt this is pulmonary edema    Her CBC and chemistry and troponin are negative.    EKG does not show any acute arrhythmia or ischemia    Covid test negative    History and physical are most consistent with community-acquired pneumonia on top of COPD.  She is at low risk for pulmonary embolism and does not have a history suggestive of PE She was given a dose of antibiotics in the emergency department.  She will be discharged home with 5-day course of antibiotics.          =================================================================  CHIEF COMPLAINT:  Chief Complaint   Patient presents with     Shortness of Breath         HPI  Arvind Leong is a 66 year old female with a history of anxiety, asthma, sleep apnea, COPD, HLD, cardiomyopathy, bipolar 1 disorder, and DM2 who presents to the ED by wheelchair for evaluation of shortness of breath.    Since 11/30 (2 days ago), patient reports progressively worsening persistent shortness of breath. She states this feels similar to prior episodes of pneumonia. She is on 2L O2 at baseline and has not needed to increase this at home with her symptoms. Denies fevers or any other complaints. Patient has received the COVID-19 vaccination series.       I, Marii Li am serving as a scribe to document services personally performed by Dr. Sánchez Chairez MD, based on my observation and the provider's statements to me. I, Dr. Sánchez Chairez MD attest that Marii Li is acting in a  gema leary, has observed my performance of the services and has documented them in accordance with my direction.      REVIEW OF SYSTEMS   Constitutional: Denies fever, chills, unintentional weight loss or fatigue   Eyes: Denies visual changes or discharge    HENT: Denies sore throat, ear pain or neck pain  Respiratory: Denies cough Positive for shortness of breath  Cardiovascular: Denies chest pain, palpitations or leg swelling  GI: Denies abdominal pain, nausea, vomiting, or dark, bloody stools.    : Denies hematuria, dysuria, or flank pain  Musculoskeletal: Denies any new back pain or new muscle/joint pains  Skin: Denies rash or wound  Neurologic: Denies current headache, new weakness, focal weakness, or sensory changes    Lymphatic: Denies swollen glands    Psychiatric: Denies depression, suicidal ideation or homicidal ideation.    Remainder of systems reviewed, unless noted in HPI all others negative.      PAST MEDICAL HISTORY:  Past Medical History:   Diagnosis Date     Acute on chronic respiratory failure with hypoxia (H) 11/15/2016     Bipolar 1 disorder (H)      CAP (community acquired pneumonia) 11/15/2016     Cervical high risk HPV (human papillomavirus) test positive 3/5/2018    3/5/18 NIL pap, +HR HPV (not 16/18) 5/21/21 NIL pap, neg HPV. Plan: await provider     COPD exacerbation (H) 4/24/2021     COPD with exacerbation (H) 11/15/2016     Diabetes mellitus, type II (H)      Hearing loss      Meniere's disease      Pneumonia 4/23/2021     Pneumonia of right lower lobe due to infectious organism 6/11/2019       PAST SURGICAL HISTORY:  Past Surgical History:   Procedure Laterality Date     CYST REMOVAL  01/23/2001     [place] REMOVAL ANAL FISTULA,SUBCUTANEOUS      Description: Fistula-in-ano Repair;  Recorded: 01/08/2010; x2     TONSILLECTOMY           CURRENT MEDICATIONS:    amoxicillin-clavulanate (AUGMENTIN) 875-125 MG tablet  doxycycline hyclate (VIBRAMYCIN) 100 MG capsule  albuterol (PROAIR  HFA;PROVENTIL HFA;VENTOLIN HFA) 90 mcg/actuation inhaler  albuterol (PROVENTIL) (2.5 MG/3ML) 0.083% neb solution  ARIPiprazole (ABILIFY) 10 MG tablet  azelastine (ASTELIN) 0.1 % nasal spray  buPROPion (WELLBUTRIN) 75 MG tablet  CONTOUR NEXT TEST test strip  metFORMIN (GLUCOPHAGE) 500 MG tablet  oxybutynin (DITROPAN) 5 MG tablet  predniSONE (DELTASONE) 20 MG tablet        ALLERGIES:  Allergies   Allergen Reactions     Lurasidone Other (See Comments)     Face turned red, (Brand name = Latuda) Face turned red, Face turned red       FAMILY HISTORY:  Family History   Problem Relation Age of Onset     Aneurysm Father      Heart Disease Father 70.00        bypass in 70s, AAA rupture at age 77      Ulcers Father 76.00     Pacemaker Mother      Bipolar Disorder Brother      Breast Cancer Maternal Grandmother 51.00     Kidney failure Maternal Grandmother      Cancer Paternal Grandmother         ?? lung     Cancer Paternal Uncle         ?? lung     Mental Illness Maternal Grandfather      Heart Disease Other         uncle     No Known Problems Sister         two siblings abuse chemicals     No Known Problems Brother      Bipolar Disorder Nephew        SOCIAL HISTORY:   Social History     Socioeconomic History     Marital status:      Spouse name: Not on file     Number of children: Not on file     Years of education: Not on file     Highest education level: Not on file   Occupational History     Not on file   Tobacco Use     Smoking status: Former Smoker     Packs/day: 1.00     Years: 40.00     Pack years: 40.00     Quit date: 2017     Years since quittin.8     Smokeless tobacco: Never Used   Substance and Sexual Activity     Alcohol use: Yes     Comment: Alcoholic Drinks/day: Occasional      Drug use: No     Sexual activity: Never   Other Topics Concern     Not on file   Social History Narrative    2019The patient lives with her mother.; had worked at [employer] as  but quit 2019.   Quit smoking  2015; no etoh problems  / x 2 ; no kids     Social Determinants of Health     Financial Resource Strain: Not on file   Food Insecurity: Not on file   Transportation Needs: Not on file   Physical Activity: Not on file   Stress: Not on file   Social Connections: Not on file   Intimate Partner Violence: Not on file   Housing Stability: Not on file       PHYSICAL EXAM:    /65   Pulse 70   Temp 97.3  F (36.3  C) (Temporal)   Resp 20   Wt 83.5 kg (184 lb)   SpO2 96%   BMI 42.77 kg/m      Constitutional: Awake, alert, in no acute distress  Head: Normocephalic, atraumatic.  ENT: Mucous membranes moist. Posterior oropharynx appears normal.  Eyes: Pupils midrange and reactive ,no conjunctival discharge  Neck: No lymphadenopathy, no stridor, supple, soft tissue swelling  Chest: No tenderness   Respiratory: Respirations diminished  Cardiovascular: Regular rate and rhythm.+2 radial pulses, equal bilaterally.  No murmurs.   GI: Abdomen soft, non-tender to palpation in all 4 quadrants. No guarding or rebound. Bowel sounds intact on all 4 quadrants.   Musculoskeletal: Moves all 4 extremities equally, strength symmetrical on bilateral uppers and lowers.  No peripheral edema  Integument: Warm, dry. No rash. No bruising or petechiae.  Lymphatic: No cervical lymphadenopathy  Neurologic: Alert & oriented x 3. Normal speech. Grossly normal motor and sensory function. No focal deficits noted.    Psychiatric: Normal mood and affect. Normal judgement.    ED COURSE:  7:59 AM I met with patient for initial interview and exam. PPE includes surgical mask and N95.      LAB:  All pertinent labs reviewed and interpreted.  Results for orders placed or performed during the hospital encounter of 12/02/21   XR Chest Port 1 View    Impression    IMPRESSION: Perihilar interstitial opacities. Favor edema; however, the differential diagnosis includes infection. Due to technique and positioning, it is difficult to evaluate for  pleural effusions. Stable enlarged cardiac silhouette. Scoliosis.   Basic metabolic panel   Result Value Ref Range    Sodium 142 136 - 145 mmol/L    Potassium 4.5 3.5 - 5.0 mmol/L    Chloride 100 98 - 107 mmol/L    Carbon Dioxide (CO2) 35 (H) 22 - 31 mmol/L    Anion Gap 7 5 - 18 mmol/L    Urea Nitrogen 9 8 - 22 mg/dL    Creatinine 0.77 0.60 - 1.10 mg/dL    Calcium 9.5 8.5 - 10.5 mg/dL    Glucose 173 (H) 70 - 125 mg/dL    GFR Estimate 81 >60 mL/min/1.73m2   B-Type Natriuretic Peptide ([facility] Only)   Result Value Ref Range    BNP 24 0 - 106 pg/mL   Troponin I (now)   Result Value Ref Range    Troponin I <0.01 0.00 - 0.29 ng/mL   Symptomatic Influenza A/B & SARS-CoV2 (COVID-19) Virus PCR Multiplex Nasopharyngeal    Specimen: Nasopharyngeal; Swab   Result Value Ref Range    Influenza A PCR Negative Negative    Influenza B PCR Negative Negative    SARS CoV2 PCR Negative Negative   CBC with platelets and differential   Result Value Ref Range    WBC Count 10.0 4.0 - 11.0 10e3/uL    RBC Count 4.99 3.80 - 5.20 10e6/uL    Hemoglobin 14.6 11.7 - 15.7 g/dL    Hematocrit 47.2 (H) 35.0 - 47.0 %    MCV 95 78 - 100 fL    MCH 29.3 26.5 - 33.0 pg    MCHC 30.9 (L) 31.5 - 36.5 g/dL    RDW 12.9 10.0 - 15.0 %    Platelet Count 179 150 - 450 10e3/uL    % Neutrophils 66 %    % Lymphocytes 25 %    % Monocytes 7 %    % Eosinophils 2 %    % Basophils 0 %    % Immature Granulocytes 0 %    NRBCs per 100 WBC 0 <1 /100    Absolute Neutrophils 6.7 1.6 - 8.3 10e3/uL    Absolute Lymphocytes 2.5 0.8 - 5.3 10e3/uL    Absolute Monocytes 0.7 0.0 - 1.3 10e3/uL    Absolute Eosinophils 0.2 0.0 - 0.7 10e3/uL    Absolute Basophils 0.0 0.0 - 0.2 10e3/uL    Absolute Immature Granulocytes 0.0 <=0.4 10e3/uL    Absolute NRBCs 0.0 10e3/uL       RADIOLOGY:  Reviewed all pertinent imaging. Please see official radiology report.  XR Chest Port 1 View   Final Result   IMPRESSION: Perihilar interstitial opacities. Favor edema; however, the differential diagnosis  includes infection. Due to technique and positioning, it is difficult to evaluate for pleural effusions. Stable enlarged cardiac silhouette. Scoliosis.           EKG:     Performed at: 8:16 AM 12/02/21     Impression: sinus rhythm with possible premature atrial complexes with aberrant conduction, low voltage QRS, when compared to prior ECG of 11/8/21 at 12:47 PM, no significant change was found    Rate: 78  Rhythm: sinus rhythm  Axis: 80  SD Interval: 148  QRS Interval: 76  QTc Interval: 426  ST Changes: as above  Comparison: 11/8/21    I have independently reviewed and interpreted the EKG(s) documented above.        MEDICATIONS GIVEN IN THE EMERGENCY:  Medications   albuterol (PROVENTIL HFA/VENTOLIN HFA) inhaler (has no administration in time range)   predniSONE (DELTASONE) tablet 40 mg (has no administration in time range)   amoxicillin-clavulanate (AUGMENTIN) 875-125 MG per tablet 1 tablet (has no administration in time range)   doxycycline (VIBRAMYCIN) 100 mg vial to attach to  mL bag (has no administration in time range)           NEW PRESCRIPTIONS STARTED AT TODAY'S ER VISIT:  New Prescriptions    AMOXICILLIN-CLAVULANATE (AUGMENTIN) 875-125 MG TABLET    Take 1 tablet by mouth 2 times daily for 5 days    DOXYCYCLINE HYCLATE (VIBRAMYCIN) 100 MG CAPSULE    Take 1 capsule (100 mg) by mouth 2 times daily for 5 days          FINAL DIAGNOSIS:    ICD-10-CM    1. Pneumonia of both lungs due to infectious organism, unspecified part of lung  J18.9             At the conclusion of the encounter I discussed the results of all of the tests and the disposition. The questions were answered. The patient or family acknowledged understanding and was agreeable with the care plan.     NAME: Arvind Leong  AGE: 66 year old female  YOB: 1955  MRN: [id number]  EVALUATION DATE & TIME: No admission date for patient encounter.    PCP: Rema Whiting    ED PROVIDER: JOSUE Iniguez Courtney  Guillermo, am serving as a scribe to document services personally performed by Dr. Sánchez Chairez based on my observation and the provider's statements to me. I, Sánchez Chairez MD attest that Marii Li is acting in a scribe capacity, has observed my performance of the services and has documented them in accordance with my direction.    Sánchez Chairez M.D.  Emergency Medicine  [facility] EMERGENCY DEPARTMENT  [facility] [facility] [address]  [phone]  Dept: [phone]  12/2/2021         Sánchez Chairez MD  12/02/21 1005

## 2021-12-07 ENCOUNTER — TELEPHONE (OUTPATIENT)
Dept: FAMILY MEDICINE | Facility: CLINIC | Age: 66
End: 2021-12-07
Payer: COMMERCIAL

## 2021-12-07 NOTE — TELEPHONE ENCOUNTER
Incoming call from pt following up on [employer] form she dropped off last week. I let her know Dr Whiting has been out of the clinic and can review when she is back in clinic later this week. Form is in your basket.

## 2021-12-09 ENCOUNTER — NURSE TRIAGE (OUTPATIENT)
Dept: NURSING | Facility: CLINIC | Age: 66
End: 2021-12-09
Payer: COMMERCIAL

## 2021-12-09 ENCOUNTER — HOSPITAL ENCOUNTER (OUTPATIENT)
Facility: HOSPITAL | Age: 66
Setting detail: OBSERVATION
Discharge: HOME | End: 2021-12-10
Attending: EMERGENCY MEDICINE | Admitting: STUDENT IN AN ORGANIZED HEALTH CARE EDUCATION/TRAINING PROGRAM
Payer: COMMERCIAL

## 2021-12-09 ENCOUNTER — TELEPHONE (OUTPATIENT)
Dept: FAMILY MEDICINE | Facility: CLINIC | Age: 66
End: 2021-12-09

## 2021-12-09 ENCOUNTER — APPOINTMENT (OUTPATIENT)
Dept: CT IMAGING | Facility: HOSPITAL | Age: 66
End: 2021-12-09
Attending: EMERGENCY MEDICINE
Payer: COMMERCIAL

## 2021-12-09 DIAGNOSIS — R41.0 ACUTE CONFUSION: ICD-10-CM

## 2021-12-09 DIAGNOSIS — R06.89 HYPERCARBIA: ICD-10-CM

## 2021-12-09 DIAGNOSIS — R09.02 HYPOXIA: ICD-10-CM

## 2021-12-09 DIAGNOSIS — J44.9 CHRONIC OBSTRUCTIVE PULMONARY DISEASE, UNSPECIFIED COPD TYPE (H): Primary | ICD-10-CM

## 2021-12-09 DIAGNOSIS — J44.1 COPD EXACERBATION (H): Primary | ICD-10-CM

## 2021-12-09 DIAGNOSIS — E11.8 TYPE 2 DIABETES MELLITUS WITH UNSPECIFIED COMPLICATIONS (H): ICD-10-CM

## 2021-12-09 LAB
ALBUMIN SERPL-MCNC: 3.7 G/DL (ref 3.5–5)
ALP SERPL-CCNC: 80 U/L (ref 45–120)
ALT SERPL W P-5'-P-CCNC: 24 U/L (ref 0–45)
ANION GAP SERPL CALCULATED.3IONS-SCNC: 9 MMOL/L (ref 5–18)
APTT PPP: 29 SECONDS (ref 22–38)
AST SERPL W P-5'-P-CCNC: 17 U/L (ref 0–40)
BASE EXCESS BLDV CALC-SCNC: 15.3 MMOL/L
BASE EXCESS BLDV CALC-SCNC: 16.8 MMOL/L
BASOPHILS # BLD AUTO: 0 10E3/UL (ref 0–0.2)
BASOPHILS NFR BLD AUTO: 0 %
BILIRUB SERPL-MCNC: 0.7 MG/DL (ref 0–1)
BNP SERPL-MCNC: 55 PG/ML (ref 0–106)
BUN SERPL-MCNC: 9 MG/DL (ref 8–22)
CALCIUM SERPL-MCNC: 9.8 MG/DL (ref 8.5–10.5)
CHLORIDE BLD-SCNC: 98 MMOL/L (ref 98–107)
CO2 SERPL-SCNC: 39 MMOL/L (ref 22–31)
CREAT SERPL-MCNC: 0.75 MG/DL (ref 0.6–1.1)
EOSINOPHIL # BLD AUTO: 0.1 10E3/UL (ref 0–0.7)
EOSINOPHIL NFR BLD AUTO: 1 %
ERYTHROCYTE [DISTWIDTH] IN BLOOD BY AUTOMATED COUNT: 12.8 % (ref 10–15)
FLUAV RNA SPEC QL NAA+PROBE: NEGATIVE
FLUBV RNA RESP QL NAA+PROBE: NEGATIVE
GFR SERPL CREATININE-BSD FRML MDRD: 84 ML/MIN/1.73M2
GLUCOSE BLD-MCNC: 193 MG/DL (ref 70–125)
GLUCOSE BLDC GLUCOMTR-MCNC: 113 MG/DL (ref 70–99)
HBA1C MFR BLD: 7.6 %
HCO3 BLDV-SCNC: 35 MMOL/L (ref 24–30)
HCO3 BLDV-SCNC: 36 MMOL/L (ref 24–30)
HCT VFR BLD AUTO: 47.1 % (ref 35–47)
HGB BLD-MCNC: 14.3 G/DL (ref 11.7–15.7)
HOLD SPECIMEN: NORMAL
IMM GRANULOCYTES # BLD: 0 10E3/UL
IMM GRANULOCYTES NFR BLD: 0 %
INR PPP: 1 (ref 0.9–1.15)
LACTATE SERPL-SCNC: 1 MMOL/L (ref 0.7–2)
LYMPHOCYTES # BLD AUTO: 2.3 10E3/UL (ref 0.8–5.3)
LYMPHOCYTES NFR BLD AUTO: 21 %
MAGNESIUM SERPL-MCNC: 2 MG/DL (ref 1.8–2.6)
MCH RBC QN AUTO: 28.8 PG (ref 26.5–33)
MCHC RBC AUTO-ENTMCNC: 30.4 G/DL (ref 31.5–36.5)
MCV RBC AUTO: 95 FL (ref 78–100)
MONOCYTES # BLD AUTO: 0.7 10E3/UL (ref 0–1.3)
MONOCYTES NFR BLD AUTO: 6 %
NEUTROPHILS # BLD AUTO: 7.9 10E3/UL (ref 1.6–8.3)
NEUTROPHILS NFR BLD AUTO: 72 %
NRBC # BLD AUTO: 0 10E3/UL
NRBC BLD AUTO-RTO: 0 /100
OXYHGB MFR BLDV: 68.3 % (ref 70–75)
OXYHGB MFR BLDV: 77.6 % (ref 70–75)
PCO2 BLDV: 80 MM HG (ref 35–50)
PCO2 BLDV: 88 MM HG (ref 35–50)
PH BLDV: 7.29 [PH] (ref 7.35–7.45)
PH BLDV: 7.34 [PH] (ref 7.35–7.45)
PLATELET # BLD AUTO: 215 10E3/UL (ref 150–450)
PO2 BLDV: 37 MM HG (ref 25–47)
PO2 BLDV: 46 MM HG (ref 25–47)
POTASSIUM BLD-SCNC: 4.5 MMOL/L (ref 3.5–5)
PROCALCITONIN SERPL-MCNC: 0.22 NG/ML (ref 0–0.49)
PROT SERPL-MCNC: 6.8 G/DL (ref 6–8)
RBC # BLD AUTO: 4.97 10E6/UL (ref 3.8–5.2)
SAO2 % BLDV: 69.6 % (ref 70–75)
SAO2 % BLDV: 79.1 % (ref 70–75)
SARS-COV-2 RNA RESP QL NAA+PROBE: NEGATIVE
SODIUM SERPL-SCNC: 146 MMOL/L (ref 136–145)
TROPONIN I SERPL-MCNC: <0.01 NG/ML (ref 0–0.29)
WBC # BLD AUTO: 11 10E3/UL (ref 4–11)

## 2021-12-09 PROCEDURE — 83880 ASSAY OF NATRIURETIC PEPTIDE: CPT | Performed by: EMERGENCY MEDICINE

## 2021-12-09 PROCEDURE — A7035 POS AIRWAY PRESS HEADGEAR: HCPCS

## 2021-12-09 PROCEDURE — 82805 BLOOD GASES W/O2 SATURATION: CPT | Performed by: INTERNAL MEDICINE

## 2021-12-09 PROCEDURE — C9803 HOPD COVID-19 SPEC COLLECT: HCPCS

## 2021-12-09 PROCEDURE — 93005 ELECTROCARDIOGRAM TRACING: CPT | Performed by: EMERGENCY MEDICINE

## 2021-12-09 PROCEDURE — 36415 COLL VENOUS BLD VENIPUNCTURE: CPT | Performed by: STUDENT IN AN ORGANIZED HEALTH CARE EDUCATION/TRAINING PROGRAM

## 2021-12-09 PROCEDURE — 70450 CT HEAD/BRAIN W/O DYE: CPT

## 2021-12-09 PROCEDURE — 36415 COLL VENOUS BLD VENIPUNCTURE: CPT | Performed by: INTERNAL MEDICINE

## 2021-12-09 PROCEDURE — 999N000157 HC STATISTIC RCP TIME EA 10 MIN

## 2021-12-09 PROCEDURE — 250N000009 HC RX 250: Performed by: EMERGENCY MEDICINE

## 2021-12-09 PROCEDURE — 87636 SARSCOV2 & INF A&B AMP PRB: CPT | Performed by: EMERGENCY MEDICINE

## 2021-12-09 PROCEDURE — G0378 HOSPITAL OBSERVATION PER HR: HCPCS

## 2021-12-09 PROCEDURE — 999N000215 HC STATISTIC HFNC ADULT NON-CPAP

## 2021-12-09 PROCEDURE — 94640 AIRWAY INHALATION TREATMENT: CPT

## 2021-12-09 PROCEDURE — 250N000011 HC RX IP 250 OP 636: Performed by: EMERGENCY MEDICINE

## 2021-12-09 PROCEDURE — 272N000064 HC CIRCUIT HUMIDITY W/CPAP BIPAP

## 2021-12-09 PROCEDURE — 94660 CPAP INITIATION&MGMT: CPT

## 2021-12-09 PROCEDURE — 99285 EMERGENCY DEPT VISIT HI MDM: CPT | Mod: 25

## 2021-12-09 PROCEDURE — 258N000003 HC RX IP 258 OP 636: Performed by: EMERGENCY MEDICINE

## 2021-12-09 PROCEDURE — 85610 PROTHROMBIN TIME: CPT | Performed by: EMERGENCY MEDICINE

## 2021-12-09 PROCEDURE — 96361 HYDRATE IV INFUSION ADD-ON: CPT

## 2021-12-09 PROCEDURE — 85730 THROMBOPLASTIN TIME PARTIAL: CPT | Performed by: EMERGENCY MEDICINE

## 2021-12-09 PROCEDURE — 85025 COMPLETE CBC W/AUTO DIFF WBC: CPT | Performed by: EMERGENCY MEDICINE

## 2021-12-09 PROCEDURE — 82805 BLOOD GASES W/O2 SATURATION: CPT | Performed by: EMERGENCY MEDICINE

## 2021-12-09 PROCEDURE — 84145 PROCALCITONIN (PCT): CPT | Performed by: EMERGENCY MEDICINE

## 2021-12-09 PROCEDURE — 83605 ASSAY OF LACTIC ACID: CPT | Performed by: EMERGENCY MEDICINE

## 2021-12-09 PROCEDURE — 84484 ASSAY OF TROPONIN QUANT: CPT | Performed by: EMERGENCY MEDICINE

## 2021-12-09 PROCEDURE — 82040 ASSAY OF SERUM ALBUMIN: CPT | Performed by: EMERGENCY MEDICINE

## 2021-12-09 PROCEDURE — 36415 COLL VENOUS BLD VENIPUNCTURE: CPT | Performed by: EMERGENCY MEDICINE

## 2021-12-09 PROCEDURE — 93005 ELECTROCARDIOGRAM TRACING: CPT | Performed by: STUDENT IN AN ORGANIZED HEALTH CARE EDUCATION/TRAINING PROGRAM

## 2021-12-09 PROCEDURE — 83036 HEMOGLOBIN GLYCOSYLATED A1C: CPT | Performed by: INTERNAL MEDICINE

## 2021-12-09 PROCEDURE — 71275 CT ANGIOGRAPHY CHEST: CPT

## 2021-12-09 PROCEDURE — 83735 ASSAY OF MAGNESIUM: CPT | Performed by: EMERGENCY MEDICINE

## 2021-12-09 PROCEDURE — 99220 PR INITIAL OBSERVATION CARE,LEVEL III: CPT | Performed by: INTERNAL MEDICINE

## 2021-12-09 PROCEDURE — 82962 GLUCOSE BLOOD TEST: CPT

## 2021-12-09 RX ORDER — DEXTROSE MONOHYDRATE 25 G/50ML
25-50 INJECTION, SOLUTION INTRAVENOUS
Status: DISCONTINUED | OUTPATIENT
Start: 2021-12-09 | End: 2021-12-10 | Stop reason: HOSPADM

## 2021-12-09 RX ORDER — CALCIUM CARBONATE 500 MG/1
1000 TABLET, CHEWABLE ORAL 4 TIMES DAILY PRN
Status: DISCONTINUED | OUTPATIENT
Start: 2021-12-09 | End: 2021-12-10 | Stop reason: HOSPADM

## 2021-12-09 RX ORDER — ONDANSETRON 4 MG/1
4 TABLET, ORALLY DISINTEGRATING ORAL EVERY 6 HOURS PRN
Status: DISCONTINUED | OUTPATIENT
Start: 2021-12-09 | End: 2021-12-10 | Stop reason: HOSPADM

## 2021-12-09 RX ORDER — POLYETHYLENE GLYCOL 3350 17 G/17G
17 POWDER, FOR SOLUTION ORAL DAILY
Status: DISCONTINUED | OUTPATIENT
Start: 2021-12-10 | End: 2021-12-10 | Stop reason: HOSPADM

## 2021-12-09 RX ORDER — NICOTINE POLACRILEX 4 MG
15-30 LOZENGE BUCCAL
Status: DISCONTINUED | OUTPATIENT
Start: 2021-12-09 | End: 2021-12-10 | Stop reason: HOSPADM

## 2021-12-09 RX ORDER — DOXYCYCLINE 100 MG/1
100 CAPSULE ORAL 2 TIMES DAILY
COMMUNITY
End: 2022-09-22

## 2021-12-09 RX ORDER — LIDOCAINE 40 MG/G
CREAM TOPICAL
Status: DISCONTINUED | OUTPATIENT
Start: 2021-12-09 | End: 2021-12-10 | Stop reason: HOSPADM

## 2021-12-09 RX ORDER — IPRATROPIUM BROMIDE AND ALBUTEROL SULFATE 2.5; .5 MG/3ML; MG/3ML
3 SOLUTION RESPIRATORY (INHALATION) ONCE
Status: COMPLETED | OUTPATIENT
Start: 2021-12-09 | End: 2021-12-09

## 2021-12-09 RX ORDER — IOPAMIDOL 755 MG/ML
100 INJECTION, SOLUTION INTRAVASCULAR ONCE
Status: COMPLETED | OUTPATIENT
Start: 2021-12-09 | End: 2021-12-09

## 2021-12-09 RX ORDER — ONDANSETRON 2 MG/ML
4 INJECTION INTRAMUSCULAR; INTRAVENOUS EVERY 6 HOURS PRN
Status: DISCONTINUED | OUTPATIENT
Start: 2021-12-09 | End: 2021-12-10 | Stop reason: HOSPADM

## 2021-12-09 RX ORDER — METHYLPREDNISOLONE SODIUM SUCCINATE 125 MG/2ML
60 INJECTION, POWDER, LYOPHILIZED, FOR SOLUTION INTRAMUSCULAR; INTRAVENOUS EVERY 8 HOURS
Status: DISCONTINUED | OUTPATIENT
Start: 2021-12-09 | End: 2021-12-10

## 2021-12-09 RX ORDER — ACETAMINOPHEN 650 MG/1
650 SUPPOSITORY RECTAL EVERY 6 HOURS PRN
Status: DISCONTINUED | OUTPATIENT
Start: 2021-12-09 | End: 2021-12-10 | Stop reason: HOSPADM

## 2021-12-09 RX ORDER — ALBUTEROL SULFATE 0.83 MG/ML
2.5 SOLUTION RESPIRATORY (INHALATION) EVERY 4 HOURS PRN
Status: DISCONTINUED | OUTPATIENT
Start: 2021-12-09 | End: 2021-12-10 | Stop reason: HOSPADM

## 2021-12-09 RX ORDER — DOXYCYCLINE 100 MG/1
100 CAPSULE ORAL 2 TIMES DAILY
Status: DISCONTINUED | OUTPATIENT
Start: 2021-12-09 | End: 2021-12-10 | Stop reason: HOSPADM

## 2021-12-09 RX ORDER — POLYETHYLENE GLYCOL 3350 17 G/17G
17 POWDER, FOR SOLUTION ORAL DAILY PRN
Status: DISCONTINUED | OUTPATIENT
Start: 2021-12-09 | End: 2021-12-10 | Stop reason: HOSPADM

## 2021-12-09 RX ORDER — ACETAMINOPHEN 325 MG/1
650 TABLET ORAL EVERY 6 HOURS PRN
Status: DISCONTINUED | OUTPATIENT
Start: 2021-12-09 | End: 2021-12-10 | Stop reason: HOSPADM

## 2021-12-09 RX ORDER — ARIPIPRAZOLE 10 MG/1
10 TABLET ORAL DAILY
Status: DISCONTINUED | OUTPATIENT
Start: 2021-12-10 | End: 2021-12-10 | Stop reason: HOSPADM

## 2021-12-09 RX ORDER — IPRATROPIUM BROMIDE AND ALBUTEROL SULFATE 2.5; .5 MG/3ML; MG/3ML
3 SOLUTION RESPIRATORY (INHALATION) EVERY 4 HOURS
Status: DISCONTINUED | OUTPATIENT
Start: 2021-12-09 | End: 2021-12-10 | Stop reason: HOSPADM

## 2021-12-09 RX ORDER — ALBUTEROL SULFATE 0.83 MG/ML
SOLUTION RESPIRATORY (INHALATION)
Status: DISCONTINUED
Start: 2021-12-09 | End: 2021-12-10 | Stop reason: HOSPADM

## 2021-12-09 RX ORDER — BISACODYL 10 MG
10 SUPPOSITORY, RECTAL RECTAL DAILY PRN
Status: DISCONTINUED | OUTPATIENT
Start: 2021-12-09 | End: 2021-12-10 | Stop reason: HOSPADM

## 2021-12-09 RX ADMIN — IPRATROPIUM BROMIDE AND ALBUTEROL SULFATE 3 ML: .5; 3 SOLUTION RESPIRATORY (INHALATION) at 15:30

## 2021-12-09 RX ADMIN — SODIUM CHLORIDE, POTASSIUM CHLORIDE, SODIUM LACTATE AND CALCIUM CHLORIDE 1000 ML: 600; 310; 30; 20 INJECTION, SOLUTION INTRAVENOUS at 20:06

## 2021-12-09 RX ADMIN — IOPAMIDOL 100 ML: 755 INJECTION, SOLUTION INTRAVENOUS at 14:38

## 2021-12-09 ASSESSMENT — ENCOUNTER SYMPTOMS
COUGH: 0
VOMITING: 0
ABDOMINAL PAIN: 0
SHORTNESS OF BREATH: 1
DIARRHEA: 0
FEVER: 0
NAUSEA: 0
DYSURIA: 0

## 2021-12-09 ASSESSMENT — ACTIVITIES OF DAILY LIVING (ADL): DEPENDENT_IADLS:: INDEPENDENT

## 2021-12-09 NOTE — TELEPHONE ENCOUNTER
Went to [facility] last Friday diagnosed with pneumonia. Told to come back if it gets worse. Can't get comfortable, not able to sleep, has COPD. Hasn't slept since Friday. Whenever she lays down she feels like she's going to die so gets up again.  She said her mom will take her to the ER.  Yadira Rajan RN  [facility]      Reason for Disposition    MODERATE difficulty breathing (e.g., speaks in phrases, SOB even at rest, pulse 100-120) of new onset or worse than normal    Additional Information    Negative: Breathing stopped and hasn't returned    Negative: Choking on something    Negative: SEVERE difficulty breathing (e.g., struggling for each breath, speaks in single words, pulse > 120)    Negative: Bluish (or gray) lips or face    Negative: Difficult to awaken or acting confused (e.g., disoriented, slurred speech)    Negative: Passed out (i.e., fainted, collapsed and was not responding)    Negative: Wheezing started suddenly after medicine, an allergic food, or bee sting    Negative: Stridor    Negative: Slow, shallow and weak breathing    Negative: Sounds like a life-threatening emergency to the triager    Negative: Chest pain    Negative: Wheezing (high pitched whistling sound) and previous asthma attacks or use of asthma medicines    Negative: Difficulty breathing and only present when coughing    Negative: Difficulty breathing and only from stuffy or runny nose    Negative: Difficulty breathing and within 14 days of COVID-19 Exposure    Protocols used: BREATHING DIFFICULTY-A-OH

## 2021-12-09 NOTE — ED PROVIDER NOTES
EMERGENCY DEPARTMENT ENCOUNTER      NAME: Arvind Leong  AGE: 66 year old female  YOB: 1955  MRN: [id number]  EVALUATION DATE & TIME: No admission date for patient encounter.    PCP: Rema Whiting    ED PROVIDER: Ida Soto M.D.        Chief Complaint   Patient presents with     Shortness of Breath         FINAL IMPRESSION:    1. Hypercarbia    2. Acute confusion    3. Hypoxia            MEDICAL DECISION MAKIN year old female with history of oxygen dependence, COPD, who presents emergency department with shortness of breath that began several days ago.  Patient arrived to the ER without her oxygen turned on and her sat was 78% on room air.  On 2 L saturations are %.  No obvious source for hypoxia otherwise found as CT scan for PE and pneumonia negative, BNP negative, and Covid negative.  Patient was not even aware that her oxygen was not on nor that she should even check it.  This does have a little concern that reason a VBG was checked which shows some hypercarbia.  Plan at this time is admitted to the hospital for BiPAP to help get rid of this hypercarbia and see if that helps to clear her mental status.  At this time I have concerns about sending her home with her mental status just slightly off like it is is a fear that she could go home and not keep her oxygen on and suffer consequences.      ED COURSE:  2:33 PM   I attempted to see the patient, but they were not present in triage. They are possibly at radiology.    3:20 PM   I met with the patient to gather history and perform my exam. ED course and treatment discussed.    3:46 PM  I talked with RT concerning the plan moving forward, and to clarify the patient's oxygen habits.      4:22 PM  RT unable to get ABG after murltiple attempts. Will do VBG. Luckily I am not worried about oxygen level as this is normal and more looking for CO2 levels.    5:58 PM  I updated the patient for admission.    6:30 PM  I spoke  with hospitalist Dr. Bush, who accepts the patient for admission.  Will go to [facility].  We will do a head CT for this confusion, but overall I suspect this is all related to a little bit of hypercarbia.  Probably from not wearing her oxygen.  I anticipate that this will resolve overnight on BiPAP.  Otherwise no acute findings for shortness of breath otherwise.  No signs of acute CHF, no obvious PE or pneumonia on CT scan.  Will check lactic acid and procalcitonin at hospitalist request.  She does not describe any obvious infectious symptoms and WBC normal.     7:22 PM  RT here now to do BIPAP.    I do not think that this represents rib fractures, allergic reaction, COPD exacerbation, asthma exacerbation, pneumonia, CHF, myocarditis, pericarditis, endocarditis, ACS, PE, ruptured AAA, pneumothorax, aortic dissection, bowel obstruction, or other such etiologies at this time.    It is possible that her shortness of breath was purely the fact that she forgot to turn her oxygen on.      COVID-19 PPE worn during patient evaluation:  Mask: n95 and homemade masks  Eye Protection: goggles   Gown: none  Hair cover: yes  Face shield: yes  Patient wearing a mask: yes    At the conclusion of the encounter I discussed the results of all of the tests and the disposition. Their questions were answered. The patient (and any family present) acknowledged understanding and were agreeable with the care plan.        CONSULTANTS:  Care Manager - Laura  Hospitalist - Dr. Bush        MEDICATIONS GIVEN IN THE EMERGENCY:  Medications   albuterol (PROVENTIL) (2.5 MG/3ML) 0.083% neb solution (has no administration in time range)   lactated ringers BOLUS 1,000 mL (has no administration in time range)   iopamidol (ISOVUE-370) solution 100 mL (100 mLs Intravenous Given 12/9/21 1438)   ipratropium - albuterol 0.5 mg/2.5 mg/3 mL (DUONEB) neb solution 3 mL (3 mLs Nebulization Given 12/9/21 1530)             NEW PRESCRIPTIONS STARTED  "AT TODAY'S ER VISIT     Medication List      There are no discharge medications for this visit.             CONDITION:  stable      DISPOSITION:  Med surg obs as accepted by Dr. Bush, hospitalist         =================================================================  =================================================================    HPI    Patient information was obtained from: Patient    Use of Intrepreter: N/A     Arvind Leong is a 66 year old female with history of COPD, asthma, oxygen dependence, CAP, hyperlipidemia, diabetes mellitus type 2, cardiomyopathy, bipolar 1 disorder, who presents to the ER with complaints of shortness of breath.    Per chart review (12/2/2021), the patient presented to [facility] ED for evaluation of worsening dyspnea. Upon exam, her lungs have scattered crackles. She was given steroids and an albuterol inhaler for COPD treatment. Xray showed perihilar infiltrates suggestive of pneumonia, and BNP is normal indicative no pulmonary edema. She was discharged with antibiotics, amoxacillin.     Per triage note, the patient describes her pain as \"bothersome.\"    For the past 5 days (since 12/4), the patient endorses increased shortness breath. She is normally on 2L oxygen, but arrived to the ED without it turned on and O2sats at 78%. The patient states she was not aware it was off. Additionally, she is prescribed a nebulizer for relief, but does not use it. Currently, she is on 2L O2 via nasal cannula, and she is feeling much better (sats up to 96%). She endorses slight associated nausea, but denies cough, fever, chest pain, abdominal pain, vomiting, or any other complaints at this time.    REVIEW OF SYSTEMS  Review of Systems   Constitutional: Negative for fever.   Respiratory: Positive for shortness of breath. Negative for cough.    Cardiovascular: Negative for chest pain.   Gastrointestinal: Negative for abdominal pain, diarrhea, nausea and vomiting.   Genitourinary: " Negative for dysuria.   Allergic/Immunologic: Negative for immunocompromised state.   All other systems reviewed and are negative.        PAST MEDICAL HISTORY:  Past Medical History:   Diagnosis Date     Acute on chronic respiratory failure with hypoxia (H) 11/15/2016     Bipolar 1 disorder (H)      CAP (community acquired pneumonia) 11/15/2016     Cervical high risk HPV (human papillomavirus) test positive 3/5/2018    3/5/18 NIL pap, +HR HPV (not 16/18) 5/21/21 NIL pap, neg HPV. Plan: await provider     COPD exacerbation (H) 4/24/2021     COPD with exacerbation (H) 11/15/2016     Diabetes mellitus, type II (H)      Hearing loss      Meniere's disease      Pneumonia 4/23/2021     Pneumonia of right lower lobe due to infectious organism 6/11/2019         PAST SURGICAL HISTORY:  Past Surgical History:   Procedure Laterality Date     CYST REMOVAL  01/23/2001     SC REMOVAL ANAL FISTULA,SUBCUTANEOUS      Description: Fistula-in-ano Repair;  Recorded: 01/08/2010; x2     TONSILLECTOMY           CURRENT MEDICATIONS:    Prior to Admission medications    Medication Sig Start Date End Date Taking? Authorizing Provider   albuterol (PROAIR HFA;PROVENTIL HFA;VENTOLIN HFA) 90 mcg/actuation inhaler [ALBUTEROL (PROAIR HFA;PROVENTIL HFA;VENTOLIN HFA) 90 MCG/ACTUATION INHALER] Inhale 2 puffs every 6 (six) hours as needed for wheezing or shortness of breath. 2/26/21   Arnoldo uRsso MD   albuterol (PROVENTIL) (2.5 MG/3ML) 0.083% neb solution Take 1 vial (2.5 mg) by nebulization every 4 hours as needed for shortness of breath / dyspnea 11/8/21   Abdiaziz Chahal MD   ARIPiprazole (ABILIFY) 10 MG tablet [ARIPIPRAZOLE (ABILIFY) 10 MG TABLET] Take 10 mg by mouth daily. 4/23/21   Provider, Historical   azelastine (ASTELIN) 0.1 % nasal spray USE 2 SPRAYS IN EACH NOSTRIL TWICE DAILY AS DIRECTED 2/26/21   Reported, Patient   buPROPion (WELLBUTRIN) 75 MG tablet [BUPROPION (WELLBUTRIN) 75 MG TABLET] Take 75 mg by mouth 2 (two)  times a day. 21   Provider, Historical   CONTOUR NEXT TEST test strip daily 21   Reported, Patient   metFORMIN (GLUCOPHAGE) 500 MG tablet Take 1 tablet (500 mg) by mouth daily (with breakfast) 21   Wil Mills MD   oxybutynin (DITROPAN) 5 MG tablet Take 1 tablet (5 mg) by mouth 3 times daily 21   Blaise Ramirez MD   predniSONE (DELTASONE) 20 MG tablet Take two tablets (= 40mg) each day for 5 (five) days 21   Abdiaziz Chahal MD         ALLERGIES:  Allergies   Allergen Reactions     Lurasidone Other (See Comments)     Face turned red, (Brand name = Latuda) Face turned red, Face turned red         FAMILY HISTORY:  Family History   Problem Relation Age of Onset     Aneurysm Father      Heart Disease Father 70.00        bypass in 70s, AAA rupture at age 77      Ulcers Father 76.00     Pacemaker Mother      Bipolar Disorder Brother      Breast Cancer Maternal Grandmother 51.00     Kidney failure Maternal Grandmother      Cancer Paternal Grandmother         ?? lung     Cancer Paternal Uncle         ?? lung     Mental Illness Maternal Grandfather      Heart Disease Other         uncle     No Known Problems Sister         two siblings abuse chemicals     No Known Problems Brother      Bipolar Disorder Nephew          SOCIAL HISTORY:  Social History     Socioeconomic History     Marital status:      Spouse name: Not on file     Number of children: Not on file     Years of education: Not on file     Highest education level: Not on file   Occupational History     Not on file   Tobacco Use     Smoking status: Former Smoker     Packs/day: 1.00     Years: 40.00     Pack years: 40.00     Quit date: 2017     Years since quittin.8     Smokeless tobacco: Never Used   Substance and Sexual Activity     Alcohol use: Yes     Comment: Alcoholic Drinks/day: Occasional      Drug use: No     Sexual activity: Never   Other Topics Concern     Not on file   Social History Narrative     03/2019The patient lives with her mother.; had worked at [employer]  but quit March 2019.   Quit smoking 2015; no etoh problems  / x 2 ; no kids     Social Determinants of Health     Financial Resource Strain: Not on file   Food Insecurity: Not on file   Transportation Needs: Not on file   Physical Activity: Not on file   Stress: Not on file   Social Connections: Not on file   Intimate Partner Violence: Not on file   Housing Stability: Not on file         VITALS:  Patient Vitals for the past 24 hrs:   BP Temp Pulse Resp SpO2 Weight   12/09/21 1531 -- -- 78 -- 98 % --   12/09/21 1253 115/58 98.6  F (37  C) 71 20 (!) 78 % 83.5 kg (184 lb)       Wt Readings from Last 3 Encounters:   12/09/21 83.5 kg (184 lb)   12/02/21 83.5 kg (184 lb)   11/08/21 83.5 kg (184 lb)         PHYSICAL EXAM    Constitutional:  Well developed, Well nourished, NAD, GCS 15  HENT:  Normocephalic, Atraumatic, Bilateral external ears normal, Nose normal. Neck- Normal range of motion, No tenderness, Supple, No stridor.   Eyes:  PERRL, EOMI, Conjunctiva normal, No discharge.  Respiratory:  Decreased breath sounds, No respiratory distress, No wheezing, Speaks full sentences easily. No cough.   Cardiovascular:  Normal heart rate, Regular rhythm, No murmurs, No rubs, No gallops.   GI:  No excessive obesity.  Bowel sounds normal, Soft, No tenderness, No masses, No flank tenderness. No rebound or guarding.   : deferred  Musculoskeletal:  No cyanosis, No clubbing. Good range of motion in all major joints. No major deformities noted.   Integument:  Warm, Dry, No erythema, No rash.  No petechiae.  Neurologic:  Alert & oriented x 3 but just seems a bit slow to answer, No focal deficits noted.   Psychiatric:  Affect normal, Cooperative         LAB:  All pertinent labs reviewed and interpreted.  Recent Results (from the past 24 hour(s))   Extra Blue Top Tube    Collection Time: 12/09/21  1:22 PM   Result Value Ref Range    Hold Specimen JIC     Extra Green Top (Lithium Heparin) Tube    Collection Time: 12/09/21  1:22 PM   Result Value Ref Range    Hold Specimen JIC    Extra Purple Top Tube    Collection Time: 12/09/21  1:22 PM   Result Value Ref Range    Hold Specimen JIC    INR    Collection Time: 12/09/21  1:22 PM   Result Value Ref Range    INR 1.00 0.90 - 1.15   Partial thromboplastin time    Collection Time: 12/09/21  1:22 PM   Result Value Ref Range    aPTT 29 22 - 38 Seconds   Comprehensive metabolic panel    Collection Time: 12/09/21  1:22 PM   Result Value Ref Range    Sodium 146 (H) 136 - 145 mmol/L    Potassium 4.5 3.5 - 5.0 mmol/L    Chloride 98 98 - 107 mmol/L    Carbon Dioxide (CO2) 39 (H) 22 - 31 mmol/L    Anion Gap 9 5 - 18 mmol/L    Urea Nitrogen 9 8 - 22 mg/dL    Creatinine 0.75 0.60 - 1.10 mg/dL    Calcium 9.8 8.5 - 10.5 mg/dL    Glucose 193 (H) 70 - 125 mg/dL    Alkaline Phosphatase 80 45 - 120 U/L    AST 17 0 - 40 U/L    ALT 24 0 - 45 U/L    Protein Total 6.8 6.0 - 8.0 g/dL    Albumin 3.7 3.5 - 5.0 g/dL    Bilirubin Total 0.7 0.0 - 1.0 mg/dL    GFR Estimate 84 >60 mL/min/1.73m2   Magnesium    Collection Time: 12/09/21  1:22 PM   Result Value Ref Range    Magnesium 2.0 1.8 - 2.6 mg/dL   Troponin I    Collection Time: 12/09/21  1:22 PM   Result Value Ref Range    Troponin I <0.01 0.00 - 0.29 ng/mL   B-Type Natriuretic Peptide ([facility])    Collection Time: 12/09/21  1:22 PM   Result Value Ref Range    BNP 55 0 - 106 pg/mL   CBC with platelets and differential    Collection Time: 12/09/21  1:22 PM   Result Value Ref Range    WBC Count 11.0 4.0 - 11.0 10e3/uL    RBC Count 4.97 3.80 - 5.20 10e6/uL    Hemoglobin 14.3 11.7 - 15.7 g/dL    Hematocrit 47.1 (H) 35.0 - 47.0 %    MCV 95 78 - 100 fL    MCH 28.8 26.5 - 33.0 pg    MCHC 30.4 (L) 31.5 - 36.5 g/dL    RDW 12.8 10.0 - 15.0 %    Platelet Count 215 150 - 450 10e3/uL    % Neutrophils 72 %    % Lymphocytes 21 %    % Monocytes 6 %    % Eosinophils 1 %    % Basophils 0 %    % Immature  Granulocytes 0 %    NRBCs per 100 WBC 0 <1 /100    Absolute Neutrophils 7.9 1.6 - 8.3 10e3/uL    Absolute Lymphocytes 2.3 0.8 - 5.3 10e3/uL    Absolute Monocytes 0.7 0.0 - 1.3 10e3/uL    Absolute Eosinophils 0.1 0.0 - 0.7 10e3/uL    Absolute Basophils 0.0 0.0 - 0.2 10e3/uL    Absolute Immature Granulocytes 0.0 <=0.4 10e3/uL    Absolute NRBCs 0.0 10e3/uL   Symptomatic Influenza A/B & SARS-CoV2 (COVID-19) Virus PCR Multiplex Nasopharyngeal    Collection Time: 12/09/21  2:49 PM    Specimen: Nasopharyngeal; Swab   Result Value Ref Range    Influenza A PCR Negative Negative    Influenza B PCR Negative Negative    SARS CoV2 PCR Negative Negative   Blood gas venous    Collection Time: 12/09/21  5:07 PM   Result Value Ref Range    pH Venous 7.29 (L) 7.35 - 7.45    pCO2 Venous 88 (H) 35 - 50 mm Hg    pO2 Venous 46 25 - 47 mm Hg    Bicarbonate Venous 35 (H) 24 - 30 mmol/L    Base Excess/Deficit (+/-) 15.3   mmol/L    Oxyhemoglobin Venous 77.6 (H) 70.0 - 75.0 %    O2 Sat, Venous 79.1 (H) 70.0 - 75.0 %       No results found for: ABORH        RADIOLOGY:  Reviewed all pertinent imaging. Please see official radiology report.    Head CT w/o contrast   Preliminary Result   IMPRESSION:   1.  No acute intracranial process.   2.  Indeterminate soft tissue lesion over the right vertex scalp measuring up to 1.7 cm. This was also present on CT 12/27/2018. This most likely represents a sebaceous cyst.      CT Chest Pulmonary Embolism w Contrast   Final Result   IMPRESSION:   1.  No pulmonary embolism.   2.  Nonaneurysmal aorta without dissection.   3.  Mild basilar predominant atelectasis. No consolidation.                  EKG:    Performed at: 13:03p    Impression: Sinus rhythm at 89 bpm.  Frequent PACs also noted.  Flipped T waves noted in lead aVR and V1.  ID interval 138 ms, QTC 72 ms,  ms.  Nonspecific changes compared to EKG from December 2, 2021.      I have independently reviewed and interpreted the EKG(s) documented  above.        PROCEDURES:  none    I, Peyton Yarely, am serving as a scribe to document services personally performed by Dr. Ida Soto based on my observation and the provider's statements to me. I, Dr. Ida Soto MD attest that Peyton Pritchett is acting in a scribe capacity, has observed my performance of the services and has documented them in accordance with my direction.        Ida Soto M.D. [facility]  Emergency Medicine and Medical Toxicology  [facility] EMERGENCY DEPARTMENT  [address] [place] [id number]  [phone]  Dept: [phone]           Ida Soto MD  12/09/21 1910       Ida Soto MD  12/09/21 1922

## 2021-12-09 NOTE — ED TRIAGE NOTES
The pt presents for evaluation of SOB. She reports she was seen her 1 week ago and diagnosed with pneumonia. She states that she was told to return if symptoms worsened. The pt reports that her symptoms have been more bothersome since last Saturday. She presents with her home O2 machine, which is turned off. She reports needing 2 L of O2 continuously. Initial SpO2 was 78%. She was placed on 2L of O2 via N/C.

## 2021-12-09 NOTE — CONSULTS
Care Management Initial Consult    General Information  Assessment completed with: Patient,    Type of CM/SW Visit: Initial Assessment    Primary Care Provider verified and updated as needed: Yes   Readmission within the last 30 days: other (see comments)   Return Category: Exacerbation of disease     Advance Care Planning: Advance Care Planning Reviewed:  (Does not have HCD)          Communication Assessment  Patient's communication style: spoken language (English or Bilingual)    Hearing Difficulty or Deaf: yes        Cognitive  Cognitive/Neuro/Behavioral:                        Living Environment:   People in home: parent(s)   (mom)  Current living Arrangements: mobile home      Able to return to prior arrangements: yes       Family/Social Support:  Care provided by: self  Provides care for: no one     Parent(s)          Description of Support System: Supportive         Current Resources:   Patient receiving home care services: No     Community Resources: None  Equipment currently used at home: none  Supplies currently used at home: Oxygen Tubing/Supplies    Employment/Financial:  Employment Status:          Financial Concerns:             Lifestyle & Psychosocial Needs:  Social Determinants of Health     Tobacco Use: Medium Risk     Smoking Tobacco Use: Former Smoker     Smokeless Tobacco Use: Never Used   Alcohol Use: Not on file   Financial Resource Strain: Not on file   Food Insecurity: Not on file   Transportation Needs: Not on file   Physical Activity: Not on file   Stress: Not on file   Social Connections: Not on file   Intimate Partner Violence: Not on file   Depression: At risk     PHQ-2 Score: 3   Housing Stability: Not on file       Functional Status:  Prior to admission patient needed assistance:   Dependent ADLs:: Independent,Ambulation-walker  Dependent IADLs:: Independent         Additional Information:  Assessment completed with patient.  Patient states she lives in a mobile home with her mom. She  "states her mom is independent. Patient reports she is independent with ADLs and IADLs. She has home oxygen from [facility]. Both main tank and portable. Patient uses a walker for ambulating and drives. She is on a leave from work. She has no home services. Discussed recommendation for home nurse to follow up with patient as patient arrived with low O2 stats and oxygen wasn't on. Patient states \"Oh I just forgot to turn it on.\" She states she knows how to use both portable and main tank.  She declines home nurse visits as \"it would upset my mom having people coming.\"  Planning for patient to return home. CM spoke with pcp -Caro and requested for clinic care coordination.     Laura Miranda RN        "

## 2021-12-09 NOTE — TELEPHONE ENCOUNTER
Patient is being seen in the emergency department at [facility]. She is going to be discharged. The care manager, sydney, is requesting a care coordinator from our clinic to follow up and call patient.  Patient told the care manager that she forgot to turn on her Oxygen today.     She was being seen in the ER with SOB.  No Covid, Hx copd.  On home O2, 2 liters.     Patient would not like someone to come to her home.  Thinks it would be upsetting to her mother.  Patient does drive.  Please call patient.

## 2021-12-10 ENCOUNTER — APPOINTMENT (OUTPATIENT)
Dept: PHYSICAL THERAPY | Facility: HOSPITAL | Age: 66
End: 2021-12-10
Attending: FAMILY MEDICINE
Payer: COMMERCIAL

## 2021-12-10 VITALS
HEART RATE: 82 BPM | HEIGHT: 55 IN | RESPIRATION RATE: 22 BRPM | TEMPERATURE: 98.3 F | DIASTOLIC BLOOD PRESSURE: 63 MMHG | SYSTOLIC BLOOD PRESSURE: 110 MMHG | BODY MASS INDEX: 41.33 KG/M2 | WEIGHT: 178.6 LBS | OXYGEN SATURATION: 94 %

## 2021-12-10 LAB
ATRIAL RATE - MUSE: 89 BPM
DIASTOLIC BLOOD PRESSURE - MUSE: NORMAL MMHG
GLUCOSE BLDC GLUCOMTR-MCNC: 267 MG/DL (ref 70–99)
GLUCOSE BLDC GLUCOMTR-MCNC: 298 MG/DL (ref 70–99)
INTERPRETATION ECG - MUSE: NORMAL
P AXIS - MUSE: 51 DEGREES
PR INTERVAL - MUSE: 138 MS
QRS DURATION - MUSE: 72 MS
QT - MUSE: 374 MS
QTC - MUSE: 455 MS
R AXIS - MUSE: 49 DEGREES
SYSTOLIC BLOOD PRESSURE - MUSE: NORMAL MMHG
T AXIS - MUSE: 63 DEGREES
VENTRICULAR RATE- MUSE: 89 BPM

## 2021-12-10 PROCEDURE — 96372 THER/PROPH/DIAG INJ SC/IM: CPT | Performed by: INTERNAL MEDICINE

## 2021-12-10 PROCEDURE — 97162 PT EVAL MOD COMPLEX 30 MIN: CPT | Mod: GP

## 2021-12-10 PROCEDURE — 82962 GLUCOSE BLOOD TEST: CPT

## 2021-12-10 PROCEDURE — 250N000013 HC RX MED GY IP 250 OP 250 PS 637: Performed by: INTERNAL MEDICINE

## 2021-12-10 PROCEDURE — 999N000157 HC STATISTIC RCP TIME EA 10 MIN

## 2021-12-10 PROCEDURE — 97116 GAIT TRAINING THERAPY: CPT | Mod: GP

## 2021-12-10 PROCEDURE — 250N000009 HC RX 250: Performed by: INTERNAL MEDICINE

## 2021-12-10 PROCEDURE — 99217 PR OBSERVATION CARE DISCHARGE: CPT | Performed by: FAMILY MEDICINE

## 2021-12-10 PROCEDURE — 250N000011 HC RX IP 250 OP 636: Performed by: INTERNAL MEDICINE

## 2021-12-10 PROCEDURE — G0378 HOSPITAL OBSERVATION PER HR: HCPCS

## 2021-12-10 PROCEDURE — 96374 THER/PROPH/DIAG INJ IV PUSH: CPT | Mod: XU

## 2021-12-10 PROCEDURE — 96372 THER/PROPH/DIAG INJ SC/IM: CPT

## 2021-12-10 RX ORDER — POLYETHYLENE GLYCOL 3350 17 G/17G
17 POWDER, FOR SOLUTION ORAL DAILY
Qty: 510 G | Refills: 0 | Status: SHIPPED | OUTPATIENT
Start: 2021-12-10 | End: 2022-09-22

## 2021-12-10 RX ORDER — ALBUTEROL SULFATE 0.83 MG/ML
2.5 SOLUTION RESPIRATORY (INHALATION) EVERY 4 HOURS PRN
Qty: 3 ML | Refills: 0 | Status: SHIPPED | OUTPATIENT
Start: 2021-12-10 | End: 2022-03-28

## 2021-12-10 RX ORDER — PREDNISONE 20 MG/1
40 TABLET ORAL DAILY
Qty: 10 TABLET | Refills: 0 | Status: SHIPPED | OUTPATIENT
Start: 2021-12-11 | End: 2021-12-16

## 2021-12-10 RX ORDER — DIPHENHYDRAMINE HCL 50 MG
50 CAPSULE ORAL
Status: DISCONTINUED | OUTPATIENT
Start: 2021-12-10 | End: 2021-12-10

## 2021-12-10 RX ORDER — DIPHENHYDRAMINE HYDROCHLORIDE 50 MG/ML
50 INJECTION INTRAMUSCULAR; INTRAVENOUS
Status: DISCONTINUED | OUTPATIENT
Start: 2021-12-10 | End: 2021-12-10

## 2021-12-10 RX ORDER — PREDNISONE 20 MG/1
40 TABLET ORAL DAILY
Status: DISCONTINUED | OUTPATIENT
Start: 2021-12-11 | End: 2021-12-10 | Stop reason: HOSPADM

## 2021-12-10 RX ADMIN — ALBUTEROL SULFATE 2.5 MG: 2.5 SOLUTION RESPIRATORY (INHALATION) at 07:10

## 2021-12-10 RX ADMIN — POLYETHYLENE GLYCOL 3350 17 G: 17 POWDER, FOR SOLUTION ORAL at 09:13

## 2021-12-10 RX ADMIN — ENOXAPARIN SODIUM 40 MG: 40 INJECTION SUBCUTANEOUS at 02:27

## 2021-12-10 RX ADMIN — METHYLPREDNISOLONE SODIUM SUCCINATE 62.5 MG: 125 INJECTION, POWDER, FOR SOLUTION INTRAMUSCULAR; INTRAVENOUS at 02:24

## 2021-12-10 RX ADMIN — DOXYCYCLINE 100 MG: 100 CAPSULE ORAL at 10:31

## 2021-12-10 RX ADMIN — IPRATROPIUM BROMIDE AND ALBUTEROL SULFATE 3 ML: .5; 3 SOLUTION RESPIRATORY (INHALATION) at 02:39

## 2021-12-10 RX ADMIN — ENOXAPARIN SODIUM 40 MG: 40 INJECTION SUBCUTANEOUS at 10:31

## 2021-12-10 RX ADMIN — ARIPIPRAZOLE 10 MG: 10 TABLET ORAL at 09:13

## 2021-12-10 RX ADMIN — DOXYCYCLINE 100 MG: 100 CAPSULE ORAL at 02:26

## 2021-12-10 RX ADMIN — METHYLPREDNISOLONE SODIUM SUCCINATE 62.5 MG: 125 INJECTION, POWDER, FOR SOLUTION INTRAMUSCULAR; INTRAVENOUS at 09:15

## 2021-12-10 RX ADMIN — Medication 1 MG: at 05:11

## 2021-12-10 RX ADMIN — ACETAMINOPHEN 650 MG: 325 TABLET ORAL at 13:18

## 2021-12-10 RX ADMIN — POLYETHYLENE GLYCOL 3350 17 G: 17 POWDER, FOR SOLUTION ORAL at 13:18

## 2021-12-10 ASSESSMENT — MIFFLIN-ST. JEOR: SCORE: 1197.25

## 2021-12-10 NOTE — PLAN OF CARE
Patient found on 3L NC this am, Sats mid 90's. At baseline she is on 2L NC. Scheduled nebs given.  She is able to communicate her needs. BiPAP prn on S/B in the room. Will cont to follow and assess as needed.    Shayan Buchanan, RT

## 2021-12-10 NOTE — PLAN OF CARE
Physical Therapy Discharge Summary    Reason for therapy discharge:    Discharged to home with home therapy.    Progress towards therapy goal(s). See goals on Care Plan in [facility] electronic health record for goal details.  Eval only    Therapy recommendation(s):    Continued therapy is recommended.  Rationale/Recommendations:  to increase activity tolerance and safety with all mobility.

## 2021-12-10 NOTE — DISCHARGE SUMMARY
[facility]  Hospitalist Discharge Summary      Date of Admission:  12/9/2021  Date of Discharge:  12/10/2021  Discharging Provider: Lissa Levy MD      Discharge Diagnoses       Acute on chronic respiratory failure; secondary to COPD exacerbation.  Improved    COPD exacerbation.  Failed outpatient management.  Improved.    Encephalopathy, resolved.  Suspected metabolic from hypoxia.    Constipation, causing bloating and abdominal pain.  Started regular bowel regimen    Bipolar disorder    Diabetes mellitus, type II    Obesity    Overactive bladder    Follow-ups Needed After Discharge   Follow-up Appointments     Follow-up and recommended labs and tests       Follow up with primary care provider, Rema Whiting, within 7 days   for hospital follow- up.  No follow up labs or test are needed.  We cannot give your covid booster today.  Please check with primary care   and have this done at that clinic or at [facility]               Discharge Disposition   Discharged to home  Condition at discharge: Stable      Hospital Course     66-year-old female with history of chronic respiratory failure on home oxygen, diabetes, bipolar disorder, and obesity presented to the hospital with worsening shortness of breath, failed outpatient treatment for COPD exacerbation and admitted on BiPAP.     Acute on chronic respiratory failure  --- Suspected secondary to COPD exacerbation; in ED reports unclear if home oxygen machine not functioning.  Patient and her mother deny this.  They state that she has had increasing difficulty with her breathing over the last 2 weeks.  --- Had been on BiPAP but weaned off and stable on oxygen.   --- CTA negative for PE edema or pneumonia  --- BNP negative  --- low procalcitonin  --- Continue steroids, doxycycline; changed to po prednisone     COPD exacerbation  --- Continue steroids and doxycycline that she had previously been on.  --- Continue home nebulizer,  Yasir     Encephalopathy  --- resolved  ---Present on admission. Suspect metabolic from hypoxia  --- Resolved with COPD treatment  ---head CT negative     Diabetes mellitus, type II  --- Holding Metformin  --- continue insulin sliding scale and diabetic diet     Bipolar disorder  --- Continue home Abilify     Abdominal pain/constipation  --Continue bowel regimen  --pain resolved     Overactive bladder  --- Confused on admission, holding Ditropan    Consultations This Hospital Stay   SOCIAL WORK IP CONSULT  IP RESPIRATORY CARE CHRONIC PULMONARY DISEASE SPECIALIST  PHYSICAL THERAPY ADULT IP CONSULT  IP RESPIRATORY CARE CHRONIC PULMONARY DISEASE SPECIALIST    Code Status   Full Code    Time Spent on this Encounter   I, Lissa Levy MD, personally saw the patient today and spent greater than 30 minutes discharging this patient.       Lissa Levy MD  [address] [address]  Phone: [phone]  Fax: [phone]  ______________________________________________________________________    Physical Exam   Vital Signs: Temp: 98.3  F (36.8  C) Temp src: Oral BP: 110/63 Pulse: 82   Resp: 22 SpO2: 94 % O2 Device: Nasal cannula Oxygen Delivery: 3 LPM  Weight: 178 lbs 9.6 oz  General Appearance: Pleasant female seen with mom in room eating breakfast  Respiratory: Slight crackle bilaterally and faint wheeze heard once  Cardiovascular: RRR  GI: soft, obese, non-tender  Skin: mild edema,  No open areas  Other: Neuro grossly intact without focal deficits appreciated        Primary Care Physician   Rema Whiting    Discharge Orders      Reason for your hospital stay    Breathing difficulty.     Follow-up and recommended labs and tests     Follow up with primary care provider, Rema Whiting, within 7 days for hospital follow- up.  No follow up labs or test are needed.  We cannot give your covid booster today.  Please check with primary care and have this done at that  clinic or at [facility]    Your activity upon discharge: activity as tolerated     Diet    Follow this diet upon discharge: Orders Placed This Encounter      High Consistent Carb (75 g CHO per Meal) Diet       Significant Results and Procedures   Most Recent 3 CBC's:Recent Labs   Lab Test 12/09/21  1322 12/02/21  0743 11/08/21  1407   WBC 11.0 10.0 11.9*   HGB 14.3 14.6 15.0   MCV 95 95 95    179 211     Most Recent 3 BMP's:Recent Labs   Lab Test 12/10/21  1156 12/10/21  0836 12/09/21  2347 12/09/21  1322 12/02/21  0743 11/08/21  1407   NA  --   --   --  146* 142 142   POTASSIUM  --   --   --  4.5 4.5 4.1   CHLORIDE  --   --   --  98 100 100   CO2  --   --   --  39* 35* 38*   BUN  --   --   --  9 9 11   CR  --   --   --  0.75 0.77 0.80   ANIONGAP  --   --   --  9 7 4*   ARINA  --   --   --  9.8 9.5 9.3   * 267* 113* 193* 173* 122   ,   Results for orders placed or performed during the hospital encounter of 12/09/21   CT Chest Pulmonary Embolism w Contrast    Narrative    EXAM: CT CHEST PULMONARY EMBOLISM W CONTRAST  LOCATION: [facility]  DATE/TIME: 12/9/2021 2:37 PM    INDICATION: Chest pain and shortness of breath.  COMPARISON: None.  TECHNIQUE: CT chest pulmonary angiogram during arterial phase injection of IV contrast. Multiplanar reformats and MIP reconstructions were performed. Dose reduction techniques were used.   CONTRAST: IsoVue 370 100mL    FINDINGS:  ANGIOGRAM CHEST: No pulmonary embolism. Tortuous, nonaneurysmal aorta without dissection.    LUNGS AND PLEURA: Mild basilar atelectasis. No pleural effusion or pneumothorax.    MEDIASTINUM/AXILLAE: No adenopathy. Normal heart size. No pericardial effusion    CORONARY ARTERY CALCIFICATION: Compromised by motion.    UPPER ABDOMEN: Adjacent 1 and 2 mm left renal upper pole hyperdensities are suggestive of tiny nonobstructing stones.    MUSCULOSKELETAL: Scoliosis.      Impression    IMPRESSION:  1.  No pulmonary  embolism.  2.  Nonaneurysmal aorta without dissection.  3.  Mild basilar predominant atelectasis. No consolidation.       Head CT w/o contrast    Narrative    EXAM: CT HEAD W/O CONTRAST  LOCATION: [facility]  DATE/TIME: 12/9/2021 6:45 PM    INDICATION: Altered mental status. Confusion.  COMPARISON: None.  TECHNIQUE: Routine CT Head without IV contrast. Multiplanar reformats. Dose reduction techniques were used.    FINDINGS:  INTRACRANIAL CONTENTS: No intracranial hemorrhage, extraaxial collection, or mass effect.  No CT evidence of acute infarct. Normal parenchymal attenuation. Normal ventricles and sulci.     VISUALIZED ORBITS/SINUSES/MASTOIDS: No intraorbital abnormality. No paranasal sinus mucosal disease. No middle ear or mastoid effusion. Subcutaneous nodule over the right vertex scalp measuring up to 1.7 cm.    BONES/SOFT TISSUES: No acute abnormality.      Impression    IMPRESSION:  1.  No acute intracranial process.  2.  Indeterminate soft tissue lesion over the right vertex scalp measuring up to 1.7 cm. This was also present on CT 12/27/2018. This most likely represents a sebaceous cyst.       Discharge Medications   Current Discharge Medication List      START taking these medications    Details   polyethylene glycol (MIRALAX) 17 GM/Dose powder Take 17 g by mouth daily  Qty: 510 g, Refills: 0    Associated Diagnoses: COPD exacerbation (H)      predniSONE (DELTASONE) 20 MG tablet Take 2 tablets (40 mg) by mouth daily for 5 days  Qty: 10 tablet, Refills: 0    Associated Diagnoses: COPD exacerbation (H)         CONTINUE these medications which have CHANGED    Details   albuterol (PROVENTIL) (2.5 MG/3ML) 0.083% neb solution Take 1 vial (2.5 mg) by nebulization every 4 hours as needed for shortness of breath / dyspnea Take 1 vial four times a day for the next 5 days then go back to as needed  Qty: 3 mL, Refills: 0    Associated Diagnoses: COPD exacerbation (H)         CONTINUE these  medications which have NOT CHANGED    Details   albuterol (PROAIR HFA;PROVENTIL HFA;VENTOLIN HFA) 90 mcg/actuation inhaler [ALBUTEROL (PROAIR HFA;PROVENTIL HFA;VENTOLIN HFA) 90 MCG/ACTUATION INHALER] Inhale 2 puffs every 6 (six) hours as needed for wheezing or shortness of breath.  Qty: 1 Inhaler, Refills: 11    Comments: May substitute the equivalent medication per insurance preference.  Associated Diagnoses: Chronic obstructive pulmonary disease, unspecified COPD type (H)      ARIPiprazole (ABILIFY) 10 MG tablet [ARIPIPRAZOLE (ABILIFY) 10 MG TABLET] Take 10 mg by mouth daily.      doxycycline hyclate (VIBRAMYCIN) 100 MG capsule Take 100 mg by mouth 2 times daily End on 12/11/21      metFORMIN (GLUCOPHAGE) 500 MG tablet Take 1 tablet (500 mg) by mouth daily (with breakfast)  Qty: 30 tablet, Refills: 0    Associated Diagnoses: Type 2 diabetes mellitus with other specified complication, without long-term current use of insulin (H)      CONTOUR NEXT TEST test strip daily      oxybutynin (DITROPAN) 5 MG tablet Take 1 tablet (5 mg) by mouth 3 times daily  Qty: 30 tablet, Refills: 0    Associated Diagnoses: Urgency incontinence           Allergies   Allergies   Allergen Reactions     Lurasidone Other (See Comments)     Face turned red, (Brand name = Latuda) Face turned red, Face turned red

## 2021-12-10 NOTE — PLAN OF CARE
Problem: Adult Inpatient Plan of Care  Goal: Optimal Comfort and Wellbeing  12/10/2021 1346 by Bijan Ordonez RN  Outcome: Improving     Patient calm and pleasant. 400mL urine output. Ambulating in halls with PT. May discharge today.

## 2021-12-10 NOTE — PROGRESS NOTES
12/10/21 1300   Quick Adds   Type of Visit Initial PT Evaluation   Living Environment   People in home parent(s)  (mother who is independent)   Current Living Arrangements mobile home   Home Accessibility stairs to enter home   Number of Stairs, Main Entrance 6   Stair Railings, Main Entrance railings safe and in good condition   Transportation Anticipated family or friend will provide   Living Environment Comments has home O2   Self-Care   Usual Activity Tolerance moderate   Current Activity Tolerance fair   Equipment Currently Used at Home walker, rolling  (uses when she goes out)   Activity/Exercise/Self-Care Comment Indep all ADL's, drives   General Information   Onset of Illness/Injury or Date of Surgery 12/09/21   Referring Physician MOIRA Levy   Patient/Family Therapy Goals Statement (PT) go home   Pertinent History of Current Problem (include personal factors and/or comorbidities that impact the POC) acute on chronic respiratory failure   General Observations agreeable to PT   Cognition   Orientation Status (Cognition) oriented x 4   Affect/Mental Status (Cognition) WFL   Follows Commands (Cognition) WFL   Cognitive Status Comments very hard of hearing   Pain Assessment   Patient Currently in Pain No   Range of Motion (ROM)   ROM Comment LE ROM WFL for mobility   Strength   Strength Comments LE strength WFL   Bed Mobility   Bed Mobility supine-sit   Supine-Sit [place] (Bed Mobility) independent   Assistive Device (Bed Mobility) bed rails   Comment (Bed Mobility) initially feels dizzy, improves with time sitting up   Transfers   Transfers sit-stand transfer   Sit-Stand Transfer   Sit-Stand [place] (Transfers) contact guard;supervision   Assistive Device (Sit-Stand Transfers) walker, front-wheeled   Sit/Stand Transfer Comments mult trials, better with each, final transfer with SBA only   Gait/Stairs (Locomotion)   [place] Level (Gait) contact guard;supervision   Assistive Device (Gait) walker,  front-wheeled   Distance in Feet (Required for LE Total Joints) 75'   Pattern (Gait) step-through   Comment (Gait/Stairs) slower pace, no LOB   Clinical Impression   Criteria for Skilled Therapeutic Intervention yes, treatment indicated   PT Diagnosis (PT) decreased activity tolerance   Influenced by the following impairments SOB   Functional limitations due to impairments fatigue   Clinical Presentation Stable/Uncomplicated   Clinical Presentation Rationale as medisally diagnosed   Clinical Decision Making (Complexity) moderate complexity   Therapy Frequency (PT) Daily   Predicted Duration of Therapy Intervention (days/wks) 3 days   Planned Therapy Interventions (PT) gait training;stair training;strengthening   Anticipated Equipment Needs at Discharge (PT) walker, rolling   Risk & Benefits of therapy have been explained care plan/treatment goals reviewed;patient   PT Discharge Planning    PT Discharge Recommendation (DC Rec) home;home with home care physical therapy   PT Rationale for DC Rec home PT to increase activity tolerance and eval safety at home   PT Brief overview of current status  pt tolerated PT well   Total Evaluation Time   Total Evaluation Time (Minutes) 10

## 2021-12-10 NOTE — PROGRESS NOTES
[facility]    Medicine Progress Note - Hospitalist Service       Date of Admission:  12/9/2021    Assessment & Plan           66-year-old female with history of chronic respiratory failure on home oxygen, diabetes, bipolar disorder, and obesity presented to the hospital with worsening shortness of breath, failed outpatient treatment for COPD exacerbation and admitted on BiPAP.    Acute on chronic respiratory failure  --- Suspected secondary to COPD exacerbation; in ED reports unclear if home oxygen machine not functioning.  --- Has been requiring BiPAP but weaned off and stable on oxygen.   --- CTA negative for PE edema or pneumonia  --- BNP negative  --- low procalcitonin  --- Continue steroids, doxycycline  --- Continue to monitor ABG    COPD exacerbation  --- Continue steroids and doxycycline that she had previously been on.  --- Continue home nebulizer, MDIs    Encephalopathy  --- Present on admission. Suspect metabolic from hypoxia  --- Resolved with COPD treatment  ---head CT negative    Diabetes mellitus, type II  --- Holding Metformin  --- continue insulin sliding scale and diabetic diet    Bipolar disorder  --- Continue home Abilify    Abdominal pain/constipation  --Continue bowel regimen  --Admit labs    Overactive bladder  --- Confused on admission, holding Ditropan           Diet: High Consistent Carb (75 g CHO per Meal) Diet  Diet    DVT Prophylaxis: heparin  Lara Catheter: Not present  Central Lines: None  Code Status: Full Code      Disposition Plan   Expected Discharge: 12/10/2021       The patient's care was discussed with the Bedside Nurse, Care Coordinator/, Patient and Patient's Family.    Lissa Levy MD  Hospitalist Service  [facility]  Securely message with the Vocera Web Console (learn more here)  Text page via [employer] Paging/Directory        Clinically Significant Risk Factors Present on Admission         # Hypernatremia: Na = 146  mmol/L (Ref range: 136 - 145 mmol/L) on admission, will monitor as appropriate           ______________________________________________________________________    Interval History   Seen this morning.  Everything reviewed including being reviewed with her mom.  She is feeling much better.  They do not think oxygen machine malfunctioned.  They feel like she is back to baseline had had breathing difficulty for about 2 weeks and were trying as an outpatient with doxycycline.  Was not on steroids prior to admission    Data reviewed today: I reviewed all medications, new labs and imaging results over the last 24 hours. .    Physical Exam   Vital Signs: Temp: 98.3  F (36.8  C) Temp src: Oral BP: 110/63 Pulse: 82   Resp: 22 SpO2: 94 % O2 Device: Nasal cannula Oxygen Delivery: 3 LPM  Weight: 178 lbs 9.6 oz  General Appearance: Pleasant, eating  Respiratory: Faint crackles and wheeze  Cardiovascular: Regular rate and rhythm       Data   Recent Labs   Lab 12/10/21  1156 12/10/21  0836 12/09/21  2347 12/09/21  1322   WBC  --   --   --  11.0   HGB  --   --   --  14.3   MCV  --   --   --  95   PLT  --   --   --  215   INR  --   --   --  1.00   NA  --   --   --  146*   POTASSIUM  --   --   --  4.5   CHLORIDE  --   --   --  98   CO2  --   --   --  39*   BUN  --   --   --  9   CR  --   --   --  0.75   ANIONGAP  --   --   --  9   ARINA  --   --   --  9.8   * 267* 113* 193*   ALBUMIN  --   --   --  3.7   PROTTOTAL  --   --   --  6.8   BILITOTAL  --   --   --  0.7   ALKPHOS  --   --   --  80   ALT  --   --   --  24   AST  --   --   --  17

## 2021-12-10 NOTE — DISCHARGE INSTRUCTIONS
Home care services have been arrange for you    Home Care Agency: [facility]    Home Care Phone: [phone]    Services: PT    Instructions: Home care will visit by 12/13/21 or 12/14/21.  Provider will call you to schedule visit.

## 2021-12-10 NOTE — DISCHARGE SUMMARY
Pt discharged home at approximately 1450. Escorted out in a wheelchair with nursing aid while on 2 lpm O2 via home oxygen concentrator. AVS reviewed w/patient and family member. Prescriptions reviewed and handed to patient. No questions or concerns from patient and family member

## 2021-12-10 NOTE — PROGRESS NOTES
Care Management Follow Up    Length of Stay (days): 0    Expected Discharge Date: 12/10/2021     Concerns to be Addressed:       Patient plan of care discussed at interdisciplinary rounds: Yes    Anticipated Discharge Disposition:       Anticipated Discharge Services:    Anticipated Discharge DME:      Patient/family educated on Medicare website which has current facility and service quality ratings:    Education Provided on the Discharge Plan:    Patient/Family in Agreement with the Plan:      Referrals Placed by CM/SW:    Private pay costs discussed: Not applicable    Additional Information:  2:14 PM  SW met with patient to complete [place] form and discuss home PT recommendation.  Pt agreeable to home PT and is open to any agency.  BHAVNA learned [facility] is out until 12/17/21.  SW called [facility]  They accepted pt with start of service either 12/13/21 or 12/14/21.  CM will need to fax HC orders to HC agency when available.      LACI García

## 2021-12-10 NOTE — ED NOTES
[facility] ED Handoff Report    ED Chief Complaint:     ED Diagnosis:  (R06.89) Hypercarbia  Comment:   Plan:     (R41.0) Acute confusion  Comment:   Plan:     (R09.02) Hypoxia  Comment:   Plan:        PMH:    Past Medical History:   Diagnosis Date     Acute on chronic respiratory failure with hypoxia (H) 11/15/2016     Bipolar 1 disorder (H)      CAP (community acquired pneumonia) 11/15/2016     Cervical high risk HPV (human papillomavirus) test positive 3/5/2018    3/5/18 NIL pap, +HR HPV (not 16/18) 5/21/21 NIL pap, neg HPV. Plan: await provider     COPD exacerbation (H) 4/24/2021     COPD with exacerbation (H) 11/15/2016     Diabetes mellitus, type II (H)      Hearing loss      Meniere's disease      Pneumonia 4/23/2021     Pneumonia of right lower lobe due to infectious organism 6/11/2019        Code Status:  Full Code     Falls Risk: Yes Band: Applied    Current Living Situation/Residence: lives with mother     Elimination Status: Continent: purwick in place-uses well     Activity Level: 2 assist    Patients Preferred Language:  English     Needed: No    Vital Signs:  /76   Pulse 87   Temp 98.6  F (37  C)   Resp 24   Wt 83.5 kg (184 lb)   SpO2 93%   BMI 42.77 kg/m       Cardiac Rhythm: Sinus with PACs and aberrant conduction complexes    Pain Score: 0/10    Is the Patient Confused:  No-pt does not address this writer as acutely confused at time of assignment nor throughout the night. Pt is alert and oriented. Though confusion was noted by provider and exam- also pt was hypoxic upon arrival so reasonably confused initially    Last Food or Drink: 12/10/21 at 0400    Focused Assessment:  Pt comes in due to potential home oxygen malfunction or  error (not turning oxygen on) arrives hypoxic with recent dx of pneumonia  within past week or so (finishing oral antibx here) pt is now alert. On 3L nasal cannula currently but was placed on BIPAP at one point due to hypercarbia (VBG  was taken after nebulization). Pt presents a bit wheezy, and intermittent labored breathing. Difficulty sleeping throughout night. COPD pt.     Tests Performed: Done: Labs and Imaging    Treatments Provided:  SEE MAR    Family Dynamics/Concerns: No    Family Updated On Visitor Policy: Yes    Plan of Care Communicated to Family: Yes    Who Was Updated about Plan of Care: pt's mother when she was here    Belongings Checklist Done and Signed by Patient: Yes    Covid: symptomatic, negative    Additional Information:     RN: Teresa Hooper RN 12/10/2021 7:12 AM

## 2021-12-10 NOTE — H&P
Admission History and Physical   Arvind Leong,  1955, MRN [id number]    [facility]  Hypoxia [R09.02]    PCP: Rema Whiting, [address]  [facility] [address], [phone]   Code status:  Full code        Extended Emergency Contact Information  Primary Emergency Contact: Jeri Leong   [facility]  Mobile Phone: [phone]  Relation: Mother  Secondary Emergency Contact: SaloUlysses   [facility]  Mobile Phone: [phone]  Relation: Other       Assessment and Plan    66 year old old female with obesity, former tobacco use, COPD, chronic hypoxic respiratory failure on 2 LPM home O2, DM 2, pneumonia, Ménière's disease, bipolar disease recent outpatient treatment for COPD exacerbation presenting for evaluation of worsening dyspnea    1.  COPD exacerbation.  Start IV Solu-Medrol 60 mg every 8 hours.  DuoNebs every 4 hours, albuterol nebs as needed.  Will complete doxycycline course (stop date )   2.  Acute on chronic respiratory failure with hypoxia and hypercapnia.  Continue BiPAP for hypercarbia.  Recheck ABG in 2 hours and reassess.  Supplemental O2 to keep O2 sats above 90%  3.  Constipation.  Start scheduled stool softeners and laxatives.  If has persistent abdominal distention consider imaging  4.  Obesity Body mass index is 42.77 kg/m .  5.  Bipolar disorder.  Resume PTA medications      DVT Prophylaxis: SQ Lovenox     Chief Complaint:  Shortness of breath     HPI:    Arvind Leong is a 66 year old old female with obesity, former tobacco use, COPD, chronic hypoxic respiratory failure on 2 LPM home O2, DM 2, pneumonia, Ménière's disease, bipolar disease recent outpatient treatment for COPD exacerbation presenting for evaluation of worsening dyspnea  History is provided by patient and medical records  patient reports worsening shortness of breath since .  She was seen in the ER on , discharged on Augmentin and doxycycline for possible  community-acquired pneumonia and prednisone for COPD exacerbation.  Today she presents with persistent symptoms, presenting to the ER without oxygen with O2 sats 78% on room air.  Denies having any fevers, chills, chest pain, cough.  Also endorses abdominal fullness and tenderness.  Has been constipated for a while.  No black stool or blood in the stool.    Labs significant for BNP 55, normal troponin, mild hypernatremia 146, VBG with hypercapnia PCO2 88.  CTA chest with no evidence of PE, pulmonary edema or pneumonia.     Medical History  Past Medical History:   Diagnosis Date     Acute on chronic respiratory failure with hypoxia (H) 11/15/2016     Bipolar 1 disorder (H)      CAP (community acquired pneumonia) 11/15/2016     Cervical high risk HPV (human papillomavirus) test positive 3/5/2018    3/5/18 NIL pap, +HR HPV (not 16/18) 21 NIL pap, neg HPV. Plan: await provider     COPD exacerbation (H) 2021     COPD with exacerbation (H) 11/15/2016     Diabetes mellitus, type II (H)      Hearing loss      Meniere's disease      Pneumonia 2021     Pneumonia of right lower lobe due to infectious organism 2019        Surgical History  She  has a past surgical history that includes Pr Removal Anal Fistula,Subcutaneous; Tonsillectomy; and Cyst Removal (2001).       Social History  Reviewed, and she  reports that she quit smoking about 4 years ago. She has a 40.00 pack-year smoking history. She has never used smokeless tobacco. She reports current alcohol use. She reports that she does not use drugs.   Social History     Tobacco Use     Smoking status: Former Smoker     Packs/day: 1.00     Years: 40.00     Pack years: 40.00     Quit date: 2017     Years since quittin.8     Smokeless tobacco: Never Used   Substance Use Topics     Alcohol use: Yes     Comment: Alcoholic Drinks/day: Occasional           Allergies  Allergies   Allergen Reactions     Lurasidone Other (See Comments)     Face turned  red, (Brand name = Latuda) Face turned red, Face turned red    Family History  Reviewed, and   Family History   Problem Relation Age of Onset     Aneurysm Father      Heart Disease Father 70.00        bypass in 70s, AAA rupture at age 77      Ulcers Father 76.00     Pacemaker Mother      Bipolar Disorder Brother      Breast Cancer Maternal Grandmother 51.00     Kidney failure Maternal Grandmother      Cancer Paternal Grandmother         ?? lung     Cancer Paternal Uncle         ?? lung     Mental Illness Maternal Grandfather      Heart Disease Other         uncle     No Known Problems Sister         two siblings abuse chemicals     No Known Problems Brother      Bipolar Disorder Nephew              Prior to Admission Medications   (Not in a hospital admission)         Review of Systems:  A 12 point comprehensive review of systems was negative except as noted. Physical Exam:  Temp:  [98.6  F (37  C)] 98.6  F (37  C)  Pulse:  [71-78] 78  Resp:  [20] 20  BP: (115)/(58) 115/58  SpO2:  [78 %-98 %] 98 %    /58   Pulse 78   Temp 98.6  F (37  C)   Resp 20   Wt 83.5 kg (184 lb)   SpO2 98%   BMI 42.77 kg/m      General Appearance:   Obese female, no acute distress on BiPAP   Head:    Normocephalic, without obvious abnormality, atraumatic   Eyes:    PERRL, conjunctiva/corneas clear, EOM's intact,both eyes    Ears:    Normal external ear canals no drainage or erythema bilat.   Nose:   Nares normal by gross inspection,  mucosa normal, no drainage or sinus tenderness   Throat:   Lips, mucosa, and tongue normal; teeth and gums normal   Neck:   Supple, symmetrical, trachea midline, no adenopathy;        thyroid:  No enlargement/tenderness/nodules   Back:     Symmetric, no curvature, ROM normal, no CVA tenderness   Lungs:    Coarse breath sounds and expiratory wheezes bilaterally   Chest wall:    No tenderness or deformity   Heart:    Regular rate and rhythm, S1 and S2 normal, no murmur, no rubs, no JVD, no edema    Abdomen:     Obese, distended, non-tender, bowel sounds active all four quadrants   Musculoskeletal:   Extremities are warm and non-tender, atraumatic, no joint swelling or tenderness   Pulses:   2+ and symmetric all extremities   Skin:    no rashes or lesions on exposed areas, please see nursing assessment for full skin assessment   Neurologic:  Awake and alert, grossly nonfocal        Pertinent Labs  Lab Results: personally reviewed.   Recent Labs   Lab 12/09/21  1322   *   CO2 39*   BUN 9   ALBUMIN 3.7   ALKPHOS 80   ALT 24   AST 17     Recent Labs   Lab 12/09/21  1322   WBC 11.0   HGB 14.3   HCT 47.1*        Recent Labs   Lab 12/09/21  1322   TROPONINI <0.01       MOST RECENT A1c, Iron, TIBC, Coags, TFTs  Lab Results   Component Value Date    INR 1.00 12/09/2021    PTT 29 12/09/2021     Lab Results   Component Value Date    IRON 90 11/23/2020     Lab Results   Component Value Date    TSH 0.92 11/23/2020         Pertinent Radiology  Radiology Results: I personally reviewed.  Results for orders placed or performed during the hospital encounter of 12/09/21   CT Chest Pulmonary Embolism w Contrast    Impression    IMPRESSION:  1.  No pulmonary embolism.  2.  Nonaneurysmal aorta without dissection.  3.  Mild basilar predominant atelectasis. No consolidation.       Head CT w/o contrast    Impression    IMPRESSION:  1.  No acute intracranial process.  2.  Indeterminate soft tissue lesion over the right vertex scalp measuring up to 1.7 cm. This was also present on CT 12/27/2018. This most likely represents a sebaceous cyst.         Advanced Care Planning  Treatment and discharge Planning discussed with patient and nursing staff  Anticipated Length of Stay in midnights (including a midnight in the Emergency Department after triage if applicable): At least 1 midnight for evaluation and treatment of COPD exacerbation and hypercarbic respiratory failure      Luh Zaldivar MD  Internal Medicine  Hospitalist  12/9/2021

## 2021-12-10 NOTE — CONSULTS
COPD Consult Note    12/10/2021, 10:15 AM     Reason for Consult: COPD Education  Admission diagnosis: Hypercarbia [R06.89]  Acute confusion [R41.0]  Hypoxia [R09.02]     Assessment:  Patient will not be seen at this time due to not meeting GOLD Guidelines for COPD at this time according to their PFT results on 3/8/2016.    PFT Results:  Post-Spirometry  FVC = 0.68  32%  FEV1= 0.59  35%  FEV1/FVC ratio= 87%  GOLD Guidelines states for COPD, ratio of <70% on post bronchodilator.    Recommendations:  Palliative consult for goals of care and symptom management as patient has been admitted 3 times for her breathing this year.    We ask that you please update the patient's chart to reflect an alternative diagnosis.  If you have questions please call COPD Education at [phone], thank you.    Kaylee Bray, RT, Chronic Pulmonary Disease Specialist

## 2021-12-10 NOTE — PHARMACY-ADMISSION MEDICATION HISTORY
Pharmacy Note - Admission Medication History    Pertinent Provider Information: Pt finished a 5 day course of Augmentin 875mg BID on 12/8/21. Has 2 days left of a 5 day course of Doxyclcline 100mg BID     ______________________________________________________________________    Prior To Admission (PTA) med list completed and updated in EMR.       PTA Med List   Medication Sig Last Dose     albuterol (PROAIR HFA;PROVENTIL HFA;VENTOLIN HFA) 90 mcg/actuation inhaler [ALBUTEROL (PROAIR HFA;PROVENTIL HFA;VENTOLIN HFA) 90 MCG/ACTUATION INHALER] Inhale 2 puffs every 6 (six) hours as needed for wheezing or shortness of breath. Past Month at prn     albuterol (PROVENTIL) (2.5 MG/3ML) 0.083% neb solution Take 1 vial (2.5 mg) by nebulization every 4 hours as needed for shortness of breath / dyspnea 12/8/2021 at prn     ARIPiprazole (ABILIFY) 10 MG tablet [ARIPIPRAZOLE (ABILIFY) 10 MG TABLET] Take 10 mg by mouth daily. 12/8/2021 at Unknown time     doxycycline hyclate (VIBRAMYCIN) 100 MG capsule Take 100 mg by mouth 2 times daily End on 12/11/21 12/8/2021 at Unknown time     metFORMIN (GLUCOPHAGE) 500 MG tablet Take 1 tablet (500 mg) by mouth daily (with breakfast) Past Month at Unknown time       Information source(s): Patient and CareEverywhere/[facility]  Method of interview communication: in-person    Summary of Changes to PTA Med List  New: doxycycline  Discontinued: azelastine, bupropion, oxybutynin, prednisone, prednisolone  Changed: N/A    Patient was asked about OTC/herbal products specifically.  PTA med list reflects this.    In the past week, patient estimated taking medication this percent of the time:  50-90% due to other.    Allergies were reviewed, assessed, and updated with the patient.      Patient did not bring any medications to the hospital and can't retrieve from home. No multi-dose medications are available for use during hospital stay.      The information provided in this note is only as accurate as the  sources available at the time of the update(s).    Thank you for the opportunity to participate in the care of this patient.    Peyton Pennington  12/9/2021 6:30 PM

## 2021-12-13 ENCOUNTER — PATIENT OUTREACH (OUTPATIENT)
Dept: CARE COORDINATION | Facility: CLINIC | Age: 66
End: 2021-12-13
Payer: COMMERCIAL

## 2021-12-13 ENCOUNTER — TELEPHONE (OUTPATIENT)
Dept: FAMILY MEDICINE | Facility: CLINIC | Age: 66
End: 2021-12-13
Payer: COMMERCIAL

## 2021-12-13 NOTE — TELEPHONE ENCOUNTER
I need to see patient to fill out this form and also do hospital discharge follow up. Can put on my schedule friday

## 2021-12-13 NOTE — TELEPHONE ENCOUNTER
Incoming call from pt following up on AURELIA form. Pt wanting to know when this will be available for her to ?

## 2021-12-13 NOTE — TELEPHONE ENCOUNTER
Would like verbal confirmation that Dr. Whiting would be willing to follow Arvind for home care services.    Please call Sandra back at [phone]

## 2021-12-13 NOTE — LETTER
[facility] CARE COORDINATION  [address]  [place] [address]    December 14, 2021    Arvind Leong  [address] [place] [address]      Dear Arvind,    I am a clinic community health worker who works with Rema Whiting MD at [facility]. I have been trying to reach you recently to introduce Clinic Care Coordination and to see if there was anything I could assist you with.  Below is a description of clinic care coordination and how I can further assist you.      The clinic care coordination team is made up of a registered nurse,  and community health worker who understand the health care system. The goal of clinic care coordination is to help you manage your health and improve access to the health care system in the most efficient manner. The team can assist you in meeting your health care goals by providing education, coordinating services, strengthening the communication among your providers and supporting you with any resource needs.    Please feel free to contact me at [phone] with any questions or concerns. We are focused on providing you with the highest-quality healthcare experience possible and that all starts with you.     Sincerely,     Kathy Zhang  Community Health Worker  [facility] Care Coordination    Office: [phone]

## 2021-12-13 NOTE — TELEPHONE ENCOUNTER
Patient calling to see if letter that her mother dropped off for dr Whiting was signed.  Patient concerned that she has not heard back.  Note was for patient to have time off work

## 2021-12-13 NOTE — PROGRESS NOTES
Clinic Care Coordination Contact  [facility]    Clinical Data: Care Coordinator Outreach  Outreach attempted x 1.  Left message on patient's voicemail with call back information and requested return call.  Plan: Care Coordinator will try to reach patient again in 1-2 business days.    TCM Referral

## 2021-12-14 NOTE — PROGRESS NOTES
Clinic Care Coordination Contact  [facility]/Voicemail    Clinical Data: Care Coordinator Outreach  Outreach attempted x 2.  Left message on patient's voicemail with call back information and requested return call.  Plan: Care Coordinator sent care coordination introduction letter on 12/14/21 via [employer]. Care Coordinator will do no further outreaches at this time.    Kathy Zhang  [facility] Health Worker  [facility] Care Coordination   Office: [phone]

## 2021-12-16 ENCOUNTER — OFFICE VISIT (OUTPATIENT)
Dept: FAMILY MEDICINE | Facility: CLINIC | Age: 66
End: 2021-12-16
Payer: COMMERCIAL

## 2021-12-16 VITALS
WEIGHT: 178 LBS | DIASTOLIC BLOOD PRESSURE: 65 MMHG | HEIGHT: 55 IN | SYSTOLIC BLOOD PRESSURE: 120 MMHG | BODY MASS INDEX: 41.19 KG/M2 | HEART RATE: 72 BPM | OXYGEN SATURATION: 94 %

## 2021-12-16 DIAGNOSIS — Z09 HOSPITAL DISCHARGE FOLLOW-UP: Primary | ICD-10-CM

## 2021-12-16 DIAGNOSIS — J18.9 PNEUMONIA OF RIGHT LOWER LOBE DUE TO INFECTIOUS ORGANISM: ICD-10-CM

## 2021-12-16 DIAGNOSIS — J44.9 CHRONIC OBSTRUCTIVE PULMONARY DISEASE, UNSPECIFIED COPD TYPE (H): ICD-10-CM

## 2021-12-16 DIAGNOSIS — E11.69 TYPE 2 DIABETES MELLITUS WITH OTHER SPECIFIED COMPLICATION, WITHOUT LONG-TERM CURRENT USE OF INSULIN (H): ICD-10-CM

## 2021-12-16 DIAGNOSIS — F31.30 BIPOLAR I DISORDER, MOST RECENT EPISODE DEPRESSED (H): ICD-10-CM

## 2021-12-16 DIAGNOSIS — R35.0 INCREASED URINARY FREQUENCY: ICD-10-CM

## 2021-12-16 DIAGNOSIS — Z13.220 SCREENING FOR HYPERLIPIDEMIA: ICD-10-CM

## 2021-12-16 LAB
CHOLEST SERPL-MCNC: 142 MG/DL
CREAT UR-MCNC: 37 MG/DL
FASTING STATUS PATIENT QL REPORTED: YES
HDLC SERPL-MCNC: 36 MG/DL
LDLC SERPL CALC-MCNC: 68 MG/DL
LDLC SERPL CALC-MCNC: ABNORMAL MG/DL
MICROALBUMIN UR-MCNC: 1.08 MG/DL (ref 0–1.99)
MICROALBUMIN/CREAT UR: 29.2 MG/G CR
TRIGL SERPL-MCNC: 472 MG/DL

## 2021-12-16 PROCEDURE — 80061 LIPID PANEL: CPT | Performed by: FAMILY MEDICINE

## 2021-12-16 PROCEDURE — 83721 ASSAY OF BLOOD LIPOPROTEIN: CPT | Mod: 59 | Performed by: FAMILY MEDICINE

## 2021-12-16 PROCEDURE — 82043 UR ALBUMIN QUANTITATIVE: CPT | Performed by: FAMILY MEDICINE

## 2021-12-16 PROCEDURE — 36415 COLL VENOUS BLD VENIPUNCTURE: CPT | Performed by: FAMILY MEDICINE

## 2021-12-16 PROCEDURE — 99495 TRANSJ CARE MGMT MOD F2F 14D: CPT | Mod: 25 | Performed by: FAMILY MEDICINE

## 2021-12-16 PROCEDURE — 91300 COVID-19,PF,PFIZER (12+ YRS): CPT | Performed by: FAMILY MEDICINE

## 2021-12-16 PROCEDURE — 0003A COVID-19,PF,PFIZER (12+ YRS): CPT | Performed by: FAMILY MEDICINE

## 2021-12-16 ASSESSMENT — MIFFLIN-ST. JEOR: SCORE: 1194.53

## 2021-12-16 NOTE — PROGRESS NOTES
Assessment & Plan     Hospital discharge follow-up  Reviewed.     Screening for hyperlipidemia    Pneumonia of right lower lobe due to infectious organism  Treated in hospital. Stable.     Chronic obstructive pulmonary disease, unspecified COPD type (H)  Not well controlled hence hospitalization. Improving now.       Bipolar I disorder, most recent episode depressed (H)  Hasn't been taking her med. She said she'll be seeing psych soon. I filled out FMLA form for her to take off work for 4 months to get her mental health and rest of her medical problems straightened out. She's been in hospitalized 2 times in the last 3 months and had 2 other ED visits as well.     Type 2 diabetes mellitus with other specified complication, without long-term current use of insulin (H)  Not well controlled. Will increase metformin to 1000 mg bid. Hopefully will help her urinary frequency  - metFORMIN (GLUCOPHAGE) 1000 MG tablet  Dispense: 180 tablet; Refill: 1  - Lipid panel reflex to direct LDL Fasting  - Albumin Random Urine Quantitative with Creat Ratio  - Lipid panel reflex to direct LDL Fasting  - Albumin Random Urine Quantitative with Creat Ratio  - LDL cholesterol direct    Increased urinary frequency  Most likely due to worsening diabetes.  No other urine symptoms.        Return in about 4 weeks (around 1/13/2022) for Follow up, with me, in person.    Greater than 45 minutes was spent today with patient reviewing charts, documentation, patient counseling, filling out FMLA form    Rema Whiting MD  [facility]    Wendy Samuels is a 66 year old who presents for the following health issues     HPI   Chief Complaint   Patient presents with     Hospital F/U     Pt is here for hospital follow and needs a letter for work. Pt will get covid booster today- pt states she has not been going number 2 very well.        Hospital Follow-up Visit:    Hospital/Nursing Home/IP Rehab Facility: [facility]  "[facility]  Date of Admission: 12/9/2021  Date of Discharge: 12/10/2021  Reason(s) for Admission: pneumonia, copd exacerbation, acute on chronic respiratory failure      Was your hospitalization related to COVID-19? No   Problems taking medications regularly:  Yes - quit taking some of her meds prior to hospitalization  Medication changes since discharge: yes see med list  Problems adhering to non-medication therapy:  None    Summary of hospitalization:  [facility] discharge summary reviewed  Diagnostic Tests/Treatments reviewed.  Follow up needed: psych follow up  Other Healthcare Providers Involved in Patient s Care:         Homecare  Update since discharge: improved.       Post Discharge Medication Reconciliation: discharge medications reconciled and changed, per note/orders.  Plan of care communicated with patient                Review of Systems   Constitutional, HEENT, cardiovascular, pulmonary, GI, , musculoskeletal, neuro, skin, endocrine and psych systems are negative, except as otherwise noted.      Objective    /65 (BP Location: Left arm, Patient Position: Sitting, Cuff Size: Adult Large)   Pulse 72   Ht 1.397 m (4' 7\")   Wt 80.7 kg (178 lb)   SpO2 94%   BMI 41.37 kg/m    Body mass index is 41.37 kg/m .  Physical Exam   GENERAL: healthy, alert and no distress  NECK: no adenopathy, no asymmetry, masses, or scars and thyroid normal to palpation  RESP: lungs clear to auscultation - no rales, rhonchi or wheezes  CV: regular rate and rhythm, normal S1 S2, no S3 or S4, no murmur, click or rub, no peripheral edema and peripheral pulses strong  ABDOMEN: soft, nontender, no hepatosplenomegaly, no masses and bowel sounds normal  MS: no gross musculoskeletal defects noted, no edema  NEURO: Normal strength and tone, mentation intact and speech normal  PSYCH: mentation appears normal, affect normal/bright        "

## 2021-12-21 ENCOUNTER — PATIENT OUTREACH (OUTPATIENT)
Dept: CARE COORDINATION | Facility: CLINIC | Age: 66
End: 2021-12-21
Payer: COMMERCIAL

## 2022-01-03 ENCOUNTER — TELEPHONE (OUTPATIENT)
Dept: FAMILY MEDICINE | Facility: CLINIC | Age: 67
End: 2022-01-03
Payer: COMMERCIAL

## 2022-01-03 NOTE — TELEPHONE ENCOUNTER
Incoming call from home care just wanting to let you know they cancelled the referral. She declined the start of care appointment twice and has not returned their call in over a week. Can submit a new referral if pt changes her mind

## 2022-01-19 NOTE — PROGRESS NOTES
[facility]      OUTPATIENT PHYSICAL THERAPY EVALUATION  PLAN OF TREATMENT FOR OUTPATIENT REHABILITATION  (COMPLETE FOR INITIAL CLAIMS ONLY)  Patient's Last Name, First Name, M.I.  YOB: 1955  Arvind Leong                        Provider's Name  [facility] Medical Record No.  [id number]                               Onset Date:  12/09/21   Start of Care Date:  (P) 12/10/21      Type:     _X_PT   ___OT   ___SLP Medical Diagnosis:  (P) acute/chronic resp failure                        PT Diagnosis:  decreased activity tolerance   Visits from SOC:  1   _________________________________________________________________________________  Plan of Treatment/Functional Goals    Planned Interventions: gait training,stair training,strengthening     Goals: See Physical Therapy Goals on Care Plan in [employer] electronic health record.    Therapy Frequency: Daily  Predicted Duration of Therapy Intervention: 3 days  _________________________________________________________________________________    I CERTIFY THE NEED FOR THESE SERVICES FURNISHED UNDER        THIS PLAN OF TREATMENT AND WHILE UNDER MY CARE     (Physician co-signature of this document indicates review and certification of the therapy plan).                Certification date from: (P) 12/10/21, Certification date to: (P) 12/17/21    Referring Physician: MOIRA Levy            Initial Assessment        See Physical Therapy evaluation dated (P) 12/10/21 in Epic electronic health record.

## 2022-02-21 ENCOUNTER — NURSE TRIAGE (OUTPATIENT)
Dept: NURSING | Facility: CLINIC | Age: 67
End: 2022-02-21

## 2022-02-21 NOTE — TELEPHONE ENCOUNTER
Patient calling back.  Advised patient to go to walk in clinic or urgent care per Dr Griggs.  Patient said she understood and will do that

## 2022-02-21 NOTE — TELEPHONE ENCOUNTER
Attempted to reach pt by phone. Pt answered but was disconnected and goes to voicemail. Per Dr Griggs due to pt having a cough she should seek walk in clinic or urgent care vs coming into the clinic. Will try pt again soon.

## 2022-02-21 NOTE — TELEPHONE ENCOUNTER
"Copd cough. Both ears ache. Trouble breathing with laying down last night. She is better since being up but has wheezing. I connected with scheduling for an appointment and advised urgent care if they can't get her into the clinic.   Yadira Rajan RN  [facility]      Reason for Disposition    MILD difficulty breathing (e.g., minimal/no SOB at rest, SOB with walking, pulse < 100) of new onset or worse than normal    Additional Information    Negative: Breathing stopped and hasn't returned    Negative: Choking on something    Negative: SEVERE difficulty breathing (e.g., struggling for each breath, speaks in single words, pulse > 120)    Negative: Bluish (or gray) lips or face    Negative: Difficult to awaken or acting confused (e.g., disoriented, slurred speech)    Negative: Passed out (i.e., fainted, collapsed and was not responding)    Negative: Wheezing started suddenly after medicine, an allergic food, or bee sting    Negative: Stridor    Negative: Slow, shallow and weak breathing    Negative: Sounds like a life-threatening emergency to the triager    Negative: Chest pain    Negative: Wheezing (high pitched whistling sound) and previous asthma attacks or use of asthma medicines    Negative: Difficulty breathing and only present when coughing    Negative: Difficulty breathing and only from stuffy or runny nose    Negative: Difficulty breathing and within 14 days of COVID-19 Exposure    Negative: MODERATE difficulty breathing (e.g., speaks in phrases, SOB even at rest, pulse 100-120) of new onset or worse than normal    Negative: Wheezing can be heard across the room    Negative: Drooling or spitting out saliva (because can't swallow)    Negative: Any history of prior \"blood clot\" in leg or lungs    Negative: Illness requiring prolonged bedrest in past month (e.g., immobilization, long hospital stay)    Negative: Hip or leg fracture (broken bone) in past month (or had cast on leg or ankle in past " "month)    Negative: Major surgery in the past month    Negative: Long-distance travel in past month (e.g., car, bus, train, plane; with trip lasting 6 or more hours)    Negative: Extra heart beats OR irregular heart beating   (i.e., \"palpitations\")    Negative: Fever > 103 F (39.4 C)    Negative: Fever > 101 F (38.3 C) and over 60 years of age    Negative: Fever > 100.0 F (37.8 C) and bedridden (e.g., nursing home patient, stroke, chronic illness, recovering from surgery)    Negative: Fever > 100.0 F (37.8 C) and diabetes mellitus or weak immune system (e.g., HIV positive, cancer chemo, splenectomy, organ transplant, chronic steroids)    Negative: Periods where breathing stops and then resumes normally and bedridden (e.g., nursing home patient, CVA)    Negative: Pregnant or postpartum (from 0 to 6 weeks after delivery)    Negative: Patient sounds very sick or weak to the triager    Protocols used: BREATHING DIFFICULTY-A-OH      "

## 2022-03-17 ENCOUNTER — TELEPHONE (OUTPATIENT)
Dept: FAMILY MEDICINE | Facility: CLINIC | Age: 67
End: 2022-03-17
Payer: COMMERCIAL

## 2022-03-17 ENCOUNTER — NURSE TRIAGE (OUTPATIENT)
Dept: NURSING | Facility: CLINIC | Age: 67
End: 2022-03-17
Payer: COMMERCIAL

## 2022-03-17 NOTE — TELEPHONE ENCOUNTER
Reason for call:  Patient reporting a symptom    Symptom or request: abd pain from not being able to have bowel movement    Duration (how long have symptoms been present):   Started yesterday    Have you been treated for this before? Yes    Additional comments: patient requested triage nurse    Phone Number patient can be reached at:  Home number on file [phone] (home)    Best Time:  any    Can we leave a detailed message on this number:  YES    Call taken on 3/17/2022 at 3:32 PM by TONY ARMENTA

## 2022-03-17 NOTE — TELEPHONE ENCOUNTER
"S-(situation): \"Yesterday my bowels were so bad I couldn't stand it\"  Almost went to the ED, took Pepto bismol didn't help. Taking immorim which helps the pain but now is concerned that she hasn't had a bowel movement since yesterday.  Pt's abdominal pain is still there, left rib / stomach pain area. Pain was severe yesterday and today less painful but more constant.    B-(background): Pain was sever yesterday, less so today. Pt is drinking cranberry juice and is peeing.    A-(assessment): Constant abdominal pain and concerns over no stool since yesterday. Pt taking imodium which is helping with the pain.     R-(recommendations): Due to abdominal pain, go to ED now or PCP triage. Message sent to PCP asking to weigh in on abdominal pain.     Did discuss pt's concern of lack of bowel movements, encouraged pt to stop the imodium.        Reviewed chart. Dr Whiting recommended pt go to ED. Called pt and shared this advice. Pt unsure about this advice \"it isn't that bad.\" Encouraged pt again to go to  ED.       Reason for Disposition    Constant abdominal pain lasting > 2 hours    Mild constipation    Additional Information    Negative: Passed out (i.e., fainted, collapsed and was not responding)    Negative: Shock suspected (e.g., cold/pale/clammy skin, too weak to stand, low BP, rapid pulse)    Negative: Visible sweat on face or sweat is dripping down    Negative: Chest pain    Negative: SEVERE abdominal pain (e.g., excruciating)    Negative: Bloody, black, or tarry bowel movements (Exception: chronic-unchanged  black-grey bowel movements and is taking iron pills or Pepto-bismol)    Negative: Vomiting red blood or black (coffee ground) material    Negative: Recent injury to the abdomen    Negative: Pain lasting > 10 minutes and over 50 years old    Negative: Pain lasting > 10 minutes and over 40 years old and associated chest, arm, neck, upper back, or jaw pain    Negative: Pain lasting > 10 minutes and over 35 years " old and at least one cardiac risk factor    Negative: Pain lasting > 10 minutes and history of heart disease (i.e., heart attack, bypass surgery, angina, angioplasty, CHF)    Negative: Pregnant > 24 weeks and hand or face swelling    Negative: Vomiting bile (green color)    Negative: Patient sounds very sick or weak to the triager    Negative: Abdomen pain is the main symptom and adult male    Negative: Abdomen pain is the main symptom and adult female    Negative: Rectal bleeding or blood in stool is the main symptom    Negative: Patient sounds very sick or weak to the triager    Negative: Vomiting bile (green color)    Negative: Vomiting and abdomen looks much more swollen than usual    Negative: Rectal pain or fullness from fecal impaction (rectum full of stool) and NOT better after SITZ bath, suppository or enema    Negative: Last bowel movement (BM) > 4 days ago    Negative: Abdomen is more swollen than usual    Negative: Leaking stool    Negative: Intermittent mild abdominal pain and fever    Negative: Unable to have a bowel movement (BM) without manually removing stool (using finger to pull out stool or perform disimpaction)    Negative: Unable to have a bowel movement (BM) without using a laxative, suppository, or enema    Negative: Constipation persists > 1 week and no improvement after using CARE ADVICE    Negative: Weight loss greater than 10 pounds (5 kg) and not dieting    Negative: Patient wants to be seen    Negative: Pencil-like, narrow stools    Protocols used: ABDOMINAL PAIN - UPPER-A-OH, CONSTIPATION-A-OH

## 2022-03-20 ENCOUNTER — NURSE TRIAGE (OUTPATIENT)
Dept: NURSING | Facility: CLINIC | Age: 67
End: 2022-03-20
Payer: COMMERCIAL

## 2022-03-20 NOTE — TELEPHONE ENCOUNTER
Bowel movement was last on Friday. Took Exlax. She feels like she has to go but has no success. She did leak some stool yesterday but no normal amount.   She has a paper signing appointment tomorrow but needs a visit with an MD about her constipation. I connected with scheduling for an appointment and told her if they can't get her into the clinic, she should be seen in urgent care.  Yadira Rajan RN  [facility]      Reason for Disposition    Leaking stool    Additional Information    Negative: [1] Abdomen pain is main symptom AND [2] male    Negative: [1] Abdomen pain is main symptom AND [2] adult female    Negative: Rectal bleeding or blood in stool is main symptom    Negative: Rectal pain or itching is main symptom    Negative: Constipation in a cancer patient who is currently (or recently) receiving chemotherapy or radiation therapy, or cancer patient who has metastatic or end-stage cancer and is receiving palliative care    Negative: Patient sounds very sick or weak to the triager    Negative: [1] Vomiting AND [2] abdomen looks much more swollen than usual    Negative: [1] Vomiting AND [2] contains bile (green color)    Negative: [1] Constant abdominal pain AND [2] present > 2 hours    Negative: [1] Rectal pain or fullness from fecal impaction (rectum full of stool) AND [2] NOT better after SITZ bath, suppository or enema    Negative: [1] Intermittent mild abdominal pain AND [2] fever    Negative: Abdomen is more swollen than usual    Negative: Last bowel movement (BM) > 4 days ago    Protocols used: CONSTIPATION-A-

## 2022-03-21 ENCOUNTER — OFFICE VISIT (OUTPATIENT)
Dept: FAMILY MEDICINE | Facility: CLINIC | Age: 67
End: 2022-03-21
Payer: COMMERCIAL

## 2022-03-21 VITALS
DIASTOLIC BLOOD PRESSURE: 89 MMHG | SYSTOLIC BLOOD PRESSURE: 132 MMHG | WEIGHT: 171.8 LBS | HEART RATE: 84 BPM | RESPIRATION RATE: 16 BRPM | BODY MASS INDEX: 39.93 KG/M2 | OXYGEN SATURATION: 98 %

## 2022-03-21 DIAGNOSIS — E11.69 TYPE 2 DIABETES MELLITUS WITH OTHER SPECIFIED COMPLICATION, WITHOUT LONG-TERM CURRENT USE OF INSULIN (H): ICD-10-CM

## 2022-03-21 DIAGNOSIS — J96.11 CHRONIC RESPIRATORY FAILURE WITH HYPOXIA (H): ICD-10-CM

## 2022-03-21 DIAGNOSIS — J44.9 CHRONIC OBSTRUCTIVE PULMONARY DISEASE, UNSPECIFIED COPD TYPE (H): ICD-10-CM

## 2022-03-21 DIAGNOSIS — K59.00 CONSTIPATION, UNSPECIFIED CONSTIPATION TYPE: Primary | ICD-10-CM

## 2022-03-21 PROCEDURE — 99214 OFFICE O/P EST MOD 30 MIN: CPT | Performed by: FAMILY MEDICINE

## 2022-03-21 NOTE — PATIENT INSTRUCTIONS
Arvind,    You can use MiraLAX on a daily basis for constipation  That is a powder that is mixed with water  We also discussed Colace 50 mg - 100 mg  Given your family history of colon cancer I do encourage some type of screening such as a colonoscopy  Recommend follow-up with Dr. Whiting for a medication check and to review your diabetes  You can review your [place] paperwork with her at that visit    Duy Curtis MD

## 2022-03-21 NOTE — PROGRESS NOTES
Assessment & Plan     Constipation, unspecified constipation type    Recommend increased fluids  Discussed use of MiraLAX which will be taken on a daily basis if needed  Recommend Colace 50 mg- 100 mg as needed  Reviewed that Imodium is best used for diarrhea not constipation  A colonoscopy is recommended    Chronic respiratory failure with hypoxia (H)  Chronic obstructive pulmonary disease, unspecified COPD type (H)    Continue current plan including albuterol inhaler or nebulizer treatments as needed  Continue with oxygen use  She would like to consider some type of work accommodations and needs [facility] paperwork completed  She is thinking she cannot work during the colder months of the year due to her COPD  She will follow-up with Dr. Whiting to discuss next steps      Type 2 diabetes mellitus with other specified complication, without long-term current use of insulin (H)    Her previous hemoglobin A1c was 7.6%  Continue Metformin and Jardiance  Follow-up Dr. Whiting as planned    Spent 30 minutes including time for chart review as well as time with patient and in reviewing the plan    No follow-ups on file.    Duy Curtis MD  [facility]    Wendy Samuels is a 66 year old who presents to the clinic with multiple concerns.  Her primary physician is Dr. Whiting.  She has had recent emergency room visits and a hospitalization since the September of 2021 for respiratory concerns.  She has a history of chronic obstructive pulmonary disease and has had previous pneumonia.  She has had multiple COPD exacerbations requiring treatment with prednisone as well as antibiotics but she does use oxygen on a consistent basis.  She like to consider [place] paperwork and limited work during the cold months as colds can serve as a trigger.  She denies fever or chills currently.  She works as a  at [employer].    Additionally, she has a longstanding history of bowel movement problems.   She states that she has had a tendency for constipation.  She has tried Ex-Lax and also has taken Imodium thinking that would be helpful for her constipation.  She denies diarrhea.  She has not had dark or tarry stools.  She denies bright red blood per rectum.    Finally, she is history of type 2 diabetes mellitus and her most recent hemoglobin A1c was 6.8%.  She is treated with Metformin as well as Jardiance.    She has a mood disorder/bipolar disorder treated with Abilify.          History of Present Illness       Reason for visit:  Paper work for family leave        Review of Systems         Objective    /89 (BP Location: Left arm, Patient Position: Sitting, Cuff Size: Adult Large)   Pulse 84   Resp 16   Wt 77.9 kg (171 lb 12.8 oz)   SpO2 98%   BMI 39.93 kg/m    Body mass index is 39.93 kg/m .  Physical Exam   GENERAL: healthy, alert and no distress  EYES: Eyes grossly normal to inspection, PERRL and conjunctivae and sclerae normal  NECK: no adenopathy, no asymmetry, masses, or scars and thyroid normal to palpation  RESP: lungs clear to auscultation - no rales, rhonchi or wheezes  CV: regular rate and rhythm, normal S1 S2, no S3 or S4, no murmur, click or rub, no peripheral edema and peripheral pulses strong  MS: no gross musculoskeletal defects noted, no edema  SKIN: no suspicious lesions or rashes  PSYCH: mentation appears normal, affect normal/bright

## 2022-03-25 ENCOUNTER — OFFICE VISIT (OUTPATIENT)
Dept: FAMILY MEDICINE | Facility: CLINIC | Age: 67
End: 2022-03-25
Payer: COMMERCIAL

## 2022-03-25 VITALS
OXYGEN SATURATION: 98 % | HEART RATE: 98 BPM | RESPIRATION RATE: 16 BRPM | WEIGHT: 171.2 LBS | BODY MASS INDEX: 39.79 KG/M2 | SYSTOLIC BLOOD PRESSURE: 131 MMHG | DIASTOLIC BLOOD PRESSURE: 73 MMHG

## 2022-03-25 DIAGNOSIS — F31.30 BIPOLAR I DISORDER, MOST RECENT EPISODE DEPRESSED (H): ICD-10-CM

## 2022-03-25 DIAGNOSIS — I42.9 CARDIOMYOPATHY, UNSPECIFIED TYPE (H): ICD-10-CM

## 2022-03-25 DIAGNOSIS — E66.01 MORBID OBESITY (H): ICD-10-CM

## 2022-03-25 DIAGNOSIS — E11.8 TYPE 2 DIABETES MELLITUS WITH UNSPECIFIED COMPLICATIONS (H): ICD-10-CM

## 2022-03-25 DIAGNOSIS — J44.9 CHRONIC OBSTRUCTIVE PULMONARY DISEASE, UNSPECIFIED COPD TYPE (H): Primary | ICD-10-CM

## 2022-03-25 DIAGNOSIS — J96.11 CHRONIC RESPIRATORY FAILURE WITH HYPOXIA (H): ICD-10-CM

## 2022-03-25 LAB
ALBUMIN SERPL-MCNC: 3.7 G/DL (ref 3.5–5)
ALP SERPL-CCNC: 94 U/L (ref 45–120)
ALT SERPL W P-5'-P-CCNC: 28 U/L (ref 0–45)
ANION GAP SERPL CALCULATED.3IONS-SCNC: 10 MMOL/L (ref 5–18)
AST SERPL W P-5'-P-CCNC: 27 U/L (ref 0–40)
BILIRUB SERPL-MCNC: 0.5 MG/DL (ref 0–1)
BUN SERPL-MCNC: 6 MG/DL (ref 8–22)
CALCIUM SERPL-MCNC: 9.6 MG/DL (ref 8.5–10.5)
CHLORIDE BLD-SCNC: 97 MMOL/L (ref 98–107)
CHOLEST SERPL-MCNC: 153 MG/DL
CO2 SERPL-SCNC: 35 MMOL/L (ref 22–31)
CREAT SERPL-MCNC: 0.77 MG/DL (ref 0.6–1.1)
ERYTHROCYTE [DISTWIDTH] IN BLOOD BY AUTOMATED COUNT: 12.2 % (ref 10–15)
FASTING STATUS PATIENT QL REPORTED: NO
GFR SERPL CREATININE-BSD FRML MDRD: 85 ML/MIN/1.73M2
GLUCOSE BLD-MCNC: 230 MG/DL (ref 70–125)
HBA1C MFR BLD: 7.4 % (ref 0–5.6)
HCT VFR BLD AUTO: 45.5 % (ref 35–47)
HDLC SERPL-MCNC: 38 MG/DL
HGB BLD-MCNC: 14 G/DL (ref 11.7–15.7)
LDLC SERPL CALC-MCNC: 71 MG/DL
MCH RBC QN AUTO: 28.7 PG (ref 26.5–33)
MCHC RBC AUTO-ENTMCNC: 30.8 G/DL (ref 31.5–36.5)
MCV RBC AUTO: 93 FL (ref 78–100)
PLATELET # BLD AUTO: 268 10E3/UL (ref 150–450)
POTASSIUM BLD-SCNC: 4.4 MMOL/L (ref 3.5–5)
PROT SERPL-MCNC: 6.4 G/DL (ref 6–8)
RBC # BLD AUTO: 4.88 10E6/UL (ref 3.8–5.2)
SODIUM SERPL-SCNC: 142 MMOL/L (ref 136–145)
TRIGL SERPL-MCNC: 219 MG/DL
WBC # BLD AUTO: 10.1 10E3/UL (ref 4–11)

## 2022-03-25 PROCEDURE — 99214 OFFICE O/P EST MOD 30 MIN: CPT | Performed by: FAMILY MEDICINE

## 2022-03-25 PROCEDURE — 80061 LIPID PANEL: CPT | Performed by: FAMILY MEDICINE

## 2022-03-25 PROCEDURE — 80053 COMPREHEN METABOLIC PANEL: CPT | Performed by: FAMILY MEDICINE

## 2022-03-25 PROCEDURE — 85027 COMPLETE CBC AUTOMATED: CPT | Performed by: FAMILY MEDICINE

## 2022-03-25 PROCEDURE — 83036 HEMOGLOBIN GLYCOSYLATED A1C: CPT | Performed by: FAMILY MEDICINE

## 2022-03-25 PROCEDURE — 36415 COLL VENOUS BLD VENIPUNCTURE: CPT | Performed by: FAMILY MEDICINE

## 2022-03-25 NOTE — PROGRESS NOTES
"  Assessment & Plan     Chronic obstructive pulmonary disease, unspecified COPD type (H)  Exacerbations and ED/hospitalization 4 times now in the last 6 months. Will need to re-establish with pulmonary. Filled out her [facility] paperwork to take off from work for another 2.5 months.   - Adult Pulmonary Medicine Referral    Type 2 diabetes mellitus with unspecified complications (H)  Not well controlled.  A1c is above 7.  We will add on Jardiance in addition to Metformin.  Follow-up in 3 months.  - Hemoglobin A1c  - Lipid panel  - Comprehensive metabolic panel (BMP + Alb, Alk Phos, ALT, AST, Total. Bili, TP)  - CBC with platelets  - Hemoglobin A1c  - Lipid panel  - Comprehensive metabolic panel (BMP + Alb, Alk Phos, ALT, AST, Total. Bili, TP)  - CBC with platelets  - empagliflozin (JARDIANCE) 10 MG TABS tablet  Dispense: 90 tablet; Refill: 1    Bipolar I disorder, most recent episode depressed (H)  Advised her to see psychiatry.  She is not compliant with Abilify per her report today.    Morbid obesity (H)  Advised her to walk more.    Chronic respiratory failure with hypoxia (H)  Need to see pulmonary.  On oxygen.  - Adult Pulmonary Medicine Referral    Cardiomyopathy, unspecified type (H)  Echo 2017 normal. No symptoms.          BMI:   Estimated body mass index is 39.79 kg/m  as calculated from the following:    Height as of 12/16/21: 1.397 m (4' 7\").    Weight as of this encounter: 77.7 kg (171 lb 3.2 oz).   Weight management plan: Discussed healthy diet and exercise guidelines      Return in about 3 months (around 6/25/2022) for Routine preventive, with me, in person and pap smear .    Greater than 40 minutes was spent today with patient, chart review, documentation,    Rema Whiting MD  [facility]    Wendy Samuels is a 66 year old who presents for the following health issues     HPI   Chief Complaint   Patient presents with     Recheck Medication     Forms     [facility] paperwork "      She was seen by another physician on 3/21/2022 for constipation and was advised MiraLAX and Colace as needed.  She was encouraged to do a colonoscopy.  Last colonoscopy was in 2000.  She is also here to get her [facility] paperwork done.  She was also seen in the ED and February 2022 for COPD exacerbation, she was admitted to the hospital in December 2021 for COPD exacerbation and pneumonia.  She was seen again in the ED in November 2021 for COPD exacerbation and September 2021 hospitalized for COPD exacerbation.  Has not seen pulmonology in a while.  Her abdomen is now better, constipation has resolved.     Review of Systems   Constitutional, HEENT, cardiovascular, pulmonary, gi and gu systems are negative, except as otherwise noted.      Objective    /73 (BP Location: Left arm, Patient Position: Sitting, Cuff Size: Adult Regular)   Pulse 98   Resp 16   Wt 77.7 kg (171 lb 3.2 oz)   SpO2 98%   BMI 39.79 kg/m    Body mass index is 39.79 kg/m .  Physical Exam   GENERAL: healthy, alert and no distress  NECK: no adenopathy, no asymmetry, masses, or scars and thyroid normal to palpation  RESP: lungs clear to auscultation - no rales, rhonchi or wheezes  CV: regular rate and rhythm, normal S1 S2, no S3 or S4, no murmur, click or rub, no peripheral edema and peripheral pulses strong  ABDOMEN: soft, nontender, no hepatosplenomegaly, no masses and bowel sounds normal  MS: no gross musculoskeletal defects noted, no edema  NEURO: Normal strength and tone, mentation intact and speech normal  PSYCH: mentation appears normal, affect normal/bright

## 2022-03-28 ENCOUNTER — OFFICE VISIT (OUTPATIENT)
Dept: PULMONOLOGY | Facility: OTHER | Age: 67
End: 2022-03-28
Payer: COMMERCIAL

## 2022-03-28 VITALS
BODY MASS INDEX: 39.74 KG/M2 | HEART RATE: 100 BPM | DIASTOLIC BLOOD PRESSURE: 76 MMHG | RESPIRATION RATE: 20 BRPM | WEIGHT: 171 LBS | OXYGEN SATURATION: 94 % | SYSTOLIC BLOOD PRESSURE: 128 MMHG

## 2022-03-28 DIAGNOSIS — G47.33 OSA (OBSTRUCTIVE SLEEP APNEA): ICD-10-CM

## 2022-03-28 DIAGNOSIS — J44.9 CHRONIC OBSTRUCTIVE PULMONARY DISEASE, UNSPECIFIED COPD TYPE (H): Primary | ICD-10-CM

## 2022-03-28 DIAGNOSIS — J44.1 COPD EXACERBATION (H): ICD-10-CM

## 2022-03-28 PROCEDURE — 99214 OFFICE O/P EST MOD 30 MIN: CPT | Performed by: INTERNAL MEDICINE

## 2022-03-28 RX ORDER — ALBUTEROL SULFATE 90 UG/1
2 AEROSOL, METERED RESPIRATORY (INHALATION) EVERY 6 HOURS PRN
Qty: 18 G | Refills: 12 | Status: SHIPPED | OUTPATIENT
Start: 2022-03-28 | End: 2024-05-16

## 2022-03-28 RX ORDER — ALBUTEROL SULFATE 0.83 MG/ML
2.5 SOLUTION RESPIRATORY (INHALATION) EVERY 4 HOURS PRN
Qty: 240 ML | Refills: 12 | Status: SHIPPED | OUTPATIENT
Start: 2022-03-28 | End: 2023-04-06

## 2022-03-28 NOTE — PROGRESS NOTES
PULMONARY OUTPATIENT FOLLOW UP NOTE    Assessment:   1. Frequent hospitalizations / ED visits for COPD exacerbation   Doing well after recent hospitalization.   Previously on LAMA, not using it.   Plan to start LABA/ICS.   Declines referral to PT/pulmonary rehab.   2. COPD on home O2  Spirometry showed FEV1 0.52 L (31%), no significant post bronchodilator response.   Continue albuterol HFA as needed. Add LABA/ICS  Continue O2 2 LPM with activities.   3. ABDIFATAH  Sleep Study done on 2/17/2014 showed AHI 14.7, RDI 14.7, lateral , REM AHI 45, Supine AHI 14.3. PML index 21.7. Mean O2 sat 87%, desaturation heide 61%, Time of SpO2 < 89% 44 minutes out of 138 minutes of diagnostic time. CPAP 7 cmH20 was therapeutic.   Patent is using her CPAP machine intermittently.   Sleep hygiene was discussed, increase compliance of CPAP at night.   Referral to sleep clinic    4. Bipolar / Anxiety disorder  5. Back pain  Secondary to thoracolumbar scoliosis and obesity.   Weight loss was discussed.   6. Physical deconditioning   7. Obesity Body mass index is 39.74 kg/m .    Plan:   1. Continue O2 supplementation 2 LPM with activities.   2. Sleep hygiene  3. Start BREO one puff daily, rinse your mouth with water after each use  4. Albuterol inhaler 2 puffs up to 6 times a day as needed  5. Albuterol nebs as needed  6. Increase activity as tolerated  7. Weight loss  8. Follow up in 6 months    Arnoldo Mansfield  Pulmonary / Critical Care  3/28/22    CC:      Chief Complaint   Patient presents with     Shortness of Breath     HPI:      Arvind Leong is an 66 y.o. female who presents for follow up.   Patient has history of severe COPD on home O2, ABDIFATAH, bipolar / anxiety disorder, Meniere disease.   Patient reports ED visits or hospitalization in 5 opportunities over the last six months, treated for acute bronchitis / pneumonia or COPD exacerbation . Last ED was on 2/21/2022.   Doing well after finishing steroids and  antibiotics (doxycycline)  Reports mild exertional dyspnea, able to walk over one block before stopping, denies cough, occasional wheezes. Not using LAMA inhaler. Currently using albuterol HFA or albuterol nebs once or twice a day.   No chest pain, decrease swelling of LEs. No orthopnea or PND.   Stopped using CPAP machine. Unable to tolerate CPAP machine.   No acid reflux symptoms.     Previous tobacco use 1/2 ppd for 47 years. Quit 2/2017.    Past Medical History:   Diagnosis Date     Acute on chronic respiratory failure with hypoxia (H) 11/15/2016     Bipolar 1 disorder (H)      CAP (community acquired pneumonia) 11/15/2016     Cervical high risk HPV (human papillomavirus) test positive 3/5/2018    3/5/18 NIL pap, +HR HPV (not 16/18) 5/21/21 NIL pap, neg HPV. Plan: await provider     COPD with exacerbation (H) 11/15/2016     Diabetes mellitus, type II (H)      Hearing loss      Meniere's disease      Medications:     ARIPiprazole (ABILIFY) 10 MG tablet, [ARIPIPRAZOLE (ABILIFY) 10 MG TABLET] Take 10 mg by mouth daily.  CONTOUR NEXT TEST test strip, 1 strip by In Vitro route daily   empagliflozin (JARDIANCE) 10 MG TABS tablet, Take 1 tablet (10 mg) by mouth daily  metFORMIN (GLUCOPHAGE) 1000 MG tablet, Take 1 tablet (1,000 mg) by mouth 2 times daily (with meals)  oxybutynin (DITROPAN) 5 MG tablet, Take 1 tablet (5 mg) by mouth 3 times daily  polyethylene glycol (MIRALAX) 17 GM/Dose powder, Take 17 g by mouth daily  doxycycline hyclate (VIBRAMYCIN) 100 MG capsule, Take 100 mg by mouth 2 times daily End on 12/11/21    No current facility-administered medications on file prior to visit.    Social History     Socioeconomic History     Marital status:      Spouse name: Not on file     Number of children: Not on file     Years of education: Not on file     Highest education level: Not on file   Occupational History     Occupation: /[employer]- quit March 2019   Social Needs     Financial resource strain: Not on  file     Food insecurity     Worry: Not on file     Inability: Not on file     Transportation needs     Medical: Not on file     Non-medical: Not on file   Tobacco Use     Smoking status: Former Smoker     Packs/day: 1.00     Years: 40.00     Pack years: 40.00     Quit date: 2017     Years since quittin.3     Smokeless tobacco: Never Used   Substance and Sexual Activity     Alcohol use: Yes     Comment: Occasional      Drug use: No     Sexual activity: Never   Lifestyle     Physical activity     Days per week: Not on file     Minutes per session: Not on file     Stress: Not on file   Relationships     Social connections     Talks on phone: Not on file     Gets together: Not on file     Attends Congregational service: Not on file     Active member of club or organization: Not on file     Attends meetings of clubs or organizations: Not on file     Relationship status: Not on file     Intimate partner violence     Fear of current or ex partner: Not on file     Emotionally abused: Not on file     Physically abused: Not on file     Forced sexual activity: Not on file   Other Topics Concern     Not on file   Social History Narrative    2019The patient lives with her mother.; had worked at [email or web address] as  but quit 2019.     Quit smoking ; no etoh problems    / x 2 ; no kids     Family History   Problem Relation Age of Onset     Aneurysm Father      Heart disease Father 70        bypass in 70s, AAA rupture at age 77      Ulcers Father 76     Pacemaker Mother      Bipolar disorder Brother      Breast cancer Maternal Grandmother 51     Kidney failure Maternal Grandmother      Cancer Paternal Grandmother         ?? lung     Cancer Paternal Uncle         ?? lung     Mental illness Maternal Grandfather      Heart disease Other         uncle     No Medical Problems Sister         two siblings abuse chemicals     No Medical Problems Brother      Bipolar disorder Nephew      Review of Systems  A 12  point comprehensive review of systems was negative except as noted.      Objective:     /76 (BP Location: Left arm, Patient Position: Chair, Cuff Size: Adult Large)   Pulse 100   Resp 20   Wt 77.6 kg (171 lb)   SpO2 94%   BMI 39.74 kg/m        Wt Readings from Last 3 Encounters:   06/15/21 177 lb (80.3 kg)   05/21/21 178 lb (80.7 kg)   04/30/21 187 lb 6 oz (85 kg)     Vitals:    03/28/22 0840   Weight: 77.6 kg (171 lb)     Gen: obese, awake, alert, no distress  HEENT: pink conjunctiva, moist mucosa, Mallampati III/IV  Neck: no thyromegaly, masses or JVD  Lungs: clear  CV: regular, no murmurs or gallops appreciated  Abdomen: soft, distended, NT, BS wnl  Ext: trace edema  Neuro: CN II-XII intact, non focal      Diagnostic tests:     Sleep Study (2/17/2014)  AHI 14.7, RDI 14.7, lateral , REM AHI 45, Supine   PML index 21.7  Mean O2 sat 87%, desaturation heide 61%, Time of SpO2 < 89% 44 minutes out of 138 minutes of diagnostic time.   CPAP 7 cmH20 was therapeutic     PFT's 3/8/16  FEV1/FVC is 81  FEV1 is 0.52L (31%) predicted and is reduced.  FVC is 0.64L (30%) predicted and reduced.  There was no improvement in spirometry after a single inhaled dose of bronchodilator.  TLC is 4.05L (118%) predicted and is normal.  RV is 3.26L (226%) predicted and is increased.  Flow volume loops indicate scooped expiratory limb.    CTA CHEST PE RUN 9/26/2017 12:03 PM  INDICATION: acute on chronic respiratory failure  COMPARISON: 10/31/2013  ANGIOGRAM CHEST: There is moderate motion artifact which results in regions of modeled vascular enhancement. In the highest degree of vascular blurring and mottled enhancement, it is impossible to exclude small emboli but no convincing evidence for emboli present. The overall appearance is very similar to the previous exam.  LUNGS AND PLEURA: Mild peripheral bilateral atelectasis or scar also similar to previous exam, no new pneumonia.  MEDIASTINUM: Small reactive lymph  nodes within anterior mediastinum unchanged, no suspicious adenopathy. Heart size upper limits of normal. Tortuous thoracic aorta.  LIMITED UPPER ABDOMEN: Negative.  MUSCULOSKELETAL: S-shaped scoliosis of thoracic and lumbar spine unchanged.  CONCLUSION:  1.  No significant change. No convincing evidence of pulmonary embolism.  2.  Stable atelectasis or scar, left lung base. Scoliosis.    XR CHEST 2 VIEWS 8/28/2019 3:12 PM  INDICATION: cough  COMPARISON: 7/3/2019    FINDINGS: No pleural fluid or pneumothorax. No consolidative airspace disease identified. There is interstitial edema. The cardiomediastinal silhouette is not well assessed. There is lateral curvature of the spine.     XR CHEST 2 VIEWS PA AND LATERAL 2/21/2022 11:25 AM   INDICATION: URI. Cough.   COMPARISON: Chest CTA 12/09/2021, chest x-ray 12/02/2021 and older studies.   IMPRESSION: Large body habitus. Lungs are clear. Heart and pulmonary vascularity are normal. Severe S-shaped thoracolumbar scoliosis.      Echocardiogram 1/3/2014  EF 75%, IVSd 1.3  Normal RV  No significant valvular disease.

## 2022-03-28 NOTE — PATIENT INSTRUCTIONS
1. Continue O2 supplementation 2 LPM with activities.   2. Sleep hygiene  3. Start BREO one puff daily, rinse your mouth with water after each use  4. Albuterol inhaler 2 puffs up to 6 times a day as needed  5. Albuterol nebs as needed  6. Increase activity as tolerated  7. Weight loss  8. Follow up in 6 months   Patient states that she is no longer going to [place].   changed employers and is now has [employer] insurance. Sparkle García RN

## 2022-04-02 ENCOUNTER — TRANSFERRED RECORDS (OUTPATIENT)
Dept: HEALTH INFORMATION MANAGEMENT | Facility: CLINIC | Age: 67
End: 2022-04-02
Payer: COMMERCIAL

## 2022-04-02 LAB — COLOGUARD-ABSTRACT: NEGATIVE

## 2022-04-05 ENCOUNTER — TELEPHONE (OUTPATIENT)
Dept: FAMILY MEDICINE | Facility: CLINIC | Age: 67
End: 2022-04-05
Payer: COMMERCIAL

## 2022-04-05 NOTE — TELEPHONE ENCOUNTER
Patient had Cologuard test delivered but it has not been returned to [employer].  Please call patient and remind her the kit will be expiring and will need and it will not be processed if received after 5/15/2022.

## 2022-04-12 ENCOUNTER — TELEPHONE (OUTPATIENT)
Dept: FAMILY MEDICINE | Facility: CLINIC | Age: 67
End: 2022-04-12
Payer: COMMERCIAL

## 2022-04-12 NOTE — TELEPHONE ENCOUNTER
Patient would like cologuard results.  She sent it in two weeks ago.  They told her it would take two weeks for results.     Please call Merlyn at [phone]

## 2022-04-13 NOTE — TELEPHONE ENCOUNTER
Left message to call back for: Results  Information to relay to patient: LMTCB, Please see message below.

## 2022-04-14 NOTE — TELEPHONE ENCOUNTER
Pt returning a call stating she cant hear what the message say but something about cologuard. Pt states the message was very low and static.     I relayed Dr. Whiting' message to pt. She states understanding.     Patient has a question for Dr. Whiting if it is OK to take berberine and glucogold. Pt states she was looking on the Internet and would like to know if it's OK to take. Please all pt back at [phone]; OK to leave detailed message.

## 2022-04-27 ENCOUNTER — NURSE TRIAGE (OUTPATIENT)
Dept: NURSING | Facility: CLINIC | Age: 67
End: 2022-04-27

## 2022-04-27 ENCOUNTER — OFFICE VISIT (OUTPATIENT)
Dept: FAMILY MEDICINE | Facility: CLINIC | Age: 67
End: 2022-04-27
Payer: COMMERCIAL

## 2022-04-27 ENCOUNTER — TELEPHONE (OUTPATIENT)
Dept: BEHAVIORAL HEALTH | Facility: CLINIC | Age: 67
End: 2022-04-27

## 2022-04-27 ENCOUNTER — VIRTUAL VISIT (OUTPATIENT)
Dept: BEHAVIORAL HEALTH | Facility: CLINIC | Age: 67
End: 2022-04-27
Payer: COMMERCIAL

## 2022-04-27 VITALS
HEART RATE: 79 BPM | BODY MASS INDEX: 39.81 KG/M2 | SYSTOLIC BLOOD PRESSURE: 110 MMHG | OXYGEN SATURATION: 95 % | WEIGHT: 172 LBS | DIASTOLIC BLOOD PRESSURE: 68 MMHG | HEIGHT: 55 IN

## 2022-04-27 DIAGNOSIS — S39.012A STRAIN OF LUMBAR REGION, INITIAL ENCOUNTER: Primary | ICD-10-CM

## 2022-04-27 DIAGNOSIS — F33.1 MODERATE EPISODE OF RECURRENT MAJOR DEPRESSIVE DISORDER (H): Primary | ICD-10-CM

## 2022-04-27 DIAGNOSIS — E11.8 TYPE 2 DIABETES MELLITUS WITH UNSPECIFIED COMPLICATIONS (H): ICD-10-CM

## 2022-04-27 PROCEDURE — 99213 OFFICE O/P EST LOW 20 MIN: CPT | Performed by: FAMILY MEDICINE

## 2022-04-27 NOTE — TELEPHONE ENCOUNTER
Patient states behavioral health is not available for her ., and she states she is needing more numbers.    She states she cannot connect with anyone until Sept 27th.    Video appointment was made at 10:00am, and this appointment never happened.    She would like to have more options to get Behavioral Health appointment today.    Helen Woods RN   [facility] Nurse Advisor    Additional Information    Information only question and nurse able to answer    Protocols used: INFORMATION ONLY CALL - NO TRIAGE-A-OH

## 2022-04-27 NOTE — PROGRESS NOTES
"  Assessment & Plan     Strain of lumbar region, initial encounter  Low back pain in patient with known scoliosis, exacerbated by prolonged sitting on a new couch.  No red flag symptoms so will not pursue imaging today.  Offered PT but patient declined.  Recommended increasing activity and avoiding sitting since this exacerbates her pain.  We also discussed stretching, heat, and Tylenol or ibuprofen as needed.    Type 2 diabetes mellitus with unspecified complications (H)  Due for eye exam, referral placed.  - OPTOMETRY REFERRAL; Future    DO TIESHA [facility]ler [facility]    Wendy Samuels is a 67 year old who presents for the following health issues  Chief Complaint   Patient presents with     Back Pain     Lower back pain, started when mother bought new couch     HPI     [place]  Her mother got rid of her love seat and has a new couch.  This is more stiff than her old couch and she feels her low back has been disturbed by it.  She admits to sitting in the couch for prolonged periods of time and her back aches once getting off of the couch.  She has known scoliosis and has found PT unhelpful in the past.  She denies radiation into the legs.  It is difficult for her to put on her shoes but this has been a chronic issue, so she has slip on tennis shoes.  She has not used any medications heat or ice for her pain.        Objective    /68   Pulse 79   Ht 1.397 m (4' 7\")   Wt 78 kg (172 lb)   SpO2 95%   BMI 39.98 kg/m    Body mass index is 39.98 kg/m .  Physical Exam   Back: scolios.  Tender to palpation lumbar and paraspinal muscles.  Decreased lumbar flexion due to body habitus. Sensation grossly intact in bilateral lower extremities.  Normal lower extremity strength.  Non-ataxic gait    "

## 2022-04-27 NOTE — TELEPHONE ENCOUNTER
Writer spoke with pt and scheduled initial TC therapy appointment on 04/27/22 @ 10 am. Writer has sent documents via my chart and appointment reminder. Writer will reply to referral source.    Hilda Ron  04/27/22  914    ----- Message from Lawson Bailey sent at 4/27/2022  8:52 AM CDT -----  Regarding: Referral Outcome  Transition Clinic Referral   [place] Only   Limited [place] Availability    Type of Referral:      __X___Therapy  _____Therapy & Medication (Psychiatry next level of care appointment needs to be scheduled)  _____Medication Only (Psychiatry next level of care appointment needs to be scheduled)  _____Diagnostic Assessment Only      Referring Provider Name: Lawson Bailey    Clinician completing the assessment. NA    Referring Provider: OTHER: pt is self referred    If known, referring provider contact name: NA; Phone Number: NA  Service Line/Location: NA    Reason for Transition Clinic Referral: long wait for services    Next Level of Care Patient Will Be Transitioned To: long term 1:1 therapy    Start Date for Next Level of Care Therapy (Required): 9/27/2022  Provider Nishant Pickard [facility]    Start Date for Next Level of Care Medication (Required): NANCY   [facility]  [facility] cannot [facility] patients who do not have active medical insurance    What Would Be Helpful from the Transition Clinic: bridge appt for therapy, pt would like to be seen as soon as possible (today or tomorrow) but I did make it clear most likely the first opening wouldn't be until next week     Needs: NO    Does Patient Have Access to Technology: YES    Patient E-mail Address: [email or web address]    Current Patient Phone Number: [phone]; NA    Clinician Gender Preference (if applicable): Unknown    Lawson Bailey
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
## 2022-04-29 NOTE — TELEPHONE ENCOUNTER
Has appointment with me in 6/2022. Ok to wait until then  In the meantime can take tylenol for pain

## 2022-05-03 ENCOUNTER — TELEPHONE (OUTPATIENT)
Dept: BEHAVIORAL HEALTH | Facility: CLINIC | Age: 67
End: 2022-05-03
Payer: COMMERCIAL

## 2022-05-03 ENCOUNTER — VIRTUAL VISIT (OUTPATIENT)
Dept: BEHAVIORAL HEALTH | Facility: CLINIC | Age: 67
End: 2022-05-03
Attending: PSYCHIATRY & NEUROLOGY
Payer: COMMERCIAL

## 2022-05-03 DIAGNOSIS — F31.9 BIPOLAR 1 DISORDER (H): Primary | ICD-10-CM

## 2022-05-03 ASSESSMENT — ANXIETY QUESTIONNAIRES
6. BECOMING EASILY ANNOYED OR IRRITABLE: MORE THAN HALF THE DAYS
7. FEELING AFRAID AS IF SOMETHING AWFUL MIGHT HAPPEN: SEVERAL DAYS
4. TROUBLE RELAXING: MORE THAN HALF THE DAYS
2. NOT BEING ABLE TO STOP OR CONTROL WORRYING: SEVERAL DAYS
1. FEELING NERVOUS, ANXIOUS, OR ON EDGE: SEVERAL DAYS
7. FEELING AFRAID AS IF SOMETHING AWFUL MIGHT HAPPEN: SEVERAL DAYS
3. WORRYING TOO MUCH ABOUT DIFFERENT THINGS: SEVERAL DAYS
GAD7 TOTAL SCORE: 10
8. IF YOU CHECKED OFF ANY PROBLEMS, HOW DIFFICULT HAVE THESE MADE IT FOR YOU TO DO YOUR WORK, TAKE CARE OF THINGS AT HOME, OR GET ALONG WITH OTHER PEOPLE?: NOT DIFFICULT AT ALL
GAD7 TOTAL SCORE: 10
5. BEING SO RESTLESS THAT IT IS HARD TO SIT STILL: MORE THAN HALF THE DAYS
GAD7 TOTAL SCORE: 10

## 2022-05-03 ASSESSMENT — PATIENT HEALTH QUESTIONNAIRE - PHQ9
2. FEELING DOWN, DEPRESSED, IRRITABLE, OR HOPELESS: MORE THAN HALF THE DAYS
10. IF YOU CHECKED OFF ANY PROBLEMS, HOW DIFFICULT HAVE THESE PROBLEMS MADE IT FOR YOU TO DO YOUR WORK, TAKE CARE OF THINGS AT HOME, OR GET ALONG WITH OTHER PEOPLE: NOT DIFFICULT AT ALL
9. THOUGHTS THAT YOU WOULD BE BETTER OFF DEAD, OR OF HURTING YOURSELF: NOT AT ALL
3. TROUBLE FALLING OR STAYING ASLEEP OR SLEEPING TOO MUCH: NOT AT ALL
1. LITTLE INTEREST OR PLEASURE IN DOING THINGS: SEVERAL DAYS
SUM OF ALL RESPONSES TO PHQ QUESTIONS 1-9: 7
SUM OF ALL RESPONSES TO PHQ QUESTIONS 1-9: 7
10. IF YOU CHECKED OFF ANY PROBLEMS, HOW DIFFICULT HAVE THESE PROBLEMS MADE IT FOR YOU TO DO YOUR WORK, TAKE CARE OF THINGS AT HOME, OR GET ALONG WITH OTHER PEOPLE: NOT DIFFICULT AT ALL
SUM OF ALL RESPONSES TO PHQ QUESTIONS 1-9: 7
4. FEELING TIRED OR HAVING LITTLE ENERGY: MORE THAN HALF THE DAYS
7. TROUBLE CONCENTRATING ON THINGS, SUCH AS READING THE NEWSPAPER OR WATCHING TELEVISION: NOT AT ALL
5. POOR APPETITE OR OVEREATING: MORE THAN HALF THE DAYS

## 2022-05-03 NOTE — TELEPHONE ENCOUNTER
Pt called back on que about missing therapy appointment. Writer rescheduled pt for today at 12 pm via phone call.    Hilda Ron  05/03/22  950

## 2022-05-04 ASSESSMENT — ANXIETY QUESTIONNAIRES: GAD7 TOTAL SCORE: 10

## 2022-05-04 ASSESSMENT — PATIENT HEALTH QUESTIONNAIRE - PHQ9: SUM OF ALL RESPONSES TO PHQ QUESTIONS 1-9: 7

## 2022-05-05 ENCOUNTER — PATIENT OUTREACH (OUTPATIENT)
Dept: FAMILY MEDICINE | Facility: CLINIC | Age: 67
End: 2022-05-05
Payer: COMMERCIAL

## 2022-05-05 NOTE — LETTER
August 8, 2022      Arvind Leong  [address] [place] [address]        Dear ,    This letter is to remind you that you are due for your follow-up Pap smear and Human Papillomavirus (HPV) test.    Please call [phone] to schedule your appointment at your earliest convenience.    If you have completed the appointment outside of the [facility] system, please have the records forwarded to our office. We will update your chart for your provider to review before your next annual wellness visit.     Thank you for choosing [facility]!      Sincerely,    Your [facility] Care Team

## 2022-05-05 NOTE — LETTER
May 5, 2022      Arvind Leong  [address] [place] [address]        Dear ,    This letter is to remind you that you are due for your follow-up Pap smear and Human Papillomavirus (HPV) test.    Please call [phone] to schedule your appointment at your earliest convenience.    If you have completed the appointment outside of the [facility] system, please have the records forwarded to our office. We will update your chart for your provider to review before your next annual wellness visit.     Thank you for choosing [facility]!      Sincerely,    Your [facility] Care Team

## 2022-05-06 NOTE — TELEPHONE ENCOUNTER
Pt is scheduled 5/11/22 with psychotherapist. Patient's voicemail is full. Spoke to patient's Mom Jeri and gave her the phone number for [facility] so patient can schedule for therapy.

## 2022-05-11 ENCOUNTER — DOCUMENTATION ONLY (OUTPATIENT)
Dept: BEHAVIORAL HEALTH | Facility: CLINIC | Age: 67
End: 2022-05-11
Payer: COMMERCIAL

## 2022-05-11 ENCOUNTER — VIRTUAL VISIT (OUTPATIENT)
Dept: BEHAVIORAL HEALTH | Facility: CLINIC | Age: 67
End: 2022-05-11
Attending: PSYCHIATRY & NEUROLOGY
Payer: COMMERCIAL

## 2022-05-11 DIAGNOSIS — F31.9 BIPOLAR 1 DISORDER (H): Primary | ICD-10-CM
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
## 2022-05-20 ENCOUNTER — NURSE TRIAGE (OUTPATIENT)
Dept: NURSING | Facility: CLINIC | Age: 67
End: 2022-05-20
Payer: COMMERCIAL

## 2022-05-20 ENCOUNTER — TELEPHONE (OUTPATIENT)
Dept: NURSING | Facility: CLINIC | Age: 67
End: 2022-05-20
Payer: COMMERCIAL

## 2022-05-20 NOTE — TELEPHONE ENCOUNTER
"Patient calling to report back pain and right leg pain.  Pain started a few days ago and gradually got worse.  Leg pain is from the knee down and hurts with activity, is limping.  Back pain is also worse with activity.  Pt states that she works as a  and is on her feet all day.    Pt has known arthritis in legs but isn't taking anything for it currently.  Pt hasn't tried taking any pain medicine for her pain.  Lying down helps with pain.  Pt said she has been taking pills from a company called \"[employer]\" and states it's for her \"body\".  Pt doesn't know the name.    Disposition: See PCP within 3 days.  Care advice given.    Pt is wondering if the \"tests\" that she would need to have done can be done at her clinic appointment.  In the past, she has gone to [facility] and have been sent to the ED but for different issues.  Please advice.     Yana Jones RN, BSN Nurse Triage Advisor 5/20/2022 2:59 AM     COVID 19 Nurse Triage Plan/Patient Instructions    Please be aware that novel coronavirus (COVID-19) may be circulating in the community. If you develop symptoms such as fever, cough, or SOB or if you have concerns about the presence of another infection including coronavirus (COVID-19), please contact your health care provider or visit [email or web address].     Disposition/Instructions    In-Person Visit with provider recommended. Reference Visit Selection Guide.    Thank you for taking steps to prevent the spread of this virus.  o Limit your contact with others.  o Wear a simple mask to cover your cough.  o Wash your hands well and often.    Resources    [facility]: About COVID-19: [email or web address]    CDC: What to Do If You're Sick: www.cdc.gov/coronavirus/2019-ncov/about/steps-when-sick.html    CDC: Ending Home Isolation: www.cdc.gov/coronavirus/2019-ncov/hcp/disposition-in-home-patients.html     CDC: Caring for Someone: " "www.cdc.gov/coronavirus/2019-ncov/if-you-are-sick/care-for-someone.html     [facility]: Interim Guidance for Hospital Discharge to Home: www.health.[facility].mn.us/diseases/coronavirus/hcp/hospdischarge.pdf    [facility] clinical trials (COVID-19 research studies): [email or web address].[email or web address]/umn-clinical-trials     Below are the COVID-19 hotlines at the [place] Department of Health ([facility]). Interpreters are available.   o For health questions: Call 835-598-4372 or 1-251.476.5272 (7 a.m. to 7 p.m.)  o For questions about schools and childcare: Call 363-889-8258 or 1-401.450.2807 (7 a.m. to 7 p.m.)                     Reason for Disposition    [1] MODERATE pain (e.g., interferes with normal activities, limping) AND [2] present > 3 days    [1] MODERATE back pain (e.g., interferes with normal activities) AND [2] present > 3 days    Additional Information    Negative: Looks like a broken bone or dislocated joint (e.g., crooked or deformed)    Negative: Sounds like a life-threatening emergency to the triager    Negative: Chest pain    Negative: Difficulty breathing    Negative: Entire foot is cool or blue in comparison to other side    Negative: Unable to walk    Negative: [1] Red area or streak AND [2] fever    Negative: [1] Swollen joint AND [2] fever    Negative: [1] Cast on leg or ankle AND [2] now increased pain    Negative: Patient sounds very sick or weak to the triager    Negative: [1] SEVERE pain (e.g., excruciating, unable to do any normal activities) AND [2] not improved after 2 hours of pain medicine    Negative: [1] Thigh, calf, or ankle swelling AND [2] only 1 side    Negative: [1] Thigh, calf, or ankle swelling AND [2] bilateral AND [3] 1 side is more swollen    Negative: [1] Red area or streak AND [2] large (> 2 in. or 5 cm)    Negative: History of prior \"blood clot\" in leg or lungs (i.e., deep vein thrombosis, pulmonary embolism)    Negative: History of inherited increased risk of blood clots (e.g., Factor " "5 Leiden, Anti-thrombin 3, Protein C or Protein S deficiency, Prothrombin mutation)    Negative: Major surgery in the past month    Negative: Hip or leg fracture (broken bone) in past month (or had cast on leg or ankle in past month)    Negative: Illness requiring prolonged bedrest in past month (e.g., immobilization, long hospital stay)    Negative: Cancer treatment in the past two months (or has cancer now)    Negative: Long-distance travel in past month (e.g., car, bus, train, plane; with trip lasting 6 or more hours)    Negative: [1] Thigh or calf pain AND [2] only 1 side AND [3] present > 1 hour    Negative: [1] Painful rash AND [2] multiple small blisters grouped together (i.e., dermatomal distribution or \"band\" or \"stripe\")    Negative: Looks like a boil, infected sore, deep ulcer or other infected rash (spreading redness, pus)    Negative: [1] Localized rash is very painful AND [2] no fever    Negative: Numbness in a leg or foot (i.e., loss of sensation)    Negative: Localized pain, redness or hard lump along vein    Negative: Passed out (i.e., lost consciousness, collapsed and was not responding)    Negative: Shock suspected (e.g., cold/pale/clammy skin, too weak to stand, low BP, rapid pulse)    Negative: Sounds like a life-threatening emergency to the triager    Negative: [1] SEVERE back pain (e.g., excruciating) AND [2] sudden onset AND [3] age > 60    Negative: [1] Unable to urinate (or only a few drops) > 4 hours AND [2] bladder feels very full (e.g., palpable bladder or strong urge to urinate)    Negative: [1] Loss of bladder or bowel control (urine or bowel incontinence; wetting self, leaking stool) AND [2] new onset    Negative: Numbness in groin or rectal area (i.e., loss of sensation)    Negative: [1] SEVERE abdominal pain AND [2] present > 1 hour    Negative: Unable to walk    Negative: [1] Abdominal pain AND [2] age > 60    Negative: Weakness of a leg or foot (e.g., unable to bear weight, " "dragging foot)    Negative: Patient sounds very sick or weak to the triager    Negative: [1] SEVERE back pain (e.g., excruciating, unable to do any normal activities) AND [2] not improved 2 hours after pain medicine    Negative: [1] Pain radiates into the thigh or further down the leg AND [2] both legs    Negative: [1] Fever > 100.0 F (37.8 C) AND [2] flank pain (i.e., in side, below ribs and above hip)    Negative: [1] Pain or burning with passing urine (urination) AND [2] flank pain (i.e., in side, below ribs and above hip)    Negative: Numbness in a leg or foot (i.e., loss of sensation)    Negative: [1] Numbness in an arm or hand (i.e., loss of sensation) AND [2] upper back pain    Negative: High-risk adult (e.g., history of cancer, HIV, or IV drug abuse)    Negative: [1] Fever AND [2] no symptoms of UTI  (Exception: has generalized muscle pains, not localized back pain)    Negative: Rash in same area as pain (may be described as \"small blisters\")    Negative: Blood in urine (red, pink, or tea-colored)    Protocols used: LEG PAIN-A-AH, BACK PAIN-A-AH      "

## 2022-05-28 ENCOUNTER — APPOINTMENT (OUTPATIENT)
Dept: RADIOLOGY | Facility: HOSPITAL | Age: 67
End: 2022-05-28
Attending: EMERGENCY MEDICINE
Payer: COMMERCIAL

## 2022-05-28 ENCOUNTER — APPOINTMENT (OUTPATIENT)
Dept: CT IMAGING | Facility: HOSPITAL | Age: 67
End: 2022-05-28
Attending: EMERGENCY MEDICINE
Payer: COMMERCIAL

## 2022-05-28 ENCOUNTER — NURSE TRIAGE (OUTPATIENT)
Dept: NURSING | Facility: CLINIC | Age: 67
End: 2022-05-28
Payer: COMMERCIAL

## 2022-05-28 ENCOUNTER — HOSPITAL ENCOUNTER (EMERGENCY)
Facility: HOSPITAL | Age: 67
Discharge: HOME | End: 2022-05-28
Attending: EMERGENCY MEDICINE | Admitting: EMERGENCY MEDICINE
Payer: COMMERCIAL

## 2022-05-28 VITALS
RESPIRATION RATE: 18 BRPM | WEIGHT: 174 LBS | OXYGEN SATURATION: 95 % | TEMPERATURE: 98.3 F | SYSTOLIC BLOOD PRESSURE: 110 MMHG | DIASTOLIC BLOOD PRESSURE: 53 MMHG | HEART RATE: 84 BPM | HEIGHT: 55 IN | BODY MASS INDEX: 40.27 KG/M2

## 2022-05-28 DIAGNOSIS — M54.50 BILATERAL LOW BACK PAIN WITHOUT SCIATICA, UNSPECIFIED CHRONICITY: ICD-10-CM

## 2022-05-28 DIAGNOSIS — J44.1 COPD EXACERBATION (H): ICD-10-CM

## 2022-05-28 LAB
ALBUMIN UR-MCNC: NEGATIVE MG/DL
AMORPH CRY #/AREA URNS HPF: ABNORMAL /HPF
ANION GAP SERPL CALCULATED.3IONS-SCNC: 10 MMOL/L (ref 5–18)
APPEARANCE UR: ABNORMAL
BACTERIA #/AREA URNS HPF: ABNORMAL /HPF
BASOPHILS # BLD AUTO: 0.1 10E3/UL (ref 0–0.2)
BASOPHILS NFR BLD AUTO: 0 %
BILIRUB UR QL STRIP: NEGATIVE
BUN SERPL-MCNC: 15 MG/DL (ref 8–22)
CALCIUM SERPL-MCNC: 9.5 MG/DL (ref 8.5–10.5)
CHLORIDE BLD-SCNC: 101 MMOL/L (ref 98–107)
CO2 SERPL-SCNC: 31 MMOL/L (ref 22–31)
COLOR UR AUTO: ABNORMAL
CREAT SERPL-MCNC: 0.78 MG/DL (ref 0.6–1.1)
EOSINOPHIL # BLD AUTO: 0.2 10E3/UL (ref 0–0.7)
EOSINOPHIL NFR BLD AUTO: 1 %
ERYTHROCYTE [DISTWIDTH] IN BLOOD BY AUTOMATED COUNT: 12.7 % (ref 10–15)
GFR SERPL CREATININE-BSD FRML MDRD: 83 ML/MIN/1.73M2
GLUCOSE BLD-MCNC: 229 MG/DL (ref 70–125)
GLUCOSE UR STRIP-MCNC: 50 MG/DL
HCT VFR BLD AUTO: 45.7 % (ref 35–47)
HGB BLD-MCNC: 14.4 G/DL (ref 11.7–15.7)
HGB UR QL STRIP: NEGATIVE
IMM GRANULOCYTES # BLD: 0.1 10E3/UL
IMM GRANULOCYTES NFR BLD: 0 %
KETONES UR STRIP-MCNC: NEGATIVE MG/DL
LEUKOCYTE ESTERASE UR QL STRIP: ABNORMAL
LYMPHOCYTES # BLD AUTO: 3 10E3/UL (ref 0.8–5.3)
LYMPHOCYTES NFR BLD AUTO: 22 %
MCH RBC QN AUTO: 29.5 PG (ref 26.5–33)
MCHC RBC AUTO-ENTMCNC: 31.5 G/DL (ref 31.5–36.5)
MCV RBC AUTO: 94 FL (ref 78–100)
MONOCYTES # BLD AUTO: 0.9 10E3/UL (ref 0–1.3)
MONOCYTES NFR BLD AUTO: 7 %
MUCOUS THREADS #/AREA URNS LPF: PRESENT /LPF
NEUTROPHILS # BLD AUTO: 9.3 10E3/UL (ref 1.6–8.3)
NEUTROPHILS NFR BLD AUTO: 70 %
NITRATE UR QL: NEGATIVE
NRBC # BLD AUTO: 0 10E3/UL
NRBC BLD AUTO-RTO: 0 /100
PH UR STRIP: 7 [PH] (ref 5–7)
PLATELET # BLD AUTO: 220 10E3/UL (ref 150–450)
POTASSIUM BLD-SCNC: 4.1 MMOL/L (ref 3.5–5)
RBC # BLD AUTO: 4.88 10E6/UL (ref 3.8–5.2)
RBC URINE: 0 /HPF
SODIUM SERPL-SCNC: 142 MMOL/L (ref 136–145)
SP GR UR STRIP: 1.01 (ref 1–1.03)
SQUAMOUS EPITHELIAL: 2 /HPF
UROBILINOGEN UR STRIP-MCNC: <2 MG/DL
WBC # BLD AUTO: 13.4 10E3/UL (ref 4–11)
WBC URINE: <1 /HPF

## 2022-05-28 PROCEDURE — 81001 URINALYSIS AUTO W/SCOPE: CPT | Performed by: EMERGENCY MEDICINE

## 2022-05-28 PROCEDURE — 74176 CT ABD & PELVIS W/O CONTRAST: CPT

## 2022-05-28 PROCEDURE — 96374 THER/PROPH/DIAG INJ IV PUSH: CPT

## 2022-05-28 PROCEDURE — 71046 X-RAY EXAM CHEST 2 VIEWS: CPT

## 2022-05-28 PROCEDURE — 85025 COMPLETE CBC W/AUTO DIFF WBC: CPT | Performed by: EMERGENCY MEDICINE

## 2022-05-28 PROCEDURE — 250N000009 HC RX 250: Performed by: EMERGENCY MEDICINE

## 2022-05-28 PROCEDURE — 250N000012 HC RX MED GY IP 250 OP 636 PS 637: Performed by: EMERGENCY MEDICINE

## 2022-05-28 PROCEDURE — 80048 BASIC METABOLIC PNL TOTAL CA: CPT | Performed by: EMERGENCY MEDICINE

## 2022-05-28 PROCEDURE — 250N000011 HC RX IP 250 OP 636: Performed by: EMERGENCY MEDICINE

## 2022-05-28 PROCEDURE — 94640 AIRWAY INHALATION TREATMENT: CPT

## 2022-05-28 PROCEDURE — 36415 COLL VENOUS BLD VENIPUNCTURE: CPT | Performed by: EMERGENCY MEDICINE

## 2022-05-28 PROCEDURE — 99285 EMERGENCY DEPT VISIT HI MDM: CPT | Mod: 25

## 2022-05-28 RX ORDER — PREDNISONE 20 MG/1
TABLET ORAL
Qty: 10 TABLET | Refills: 0 | Status: SHIPPED | OUTPATIENT
Start: 2022-05-28 | End: 2022-05-28

## 2022-05-28 RX ORDER — KETOROLAC TROMETHAMINE 30 MG/ML
10 INJECTION, SOLUTION INTRAMUSCULAR; INTRAVENOUS ONCE
Status: COMPLETED | OUTPATIENT
Start: 2022-05-28 | End: 2022-05-28

## 2022-05-28 RX ORDER — PREDNISONE 20 MG/1
60 TABLET ORAL ONCE
Status: COMPLETED | OUTPATIENT
Start: 2022-05-28 | End: 2022-05-28

## 2022-05-28 RX ORDER — PREDNISONE 20 MG/1
TABLET ORAL
Qty: 10 TABLET | Refills: 0 | Status: SHIPPED | OUTPATIENT
Start: 2022-05-28 | End: 2022-06-22

## 2022-05-28 RX ORDER — IPRATROPIUM BROMIDE AND ALBUTEROL SULFATE 2.5; .5 MG/3ML; MG/3ML
3 SOLUTION RESPIRATORY (INHALATION) ONCE
Status: COMPLETED | OUTPATIENT
Start: 2022-05-28 | End: 2022-05-28

## 2022-05-28 RX ADMIN — KETOROLAC TROMETHAMINE 9.9 MG: 30 INJECTION, SOLUTION INTRAMUSCULAR at 02:12

## 2022-05-28 RX ADMIN — PREDNISONE 60 MG: 20 TABLET ORAL at 02:19

## 2022-05-28 RX ADMIN — IPRATROPIUM BROMIDE AND ALBUTEROL SULFATE 3 ML: 2.5; .5 SOLUTION RESPIRATORY (INHALATION) at 02:10

## 2022-05-28 ASSESSMENT — ENCOUNTER SYMPTOMS
BACK PAIN: 1
TREMORS: 0
ABDOMINAL PAIN: 1
APPETITE CHANGE: 0
SHORTNESS OF BREATH: 1
COUGH: 0
FREQUENCY: 1
NUMBNESS: 0

## 2022-05-28 NOTE — ED PROVIDER NOTES
EMERGENCY DEPARTMENT ENCOUNTER      NAME: Arvind Leong  AGE: 67 year old female  YOB: 1955  MRN: [id number]  EVALUATION DATE & TIME: 5/28/2022  1:20 AM    PCP: Rema Whiting    ED PROVIDER: Kevin Correa M.D.      Chief Complaint   Patient presents with     Back Pain     Shortness of Breath         FINAL IMPRESSION:  1. Bilateral low back pain without sciatica, unspecified chronicity    2. COPD exacerbation (H)          ED COURSE & MEDICAL DECISION MAKING:    Pertinent Labs & Imaging studies reviewed. (See chart for details)  67 year old female presents to the Emergency Department for evaluation of 2 complaints.  First is low back pain.  This started couple days ago.  Denies any injury.  Is bilateral flanks.  Some radiation to the anterior aspect of her abdomen.  Did get a CT scan.  No obvious cause.  No focal deficits.  No signs of cauda equina.  Urinalysis is normal.  White count mildly elevated 13.4.  Also has some shortness of breath.  Seems likely COPD exacerbation.  Given neb and prednisone here with improvement.  Chest x-ray is clear.  Feeling better after nebs.  Will discharge home with prednisone.  We will follow-up with primary.  Return for worsening symptoms.    1:22 AM I met with the patient to gather history and to perform my initial exam. I discussed the plan for care while in the Emergency Department. PPE: eye protection, surgical mask and nitrile gloves.  3:41 AM Results were communicated with the patient. Discussed discharge plans along with return precautions. Patient was agreeable with plan.        At the conclusion of the encounter I discussed the results of all of the tests and the disposition. The questions were answered. The patient or family acknowledged understanding and was agreeable with the care plan.           MEDICATIONS GIVEN IN THE EMERGENCY:  Medications   ketorolac (TORADOL) injection 9.9 mg (9.9 mg Intravenous Given 5/28/22 0212)   predniSONE  "(DELTASONE) tablet 60 mg (60 mg Oral Given 5/28/22 0219)   ipratropium - albuterol 0.5 mg/2.5 mg/3 mL (DUONEB) neb solution 3 mL (3 mLs Nebulization Given 5/28/22 0210)       NEW PRESCRIPTIONS STARTED AT TODAY'S ER VISIT  New Prescriptions    PREDNISONE (DELTASONE) 20 MG TABLET    Take two tablets (= 40mg) each day for 5 (five) days          =================================================================    HPI    Patient information was obtained from: patient     Use of : N/A         Arvind Leong is a 67 year old female with a pertinent history of scoliosis, chronic low back pain, anxiety, morbid obesity, hyperlipidemia, T2DM, COPD, asthma, on chronic supplemental oxygen, Meniere's disease, who presents to this ED via walk in for evaluation of low back pain and shortness of breath.    Patient has been endorsing lower back pain since yesterday with no improvement. There is no radiation with the pain. Pain does worsen with movement but no known palliating factors. No recent trauma or injuries. She does usually walk around with a walker but has not needed it. She can stand up normally by herself. No recent falls. No numbness or tingling to bilateral legs. When her abdomen is pressed during exam, she states that it is tender to palpation.     She also has endorsed worsening shortness of breath for the past \"few days\". She does have history of COPD and asthma with chronic supplemental oxygen 2L at home. She had tried using her nebulizer with no relief. No chest pain or cough. She has been endorsing urinary frequency but otherwise eating and drinking normally. No known sick contacts. No known allergies to medications.       REVIEW OF SYSTEMS   Review of Systems   Constitutional: Negative for appetite change.   Respiratory: Positive for shortness of breath (chronic) worsening. Negative for cough.    Cardiovascular: Negative for chest pain.   Gastrointestinal: Positive for abdominal pain. "   Genitourinary: Positive for frequency.   Musculoskeletal: Positive for back pain (low).   Neurological: Negative for tremors and numbness.   All other systems reviewed and are negative.       PAST MEDICAL HISTORY:  Past Medical History:   Diagnosis Date     Acute on chronic respiratory failure with hypoxia (H) 11/15/2016     Bipolar 1 disorder (H)      CAP (community acquired pneumonia) 11/15/2016     Cervical high risk HPV (human papillomavirus) test positive 3/5/2018    3/5/18 NIL pap, +HR HPV (not 16/18) 5/21/21 NIL pap, neg HPV. Plan: await provider     COPD exacerbation (H) 4/24/2021     COPD with exacerbation (H) 11/15/2016     Diabetes mellitus, type II (H)      Hearing loss      Meniere's disease      Pneumonia 4/23/2021     Pneumonia of right lower lobe due to infectious organism 6/11/2019       PAST SURGICAL HISTORY:  Past Surgical History:   Procedure Laterality Date     CYST REMOVAL  01/23/2001     VT REMOVAL ANAL FISTULA,SUBCUTANEOUS      Description: Fistula-in-ano Repair;  Recorded: 01/08/2010; x2     TONSILLECTOMY             CURRENT MEDICATIONS:    No current facility-administered medications for this encounter.     Current Outpatient Medications   Medication     predniSONE (DELTASONE) 20 MG tablet     albuterol (PROAIR HFA/PROVENTIL HFA/VENTOLIN HFA) 108 (90 Base) MCG/ACT inhaler     albuterol (PROVENTIL) (2.5 MG/3ML) 0.083% neb solution     ARIPiprazole (ABILIFY) 10 MG tablet     CONTOUR NEXT TEST test strip     doxycycline hyclate (VIBRAMYCIN) 100 MG capsule     empagliflozin (JARDIANCE) 10 MG TABS tablet     fluticasone-vilanterol (BREO ELLIPTA) 100-25 MCG/INH inhaler     metFORMIN (GLUCOPHAGE) 1000 MG tablet     oxybutynin (DITROPAN) 5 MG tablet     polyethylene glycol (MIRALAX) 17 GM/Dose powder         ALLERGIES:  Allergies   Allergen Reactions     Lurasidone Other (See Comments)     Face turned red, (Brand name = Latuda) Face turned red, Face turned red       FAMILY HISTORY:  Family  "History   Problem Relation Age of Onset     Aneurysm Father      Heart Disease Father 70.00        bypass in 70s, AAA rupture at age 77      Ulcers Father 76.00     Pacemaker Mother      Bipolar Disorder Brother      Breast Cancer Maternal Grandmother 51.00     Kidney failure Maternal Grandmother      Cancer Paternal Grandmother         ?? lung     Cancer Paternal Uncle         ?? lung     Mental Illness Maternal Grandfather      Heart Disease Other         uncle     No Known Problems Sister         two siblings abuse chemicals     No Known Problems Brother      Bipolar Disorder Nephew        SOCIAL HISTORY:   Social History     Socioeconomic History     Marital status:    Tobacco Use     Smoking status: Former Smoker     Packs/day: 1.00     Years: 40.00     Pack years: 40.00     Quit date: 2017     Years since quittin.3     Smokeless tobacco: Never Used   Vaping Use     Vaping Use: Never used   Substance and Sexual Activity     Alcohol use: Yes     Comment: Alcoholic Drinks/day: Occasional      Drug use: No     Sexual activity: Never   Social History Narrative    2019The patient lives with her mother.; had worked at [employer] as  but quit 2019.   Quit smoking ; no etoh problems  / x 2 ; no kids       VITALS:  /53   Pulse 84   Temp 98.3  F (36.8  C) (Oral)   Resp 18   Ht 1.397 m (4' 7\")   Wt 78.9 kg (174 lb)   SpO2 95%   BMI 40.44 kg/m      PHYSICAL EXAM    Physical Exam  Constitutional:       General: She is not in acute distress.     Appearance: She is not diaphoretic.   HENT:      Head: Atraumatic.      Mouth/Throat:      Pharynx: No oropharyngeal exudate.   Eyes:      General: No scleral icterus.     Pupils: Pupils are equal, round, and reactive to light.   Cardiovascular:      Heart sounds: Normal heart sounds.   Pulmonary:      Effort: No respiratory distress.      Breath sounds: Wheezing present.   Abdominal:      Palpations: Abdomen is soft.      " Tenderness: There is no abdominal tenderness. There is no guarding or rebound.   Musculoskeletal:         General: No tenderness.   Skin:     General: Skin is warm.      Findings: No rash.   Neurological:      General: No focal deficit present.      Mental Status: She is alert.      Comments: 5 out of 5 strength in bilateral upper and lower extremities.  Sensation intact in all 4 extremes.  Cranial nerves intact.  No pronator drift  Toes down going.  DTR symmetric.            LAB:  All pertinent labs reviewed and interpreted.  Labs Ordered and Resulted from Time of ED Arrival to Time of ED Departure   BASIC METABOLIC PANEL - Abnormal       Result Value    Sodium 142      Potassium 4.1      Chloride 101      Carbon Dioxide (CO2) 31      Anion Gap 10      Urea Nitrogen 15      Creatinine 0.78      Calcium 9.5      Glucose 229 (*)     GFR Estimate 83     ROUTINE UA WITH MICROSCOPIC REFLEX TO CULTURE - Abnormal    Color Urine Light Yellow      Appearance Urine Turbid (*)     Glucose Urine 50  (*)     Bilirubin Urine Negative      Ketones Urine Negative      Specific Gravity Urine 1.014      Blood Urine Negative      pH Urine 7.0      Protein Albumin Urine Negative      Urobilinogen Urine <2.0      Nitrite Urine Negative      Leukocyte Esterase Urine 25 Luis/uL (*)     Bacteria Urine Few (*)     Mucus Urine Present (*)     Amorphous Crystals Urine Few (*)     RBC Urine 0      WBC Urine <1      Squamous Epithelials Urine 2 (*)    CBC WITH PLATELETS AND DIFFERENTIAL - Abnormal    WBC Count 13.4 (*)     RBC Count 4.88      Hemoglobin 14.4      Hematocrit 45.7      MCV 94      MCH 29.5      MCHC 31.5      RDW 12.7      Platelet Count 220      % Neutrophils 70      % Lymphocytes 22      % Monocytes 7      % Eosinophils 1      % Basophils 0      % Immature Granulocytes 0      NRBCs per 100 WBC 0      Absolute Neutrophils 9.3 (*)     Absolute Lymphocytes 3.0      Absolute Monocytes 0.9      Absolute Eosinophils 0.2      Absolute  Basophils 0.1      Absolute Immature Granulocytes 0.1      Absolute NRBCs 0.0         RADIOLOGY:  Reviewed all pertinent imaging. Please see official radiology report.  XR Chest 2 Views   Final Result   IMPRESSION: Both lungs remain clear. No adenopathy or effusion. Normal cardiac size and pulmonary vascularity. Mild degenerative changes both shoulders and the spine. Thoracolumbar curve. No significant change.      CT Abdomen Pelvis w/o Contrast   Final Result   IMPRESSION:    1.  There are several left intrarenal stones. No ureteral stone or hydronephrosis.   2.  Colonic diverticula without acute diverticulitis. No bowel obstruction or inflammation.             PROCEDURES:   None      I, Cynthia Phillips, am serving as a scribe to document services personally performed by Dr. Kevin Correa, based on my observation and the provider's statements to me. I, Kevin Correa MD attest that Cynthia Phillips is acting in a scribe capacity, has observed my performance of the services and has documented them in accordance with my direction.    Kevin Correa M.D.  Emergency Medicine  [facility] EMERGENCY DEPARTMENT  [facility] [facility] [address]  [phone]  Dept: [phone]     Kevin Correa MD  05/28/22 0688

## 2022-05-28 NOTE — ED TRIAGE NOTES
Pt on home O2 at 2L/NC, came tonight for c/o back pain. Took Aleve with some relief. Pt also c/o SOB, at times pt can't catch her breath. Pt has history of COPD.     Triage Assessment     Row Name 05/28/22 0113       Triage Assessment (Adult)    Airway WDL WDL       Respiratory WDL    Respiratory WDL WDL       Cognitive/Neuro/Behavioral WDL    Cognitive/Neuro/Behavioral WDL WDL

## 2022-05-28 NOTE — TELEPHONE ENCOUNTER
"Triage Call     Pt calling with report of back pain.  Says she had COPD and her back has been hurting for \"several days\"      Calling because she wants me to call an MD to get her a script for prednisone.  Pt very unhappy when I told her she will need to see an MD to be prescribed medication.    Triaged to disposition of See PCP within 3 days, but pt says she cannot wait until Monday, and doesn't want an appointment. Suggested pt go the the Urgent Care Clinic tomorrow, but she says they will just send her to the ED... and she doesn't want to go to the ED.    Pt says she will \"decide for herself what to do\"  After call done.    Karrie Otto RN        Reason for Disposition    [1] MODERATE back pain (e.g., interferes with normal activities) AND [2] present > 3 days    Additional Information    Negative: Passed out (i.e., lost consciousness, collapsed and was not responding)    Negative: Shock suspected (e.g., cold/pale/clammy skin, too weak to stand, low BP, rapid pulse)    Negative: Sounds like a life-threatening emergency to the triager    Negative: Major injury to the back (e.g., MVA, fall > 10 feet or 3 meters, penetrating injury, etc.)    Negative: Followed a tailbone injury    Negative: [1] Pain in the upper back over the ribs (rib cage) AND [2] radiates (travels, goes) into chest    Negative: [1] Pain in the upper back over the ribs (rib cage) AND [2] worsened by coughing (or clearly increases with breathing)    Negative: Back pain during pregnancy    Negative: Pain mainly in flank (i.e., in the side, over the lower ribs or just below the ribs)    Negative: [1] SEVERE back pain (e.g., excruciating) AND [2] sudden onset AND [3] age > 60    Negative: [1] Unable to urinate (or only a few drops) > 4 hours AND [2] bladder feels very full (e.g., palpable bladder or strong urge to urinate)    Negative: [1] Loss of bladder or bowel control (urine or bowel incontinence; wetting self, leaking stool) AND [2] new " "onset    Negative: Numbness in groin or rectal area (i.e., loss of sensation)    Negative: [1] SEVERE abdominal pain AND [2] present > 1 hour    Negative: [1] Abdominal pain AND [2] age > 60    Negative: Weakness of a leg or foot (e.g., unable to bear weight, dragging foot)    Negative: Unable to walk    Negative: Patient sounds very sick or weak to the triager    Negative: [1] SEVERE back pain (e.g., excruciating, unable to do any normal activities) AND [2] not improved 2 hours after pain medicine    Negative: [1] Pain radiates into the thigh or further down the leg AND [2] both legs    Negative: [1] Fever > 100.0 F (37.8 C) AND [2] flank pain (i.e., in side, below ribs and above hip)    Negative: [1] Pain or burning with passing urine (urination) AND [2] flank pain (i.e., in side, below ribs and above hip)    Negative: Numbness in a leg or foot (i.e., loss of sensation)    Negative: [1] Numbness in an arm or hand (i.e., loss of sensation) AND [2] upper back pain    Negative: High-risk adult (e.g., history of cancer, HIV, or IV drug abuse)    Negative: [1] Fever AND [2] no symptoms of UTI  (Exception: has generalized muscle pains, not localized back pain)    Negative: Rash in same area as pain (may be described as \"small blisters\")    Negative: Blood in urine (red, pink, or tea-colored)    Protocols used: BACK PAIN-A-AH      "

## 2022-05-29 NOTE — TELEPHONE ENCOUNTER
Pt called tc queue to schedule returning psychotherapy.    [facility]rd[facility] 2022 [facility]  4.27    
FAMILY HISTORY:  Aunt  Still living? Unknown  Family history of diabetes mellitus (DM), Age at diagnosis: Age Unknown

## 2022-06-09 ENCOUNTER — TELEPHONE (OUTPATIENT)
Dept: LAB | Facility: CLINIC | Age: 67
End: 2022-06-09
Payer: COMMERCIAL

## 2022-06-09 NOTE — PROGRESS NOTES
Patient coming in 6/13/2022 for labs. She is too early for an A1C and I do not see any notes saying to come for labs. Please review chart and place orders if needed, thanks!

## 2022-06-10 ENCOUNTER — HOSPITAL ENCOUNTER (EMERGENCY)
Facility: HOSPITAL | Age: 67
Discharge: HOME | End: 2022-06-10
Attending: EMERGENCY MEDICINE | Admitting: EMERGENCY MEDICINE
Payer: COMMERCIAL

## 2022-06-10 ENCOUNTER — TELEPHONE (OUTPATIENT)
Dept: SLEEP MEDICINE | Facility: CLINIC | Age: 67
End: 2022-06-10
Payer: COMMERCIAL

## 2022-06-10 ENCOUNTER — APPOINTMENT (OUTPATIENT)
Dept: RADIOLOGY | Facility: HOSPITAL | Age: 67
End: 2022-06-10
Attending: EMERGENCY MEDICINE
Payer: COMMERCIAL

## 2022-06-10 VITALS
OXYGEN SATURATION: 97 % | DIASTOLIC BLOOD PRESSURE: 86 MMHG | TEMPERATURE: 99.2 F | HEART RATE: 92 BPM | RESPIRATION RATE: 20 BRPM | SYSTOLIC BLOOD PRESSURE: 117 MMHG

## 2022-06-10 DIAGNOSIS — G47.00 INSOMNIA: Primary | ICD-10-CM

## 2022-06-10 DIAGNOSIS — R53.83 OTHER FATIGUE: ICD-10-CM

## 2022-06-10 LAB
ALBUMIN SERPL-MCNC: 3.4 G/DL (ref 3.5–5)
ALP SERPL-CCNC: 106 U/L (ref 45–120)
ALT SERPL W P-5'-P-CCNC: 24 U/L (ref 0–45)
ANION GAP SERPL CALCULATED.3IONS-SCNC: 8 MMOL/L (ref 5–18)
AST SERPL W P-5'-P-CCNC: 14 U/L (ref 0–40)
ATRIAL RATE - MUSE: 86 BPM
BASE EXCESS BLDV CALC-SCNC: 13.9 MMOL/L
BASOPHILS # BLD AUTO: 0 10E3/UL (ref 0–0.2)
BASOPHILS NFR BLD AUTO: 0 %
BILIRUB SERPL-MCNC: 0.4 MG/DL (ref 0–1)
BNP SERPL-MCNC: 42 PG/ML (ref 0–109)
BUN SERPL-MCNC: 10 MG/DL (ref 8–22)
CALCIUM SERPL-MCNC: 9.4 MG/DL (ref 8.5–10.5)
CHLORIDE BLD-SCNC: 97 MMOL/L (ref 98–107)
CO2 SERPL-SCNC: 33 MMOL/L (ref 22–31)
CREAT SERPL-MCNC: 0.79 MG/DL (ref 0.6–1.1)
DIASTOLIC BLOOD PRESSURE - MUSE: NORMAL MMHG
EOSINOPHIL # BLD AUTO: 0.1 10E3/UL (ref 0–0.7)
EOSINOPHIL NFR BLD AUTO: 1 %
ERYTHROCYTE [DISTWIDTH] IN BLOOD BY AUTOMATED COUNT: 12.8 % (ref 10–15)
GFR SERPL CREATININE-BSD FRML MDRD: 82 ML/MIN/1.73M2
GLUCOSE BLD-MCNC: 267 MG/DL (ref 70–125)
HCO3 BLDV-SCNC: 34 MMOL/L (ref 24–30)
HCT VFR BLD AUTO: 47.8 % (ref 35–47)
HGB BLD-MCNC: 14.8 G/DL (ref 11.7–15.7)
IMM GRANULOCYTES # BLD: 0.1 10E3/UL
IMM GRANULOCYTES NFR BLD: 0 %
INTERPRETATION ECG - MUSE: NORMAL
LYMPHOCYTES # BLD AUTO: 2.2 10E3/UL (ref 0.8–5.3)
LYMPHOCYTES NFR BLD AUTO: 18 %
MAGNESIUM SERPL-MCNC: 2 MG/DL (ref 1.8–2.6)
MCH RBC QN AUTO: 29.4 PG (ref 26.5–33)
MCHC RBC AUTO-ENTMCNC: 31 G/DL (ref 31.5–36.5)
MCV RBC AUTO: 95 FL (ref 78–100)
MONOCYTES # BLD AUTO: 0.6 10E3/UL (ref 0–1.3)
MONOCYTES NFR BLD AUTO: 5 %
NEUTROPHILS # BLD AUTO: 8.9 10E3/UL (ref 1.6–8.3)
NEUTROPHILS NFR BLD AUTO: 76 %
NRBC # BLD AUTO: 0 10E3/UL
NRBC BLD AUTO-RTO: 0 /100
OXYHGB MFR BLDV: 78.9 % (ref 70–75)
P AXIS - MUSE: 66 DEGREES
PCO2 BLDV: 71 MM HG (ref 35–50)
PH BLDV: 7.36 [PH] (ref 7.35–7.45)
PLATELET # BLD AUTO: 217 10E3/UL (ref 150–450)
PO2 BLDV: 44 MM HG (ref 25–47)
POTASSIUM BLD-SCNC: 4.3 MMOL/L (ref 3.5–5)
PR INTERVAL - MUSE: 128 MS
PROT SERPL-MCNC: 6.5 G/DL (ref 6–8)
QRS DURATION - MUSE: 72 MS
QT - MUSE: 352 MS
QTC - MUSE: 421 MS
R AXIS - MUSE: 62 DEGREES
RBC # BLD AUTO: 5.04 10E6/UL (ref 3.8–5.2)
SAO2 % BLDV: 80 % (ref 70–75)
SODIUM SERPL-SCNC: 138 MMOL/L (ref 136–145)
SYSTOLIC BLOOD PRESSURE - MUSE: NORMAL MMHG
T AXIS - MUSE: 63 DEGREES
TROPONIN I SERPL-MCNC: <0.01 NG/ML (ref 0–0.29)
VENTRICULAR RATE- MUSE: 86 BPM
WBC # BLD AUTO: 11.8 10E3/UL (ref 4–11)

## 2022-06-10 PROCEDURE — 80053 COMPREHEN METABOLIC PANEL: CPT | Performed by: EMERGENCY MEDICINE

## 2022-06-10 PROCEDURE — 83735 ASSAY OF MAGNESIUM: CPT | Performed by: EMERGENCY MEDICINE

## 2022-06-10 PROCEDURE — 99285 EMERGENCY DEPT VISIT HI MDM: CPT | Mod: 25

## 2022-06-10 PROCEDURE — 85025 COMPLETE CBC W/AUTO DIFF WBC: CPT | Performed by: EMERGENCY MEDICINE

## 2022-06-10 PROCEDURE — 36415 COLL VENOUS BLD VENIPUNCTURE: CPT | Performed by: EMERGENCY MEDICINE

## 2022-06-10 PROCEDURE — 93005 ELECTROCARDIOGRAM TRACING: CPT | Performed by: EMERGENCY MEDICINE

## 2022-06-10 PROCEDURE — 84484 ASSAY OF TROPONIN QUANT: CPT | Performed by: EMERGENCY MEDICINE

## 2022-06-10 PROCEDURE — 71045 X-RAY EXAM CHEST 1 VIEW: CPT

## 2022-06-10 PROCEDURE — 83880 ASSAY OF NATRIURETIC PEPTIDE: CPT | Performed by: EMERGENCY MEDICINE

## 2022-06-10 PROCEDURE — 82805 BLOOD GASES W/O2 SATURATION: CPT | Performed by: EMERGENCY MEDICINE

## 2022-06-10 NOTE — ED NOTES
ED Provider In Triage Note  [facility]  Encounter Date: Michael 10, 2022    Chief Complaint   Patient presents with     Shortness of Breath       Brief HPI:   Arvind Leong is a 67 year old female presenting to the Emergency Department with a chief complaint of SOA for a few days. Seen 10 days ago for same. Briefly better with prednisone. Arrives with O2 off. On chronic O2 supplementation for years.    Brief Physical Exam:  /73   Pulse 87   Temp 99.2  F (37.3  C) (Temporal)   Resp 20   SpO2 91%   General: Anxious  HEENT: Atraumatic  Resp: Moderate respiratory distress. CTA. No edema  Abdomen: Non-peritoneal  Neuro: Alert, oriented, answers questions appropriately  Psych: Behavior appropriate      Plan Initiated in Triage:  Orders Placed This Encounter     XR Chest Port 1 View     Troponin I     Magnesium     Comprehensive metabolic panel     B-Type Natriuretic Peptide ([facility] Only)     Blood gas venous     CBC with platelets and differential       PIT Dispo:   Return to lobby while awaiting workup and ED bed availability    Mo Lam MD on 6/10/2022 at 12:16 PM    Patient was evaluated by the Physician in Triage due to a limitation of available rooms in the Emergency Department. A plan of care was discussed based on the information obtained on the initial evaluation and patient was consuled to return back to the Emergency Department lobby after this initial evalutaiton until results were obtained or a room became available in the Emergency Department. Patient was counseled not to leave prior to receiving the results of their workup.     Mo Lam MD  [facility] EMERGENCY DEPARTMENT  [address] [place] [id number]  [phone]     Mo Lam MD  06/10/22 4757

## 2022-06-10 NOTE — ED PROVIDER NOTES
EMERGENCY DEPARTMENT ENCOUnter      NAME: Arvind Leong  AGE: 67 year old female  YOB: 1955  MRN: [id number]  EVALUATION DATE & TIME: 6/10/2022  1:34 PM    PCP: Rema Whitign    ED PROVIDER: Jorgito Brunner DO      Chief Complaint   Patient presents with     Shortness of Breath         FINAL IMPRESSION:  1. Other fatigue          ED COURSE & MEDICAL DECISION MAKIN:10 PM I met the patient and performed my initial interview and exam.  4:26 PM I rechecked and updated the patient. We discussed the plan for discharge and the patient is agreeable. Reviewed supportive cares, symptomatic treatment, outpatient follow up, and reasons to return to the Emergency Department. All questions and concerns were addressed. Patient to be discharged by ED RN.        The patient presents emerged from today with complaints of fatigue and weakness.  No concerning physical exam findings.  Her work-up today has been unremarkable.  She has been able to ambulate safely in the emergency department and would like to go home.  I recommended close outpatient primary care follow-up.  She has also been advised to return right away for any worsening symptoms or other concerns.  She is comfortable with this plan.        At the conclusion of the encounter I discussed the results of all of the tests and the disposition. The questions were answered. The patient or family acknowledged understanding and was agreeable with the care plan.             =================================================================    HPI    Arvind Leong is a 67 year old female with a pertinent history of COPD, T2DM, and Bipolar 1 Disorder who presents to this ED by walk in for evaluation of fatigue and weakness.    Patient reports onset of weakness and fatigue earlier today. She thinks this is due to high blood sugar, but she hasn't been checking her sugars recently. Patient notes some mild shortness of breath. She has had decreased  urinary frequency. Patient notes she is unable to stand. Patient denies chest pain, abdominal pain, back pain, or any other complaints at this time.     REVIEW OF SYSTEMS     Constitutional: Positive for fatigue. Denies fever or chills  HENT:  Denies sore throat   Respiratory: Positive for shortness of breath. Denies cough  Cardiovascular:  Denies chest pain or palpitations  GI:  Denies abdominal pain, nausea, or vomiting  : Positive for decreased urinary frequency.  Musculoskeletal:  Denies any new extremity pain   Skin:  Denies rash   Neurologic: Positive for generalized weakness. Denies headache, focal weakness or sensory changes    All other systems reviewed and are negative      PAST MEDICAL HISTORY:  Past Medical History:   Diagnosis Date     Acute on chronic respiratory failure with hypoxia (H) 11/15/2016     Bipolar 1 disorder (H)      CAP (community acquired pneumonia) 11/15/2016     Cervical high risk HPV (human papillomavirus) test positive 3/5/2018    3/5/18 NIL pap, +HR HPV (not 16/18) 5/21/21 NIL pap, neg HPV. Plan: await provider     COPD exacerbation (H) 4/24/2021     COPD with exacerbation (H) 11/15/2016     Diabetes mellitus, type II (H)      Hearing loss      Meniere's disease      Pneumonia 4/23/2021     Pneumonia of right lower lobe due to infectious organism 6/11/2019       PAST SURGICAL HISTORY:  Past Surgical History:   Procedure Laterality Date     CYST REMOVAL  01/23/2001     MT REMOVAL ANAL FISTULA,SUBCUTANEOUS      Description: Fistula-in-ano Repair;  Recorded: 01/08/2010; x2     TONSILLECTOMY             CURRENT MEDICATIONS:    albuterol (PROAIR HFA/PROVENTIL HFA/VENTOLIN HFA) 108 (90 Base) MCG/ACT inhaler  albuterol (PROVENTIL) (2.5 MG/3ML) 0.083% neb solution  ARIPiprazole (ABILIFY) 10 MG tablet  CONTOUR NEXT TEST test strip  doxycycline hyclate (VIBRAMYCIN) 100 MG capsule  empagliflozin (JARDIANCE) 10 MG TABS tablet  fluticasone-vilanterol (BREO ELLIPTA) 100-25 MCG/INH  inhaler  metFORMIN (GLUCOPHAGE) 1000 MG tablet  oxybutynin (DITROPAN) 5 MG tablet  polyethylene glycol (MIRALAX) 17 GM/Dose powder  predniSONE (DELTASONE) 20 MG tablet        ALLERGIES:  Allergies   Allergen Reactions     Lurasidone Other (See Comments)     Face turned red, (Brand name = Latuda) Face turned red, Face turned red       FAMILY HISTORY:  Family History   Problem Relation Age of Onset     Aneurysm Father      Heart Disease Father 70.00        bypass in 70s, AAA rupture at age 77      Ulcers Father 76.00     Pacemaker Mother      Bipolar Disorder Brother      Breast Cancer Maternal Grandmother 51.00     Kidney failure Maternal Grandmother      Cancer Paternal Grandmother         ?? lung     Cancer Paternal Uncle         ?? lung     Mental Illness Maternal Grandfather      Heart Disease Other         uncle     No Known Problems Sister         two siblings abuse chemicals     No Known Problems Brother      Bipolar Disorder Nephew        SOCIAL HISTORY:   Social History     Socioeconomic History     Marital status:      Spouse name: None     Number of children: None     Years of education: None     Highest education level: None   Tobacco Use     Smoking status: Former Smoker     Packs/day: 1.00     Years: 40.00     Pack years: 40.00     Quit date: 2017     Years since quittin.3     Smokeless tobacco: Never Used   Vaping Use     Vaping Use: Never used   Substance and Sexual Activity     Alcohol use: Yes     Comment: Alcoholic Drinks/day: Occasional      Drug use: No     Sexual activity: Never   Social History Narrative    2019The patient lives with her mother.; had worked at [employer] as [employer] but quit 2019.   Quit smoking ; no etoh problems  / x 2 ; no kids       VITALS:  Patient Vitals for the past 24 hrs:   BP Temp Temp src Pulse Resp SpO2   06/10/22 1530 -- -- -- 92 20 97 %   06/10/22 1515 -- -- -- 82 (!) 0 98 %   06/10/22 1500 -- -- -- 68 (!) 0 97 %   06/10/22  1445 -- -- -- 82 27 97 %   06/10/22 1345 117/86 -- -- 66 -- (!) 89 %   06/10/22 1343 117/86 -- -- 81 20 100 %   06/10/22 1207 124/73 99.2  F (37.3  C) Temporal 87 20 91 %       PHYSICAL EXAM    Constitutional: Globally fatigued. Well developed, Well nourished,  HENT:  Normocephalic, Atraumatic, Bilateral external ears normal, Oropharynx moist, Nose normal.   Neck:  Normal range of motion, No meningismus, No stridor.   Eyes:  EOMI, Conjunctiva normal, No discharge.   Respiratory: Currently not on supplemental Oxygen. Normal breath sounds, No respiratory distress, No wheezing, No chest tenderness.   Cardiovascular:  Normal heart rate, Normal rhythm, No murmurs  GI:  Soft, No tenderness, No guarding, No CVA tenderness.   Musculoskeletal:  Neurovascularly intact distally, No significant lower extremity edema, No tenderness, No cyanosis, Good range of motion in all major joints. No tenderness to palpation or major deformities noted.   Integument:  Warm, Dry, No erythema, No rash.   Lymphatic:  No lymphadenopathy noted.   Neurologic:  Alert & oriented x 3, Normal motor function, Normal sensory function, No focal deficits noted.   Psychiatric:  Affect normal, Judgment normal, Mood normal.      LAB:  All pertinent labs reviewed and interpreted.  Results for orders placed or performed during the hospital encounter of 06/10/22              Result Value Ref Range    Troponin I <0.01 0.00 - 0.29 ng/mL   Result Value Ref Range    Magnesium 2.0 1.8 - 2.6 mg/dL   Comprehensive metabolic panel   Result Value Ref Range    Sodium 138 136 - 145 mmol/L    Potassium 4.3 3.5 - 5.0 mmol/L    Chloride 97 (L) 98 - 107 mmol/L    Carbon Dioxide (CO2) 33 (H) 22 - 31 mmol/L    Anion Gap 8 5 - 18 mmol/L    Urea Nitrogen 10 8 - 22 mg/dL    Creatinine 0.79 0.60 - 1.10 mg/dL    Calcium 9.4 8.5 - 10.5 mg/dL    Glucose 267 (H) 70 - 125 mg/dL    Alkaline Phosphatase 106 45 - 120 U/L    AST 14 0 - 40 U/L    ALT 24 0 - 45 U/L    Protein Total 6.5 6.0 -  8.0 g/dL    Albumin 3.4 (L) 3.5 - 5.0 g/dL    Bilirubin Total 0.4 0.0 - 1.0 mg/dL    GFR Estimate 82 >60 mL/min/1.73m2   B-Type Natriuretic Peptide ([facility] Only)   Result Value Ref Range    BNP 42 0 - 109 pg/mL   Blood gas venous   Result Value Ref Range    pH Venous 7.36 7.35 - 7.45    pCO2 Venous 71 (H) 35 - 50 mm Hg    pO2 Venous 44 25 - 47 mm Hg    Bicarbonate Venous 34 (H) 24 - 30 mmol/L    Base Excess/Deficit (+/-) 13.9   mmol/L    Oxyhemoglobin Venous 78.9 (H) 70.0 - 75.0 %    O2 Sat, Venous 80.0 (H) 70.0 - 75.0 %   CBC with platelets and differential   Result Value Ref Range    WBC Count 11.8 (H) 4.0 - 11.0 10e3/uL    RBC Count 5.04 3.80 - 5.20 10e6/uL    Hemoglobin 14.8 11.7 - 15.7 g/dL    Hematocrit 47.8 (H) 35.0 - 47.0 %    MCV 95 78 - 100 fL    MCH 29.4 26.5 - 33.0 pg    MCHC 31.0 (L) 31.5 - 36.5 g/dL    RDW 12.8 10.0 - 15.0 %    Platelet Count 217 150 - 450 10e3/uL    % Neutrophils 76 %    % Lymphocytes 18 %    % Monocytes 5 %    % Eosinophils 1 %    % Basophils 0 %    % Immature Granulocytes 0 %    NRBCs per 100 WBC 0 <1 /100    Absolute Neutrophils 8.9 (H) 1.6 - 8.3 10e3/uL    Absolute Lymphocytes 2.2 0.8 - 5.3 10e3/uL    Absolute Monocytes 0.6 0.0 - 1.3 10e3/uL    Absolute Eosinophils 0.1 0.0 - 0.7 10e3/uL    Absolute Basophils 0.0 0.0 - 0.2 10e3/uL    Absolute Immature Granulocytes 0.1 <=0.4 10e3/uL    Absolute NRBCs 0.0 10e3/uL         RADIOLOGY:  I have independently reviewed and interpreted the above imaging, pending the final radiology read.  XR Chest Port 1 View   Final Result   IMPRESSION: Large body habitus. Lungs are clear. Heart and pulmonary vascularity are normal. Severe S-shaped thoracolumbar scoliosis, with the thoracic curve convex to the right.          EKG:    Normal sinus rhythm at 86 bpm.  Occasional PVCs.  No signs of acute ischemia.    I have independently reviewed and interpreted this EKG          I, Neal Treadwell, am serving as a scribe to document services personally  performed by Dr. Brunner based on my observation and the provider's statements to me. I, Jorgito Brunner, DO attest that Neal Treadwell is acting in a scribe capacity, has observed my performance of the services and has documented them in accordance with my direction.    Jorgito Brunner, DO  Emergency Medicine  [facility] EMERGENCY DEPARTMENT  [address] [place] [id number]  [phone]  Dept: [phone]     Jorgito Brunner MD  06/10/22 3208

## 2022-06-10 NOTE — ED TRIAGE NOTES
The pt arrives with complaints of continued SOB, has a hx of COPD. Was seen here recently and sent home on steroids but has not helped. Is on 2 lpm of home oxygen.      Triage Assessment     Row Name 06/10/22 1208       Triage Assessment (Adult)    Airway WDL WDL       Respiratory WDL    Respiratory WDL rhythm/pattern    Rhythm/Pattern, Respiratory breathlessness;dyspnea on exertion       Skin Circulation/Temperature WDL    Skin Circulation/Temperature WDL WDL

## 2022-06-10 NOTE — DISCHARGE INSTRUCTIONS
Fortunately your testing today has been normal.  Be sure to drink plenty fluids and follow-up closely with your primary care doctor.  Return to the ER for any worsening symptoms or other concerns.

## 2022-06-13 ENCOUNTER — LAB (OUTPATIENT)
Dept: LAB | Facility: CLINIC | Age: 67
End: 2022-06-13
Payer: COMMERCIAL

## 2022-06-13 DIAGNOSIS — E11.8 TYPE 2 DIABETES MELLITUS WITH UNSPECIFIED COMPLICATIONS (H): ICD-10-CM

## 2022-06-13 DIAGNOSIS — F31.31 MILD DEPRESSED BIPOLAR I DISORDER (H): Primary | ICD-10-CM

## 2022-06-13 LAB
ALBUMIN SERPL-MCNC: 3.4 G/DL (ref 3.5–5)
ALP SERPL-CCNC: 102 U/L (ref 45–120)
ALT SERPL W P-5'-P-CCNC: 23 U/L (ref 0–45)
ANION GAP SERPL CALCULATED.3IONS-SCNC: 10 MMOL/L (ref 5–18)
AST SERPL W P-5'-P-CCNC: 17 U/L (ref 0–40)
BASOPHILS # BLD AUTO: 0 10E3/UL (ref 0–0.2)
BASOPHILS NFR BLD AUTO: 0 %
BILIRUB SERPL-MCNC: 0.6 MG/DL (ref 0–1)
BUN SERPL-MCNC: 12 MG/DL (ref 8–22)
CALCIUM SERPL-MCNC: 8.9 MG/DL (ref 8.5–10.5)
CHLORIDE BLD-SCNC: 98 MMOL/L (ref 98–107)
CHOLEST SERPL-MCNC: 169 MG/DL
CO2 SERPL-SCNC: 33 MMOL/L (ref 22–31)
CREAT SERPL-MCNC: 0.75 MG/DL (ref 0.6–1.1)
EOSINOPHIL # BLD AUTO: 0.2 10E3/UL (ref 0–0.7)
EOSINOPHIL NFR BLD AUTO: 2 %
ERYTHROCYTE [DISTWIDTH] IN BLOOD BY AUTOMATED COUNT: 12.9 % (ref 10–15)
FASTING STATUS PATIENT QL REPORTED: YES
GFR SERPL CREATININE-BSD FRML MDRD: 87 ML/MIN/1.73M2
GLUCOSE BLD-MCNC: 211 MG/DL (ref 70–125)
HBA1C MFR BLD: 8 % (ref 0–5.6)
HCT VFR BLD AUTO: 46 % (ref 35–47)
HDLC SERPL-MCNC: 36 MG/DL
HGB BLD-MCNC: 14.1 G/DL (ref 11.7–15.7)
HOLD SPECIMEN: NORMAL
IMM GRANULOCYTES # BLD: 0 10E3/UL
IMM GRANULOCYTES NFR BLD: 0 %
LDLC SERPL CALC-MCNC: 80 MG/DL
LYMPHOCYTES # BLD AUTO: 2.3 10E3/UL (ref 0.8–5.3)
LYMPHOCYTES NFR BLD AUTO: 22 %
MCH RBC QN AUTO: 28.7 PG (ref 26.5–33)
MCHC RBC AUTO-ENTMCNC: 30.7 G/DL (ref 31.5–36.5)
MCV RBC AUTO: 94 FL (ref 78–100)
MONOCYTES # BLD AUTO: 0.8 10E3/UL (ref 0–1.3)
MONOCYTES NFR BLD AUTO: 8 %
NEUTROPHILS # BLD AUTO: 7.2 10E3/UL (ref 1.6–8.3)
NEUTROPHILS NFR BLD AUTO: 69 %
PLATELET # BLD AUTO: 206 10E3/UL (ref 150–450)
POTASSIUM BLD-SCNC: 4.2 MMOL/L (ref 3.5–5)
PROT SERPL-MCNC: 6.2 G/DL (ref 6–8)
RBC # BLD AUTO: 4.91 10E6/UL (ref 3.8–5.2)
SODIUM SERPL-SCNC: 141 MMOL/L (ref 136–145)
TRIGL SERPL-MCNC: 267 MG/DL
TSH SERPL DL<=0.005 MIU/L-ACNC: 1.17 UIU/ML (ref 0.3–5)
WBC # BLD AUTO: 10.5 10E3/UL (ref 4–11)

## 2022-06-13 PROCEDURE — 36415 COLL VENOUS BLD VENIPUNCTURE: CPT

## 2022-06-13 PROCEDURE — 84443 ASSAY THYROID STIM HORMONE: CPT

## 2022-06-13 PROCEDURE — 85025 COMPLETE CBC W/AUTO DIFF WBC: CPT

## 2022-06-13 PROCEDURE — 80053 COMPREHEN METABOLIC PANEL: CPT

## 2022-06-13 PROCEDURE — 80061 LIPID PANEL: CPT

## 2022-06-13 PROCEDURE — 83036 HEMOGLOBIN GLYCOSYLATED A1C: CPT

## 2022-06-13 NOTE — TELEPHONE ENCOUNTER
She needs appointment with me for ER visit and follow up of diabetes, lab can be done then. I'm not going to obtain lab without seeing her first

## 2022-06-13 NOTE — TELEPHONE ENCOUNTER
Needs appointment after 6/25 for 3 months follow up of diabetes and COPD. Please help schedule. Ok to do reserved slot

## 2022-06-14 NOTE — TELEPHONE ENCOUNTER
I sent a my chart message to the patient to please contact our office to schedule a diabetic check appt after 06.25.2022. OK for reserved slots with Dr Whiting.

## 2022-06-22 ENCOUNTER — HOSPITAL ENCOUNTER (EMERGENCY)
Facility: HOSPITAL | Age: 67
Discharge: HOME | End: 2022-06-22
Attending: EMERGENCY MEDICINE | Admitting: EMERGENCY MEDICINE
Payer: COMMERCIAL

## 2022-06-22 ENCOUNTER — APPOINTMENT (OUTPATIENT)
Dept: RADIOLOGY | Facility: HOSPITAL | Age: 67
End: 2022-06-22
Attending: STUDENT IN AN ORGANIZED HEALTH CARE EDUCATION/TRAINING PROGRAM
Payer: COMMERCIAL

## 2022-06-22 VITALS
HEART RATE: 95 BPM | RESPIRATION RATE: 30 BRPM | WEIGHT: 180 LBS | BODY MASS INDEX: 37.79 KG/M2 | OXYGEN SATURATION: 95 % | TEMPERATURE: 98.5 F | DIASTOLIC BLOOD PRESSURE: 70 MMHG | SYSTOLIC BLOOD PRESSURE: 115 MMHG | HEIGHT: 58 IN

## 2022-06-22 DIAGNOSIS — R06.00 DYSPNEA, UNSPECIFIED TYPE: ICD-10-CM

## 2022-06-22 DIAGNOSIS — J44.1 COPD WITH ACUTE EXACERBATION (H): ICD-10-CM

## 2022-06-22 LAB
ANION GAP SERPL CALCULATED.3IONS-SCNC: 5 MMOL/L (ref 5–18)
BASOPHILS # BLD AUTO: 0 10E3/UL (ref 0–0.2)
BASOPHILS NFR BLD AUTO: 0 %
BNP SERPL-MCNC: 59 PG/ML (ref 0–109)
BUN SERPL-MCNC: 10 MG/DL (ref 8–22)
CALCIUM SERPL-MCNC: 8.9 MG/DL (ref 8.5–10.5)
CHLORIDE BLD-SCNC: 100 MMOL/L (ref 98–107)
CO2 SERPL-SCNC: 36 MMOL/L (ref 22–31)
CREAT SERPL-MCNC: 0.69 MG/DL (ref 0.6–1.1)
EOSINOPHIL # BLD AUTO: 0.1 10E3/UL (ref 0–0.7)
EOSINOPHIL NFR BLD AUTO: 1 %
ERYTHROCYTE [DISTWIDTH] IN BLOOD BY AUTOMATED COUNT: 12.8 % (ref 10–15)
FLUAV RNA SPEC QL NAA+PROBE: NEGATIVE
FLUBV RNA RESP QL NAA+PROBE: NEGATIVE
GFR SERPL CREATININE-BSD FRML MDRD: >90 ML/MIN/1.73M2
GLUCOSE BLD-MCNC: 180 MG/DL (ref 70–125)
HCT VFR BLD AUTO: 46.8 % (ref 35–47)
HGB BLD-MCNC: 14.4 G/DL (ref 11.7–15.7)
IMM GRANULOCYTES # BLD: 0 10E3/UL
IMM GRANULOCYTES NFR BLD: 0 %
LYMPHOCYTES # BLD AUTO: 1.9 10E3/UL (ref 0.8–5.3)
LYMPHOCYTES NFR BLD AUTO: 17 %
MCH RBC QN AUTO: 29.4 PG (ref 26.5–33)
MCHC RBC AUTO-ENTMCNC: 30.8 G/DL (ref 31.5–36.5)
MCV RBC AUTO: 96 FL (ref 78–100)
MONOCYTES # BLD AUTO: 0.6 10E3/UL (ref 0–1.3)
MONOCYTES NFR BLD AUTO: 5 %
NEUTROPHILS # BLD AUTO: 8.4 10E3/UL (ref 1.6–8.3)
NEUTROPHILS NFR BLD AUTO: 77 %
NRBC # BLD AUTO: 0 10E3/UL
NRBC BLD AUTO-RTO: 0 /100
PLATELET # BLD AUTO: 204 10E3/UL (ref 150–450)
POTASSIUM BLD-SCNC: 4.4 MMOL/L (ref 3.5–5)
RBC # BLD AUTO: 4.89 10E6/UL (ref 3.8–5.2)
SARS-COV-2 RNA RESP QL NAA+PROBE: NEGATIVE
SODIUM SERPL-SCNC: 141 MMOL/L (ref 136–145)
TROPONIN I SERPL-MCNC: <0.01 NG/ML (ref 0–0.29)
WBC # BLD AUTO: 10.9 10E3/UL (ref 4–11)

## 2022-06-22 PROCEDURE — 99285 EMERGENCY DEPT VISIT HI MDM: CPT | Mod: 25

## 2022-06-22 PROCEDURE — 85004 AUTOMATED DIFF WBC COUNT: CPT | Performed by: NURSE PRACTITIONER

## 2022-06-22 PROCEDURE — 84484 ASSAY OF TROPONIN QUANT: CPT | Performed by: NURSE PRACTITIONER

## 2022-06-22 PROCEDURE — 93005 ELECTROCARDIOGRAM TRACING: CPT | Performed by: NURSE PRACTITIONER

## 2022-06-22 PROCEDURE — 250N000009 HC RX 250: Performed by: EMERGENCY MEDICINE

## 2022-06-22 PROCEDURE — 999N000157 HC STATISTIC RCP TIME EA 10 MIN

## 2022-06-22 PROCEDURE — 71046 X-RAY EXAM CHEST 2 VIEWS: CPT

## 2022-06-22 PROCEDURE — 96375 TX/PRO/DX INJ NEW DRUG ADDON: CPT

## 2022-06-22 PROCEDURE — 96365 THER/PROPH/DIAG IV INF INIT: CPT

## 2022-06-22 PROCEDURE — 83880 ASSAY OF NATRIURETIC PEPTIDE: CPT | Performed by: NURSE PRACTITIONER

## 2022-06-22 PROCEDURE — 82310 ASSAY OF CALCIUM: CPT | Performed by: NURSE PRACTITIONER

## 2022-06-22 PROCEDURE — 250N000011 HC RX IP 250 OP 636: Performed by: EMERGENCY MEDICINE

## 2022-06-22 PROCEDURE — 36415 COLL VENOUS BLD VENIPUNCTURE: CPT | Performed by: NURSE PRACTITIONER

## 2022-06-22 PROCEDURE — 94640 AIRWAY INHALATION TREATMENT: CPT

## 2022-06-22 PROCEDURE — 87636 SARSCOV2 & INF A&B AMP PRB: CPT | Performed by: EMERGENCY MEDICINE

## 2022-06-22 RX ORDER — PREDNISONE 50 MG/1
50 TABLET ORAL DAILY
Qty: 4 TABLET | Refills: 0 | Status: SHIPPED | OUTPATIENT
Start: 2022-06-23 | End: 2022-09-22

## 2022-06-22 RX ORDER — MAGNESIUM SULFATE HEPTAHYDRATE 40 MG/ML
2 INJECTION, SOLUTION INTRAVENOUS ONCE
Status: COMPLETED | OUTPATIENT
Start: 2022-06-22 | End: 2022-06-22

## 2022-06-22 RX ORDER — IPRATROPIUM BROMIDE AND ALBUTEROL SULFATE 2.5; .5 MG/3ML; MG/3ML
3 SOLUTION RESPIRATORY (INHALATION) ONCE
Status: COMPLETED | OUTPATIENT
Start: 2022-06-22 | End: 2022-06-22

## 2022-06-22 RX ORDER — METHYLPREDNISOLONE SODIUM SUCCINATE 125 MG/2ML
125 INJECTION, POWDER, LYOPHILIZED, FOR SOLUTION INTRAMUSCULAR; INTRAVENOUS ONCE
Status: COMPLETED | OUTPATIENT
Start: 2022-06-22 | End: 2022-06-22

## 2022-06-22 RX ADMIN — IPRATROPIUM BROMIDE AND ALBUTEROL SULFATE 3 ML: 2.5; .5 SOLUTION RESPIRATORY (INHALATION) at 11:48

## 2022-06-22 RX ADMIN — MAGNESIUM SULFATE IN WATER 2 G: 40 INJECTION, SOLUTION INTRAVENOUS at 12:19

## 2022-06-22 RX ADMIN — METHYLPREDNISOLONE SODIUM SUCCINATE 125 MG: 125 INJECTION, POWDER, FOR SOLUTION INTRAMUSCULAR; INTRAVENOUS at 12:19

## 2022-06-22 NOTE — ED NOTES
Patient and family member reviewed discharge.  Discussed printed discharge information.  Follow-up appts reviewed.   What s/s warrant return to the ER.  Prescriptions and how to take them.  Importance of social distancing, good hand hygiene, and wearing a mask when in public spaces.    Assessment defferred to primary

## 2022-06-22 NOTE — DISCHARGE INSTRUCTIONS
You were seen today in the emergency department at [facility] for shortness of breath.  Your evaluation here including chest x-ray and lab testing looked stable and reassuring.  We are glad that right now you are stable on your baseline 2 L of oxygen and we do not think you really need to increase this at home at this time.  You were treated with inhalers and steroids for your COPD.  We are going to prescribe a few more days of prednisone for home.  Please continue using your inhalers and/or nebulizer treatments at home.  If you have significant worsening, you can try this first to see if it is helpful.  Given your increased frequency of ED visits for it lately we would like you to follow-up with your primary doctor and/or pulmonologist to discuss if there is anything they can do to escalate your COPD treatments as an outpatient to decrease frequency of attacks or if they need any additional outpatient testing.  We are always available to see if you have any other immediate concerns like sustained breathing difficulties or severe chest discomfort.

## 2022-06-22 NOTE — ED PROVIDER NOTES
EMERGENCY DEPARTMENT ENCOUNTER      NAME: Arvind Leong  AGE: 67 year old female  YOB: 1955  MRN: [id number]  EVALUATION DATE & TIME: 2022 10:22 AM    PCP: Rema Whiting    ED PROVIDER: Lencho Baeza M.D.      Chief Complaint   Patient presents with     Shortness of Breath       FINAL IMPRESSION:  1. COPD with acute exacerbation (H)    2. Dyspnea, unspecified type        ED COURSE & MEDICAL DECISION MAKIN year old female presents to the Emergency Department for evaluation of shortness of breath.  She has a history of COPD and is on baseline 2 L of supplemental oxygen via nasal cannula.  Also with a history of bipolar disorder.  She sound slightly wheezy on exam but is in no significant respiratory distress and maintaining oxygen saturations in the mid 90s on her baseline 2 L of home oxygen.  She underwent lab and imaging work-up as below.  Cardiac work-up including EKG troponin and BNP are unrevealing.  Chest x-ray does show some suggestion of pulmonary vascular congestion however she does not have any evidence of hypervolemia on exam and with a normal BNP, I am less concerned about a cardiac cause of her dyspnea at this point.  She is feeling improved here after DuoNeb's and IV Solu-Medrol and magnesium.  I am going to prescribe her an additional short course of steroids and I do think if she would benefit from following up with her pulmonologist and/or primary care doctor given the increased frequency of her ED visits.  Reviewed everything with the patient and her friend.  They were comfortable with discharge home at this time.    At the conclusion of the encounter I discussed the results of all of the tests and the disposition. The questions were answered. The patient or family acknowledged understanding and was agreeable with the care plan.       MEDICATIONS GIVEN IN THE EMERGENCY:  Medications   ipratropium - albuterol 0.5 mg/2.5 mg/3 mL (DUONEB) neb solution 3 mL (3  mLs Nebulization Given 6/22/22 1148)   methylPREDNISolone sodium succinate (solu-MEDROL) injection 125 mg (125 mg Intravenous Given 6/22/22 1219)   magnesium sulfate 2 g in water intermittent infusion (0 g Intravenous Stopped 6/22/22 1250)       NEW PRESCRIPTIONS STARTED AT TODAY'S ER VISIT  Discharge Medication List as of 6/22/2022  3:26 PM             =================================================================    HPI    Patient information was obtained from: the patient    Use of : N/A       Arvind Leong is a 67 year old female with a pertinent history of COPD, bipolar disorder, pneumonia, asthma, hyperlipidemia, type II diabetes mellitus, chronic respiratory failure, tobacco use disorder, and cardiomyopathy, who presents to this ED for evaluation of shortness of breath.    For the past couple days, the patient has had shortness of breath that worsens with exertion. She endorses an increase in coughing. In order to alleviate her symptoms, she has been using inhalers and nebulizers at home without sufficient relief. The patient denies leg swelling, chest pain, fever, and any other symptoms at this time.      REVIEW OF SYSTEMS   All systems reviewed and negative except as noted in HPI.    PAST MEDICAL HISTORY:  Past Medical History:   Diagnosis Date     Acute on chronic respiratory failure with hypoxia (H) 11/15/2016     Bipolar 1 disorder (H)      CAP (community acquired pneumonia) 11/15/2016     Cervical high risk HPV (human papillomavirus) test positive 3/5/2018    3/5/18 NIL pap, +HR HPV (not 16/18) 5/21/21 NIL pap, neg HPV. Plan: await provider     COPD exacerbation (H) 4/24/2021     COPD with exacerbation (H) 11/15/2016     Diabetes mellitus, type II (H)      Hearing loss      Meniere's disease      Pneumonia 4/23/2021     Pneumonia of right lower lobe due to infectious organism 6/11/2019       PAST SURGICAL HISTORY:  Past Surgical History:   Procedure Laterality Date     CYST REMOVAL   2001     ME REMOVAL ANAL FISTULA,SUBCUTANEOUS      Description: Fistula-in-ano Repair;  Recorded: 2010; x2     TONSILLECTOMY             CURRENT MEDICATIONS:    No current facility-administered medications for this encounter.     Current Outpatient Medications   Medication     [START ON 2022] predniSONE (DELTASONE) 50 MG tablet     albuterol (PROAIR HFA/PROVENTIL HFA/VENTOLIN HFA) 108 (90 Base) MCG/ACT inhaler     albuterol (PROVENTIL) (2.5 MG/3ML) 0.083% neb solution     ARIPiprazole (ABILIFY) 10 MG tablet     CONTOUR NEXT TEST test strip     doxycycline hyclate (VIBRAMYCIN) 100 MG capsule     empagliflozin (JARDIANCE) 10 MG TABS tablet     fluticasone-vilanterol (BREO ELLIPTA) 100-25 MCG/INH inhaler     metFORMIN (GLUCOPHAGE) 1000 MG tablet     oxybutynin (DITROPAN) 5 MG tablet     polyethylene glycol (MIRALAX) 17 GM/Dose powder         ALLERGIES:  Allergies   Allergen Reactions     Lurasidone Other (See Comments)     Face turned red, (Brand name = Latuda) Face turned red, Face turned red       FAMILY HISTORY:  Family History   Problem Relation Age of Onset     Aneurysm Father      Heart Disease Father 70.00        bypass in 70s, AAA rupture at age 77      Ulcers Father 76.00     Pacemaker Mother      Bipolar Disorder Brother      Breast Cancer Maternal Grandmother 51.00     Kidney failure Maternal Grandmother      Cancer Paternal Grandmother         ?? lung     Cancer Paternal Uncle         ?? lung     Mental Illness Maternal Grandfather      Heart Disease Other         uncle     No Known Problems Sister         two siblings abuse chemicals     No Known Problems Brother      Bipolar Disorder Nephew        SOCIAL HISTORY:   Social History     Socioeconomic History     Marital status:    Tobacco Use     Smoking status: Former Smoker     Packs/day: 1.00     Years: 40.00     Pack years: 40.00     Quit date: 2017     Years since quittin.3     Smokeless tobacco: Never Used   Vaping  "Use     Vaping Use: Never used   Substance and Sexual Activity     Alcohol use: Yes     Comment: Alcoholic Drinks/day: Occasional      Drug use: No     Sexual activity: Never   Social History Narrative    03/2019The patient lives with her mother.; had worked at [employer] as  but quit March 2019.   Quit smoking 2015; no etoh problems  / x 2 ; no kids       VITALS:  /70   Pulse 95   Temp 98.5  F (36.9  C) (Oral)   Resp 30   Ht 1.473 m (4' 10\")   Wt 81.6 kg (180 lb)   SpO2 95%   BMI 37.62 kg/m      PHYSICAL EXAM    Constitutional: Chronically ill-appearing middle-aged female patient, laying in bed, no distress  HENT: Normocephalic, Atraumatic. Neck Supple.  Eyes: EOMI, Conjunctiva normal.  Respiratory: Breathing is unlabored on baseline 2 L of supplemental oxygen.  Speaking full sentences.  Scattered expiratory wheezing in all lung fields.  Cardiovascular: Normal heart rate, Regular rhythm. No peripheral edema.  Abdomen: Soft, nontender  Musculoskeletal: Good range of motion in all major joints. No major deformities noted.  Integument: Warm, Dry.  Neurologic: Alert & awake, Normal motor function, Normal sensory function, No focal deficits noted.   Psychiatric: Cooperative. Affect appropriate.     LAB:  All pertinent labs reviewed and interpreted.  Labs Ordered and Resulted from Time of ED Arrival to Time of ED Departure   BASIC METABOLIC PANEL - Abnormal       Result Value    Sodium 141      Potassium 4.4      Chloride 100      Carbon Dioxide (CO2) 36 (*)     Anion Gap 5      Urea Nitrogen 10      Creatinine 0.69      Calcium 8.9      Glucose 180 (*)     GFR Estimate >90     CBC WITH PLATELETS AND DIFFERENTIAL - Abnormal    WBC Count 10.9      RBC Count 4.89      Hemoglobin 14.4      Hematocrit 46.8      MCV 96      MCH 29.4      MCHC 30.8 (*)     RDW 12.8      Platelet Count 204      % Neutrophils 77      % Lymphocytes 17      % Monocytes 5      % Eosinophils 1      % Basophils 0      % " Immature Granulocytes 0      NRBCs per 100 WBC 0      Absolute Neutrophils 8.4 (*)     Absolute Lymphocytes 1.9      Absolute Monocytes 0.6      Absolute Eosinophils 0.1      Absolute Basophils 0.0      Absolute Immature Granulocytes 0.0      Absolute NRBCs 0.0     TROPONIN I - Normal    Troponin I <0.01     B-TYPE NATRIURETIC PEPTIDE ( [facility] ONLY) - Normal    BNP 59     INFLUENZA A/B & SARS-COV2 PCR MULTIPLEX   INFLUENZA A/B & SARS-COV2 PCR MULTIPLEX       RADIOLOGY:  Reviewed all pertinent imaging. Please see official radiology report.  Chest XR,  PA & LAT   Final Result   IMPRESSION: Heart size is within normal limits. There is mild to moderate pulmonary vascular congestion. No pleural effusion or pneumothorax.             EKG:    Performed at: 22-JUN-2022 1059    Impression:   Sinus rhythm with premature supraventricular complexes   Low voltage QRS  Septal infarct, age undetermined    Rate: 84 bpm  Rhythm: Sinus rhythm with premature supraventricular complexes  Axis: 74   82   72  GA Interval: 144 ms  QRS Interval: 64 ms  QTc Interval: 439 ms  Comparison: When compared with ECG of 10-RYANNE-2022 1225, Premature ventricular complexes no longer present, Premature supraventricular complexes are now present, Septal infarct is now present.    I have independently reviewed and interpreted the EKG(s) documented above.        I, Jess Mendez, am serving as a scribe to document services personally performed by Dr. Lnecho Baeza, based on my observation and the provider's statements to me. I, Lencho Baeza MD attest that Jess Mendez is acting in a scribe capacity, has observed my performance of the services and has documented them in accordance with my direction.    Lencho Baeza M.D.  Emergency Medicine  [facility] EMERGENCY DEPARTMENT  [address] [place] [id number]  [phone]  Dept: [phone]     Lencho Baeza MD  06/22/22 9024

## 2022-06-22 NOTE — ED TRIAGE NOTES
"Presents to triage with mom for c/o SOB.  Hx of Bipolar, COPD, baseline 2L of oxygen via NC.  Started to feel SOB yesterday. \"I'm stressed out.\"  Per mom, pt doesn't take inhalers or try nebs.  RR 25.  Sats 93% in triage with personal portable oxygen tank.  Had pain in stomach this AM.  Now having throat pain.         Triage Assessment     Row Name 06/22/22 8194       Triage Assessment (Adult)    Airway WDL WDL       Respiratory WDL    Respiratory WDL X;rhythm/pattern    Rhythm/Pattern, Respiratory tachypneic       Skin Circulation/Temperature WDL    Skin Circulation/Temperature WDL WDL       Cardiac WDL    Cardiac WDL WDL       Peripheral/Neurovascular WDL    Peripheral Neurovascular WDL WDL       Cognitive/Neuro/Behavioral WDL    Cognitive/Neuro/Behavioral WDL WDL              "

## 2022-06-23 ENCOUNTER — TELEPHONE (OUTPATIENT)
Dept: PULMONOLOGY | Facility: OTHER | Age: 67
End: 2022-06-23

## 2022-06-23 ENCOUNTER — PATIENT OUTREACH (OUTPATIENT)
Dept: CARE COORDINATION | Facility: CLINIC | Age: 67
End: 2022-06-23

## 2022-06-23 DIAGNOSIS — Z71.89 OTHER SPECIFIED COUNSELING: ICD-10-CM

## 2022-06-23 LAB
ATRIAL RATE - MUSE: 84 BPM
DIASTOLIC BLOOD PRESSURE - MUSE: 56 MMHG
INTERPRETATION ECG - MUSE: NORMAL
P AXIS - MUSE: 74 DEGREES
PR INTERVAL - MUSE: 144 MS
QRS DURATION - MUSE: 64 MS
QT - MUSE: 372 MS
QTC - MUSE: 439 MS
R AXIS - MUSE: 82 DEGREES
SYSTOLIC BLOOD PRESSURE - MUSE: 112 MMHG
T AXIS - MUSE: 72 DEGREES
VENTRICULAR RATE- MUSE: 84 BPM

## 2022-06-23 NOTE — PROGRESS NOTES
Clinic Care Coordination Contact    Background: Care Coordination referral placed from [facility] discharge report for reason of patient meeting criteria for a TCM outreach call by [facility] team.    Assessment: Upon chart review, [facility] Team member will cancel/close the referral for TCM outreach due to reason below:    Patient has a follow up appointment with an appropriate provider today for hospital discharge    Plan: Care Coordination referral for TCM outreach canceled.    Caro Alexander MA  [facility]

## 2022-06-23 NOTE — TELEPHONE ENCOUNTER
Arvind called today with increase of shortness of breath. She went to ED yesterday and was prescribed prednisone, solu-medrol, and mag. She was sent home but still not feeling well today. She has her oxygen on at 2.5 liters with O2 sats at 93 % but still extremely short of breath. Advised Arvind to return to the ED now. She agrees to do this.

## 2022-07-06 ENCOUNTER — HOSPITAL ENCOUNTER (OUTPATIENT)
Dept: GENERAL RADIOLOGY | Facility: HOSPITAL | Age: 67
Discharge: HOME | End: 2022-07-06
Attending: FAMILY MEDICINE | Admitting: FAMILY MEDICINE
Payer: COMMERCIAL

## 2022-07-06 ENCOUNTER — OFFICE VISIT (OUTPATIENT)
Dept: FAMILY MEDICINE | Facility: CLINIC | Age: 67
End: 2022-07-06
Payer: COMMERCIAL

## 2022-07-06 VITALS
RESPIRATION RATE: 22 BRPM | OXYGEN SATURATION: 94 % | BODY MASS INDEX: 35.72 KG/M2 | TEMPERATURE: 99 F | HEART RATE: 81 BPM | DIASTOLIC BLOOD PRESSURE: 82 MMHG | WEIGHT: 170.9 LBS | SYSTOLIC BLOOD PRESSURE: 121 MMHG

## 2022-07-06 DIAGNOSIS — M79.661 PAIN OF RIGHT LOWER LEG: ICD-10-CM

## 2022-07-06 DIAGNOSIS — S80.11XA CONTUSION OF RIGHT LOWER LEG, INITIAL ENCOUNTER: Primary | ICD-10-CM

## 2022-07-06 DIAGNOSIS — H66.90 EAR INFECTION: ICD-10-CM

## 2022-07-06 PROCEDURE — 99214 OFFICE O/P EST MOD 30 MIN: CPT | Performed by: FAMILY MEDICINE

## 2022-07-06 PROCEDURE — 73590 X-RAY EXAM OF LOWER LEG: CPT | Mod: RT,FY

## 2022-07-06 RX ORDER — CEPHALEXIN 500 MG/1
500 TABLET ORAL 3 TIMES DAILY
Qty: 21 TABLET | Refills: 0 | Status: SHIPPED | OUTPATIENT
Start: 2022-07-06 | End: 2022-07-13

## 2022-07-06 NOTE — PROGRESS NOTES
"Assessment:       Contusion right lower leg  Pain of right lower leg  - XR Tibia & Fibula Right 2 Views    Ear infection  - cephalexin 500 MG tablet  Dispense: 21 tablet; Refill: 0         Plan:     Patient with contusion of right lower leg.X-ray ordered and personally reviewed by myself showing no evidence of bony lesion or other significant abnormality.  There are some soft tissue swelling.  Reviewed radiology report which concurs.  Improving over the next several weeks.    Patient also with abrasion that is formed on the top of her ear oxygen tubing rubbing.  He now has developed some evidence of a secondary bacterial infection.  We will treat with cephalexin.  Follow-up if symptoms getting worse or not improving.  Recommend keeping a gauze or other protective padding on top of her ear to keep    MEDICATIONS:   Orders Placed This Encounter   Medications     cephalexin 500 MG tablet     Sig: Take 500 mg by mouth 3 times daily for 7 days     Dispense:  21 tablet     Refill:  0       Subjective:       67 year old female presents for evaluation of anterior right lower leg pain for \"a long time\".  It hurts when she walks on it.  She does not recall any injury.  She does not feel like the pain is in her knee.    She also has a sore that is developed on the top of her left ear where her oxygen tubing rubs.  It is now been oozing and very red and swollen over the past week and a half.  No fevers    Patient Active Problem List   Diagnosis     Scoliosis     Morbid obesity (H)     Lower Back Pain     Asthma     Urge Incontinence Of Urine     COPD (chronic obstructive pulmonary disease) (H)     Hyperlipidemia     Adult Sleep Apnea     Anxiety     Vertigo     Cognitive dysfunction in bipolar disorder (H)     Noncompliance with medications     Dependence on continuous supplemental oxygen     Bipolar I disorder, most recent episode depressed (H)     Runny nose     CAP (community acquired pneumonia) due to Chlamydia species     " Type 2 diabetes mellitus with unspecified complications (H)     Chronic respiratory failure with hypoxia (H)     Cervical high risk HPV (human papillomavirus) test positive     Cardiomyopathy, unspecified type (H)     COPD exacerbation (H)     Tobacco use disorder     Hypercarbia     Acute confusion     Hypoxia       Past Medical History:   Diagnosis Date     Acute on chronic respiratory failure with hypoxia (H) 11/15/2016     Bipolar 1 disorder (H)      CAP (community acquired pneumonia) 11/15/2016     Cervical high risk HPV (human papillomavirus) test positive 3/5/2018    3/5/18 NIL pap, +HR HPV (not 16/18) 5/21/21 NIL pap, neg HPV. Plan: await provider     COPD exacerbation (H) 4/24/2021     COPD with exacerbation (H) 11/15/2016     Diabetes mellitus, type II (H)      Hearing loss      Meniere's disease      Pneumonia 4/23/2021     Pneumonia of right lower lobe due to infectious organism 6/11/2019       Past Surgical History:   Procedure Laterality Date     CYST REMOVAL  01/23/2001     IN REMOVAL ANAL FISTULA,SUBCUTANEOUS      Description: Fistula-in-ano Repair;  Recorded: 01/08/2010; x2     TONSILLECTOMY         Current Outpatient Medications   Medication     albuterol (PROAIR HFA/PROVENTIL HFA/VENTOLIN HFA) 108 (90 Base) MCG/ACT inhaler     albuterol (PROVENTIL) (2.5 MG/3ML) 0.083% neb solution     ARIPiprazole (ABILIFY) 10 MG tablet     cephalexin 500 MG tablet     CONTOUR NEXT TEST test strip     doxycycline hyclate (VIBRAMYCIN) 100 MG capsule     empagliflozin (JARDIANCE) 10 MG TABS tablet     fluticasone-vilanterol (BREO ELLIPTA) 100-25 MCG/INH inhaler     metFORMIN (GLUCOPHAGE) 1000 MG tablet     oxybutynin (DITROPAN) 5 MG tablet     polyethylene glycol (MIRALAX) 17 GM/Dose powder     predniSONE (DELTASONE) 50 MG tablet     No current facility-administered medications for this visit.       Allergies   Allergen Reactions     Lurasidone Other (See Comments)     Face turned red, (Brand name = Latuda) Face  turned red, Face turned red       Family History   Problem Relation Age of Onset     Aneurysm Father      Heart Disease Father 70.00        bypass in 70s, AAA rupture at age 77      Ulcers Father 76.00     Pacemaker Mother      Bipolar Disorder Brother      Breast Cancer Maternal Grandmother 51.00     Kidney failure Maternal Grandmother      Cancer Paternal Grandmother         ?? lung     Cancer Paternal Uncle         ?? lung     Mental Illness Maternal Grandfather      Heart Disease Other         uncle     No Known Problems Sister         two siblings abuse chemicals     No Known Problems Brother      Bipolar Disorder Nephew        Social History     Socioeconomic History     Marital status:      Spouse name: None     Number of children: None     Years of education: None     Highest education level: None   Tobacco Use     Smoking status: Former Smoker     Packs/day: 1.00     Years: 40.00     Pack years: 40.00     Quit date: 2017     Years since quittin.4     Smokeless tobacco: Never Used   Vaping Use     Vaping Use: Never used   Substance and Sexual Activity     Alcohol use: Yes     Comment: Alcoholic Drinks/day: Occasional      Drug use: No     Sexual activity: Never   Social History Narrative    2019The patient lives with her mother.; had worked at [employer] as  but quit 2019.   Quit smoking ; no etoh problems  / x 2 ; no kids         Review of Systems  Pertinent items are noted in HPI.      Objective:     /82 (BP Location: Left arm, Patient Position: Sitting, Cuff Size: Adult Regular)   Pulse 81   Temp 99  F (37.2  C) (Oral)   Resp 22   Wt 77.5 kg (170 lb 14.4 oz)   SpO2 94%   BMI 35.72 kg/m       General appearance: alert, appears stated age and cooperative  Ears: Patient noted to have a red swollen area on the top of her ear where her tubing rubs.  There is an area of crusting overlying this.  No active drainage.  Extremities: Patient has tenderness  over the anterior tibia.  There is no ecchymosis or erythema noted.      Results for orders placed or performed during the hospital encounter of 07/06/22   XR Tibia & Fibula Right 2 Views     Status: None    Narrative    EXAM: XR TIBIA and FIBULA RT 2 VW  LOCATION: [facility]  DATE/TIME: 7/6/2022 12:27 PM    INDICATION:  Pain of right lower leg  COMPARISON: None.      Impression    IMPRESSION: Right leg negative for fracture or joint malalignment. Pretibial soft tissue swelling.       This note has been dictated using voice recognition software. Any grammatical or context distortions are unintentional and inherent to the software

## 2022-07-06 NOTE — PATIENT INSTRUCTIONS
The x-ray of your leg was normal.  You may take Tylenol as needed for discomfort and may want to use an ice pack as well.    I have sent over a prescription for an antibiotic for your ear.    Follow-up with your primary doctor if your symptoms or not improving.

## 2022-07-07 ENCOUNTER — OFFICE VISIT (OUTPATIENT)
Dept: FAMILY MEDICINE | Facility: CLINIC | Age: 67
End: 2022-07-07
Payer: COMMERCIAL

## 2022-07-07 VITALS
BODY MASS INDEX: 36.03 KG/M2 | SYSTOLIC BLOOD PRESSURE: 116 MMHG | DIASTOLIC BLOOD PRESSURE: 55 MMHG | RESPIRATION RATE: 36 BRPM | HEART RATE: 87 BPM | OXYGEN SATURATION: 92 % | WEIGHT: 172.4 LBS

## 2022-07-07 DIAGNOSIS — J44.9 CHRONIC OBSTRUCTIVE PULMONARY DISEASE, UNSPECIFIED COPD TYPE (H): ICD-10-CM

## 2022-07-07 DIAGNOSIS — Z12.4 CERVICAL CANCER SCREENING: ICD-10-CM

## 2022-07-07 DIAGNOSIS — E11.8 TYPE 2 DIABETES MELLITUS WITH UNSPECIFIED COMPLICATIONS (H): ICD-10-CM

## 2022-07-07 DIAGNOSIS — M70.50 PES ANSERINE BURSITIS: ICD-10-CM

## 2022-07-07 DIAGNOSIS — J44.1 COPD EXACERBATION (H): ICD-10-CM

## 2022-07-07 DIAGNOSIS — F31.30 BIPOLAR I DISORDER, MOST RECENT EPISODE DEPRESSED (H): ICD-10-CM

## 2022-07-07 DIAGNOSIS — Z23 ENCOUNTER FOR IMMUNIZATION: ICD-10-CM

## 2022-07-07 DIAGNOSIS — R06.82 TACHYPNEA: Primary | ICD-10-CM

## 2022-07-07 DIAGNOSIS — M79.661 PAIN OF RIGHT LOWER LEG: ICD-10-CM

## 2022-07-07 DIAGNOSIS — J96.11 CHRONIC RESPIRATORY FAILURE WITH HYPOXIA (H): ICD-10-CM

## 2022-07-07 PROCEDURE — 99215 OFFICE O/P EST HI 40 MIN: CPT | Mod: 25 | Performed by: FAMILY MEDICINE

## 2022-07-07 PROCEDURE — G0009 ADMIN PNEUMOCOCCAL VACCINE: HCPCS | Performed by: FAMILY MEDICINE

## 2022-07-07 PROCEDURE — 90677 PCV20 VACCINE IM: CPT | Performed by: FAMILY MEDICINE

## 2022-07-07 RX ORDER — AZITHROMYCIN 250 MG/1
TABLET, FILM COATED ORAL
Qty: 6 TABLET | Refills: 0 | Status: SHIPPED | OUTPATIENT
Start: 2022-07-07 | End: 2022-07-12

## 2022-07-07 RX ORDER — SIMVASTATIN 20 MG
20 TABLET ORAL AT BEDTIME
Qty: 90 TABLET | Refills: 3 | Status: SHIPPED | OUTPATIENT
Start: 2022-07-07 | End: 2023-07-11 | Stop reason: ALTCHOICE

## 2022-07-07 RX ORDER — PREDNISONE 20 MG/1
40 TABLET ORAL DAILY
Qty: 10 TABLET | Refills: 0 | Status: SHIPPED | OUTPATIENT
Start: 2022-07-07 | End: 2022-07-12

## 2022-07-07 NOTE — PROGRESS NOTES
Assessment & Plan     Tachypnea  COPD exacerbation (H)  Seen in ED on 6/22/2022 for COPD exacerbation as well.  She seems to be in exacerbation today with tachypnea, mild respiratory distress, expiratory wheezes throughout all lung fields.  She is on 2 L of oxygen.  We will give her prednisone and azithromycin.  She is on Breo Ellipta per pulmonology.  Advised that she should see pulmonology again to escalate COPD treatment plan.  staff helped her schedule an appointment with pulmonology in near future. Otherwise f/up with me in a week.  Gave her no for work stating that she can return to work Monday, July 11, 2022  - predniSONE (DELTASONE) 20 MG tablet  Dispense: 10 tablet; Refill: 0  - azithromycin (ZITHROMAX) 250 MG tablet  Dispense: 6 tablet; Refill: 0    Chronic obstructive pulmonary disease, unspecified COPD type (H)  Chronic respiratory failure with hypoxia (H)  See above.   - Pneumococcal 20 Valent Conjugate (PCV20)  - azithromycin (ZITHROMAX) 250 MG tablet  Dispense: 6 tablet; Refill: 0    Type 2 diabetes mellitus with unspecified complications (H)  Worsening.  A1c obtained in the ED in June was 8.  Will increase Jardiance to 25 mg.  We will add on simvastatin as well as she should be on a statin with a diabetes diagnosis.  - empagliflozin (JARDIANCE) 25 MG TABS tablet  Dispense: 90 tablet; Refill: 1  - simvastatin (ZOCOR) 20 MG tablet  Dispense: 90 tablet; Refill: 3    Bipolar I disorder, most recent episode depressed (H)  Seeing behavioral health services    Cervical cancer screening  Not ready to do Pap smear today.  She is due for Pap soon. Will try during next visit     Encounter for immunization   - Pneumococcal 20 Valent Conjugate (PCV20)    Pain of right lower leg  Pes anserine bursitis  Reviewed walk-in clinic visit yesterday and x-ray imaging results.  Most likely past anserine bursitis.  Advised Tylenol and heating pad as needed.        Return in about 1 week (around 7/14/2022) for Follow up,  with me, in person.      I spent 45 minutes on this encounter, including collecting history from patient and reviewing records from previous providers, examining the patient, explaining and counseling the issues enumerated in the Assessment and Plan (patient given a copy), ordering indicated tests, ordering prescriptions.       Rema Whiting MD  [facility]    Wendy Samuels is a 67 year old, presenting for the following health issues:  Hospital F/U (Follow up from ER visit. Was at [facility] on 6/22. Was also seen by another provider yesterday for leg pain )      HPI   Chief Complaint   Patient presents with     Hospital F/U     Follow up from ER visit. Was at [facility] on 6/22. Was also seen by another provider yesterday for leg pain        Was seen at walk-in clinic yesterday 7/6/2022 and was diagnosed with ear infection now on Keflex and also got x-ray of the right leg for leg pain and x-ray was unremarkable.  She saw pulmonology 3/28/2022 for COPD, frequent hospitalizations and ED visits for COPD exacerbation and was started back on LABA/ICS and she's on O2 LPM with activities.  She is only using CPAP machine intermittently and was sent to sleep clinic recently by pulmonology.     Review of Systems   Constitutional, HEENT, cardiovascular, pulmonary, gi and gu systems are negative, except as otherwise noted.      Objective    /55   Pulse 87   Resp (!) 36   Wt 78.2 kg (172 lb 6.4 oz)   SpO2 92%   BMI 36.03 kg/m    Body mass index is 36.03 kg/m .  Physical Exam   GENERAL: healthy, alert and mild distress  NECK: no adenopathy, no asymmetry, masses, or scars and thyroid normal to palpation  RESP: Decreased lung sounds bilaterally.  Expiratory wheezes throughout all lung fields, tachypneic  CV: regular rate and rhythm, normal S1 S2, no S3 or S4, no murmur, click or rub, no peripheral edema and peripheral pulses strong  MS: no gross musculoskeletal defects noted, no  edema, right sided Pes anserine bursa tender to palpation  NEURO: Normal strength and tone, mentation intact and speech normal  PSYCH: mentation appears normal, affect normal/bright                    .  ..

## 2022-07-07 NOTE — LETTER
July 7, 2022      Arvind SALDANA Salo  [address] [place] [address] [address] [place] [address]        To Whom It May Concern:    Arvind SALDANA Salo was seen in our clinic today 7/7/2022. She may return to work without restrictions on Monday 7/11/2022      Sincerely,        Rema Whiting MD

## 2022-07-08 NOTE — TELEPHONE ENCOUNTER
"07/7/22 appt scheduled--notes added, office visit note said \"not ready to do Pap smear today\"  07/15/22 appt scheduled--notes added    "

## 2022-07-10 ENCOUNTER — NURSE TRIAGE (OUTPATIENT)
Dept: NURSING | Facility: CLINIC | Age: 67
End: 2022-07-10

## 2022-07-10 ENCOUNTER — HOSPITAL ENCOUNTER (EMERGENCY)
Facility: HOSPITAL | Age: 67
Discharge: HOME | End: 2022-07-10
Admitting: PHYSICIAN ASSISTANT
Payer: COMMERCIAL

## 2022-07-10 VITALS
TEMPERATURE: 98.3 F | RESPIRATION RATE: 20 BRPM | BODY MASS INDEX: 39.81 KG/M2 | OXYGEN SATURATION: 94 % | HEART RATE: 87 BPM | SYSTOLIC BLOOD PRESSURE: 135 MMHG | WEIGHT: 172 LBS | DIASTOLIC BLOOD PRESSURE: 80 MMHG | HEIGHT: 55 IN

## 2022-07-10 DIAGNOSIS — M25.552 ACUTE PAIN OF LEFT HIP: ICD-10-CM

## 2022-07-10 LAB
ALBUMIN UR-MCNC: NEGATIVE MG/DL
APPEARANCE UR: CLEAR
BILIRUB UR QL STRIP: NEGATIVE
COLOR UR AUTO: COLORLESS
GLUCOSE UR STRIP-MCNC: >1000 MG/DL
HGB UR QL STRIP: NEGATIVE
KETONES UR STRIP-MCNC: NEGATIVE MG/DL
LEUKOCYTE ESTERASE UR QL STRIP: NEGATIVE
NITRATE UR QL: NEGATIVE
PH UR STRIP: 7 [PH] (ref 5–7)
RBC URINE: <1 /HPF
SP GR UR STRIP: 1.02 (ref 1–1.03)
SQUAMOUS EPITHELIAL: <1 /HPF
TRANSITIONAL EPI: <1 /HPF
UROBILINOGEN UR STRIP-MCNC: <2 MG/DL
WBC URINE: 4 /HPF

## 2022-07-10 PROCEDURE — 99283 EMERGENCY DEPT VISIT LOW MDM: CPT

## 2022-07-10 PROCEDURE — 250N000013 HC RX MED GY IP 250 OP 250 PS 637: Performed by: PHYSICIAN ASSISTANT

## 2022-07-10 PROCEDURE — 81001 URINALYSIS AUTO W/SCOPE: CPT | Performed by: PHYSICIAN ASSISTANT

## 2022-07-10 RX ORDER — ACETAMINOPHEN 325 MG/1
650 TABLET ORAL ONCE
Status: COMPLETED | OUTPATIENT
Start: 2022-07-10 | End: 2022-07-10

## 2022-07-10 RX ADMIN — ACETAMINOPHEN 650 MG: 325 TABLET ORAL at 12:41

## 2022-07-10 ASSESSMENT — ENCOUNTER SYMPTOMS
DIZZINESS: 0
FREQUENCY: 1
SHORTNESS OF BREATH: 0
NECK PAIN: 0
MYALGIAS: 0
ABDOMINAL PAIN: 0
FATIGUE: 0
FEVER: 0
WEAKNESS: 0
NUMBNESS: 0
DIARRHEA: 0
VOMITING: 0
BACK PAIN: 1
CHILLS: 0
HEADACHES: 0
DYSURIA: 0
ARTHRALGIAS: 1

## 2022-07-10 NOTE — DISCHARGE INSTRUCTIONS
You are seen here in the emergency department for evaluation of left hip pain.  No evidence of trauma, we did not do any imaging here, as there is no indication that you broke anything.  He did complain of some urinary symptoms, we did test the urine here, there is no sign of infection at this time.  I recommend you follow-up with your primary care provider if you continue to have left-sided hip pain.  No indication for any infection.    Please continue to take ibuprofen and Tylenol at home, there is dosage recommendations included below.  Return to the emergency department if you develop any worsening pain, numbness, tingling, or weakness.    For pain or fever you may use:  -Tylenol 650 mg every 6 hours.  Max 4000 mg in 24 hours  Do not use thismedication with alcohol as it can cause liver problems.  -Ibuprofen 600 mg every 6 hours.  Max 3500 mg in 24 hours  Do not take this medication if you have a history of a GI bleed or have kidney problems.  You may use both of these medications at the same time or you can alternate them every 3 hours.  For example, Tylenol at 6 AM, ibuprofen at 9 AM, Tylenol at noon, etc.

## 2022-07-10 NOTE — ED TRIAGE NOTES
Patient drove herself here for lower back pain which radiates down both of her legs.  This pain started yesterday.  Arjun any trama.  Says it is hard to sit.  Wear oxygen at 2 liters NC.  States her breathing is stable. Patient says she started a new medication last Friday, does not know the name nor what it is for.      Triage Assessment     Row Name 07/10/22 1144       Triage Assessment (Adult)    Airway WDL WDL       Respiratory WDL    Respiratory WDL WDL       Skin Circulation/Temperature WDL    Skin Circulation/Temperature WDL WDL       Cardiac WDL    Cardiac WDL WDL       Peripheral/Neurovascular WDL    Peripheral Neurovascular WDL WDL       Cognitive/Neuro/Behavioral WDL    Cognitive/Neuro/Behavioral WDL WDL

## 2022-07-10 NOTE — ED PROVIDER NOTES
EMERGENCY DEPARTMENT ENCOUNTER      NAME: Arvind Leong  AGE: 67 year old female  YOB: 1955  MRN: [id number]  EVALUATION DATE & TIME: 7/10/2022 12:11 PM    PCP: Rema Whiting    ED PROVIDER: Otis Montaño PA-C      Chief Complaint   Patient presents with     Back Pain         FINAL IMPRESSION:  1. Acute pain of left hip        ED COURSE & MEDICAL DECISION MAKING:    Pertinent Labs & Imaging studies reviewed. (See chart for details)  12:34 PM I met the patient and performed my initial interview and exam.   1:45 PM Plan for discharge at this time.     67 year old female presents to the Emergency Department for evaluation of left lower back pain.     ED Course as of 07/10/22 1345   Sun Jul 10, 2022   1248 Patient is a 67-year-old female with complex past medical history presents emergency department for evaluation of left-sided back pain.  On examination she does not really have any focal tenderness.  No recent trauma.  No indication for further imaging at this time.  She denies any loss of control of bowel or bladder, numbness or tingling down her legs.  No evidence of cauda equina, or spinal abscess.  She complains primarily of left-sided leg pain, hip pain.  No recent trauma, but notes the pain is worse when she is up and walking around.  She has not taken anything at home for pain.  She thinks that the medication she was started on for presumptive urinary tract infection is what is causing her pain, however it is unclear whether or not she was actually started on the medication.  She denies any hematuria, however notes some dysuria, no CVA tenderness.  No fevers.  No cough, cold, chills.  She has not taken anything at home for pain.  Differential includes possible acute cystitis, chronic left-sided lower back pain.  Will attempt to get a urine sample here, as well as give her some Tylenol to see if this helps with the pain.  I do not think she warrants any further imaging at this  "point.   1340 No evidence of urinary tract infection on urinalysis.   1344 Discussed laboratory results with the patient, Tylenol did help with the pain.  Recommend that she continue to do this at home.  Patient expresses understanding.  No indication for any further imaging at this time given her negative examination.  Plan for discharge at this time.        At the conclusion of the encounter I discussed the results of all of the tests and the disposition. The questions were answered. The patient or family acknowledged understanding and was agreeable with the care plan.       0 minutes of critical care time     MEDICATIONS GIVEN IN THE EMERGENCY:  Medications   acetaminophen (TYLENOL) tablet 650 mg (650 mg Oral Given 7/10/22 1241)       NEW PRESCRIPTIONS STARTED AT TODAY'S ER VISIT  New Prescriptions    No medications on file          =================================================================    HPI    Patient information was obtained from: Patient     Use of : N/A       Arvind Leong is a 67 year old female with a pertinent history of obesity, urge incontinence who presents to this ED for evaluation of left lower back pain.  She notes that this is primarily worse when she is up and walking around, no recent trauma.  No recent falls.  She has not hit her head, or had any injury to the left leg.  She notes that the pain is primarily in her left hip, left lower back.  She notes that the pain \"radiates down both legs\".  She does not have any weakness, numbness or tingling.  She has full range of motion of her lower extremities, and is able to move them independently.  She denies any headache, dizziness, lightheadedness.  No other focal neurological complaints here today.  Denies any chest pain or shortness of breath.  She notes that \"this is all from the medication that they started me on\".  She was recently seen for a presumptive urinary tract infection, unclear whether or not she has been " taking the antibiotics.  She does note some urinary frequency, denies any dysuria.  Denies any changes of bowel or bladder.  Denies any incontinence.  No other constitutional symptoms.    REVIEW OF SYSTEMS   Review of Systems   Constitutional: Negative for chills, fatigue and fever.   Respiratory: Negative for shortness of breath.    Cardiovascular: Negative for chest pain.   Gastrointestinal: Negative for abdominal pain, diarrhea and vomiting.   Genitourinary: Positive for frequency. Negative for dysuria, urgency and vaginal pain.   Musculoskeletal: Positive for arthralgias and back pain. Negative for myalgias and neck pain.   Neurological: Negative for dizziness, weakness, numbness and headaches.        PAST MEDICAL HISTORY:  Past Medical History:   Diagnosis Date     Acute on chronic respiratory failure with hypoxia (H) 11/15/2016     Bipolar 1 disorder (H)      CAP (community acquired pneumonia) 11/15/2016     Cervical high risk HPV (human papillomavirus) test positive 3/5/2018    3/5/18 NIL pap, +HR HPV (not 16/18) 5/21/21 NIL pap, neg HPV. Plan: await provider     COPD exacerbation (H) 4/24/2021     COPD with exacerbation (H) 11/15/2016     Diabetes mellitus, type II (H)      Hearing loss      Meniere's disease      Pneumonia 4/23/2021     Pneumonia of right lower lobe due to infectious organism 6/11/2019       PAST SURGICAL HISTORY:  Past Surgical History:   Procedure Laterality Date     CYST REMOVAL  01/23/2001     LA REMOVAL ANAL FISTULA,SUBCUTANEOUS      Description: Fistula-in-ano Repair;  Recorded: 01/08/2010; x2     TONSILLECTOMY             CURRENT MEDICATIONS:    albuterol (PROAIR HFA/PROVENTIL HFA/VENTOLIN HFA) 108 (90 Base) MCG/ACT inhaler  albuterol (PROVENTIL) (2.5 MG/3ML) 0.083% neb solution  ARIPiprazole (ABILIFY) 10 MG tablet  azithromycin (ZITHROMAX) 250 MG tablet  cephalexin 500 MG tablet  CONTOUR NEXT TEST test strip  doxycycline hyclate (VIBRAMYCIN) 100 MG capsule  empagliflozin (JARDIANCE)  "25 MG TABS tablet  fluticasone-vilanterol (BREO ELLIPTA) 100-25 MCG/INH inhaler  metFORMIN (GLUCOPHAGE) 1000 MG tablet  oxybutynin (DITROPAN) 5 MG tablet  polyethylene glycol (MIRALAX) 17 GM/Dose powder  predniSONE (DELTASONE) 20 MG tablet  predniSONE (DELTASONE) 50 MG tablet  simvastatin (ZOCOR) 20 MG tablet         ALLERGIES:  Allergies   Allergen Reactions     Lurasidone Other (See Comments)     Face turned red, (Brand name = Latuda) Face turned red, Face turned red       FAMILY HISTORY:  Family History   Problem Relation Age of Onset     Aneurysm Father      Heart Disease Father 70.00        bypass in 70s, AAA rupture at age 77      Ulcers Father 76.00     Pacemaker Mother      Bipolar Disorder Brother      Breast Cancer Maternal Grandmother 51.00     Kidney failure Maternal Grandmother      Cancer Paternal Grandmother         ?? lung     Cancer Paternal Uncle         ?? lung     Mental Illness Maternal Grandfather      Heart Disease Other         uncle     No Known Problems Sister         two siblings abuse chemicals     No Known Problems Brother      Bipolar Disorder Nephew        SOCIAL HISTORY:   Social History     Socioeconomic History     Marital status:    Tobacco Use     Smoking status: Former Smoker     Packs/day: 1.00     Years: 40.00     Pack years: 40.00     Quit date: 2017     Years since quittin.4     Smokeless tobacco: Never Used   Vaping Use     Vaping Use: Never used   Substance and Sexual Activity     Alcohol use: Yes     Comment: Alcoholic Drinks/day: Occasional      Drug use: No     Sexual activity: Never   Social History Narrative    2019The patient lives with her mother.; had worked at [employer] as  but quit 2019.   Quit smoking ; no etoh problems  / x 2 ; no kids       VITALS:  /67   Pulse 86   Temp 97.2  F (36.2  C) (Oral)   Resp 20   Ht 1.397 m (4' 7\")   Wt 78 kg (172 lb)   SpO2 96%   BMI 39.98 kg/m      PHYSICAL EXAM  "   Physical Exam  Vitals and nursing note reviewed.   Constitutional:       General: She is not in acute distress.     Appearance: Normal appearance. She is normal weight. She is not toxic-appearing or diaphoretic.   HENT:      Right Ear: External ear normal.      Left Ear: External ear normal.   Eyes:      Conjunctiva/sclera: Conjunctivae normal.   Cardiovascular:      Rate and Rhythm: Normal rate and regular rhythm.      Pulses: Normal pulses.      Heart sounds: Normal heart sounds. No murmur heard.    No friction rub. No gallop.   Pulmonary:      Effort: No respiratory distress.      Breath sounds: No wheezing or rales.      Comments: COPD, on oxygen chronically, at baseline.   Abdominal:      General: Abdomen is flat.      Palpations: Abdomen is soft.      Tenderness: There is no abdominal tenderness. There is no right CVA tenderness or left CVA tenderness.   Musculoskeletal:         General: Tenderness present. No swelling, deformity or signs of injury.      Right lower leg: No edema.      Left lower leg: No edema.      Comments: Mild left hip tenderness, no recent trauma    Neurological:      Mental Status: She is alert. Mental status is at baseline.      Sensory: No sensory deficit.      Motor: No weakness.        LAB:  All pertinent labs reviewed and interpreted.  Labs Ordered and Resulted from Time of ED Arrival to Time of ED Departure   ROUTINE UA WITH MICROSCOPIC REFLEX TO CULTURE - Abnormal       Result Value    Color Urine Colorless      Appearance Urine Clear      Glucose Urine >1000 (*)     Bilirubin Urine Negative      Ketones Urine Negative      Specific Gravity Urine 1.019      Blood Urine Negative      pH Urine 7.0      Protein Albumin Urine Negative      Urobilinogen Urine <2.0      Nitrite Urine Negative      Leukocyte Esterase Urine Negative      RBC Urine <1      WBC Urine 4      Squamous Epithelials Urine <1      Transitional Epithelials Urine <1         RADIOLOGY:  Reviewed all pertinent  imaging. Please see official radiology report.  No orders to display     PROCEDURES:   None.     Otis Montaño PA-C  Emergency Medicine  [facility] EMERGENCY DEPARTMENT  [facility] [facility] [id number] [phone]  Dept: [phone]     Otis Montaño PA-C  07/10/22 6313       Otis Montaño PA-C  07/10/22 0833

## 2022-07-10 NOTE — TELEPHONE ENCOUNTER
"Triage Call:    Caller: Patient    Patient is calling about severe 20/10 lower pain today and it wasn't acting that way at her appointment on Friday.  \"it is so bad I can't get up off the cough and move\".      She is upset and claiming that a medication that she was started on was the one that is causing her to not be albe ot get up and around and having this much pain.       ED recommended disposition.  Patient verbalized understanding about recommendations and disposition.  Encouraged to call back with any further questions.      Ida Rivera RN on 7/10/2022 at 10:50 AM      COVID 19 Nurse Triage Plan/Patient Instructions    Please be aware that novel coronavirus (COVID-19) may be circulating in the community. If you develop symptoms such as fever, cough, or SOB or if you have concerns about the presence of another infection including coronavirus (COVID-19), please contact your health care provider or visit [email or web address].     Disposition/Instructions    ED Visit recommended. Follow protocol based instructions.     Bring Your Own Device:  Please also bring your smart device(s) (smart phones, tablets, laptops) and their charging cables for your personal use and to communicate with your care team during your visit.    Thank you for taking steps to prevent the spread of this virus.  o Limit your contact with others.  o Wear a simple mask to cover your cough.  o Wash your hands well and often.    Resources    [facility]: About COVID-19: [email or web address]    CDC: What to Do If You're Sick: www.cdc.gov/coronavirus/2019-ncov/about/steps-when-sick.html    CDC: Ending Home Isolation: www.cdc.gov/coronavirus/2019-ncov/hcp/disposition-in-home-patients.html     CDC: Caring for Someone: www.cdc.gov/coronavirus/2019-ncov/if-you-are-sick/care-for-someone.html     [facility]: Interim Guidance for Hospital Discharge to Home: www.health.[facility].[place].us/diseases/coronavirus/hcp/hospdischarge.pdf    [facility]" [place] clinical trials (COVID-19 research studies): clinicalaffairs.[facility]-clinical-trials     Below are the COVID-19 hotlines at the [facility] of Health ([facility]). Interpreters are available.   o For health questions: Call 729-723-5013 or 1-275.528.3878 (7 a.m. to 7 p.m.)  o For questions about schools and childcare: Call 653-877-3657 or 1-380.946.4018 (7 a.m. to 7 p.m.)                   Reason for Disposition    [1] SEVERE back pain (e.g., excruciating) AND [2] sudden onset AND [3] age > 60    Additional Information    Negative: Passed out (i.e., lost consciousness, collapsed and was not responding)    Negative: Sounds like a life-threatening emergency to the triager    Negative: Major injury to the back (e.g., MVA, fall > 10 feet or 3 meters, penetrating injury, etc.)    Negative: Followed a tailbone injury    Negative: [1] Pain in the upper back over the ribs (rib cage) AND [2] radiates (travels, goes) into chest    Negative: [1] Pain in the upper back over the ribs (rib cage) AND [2] worsened by coughing (or clearly increases with breathing)    Negative: Back pain during pregnancy    Negative: Pain mainly in flank (i.e., in the side, over the lower ribs or just below the ribs)    Negative: Shock suspected (e.g., cold/pale/clammy skin, too weak to stand, low BP, rapid pulse)    Protocols used: BACK PAIN-A-AH

## 2022-07-19 ENCOUNTER — OFFICE VISIT (OUTPATIENT)
Dept: PULMONOLOGY | Facility: OTHER | Age: 67
End: 2022-07-19
Payer: COMMERCIAL

## 2022-07-19 VITALS
HEART RATE: 84 BPM | SYSTOLIC BLOOD PRESSURE: 128 MMHG | DIASTOLIC BLOOD PRESSURE: 80 MMHG | BODY MASS INDEX: 39.49 KG/M2 | OXYGEN SATURATION: 94 % | WEIGHT: 169.9 LBS

## 2022-07-19 DIAGNOSIS — R06.00 DYSPNEA, UNSPECIFIED TYPE: ICD-10-CM

## 2022-07-19 DIAGNOSIS — R53.81 PHYSICAL DECONDITIONING: ICD-10-CM

## 2022-07-19 DIAGNOSIS — G47.33 OSA (OBSTRUCTIVE SLEEP APNEA): ICD-10-CM

## 2022-07-19 DIAGNOSIS — J44.9 CHRONIC OBSTRUCTIVE PULMONARY DISEASE, UNSPECIFIED COPD TYPE (H): Primary | ICD-10-CM

## 2022-07-19 DIAGNOSIS — J44.1 COPD EXACERBATION (H): ICD-10-CM

## 2022-07-19 PROCEDURE — 99214 OFFICE O/P EST MOD 30 MIN: CPT | Mod: 25 | Performed by: INTERNAL MEDICINE

## 2022-07-19 PROCEDURE — 94760 N-INVAS EAR/PLS OXIMETRY 1: CPT | Performed by: INTERNAL MEDICINE

## 2022-07-19 RX ORDER — CEPHALEXIN 500 MG/1
CAPSULE ORAL
COMMUNITY
Start: 2022-07-06 | End: 2022-09-22

## 2022-07-19 RX ORDER — PREDNISONE 10 MG/1
TABLET ORAL
Qty: 18 TABLET | Refills: 0 | Status: SHIPPED | OUTPATIENT
Start: 2022-07-19 | End: 2022-09-22

## 2022-07-19 RX ORDER — FUROSEMIDE 20 MG
20 TABLET ORAL DAILY
Qty: 3 TABLET | Refills: 0 | Status: SHIPPED | OUTPATIENT
Start: 2022-07-19 | End: 2022-09-22

## 2022-07-19 NOTE — PATIENT INSTRUCTIONS
Continue O2 supplementation 2 LPM with activities.   Prednisone taper as instructed  BREO one puff daily, rinse your mouth with water after each use  Albuterol inhaler 2 puffs up to 6 times a day as needed  Albuterol nebs as needed  Increase activity as tolerated  Weight loss  Referral to physical therapy   Follow up in 6 months

## 2022-07-19 NOTE — PROGRESS NOTES
Patient oxygen saturation on 2L at rest is 95.  Oxygen saturation after ambulating 150ft on 2L is 90.    Patient is ambulatory within his/her home.    Mehdi Mckeon MA  [facility] Pulmonology [place]

## 2022-07-19 NOTE — PROGRESS NOTES
PULMONARY OUTPATIENT FOLLOW UP NOTE    Assessment:   1. Dyspnea  Recently treated for COPD exacerbation. Ongoing respiratory symptoms.   Trace edema of LEs, sleeps in the couch, ? Orthopnea. .   Resume systemic steroids. Add short course of diuretics  2. COPD with acute exacerbation   Spirometry showed FEV1 0.52 L (31%), no significant post bronchodilator response.   Recently treated for COPD exacerbation, but ongoing respiratory symptoms.  Start systemic steroids.  Continue LABA/ICS. Albuterol as needed.  Continue O2 2 LPM with activities.   Referral to pulmonary rehab   3. ABDIFATAH  Sleep Study done on 2/17/2014 showed AHI 14.7, RDI 14.7, lateral , REM AHI 45, Supine AHI 14.3. PML index 21.7. Mean O2 sat 87%, desaturation heide 61%, Time of SpO2 < 89% 44 minutes out of 138 minutes of diagnostic time. CPAP 7 cmH20 was therapeutic.   Sleep hygiene was discussed, increase compliance of CPAP at night.   4. Bipolar / Anxiety disorder  5. Back pain  Secondary to thoracolumbar scoliosis and obesity.   Weight loss was discussed.   6. Severe physical deconditioning   Referral to physical therapy   7. Obesity Body mass index is 39.49 kg/m .    Plan:   1. Continue O2 supplementation 2 LPM with activities.   2. Prednisone taper as instructed  3. BREO one puff daily, rinse your mouth with water after each use  4. Albuterol inhaler 2 puffs up to 6 times a day as needed  5. Albuterol nebs as needed  6. Add short course of diuretics lasix 20 mg daily for a few days.   7. Increase activity as tolerated  8. Weight loss  9. Referral to physical therapy   10. Follow up in 6 months    Arnoldo Mansfield  Pulmonary / Critical Care  07/19/22    CC:      Chief Complaint   Patient presents with     Follow Up     COPD      HPI:      Arvind Leong is an 67 y.o. female who presents for follow up.   Patient has history of severe COPD on home O2, ABDIFATAH, bipolar / anxiety disorder, Meniere disease.   Patient was recently treated  for COPD exacerbation, reports ongoing exertional dyspnea and wheezes.    Just finished systemic steroids.   Difficulty walking 1/2 block due to dyspnea and pain in both back and hip.   Report mils dry cough and wheezes.   Uses BREO daily and albuterol HFA or albuterol nebs once or twice a day.   No chest pain, mild swelling of LEs. Mild orthopnea or PND.   Stopped using CPAP machine. Unable to tolerate CPAP machine.   Activity level is limited, sleep in a couch.    No acid reflux symptoms.     Previous tobacco use 1/2 ppd for 47 years. Quit 2/2017.    Past Medical History:   Diagnosis Date     Acute on chronic respiratory failure with hypoxia (H) 11/15/2016     Bipolar 1 disorder (H)      CAP (community acquired pneumonia) 11/15/2016     Cervical high risk HPV (human papillomavirus) test positive 3/5/2018    3/5/18 NIL pap, +HR HPV (not 16/18) 5/21/21 NIL pap, neg HPV. Plan: await provider     COPD with exacerbation (H) 11/15/2016     Diabetes mellitus, type II (H)      Hearing loss      Meniere's disease      Medications:     albuterol (PROAIR HFA/PROVENTIL HFA/VENTOLIN HFA) 108 (90 Base) MCG/ACT inhaler, Inhale 2 puffs into the lungs every 6 hours as needed for shortness of breath / dyspnea or wheezing  albuterol (PROVENTIL) (2.5 MG/3ML) 0.083% neb solution, Take 1 vial (2.5 mg) by nebulization every 4 hours as needed for shortness of breath / dyspnea or wheezing Take 1 vial four times a day for the next 5 days then go back to as needed  ARIPiprazole (ABILIFY) 10 MG tablet, [ARIPIPRAZOLE (ABILIFY) 10 MG TABLET] Take 10 mg by mouth daily.  cephALEXin (KEFLEX) 500 MG capsule, TAKE 1 CAPSULE BY MOUTH THREE TIMES DAILY FOR 7 DAYS  CONTOUR NEXT TEST test strip, 1 strip by In Vitro route daily   empagliflozin (JARDIANCE) 25 MG TABS tablet, Take 1 tablet (25 mg) by mouth daily  fluticasone-vilanterol (BREO ELLIPTA) 100-25 MCG/INH inhaler, Inhale 1 puff into the lungs daily  metFORMIN (GLUCOPHAGE) 1000 MG tablet, Take 1  tablet (1,000 mg) by mouth 2 times daily (with meals)  oxybutynin (DITROPAN) 5 MG tablet, Take 1 tablet (5 mg) by mouth 3 times daily  polyethylene glycol (MIRALAX) 17 GM/Dose powder, Take 17 g by mouth daily  simvastatin (ZOCOR) 20 MG tablet, Take 1 tablet (20 mg) by mouth At Bedtime  doxycycline hyclate (VIBRAMYCIN) 100 MG capsule, Take 100 mg by mouth 2 times daily End on 21  predniSONE (DELTASONE) 50 MG tablet, Take 1 tablet (50 mg) by mouth daily    No current facility-administered medications on file prior to visit.    Social History     Socioeconomic History     Marital status:      Spouse name: Not on file     Number of children: Not on file     Years of education: Not on file     Highest education level: Not on file   Occupational History     Occupation: /[employer]- quit 2019   Social Needs     Financial resource strain: Not on file     Food insecurity     Worry: Not on file     Inability: Not on file     Transportation needs     Medical: Not on file     Non-medical: Not on file   Tobacco Use     Smoking status: Former Smoker     Packs/day: 1.00     Years: 40.00     Pack years: 40.00     Quit date: 2017     Years since quittin.3     Smokeless tobacco: Never Used   Substance and Sexual Activity     Alcohol use: Yes     Comment: Occasional      Drug use: No     Sexual activity: Never   Lifestyle     Physical activity     Days per week: Not on file     Minutes per session: Not on file     Stress: Not on file   Relationships     Social connections     Talks on phone: Not on file     Gets together: Not on file     Attends Cheondoism service: Not on file     Active member of club or organization: Not on file     Attends meetings of clubs or organizations: Not on file     Relationship status: Not on file     Intimate partner violence     Fear of current or ex partner: Not on file     Emotionally abused: Not on file     Physically abused: Not on file     Forced sexual activity: Not on  file   Other Topics Concern     Not on file   Social History Narrative    2019The patient lives with her mother.; had worked at [employer] as  but quit 2019.     Quit smoking ; no etoh problems    / x 2 ; no kids     Family History   Problem Relation Age of Onset     Aneurysm Father      Heart disease Father 70        bypass in 70s, AAA rupture at age 77      Ulcers Father 76     Pacemaker Mother      Bipolar disorder Brother      Breast cancer Maternal Grandmother 51     Kidney failure Maternal Grandmother      Cancer Paternal Grandmother         ?? lung     Cancer Paternal Uncle         ?? lung     Mental illness Maternal Grandfather      Heart disease Other         uncle     No Medical Problems Sister         two siblings abuse chemicals     No Medical Problems Brother      Bipolar disorder Nephew      Review of Systems  A 12 point comprehensive review of systems was negative except as noted.      Objective:     /80 (BP Location: Right arm, Patient Position: Chair, Cuff Size: Adult Regular)   Pulse 84   Wt 77.1 kg (169 lb 14.4 oz)   SpO2 94%   BMI 39.49 kg/m      Wt Readings from Last 3 Encounters:   06/15/21 177 lb (80.3 kg)   21 178 lb (80.7 kg)   21 187 lb 6 oz (85 kg)     Vitals:    22 0914   Weight: 77.1 kg (169 lb 14.4 oz)     Gen: obese, awake, alert, no distress  HEENT: pink conjunctiva, moist mucosa, Mallampati III/IV  Neck: no thyromegaly, masses or JVD  Lungs: expiratory wheezes  CV: regular, no murmurs or gallops appreciated  Abdomen: soft, distended, NT, BS wnl  Ext: trace edema  Neuro: CN II-XII intact, non focal      Diagnostic tests:     Sleep Study (2014)  AHI 14.7, RDI 14.7, lateral , REM AHI 45, Supine   PML index 21.7  Mean O2 sat 87%, desaturation heide 61%, Time of SpO2 < 89% 44 minutes out of 138 minutes of diagnostic time.   CPAP 7 cmH20 was therapeutic     PFT's 3/8/16  FEV1/FVC is 81  FEV1 is 0.52L (31%)  predicted and is reduced.  FVC is 0.64L (30%) predicted and reduced.  There was no improvement in spirometry after a single inhaled dose of bronchodilator.  TLC is 4.05L (118%) predicted and is normal.  RV is 3.26L (226%) predicted and is increased.  Flow volume loops indicate scooped expiratory limb.    CTA CHEST PE RUN 9/26/2017 12:03 PM  INDICATION: acute on chronic respiratory failure  COMPARISON: 10/31/2013  ANGIOGRAM CHEST: There is moderate motion artifact which results in regions of modeled vascular enhancement. In the highest degree of vascular blurring and mottled enhancement, it is impossible to exclude small emboli but no convincing evidence for emboli present. The overall appearance is very similar to the previous exam.  LUNGS AND PLEURA: Mild peripheral bilateral atelectasis or scar also similar to previous exam, no new pneumonia.  MEDIASTINUM: Small reactive lymph nodes within anterior mediastinum unchanged, no suspicious adenopathy. Heart size upper limits of normal. Tortuous thoracic aorta.  LIMITED UPPER ABDOMEN: Negative.  MUSCULOSKELETAL: S-shaped scoliosis of thoracic and lumbar spine unchanged.  CONCLUSION:  1.  No significant change. No convincing evidence of pulmonary embolism.  2.  Stable atelectasis or scar, left lung base. Scoliosis.    XR CHEST 2 VIEWS 8/28/2019 3:12 PM  INDICATION: cough  COMPARISON: 7/3/2019    FINDINGS: No pleural fluid or pneumothorax. No consolidative airspace disease identified. There is interstitial edema. The cardiomediastinal silhouette is not well assessed. There is lateral curvature of the spine.     XR CHEST 2 VIEWS PA AND LATERAL 2/21/2022 11:25 AM   INDICATION: URI. Cough.   COMPARISON: Chest CTA 12/09/2021, chest x-ray 12/02/2021 and older studies.   IMPRESSION: Large body habitus. Lungs are clear. Heart and pulmonary vascularity are normal. Severe S-shaped thoracolumbar scoliosis.      Echocardiogram 1/3/2014  EF 75%, IVSd 1.3  Normal RV  No significant  valvular disease.

## 2022-07-20 DIAGNOSIS — J45.909 ASTHMA: Primary | ICD-10-CM

## 2022-07-20 DIAGNOSIS — J44.1 CHRONIC OBSTRUCTIVE PULMONARY DISEASE WITH ACUTE EXACERBATION (H): ICD-10-CM

## 2022-07-23 ENCOUNTER — HEALTH MAINTENANCE LETTER (OUTPATIENT)
Age: 67
End: 2022-07-23

## 2022-08-08 NOTE — TELEPHONE ENCOUNTER
Patient due for Pap and HPV.    Reminder done today via letter and telephone call-vm full Final Letter sent.

## 2022-08-16 ENCOUNTER — NURSE TRIAGE (OUTPATIENT)
Dept: FAMILY MEDICINE | Facility: CLINIC | Age: 67
End: 2022-08-16

## 2022-08-16 ENCOUNTER — OFFICE VISIT (OUTPATIENT)
Dept: FAMILY MEDICINE | Facility: CLINIC | Age: 67
End: 2022-08-16
Payer: COMMERCIAL

## 2022-08-16 VITALS
OXYGEN SATURATION: 95 % | RESPIRATION RATE: 20 BRPM | TEMPERATURE: 97.9 F | WEIGHT: 185 LBS | BODY MASS INDEX: 43 KG/M2 | DIASTOLIC BLOOD PRESSURE: 69 MMHG | SYSTOLIC BLOOD PRESSURE: 103 MMHG | HEART RATE: 86 BPM

## 2022-08-16 DIAGNOSIS — R35.0 URINARY FREQUENCY: Primary | ICD-10-CM

## 2022-08-16 LAB
ALBUMIN UR-MCNC: NEGATIVE MG/DL
AMORPH CRY #/AREA URNS HPF: ABNORMAL /HPF
APPEARANCE UR: CLEAR
BACTERIA #/AREA URNS HPF: ABNORMAL /HPF
BILIRUB UR QL STRIP: NEGATIVE
COLOR UR AUTO: YELLOW
GLUCOSE UR STRIP-MCNC: >=1000 MG/DL
HGB UR QL STRIP: ABNORMAL
KETONES UR STRIP-MCNC: NEGATIVE MG/DL
LEUKOCYTE ESTERASE UR QL STRIP: NEGATIVE
NITRATE UR QL: NEGATIVE
PH UR STRIP: 7 [PH] (ref 5–8)
RBC #/AREA URNS AUTO: ABNORMAL /HPF
SP GR UR STRIP: 1.01 (ref 1–1.03)
SQUAMOUS #/AREA URNS AUTO: ABNORMAL /LPF
UROBILINOGEN UR STRIP-ACNC: 0.2 E.U./DL
WBC #/AREA URNS AUTO: ABNORMAL /HPF

## 2022-08-16 PROCEDURE — 99214 OFFICE O/P EST MOD 30 MIN: CPT | Performed by: PHYSICIAN ASSISTANT

## 2022-08-16 PROCEDURE — 81001 URINALYSIS AUTO W/SCOPE: CPT | Performed by: PHYSICIAN ASSISTANT

## 2022-08-16 NOTE — TELEPHONE ENCOUNTER
Nurse Triage SBAR    Is this a 2nd Level Triage? YES, LICENSED PRACTITIONER REVIEW IS REQUIRED    Situation:   Patient having urinary frequency. States that as soon as she consumes liquid, she is urinating them out. Having episodes of incontinence.    Background:   Patient states that she was recently treated with Cephalexin for a bladder infection and symptoms have not resolved and have actually gotten worse. Patient reports taking the entire course of antibiotics. Denies any pain, odor to urine, or fevers. Having frequency and episodes of incontinence which have now progressed to leaking urine with every step that she takes.     Assessment:   Patient needs to be seen to leave urine specimen.    Protocol Recommended Disposition:   No disposition on file. - Be seen in office today. No appointments available, advised patient to be seen at [facility]. Assisted patient in scheduling a 40-minute appointment with Dr. Whiting for 8/22 as patient does not sound like she will follow recommendation to be seen today.     Recommendation:   Advised patient to be seen at [facility] -  Also assisted in scheduling with PCP 8/22 as she does not sound like she will follow advice to be seen today. Routing to PCP as FYI and for any additional recommendations.    Arabella Corona RN      Routed to provider    Does the patient meet one of the following criteria for ADS visit consideration? 16+ years old, with an [facility] PCP     TIP  Providers, please consider if this condition is appropriate for management at one of our Acute and Diagnostic Services sites.     If patient is a good candidate, please use dotphrase <dot>triageresponse and select Refer to ADS to document.    Additional Information    Negative: Shock suspected (e.g., cold/pale/clammy skin, too weak to stand, low BP, rapid pulse)    Negative: Sounds like a life-threatening emergency to the triager    Negative: Followed a female genital area injury (e.g., vagina, vulva)    Negative:  "Followed a male genital area injury (penis, scrotum)    Negative: Vaginal discharge    Negative: Pus (white, yellow) or bloody discharge from end of penis    Negative: Pain or burning with passing urine (urination) and pregnant    Negative: Pain or burning with passing urine (urination) and female    Negative: Pain or burning with passing urine (urination) and male    Negative: Pain or itching in the vulvar area    Negative: Pain in scrotum is main symptom    Negative: Blood in the urine is main symptom    Negative: Symptoms arising from use of a urinary catheter (e.g., coude, Lara)    Negative: Unable to urinate (or only a few drops) > 4 hours and bladder feels very full (e.g., palpable bladder or strong urge to urinate)    Negative: Decreased urination and drinking very little and dehydration suspected (e.g., dark urine, no urine > 12 hours, very dry mouth, very lightheaded)    Negative: Patient sounds very sick or weak to the triager    Negative: Fever > 100.4 F  (38.0 C)    Negative: Side (flank) or lower back pain present    Negative: Can't control passage of urine (i.e., urinary incontinence) and new-onset (< 2 weeks) or worsening    Answer Assessment - Initial Assessment Questions  1. SYMPTOM: \"What's the main symptom you're concerned about?\" (e.g., frequency, incontinence)      Frequency  2. ONSET: \"When did the  Urinary frequency  start?\"      \"Quite a while ago\"  3. PAIN: \"Is there any pain?\" If Yes, ask: \"How bad is it?\" (Scale: 1-10; mild, moderate, severe)      No  4. CAUSE: \"What do you think is causing the symptoms?\"      Urinary symptoms  5. OTHER SYMPTOMS: \"Do you have any other symptoms?\" (e.g., fever, flank pain, blood in urine, pain with urination)      None  6. PREGNANCY: \"Is there any chance you are pregnant?\" \"When was your last menstrual period?\"      N/A    Protocols used: URINARY SYMPTOMS-A-OH    "

## 2022-08-16 NOTE — PATIENT INSTRUCTIONS
Patient was educated on the natural course of condition.  UA does not show a UTI. I suspect overactive bladder. Other etiologies include interstitial cystitis, incontinence, among others. Referred to urology. Seek emergency care if you develop severe abdominal/flank pain, or fever.

## 2022-08-16 NOTE — TELEPHONE ENCOUNTER
Attempted to call patient to relay that provider also recommends being seen today for symptoms. Unable to leave VM as mailbox is full. Will attempt again later.    Arabella Corona RN

## 2022-08-16 NOTE — TELEPHONE ENCOUNTER
Provider Response to 2nd Level Triage Request    PCP is out of the office. Covering messages.   I have reviewed the RN documentation. My recommendation is:  Face To Face Visit. Same Day: to be seen by another provider in same service line or walk in care.     Conchita Saenz, DO

## 2022-08-16 NOTE — PROGRESS NOTES
URGENT CARE VISIT:    SUBJECTIVE:    Arvind Leong is a 67 year old female who presents today for a possible UTI. Symptoms of frequency have been going on for 1 month(s). Symptoms were gradual onset and moderate.  Patient denies dysuria, urgency, back pain, nausea, vomiting, fever and chills or vaginal discharge. This patient does have a history of urinary tract infections. She was recently treated with Cephalexin.    PMH:   Past Medical History:   Diagnosis Date     Acute on chronic respiratory failure with hypoxia (H) 11/15/2016     Bipolar 1 disorder (H)      CAP (community acquired pneumonia) 11/15/2016     Cervical high risk HPV (human papillomavirus) test positive 3/5/2018    3/5/18 NIL pap, +HR HPV (not 16/18) 5/21/21 NIL pap, neg HPV. Plan: await provider     COPD exacerbation (H) 4/24/2021     COPD with exacerbation (H) 11/15/2016     Diabetes mellitus, type II (H)      Hearing loss      Meniere's disease      Pneumonia 4/23/2021     Pneumonia of right lower lobe due to infectious organism 6/11/2019     Allergies: Lurasidone   Medications:   Current Outpatient Medications   Medication Sig Dispense Refill     albuterol (PROAIR HFA/PROVENTIL HFA/VENTOLIN HFA) 108 (90 Base) MCG/ACT inhaler Inhale 2 puffs into the lungs every 6 hours as needed for shortness of breath / dyspnea or wheezing 18 g 12     albuterol (PROVENTIL) (2.5 MG/3ML) 0.083% neb solution Take 1 vial (2.5 mg) by nebulization every 4 hours as needed for shortness of breath / dyspnea or wheezing Take 1 vial four times a day for the next 5 days then go back to as needed 240 mL 12     ARIPiprazole (ABILIFY) 10 MG tablet [ARIPIPRAZOLE (ABILIFY) 10 MG TABLET] Take 10 mg by mouth daily.       cephALEXin (KEFLEX) 500 MG capsule TAKE 1 CAPSULE BY MOUTH THREE TIMES DAILY FOR 7 DAYS       CONTOUR NEXT TEST test strip 1 strip by In Vitro route daily        doxycycline hyclate (VIBRAMYCIN) 100 MG capsule Take 100 mg by mouth 2 times daily End on  21       empagliflozin (JARDIANCE) 25 MG TABS tablet Take 1 tablet (25 mg) by mouth daily 90 tablet 1     fluticasone-vilanterol (BREO ELLIPTA) 100-25 MCG/INH inhaler Inhale 1 puff into the lungs daily 30 each 12     furosemide (LASIX) 20 MG tablet Take 1 tablet (20 mg) by mouth daily 3 tablet 0     metFORMIN (GLUCOPHAGE) 1000 MG tablet Take 1 tablet (1,000 mg) by mouth 2 times daily (with meals) 180 tablet 1     oxybutynin (DITROPAN) 5 MG tablet Take 1 tablet (5 mg) by mouth 3 times daily 30 tablet 0     polyethylene glycol (MIRALAX) 17 GM/Dose powder Take 17 g by mouth daily 510 g 0     predniSONE (DELTASONE) 10 MG tablet 30 mg (3 tabs) daily x 3 days  20 mg (3 tabs) daily x 3 days then 10 mg daily x 3 days 18 tablet 0     predniSONE (DELTASONE) 50 MG tablet Take 1 tablet (50 mg) by mouth daily 4 tablet 0     simvastatin (ZOCOR) 20 MG tablet Take 1 tablet (20 mg) by mouth At Bedtime 90 tablet 3     Social History:   Social History     Tobacco Use     Smoking status: Former Smoker     Packs/day: 1.00     Years: 40.00     Pack years: 40.00     Quit date: 2017     Years since quittin.5     Smokeless tobacco: Never Used   Substance Use Topics     Alcohol use: Yes     Comment: Alcoholic Drinks/day: Occasional        ROS:  Review of systems negative except as stated above.    OBJECTIVE:  /69 (BP Location: Right arm, Patient Position: Sitting, Cuff Size: Adult Large)   Pulse 86   Temp 97.9  F (36.6  C) (Tympanic)   Resp 20   Wt 83.9 kg (185 lb)   SpO2 95%   BMI 43.00 kg/m    GENERAL APPEARANCE: healthy, alert and no distress  RESP: lungs clear to auscultation - no rales, rhonchi or wheezes  CV: regular rates and rhythm, normal S1 S2, no murmur noted  ABDOMEN:  soft, nontender, no HSM or masses and bowel sounds normal  BACK: No CVA tenderness  SKIN: no suspicious lesions or rashes    Labs:    Results for orders placed or performed in visit on 22   UA macro with reflex to Microscopic and  Culture - Clinc Collect     Status: Abnormal    Specimen: Urine, Clean Catch   Result Value Ref Range    Color Urine Yellow Colorless, Straw, Light Yellow, Yellow    Appearance Urine Clear Clear    Glucose Urine >=1000 (A) Negative mg/dL    Bilirubin Urine Negative Negative    Ketones Urine Negative Negative mg/dL    Specific Gravity Urine 1.015 1.005 - 1.030    Blood Urine Trace (A) Negative    pH Urine 7.0 5.0 - 8.0    Protein Albumin Urine Negative Negative mg/dL    Urobilinogen Urine 0.2 0.2, 1.0 E.U./dL    Nitrite Urine Negative Negative    Leukocyte Esterase Urine Negative Negative   Urine Microscopic     Status: Abnormal   Result Value Ref Range    Bacteria Urine Few (A) None Seen /HPF    RBC Urine 0-2 0-2 /HPF /HPF    WBC Urine None Seen 0-5 /HPF /HPF    Squamous Epithelials Urine Few (A) None Seen /LPF    Amorphous Crystals Urine Few (A) None Seen /HPF    Narrative    Urine Culture not indicated       ASSESSMENT:     ICD-10-CM    1. Urinary frequency  R35.0 UA macro with reflex to Microscopic and Culture - Clinc Collect     Urine Microscopic     Adult Urology  Referral       PLAN:  30 minutes spent on the date of the encounter doing chart review, review of outside records, review of test results, interpretation of tests, patient visit and documentation   Patient Instructions   Patient was educated on the natural course of condition.  UA does not show a UTI. I suspect overactive bladder. Other etiologies include interstitial cystitis, incontinence, among others. Referred to urology. Seek emergency care if you develop severe abdominal/flank pain, or fever.     Patient verbalized understanding and is agreeable to plan. The patient was discharged ambulatory and in stable condition.    Joann Marcelo PA-C on 8/16/2022 at 4:32 PM

## 2022-08-17 NOTE — TELEPHONE ENCOUNTER
Per chart review, patient was seen yesterday at [facility] as advised. Writer will close encounter.    Arabella Corona RN

## 2022-09-08 NOTE — TELEPHONE ENCOUNTER
"Brad Whiting,   Patient is lost to pap tracking follow-up. Attempts to contact pt have been made per reminder process and there has been no reply and/or no appt scheduled.       Pap Hx:  3/5/18 NIL pap, +HR HPV (not 16/18)  5/21/21 NIL pap, neg HPV. Plan: cotest in 1 year per provider  05/5/22 Reminder Mychart, Letter  06/3/22 visit no showed appt and 06/24/22--canceled appt  06/27/22 Reminder call-vm full  07/7/22 appt scheduled--notes added, office visit note said \"not ready to do Pap smear today\"  07/15/22 appt scheduled--notes added no showed appt  08/8/22 Reminder call-vm full Final Letter sent  09/8/22 Lost to follow-up for pap tracking, rashida routed to provider    "

## 2022-09-09 NOTE — TELEPHONE ENCOUNTER
Second Attempt: I sent a my chart message to the patient to please contact our office to schedule an AWV/pap smear.

## 2022-09-15 ENCOUNTER — NURSE TRIAGE (OUTPATIENT)
Dept: NURSING | Facility: CLINIC | Age: 67
End: 2022-09-15

## 2022-09-15 NOTE — TELEPHONE ENCOUNTER
"Patient calling tonight due to having a hard time breathing and sleeping due to having an \"oversized\" stomach, patient has been sleeping in a chair or on the cough due to this. Patient states she saw a provider a while back and they gave her some medicine to help her \"infection in her stomach\" or bladder she was unsure but states they did not help, she states the medication was due to her stomach being \"so large\" and was supposed to bring it down some. Patient states she also has an  overactive bladder.     Patient declines nurse triage and wants appointment to see provider tomorrow.       Reason for Disposition    Requesting regular office appointment    Protocols used: INFORMATION ONLY CALL - NO TRIAGE-A-      "

## 2022-09-17 DIAGNOSIS — E11.8 TYPE 2 DIABETES MELLITUS WITH UNSPECIFIED COMPLICATIONS (H): Primary | ICD-10-CM

## 2022-09-19 NOTE — TELEPHONE ENCOUNTER
"Outpatient Medication Detail     Disp Refills Start End SAMANTHA   CONTOUR NEXT TEST test strip   6/25/2021  Yes   Sig - Route: 1 strip by In Vitro route daily  - In Vitro   Class: Historical   Order: [id number]     Routing refill request to provider for review/approval because:  Medication is reported/historical    Last office visit provider:  7/7/22     Requested Prescriptions   Pending Prescriptions Disp Refills     CONTOUR NEXT TEST test strip [Pharmacy Med Name: CONTOUR NEXT TEST STRIPS 100S] 100 strip      Sig: TEST ONCE DAILY       Diabetic Supplies Protocol Passed - 9/19/2022 10:35 AM        Passed - Medication is active on med list        Passed - Patient is 18 years of age or older        Passed - Recent (6 mo) or future (30 days) visit within the authorizing provider's specialty     Patient had office visit in the last 6 months or has a visit in the next 30 days with authorizing provider.  See \"Patient Info\" tab in inbasket, or \"Choose Columns\" in Meds & Orders section of the refill encounter.                 Jorge Cisse RN 09/19/22 10:35 AM  "

## 2022-09-20 NOTE — TELEPHONE ENCOUNTER
Fax from pharmacy stating Contour Next is not covered by insurance. Preferred brand is Accu-chek guide. Patient will also need new meter, lancets and strips.

## 2022-09-21 NOTE — TELEPHONE ENCOUNTER
Third Attempt: I mailed a letter to the patient to please contact our office to schedule an AWV with a pap smear. Closing until the patient responds. OK to schedule when the patient calls back.   1

## 2022-09-22 ENCOUNTER — VIRTUAL VISIT (OUTPATIENT)
Dept: FAMILY MEDICINE | Facility: CLINIC | Age: 67
End: 2022-09-22
Payer: COMMERCIAL

## 2022-09-22 DIAGNOSIS — E11.8 TYPE 2 DIABETES MELLITUS WITH UNSPECIFIED COMPLICATIONS (H): ICD-10-CM

## 2022-09-22 DIAGNOSIS — J44.1 COPD EXACERBATION (H): ICD-10-CM

## 2022-09-22 DIAGNOSIS — N32.81 OAB (OVERACTIVE BLADDER): Primary | ICD-10-CM

## 2022-09-22 PROCEDURE — 99442 PR PHYSICIAN TELEPHONE EVALUATION 11-20 MIN: CPT | Performed by: FAMILY MEDICINE

## 2022-09-22 RX ORDER — PREDNISONE 10 MG/1
TABLET ORAL
Qty: 18 TABLET | Refills: 0 | Status: SHIPPED | OUTPATIENT
Start: 2022-09-22 | End: 2022-11-10

## 2022-09-22 RX ORDER — MIRABEGRON 50 MG/1
50 TABLET, EXTENDED RELEASE ORAL DAILY
Qty: 90 TABLET | Refills: 0 | Status: SHIPPED | OUTPATIENT
Start: 2022-09-22 | End: 2022-11-10

## 2022-09-22 RX ORDER — DOXYCYCLINE HYCLATE 100 MG
100 TABLET ORAL 2 TIMES DAILY
Qty: 20 TABLET | Refills: 0 | Status: SHIPPED | OUTPATIENT
Start: 2022-09-22 | End: 2022-10-02

## 2022-09-22 NOTE — PROGRESS NOTES
Arvind is a 67 year old who is being evaluated via a billable telephone visit.      What phone number would you like to be contacted at? [phone]  How would you like to obtain your AVS? MyChart    Assessment & Plan     COPD exacerbation (H)  Last prednisone rx was 7/2022 per pulmonology. Will give her another course for copd exacerbation today. F/up in 2-3 weeks.  Told patient that her sleep may get better when COPD is better controlled  - predniSONE (DELTASONE) 10 MG tablet  Dispense: 18 tablet; Refill: 0  - doxycycline hyclate (VIBRA-TABS) 100 MG tablet  Dispense: 20 tablet; Refill: 0    OAB (overactive bladder)  Used to take oxybutynin. Will trial myrbetrig instead. F/up in 2-3 weeks  - mirabegron (MYRBETRIQ) 50 MG 24 hr tablet  Dispense: 90 tablet; Refill: 0    Type 2 diabetes mellitus with unspecified complications (H)  Due for a1c check will need in clinic visit in 2-3 weeks.   - blood glucose (CONTOUR NEXT TEST) test strip  Dispense: 100 strip; Refill: 1        Return in about 2 weeks (around 10/6/2022) for Follow up, with me, in person.    Rema Whiting MD  [facility]    Subjective   Arvind is a 67 year old, presenting for the following health issues:  Abdominal Pain (Feels like she is very bloated all the time. Has been going on for a couple of months. Is also having SOB off and on. Is having trouble sleeping. Is unable to lay down due to breathing issues. )      HPI   Chief Complaint   Patient presents with     Abdominal Pain     Feels like she is very bloated all the time. Has been going on for a couple of months. Is also having SOB off and on. Is having trouble sleeping. Is unable to lay down due to breathing issues.      Hysterical over the phone. Cannot breathe. Can't sleep because of breathing issues. Her stomach is bloated. Has frequent urinary frequency. Out of glucose test strip.   Is wondering if she should be taking a gummies advertised on [employer].      Review of Systems   Constitutional, HEENT, cardiovascular, pulmonary, gi and gu systems are negative, except as otherwise noted.      Objective           Vitals:  No vitals were obtained today due to virtual visit.    Physical Exam   healthy, alert and no distress  PSYCH: Alert and oriented times 3; coherent speech, normal   rate and volume, able to articulate logical thoughts, able   to abstract reason, no tangential thoughts, no hallucinations   or delusions  Her affect is normal  RESP: No cough, no audible wheezing, able to talk in full sentences  Remainder of exam unable to be completed due to telephone visits              Phone call duration: 11 minutes

## 2022-09-23 ENCOUNTER — NURSE TRIAGE (OUTPATIENT)
Dept: FAMILY MEDICINE | Facility: CLINIC | Age: 67
End: 2022-09-23

## 2022-09-23 NOTE — TELEPHONE ENCOUNTER
Patient calling in hysterically concerned about spO2 levels. Patient says that the meter will read 87% and go up to 92%. Patient not reporting significant increase in SOB. Talking in full sentences. RN gave education on interpreting spO2 readings in the setting of COPD. Advised she follow advice given to her at appt with Dr. Whiting on 9/22.    Rafael Polanco RN     [facility]

## 2022-09-24 DIAGNOSIS — E11.8 TYPE 2 DIABETES MELLITUS WITH UNSPECIFIED COMPLICATIONS (H): ICD-10-CM

## 2022-09-25 NOTE — TELEPHONE ENCOUNTER
"Last Written Prescription Date:  9/23/22  Last Fill Quantity: 100,  # refills: 1   Last office visit provider:  9/22/22     Requested Prescriptions   Pending Prescriptions Disp Refills     blood glucose (CONTOUR NEXT TEST) test strip [Pharmacy Med Name: CONTOUR NEXT        MIREILLE]  1     Sig: USE 1 STRIP TO CHECK GLUCOSE ONCE DAILY AS  DIRECTED       Diabetic Supplies Protocol Passed - 9/24/2022 10:57 AM        Passed - Medication is active on med list        Passed - Patient is 18 years of age or older        Passed - Recent (6 mo) or future (30 days) visit within the authorizing provider's specialty     Patient had office visit in the last 6 months or has a visit in the next 30 days with authorizing provider.  See \"Patient Info\" tab in inbasket, or \"Choose Columns\" in Meds & Orders section of the refill encounter.                 Hilda Macedo 09/25/22 2:06 PM  "

## 2022-09-26 RX ORDER — BLOOD-GLUCOSE METER
EACH MISCELLANEOUS
Qty: 1 KIT | Refills: 1 | Status: SHIPPED | OUTPATIENT
Start: 2022-09-26 | End: 2023-03-16

## 2022-09-28 ENCOUNTER — NURSE TRIAGE (OUTPATIENT)
Dept: NURSING | Facility: CLINIC | Age: 67
End: 2022-09-28

## 2022-09-29 NOTE — TELEPHONE ENCOUNTER
"Nurse Triage SBAR    Is this a 2nd Level Triage? NO    Situation: Patient calling with vulvular itching, possible hemrrhoid of anal fistula, frequent urination, and ongoing bloated abdomen.  Consent: not needed    Background:  Pt states she got off couch a little while ago and states her mom told her she couldn't have any potatoes.  States she notes again that her stomach bloated. Pt quite upset and tearful over Mom denying potatoes/food tonight and per pt Mom states she is too overweight to continue to eat.  Pt States she is \"doing a lot of peeing and very very rarely pooping and there is all water in my belly.      Pt also notes she is having problems with sitting, being anywhere where there is no cool air.  Pt states she has problems breathing heat.  Pt voices concern about what will happen when her mom turns the heat on and she can't breathe because of it.      Assessment:   Denies any pain  Notes last BM was an hour ago  States she has urinated 3 times today and notes \"each time I have drinken water, orange juice, but not tomato juice quite yet.  Everything I drink goes right through me and I can't make it right away\".        Protocol Recommended Disposition:   See HCP within 2 weeks.   Pt scheduled to see Dr Whiting on 11/10, which per  is 1st available appointment.      Recommendation: Advised patient to make an appointment. Transferred to scheduling. . Reviewed concerning symptoms and when to call back.       Emili Gray RN [facility] 9/28/2022 7:20 PM                              Reason for Disposition    Pain or itching in the vulvar area    Pain in genital area is a chronic symptom (recurrent or ongoing AND present > 4 weeks)    Additional Information    Negative: Shock suspected (e.g., cold/pale/clammy skin, too weak to stand, low BP, rapid pulse)    Negative: Sounds like a life-threatening emergency to the triager    Negative: Followed a female genital area injury (e.g., " "vagina, vulva)    Negative: Followed a male genital area injury (e.g., penis, scrotum)    Negative: Vaginal discharge    Negative: Pus (white, yellow) or bloody discharge from end of penis    Negative: [1] Taking antibiotic for urinary tract infection (UTI) AND [2] female    Negative: [1] Taking antibiotic for urinary tract infection (UTI) AND [2] male    Negative: [1] Pain or burning with passing urine (urination) AND [2] pregnant    Negative: [1] Pain or burning with passing urine (urination) AND [2] postpartum (from 0 to 6 weeks after delivery)    Negative: [1] Pain or burning with passing urine (urination) AND [2] female    Negative: [1] Pain or burning with passing urine (urination) AND [2] male    Negative: Pain in scrotum is main symptom    Negative: Blood in the urine is main symptom    Negative: Symptoms arising from use of a urinary catheter (e.g., coude, Lara)    Negative: Followed a genital area injury (e.g., vagina, vulva)    Negative: Symptoms could be from sexual assault    Negative: Pain or burning with passing urine (urination) is main symptom    Negative: Vaginal discharge is main symptom    Negative: Pubic lice suspected    Negative: Pregnant    Negative: Patient sounds very sick or weak to the triager    Negative: [1] SEVERE pain AND [2] not improved 2 hours after pain medicine    Negative: [1] Genital area looks infected (e.g., draining sore, spreading redness) AND [2] fever    Negative: MODERATE-SEVERE itching (i.e., interferes with school, work, or sleep)    Negative: Genital area looks infected (e.g., draining sore, spreading redness)    Negative: Rash with painful tiny water blisters    Negative: [1] Rash (e.g., redness, tiny bumps, sore) of genital area AND [2] present > 24 hours    Negative: Tender lump (swelling or \"ball\") at vaginal opening    Negative: [1] Symptoms of a \"yeast infection\" (i.e., itchy, white discharge, not bad smelling) AND [2] not improved > 3 days following CARE " ADVICE    Negative: [1] Vulvar itching AND [2] not improved > 3 days following CARE ADVICE    Negative: Patient is worried they have a sexually transmitted infection (STI)    Negative: [1] Itching of genital area AND [2] severe urine incontinence (e.g., uses sanitary napkin, incontinence pad, or diaper)    Negative: [1] Itching or dryness of genital area AND [2] nearing menopause or after menopause    Negative: Pain with sexual intercourse (dyspareunia)  (Exception: feels like prior yeast infection, minor abrasion, minor rash < 24 hour duration, mild itching)    Protocols used: URINARY SYMPTOMS-A-AH, VULVAR SYMPTOMS-A-AH

## 2022-10-01 ENCOUNTER — HEALTH MAINTENANCE LETTER (OUTPATIENT)
Age: 67
End: 2022-10-01

## 2022-10-03 DIAGNOSIS — E11.8 TYPE 2 DIABETES MELLITUS WITH UNSPECIFIED COMPLICATIONS (H): Primary | ICD-10-CM

## 2022-10-03 NOTE — TELEPHONE ENCOUNTER
Incoming fax from [facility] for  ACCU-CHECK GUIDE TEST STRIPS 100S.  Qty: 100  Test once before breakfast    Message from pharmacy was we received the prescription for the meter but we still need a script for lancets please.

## 2022-10-03 NOTE — TELEPHONE ENCOUNTER
Called [facility] to clarify. They do not need a script for test strops. Just a script for lancets. Pended here.    Thanks!      Rafael Polanco RN     [facility]

## 2022-10-04 RX ORDER — LANCETS
EACH MISCELLANEOUS
Qty: 100 EACH | Refills: 3 | Status: SHIPPED | OUTPATIENT
Start: 2022-10-04 | End: 2023-03-16

## 2022-11-05 ENCOUNTER — NURSE TRIAGE (OUTPATIENT)
Dept: NURSING | Facility: CLINIC | Age: 67
End: 2022-11-05

## 2022-11-05 NOTE — TELEPHONE ENCOUNTER
Patient calling reporting blood glucose of 340 this morning. Patient is newly diabetic and does not take any medication for diabetes. Patient reports feeling lightheaded this morning and weak to the point of not being able to stand. Advised per protocol to call 911 for EMS with patient refusing this. Reports she will go to the walk in clinic. Advised IV medication may be needed and the ER would be a better option with patient again refusing. Patient notes she has been drinking a lot of Lite juice. Advised to discontinue this and drink only water until being seen. Patient agreeable.     Fiona Arreguin RN 11/05/22 10:27 AM   [facility] Triage Nurse Advisor      Reason for Disposition    Very weak (e.g., can't stand)    Additional Information    Negative: Acting confused (e.g., disoriented, slurred speech)    Negative: Unconscious or difficult to awaken    Protocols used: DIABETES - HIGH BLOOD SUGAR-A-

## 2022-11-10 ENCOUNTER — ANCILLARY PROCEDURE (OUTPATIENT)
Dept: GENERAL RADIOLOGY | Facility: CLINIC | Age: 67
End: 2022-11-10
Attending: FAMILY MEDICINE
Payer: COMMERCIAL

## 2022-11-10 ENCOUNTER — OFFICE VISIT (OUTPATIENT)
Dept: FAMILY MEDICINE | Facility: CLINIC | Age: 67
End: 2022-11-10
Payer: COMMERCIAL

## 2022-11-10 VITALS
TEMPERATURE: 97.7 F | WEIGHT: 181.2 LBS | HEART RATE: 80 BPM | SYSTOLIC BLOOD PRESSURE: 116 MMHG | RESPIRATION RATE: 40 BRPM | DIASTOLIC BLOOD PRESSURE: 57 MMHG | BODY MASS INDEX: 42.11 KG/M2 | OXYGEN SATURATION: 91 %

## 2022-11-10 DIAGNOSIS — N32.81 OAB (OVERACTIVE BLADDER): ICD-10-CM

## 2022-11-10 DIAGNOSIS — J44.1 COPD EXACERBATION (H): ICD-10-CM

## 2022-11-10 DIAGNOSIS — G47.00 INSOMNIA, UNSPECIFIED TYPE: ICD-10-CM

## 2022-11-10 DIAGNOSIS — Z12.4 CERVICAL CANCER SCREENING: ICD-10-CM

## 2022-11-10 DIAGNOSIS — E11.69 TYPE 2 DIABETES MELLITUS WITH OTHER SPECIFIED COMPLICATION, WITHOUT LONG-TERM CURRENT USE OF INSULIN (H): Primary | ICD-10-CM

## 2022-11-10 DIAGNOSIS — R53.83 OTHER FATIGUE: ICD-10-CM

## 2022-11-10 DIAGNOSIS — H91.93 DECREASED HEARING OF BOTH EARS: ICD-10-CM

## 2022-11-10 DIAGNOSIS — R06.83 SNORING: ICD-10-CM

## 2022-11-10 LAB
ALBUMIN SERPL BCG-MCNC: 3.6 G/DL (ref 3.5–5.2)
ALP SERPL-CCNC: 115 U/L (ref 35–104)
ALT SERPL W P-5'-P-CCNC: 13 U/L (ref 10–35)
ANION GAP SERPL CALCULATED.3IONS-SCNC: 8 MMOL/L (ref 7–15)
AST SERPL W P-5'-P-CCNC: 14 U/L (ref 10–35)
BASOPHILS # BLD AUTO: 0 10E3/UL (ref 0–0.2)
BASOPHILS NFR BLD AUTO: 0 %
BILIRUB SERPL-MCNC: 0.3 MG/DL
BUN SERPL-MCNC: 10.9 MG/DL (ref 8–23)
CALCIUM SERPL-MCNC: 9 MG/DL (ref 8.8–10.2)
CHLORIDE SERPL-SCNC: 96 MMOL/L (ref 98–107)
CREAT SERPL-MCNC: 0.63 MG/DL (ref 0.51–0.95)
DEPRECATED HCO3 PLAS-SCNC: 36 MMOL/L (ref 22–29)
EOSINOPHIL # BLD AUTO: 0.1 10E3/UL (ref 0–0.7)
EOSINOPHIL NFR BLD AUTO: 1 %
ERYTHROCYTE [DISTWIDTH] IN BLOOD BY AUTOMATED COUNT: 12.4 % (ref 10–15)
GFR SERPL CREATININE-BSD FRML MDRD: >90 ML/MIN/1.73M2
GLUCOSE SERPL-MCNC: 341 MG/DL (ref 70–99)
HBA1C MFR BLD: 9.8 % (ref 0–5.6)
HCT VFR BLD AUTO: 48.1 % (ref 35–47)
HGB BLD-MCNC: 14.5 G/DL (ref 11.7–15.7)
IMM GRANULOCYTES # BLD: 0 10E3/UL
IMM GRANULOCYTES NFR BLD: 0 %
LYMPHOCYTES # BLD AUTO: 2 10E3/UL (ref 0.8–5.3)
LYMPHOCYTES NFR BLD AUTO: 19 %
MCH RBC QN AUTO: 28.4 PG (ref 26.5–33)
MCHC RBC AUTO-ENTMCNC: 30.1 G/DL (ref 31.5–36.5)
MCV RBC AUTO: 94 FL (ref 78–100)
MONOCYTES # BLD AUTO: 0.6 10E3/UL (ref 0–1.3)
MONOCYTES NFR BLD AUTO: 6 %
NEUTROPHILS # BLD AUTO: 7.9 10E3/UL (ref 1.6–8.3)
NEUTROPHILS NFR BLD AUTO: 75 %
PLATELET # BLD AUTO: 179 10E3/UL (ref 150–450)
POTASSIUM SERPL-SCNC: 4.4 MMOL/L (ref 3.4–5.3)
PROT SERPL-MCNC: 6.2 G/DL (ref 6.4–8.3)
RBC # BLD AUTO: 5.1 10E6/UL (ref 3.8–5.2)
SODIUM SERPL-SCNC: 140 MMOL/L (ref 136–145)
TSH SERPL DL<=0.005 MIU/L-ACNC: 1.39 UIU/ML (ref 0.3–4.2)
WBC # BLD AUTO: 10.5 10E3/UL (ref 4–11)

## 2022-11-10 PROCEDURE — 80053 COMPREHEN METABOLIC PANEL: CPT | Performed by: FAMILY MEDICINE

## 2022-11-10 PROCEDURE — 99214 OFFICE O/P EST MOD 30 MIN: CPT | Mod: 25 | Performed by: FAMILY MEDICINE

## 2022-11-10 PROCEDURE — 84443 ASSAY THYROID STIM HORMONE: CPT | Performed by: FAMILY MEDICINE

## 2022-11-10 PROCEDURE — 85025 COMPLETE CBC W/AUTO DIFF WBC: CPT | Performed by: FAMILY MEDICINE

## 2022-11-10 PROCEDURE — 90662 IIV NO PRSV INCREASED AG IM: CPT | Performed by: FAMILY MEDICINE

## 2022-11-10 PROCEDURE — 71046 X-RAY EXAM CHEST 2 VIEWS: CPT | Mod: TC | Performed by: RADIOLOGY

## 2022-11-10 PROCEDURE — 36415 COLL VENOUS BLD VENIPUNCTURE: CPT | Performed by: FAMILY MEDICINE

## 2022-11-10 PROCEDURE — 0124A COVID-19,PF,PFIZER BOOSTER BIVALENT: CPT | Performed by: FAMILY MEDICINE

## 2022-11-10 PROCEDURE — 91312 COVID-19,PF,PFIZER BOOSTER BIVALENT: CPT | Performed by: FAMILY MEDICINE

## 2022-11-10 PROCEDURE — G0008 ADMIN INFLUENZA VIRUS VAC: HCPCS | Performed by: FAMILY MEDICINE

## 2022-11-10 PROCEDURE — 83036 HEMOGLOBIN GLYCOSYLATED A1C: CPT | Performed by: FAMILY MEDICINE

## 2022-11-10 RX ORDER — PREDNISONE 10 MG/1
TABLET ORAL
Qty: 18 TABLET | Refills: 0 | Status: SHIPPED | OUTPATIENT
Start: 2022-11-10 | End: 2023-03-16

## 2022-11-10 RX ORDER — AZITHROMYCIN 250 MG/1
TABLET, FILM COATED ORAL
Qty: 6 TABLET | Refills: 0 | Status: SHIPPED | OUTPATIENT
Start: 2022-11-10 | End: 2022-11-15

## 2022-11-10 RX ORDER — MIRABEGRON 50 MG/1
50 TABLET, EXTENDED RELEASE ORAL DAILY
Qty: 90 TABLET | Refills: 0 | Status: SHIPPED | OUTPATIENT
Start: 2022-11-10 | End: 2023-03-16

## 2022-11-10 NOTE — PROGRESS NOTES
Assessment & Plan     Type 2 diabetes mellitus with other specified complication, without long-term current use of insulin (H)  Due for A1c.  Continue with metformin 1000 mg 2 times a day for now.  Depending on A1c result potentially add on GLP-1 agonist.  - Adult Eye  Referral  - metFORMIN (GLUCOPHAGE) 1000 MG tablet  Dispense: 180 tablet; Refill: 1  - Hemoglobin A1c  - Comprehensive metabolic panel (BMP + Alb, Alk Phos, ALT, AST, Total. Bili, TP)  - TSH with free T4 reflex    OAB (overactive bladder)  Stable.  - mirabegron (MYRBETRIQ) 50 MG 24 hr tablet  Dispense: 90 tablet; Refill: 0    COPD exacerbation (H)  Having CO PD exacerbation.  O2 initially and 70s with oxygen but on 2 L of oxygen O2 improved to low 90s.  We will put her on prednisone taper course as well as a Z-Lenin.  Will get chest x-ray today potentially might benefit from Lasix depending on chest x-ray  - predniSONE (DELTASONE) 10 MG tablet  Dispense: 18 tablet; Refill: 0  - XR Chest 2 Views  - azithromycin (ZITHROMAX) 250 MG tablet  Dispense: 6 tablet; Refill: 0  - CBC with platelets and differential  - TSH with free T4 reflex    Cervical cancer screening  Deferred to next visit as she is not ready    Other fatigue  Snoring  Insomnia, unspecified type  Will send to sleep specialty   - Adult Sleep Eval & Management  Referral  - TSH with free T4 reflex    Decreased hearing of both ears  Needs hearing test. Couldn't hear well during visit.   - Adult Audiology  Referral        Return in about 3 months (around 2/10/2023) for Follow up, with me, in person.    Rema Whiting MD  [facility]    Wendy Samuels is a 67 year old, presenting for the following health issues:  No chief complaint on file.      HPI   No chief complaint on file.    Here for sob and difficulty breathing   Reports no energy  Said her psychologist recommended a sleep test.  She reports daily fatigue, somnolence during  the day but insomnia at night.  She is starting cardiac rehab in a few days  Admits to eating lots of sugar at home.  She knows she needs to cut down sugar.  Saw pulmonary provider in July 2022 and needs follow-up in about 6 months from that visit.      Review of Systems   Constitutional, HEENT, cardiovascular, pulmonary, gi and gu systems are negative, except as otherwise noted.      Objective    /57   Pulse 80   Temp 97.7  F (36.5  C) (Oral)   Resp (!) 40   Wt 82.2 kg (181 lb 3.2 oz)   SpO2 (!) 86%   BMI 42.11 kg/m    Body mass index is 42.11 kg/m .  Physical Exam   GENERAL: healthy, alert and no distress  NECK: no adenopathy, no asymmetry, masses, or scars and thyroid normal to palpation  RESP: Decreased air movement throughout, quiet lungs no wheezes  CV: regular rate and rhythm, normal S1 S2, no S3 or S4, no murmur, click or rub, no peripheral edema and peripheral pulses strong  MS: no gross musculoskeletal defects noted, no edema  FEET:  MF TESTING: abnormal, absent feeling in all toes              SKIN EXAM:  NL              VASCULAR:   R DP  PULSE: +                                      L DP  PULSE: +                                      R PT  PULSE: +                                      L PT  PULSE: +    NEURO: Normal strength and tone, mentation intact and speech normal  PSYCH: mentation appears normal, affect normal/bright

## 2022-11-14 ENCOUNTER — NURSE TRIAGE (OUTPATIENT)
Dept: NURSING | Facility: CLINIC | Age: 67
End: 2022-11-14

## 2022-11-14 ENCOUNTER — HOSPITAL ENCOUNTER (OUTPATIENT)
Dept: CARDIAC REHAB | Facility: HOSPITAL | Age: 67
Discharge: HOME | End: 2022-11-14
Attending: INTERNAL MEDICINE
Payer: COMMERCIAL

## 2022-11-14 DIAGNOSIS — R53.81 PHYSICAL DECONDITIONING: ICD-10-CM

## 2022-11-14 DIAGNOSIS — J44.9 CHRONIC OBSTRUCTIVE PULMONARY DISEASE, UNSPECIFIED COPD TYPE (H): ICD-10-CM

## 2022-11-14 DIAGNOSIS — J44.1 COPD EXACERBATION (H): ICD-10-CM

## 2022-11-14 PROCEDURE — 94626 PHY/QHP OP PULM RHB W/MNTR: CPT

## 2022-11-14 NOTE — LETTER
[facility]  [address] [place] [address]  [phone]        November 18, 2022        Arvind Leong  [address] [place] [address]          Dear Arvind,      The results of your recent chest x-ray were NORMAL. We had tried to contact you multiple times, but have been unsuccessful. Dr. Whiting would like you to know that there was no pulmonary edema shown on your recent chest x-ray imaging. Also, Dr. Whiting would like for you to call back to the clinic for a follow-up visit with her to discuss any concerns that you may have at [phone].      Sincerely,      Magdiel PEREIRA, RN, BSN

## 2022-11-14 NOTE — TELEPHONE ENCOUNTER
"  Nurse Triage SBAR    Is this a 2nd Level Triage? YES, LICENSED PRACTITIONER REVIEW IS REQUIRED    Situation:   Patient is calling upset at what she \"found out\" online about pulmonary edema.    Background:   Patient states she likes to read material online that are pertinent to her medical conditions as well as her mom's.  She was reading about pulmonary edema and read that she could die if it's not addressed.  She also went on to talk about why her doctor didn't tell her that she could die from it.  She also mentioned that everything she is drinking \"just goes through her\" and her depends gets full frequently.  She also discussed that she has an overactive bladder.    Assessment:   Writer educated patient on xrays and checking for pulmonary edema.  Md will most likely make some changes in her meds if her pulmonary edema is worsening.  She states she had one just a few days ago and she doesn't know the results.  Pt wanted to message Dr. Whiting to talk to her about her results.  She also asked \"how she got pulmonary edema\".    Also informed patient that the best resource is her doctor's office for information on her medical condition and questions.  The internet offers many unreliable sources.  Pt verbalized understanding.    Recommendation:   Please advise.  Patient would like to discuss her xray results and how her pulmonary edema is being managed.    She is taking her meds as directed.  She denies having \"new\" symptoms.  Her breathing is \"not good\" and hasn't been for a while.  Pt was able to have straight conversation with writer.  No grunting or gasping for air.  Pt didn't sound short of breath.  Pt did sound anxious and cried a few times during the call as she was afraid that she was going to die from pulmonary edema.  By the end of the call, pt was calm and just wanted someone to talk to.    Please call her at: [phone] (cell)    Routed to provider    Does the patient meet one of the following criteria " for ADS visit consideration? 16+ years old, with an [facility] PCP     TIP  Providers, please consider if this condition is appropriate for management at one of our Acute and Diagnostic Services sites.     If patient is a good candidate, please use dotphrase <dot>triageresponse and select Refer to ADS to document.    Reason for Disposition    [1] Caller requesting NON-URGENT health information AND [2] PCP's office is the best resource    Additional Information    Negative: Requesting regular office appointment    Negative: RN needs further essential information from caller in order to complete triage    Protocols used: INFORMATION ONLY CALL - NO TRIAGE-A-

## 2022-11-15 NOTE — TELEPHONE ENCOUNTER
FYI:   Patient states that she will go to ED tomorrow.    PT needs a refill for the month of Nov. She said pharmacy received Dec, Gildardo Feb but not for Nov. Can you please send refill for Adderrall 10 mg and 15 mg to the same pharmacy you sent the other scripts you sent yesterday please.

## 2022-11-16 ENCOUNTER — HOSPITAL ENCOUNTER (OUTPATIENT)
Dept: CARDIAC REHAB | Facility: HOSPITAL | Age: 67
Discharge: HOME | End: 2022-11-16
Attending: INTERNAL MEDICINE
Payer: COMMERCIAL

## 2022-11-16 PROCEDURE — 94625 PHY/QHP OP PULM RHB W/O MNTR: CPT

## 2022-11-16 NOTE — TELEPHONE ENCOUNTER
Please schedule patient for follow up visit in a month (reserved slot ok)  Please tell pt there was no pulmonary edema based on most recent chest XR done during visit with me last week.

## 2022-11-16 NOTE — TELEPHONE ENCOUNTER
Attempted to call patient at requested cell number listed in documentation below, unable to leave message as VM was full. Will try again later.     Arabella Corona RN

## 2022-11-17 NOTE — TELEPHONE ENCOUNTER
Attempt #2 to call patient. Unable to leave message due to vm full. Will attempt to call patient again.    Magdiel Nazario, RN, BSN  [facility]

## 2022-11-18 ENCOUNTER — TELEPHONE (OUTPATIENT)
Dept: FAMILY MEDICINE | Facility: CLINIC | Age: 67
End: 2022-11-18

## 2022-11-18 NOTE — TELEPHONE ENCOUNTER
Attempted to call patient, left message for return call to clinic. Please relay provider notation below and assist with scheduling an appointment when patient returns call.    Arabella Corona RN

## 2022-11-18 NOTE — TELEPHONE ENCOUNTER
Attempt #3 to call patient. Vm full, unable to leave message. Sending letter to patient with provider's message due to multiple unsuccessful attempts. Closing encounter.    Magdiel Nazario, RN, BSN  [facility]

## 2022-11-18 NOTE — TELEPHONE ENCOUNTER
----- Message from Rema Whiting MD sent at 11/17/2022  5:21 PM CST -----  Pls call pt: diabetes has worsened. Needs new medications. Please schedule visit with me in 2-3 weeks (reserved ok) to discuss potential new diabetes meds.

## 2022-11-22 ENCOUNTER — HOSPITAL ENCOUNTER (OUTPATIENT)
Dept: CARDIAC REHAB | Facility: HOSPITAL | Age: 67
Discharge: HOME | End: 2022-11-22
Attending: INTERNAL MEDICINE
Payer: COMMERCIAL

## 2022-11-22 PROCEDURE — 94625 PHY/QHP OP PULM RHB W/O MNTR: CPT

## 2022-11-24 ENCOUNTER — TELEPHONE (OUTPATIENT)
Dept: FAMILY MEDICINE | Facility: CLINIC | Age: 67
End: 2022-11-24

## 2022-11-29 ENCOUNTER — HOSPITAL ENCOUNTER (OUTPATIENT)
Dept: CARDIAC REHAB | Facility: HOSPITAL | Age: 67
Discharge: HOME | End: 2022-11-29
Attending: INTERNAL MEDICINE
Payer: COMMERCIAL

## 2022-11-29 PROCEDURE — 94625 PHY/QHP OP PULM RHB W/O MNTR: CPT

## 2022-11-30 ENCOUNTER — TELEPHONE (OUTPATIENT)
Dept: FAMILY MEDICINE | Facility: CLINIC | Age: 67
End: 2022-11-30

## 2022-11-30 DIAGNOSIS — E11.8 TYPE 2 DIABETES MELLITUS WITH UNSPECIFIED COMPLICATIONS (H): ICD-10-CM

## 2022-11-30 DIAGNOSIS — E11.69 TYPE 2 DIABETES MELLITUS WITH OTHER SPECIFIED COMPLICATION, WITHOUT LONG-TERM CURRENT USE OF INSULIN (H): Primary | ICD-10-CM

## 2022-11-30 NOTE — TELEPHONE ENCOUNTER
Patient stated that she needs a refill called in because she has no test strips or lancets for her diabetes left. She needs this called in ASAP for the Verio one touch brand

## 2022-11-30 NOTE — TELEPHONE ENCOUNTER
Patient is calling stating she needs a whole new meter kit, test strips and lancets. Please advise. Patient states she has not received the other one that was ordered due to it being on back order.

## 2022-12-01 ENCOUNTER — HOSPITAL ENCOUNTER (OUTPATIENT)
Dept: CARDIAC REHAB | Facility: HOSPITAL | Age: 67
Discharge: HOME | End: 2022-12-01
Attending: INTERNAL MEDICINE
Payer: COMMERCIAL

## 2022-12-01 PROCEDURE — 94625 PHY/QHP OP PULM RHB W/O MNTR: CPT

## 2022-12-01 RX ORDER — BLOOD-GLUCOSE METER
EACH MISCELLANEOUS
Qty: 1 KIT | Refills: 1 | Status: SHIPPED | OUTPATIENT
Start: 2022-12-01

## 2022-12-01 RX ORDER — LANCETS
EACH MISCELLANEOUS
Qty: 100 EACH | Refills: 3 | OUTPATIENT
Start: 2022-12-01

## 2022-12-06 ENCOUNTER — HOSPITAL ENCOUNTER (OUTPATIENT)
Dept: CARDIAC REHAB | Facility: HOSPITAL | Age: 67
Discharge: HOME | End: 2022-12-06
Attending: INTERNAL MEDICINE
Payer: COMMERCIAL

## 2022-12-06 PROCEDURE — 94625 PHY/QHP OP PULM RHB W/O MNTR: CPT

## 2022-12-08 ENCOUNTER — HOSPITAL ENCOUNTER (OUTPATIENT)
Dept: CARDIAC REHAB | Facility: HOSPITAL | Age: 67
Discharge: HOME | End: 2022-12-08
Attending: INTERNAL MEDICINE
Payer: COMMERCIAL

## 2022-12-08 PROCEDURE — 94625 PHY/QHP OP PULM RHB W/O MNTR: CPT

## 2022-12-13 ENCOUNTER — HOSPITAL ENCOUNTER (OUTPATIENT)
Dept: CARDIAC REHAB | Facility: HOSPITAL | Age: 67
Discharge: HOME | End: 2022-12-13
Attending: INTERNAL MEDICINE
Payer: COMMERCIAL

## 2022-12-13 PROCEDURE — 94625 PHY/QHP OP PULM RHB W/O MNTR: CPT

## 2022-12-15 ENCOUNTER — HOSPITAL ENCOUNTER (OUTPATIENT)
Dept: CARDIAC REHAB | Facility: HOSPITAL | Age: 67
Discharge: HOME | End: 2022-12-15
Attending: INTERNAL MEDICINE
Payer: COMMERCIAL

## 2022-12-15 PROCEDURE — 94625 PHY/QHP OP PULM RHB W/O MNTR: CPT

## 2022-12-20 ENCOUNTER — HOSPITAL ENCOUNTER (OUTPATIENT)
Dept: CARDIAC REHAB | Facility: HOSPITAL | Age: 67
Discharge: HOME | End: 2022-12-20
Attending: INTERNAL MEDICINE
Payer: COMMERCIAL

## 2022-12-20 PROCEDURE — 94625 PHY/QHP OP PULM RHB W/O MNTR: CPT

## 2022-12-27 ENCOUNTER — HOSPITAL ENCOUNTER (OUTPATIENT)
Dept: CARDIAC REHAB | Facility: HOSPITAL | Age: 67
Discharge: HOME | End: 2022-12-27
Attending: INTERNAL MEDICINE
Payer: COMMERCIAL

## 2022-12-27 PROCEDURE — 94625 PHY/QHP OP PULM RHB W/O MNTR: CPT

## 2022-12-29 ENCOUNTER — HOSPITAL ENCOUNTER (OUTPATIENT)
Dept: CARDIAC REHAB | Facility: HOSPITAL | Age: 67
Discharge: HOME | End: 2022-12-29
Attending: INTERNAL MEDICINE
Payer: COMMERCIAL

## 2022-12-29 PROCEDURE — 94625 PHY/QHP OP PULM RHB W/O MNTR: CPT

## 2023-01-03 ENCOUNTER — HOSPITAL ENCOUNTER (OUTPATIENT)
Dept: CARDIAC REHAB | Facility: HOSPITAL | Age: 68
Discharge: HOME | End: 2023-01-03
Attending: INTERNAL MEDICINE
Payer: COMMERCIAL

## 2023-01-03 PROCEDURE — 94625 PHY/QHP OP PULM RHB W/O MNTR: CPT

## 2023-01-10 ENCOUNTER — HOSPITAL ENCOUNTER (OUTPATIENT)
Dept: CARDIAC REHAB | Facility: HOSPITAL | Age: 68
Discharge: HOME | End: 2023-01-10
Attending: INTERNAL MEDICINE
Payer: COMMERCIAL

## 2023-01-10 PROCEDURE — 94625 PHY/QHP OP PULM RHB W/O MNTR: CPT

## 2023-01-13 ENCOUNTER — NURSE TRIAGE (OUTPATIENT)
Dept: NURSING | Facility: CLINIC | Age: 68
End: 2023-01-13

## 2023-01-13 NOTE — TELEPHONE ENCOUNTER
"Patient is walking very slow and checked her blood sugar and is 189 and also has COPD.  Patient is having troubles breathing today.  Denies chest pain.  Patient states that she feels terrible.  \"It feels like I am going to die\".  Patient is having severe difficulty breathing struggling for each breath.  Patient states that she will phone 911.      Reason for Disposition    SEVERE difficulty breathing (e.g., struggling for each breath, speaks in single words, pulse > 120)    Protocols used: BREATHING DIFFICULTY-A-OH      "

## 2023-01-14 ENCOUNTER — NURSE TRIAGE (OUTPATIENT)
Dept: NURSING | Facility: CLINIC | Age: 68
End: 2023-01-14

## 2023-01-14 NOTE — TELEPHONE ENCOUNTER
Pt states that her BS is at 336. Pt calling to discuss how to get her BS down. Pt denies vomiting, feeling to weak to stand, and states that she is drinking lots of water. Discussed with pt pushing fluids, avoid high sugar drinks, eating protein and fiber and watching carb intake. Discussed eating low carb - high fiber smaller meals/snacks more frequently vs large meals spaced out. Pt does not have the ability to test urine ketones.    Advised pt to call back if new SXs develop or with other questtions or concerns.    Raegan Chinchilla, RN, BSN  [facility] Nurse Advisor 11:26 AM 1/14/2023      Reason for Disposition    [1] Blood glucose > 300 mg/dL (16.7 mmol/L) AND [2] does not  use insulin (e.g., not insulin-dependent; most people with type 2 diabetes)    Additional Information    Negative: Unconscious or difficult to awaken    Negative: Acting confused (e.g., disoriented, slurred speech)    Negative: Very weak (e.g., can't stand)    Negative: Sounds like a life-threatening emergency to the triager    Negative: [1] Vomiting AND [2] signs of dehydration (e.g., very dry mouth, lightheaded, dark urine)    Negative: [1] Blood glucose > 240 mg/dL (13.3 mmol/L) AND [2] rapid breathing    Negative: Blood glucose > 500 mg/dL (27.8 mmol/L)    Negative: [1] Blood glucose > 240 mg/dL (13.3 mmol/L) AND [2] urine ketones moderate-large (or more than 1+)    Negative: [1] Blood glucose > 240 mg/dL (13.3 mmol/L) AND [2] vomiting AND [3] unable to check for ketones (in blood or urine)    Negative: [1] Blood glucose > 240 mg/dL (13.3 mmol/L) AND [2] blood ketones > 1.4 mmol/L    Negative: [1] New-onset diabetes suspected (e.g., frequent urination, weak, weight loss) AND [2] vomiting or rapid breathing    Negative: Vomiting lasts > 4 hours    Negative: Patient sounds very sick or weak to the triager    Negative: Fever > 100.4 F (38.0 C)    Negative: Blood glucose > 400 mg/dL (22.2 mmol/L)    Negative: [1] Blood glucose > 300  mg/dL (16.7 mmol/L) AND [2] two or more times in a row    Negative: Urine ketones moderate - large (or blood ketones > 1.4 mmol/L)    Negative: [1] Caller has URGENT medication or insulin pump question AND [2] triager unable to answer question    Negative: [1] Symptoms of high blood sugar (e.g., frequent urination, weak, weight loss) AND [2] not able to test blood glucose    Negative: New-onset diabetes suspected (e.g., frequent urination, weakness, weight loss)    Negative: [1] Caller has NON-URGENT medication or insulin pump question AND [2] triager unable to answer question    Negative: [1] Blood glucose > 300 mg/dL (16.7 mmol/L) AND [2] uses insulin (e.g., insulin-dependent, all people with type 1 diabetes)    Negative: [1] Blood glucose 240 - 300 mg/dL (13.3 - 16.7 mmol/L) AND [2] uses insulin (e.g., insulin-dependent, all people with type 1 diabetes)    Protocols used: DIABETES - HIGH BLOOD SUGAR-A-

## 2023-02-16 ENCOUNTER — NURSE TRIAGE (OUTPATIENT)
Dept: NURSING | Facility: CLINIC | Age: 68
End: 2023-02-16
Payer: COMMERCIAL

## 2023-02-17 ENCOUNTER — TELEPHONE (OUTPATIENT)
Dept: FAMILY MEDICINE | Facility: CLINIC | Age: 68
End: 2023-02-17
Payer: COMMERCIAL

## 2023-02-17 DIAGNOSIS — R26.89 IMPAIRED GAIT AND MOBILITY: Primary | ICD-10-CM

## 2023-02-17 NOTE — TELEPHONE ENCOUNTER
Patient calling back stating she really needs to get a walker with a seat. (I understand this isn't an emergency but she was very frustrated on the phone)    I pended one in this encounter if you think it is reasonable.    Rafael Polanco RN     [facility]

## 2023-02-17 NOTE — TELEPHONE ENCOUNTER
"Pt reports incontinence \"everywhere I go\". Started \"maybe a month ago\". Pt then states \"I am not having any problems\". Pt began to talk about having a \"leaky gut\" and then repeated that her issue was urinating frequently and incontinence. Pt denies fever or flank pain.    Writer advised pt to be seen within 24 hours per protocol. Call back if new or worsening symptoms.    Pt verbalizes understanding and agrees to plan.     Reason for Disposition    Urinating more frequently than usual (i.e., frequency)    Additional Information    Negative: Followed a female genital area injury (e.g., vagina, vulva)    Negative: Followed a male genital area injury (e.g., penis, scrotum)    Negative: Vaginal discharge    Negative: Pus (white, yellow) or bloody discharge from end of penis    Negative: [1] Taking antibiotic for urinary tract infection (UTI) AND [2] female    Negative: [1] Taking antibiotic for urinary tract infection (UTI) AND [2] male    Negative: [1] Pain or burning with passing urine (urination) AND [2] pregnant    Negative: [1] Pain or burning with passing urine (urination) AND [2] postpartum (from 0 to 6 weeks after delivery)    Negative: [1] Pain or burning with passing urine (urination) AND [2] female    Negative: [1] Pain or burning with passing urine (urination) AND [2] male    Negative: Pain or itching in the vulvar area    Negative: Pain in scrotum is main symptom    Negative: Blood in the urine is main symptom    Negative: Symptoms arising from use of a urinary catheter (e.g., coude, Lara)    Negative: [1] Unable to urinate (or only a few drops) > 4 hours AND [2] bladder feels very full (e.g., palpable bladder or strong urge to urinate)    Negative: [1] Decreased urination and [2] drinking very little AND [2] dehydration suspected (e.g., dark urine, no urine > 12 hours, very dry mouth, very lightheaded)    Negative: Patient sounds very sick or weak to the triager    Negative: Fever > 100.4 F (38.0 " C)    Protocols used: URINARY SYMPTOMS-A-AH

## 2023-02-17 NOTE — TELEPHONE ENCOUNTER
"Patient called requesting order for a 2 wheel scooter/walker with a seat to be called in for an order to be sent to her home. Stated she is not getting around that well anymore and needs this to get around now. Said [facility] because she cannot get out and around anymore. She told me that she wants the order sent to [facility] and they will deliver it to her home.   Fax: [phone]  Please call patient when order is placed.    Merlyn also told me about her \"leaky gut\" and how she is very concerned. Stated that the medication she is getting for this is not working and wants to know if she should be seen earlier than her pending appointment with you on 2-16? I forwarded a nurse call from yesterday with an RN too.   "

## 2023-02-24 NOTE — TELEPHONE ENCOUNTER
Order for the walker cannot work. We got a call from the place that we sent the fax to for her walker. She stated that since she has medicaid the 4 wheel walker is considered an upgrade and she would have to pay out of pocket. She stated they cannot take any payment from medicaid patients if this is the case. She also said there is no elderly waiver that can be placed. She needs the order sent to a new DME.

## 2023-03-03 NOTE — TELEPHONE ENCOUNTER
So where should I send this DME to? [facility]? [facility]? Please see if patient has another place she wants me to send it to.

## 2023-03-07 NOTE — TELEPHONE ENCOUNTER
Order needs to go to [facility].   There fax number is [phone].   Once they receive the order they can mail the walker to patient.     New order pended, please advise

## 2023-03-12 ENCOUNTER — APPOINTMENT (OUTPATIENT)
Dept: RADIOLOGY | Facility: HOSPITAL | Age: 68
End: 2023-03-12
Attending: STUDENT IN AN ORGANIZED HEALTH CARE EDUCATION/TRAINING PROGRAM
Payer: COMMERCIAL

## 2023-03-12 ENCOUNTER — HOSPITAL ENCOUNTER (EMERGENCY)
Facility: HOSPITAL | Age: 68
Discharge: HOME | End: 2023-03-12
Attending: EMERGENCY MEDICINE | Admitting: EMERGENCY MEDICINE
Payer: COMMERCIAL

## 2023-03-12 VITALS
TEMPERATURE: 97 F | RESPIRATION RATE: 22 BRPM | WEIGHT: 178 LBS | BODY MASS INDEX: 41.37 KG/M2 | DIASTOLIC BLOOD PRESSURE: 66 MMHG | OXYGEN SATURATION: 97 % | HEART RATE: 89 BPM | SYSTOLIC BLOOD PRESSURE: 113 MMHG

## 2023-03-12 DIAGNOSIS — J44.9 CHRONIC OBSTRUCTIVE PULMONARY DISEASE, UNSPECIFIED COPD TYPE (H): ICD-10-CM

## 2023-03-12 PROBLEM — R87.810 CERVICAL HIGH RISK HPV (HUMAN PAPILLOMAVIRUS) TEST POSITIVE: Status: ACTIVE | Noted: 2018-03-05

## 2023-03-12 LAB
ALBUMIN SERPL BCG-MCNC: 4.2 G/DL (ref 3.5–5.2)
ALP SERPL-CCNC: 119 U/L (ref 35–104)
ALT SERPL W P-5'-P-CCNC: 17 U/L (ref 10–35)
ANION GAP SERPL CALCULATED.3IONS-SCNC: 6 MMOL/L (ref 7–15)
AST SERPL W P-5'-P-CCNC: 17 U/L (ref 10–35)
BASOPHILS # BLD AUTO: 0.1 10E3/UL (ref 0–0.2)
BASOPHILS NFR BLD AUTO: 1 %
BILIRUB SERPL-MCNC: 0.4 MG/DL
BUN SERPL-MCNC: 11.1 MG/DL (ref 8–23)
CALCIUM SERPL-MCNC: 9 MG/DL (ref 8.8–10.2)
CHLORIDE SERPL-SCNC: 96 MMOL/L (ref 98–107)
CREAT SERPL-MCNC: 0.73 MG/DL (ref 0.51–0.95)
DEPRECATED HCO3 PLAS-SCNC: 38 MMOL/L (ref 22–29)
EOSINOPHIL # BLD AUTO: 0.1 10E3/UL (ref 0–0.7)
EOSINOPHIL NFR BLD AUTO: 1 %
ERYTHROCYTE [DISTWIDTH] IN BLOOD BY AUTOMATED COUNT: 12.4 % (ref 10–15)
FLUAV RNA SPEC QL NAA+PROBE: NEGATIVE
FLUBV RNA RESP QL NAA+PROBE: NEGATIVE
GFR SERPL CREATININE-BSD FRML MDRD: 90 ML/MIN/1.73M2
GLUCOSE SERPL-MCNC: 226 MG/DL (ref 70–99)
HCT VFR BLD AUTO: 49.7 % (ref 35–47)
HGB BLD-MCNC: 15.1 G/DL (ref 11.7–15.7)
IMM GRANULOCYTES # BLD: 0 10E3/UL
IMM GRANULOCYTES NFR BLD: 0 %
LYMPHOCYTES # BLD AUTO: 2.5 10E3/UL (ref 0.8–5.3)
LYMPHOCYTES NFR BLD AUTO: 20 %
MCH RBC QN AUTO: 29 PG (ref 26.5–33)
MCHC RBC AUTO-ENTMCNC: 30.4 G/DL (ref 31.5–36.5)
MCV RBC AUTO: 96 FL (ref 78–100)
MONOCYTES # BLD AUTO: 0.8 10E3/UL (ref 0–1.3)
MONOCYTES NFR BLD AUTO: 6 %
NEUTROPHILS # BLD AUTO: 8.8 10E3/UL (ref 1.6–8.3)
NEUTROPHILS NFR BLD AUTO: 72 %
NRBC # BLD AUTO: 0 10E3/UL
NRBC BLD AUTO-RTO: 0 /100
NT-PROBNP SERPL-MCNC: 117 PG/ML (ref 0–900)
PLATELET # BLD AUTO: 183 10E3/UL (ref 150–450)
POTASSIUM SERPL-SCNC: 4.4 MMOL/L (ref 3.4–5.3)
PROT SERPL-MCNC: 7.1 G/DL (ref 6.4–8.3)
RBC # BLD AUTO: 5.2 10E6/UL (ref 3.8–5.2)
RSV RNA SPEC NAA+PROBE: NEGATIVE
SARS-COV-2 RNA RESP QL NAA+PROBE: NEGATIVE
SODIUM SERPL-SCNC: 140 MMOL/L (ref 136–145)
WBC # BLD AUTO: 12.2 10E3/UL (ref 4–11)

## 2023-03-12 PROCEDURE — 250N000009 HC RX 250: Performed by: EMERGENCY MEDICINE

## 2023-03-12 PROCEDURE — 99285 EMERGENCY DEPT VISIT HI MDM: CPT | Mod: CS,25

## 2023-03-12 PROCEDURE — 93005 ELECTROCARDIOGRAM TRACING: CPT | Performed by: EMERGENCY MEDICINE

## 2023-03-12 PROCEDURE — 36415 COLL VENOUS BLD VENIPUNCTURE: CPT | Performed by: EMERGENCY MEDICINE

## 2023-03-12 PROCEDURE — 94640 AIRWAY INHALATION TREATMENT: CPT

## 2023-03-12 PROCEDURE — 83880 ASSAY OF NATRIURETIC PEPTIDE: CPT | Performed by: EMERGENCY MEDICINE

## 2023-03-12 PROCEDURE — 250N000012 HC RX MED GY IP 250 OP 636 PS 637: Performed by: EMERGENCY MEDICINE

## 2023-03-12 PROCEDURE — C9803 HOPD COVID-19 SPEC COLLECT: HCPCS

## 2023-03-12 PROCEDURE — 87637 SARSCOV2&INF A&B&RSV AMP PRB: CPT | Performed by: EMERGENCY MEDICINE

## 2023-03-12 PROCEDURE — 85025 COMPLETE CBC W/AUTO DIFF WBC: CPT | Performed by: EMERGENCY MEDICINE

## 2023-03-12 PROCEDURE — 71046 X-RAY EXAM CHEST 2 VIEWS: CPT

## 2023-03-12 PROCEDURE — 80053 COMPREHEN METABOLIC PANEL: CPT | Performed by: EMERGENCY MEDICINE

## 2023-03-12 RX ORDER — ALBUTEROL SULFATE 5 MG/ML
2.5 SOLUTION RESPIRATORY (INHALATION) EVERY 6 HOURS PRN
Status: DISCONTINUED | OUTPATIENT
Start: 2023-03-12 | End: 2023-03-13 | Stop reason: HOSPADM

## 2023-03-12 RX ORDER — PREDNISONE 20 MG/1
40 TABLET ORAL DAILY
Qty: 10 TABLET | Refills: 0 | Status: SHIPPED | OUTPATIENT
Start: 2023-03-12 | End: 2023-03-17

## 2023-03-12 RX ORDER — IPRATROPIUM BROMIDE AND ALBUTEROL SULFATE 2.5; .5 MG/3ML; MG/3ML
3 SOLUTION RESPIRATORY (INHALATION) ONCE
Status: COMPLETED | OUTPATIENT
Start: 2023-03-12 | End: 2023-03-12

## 2023-03-12 RX ORDER — PREDNISONE 20 MG/1
40 TABLET ORAL ONCE
Status: COMPLETED | OUTPATIENT
Start: 2023-03-12 | End: 2023-03-12

## 2023-03-12 RX ADMIN — PREDNISONE 40 MG: 20 TABLET ORAL at 20:52

## 2023-03-12 RX ADMIN — IPRATROPIUM BROMIDE AND ALBUTEROL SULFATE 3 ML: .5; 3 SOLUTION RESPIRATORY (INHALATION) at 20:20

## 2023-03-12 ASSESSMENT — ACTIVITIES OF DAILY LIVING (ADL): ADLS_ACUITY_SCORE: 35

## 2023-03-12 NOTE — ED PROVIDER NOTES
"EMERGENCY DEPARTMENT ENCOUNTER            IMPRESSION:  COPD exacerbation      MEDICAL DECISION MAKING:  It was my pleasure to provide care for Arvind Leong who presented for evaluation of shortness of breath.  She has a history of chronic respiratory failure oxygen dependent COPD    Patient is pleasant and cooperative she speaks in full sentences    On exam vital signs are normal.  Oxygen low baseline.  Breath sounds are diminished with a soft wheeze    Symptomatic medications including nebulizer and steroids were given.  She felt much better.    EKG notable for low voltage but no arrhythmia or ischemia    Significant laboratory findings include are notable for negative viral panel, slightly elevated white blood cell count, BNP is not elevated     Imaging independently reviewed and agree with radiologist findings of possibly enlarged heart with questionable enlarged pulmonary vasculature    History and physical are consistent with COPD.  Patient will be discharged home with steroid burst          =================================================================  CHIEF COMPLAINT:  No chief complaint on file.        HPI  Arvind Leong is a 67 year old female with a history of COPD, asthma, type 2 diabetes, pneumonia, obesity, sleep apnea, and chronic respiratory failure who presents to the ED with family member by private care for evaluation of shortness of breath.    Patient has a history of COPD and pneumonia and uses 2L home oxygen unchanged. She endorses shortness of breath, \"gurgling\" due to phlegm build up, clear-colored productive cough, rhinorrhea, and sneezing for the past day. She also says that she has not been able to get comfortable sitting in a chair or laying in bed. Patient thinks that she has pneumonia, because she says she has given herself pneumonia 1 time in the past when she did not breathe out of her nebulizer properly. She has been hospitalized in the past for COPD but says this has " "not happened \"few a while.\" She has been taking her medications as prescribed. Endorses an additional history of pulmonary edema and a family history of heart failure and aneurysm. She denies sick contacts. Patient lives with her mother. The patient denies chest pain, fever, weight loss, abdominal pain, constipation, diarrhea, leg swelling, facial pain, or any other complaints at this time.       I, Carina Lucero am serving as a scribe to document services personally performed by Dr. Sánchez Chairez MD, based on my observation and the provider's statements to me. I, Dr. Sánchez Chairez MD attest that Carina Barker is acting in a scribe capacity, has observed my performance of the services and has documented them in accordance with my direction.      REVIEW OF SYSTEMS   Constitutional: Does not report fever, chills, unintentional weight loss or fatigue   Eyes: Does not report visual changes or discharge    HENT: Endorses rhinorrhea and sneezing. Does not report sore throat, ear pain, facial pain, or neck pain  Respiratory: Endorses cough (productive), shortness of breath (chronic), and chest congestion.  Cardiovascular: Does not report chest pain, palpitations or leg swelling  GI: Does not report abdominal pain, nausea, vomiting, diarrhea, constipation, or dark, bloody stools.    : Does not report hematuria, dysuria, or flank pain  Musculoskeletal: Does not report any new musculoskeletal pain or new muscle/joint pains  Skin: Does not report rash or wound  Neurologic: Does not report current headache, new weakness, focal weakness, or sensory changes        Remainder of systems reviewed, unless noted in HPI all others negative.      PAST MEDICAL HISTORY:  Past Medical History:   Diagnosis Date     Acute on chronic respiratory failure with hypoxia (H) 11/15/2016     Bipolar 1 disorder (H)      CAP (community acquired pneumonia) 11/15/2016     Cervical high risk HPV (human papillomavirus) test positive 3/5/2018    3/5/18 NIL " pap, +HR HPV (not 16/18) 21 NIL pap, neg HPV. Plan: await provider     COPD exacerbation (H) 2021     COPD with exacerbation (H) 11/15/2016     Diabetes mellitus, type II (H)      Hearing loss      Meniere's disease      Pneumonia 2021     Pneumonia of right lower lobe due to infectious organism 2019       PAST SURGICAL HISTORY:  Past Surgical History:   Procedure Laterality Date     CYST REMOVAL  2001     [place] REMOVAL ANAL FISTULA,SUBCUTANEOUS      Description: Fistula-in-ano Repair;  Recorded: 2010; x2     TONSILLECTOMY           CURRENT MEDICATIONS:    predniSONE (DELTASONE) 20 MG tablet  albuterol (PROAIR HFA/PROVENTIL HFA/VENTOLIN HFA) 108 (90 Base) MCG/ACT inhaler  albuterol (PROVENTIL) (2.5 MG/3ML) 0.083% neb solution  ARIPiprazole (ABILIFY) 10 MG tablet  blood glucose (CONTOUR NEXT TEST) test strip  blood glucose (NO BRAND SPECIFIED) lancets standard  blood glucose (NO BRAND SPECIFIED) test strip  blood glucose (NO BRAND SPECIFIED) test strip  blood glucose monitoring (ACCU-CHEK JOANA PLUS) meter device kit  blood glucose monitoring (ACCU-CHEK JOANA PLUS) meter device kit  blood glucose monitoring (ONETOUCH VERIO) meter device kit  empagliflozin (JARDIANCE) 25 MG TABS tablet  fluticasone-vilanterol (BREO ELLIPTA) 100-25 MCG/INH inhaler  metFORMIN (GLUCOPHAGE) 1000 MG tablet  mirabegron (MYRBETRIQ) 50 MG 24 hr tablet  predniSONE (DELTASONE) 10 MG tablet  simvastatin (ZOCOR) 20 MG tablet  thin (NO BRAND SPECIFIED) lancets        ALLERGIES:  Allergies   Allergen Reactions     Lurasidone Other (See Comments)     Face turned red, (Brand name = Latuda) Face turned red, Face turned red       FAMILY HISTORY:  Family History   Problem Relation Age of Onset     Aneurysm Father      Heart Disease Father 70.00        bypass in 70s, AAA rupture at age 77      Ulcers Father 76.00     Pacemaker Mother      Bipolar Disorder Brother      Breast Cancer Maternal Grandmother 51.00     Kidney  failure Maternal Grandmother      Cancer Paternal Grandmother         ?? lung     Cancer Paternal Uncle         ?? lung     Mental Illness Maternal Grandfather      Heart Disease Other         uncle     No Known Problems Sister         two siblings abuse chemicals     No Known Problems Brother      Bipolar Disorder Nephew        SOCIAL HISTORY:   Social History     Socioeconomic History     Marital status:    Tobacco Use     Smoking status: Former     Packs/day: 1.00     Years: 40.00     Pack years: 40.00     Types: Cigarettes     Quit date: 2017     Years since quittin.1     Smokeless tobacco: Never   Vaping Use     Vaping Use: Never used   Substance and Sexual Activity     Alcohol use: Yes     Comment: Alcoholic Drinks/day: Occasional      Drug use: No     Sexual activity: Never   Social History Narrative    2019The patient lives with her mother.; had worked at [employer] as  but quit 2019.   Quit smoking ; no etoh problems  / x 2 ; no kids       PHYSICAL EXAM:    /66   Pulse 89   Temp 97  F (36.1  C) (Temporal)   Resp 22   Wt 80.7 kg (178 lb)   SpO2 97%   BMI 41.37 kg/m      Constitutional: Awake, alert, in no acute distress  Head: Normocephalic, atraumatic.  ENT: Mucous membranes moist. Posterior oropharynx appears normal.  Eyes: Pupils midrange and reactive ,no conjunctival discharge  Neck: No lymphadenopathy, no stridor, supple, no soft tissue swelling  Chest: No tenderness   Respiratory: Soft wheeze with diminished breath sounds.  Chronic respiratory failure on nasal cannula oxygen  Cardiovascular: Regular rate and rhythm.+2 radial pulses, equal bilaterally.  No murmurs.   GI: Abdomen soft, non-tender to palpation in all 4 quadrants. No guarding or rebound. Bowel sounds intact on all 4 quadrants.   Back: No CVA tenderness.    Musculoskeletal: Bilateral lower extremity edema present  Integument: Warm, dry. No rash. No bruising or petechiae.  Lymphatic: No  cervical lymphadenopathy  Neurologic: Alert & oriented x 3. Normal speech. Grossly normal motor and sensory function. No focal deficits noted.  NIHSS = 0  Psychiatric: Normal mood and affect. Normal judgement.    ED COURSE:    6:12 PM I met with the patient, obtained history, performed an initial exam, and discussed options and plan for diagnostics and treatment here in the ED.  9:44 PM We discussed the plan for discharge and the patient is agreeable. Reviewed supportive cares, symptomatic treatment, outpatient follow up, and reasons to return to the Emergency Department. Patient to be discharged by ED RN.         Medical Decision Making    History:    Supplemental history from: n/a    External Record(s) reviewed: External medical records including care everywhere reviewed    Work Up:    EKG laboratory and imaging studies independently reviewed by the provider    Broad differential diagnosis considered for respiratory distress and hypoxia      External consultation:    Discussion of management with another provider: n/a    Complicating factors:    Patient has a complicated past medical history including COPD, asthma, type 2 diabetes    Care affected by social determinants of health: Access to primary care    Disposition considerations: Disposition involved in shared decision-making with the patient, patient instructed to continue outpatient medication as prescribed, referred for follow-up, considered admission for this patient however she improved with nebulizer.  She will be discharged home with steroids.             LAB:  Laboratory results were independently reviewed and interpreted  Results for orders placed or performed during the hospital encounter of 03/12/23   Chest XR,  PA & LAT    Impression    IMPRESSION: Exam is compromised by body habitus. Heart size is enlarged but likely stable. There are increasing parenchymal opacities bilaterally with prominence of central pulmonary vasculature, suspicious for mild  CHF. No effusions are identified,   however. No pneumothorax. No acute bony abnormality..   Comprehensive metabolic panel   Result Value Ref Range    Sodium 140 136 - 145 mmol/L    Potassium 4.4 3.4 - 5.3 mmol/L    Chloride 96 (L) 98 - 107 mmol/L    Carbon Dioxide (CO2) 38 (H) 22 - 29 mmol/L    Anion Gap 6 (L) 7 - 15 mmol/L    Urea Nitrogen 11.1 8.0 - 23.0 mg/dL    Creatinine 0.73 0.51 - 0.95 mg/dL    Calcium 9.0 8.8 - 10.2 mg/dL    Glucose 226 (H) 70 - 99 mg/dL    Alkaline Phosphatase 119 (H) 35 - 104 U/L    AST 17 10 - 35 U/L    ALT 17 10 - 35 U/L    Protein Total 7.1 6.4 - 8.3 g/dL    Albumin 4.2 3.5 - 5.2 g/dL    Bilirubin Total 0.4 <=1.2 mg/dL    GFR Estimate 90 >60 mL/min/1.73m2   Nt probnp inpatient (BNP)   Result Value Ref Range    N terminal Pro BNP Inpatient 117 0 - 900 pg/mL   CBC with platelets and differential   Result Value Ref Range    WBC Count 12.2 (H) 4.0 - 11.0 10e3/uL    RBC Count 5.20 3.80 - 5.20 10e6/uL    Hemoglobin 15.1 11.7 - 15.7 g/dL    Hematocrit 49.7 (H) 35.0 - 47.0 %    MCV 96 78 - 100 fL    MCH 29.0 26.5 - 33.0 pg    MCHC 30.4 (L) 31.5 - 36.5 g/dL    RDW 12.4 10.0 - 15.0 %    Platelet Count 183 150 - 450 10e3/uL    % Neutrophils 72 %    % Lymphocytes 20 %    % Monocytes 6 %    % Eosinophils 1 %    % Basophils 1 %    % Immature Granulocytes 0 %    NRBCs per 100 WBC 0 <1 /100    Absolute Neutrophils 8.8 (H) 1.6 - 8.3 10e3/uL    Absolute Lymphocytes 2.5 0.8 - 5.3 10e3/uL    Absolute Monocytes 0.8 0.0 - 1.3 10e3/uL    Absolute Eosinophils 0.1 0.0 - 0.7 10e3/uL    Absolute Basophils 0.1 0.0 - 0.2 10e3/uL    Absolute Immature Granulocytes 0.0 <=0.4 10e3/uL    Absolute NRBCs 0.0 10e3/uL   Symptomatic Influenza A/B, RSV, & SARS-CoV2 PCR (COVID-19) Nasopharyngeal    Specimen: Nasopharyngeal; Swab   Result Value Ref Range    Influenza A PCR Negative Negative    Influenza B PCR Negative Negative    RSV PCR Negative Negative    SARS CoV2 PCR Negative Negative         RADIOLOGY:  Radiology reports  were independently reviewed and interpreted  Chest XR,  PA & LAT   Final Result   IMPRESSION: Exam is compromised by body habitus. Heart size is enlarged but likely stable. There are increasing parenchymal opacities bilaterally with prominence of central pulmonary vasculature, suspicious for mild CHF. No effusions are identified,    however. No pneumothorax. No acute bony abnormality..           EKG:    Performed at 1930  Vent. rate 101 BPM   KS interval 150 ms   QRS duration 62 ms   QT/QTc 346/448 ms   P-R-T axes 67 61 67    Sinus tachycardia. Possible Left atrial enlargement. Low voltage QRS. Septal infarct (cited on or before 22-JUN-2022). Abnormal ECG.  When compared with ECG of 22-JUN-2022 10:59, Premature supraventricular complexes are no longer Present    I have independently reviewed and interpreted the EKG(s) documented above.      MEDICATIONS GIVEN IN THE EMERGENCY:  Medications   albuterol (PROVENTIL) neb solution 2.5 mg (has no administration in time range)   ipratropium - albuterol 0.5 mg/2.5 mg/3 mL (DUONEB) neb solution 3 mL (3 mLs Nebulization $Given 3/12/23 2020)   predniSONE (DELTASONE) tablet 40 mg (40 mg Oral $Given 3/12/23 2052)           NEW PRESCRIPTIONS STARTED AT TODAY'S ER VISIT:  Discharge Medication List as of 3/12/2023  9:54 PM      START taking these medications    Details   !! predniSONE (DELTASONE) 20 MG tablet Take 2 tablets (40 mg) by mouth daily for 5 days, Disp-10 tablet, R-0, Local Print       !! - Potential duplicate medications found. Please discuss with provider.             Prior to making a final disposition on this patient the results of patient's tests and other diagnostic studies were discussed with the patient. All questions were answered. Patient expressed understanding of the plan and was amenable to it.      FINAL DIAGNOSIS:    ICD-10-CM    1. Chronic obstructive pulmonary disease, unspecified COPD type (H)  J44.9                  NAME: Arvind SALDANA Salo  AGE: 67  year old female  YOB: 1955  MRN: [id number]  EVALUATION DATE & TIME: No admission date for patient encounter.    PCP: Rema Whiting    ED PROVIDER: Sánchez Chairez M.D.      ICarina, am serving as a scribe to document services personally performed by Dr. Sánchez Chairez based on my observation and the provider's statements to me. ISánchez MD attest that Carina Barker is acting in a scribe capacity, has observed my performance of the services and has documented them in accordance with my direction.    Sánchez Chairez M.D.  Emergency Medicine  [facility] EMERGENCY DEPARTMENT  [facility] [facility] [address]  [phone]  Dept: [phone]  3/12/2023       Sánchez Chairez MD  03/12/23 [id number]

## 2023-03-13 LAB
ATRIAL RATE - MUSE: 101 BPM
DIASTOLIC BLOOD PRESSURE - MUSE: NORMAL MMHG
INTERPRETATION ECG - MUSE: NORMAL
P AXIS - MUSE: 67 DEGREES
PR INTERVAL - MUSE: 150 MS
QRS DURATION - MUSE: 62 MS
QT - MUSE: 346 MS
QTC - MUSE: 448 MS
R AXIS - MUSE: 61 DEGREES
SYSTOLIC BLOOD PRESSURE - MUSE: NORMAL MMHG
T AXIS - MUSE: 67 DEGREES
VENTRICULAR RATE- MUSE: 101 BPM

## 2023-03-13 NOTE — DISCHARGE INSTRUCTIONS
Prednisone 40 mg for 5 days  Use your inhaler as often as needed  Return if you have more problems

## 2023-03-15 ENCOUNTER — HOSPITAL ENCOUNTER (EMERGENCY)
Facility: HOSPITAL | Age: 68
Discharge: HOME | End: 2023-03-15
Admitting: PHYSICIAN ASSISTANT
Payer: COMMERCIAL

## 2023-03-15 VITALS
OXYGEN SATURATION: 97 % | RESPIRATION RATE: 20 BRPM | TEMPERATURE: 98.3 F | SYSTOLIC BLOOD PRESSURE: 135 MMHG | HEART RATE: 91 BPM | DIASTOLIC BLOOD PRESSURE: 67 MMHG

## 2023-03-15 DIAGNOSIS — R73.9 HYPERGLYCEMIA: ICD-10-CM

## 2023-03-15 DIAGNOSIS — Z91.148 NONCOMPLIANCE WITH MEDICATION REGIMEN: ICD-10-CM

## 2023-03-15 LAB
ALBUMIN SERPL BCG-MCNC: 4.1 G/DL (ref 3.5–5.2)
ALP SERPL-CCNC: 118 U/L (ref 35–104)
ALT SERPL W P-5'-P-CCNC: 19 U/L (ref 10–35)
ANION GAP SERPL CALCULATED.3IONS-SCNC: 8 MMOL/L (ref 7–15)
AST SERPL W P-5'-P-CCNC: 19 U/L (ref 10–35)
B-OH-BUTYR SERPL-SCNC: <0.2 MMOL/L
BASOPHILS # BLD AUTO: 0 10E3/UL (ref 0–0.2)
BASOPHILS NFR BLD AUTO: 0 %
BILIRUB DIRECT SERPL-MCNC: <0.2 MG/DL (ref 0–0.3)
BILIRUB SERPL-MCNC: 0.4 MG/DL
BUN SERPL-MCNC: 16.3 MG/DL (ref 8–23)
CALCIUM SERPL-MCNC: 9 MG/DL (ref 8.8–10.2)
CHLORIDE SERPL-SCNC: 98 MMOL/L (ref 98–107)
CREAT SERPL-MCNC: 0.66 MG/DL (ref 0.51–0.95)
DEPRECATED HCO3 PLAS-SCNC: 34 MMOL/L (ref 22–29)
EOSINOPHIL # BLD AUTO: 0 10E3/UL (ref 0–0.7)
EOSINOPHIL NFR BLD AUTO: 0 %
ERYTHROCYTE [DISTWIDTH] IN BLOOD BY AUTOMATED COUNT: 12.5 % (ref 10–15)
GFR SERPL CREATININE-BSD FRML MDRD: >90 ML/MIN/1.73M2
GLUCOSE BLDC GLUCOMTR-MCNC: 381 MG/DL (ref 70–99)
GLUCOSE SERPL-MCNC: 374 MG/DL (ref 70–99)
HCT VFR BLD AUTO: 47.5 % (ref 35–47)
HGB BLD-MCNC: 14.1 G/DL (ref 11.7–15.7)
IMM GRANULOCYTES # BLD: 0.1 10E3/UL
IMM GRANULOCYTES NFR BLD: 0 %
LYMPHOCYTES # BLD AUTO: 0.9 10E3/UL (ref 0.8–5.3)
LYMPHOCYTES NFR BLD AUTO: 7 %
MCH RBC QN AUTO: 28.7 PG (ref 26.5–33)
MCHC RBC AUTO-ENTMCNC: 29.7 G/DL (ref 31.5–36.5)
MCV RBC AUTO: 97 FL (ref 78–100)
MONOCYTES # BLD AUTO: 0.1 10E3/UL (ref 0–1.3)
MONOCYTES NFR BLD AUTO: 1 %
NEUTROPHILS # BLD AUTO: 10.9 10E3/UL (ref 1.6–8.3)
NEUTROPHILS NFR BLD AUTO: 92 %
NRBC # BLD AUTO: 0 10E3/UL
NRBC BLD AUTO-RTO: 0 /100
PLATELET # BLD AUTO: 159 10E3/UL (ref 150–450)
POTASSIUM SERPL-SCNC: 5.1 MMOL/L (ref 3.4–5.3)
PROT SERPL-MCNC: 7.1 G/DL (ref 6.4–8.3)
RBC # BLD AUTO: 4.91 10E6/UL (ref 3.8–5.2)
SODIUM SERPL-SCNC: 140 MMOL/L (ref 136–145)
WBC # BLD AUTO: 11.9 10E3/UL (ref 4–11)

## 2023-03-15 PROCEDURE — 82010 KETONE BODYS QUAN: CPT | Performed by: PHYSICIAN ASSISTANT

## 2023-03-15 PROCEDURE — 96360 HYDRATION IV INFUSION INIT: CPT

## 2023-03-15 PROCEDURE — 36415 COLL VENOUS BLD VENIPUNCTURE: CPT | Performed by: STUDENT IN AN ORGANIZED HEALTH CARE EDUCATION/TRAINING PROGRAM

## 2023-03-15 PROCEDURE — 99283 EMERGENCY DEPT VISIT LOW MDM: CPT | Mod: 25

## 2023-03-15 PROCEDURE — 258N000003 HC RX IP 258 OP 636: Performed by: PHYSICIAN ASSISTANT

## 2023-03-15 PROCEDURE — 85025 COMPLETE CBC W/AUTO DIFF WBC: CPT | Performed by: STUDENT IN AN ORGANIZED HEALTH CARE EDUCATION/TRAINING PROGRAM

## 2023-03-15 PROCEDURE — 80053 COMPREHEN METABOLIC PANEL: CPT | Performed by: PHYSICIAN ASSISTANT

## 2023-03-15 PROCEDURE — 82962 GLUCOSE BLOOD TEST: CPT

## 2023-03-15 PROCEDURE — 82248 BILIRUBIN DIRECT: CPT | Performed by: PHYSICIAN ASSISTANT

## 2023-03-15 PROCEDURE — 80048 BASIC METABOLIC PNL TOTAL CA: CPT | Performed by: STUDENT IN AN ORGANIZED HEALTH CARE EDUCATION/TRAINING PROGRAM

## 2023-03-15 RX ADMIN — SODIUM CHLORIDE 500 ML: 9 INJECTION, SOLUTION INTRAVENOUS at 17:16

## 2023-03-15 ASSESSMENT — ENCOUNTER SYMPTOMS
FEVER: 0
NECK STIFFNESS: 0
EYE REDNESS: 0
DIZZINESS: 1
COLOR CHANGE: 0
DIFFICULTY URINATING: 0
CONFUSION: 0
SHORTNESS OF BREATH: 0
ABDOMINAL PAIN: 0
HEADACHES: 0
ARTHRALGIAS: 0

## 2023-03-15 ASSESSMENT — ACTIVITIES OF DAILY LIVING (ADL)
ADLS_ACUITY_SCORE: 35
ADLS_ACUITY_SCORE: 33

## 2023-03-15 NOTE — ED NOTES
When nurse was doing an accu check, gets tearful with result and states she just had Jesse Arteaga

## 2023-03-15 NOTE — ED PROVIDER NOTES
"EMERGENCY DEPARTMENT ENCOUNTER      NAME: Arvind Leong  AGE: 67 year old female  YOB: 1955  MRN: [id number]  EVALUATION DATE & TIME: No admission date for patient encounter.    PCP: Rema Whiting    ED PROVIDER: Otis Montaño PA-C      Chief Complaint   Patient presents with     Hyperglycemia     FINAL IMPRESSION:  1. Noncompliance with medication regimen    2. Hyperglycemia      ED COURSE & MEDICAL DECISION MAKING:    Pertinent Labs & Imaging studies reviewed. (See chart for details)  4:53 PM I met the patient and performed my initial interview and exam.   7:16 PM updated on results, plan for discharge.     67 year old female presents to the Emergency Department for evaluation of hyperglycemia    ED Course as of 03/15/23 1916   Wed Mar 15, 2023   1658 Patient is a 67-year-old female, past medical history of type 2 diabetes, obesity, scoliosis, cognitive dysfunction and bipolar, noncompliance of medications, bipolar type I, COPD exacerbation, hypoxia, confusion, presents emergency department for evaluation of elevated blood sugars.  Patient reports that her blood sugar was about 288 an hour ago.  Glucose here at time of arrival is 381.  On examination here, she has no acute complaints.  She presents here with her elderly mother, who reportedly is her caretaker.  The mother reports that she has a long history of medical noncompliance, and the patient reports to me that she has not taken her diabetes medications for at least a month.  She does not report any symptoms here other than feeling somewhat \"dizzy\".  Her examination here is otherwise unremarkable, she was here on the 12th, for COPD exacerbation, worked up, and discharged home.  Patient tells me that she stopped taking her diabetes medications about a month ago, does not have a reason for stopping them.  Mother reports that she has a very difficult time at home, and is concerned about cognitive function.  She is following with " her primary care doctor tomorrow, and I encouraged family to discuss concerns about mentation, as well as diabetes compliance with primary care tomorrow.  Elevated glucose here in the emergency department, however has no abdominal pain, is not tachycardic or tachypneic, is on 2 L of oxygen baseline all the time.  Suspect this is all secondary to the patient's medical noncompliance, however will evaluate basic laboratory functions for electrolyte abnormalities, ketones, and hepatic function.  She otherwise has no symptoms.  We will give her a small amount of hydration.  Emergency department should help with some of the dizziness.  Otherwise recommend that she follows up with her primary care doctor.   1836 Ketones negative, white blood cell count is actually improved from previous.   1855 Seen and updated on results, feeling somewhat improved after some fluids here.  Still hyperglycemic, however has an appointment with her primary care doctor tomorrow.  Suspect her symptoms are related to hyperglycemia, and not taking her medications, medication noncompliance.  She will follow-up with her primary care doctor tomorrow.  No other focal symptoms here, no indication for any further labs or imaging.  Plan for discharge at this time.     Medical Decision Making    History:    Supplemental history from: Caregiver    External Record(s) reviewed: Outpatient Record: ED visit from 03/12/2023, previous family med triage notes from 02/17/2023    Work Up:    Chart documentation includes differential considered and any EKGs or imaging independently interpreted by provider, where specified.    In additional to work up documented, I considered the following work up: Documented in chart, if applicable.    External consultation:    Discussion of management with another provider: Documented in chart, if applicable    Complicating factors:    Care impacted by chronic illness: Diabetes    Care affected by social determinants of health:  Medication Noncompliance    Disposition considerations: Discharge. No recommendations on prescription strength medication(s). N/A.       At the conclusion of the encounter I discussed the results of all of the tests and the disposition. The questions were answered. The patient or family acknowledged understanding and was agreeable with the care plan.       0 minutes of critical care time     MEDICATIONS GIVEN IN THE EMERGENCY:  Medications   0.9% sodium chloride BOLUS (0 mLs Intravenous Stopped 3/15/23 1756)       NEW PRESCRIPTIONS STARTED AT TODAY'S ER VISIT  Discharge Medication List as of 3/15/2023  6:57 PM        ================================================================    HPI    Patient information was obtained from: Patient, patient mother    Use of : N/A         Arvind Leong is a 67 year old female with a pertinent history of diabetes, type II , Morbid obesity, asthma, COPD, urgent continence, sleep apnea, anxiety, bipolar, noncompliance of medications who presents to this ED for evaluation of elevated blood sugars.  Patient reports to me that she is supposed to be on diabetes medications, and review of her chart shows that she should be on metformin, as well as Jardiance, and she reports that she has not taken any these medications for at least a month.  Patient's mother reports that she has a long history of medical noncompliance.  When patient was asked why she discontinued her medications, she is not able to provide any insight on this.  She has no focal symptoms at this time.  Denies any chest pain, shortness of breath, abdominal pain, nausea, vomiting, diarrhea, dysuria, hematuria, or other focal symptoms.  Patient was recently seen here on the 12th for COPD exacerbation, is chronically on 2 L of oxygen, was discharged home on prednisone.  She is feeling much better from that perspective.  She has no other acute complaints here.      REVIEW OF SYSTEMS   Review of Systems    Constitutional: Negative for fever.   HENT: Negative for congestion.    Eyes: Negative for redness.   Respiratory: Negative for shortness of breath.    Cardiovascular: Negative for chest pain.   Gastrointestinal: Negative for abdominal pain.   Genitourinary: Negative for difficulty urinating.   Musculoskeletal: Negative for arthralgias and neck stiffness.   Skin: Negative for color change.   Neurological: Positive for dizziness. Negative for headaches.   Psychiatric/Behavioral: Negative for confusion.   All other systems reviewed and are negative.       PAST MEDICAL HISTORY:  Past Medical History:   Diagnosis Date     Acute on chronic respiratory failure with hypoxia (H) 11/15/2016     Bipolar 1 disorder (H)      CAP (community acquired pneumonia) 11/15/2016     Cervical high risk HPV (human papillomavirus) test positive 3/5/2018    3/5/18 NIL pap, +HR HPV (not 16/18) 5/21/21 NIL pap, neg HPV. Plan: await provider     COPD exacerbation (H) 4/24/2021     COPD with exacerbation (H) 11/15/2016     Diabetes mellitus, type II (H)      Hearing loss      Meniere's disease      Pneumonia 4/23/2021     Pneumonia of right lower lobe due to infectious organism 6/11/2019       PAST SURGICAL HISTORY:  Past Surgical History:   Procedure Laterality Date     CYST REMOVAL  01/23/2001     SC REMOVAL ANAL FISTULA,SUBCUTANEOUS      Description: Fistula-in-ano Repair;  Recorded: 01/08/2010; x2     TONSILLECTOMY       CURRENT MEDICATIONS:    albuterol (PROAIR HFA/PROVENTIL HFA/VENTOLIN HFA) 108 (90 Base) MCG/ACT inhaler  albuterol (PROVENTIL) (2.5 MG/3ML) 0.083% neb solution  ARIPiprazole (ABILIFY) 10 MG tablet  blood glucose (CONTOUR NEXT TEST) test strip  blood glucose (NO BRAND SPECIFIED) lancets standard  blood glucose (NO BRAND SPECIFIED) test strip  blood glucose (NO BRAND SPECIFIED) test strip  blood glucose monitoring (ACCU-CHEK JOANA PLUS) meter device kit  blood glucose monitoring (ACCU-CHEK JOANA PLUS) meter device  kit  blood glucose monitoring (ONETOUCH VERIO) meter device kit  empagliflozin (JARDIANCE) 25 MG TABS tablet  fluticasone-vilanterol (BREO ELLIPTA) 100-25 MCG/INH inhaler  metFORMIN (GLUCOPHAGE) 1000 MG tablet  mirabegron (MYRBETRIQ) 50 MG 24 hr tablet  predniSONE (DELTASONE) 10 MG tablet  predniSONE (DELTASONE) 20 MG tablet  simvastatin (ZOCOR) 20 MG tablet  thin (NO BRAND SPECIFIED) lancets         ALLERGIES:  Allergies   Allergen Reactions     Lurasidone Other (See Comments)     Face turned red, (Brand name = Latuda) Face turned red, Face turned red       FAMILY HISTORY:  Family History   Problem Relation Age of Onset     Aneurysm Father      Heart Disease Father 70.00        bypass in 70s, AAA rupture at age 77      Ulcers Father 76.00     Pacemaker Mother      Bipolar Disorder Brother      Breast Cancer Maternal Grandmother 51.00     Kidney failure Maternal Grandmother      Cancer Paternal Grandmother         ?? lung     Cancer Paternal Uncle         ?? lung     Mental Illness Maternal Grandfather      Heart Disease Other         uncle     No Known Problems Sister         two siblings abuse chemicals     No Known Problems Brother      Bipolar Disorder Nephew        SOCIAL HISTORY:   Social History     Socioeconomic History     Marital status:    Tobacco Use     Smoking status: Former     Packs/day: 1.00     Years: 40.00     Pack years: 40.00     Types: Cigarettes     Quit date: 2017     Years since quittin.1     Smokeless tobacco: Never   Vaping Use     Vaping Use: Never used   Substance and Sexual Activity     Alcohol use: Yes     Comment: Alcoholic Drinks/day: Occasional      Drug use: No     Sexual activity: Never   Social History Narrative    2019The patient lives with her mother.; had worked at [employer]  but quit 2019.   Quit smoking ; no etoh problems  / x 2 ; no kids       VITALS:  /67   Pulse 91   Temp 98.3  F (36.8  C) (Oral)   Resp 20    SpO2 97%     PHYSICAL EXAM    Physical Exam  Vitals and nursing note reviewed.   Constitutional:       General: She is not in acute distress.     Appearance: Normal appearance. She is normal weight. She is not toxic-appearing or diaphoretic.   HENT:      Right Ear: External ear normal.      Left Ear: External ear normal.   Eyes:      Conjunctiva/sclera: Conjunctivae normal.   Cardiovascular:      Rate and Rhythm: Normal rate and regular rhythm.      Heart sounds: Normal heart sounds.   Pulmonary:      Effort: Pulmonary effort is normal. No respiratory distress.      Breath sounds: No wheezing or rales.      Comments: Chronically on 2 L of oxygen via nasal cannula  Abdominal:      General: Abdomen is flat. There is no distension.      Palpations: Abdomen is soft.      Tenderness: There is no abdominal tenderness. There is no right CVA tenderness, left CVA tenderness, guarding or rebound.   Neurological:      General: No focal deficit present.      Mental Status: She is alert. Mental status is at baseline.      Sensory: No sensory deficit.      Motor: No weakness.          LAB:  All pertinent labs reviewed and interpreted.  Labs Ordered and Resulted from Time of ED Arrival to Time of ED Departure   GLUCOSE BY METER - Abnormal       Result Value    GLUCOSE BY METER POCT 381 (*)    BASIC METABOLIC PANEL - Abnormal    Sodium 140      Potassium 5.1      Chloride 98      Carbon Dioxide (CO2) 34 (*)     Anion Gap 8      Urea Nitrogen 16.3      Creatinine 0.66      Calcium 9.0      Glucose 374 (*)     GFR Estimate >90     HEPATIC FUNCTION PANEL - Abnormal    Protein Total 7.1      Albumin 4.1      Bilirubin Total 0.4      Alkaline Phosphatase 118 (*)     AST 19      ALT 19      Bilirubin Direct <0.20     CBC WITH PLATELETS AND DIFFERENTIAL - Abnormal    WBC Count 11.9 (*)     RBC Count 4.91      Hemoglobin 14.1      Hematocrit 47.5 (*)     MCV 97      MCH 28.7      MCHC 29.7 (*)     RDW 12.5      Platelet Count 159      %  Neutrophils 92      % Lymphocytes 7      % Monocytes 1      % Eosinophils 0      % Basophils 0      % Immature Granulocytes 0      NRBCs per 100 WBC 0      Absolute Neutrophils 10.9 (*)     Absolute Lymphocytes 0.9      Absolute Monocytes 0.1      Absolute Eosinophils 0.0      Absolute Basophils 0.0      Absolute Immature Granulocytes 0.1      Absolute NRBCs 0.0     KETONE BETA-HYDROXYBUTYRATE QUANTITATIVE, RAPID - Normal    Ketone (Beta-Hydroxybutyrate) Quantitative <0.20     GLUCOSE MONITOR NURSING POCT       RADIOLOGY:  Reviewed all pertinent imaging. Please see official radiology report.  No orders to display     PROCEDURES:   None.     Otis Montaño PA-C  Emergency Medicine  [facility] EMERGENCY DEPARTMENT  [address] [facility] [id number] [phone]  Dept: [phone]     Otis Montaño PA-C  03/15/23 1913

## 2023-03-15 NOTE — DISCHARGE INSTRUCTIONS
You were seen here in the emergency department for evaluation of elevated blood sugars.  As we discussed, your blood sugars will continue to be elevated if you do not follow your medication regimen.  You need to follow-up with your primary care doctor tomorrow.  Please discuss your diabetes, as well as other ongoing medical concerns with them.  You need to resume your diabetes medications, you will continue to have issues with high blood sugar, if you are not taking your medications.  You need to discuss this with your primary care doctor, and should resume these medications.  Please follow-up with them tomorrow.

## 2023-03-15 NOTE — ED TRIAGE NOTES
Presents with complaint of blood sugar being 288 about an hour ago. Does not take anything for diabetes but states she should be.  in triage. Denies other complaints. Baseline on 2 LPM O2.

## 2023-03-16 ENCOUNTER — OFFICE VISIT (OUTPATIENT)
Dept: FAMILY MEDICINE | Facility: CLINIC | Age: 68
End: 2023-03-16
Payer: COMMERCIAL

## 2023-03-16 VITALS
SYSTOLIC BLOOD PRESSURE: 112 MMHG | TEMPERATURE: 98.3 F | RESPIRATION RATE: 36 BRPM | BODY MASS INDEX: 41.23 KG/M2 | WEIGHT: 177.4 LBS | DIASTOLIC BLOOD PRESSURE: 57 MMHG | HEART RATE: 87 BPM

## 2023-03-16 DIAGNOSIS — N32.81 OAB (OVERACTIVE BLADDER): ICD-10-CM

## 2023-03-16 DIAGNOSIS — F31.30 BIPOLAR I DISORDER, MOST RECENT EPISODE DEPRESSED (H): ICD-10-CM

## 2023-03-16 DIAGNOSIS — J44.1 COPD EXACERBATION (H): ICD-10-CM

## 2023-03-16 DIAGNOSIS — R41.3 MEMORY LOSS: ICD-10-CM

## 2023-03-16 DIAGNOSIS — E66.01 MORBID OBESITY (H): ICD-10-CM

## 2023-03-16 DIAGNOSIS — E11.8 TYPE 2 DIABETES MELLITUS WITH UNSPECIFIED COMPLICATIONS (H): ICD-10-CM

## 2023-03-16 DIAGNOSIS — R35.0 INCREASED URINARY FREQUENCY: ICD-10-CM

## 2023-03-16 DIAGNOSIS — Z09 HOSPITAL DISCHARGE FOLLOW-UP: Primary | ICD-10-CM

## 2023-03-16 DIAGNOSIS — J96.11 CHRONIC RESPIRATORY FAILURE WITH HYPOXIA (H): ICD-10-CM

## 2023-03-16 DIAGNOSIS — I42.9 CARDIOMYOPATHY, UNSPECIFIED TYPE (H): ICD-10-CM

## 2023-03-16 LAB
ALBUMIN SERPL BCG-MCNC: 4 G/DL (ref 3.5–5.2)
ALBUMIN UR-MCNC: ABNORMAL MG/DL
ALP SERPL-CCNC: 99 U/L (ref 35–104)
ALT SERPL W P-5'-P-CCNC: 17 U/L (ref 10–35)
ANION GAP SERPL CALCULATED.3IONS-SCNC: 9 MMOL/L (ref 7–15)
APPEARANCE UR: CLEAR
AST SERPL W P-5'-P-CCNC: 12 U/L (ref 10–35)
BILIRUB SERPL-MCNC: 0.6 MG/DL
BILIRUB UR QL STRIP: NEGATIVE
BUN SERPL-MCNC: 13.7 MG/DL (ref 8–23)
CALCIUM SERPL-MCNC: 9.3 MG/DL (ref 8.8–10.2)
CHLORIDE SERPL-SCNC: 100 MMOL/L (ref 98–107)
COLOR UR AUTO: YELLOW
CREAT SERPL-MCNC: 0.69 MG/DL (ref 0.51–0.95)
CREAT UR-MCNC: 67.6 MG/DL
DEPRECATED HCO3 PLAS-SCNC: 35 MMOL/L (ref 22–29)
GFR SERPL CREATININE-BSD FRML MDRD: >90 ML/MIN/1.73M2
GLUCOSE SERPL-MCNC: 292 MG/DL (ref 70–99)
GLUCOSE UR STRIP-MCNC: 500 MG/DL
HBA1C MFR BLD: 8.6 % (ref 0–5.6)
HGB UR QL STRIP: ABNORMAL
KETONES UR STRIP-MCNC: ABNORMAL MG/DL
LEUKOCYTE ESTERASE UR QL STRIP: NEGATIVE
MICROALBUMIN UR-MCNC: 13.3 MG/L
MICROALBUMIN/CREAT UR: 19.67 MG/G CR (ref 0–25)
NITRATE UR QL: NEGATIVE
PH UR STRIP: 7 [PH] (ref 5–8)
POTASSIUM SERPL-SCNC: 4.5 MMOL/L (ref 3.4–5.3)
PROT SERPL-MCNC: 6.6 G/DL (ref 6.4–8.3)
RBC #/AREA URNS AUTO: ABNORMAL /HPF
SODIUM SERPL-SCNC: 144 MMOL/L (ref 136–145)
SP GR UR STRIP: 1.02 (ref 1–1.03)
SQUAMOUS #/AREA URNS AUTO: ABNORMAL /LPF
UROBILINOGEN UR STRIP-ACNC: 0.2 E.U./DL
WBC #/AREA URNS AUTO: ABNORMAL /HPF

## 2023-03-16 PROCEDURE — 82570 ASSAY OF URINE CREATININE: CPT | Performed by: FAMILY MEDICINE

## 2023-03-16 PROCEDURE — 99214 OFFICE O/P EST MOD 30 MIN: CPT | Performed by: FAMILY MEDICINE

## 2023-03-16 PROCEDURE — 83036 HEMOGLOBIN GLYCOSYLATED A1C: CPT | Performed by: FAMILY MEDICINE

## 2023-03-16 PROCEDURE — 80053 COMPREHEN METABOLIC PANEL: CPT | Performed by: FAMILY MEDICINE

## 2023-03-16 PROCEDURE — 36415 COLL VENOUS BLD VENIPUNCTURE: CPT | Performed by: FAMILY MEDICINE

## 2023-03-16 PROCEDURE — 82043 UR ALBUMIN QUANTITATIVE: CPT | Performed by: FAMILY MEDICINE

## 2023-03-16 PROCEDURE — 81001 URINALYSIS AUTO W/SCOPE: CPT | Performed by: FAMILY MEDICINE

## 2023-03-16 RX ORDER — TIOTROPIUM BROMIDE 18 UG/1
18 CAPSULE ORAL; RESPIRATORY (INHALATION) DAILY
Qty: 90 CAPSULE | Refills: 1 | Status: SHIPPED | OUTPATIENT
Start: 2023-03-16 | End: 2023-07-11

## 2023-03-16 RX ORDER — ARIPIPRAZOLE 15 MG/1
15 TABLET ORAL DAILY
COMMUNITY
Start: 2023-02-21 | End: 2024-03-04

## 2023-03-16 RX ORDER — MIRABEGRON 50 MG/1
50 TABLET, EXTENDED RELEASE ORAL DAILY
Qty: 90 TABLET | Refills: 0 | Status: SHIPPED | OUTPATIENT
Start: 2023-03-16 | End: 2023-04-06

## 2023-03-16 ASSESSMENT — ANXIETY QUESTIONNAIRES
1. FEELING NERVOUS, ANXIOUS, OR ON EDGE: NEARLY EVERY DAY
7. FEELING AFRAID AS IF SOMETHING AWFUL MIGHT HAPPEN: NEARLY EVERY DAY
3. WORRYING TOO MUCH ABOUT DIFFERENT THINGS: NEARLY EVERY DAY
GAD7 TOTAL SCORE: 21
GAD7 TOTAL SCORE: 21
5. BEING SO RESTLESS THAT IT IS HARD TO SIT STILL: NEARLY EVERY DAY
8. IF YOU CHECKED OFF ANY PROBLEMS, HOW DIFFICULT HAVE THESE MADE IT FOR YOU TO DO YOUR WORK, TAKE CARE OF THINGS AT HOME, OR GET ALONG WITH OTHER PEOPLE?: SOMEWHAT DIFFICULT
7. FEELING AFRAID AS IF SOMETHING AWFUL MIGHT HAPPEN: NEARLY EVERY DAY
2. NOT BEING ABLE TO STOP OR CONTROL WORRYING: NEARLY EVERY DAY
IF YOU CHECKED OFF ANY PROBLEMS ON THIS QUESTIONNAIRE, HOW DIFFICULT HAVE THESE PROBLEMS MADE IT FOR YOU TO DO YOUR WORK, TAKE CARE OF THINGS AT HOME, OR GET ALONG WITH OTHER PEOPLE: SOMEWHAT DIFFICULT
4. TROUBLE RELAXING: NEARLY EVERY DAY
6. BECOMING EASILY ANNOYED OR IRRITABLE: NEARLY EVERY DAY
GAD7 TOTAL SCORE: 21

## 2023-03-16 ASSESSMENT — PATIENT HEALTH QUESTIONNAIRE - PHQ9
SUM OF ALL RESPONSES TO PHQ QUESTIONS 1-9: 20
SUM OF ALL RESPONSES TO PHQ QUESTIONS 1-9: 20
10. IF YOU CHECKED OFF ANY PROBLEMS, HOW DIFFICULT HAVE THESE PROBLEMS MADE IT FOR YOU TO DO YOUR WORK, TAKE CARE OF THINGS AT HOME, OR GET ALONG WITH OTHER PEOPLE: SOMEWHAT DIFFICULT

## 2023-03-16 NOTE — PROGRESS NOTES
Assessment & Plan     Hospital discharge follow-up  Reviewed ED work up. She's been to ED 2 times this month, one time for COPD exacerbation and one time for high blood sugar, not taking diabetes meds.     Type 2 diabetes mellitus with unspecified complications (H)  She has got been taking her diabetes medication.  Patient does not know why she stopped taking it.  She is also her mother and her mother said she will ensure that she will help patient to be compliant with medication.  Metformin recent.  Could not afford Jardiance so we will trial Trulicity.  If Trulicity not covered will do [employer] application for her.  Follow-up in a month.  - Albumin Random Urine Quantitative with Creat Ratio  - metFORMIN (GLUCOPHAGE) 1000 MG tablet  Dispense: 180 tablet; Refill: 3  - Hemoglobin A1c  - Comprehensive metabolic panel (BMP + Alb, Alk Phos, ALT, AST, Total. Bili, TP)  - dulaglutide (TRULICITY) 0.75 MG/0.5ML pen  Dispense: 2 mL; Refill: 3  - Albumin Random Urine Quantitative with Creat Ratio  - Hemoglobin A1c  - Comprehensive metabolic panel (BMP + Alb, Alk Phos, ALT, AST, Total. Bili, TP)    COPD exacerbation (H)  Will add on spiriva hopefully this will help with her breathing.   - tiotropium (SPIRIVA) 18 MCG inhaled capsule  Dispense: 90 capsule; Refill: 1    Cardiomyopathy, unspecified type (H)  Not in exacerbation    Bipolar I disorder, most recent episode depressed (H)  Seeing psychiatry     Chronic respiratory failure with hypoxia (H)  See above. On breo ellipta and patient said she takes it daily.   - tiotropium (SPIRIVA) 18 MCG inhaled capsule  Dispense: 90 capsule; Refill: 1    Morbid obesity (H)  Noted.     Memory loss  Mother concerned pt hasn't been taking her meds.  Patient also has bipolar as well so this could contributes. Will set up neuropsych testing.   - Adult Neuropsychology Referral    Increased urinary frequency  Will r/o UTI.but could all be potentially due to uncontrolled diabetes.   - UA  "Macro with Reflex to Micro and Culture - lab collect  - UA Macro with Reflex to Micro and Culture - lab collect  - UA Microscopic with Reflex to Culture    OAB (overactive bladder)  Refills given. F/up in a month.   - mirabegron (MYRBETRIQ) 50 MG 24 hr tablet  Dispense: 90 tablet; Refill: 0             MED REC REQUIRED  Post Medication Reconciliation Status:  Discharge medications reconciled and changed, see notes/orders    BMI:   Estimated body mass index is 41.23 kg/m  as calculated from the following:    Height as of 7/10/22: 1.397 m (4' 7\").    Weight as of this encounter: 80.5 kg (177 lb 6.4 oz).       Depression Screening Follow Up    PHQ 3/16/2023   PHQ-9 Total Score 20   Q9: Thoughts of better off dead/self-harm past 2 weeks Not at all     Last PHQ-9 3/16/2023   1.  Little interest or pleasure in doing things 2   2.  Feeling down, depressed, or hopeless 2   3.  Trouble falling or staying asleep, or sleeping too much 3   4.  Feeling tired or having little energy 3   5.  Poor appetite or overeating 3   6.  Feeling bad about yourself 2   7.  Trouble concentrating 3   8.  Moving slowly or restless 2   Q9: Thoughts of better off dead/self-harm past 2 weeks 0   PHQ-9 Total Score 20   Difficulty at work, home, or with people -       Follow Up Actions Taken  pt seeing psychiatry, living with mother, safe to self       Return in about 4 weeks (around 4/13/2023) for Follow up, with me, in person.    Rema Whiting MD  [facility]    Wendy Samuels is a 67 year old, presenting for the following health issues:  Hospital F/U (Hospital follow up appt for diabetes.  Parking certificate.  Thinks she has a leaky gut.  Increased frequency of urination, but unable to pee.  )      HPI   Chief Complaint   Patient presents with     Hospital F/U     Hospital follow up appt for diabetes.  Parking certificate.  Thinks she has a leaky gut.  Increased frequency of urination, but unable to pee.   "         Review of Systems   Constitutional, HEENT, cardiovascular, pulmonary, gi and gu systems are negative, except as otherwise noted.      Objective    /57   Pulse 87   Temp 98.3  F (36.8  C) (Oral)   Resp (!) 36   Wt 80.5 kg (177 lb 6.4 oz)   BMI 41.23 kg/m    Body mass index is 41.23 kg/m .  Physical Exam   GENERAL: healthy, alert and no distress  NECK: no adenopathy, no asymmetry, masses, or scars and thyroid normal to palpation  RESP: lungs with decreased lung sounds throughout but no wheezes/crackles   CV: regular rate and rhythm, normal S1 S2, no S3 or S4, no murmur, click or rub,   ABDOMEN: soft, nontender  MS: no gross musculoskeletal defects noted, no edema  NEURO: Normal strength and tone, mentation intact and speech normal  PSYCH: mentation appears normal, affect normal/bright    Results for orders placed or performed in visit on 03/16/23 (from the past 24 hour(s))   Hemoglobin A1c   Result Value Ref Range    Hemoglobin A1C 8.6 (H) 0.0 - 5.6 %   UA Macro with Reflex to Micro and Culture - lab collect    Specimen: Urine, Clean Catch   Result Value Ref Range    Color Urine Yellow Colorless, Straw, Light Yellow, Yellow    Appearance Urine Clear Clear    Glucose Urine 500 (A) Negative mg/dL    Bilirubin Urine Negative Negative    Ketones Urine Trace (A) Negative mg/dL    Specific Gravity Urine 1.020 1.005 - 1.030    Blood Urine Trace (A) Negative    pH Urine 7.0 5.0 - 8.0    Protein Albumin Urine Trace (A) Negative mg/dL    Urobilinogen Urine 0.2 0.2, 1.0 E.U./dL    Nitrite Urine Negative Negative    Leukocyte Esterase Urine Negative Negative   UA Microscopic with Reflex to Culture   Result Value Ref Range    RBC Urine 0-2 0-2 /HPF /HPF    WBC Urine 0-5 0-5 /HPF /HPF    Squamous Epithelials Urine Few (A) None Seen /LPF    Narrative    Urine Culture not indicated                   Answers for HPI/ROS submitted by the patient on 3/16/2023  If you checked off any problems, how difficult have these  problems made it for you to do your work, take care of things at home, or get along with other people?: Somewhat difficult  PHQ9 TOTAL SCORE: 20  VIOLET 7 TOTAL SCORE: 21

## 2023-03-27 ENCOUNTER — OFFICE VISIT (OUTPATIENT)
Dept: SLEEP MEDICINE | Facility: CLINIC | Age: 68
End: 2023-03-27
Attending: FAMILY MEDICINE
Payer: COMMERCIAL

## 2023-03-27 VITALS
WEIGHT: 177.2 LBS | DIASTOLIC BLOOD PRESSURE: 73 MMHG | SYSTOLIC BLOOD PRESSURE: 109 MMHG | HEART RATE: 86 BPM | BODY MASS INDEX: 41.01 KG/M2 | HEIGHT: 55 IN

## 2023-03-27 DIAGNOSIS — R06.83 SNORING: ICD-10-CM

## 2023-03-27 DIAGNOSIS — G47.33 OSA ON CPAP: Primary | ICD-10-CM

## 2023-03-27 DIAGNOSIS — G47.00 INSOMNIA, UNSPECIFIED TYPE: ICD-10-CM

## 2023-03-27 DIAGNOSIS — R53.83 OTHER FATIGUE: ICD-10-CM

## 2023-03-27 PROCEDURE — 99205 OFFICE O/P NEW HI 60 MIN: CPT | Performed by: STUDENT IN AN ORGANIZED HEALTH CARE EDUCATION/TRAINING PROGRAM

## 2023-03-27 ASSESSMENT — SLEEP AND FATIGUE QUESTIONNAIRES
HOW LIKELY ARE YOU TO NOD OFF OR FALL ASLEEP WHILE LYING DOWN TO REST IN THE AFTERNOON WHEN CIRCUMSTANCES PERMIT: HIGH CHANCE OF DOZING
HOW LIKELY ARE YOU TO NOD OFF OR FALL ASLEEP WHILE WATCHING TV: HIGH CHANCE OF DOZING
HOW LIKELY ARE YOU TO NOD OFF OR FALL ASLEEP WHILE SITTING INACTIVE IN A PUBLIC PLACE: SLIGHT CHANCE OF DOZING
HOW LIKELY ARE YOU TO NOD OFF OR FALL ASLEEP WHILE SITTING AND TALKING TO SOMEONE: SLIGHT CHANCE OF DOZING
HOW LIKELY ARE YOU TO NOD OFF OR FALL ASLEEP WHILE SITTING QUIETLY AFTER LUNCH WITHOUT ALCOHOL: SLIGHT CHANCE OF DOZING
HOW LIKELY ARE YOU TO NOD OFF OR FALL ASLEEP IN A CAR, WHILE STOPPED FOR A FEW MINUTES IN TRAFFIC: WOULD NEVER DOZE
HOW LIKELY ARE YOU TO NOD OFF OR FALL ASLEEP WHILE SITTING AND READING: HIGH CHANCE OF DOZING
HOW LIKELY ARE YOU TO NOD OFF OR FALL ASLEEP WHEN YOU ARE A PASSENGER IN A CAR FOR AN HOUR WITHOUT A BREAK: SLIGHT CHANCE OF DOZING

## 2023-03-27 NOTE — PATIENT INSTRUCTIONS
MY INFORMATION ON SLEEP APNEA-  Arvind Leong    DOCTOR : Lia Lock MD  SLEEP CENTER :      MY CONTACT NUMBER:    [facility]  [phone]  [facility]      For general sleep health questions:   [email or web address]    For tips about PAP and COVID-19:  [email or web address]-therapy.[email or web address]    For general info about COVID-19 including vaccines:  [email or web address]      Continue PAP therapy every night, for all hours that you are sleeping (including naps.)  As always, try to get at least 8 hours of sleep or more each day, keep a regular sleep schedule, and avoid sleep deprivation. Avoid alcohol.  Reasons that you might need a change to your pressure therapy would be weight gain or loss, waking having inadvertently removed your PAP overnight, having previously felt refreshed by sleep with CPAP use and now waking un-refreshed, and return of daytime sleepiness. Also, the development of new medical problems  (such as heart failure, stroke, medications such as narcotics) can sometimes affect breathing at night and change your PAP therapy needs.  Please bring PAP with you if you are hospitalized.  If anticipating surgery be sure to discuss with your surgeon that you have sleep apnea and use PAP therapy.    Maintain your equipment as recommended which includes routine cleaning and replacement of supplies.      Call DME for any questions regarding supplies or maintenance.    [place] [facility], [facility] [phone]    Do not drive on engage in potentially dangerous activities if feeling sleepy.  Please follow up in sleep clinic again in 3 months.        Tips for your PAP use-    Mask fitting tips  Mask fitting exercise:    To improve your mask seal and your mobility at night, put mask on and secure in place.  Lie down in bed with full pressure and  roll to one side, adjust headgear while in that position to eliminate any leaks. Repeat process rolling to other side.     The mask seal does not have to be perfect:   CPAP machines are designed to make up for small leaks. However, you will not tolerate leaks blowing in your eyes so you will need to adjust.   Any leak should only be near or at the bottom of the mask.  We expect your mask to leak slightly at night.    Do not over-tighten the headgear straps, tighter IS NOT better, we expect minimal leak.    First try re-positioning the mask or headgear before tightening the headgear straps.  Mask leaks are expected due to changing sleeping positions. Try pulling the mask away from your skin allowing the cushion to re-inflate will minimize the leak.  If you struggle for a good fit, try turning the CPAP off and then readjust the mask by pulling it away from your face and then turning back on the CPAP.        Humidifier tips  Humidifiers can be adjusted to increase or decrease the amount of moisture according to your comfort level. You may need to adjust this frequently at first, but then might only change it with seasonal weather changes.     Try INCREASING the humidity if:  You experience a dry, irritated nasal passage or throat.  You have a runny, drippy nose or sneezing fits after using CPAP.  You experience nasal congestion during or after CPAP use.    Try DECREASING the humidity if:  You have excessive condensation or  rain out  in the tubing or mask.  Otherwise keep the tubing warm during the night by running it underneath the blankets or pillow.      Clinic visit after initial PAP set-up   Bring your equipment with you to your 5-8 week follow up clinic visit.  We will be extracting your data from the machine if not available from the cloud based [employer] [facility].        Travel  Always take your equipment with you when you travel.  If you fly with your equipment bring it on with you as a carry on.  Medical equipment does  not count as a carry on.    If you travel international the machines take 110-240v.  The only adapter needed is the adapter that will fit into the receptacle (outlet).    You may also want to bring an extension cord as many hotel rooms have limited outlets at the bedside.  Do not travel with water in your humidifier chamber.     Cleaning and Maintenance Guidelines    Equipment Frequency Cleaning Method   Mask First Day    Daily      Weekly Soak mask in hot soapy water for 30 minutes, rinse and air dry.  Wipe nasal cushion with a hot soapy (Ivory, baby shampoo) cloth and rinse.  Baby wipes may also be used.  Do not use anti-bacterial soaps,Sandrita  liquid soap, rubbing alcohol, bleach or ammonia.  Wash frame in hot soapy water (Ivory, baby shampoo) rinse and let air dry   Headgear Biweekly Wash in hot soapy water, rinse and air dry   Reusable Gray Filter Weekly Wash in hot soapy water, rinse, put in towel squeeze moisture out, let air dry   Disposable White Filter Check Weekly Replace when brown or gray in color; at least every 2 to 3 months   Humidifier Chamber Daily    Weekly Empty distilled water from humidifier and let air dry    Hand wash in hot soapy water, rinse and air dry   Tubing Weekly Wash in hot soapy water, rinse and let air dry   Mask, Tubing and Humidifier Chamber As needed Disinfect: Soak in 1 part distilled white vinegar to 3 parts hot water for 30 minutes, rinse well and air dry  Not the material headgear        MASK AND SUPPLY REORDERING and EQUIPMENT NEEDS through your DME and per your insurance  Reminder: Most insurance companies will allow for a new mask, headgear, tubing, and reusable gray filter every six months.  Disposable white ultra-fine filters are covered monthly.      HOME AND SAFETY INSTRUCTIONS  Do not use frayed or cracked electrical cords, multi plug adaptors, or switched receptacles  Do not immerse electrical equipment into water  Assure that electrical cords do not become a tripping  hazard
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
## 2023-03-27 NOTE — NURSING NOTE
"Chief Complaint   Patient presents with     Sleep Problem       Initial /73   Pulse 86   Ht 1.397 m (4' 7\")   Wt 80.4 kg (177 lb 3.2 oz)   BMI 41.19 kg/m   Estimated body mass index is 41.19 kg/m  as calculated from the following:    Height as of this encounter: 1.397 m (4' 7\").    Weight as of this encounter: 80.4 kg (177 lb 3.2 oz).    Medication Reconciliation: complete    Neck circumference:     DME: [facility]     3 month f/u appt scheduled. DME order and supporting documents faxed to [facility].     Nevaeh Lee MA  "

## 2023-03-27 NOTE — ASSESSMENT & PLAN NOTE
Arvind Leong has Mild Sleep apnea. She is not tolerating PAP well and reports adequate compliance with PAP therapy. Daytime symptoms are worsened and does not report positive benefits with PAP use. She is concerned that she is not using the CPAP correctly which causes a deal of anxiety for her.   She is not using the CPAP device consistently.     Prescription provided for renewal of PAP supplies    Patient primarily needs assistance with connecting her CPAP to her supplemental O2    Prescription changed from CPAP 7 cm H2O to auto CPAP 5-15-15 cm H2O    Ramp to start at 4 cm H2O and increased to to set range within 5 minutes and C-Flex set to 3 cm H2O    Recommend CPAP desensitization techniques     Recommended him/her to continue using the CPAP regularly during sleep and instructed  to get  the supplies for the PAP replaced regularly.      Patient instructed to remember to bring PAP with him/her if hospitalized and if anticipating procedure that requires sedation/surgery to be sure to discuss with the provider/surgeon that he/she has sleep apnea and uses PAP therapy.

## 2023-04-04 ENCOUNTER — MEDICAL CORRESPONDENCE (OUTPATIENT)
Dept: HEALTH INFORMATION MANAGEMENT | Facility: CLINIC | Age: 68
End: 2023-04-04
Payer: COMMERCIAL

## 2023-04-06 ENCOUNTER — OFFICE VISIT (OUTPATIENT)
Dept: FAMILY MEDICINE | Facility: CLINIC | Age: 68
End: 2023-04-06
Payer: COMMERCIAL

## 2023-04-06 VITALS
HEIGHT: 60 IN | RESPIRATION RATE: 22 BRPM | SYSTOLIC BLOOD PRESSURE: 101 MMHG | DIASTOLIC BLOOD PRESSURE: 47 MMHG | TEMPERATURE: 98.1 F | WEIGHT: 172.4 LBS | BODY MASS INDEX: 33.85 KG/M2 | OXYGEN SATURATION: 94 % | HEART RATE: 76 BPM

## 2023-04-06 DIAGNOSIS — R35.0 INCREASED URINARY FREQUENCY: Primary | ICD-10-CM

## 2023-04-06 DIAGNOSIS — J96.11 CHRONIC RESPIRATORY FAILURE WITH HYPOXIA (H): ICD-10-CM

## 2023-04-06 DIAGNOSIS — R35.0 INCREASED URINARY FREQUENCY: ICD-10-CM

## 2023-04-06 DIAGNOSIS — Z23 NEED FOR SHINGLES VACCINE: ICD-10-CM

## 2023-04-06 DIAGNOSIS — J44.9 CHRONIC OBSTRUCTIVE PULMONARY DISEASE, UNSPECIFIED COPD TYPE (H): ICD-10-CM

## 2023-04-06 DIAGNOSIS — Z12.4 CERVICAL CANCER SCREENING: ICD-10-CM

## 2023-04-06 DIAGNOSIS — R41.3 MEMORY LOSS: ICD-10-CM

## 2023-04-06 DIAGNOSIS — E11.8 TYPE 2 DIABETES MELLITUS WITH UNSPECIFIED COMPLICATIONS (H): Primary | ICD-10-CM

## 2023-04-06 LAB
ALBUMIN UR-MCNC: 30 MG/DL
APPEARANCE UR: CLEAR
BACTERIA #/AREA URNS HPF: ABNORMAL /HPF
BILIRUB UR QL STRIP: NEGATIVE
COLOR UR AUTO: YELLOW
GLUCOSE UR STRIP-MCNC: NEGATIVE MG/DL
HGB UR QL STRIP: NEGATIVE
KETONES UR STRIP-MCNC: NEGATIVE MG/DL
LEUKOCYTE ESTERASE UR QL STRIP: ABNORMAL
NITRATE UR QL: NEGATIVE
PH UR STRIP: 7 [PH] (ref 5–8)
RBC #/AREA URNS AUTO: ABNORMAL /HPF
SP GR UR STRIP: 1.02 (ref 1–1.03)
SQUAMOUS #/AREA URNS AUTO: ABNORMAL /LPF
UROBILINOGEN UR STRIP-ACNC: 1 E.U./DL
WBC #/AREA URNS AUTO: ABNORMAL /HPF
WBC CLUMPS #/AREA URNS HPF: PRESENT /HPF

## 2023-04-06 PROCEDURE — 81001 URINALYSIS AUTO W/SCOPE: CPT | Performed by: FAMILY MEDICINE

## 2023-04-06 PROCEDURE — 87186 SC STD MICRODIL/AGAR DIL: CPT | Performed by: FAMILY MEDICINE

## 2023-04-06 PROCEDURE — 87086 URINE CULTURE/COLONY COUNT: CPT | Performed by: FAMILY MEDICINE

## 2023-04-06 PROCEDURE — 99214 OFFICE O/P EST MOD 30 MIN: CPT | Performed by: FAMILY MEDICINE

## 2023-04-06 PROCEDURE — 87088 URINE BACTERIA CULTURE: CPT | Performed by: FAMILY MEDICINE

## 2023-04-06 RX ORDER — ALBUTEROL SULFATE 0.83 MG/ML
2.5 SOLUTION RESPIRATORY (INHALATION) EVERY 4 HOURS PRN
Qty: 240 ML | Refills: 12 | Status: ON HOLD | OUTPATIENT
Start: 2023-04-06 | End: 2023-09-21

## 2023-04-06 NOTE — PROGRESS NOTES
"  Assessment & Plan     Type 2 diabetes mellitus with unspecified complications (H)  Continue with current metformin and Trulicity.  Follow-up in 5 months  - Adult Eye  Referral  - Med Therapy Management Referral    Chronic respiratory failure with hypoxia (H)  - Med Therapy Management Referral    Need for shingles vaccine  She will call insurance to see if shingrix is covered here  - Med Therapy Management Referral    Cervical cancer screening  Will do next visit  - Med Therapy Management Referral    Chronic obstructive pulmonary disease, unspecified COPD type (H)  Already doing Breo Ellipta.  Sent Spiriva is too expensive.  We will have her see [facility] pharmacist to go over her medication and find affordable solutions.  - albuterol (PROVENTIL) (2.5 MG/3ML) 0.083% neb solution  Dispense: 240 mL; Refill: 12  - Med Therapy Management Referral    Memory loss  She has a hard time remembering which meds to take and for what purpose. neuropsych referral placed.   - Adult Neuropsychology Referral  - Med Therapy Management Referral    Increased urinary frequency  Couldn't afford myrbetriq. Check UA today. Monitor for now.   - UA Macro with Reflex to Micro and Culture - lab collect  - Med Therapy Management Referral  - UA Macro with Reflex to Micro and Culture - lab collect  - UA Microscopic with Reflex to Culture       BMI:   Estimated body mass index is 34.24 kg/m  as calculated from the following:    Height as of this encounter: 1.511 m (4' 11.5\").    Weight as of this encounter: 78.2 kg (172 lb 6.4 oz).   Weight management plan: Discussed healthy diet and exercise guidelines on trulicity  Blood sugar testing frequency justification:  Uncontrolled diabetes and Adjustment of medication(s)      Rema Whiting MD  [facility]    Wendy Samuels is a 67 year old, presenting for the following health issues:  Follow Up (Follow up from hospital)        4/6/2023     8:16 AM "   Additional Questions   Roomed by Lily EDWARDS CMA   Accompanied by mother- Jeri     History of Present Illness       COPD:  She presents for follow up of COPD.  Overall, COPD symptoms are stable since last visit. She has same as usual fatigue or shortness of breath with exertion and same as usual shortness of breath at rest.  She sometimes coughs and does not have change in sputum. No recent fever. She can walk the length of 1-2 rooms without stopping to rest. She can walk 1 flights of stairs without resting.The patient has had no ED, urgent care, or hospital admissions because of COPD since the last visit.     Diabetes:   She presents for follow up of diabetes.  She is checking home blood glucose two times daily. She checks blood glucose before meals, after meals and before and after meals.  Blood glucose is never over 200 and never under 70. When her blood glucose is low, the patient is asymptomatic for confusion, blurred vision, lethargy and reports not feeling dizzy, shaky, or weak.  She has no concerns regarding her diabetes at this time.  She is not experiencing numbness or burning in feet, excessive thirst, blurry vision, weight changes or redness, sores or blisters on feet. The patient has not had a diabetic eye exam in the last 12 months.         Reason for visit:  Discuss stay in hosptial    She eats 2-3 servings of fruits and vegetables daily.She consumes 3 sweetened beverage(s) daily.She exercises with enough effort to increase her heart rate 9 or less minutes per day.  She exercises with enough effort to increase her heart rate 3 or less days per week.   She is taking medications regularly.     Chief Complaint   Patient presents with     Follow Up     Follow up from hospital     Here with mother  Doesn't know if she's taking myrbetriq or spiriva. Mother said no because they are expensive. But she's been doing trulicity  Complains of frequent urination.   Saw sleep medicine recently. Has to get setting  "adjusted.     Review of Systems   Constitutional, HEENT, cardiovascular, pulmonary, gi and gu systems are negative, except as otherwise noted.      Objective    /47 (BP Location: Left arm, Patient Position: Sitting, Cuff Size: Adult Regular)   Pulse 76   Temp 98.1  F (36.7  C) (Oral)   Resp 22   Ht 1.511 m (4' 11.5\")   Wt 78.2 kg (172 lb 6.4 oz)   SpO2 94%   BMI 34.24 kg/m    Body mass index is 34.24 kg/m .  Physical Exam   GENERAL: healthy, alert and no distress  RESP: scattered wheezes throughout good air movement.   CV: regular rate and rhythm, normal S1 S2, no S3 or S4, no murmur, click or rub  MS: no gross musculoskeletal defects noted, no edema  NEURO: Normal strength and tone, mentation intact and speech normal  PSYCH: mentation appears normal, affect normal/bright    Results for orders placed or performed in visit on 04/06/23 (from the past 24 hour(s))   UA Macro with Reflex to Micro and Culture - lab collect    Specimen: Urine, Clean Catch   Result Value Ref Range    Color Urine Yellow Colorless, Straw, Light Yellow, Yellow    Appearance Urine Clear Clear    Glucose Urine Negative Negative mg/dL    Bilirubin Urine Negative Negative    Ketones Urine Negative Negative mg/dL    Specific Gravity Urine 1.020 1.005 - 1.030    Blood Urine Negative Negative    pH Urine 7.0 5.0 - 8.0    Protein Albumin Urine 30 (A) Negative mg/dL    Urobilinogen Urine 1.0 0.2, 1.0 E.U./dL    Nitrite Urine Negative Negative    Leukocyte Esterase Urine Small (A) Negative                   "

## 2023-04-08 ENCOUNTER — TELEPHONE (OUTPATIENT)
Dept: NURSING | Facility: CLINIC | Age: 68
End: 2023-04-08
Payer: COMMERCIAL

## 2023-04-08 DIAGNOSIS — N30.00 ACUTE CYSTITIS WITHOUT HEMATURIA: Primary | ICD-10-CM

## 2023-04-08 LAB — BACTERIA UR CULT: ABNORMAL

## 2023-04-08 RX ORDER — CIPROFLOXACIN 500 MG/1
500 TABLET, FILM COATED ORAL 2 TIMES DAILY
Qty: 20 TABLET | Refills: 0 | Status: SHIPPED | OUTPATIENT
Start: 2023-04-08 | End: 2023-04-18

## 2023-04-08 NOTE — TELEPHONE ENCOUNTER
Pt called and message given re:  UTI and antibiotic at Pharmacy.  Pt states she will start antibiotic and call for Lab only appt for repeat urine test in 2 weeks.  Hilda Macedo RN  [facility] Nurse Advisor

## 2023-04-19 ENCOUNTER — OFFICE VISIT (OUTPATIENT)
Dept: NEUROLOGY | Facility: CLINIC | Age: 68
End: 2023-04-19
Attending: FAMILY MEDICINE
Payer: COMMERCIAL

## 2023-04-19 DIAGNOSIS — R41.3 MEMORY LOSS: ICD-10-CM

## 2023-04-19 DIAGNOSIS — G31.84 MILD NEUROCOGNITIVE DISORDER: Primary | ICD-10-CM

## 2023-04-19 PROCEDURE — 96116 NUBHVL XM PHYS/QHP 1ST HR: CPT | Performed by: CLINICAL NEUROPSYCHOLOGIST

## 2023-04-19 PROCEDURE — 96132 NRPSYC TST EVAL PHYS/QHP 1ST: CPT | Performed by: CLINICAL NEUROPSYCHOLOGIST

## 2023-04-19 PROCEDURE — 96138 PSYCL/NRPSYC TECH 1ST: CPT | Performed by: CLINICAL NEUROPSYCHOLOGIST

## 2023-04-19 PROCEDURE — 96133 NRPSYC TST EVAL PHYS/QHP EA: CPT | Performed by: CLINICAL NEUROPSYCHOLOGIST

## 2023-04-19 PROCEDURE — 96139 PSYCL/NRPSYC TST TECH EA: CPT | Performed by: CLINICAL NEUROPSYCHOLOGIST

## 2023-04-19 NOTE — PROGRESS NOTES
The patient was seen for a neuropsychological evaluation for the purposes of diagnostic clarification and treatment planning. 135 minutes of face-to-face testing were provided by this writer. The patient was cooperative with testing. No concerns were brought to my attention. Please see Dr. Miller's report for a detailed description of the charges and interpretation and integration of the findings.

## 2023-04-19 NOTE — PROGRESS NOTES
NEUROPSYCHOLOGY CONSULT  [facility]    NAME: Arvind Leong    YOB: 1955   AGE: 68  EDU: 12  DATE OF EVALUATION: 4/19/2023    REASON FOR REFERRAL:  Ms. Leong is a 68 year old, right-handed, White female presenting with concerns about cognitive functioning in the context of T2DM, HLD, COPD with chronic hypoxic respiratory failure on 2 L supplemental O2, mild sleep apnea, obesity, bipolar 1 disorder, VIOLET, and cardiomyopathy. She was referred for a neurocognitive evaluation by her primary care provider, Dr. Rema Whiting, to assist with differential diagnosis and care planning.     DIAGNOSTIC SUMMARY:  Results of testing indicate that Ms. Leong is a woman of estimated borderline premorbid intellectual functioning whose performance was assessed within normal limits only on measures of basic attention, sight word reading, and confrontation naming.  Otherwise, she demonstrated generally mild to moderate impairments across measures of verbal reasoning, nonverbal reasoning, prose learning, and prose memory, with more severe impairments on measures of cognitive efficiency, rote verbal learning and memory, nonverbal learning and memory, and executive functioning. On self-report questionnaires she denied any significant symptoms of anxiety but endorsed mild symptoms of depression.     Summary for Providers  ASSESSMENT:    History, interview and testing (estimated FSIQ = 65) suggest borderline premorbid intellectual functioning     Generally mild to moderate impairments across measures of verbal reasoning, nonverbal reasoning, prose learning, and prose memory    Severe impairments on measures of cognitive efficiency, rote verbal learning and memory, nonverbal learning and memory, and executive functioning    Intact performance only on measures of basic attention, and select aspects of basic language (sight word reading, confrontation naming)    Evidence of developmental  delay likely secondary to prematurity and low birthweight (4 pounds)    Very challenging to determine what represents baseline weaknesses vs acquired cognitive dysfunction    Unfortunately I cannot fully rule out an Alzheimer's process given significant impairments on 2 of 3 memory measures and strong family history of AD    She does have a number of cerebrovascular risk factors (T2DM, HLD, COPD with chronic hypoxic respiratory failure on 2 L supplemental O2, obesity, mild sleep apnea) which could definitely lead to acquired deficits in the form of frontal subcortical dysfunction that generally manifests as cognitive slowing and executive deficits    Executive deficits are also commonly seen in bipolar disorder (of which she has a lifelong history)    Individuals with borderline intellectual skills also generally show deficits in executive skills    In addition, she has sleep apnea that is not currently being treated which could also lead to cognitive deficits    It is very likely that there are multiple stable and longstanding (developmental delay, bipolar disorder), stable and acquired (frontal subcortical dysfunction secondary to cerebrovascular disease) and potentially reversible (cognitive inefficiencies secondary to untreated sleep apnea or fluctuations in mood) contributions to the deficits on current testing and cognitive difficulties in daily life    In addition, a possible Alzheimer's process would represent an acquired and progressive process.    If mood is better controlled and she resumes CPAP use, there may be some improvement in her day to day cognitive functioning. However, given her medical history (I.e., number of cerebrovascular risk factors) it is quite possible that cognition will not fully return to baseline abilities (which were reduced to begin with secondary to developmental delay)    Diagnosis: Unspecified Neurocognitive Disorder    Rule out: Mild Vascular Neurocognitive Disorder vs Mild  Neurocognitive Disorder due to Alzheimer's disease versus cognitive inefficiencies secondary to untreated sleep apnea or some combination thereof    PLAN:    Very important that she make regular use of her CPAP device to treat sleep apnea as this may lead to more immediate improvement in her cognitive functioning    Continue to take care of herself physically (medication adherence, including CPAP, regular exercise under the guidance of her physician, healthy diet)    Continue to take care of herself emotionally (through continued work with psychiatry and psychotherapy)    Continue to stay cognitively active (continue card games, puzzles, coloring- anything to stay mentally active)    Continued support from her mother in day-to-day life with financial management and also making sure she is taking her medications regularly    Continue to refrain from driving    She is at risk of being taking advantage of so she should not venture into the community independently    Family should accompany her to all doctors appointments to make sure accurate information is conveyed and instructions are followed    We discussed that Ms. Leong's siblings are ready to step up if her mother is no longer able to provide this level of support    Reevaluation in 12 to 18 months to help clarify etiology as if there is stability (likely cerebrovascular contributions) improvement (likely impact of mood and sleep apnea) or progressive decline (likely Alzheimer's or combination of Alzheimer's and vascular) can suggest the most likely source of her cognitive difficulties    FEEDBACK:  Ms. Leong received the results of this evaluation on the day of testing.     Thank you for allowing me to participate in Ms. Leong's care.  Please contact me with any questions regarding the content of this report.      Summary for Patients  DIAGNOSTIC IMPRESSIONS (from 4/19/2023 Neuropsychology Consult):    Results  Intact basic attention and basic language  skills  Impairments in all other cognitive domains assessed including speed of thinking, verbal reasoning skills, nonverbal reasoning skills, learning, memory, and problem solving skills  Repeat testing can help clarify the extent to which developmental delay versus bipolar disorder versus untreated sleep apnea, versus various medical comorbidities versus a possible Alzheimer's process could be contributing to her presentation    Diagnosis  Unspecified Neurocognitive Disorder  Rule out: Mild Vascular Neurocognitive Disorder vs Mild Neurocognitive Disorder due to Alzheimer's disease versus cognitive inefficiencies secondary to untreated sleep apnea or some combination thereof    RECOMMENDATIONS:  Driving and Activities of Daily Living    Given the range of cognitive deficits, Ms. Leong is encouraged to continue to refrain from driving.     Ms. Leong will continue to benefit from help in her daily activities particularly in terms of managing medications and finances. Her mother/ family is encouraged to oversee her medications to make sure she is taking them regularly and as prescribed.     It is strongly recommended that a family member accompany Ms. Leong to all appointments to ensure that accurate information is given to providers and instructions are followed. It will be helpful to present the information in brief, repeated segments and have her repeat back the information in her own words to ensure understanding. But ultimately, her caregivers should be in charge of following through with any recommendations.     It is recommended that Ms. Leong not venture into the community independently.  She is at high risk for being taken advantage of.  In addition, she is at risk for getting lost and turned around and may have significant problems finding her way home.    If not already done, completion of paperwork for advance directives and assignment of healthcare and financial power of  should be  considered at this time.     It sounds like Ms. Leong's siblings are ready to provide assistance if/ when her mother is no longer able to do so. If not already, their information should be incorporated into the above legal documents.     To the extent that her family can continue to provide support and assistance, it seems that the current living situation is working.  However, if more support is needed in the future or if her family is unable to provide that support, Ms. Leong would benefit from increased supervision and support in her daily life so as to help her manage daily tasks such as cooking and cleaning as well as keeping track of medications and to ensure her personal safety. An assisted living facility would be ideal so as to allow her some independence while also providing a structured and supportive environment.    Physical and Emotional Health    Given her history of sleep apnea, Ms. Leong is strongly encouraged to resume use of her CPAP machine. Untreated sleep apnea could account for at least some of her cognitive inefficiencies in daily life.     Ms. Leong is encouraged to continue with psychotherapeutic (talk therapy) and psychiatric (medication) interventions to help manage her mood symptoms.     In addition, behavioral activation techniques such as regular exercise (under the guidance of her physician), recreational activities and regular social interaction would likely be effective in helping Ms. Leong to manage her mood.     It is important that Ms. Leong continue to adhere to her medication treatment regimen and follow a healthy diet so as to maintain her physical health, as this can have a significant impact on her physical, emotional, and cognitive functioning.   Memory and Organization    Ms. Leong is encouraged to continue to engage in stimulating activities, (i.e., reading, card games, puzzles, coloring) to keep her cognitively active.     In her daily life, Ms.  Salo will continue to benefit from the use of compensation techniques. That is, she may find it helpful to post reminder notes around the house, make lists, and carry a small calendar so that she can feel more comfortable and confident in her ability to remember information. A daily planner could also be used as a memory book where important information is recorded and organized for future reference.     Ms. Leong should also create a system to establish set locations for certain items (i.e., keys) such that she always knows where to put them upon entering the house and where to look when she needs them. If she would like to keep certain items out of sight (i.e., a wallet), she could set up a specific hidden place to keep items and use that same place so as to ensure she can find the required item when needed.     When required to learn new information, Ms. Leong does best when information is placed within a meaningful context.  Learning will be facilitated if it is made personally meaningful for her and is not presented in a rote fashion (i.e., a list of unrelated information or unconnected tasks to accomplish).     Ms. Leong exhibits memory impairments but does benefit from cues, thus she will do best when provided with reminders to facilitate retrieval of important information.     Ms. Leong will do best in an environment where a lot of routines are established so that she can follow a regular pattern for her daily or weekly routines.     Ms. Leong demonstrates intact basic attention but significant memory impairments, thus, she should not be given instructions for tasks any more than a few minutes in the future.    Ms. Leong should be aware that she may not be able to process information as quickly and efficiently as she once could. Thus she should allow herself extra time to complete tasks and not try and work under extreme time constraints.   Follow-up    Re-evaluation in 12-18 months is  recommended. The current test data can be used as a baseline to which future comparisons can be made.    --------------------------------EXTENDED REPORT--------------------------------  Verbal consent for neuropsychological testing was received following the provision of information about the nature of the evaluation, and the opportunity to ask questions.     HISTORY OF PRESENTING PROBLEM:    Current Interview  Ms. Leong was accompanied by her mother Jeri and was a variable historian. Together they provided the following information.     At the present time, Ms. Leong reports that she experiences some difficulties with forgetfulness but had some trouble articulating the nature of these problems.  She noted that she may forget things that people tell her and sometimes forgets intended tasks or details of conversations.  She denied any difficulties with misplacing items.  She denied any problems with language including word finding difficulties or comprehension (as long as she can hear them).  She indicated that sometimes her attention and focus seems reduced and her thinking a little slowed, but she denied significant problems.  She otherwise denies any cognitive changes.  She was able to describe the ongoing COVID pandemic and appropriate timeline.    Her mother Jeri shared that over the last year (Ms. Leong feels it is less than that) she has noticed declines in her daughter's memory.  She shared that she can say something to her daughter and it seems like she forgets it only a few minutes later.  Her daughter often repeats herself and asks the same questions and this is new.  She added that her daughter has never been great at learning new card games but she feels it is worse now and she is making many more mistakes than she used to.  She also added that her daughter seems to be making up stories, noting that she will say things to her therapist or others that are simply not true.  She noted that  this may have happened in the past but seems to be getting worse now.  During today's interview Ms. Leong shared a story about being evicted due to opening someone else's mail which her mother said never happened.    With regard to the activities of daily living, Ms. Leong reported that she currently lives with her mother and they have been living together in [place] since about 2008 or 2009 and her mother agreed.  Ms. Leong indicated that her mother does all the cooking and her mother agreed.  Ms. Leong stated that they share management of the finances.  Her mother agreed with this although adding that her daughter has never been good with managing money and has never been able to save.  Before her daughter moved in, she had accumulated a lot of debt which her mother helped her pay off and now with a lot of budgeting and structure, she is helping her daughter better manage her money.  Ms. Leong indicated that she manages her own medications and takes them regularly (no pillbox).  Her mother shared that Dr. Whiting had strongly recommended that she oversee and make sure that her daughter is taking her meds regularly. (Records indicate that there have been repeated issues with non-compliance).  Ms. Leong indicated that she stopped driving about a year ago to because she did not want to anymore.  Her mother agreed noting that there was no particular specific but at that time that she quit working, Ms. Leong siblings felt that she should no longer drive; she opted to give up the car and sold it to her brother.  She has not driven since that time and her mother does all the driving.    Of note, Ms. Leong indicated that she moved in with her mother in 2008 because she was evicted from her other apartment due to opening a neighbors mail.  Her mother shared this was not the case and her daughter moved in because the rent was raised and while she was working she could not afford the  rodney.    MEDICAL HISTORY:  Ms. Leong's medical history is significant for   Past Medical History:   Diagnosis Date     Acute on chronic respiratory failure with hypoxia (H) 11/15/2016     Bipolar 1 disorder (H)      CAP (community acquired pneumonia) 11/15/2016     Cervical high risk HPV (human papillomavirus) test positive 3/5/2018    3/5/18 NIL pap, +HR HPV (not 16/18) 5/21/21 NIL pap, neg HPV. Plan: await provider     COPD exacerbation (H) 4/24/2021     COPD with exacerbation (H) 11/15/2016     Diabetes mellitus, type II (H)      Hearing loss      Meniere's disease      Pneumonia 4/23/2021     Pneumonia of right lower lobe due to infectious organism 6/11/2019     Ms. Leong's current problem list includes   Patient Active Problem List   Diagnosis     Scoliosis (and kyphoscoliosis), idiopathic     Morbid obesity (H)     Lower Back Pain     Asthma     Urge Incontinence Of Urine     COPD (chronic obstructive pulmonary disease) (H)     Hyperlipidemia     ABDIFATAH on CPAP     Anxiety     Vertigo     Cognitive dysfunction in bipolar disorder (H)     Noncompliance with medications     Dependence on continuous supplemental oxygen     Bipolar I disorder, most recent episode depressed (H)     Runny nose     CAP (community acquired pneumonia) due to Chlamydia species     Type 2 diabetes mellitus with unspecified complications (H)     Chronic respiratory failure with hypoxia (H)     Cervical high risk HPV (human papillomavirus) test positive     Cardiomyopathy, unspecified type (H)     COPD exacerbation (H)     Tobacco use disorder     Hypercarbia     Acute confusion     Hypoxia     Ms. Leong denied any history of stroke, seizure or head injury with loss of consciousness. She denied having tested positive for COVID or experiencing an illness concerning for COVID.  Her mother agreed.    With regard to exercise Ms. Leong indicated that she recently joined the [employer] and has now been 3 times.  She has been doing stationary  bicycle and swimming and hopes to be able to attend more regularly.    Ms. Leong indicated that she is due to see her eye doctor and has been told she needs some type of lens placed on her left eye but her mother was not clear what this was.  (Her mother did share that around age 5 her daughter needed glasses which they were told related to her time in the incubator but she only wore them until about age 11 when they were told she no longer needed them.)  Ms. Leong does currently wear glasses for reading.  She has been experiencing hearing problems for some time estimating almost 20 years and her mother agreed.  She wears hearing aids but did not wear them today and noted that she does not always wear them as she is worried that they will fall out and she will lose them.  She otherwise denied significant sensory changes or disturbance in appetite and her mother agreed.    Ms. Leong indicated that she does experience sleep disturbance both falling and staying asleep and always feels tired.  She described a history of sleep apnea and noted that she is not currently using her CPAP as she needs a hose replaced.  When prompted, she indicated that she feels that she does sleep better when using her CPAP machine.    Diagnostic studies:  A head CT from 12/9/2021 revealed:   IMPRESSION:  1.  No acute intracranial process.  2.  Indeterminate soft tissue lesion over the right vertex scalp measuring up to 1.7 cm. This was also present on CT 12/27/2018. This most likely represents a sebaceous cyst.    Past Surgical History:   Procedure Laterality Date     CYST REMOVAL  01/23/2001     DC REMOVAL ANAL FISTULA,SUBCUTANEOUS      Description: Fistula-in-ano Repair;  Recorded: 01/08/2010; x2     TONSILLECTOMY       Current medications include (per medical record):   Current Outpatient Medications:      albuterol (PROAIR HFA/PROVENTIL HFA/VENTOLIN HFA) 108 (90 Base) MCG/ACT inhaler, Inhale 2 puffs into the lungs every 6 hours  as needed for shortness of breath / dyspnea or wheezing, Disp: 18 g, Rfl: 12     albuterol (PROVENTIL) (2.5 MG/3ML) 0.083% neb solution, Take 1 vial (2.5 mg) by nebulization every 4 hours as needed for shortness of breath or wheezing Take 1 vial four times a day for the next 5 days then go back to as needed, Disp: 240 mL, Rfl: 12     ARIPiprazole (ABILIFY) 15 MG tablet, , Disp: , Rfl:      blood glucose (NO BRAND SPECIFIED) lancets standard, Use to test blood sugar 4 times daily before meals and at bedtime, Disp: 1 each, Rfl: 1     blood glucose (NO BRAND SPECIFIED) test strip, Use to test blood sugar 4 times daily before meals and at bedtime, Disp: 100 strip, Rfl: 0     blood glucose monitoring (ONETOUCH VERIO) meter device kit, Use to test blood sugar 4 times daily before meals and bedtie, Disp: 1 kit, Rfl: 1     dulaglutide (TRULICITY) 0.75 MG/0.5ML pen, Inject 0.75 mg Subcutaneous every 7 days, Disp: 2 mL, Rfl: 3     fluticasone-vilanterol (BREO ELLIPTA) 100-25 MCG/INH inhaler, Inhale 1 puff into the lungs daily, Disp: 30 each, Rfl: 12     metFORMIN (GLUCOPHAGE) 1000 MG tablet, Take 1 tablet (1,000 mg) by mouth 2 times daily (with meals), Disp: 180 tablet, Rfl: 3     simvastatin (ZOCOR) 20 MG tablet, Take 1 tablet (20 mg) by mouth At Bedtime, Disp: 90 tablet, Rfl: 3     tiotropium (SPIRIVA) 18 MCG inhaled capsule, Inhale 1 capsule (18 mcg) into the lungs daily, Disp: 90 capsule, Rfl: 1.    FAMILY HISTORY:   Family medical history is significant for:   Family History   Problem Relation Age of Onset     Aneurysm Father      Heart Disease Father 70.00        bypass in 70s, AAA rupture at age 77      Ulcers Father 76.00     Pacemaker Mother      Bipolar Disorder Brother      Breast Cancer Maternal Grandmother 51.00     Kidney failure Maternal Grandmother      Cancer Paternal Grandmother         ?? lung     Cancer Paternal Uncle         ?? lung     Mental Illness Maternal Grandfather      Heart Disease Other       "   uncle     No Known Problems Sister         two siblings abuse chemicals     No Known Problems Brother      Bipolar Disorder Nephew    They described a history of Alzheimer's disease in her mom's side of the family including a maternal grandmother, maternal great grandmother, maternal great aunt.  However, Ms. Leong's mother is currently 87 and experiencing no problems with her memory.  She indicated she was actually tested a few years ago and told that she is just experiencing normal aging.    PSYCHIATRIC AND SUBSTANCE USE HISTORY:  With regard to her psychiatric history, Ms. Leong reported that she was diagnosed with bipolar in her 20s and has been on medications to manage her mood since that time.  She stated that she has also been in psychotherapy for much of her adult life with only short breaks.  Most recently she has been working with her current therapist for about 6 weeks and had about a 6-month break; before she had been working with a different therapist.  She currently sees her psychiatric provider Danny every 3 months.  She sees her therapist via telehealth (over the phone) but has been told that they will resume in person appointments in July.  She does believe therapy is helpful.    When asked about her current mood, Ms. Leong described it as \"okay,\" noting that she experiences ups and downs.  She described how generally she is most happy when she is able to go to the casino.  Her mother agreed but added that she seems to be more down and sad after she talks with her therapist.    Ms. Leong acknowledged a history of psychiatric hospitalization noting that she was most recently hospitalized in 2017 and estimates that before that there were 2-3 other hospitalizations and her mom agrees.  She denied any current suicidal ideation, plan, or intent or hallucinations or delusions.    With regard to substance use, Ms. Leong indicated that she is not a big drinker and has never had a history " "of problematic alcohol use and her mother agreed.  They estimated that her last drink was on Easter and noted that she generally has 1 drink a year on special occasions.  Ms. Leong indicated that she was a previous smoker and stated she quit a \"long time ago\"; her mother estimates it was more like 5 years ago.  At the time, Ms. Leong estimates she was smoking about 1 pack every 3 days.  She denied any history of recreational drug use and her mother agreed.    SOCIAL HISTORY:  Ms. Leong was raised in [place].  Her mother shared that she was born approximately 6 weeks early and weighed 4 pounds.  She stayed an extra month in the hospital in an incubator which later lead to some visual difficulties.  Her mother indicated that there were no delays in reaching developmental milestones.  Her mother also shared that the school had recommended that she be held back in  but her  refused.  She had some difficulties initially learning math but otherwise they denied any history of learning problems or need for intervention. (With prompting and when questioned alone, Ms. Leong's shared that her daughter did struggle a bit in school and was a slow learner). They denied any attention or behavior problems in school.  Ms. Leong did share that she had some difficulty socially which was very challenging for her.  She described herself as an average student in high school earning C/C+ grades and her mother agreed.  She denied any further college or technical training.    Ms. Leong indicated that she worked a variety of jobs describing herself as a \",\" but when questioned further it seems that mostly she held  positions.  She described her longest job working at [employer] for 9 years as a .  She also worked at [employer]. Most recently she had worked at [email or web address] as a  for almost 3 years when she retired last July.    Ms. Leong was able to share " that she has actually been receiving Social Security (snf) since age 63 and had been working on a part-time basis since then.  Her mother confirmed.    Ms. Leong indicated that she has previously been  twice once for 7 years and once for about a year and a half.  The second marriage which was much shorter ended in divorce approximately 3 years ago.  During that time she had continue to live with her mother.  Ms. Leong stated that she has no children.    BEHAVIORAL OBSERVATIONS:   Ms. Leong arrived on time and accompanied by her mother Jeri to today's appointment. She was appropriately dressed and groomed. She was alert and engaged during the interview. She presented on oxygen and ambulated by using a wheelchair as a walker. A pocket talked was used throughout the evaluation to assist with hearing. Her mood was euthmyic and her affect was appropriately reactive. Rapport was easily established and eye contact was unremarkable. She was pleasant and cooperative. Prosody, and content of speech were grossly normal but rate was somewhat slow. No significant word finding difficulties or paraphasic errors were evident. There was no evidence of a berna thought disorder; no hallucinations or delusions were apparent.     Ms. Leong appeared adequately motivated and engaged easily in the testing component of the evaluation. Her performance was fully intact on embedded measures of objective effort. She attempted all tasks presented to her and worked at a slow but steady pace. She was often slow in her speech and in providing responses to items. Instructions often required repetition, rephrasing and some simplification to assist with comprehension. She did also request repetition of instructions due to reportedly not hearing adequately. She did not appear overly frustrated by difficult or challenging tasks and responded appropriately to encouragement from the examiner.  Of note, Similarities was  discontinued early and an incorrect trial of CTT was given due to administration error.  Otherwise, no significant barriers to testing were observed and the following is judged to be a valid representation of Ms. Leong's current cognitive strengths and weaknesses.    TESTS ADMINISTERED:   [place] Naming Test (BNT), Brief Visuospatial Memory Test - R Form 1 (BVMT-R), Color TrailsTest (CTT), DKEFS (Color Word Interference, Verbal Fluency), Generalized Anxiety Disorder-7 (VIOLET-7), Geriatric Depression Scale 30 (GDS), Porter Verbal Learning Test - R Form 1 (HVLT-R), Wechsler Abbreviated Scale of Intelligence- Second Edition (WASI-II), WAIS-IV Digit Span, WMS-IV Logical Memory, WRAT-4 Word Reading (blue), and WMS-III (Information and Orientation, Mental Control).    (Raw scores in parentheses)  PPE was not worn by the examiner or the patient.     DESCRIPTIVE PERFORMANCE KEY:    Labels for tests with Normal Distributions  Score Label Standard Score %ile Rank   Exceptionally high score  > 130 > 98   Above average score 120-129 91-97   High average score 110-119 75-90   Average score  25-74   Low average score 80-89 9-24   Below average score 70-79 2-8   Exceptionally low score < 70 < 2     Labels for tests with Non-Normal Distributions  Score Label %ile Rank   Within normal expectations/ limits score (WNL) > 24   Low average score 9-24   Below average score 2-8   Exceptionally low score < 2     The following test results utilize score labels as adapted from Kannan Angelo, J Carlos Jones, Alicia Garsia, ANTHONY Pfeiffer, Fernando Gardiner Michael Westerveld & Conference Participants (2020): American Academy of Clinical Neuropsychology consensus conference statement on uniform labeling of performance test scores, The Clinical Neuropsychologist, DOI: 10.1080/[email or web address]    All scores contain some measure of error; scores are reported here as they are obtained by the  "individual (without reference to the range of error). These are meant as labels and not interpretation of performance. While other relevant comments regarding task performance are provided below, please see the Assessment, Impressions and Diagnostic Summary sections of this report for interpretation of the scores and the cognitive profile as a whole, including what does and does not constitute impairment.    OPTIMAL PREMORBID INTELLECT:  Optimal premorbid intellectual abilities were estimated as falling in the borderline range based on Ms. Leong's educational and occupational histories and performance on tasks least likely to be affected by acquired brain dysfunction (i.e.,  hold tests ).    SUMMARY OF TEST RESULTS:     Orientation, Attention and Processing Speed  Mental status exam was measured as a below average score for her age (11). She was oriented to personal information (but reported her age as 67, actually 68), place, time, and date and was able to correctly name the most recent president.  She was not sure what city we were in and stated that \"Popeye Marrufo,\" was the current president    Performance on a measure of basic attention and working memory was assessed as a below average score (16). This reflected an average score for basic attention skills (LDF = 5) and working memory scores that ranged from a below average score (LDS = 3) to a low average score (LDB = 3).    A simple sequencing task (181\" ) as well as a speeded color naming task (53\") were both assessed as an exceptionally low scores. A speeded word reading task (32\") was assessed as a low average score.     On a measure of speeded processing and working memory (Mental Control), she earned a below average score (12).     Language  Sight word reading skills (56) were assessed as an average score.  In contrast, verbal reasoning skills were assessed as an exceptionally low score (VCI = 67).  Specifically, expressive vocabulary (19) was measured " as a below average score while verbal abstraction (11) was assessed as an exceptionally low score. Her performance on a measure of semantic fluency was measured as an exceptionally low score (Category Fluency = 16) on one task but as a low average score on a different task (Category Switching Total = 9). Confrontation naming was measured as a low average score (49).    Visuospatial Skills  Overall nonverbal skills were assessed as a below average score (DOREEN = 70). Specifically, performance on both a block construction task (8) and on a pattern completion task (7) was measured as a below average score.    Learning and Memory  Immediate memory for two short stories was measured as a below average score (16). Delayed recall of these stories resulted in a low average score (9, 82% retention). Recognition memory was measured as an exceptionally low score (13/23). She was also administered a measure of rote auditory verbal list learning that required her to learn a series of 12 words over three trials and retain and recall them over a delay. Her initial rate of learning (4,3,6) reflected an exceptionally low score. She retained and recalled 3 words (50% retention) after the delay for an exceptionally low score for delayed recall.  Recognition memory was measured as a low average score as she correctly identified 11 of the original words and made 2 false positive errors.     Immediate nonverbal memory (0,2,1) for a series of 6 geometric figures was measured as an exceptionally low score while delayed recall of these figures (1 detail) resulted in an exceptionally low score. Recognition memory was measured as an exceptionally low score as she correctly identified 3 of the original figures and made 1 false positive error.    Executive Functioning  Working memory skills ranged from a below average score (LDS = 3) to a low average score (LDB = 3).A complex sequencing and set shifting task was discontinued at 4 minutes (at 23)  "and was notable for 1 error.  Her performance on a measure of phonemic fluency resulted in a low average score (21). Semantic set shifting skills were assessed as an exceptionally low score (Category Switching Accuracy = 4). Her ability to inhibit an over-learned response was assessed as an exceptionally low score (134\"). Her performance on this task was notable for 2 errors (average score). On the more challenging set shifting portion of the task in which she had to alternate between providing and inhibiting an over-learned response, she earned an exceptionally low score (156\"). This latter task was notable for 3 errors (average score).    Mood  On the Geriatric Depression Scale-30 (GDS), a self report measure of depressive symptomatology, she obtained a score of 16, placing her in the range of mild symptoms of depression.     On the Generalized Anxiety Disorder-7, a self-report measure of anxiety, she obtained a score of 4, placing her in the range of minimal anxiety.     EVALUATION SERVICES AND TIME:   A clinical interview/neurobehavioral status examination was conducted with the patient and documented. I thoroughly reviewed the medical record, selected the neuropsychological test battery, provided supervision to the individual who administered and scored the neuropsychological test battery, interpreted/integrated patient data and test results, engaged in clinical decision making, treatment planning, report writing/preparation and provided and documented interactive feedback of test results on the day of testing. A trained examiner/technician directly administered and scored 2+ neuropsychological tests. Please see below for a breakdown of time spent and the associated codes billed for these services.     Services   Time Spent  CPT Codes   Neurobehavioral Status Exam:  (e.g., face-to-face, interpretation, report) 70 minutes 1 x 96116   Neuropsychological Evaluation Services:   (e.g., integration, interpretation, " treatment planning, clinical decision making, feedback)   165 minutes   1 x 96132  2 x 96133        Neuropsychological Testing by Trained Examiner/Technician:  (e.g., test administration, scoring, 2+ tests administered)   185 minutes   1 x 96138  5 x 96139     Diagnosis:  Unspecified Neurocognitive Disorder  Rule out: Mild Vascular Neurocognitive Disorder vs Mild Neurocognitive Disorder due to Alzheimer's disease versus cognitive inefficiencies secondary to untreated sleep apnea or some combination thereof    For diagnostic and coding purposes, Ms. Leong has a history of T2DM, HLD, COPD with chronic hypoxic respiratory failure on 2 L supplemental O2, obesity, mild sleep apnea, bipolar 1 disorder, VIOLET, and cardiomyopathy and was referred for an evaluation of Memory loss. Feedback of results was provided on the day of testing.       Mirian Miller, PhD, LP, ABPP  Clinical Neuropsychologist, [id number]  Board Certified in Clinical Neuropsychology    [facility] [address], [address]  [place] [address]  Phone:  [phone]

## 2023-04-19 NOTE — LETTER
4/19/2023         RE: Arvind Leong  [address] [place] [address] [place] [address]        Dear Colleague,    Thank you for referring your patient, Arvind Leong, to the [facility]. Please see a copy of my visit note below.    NEUROPSYCHOLOGY CONSULT  [facility]    NAME: Arvind Leong    YOB: 1955   AGE: 68  EDU: 12  DATE OF EVALUATION: 4/19/2023    REASON FOR REFERRAL:  Ms. Leong is a 68 year old, right-handed, White female presenting with concerns about cognitive functioning in the context of T2DM, HLD, COPD with chronic hypoxic respiratory failure on 2 L supplemental O2, mild sleep apnea, obesity, bipolar 1 disorder, VIOLET, and cardiomyopathy. She was referred for a neurocognitive evaluation by her primary care provider, Dr. Rema Whiting, to assist with differential diagnosis and care planning.     DIAGNOSTIC SUMMARY:  Results of testing indicate that Ms. Leong is a woman of estimated borderline premorbid intellectual functioning whose performance was assessed within normal limits only on measures of basic attention, sight word reading, and confrontation naming.  Otherwise, she demonstrated generally mild to moderate impairments across measures of verbal reasoning, nonverbal reasoning, prose learning, and prose memory, with more severe impairments on measures of cognitive efficiency, rote verbal learning and memory, nonverbal learning and memory, and executive functioning. On self-report questionnaires she denied any significant symptoms of anxiety but endorsed mild symptoms of depression.     Summary for Providers  ASSESSMENT:    History, interview and testing (estimated FSIQ = 65) suggest borderline premorbid intellectual functioning     Generally mild to moderate impairments across measures of verbal reasoning, nonverbal reasoning, prose learning, and prose memory    Severe impairments on measures of  cognitive efficiency, rote verbal learning and memory, nonverbal learning and memory, and executive functioning    Intact performance only on measures of basic attention, and select aspects of basic language (sight word reading, confrontation naming)    Evidence of developmental delay likely secondary to prematurity and low birthweight (4 pounds)    Very challenging to determine what represents baseline weaknesses vs acquired cognitive dysfunction    Unfortunately I cannot fully rule out an Alzheimer's process given significant impairments on 2 of 3 memory measures and strong family history of AD    She does have a number of cerebrovascular risk factors (T2DM, HLD, COPD with chronic hypoxic respiratory failure on 2 L supplemental O2, obesity, mild sleep apnea) which could definitely lead to acquired deficits in the form of frontal subcortical dysfunction that generally manifests as cognitive slowing and executive deficits    Executive deficits are also commonly seen in bipolar disorder (of which she has a lifelong history)    Individuals with borderline intellectual skills also generally show deficits in executive skills    In addition, she has sleep apnea that is not currently being treated which could also lead to cognitive deficits    It is very likely that there are multiple stable and longstanding (developmental delay, bipolar disorder), stable and acquired (frontal subcortical dysfunction secondary to cerebrovascular disease) and potentially reversible (cognitive inefficiencies secondary to untreated sleep apnea or fluctuations in mood) contributions to the deficits on current testing and cognitive difficulties in daily life    In addition, a possible Alzheimer's process would represent an acquired and progressive process.    If mood is better controlled and she resumes CPAP use, there may be some improvement in her day to day cognitive functioning. However, given her medical history (I.e., number of  cerebrovascular risk factors) it is quite possible that cognition will not fully return to baseline abilities (which were reduced to begin with secondary to developmental delay)    Diagnosis: Unspecified Neurocognitive Disorder    Rule out: Mild Vascular Neurocognitive Disorder vs Mild Neurocognitive Disorder due to Alzheimer's disease versus cognitive inefficiencies secondary to untreated sleep apnea or some combination thereof    PLAN:    Very important that she make regular use of her CPAP device to treat sleep apnea as this may lead to more immediate improvement in her cognitive functioning    Continue to take care of herself physically (medication adherence, including CPAP, regular exercise under the guidance of her physician, healthy diet)    Continue to take care of herself emotionally (through continued work with psychiatry and psychotherapy)    Continue to stay cognitively active (continue card games, puzzles, coloring- anything to stay mentally active)    Continued support from her mother in day-to-day life with financial management and also making sure she is taking her medications regularly    Continue to refrain from driving    She is at risk of being taking advantage of so she should not venture into the community independently    Family should accompany her to all doctors appointments to make sure accurate information is conveyed and instructions are followed    We discussed that Ms. Leong's siblings are ready to step up if her mother is no longer able to provide this level of support    Reevaluation in 12 to 18 months to help clarify etiology as if there is stability (likely cerebrovascular contributions) improvement (likely impact of mood and sleep apnea) or progressive decline (likely Alzheimer's or combination of Alzheimer's and vascular) can suggest the most likely source of her cognitive difficulties    FEEDBACK:  Ms. Leong received the results of this evaluation on the day of testing.      Thank you for allowing me to participate in Ms. Leong's care.  Please contact me with any questions regarding the content of this report.      Summary for Patients  DIAGNOSTIC IMPRESSIONS (from 4/19/2023 Neuropsychology Consult):    Results  Intact basic attention and basic language skills  Impairments in all other cognitive domains assessed including speed of thinking, verbal reasoning skills, nonverbal reasoning skills, learning, memory, and problem solving skills  Repeat testing can help clarify the extent to which developmental delay versus bipolar disorder versus untreated sleep apnea, versus various medical comorbidities versus a possible Alzheimer's process could be contributing to her presentation    Diagnosis  Unspecified Neurocognitive Disorder  Rule out: Mild Vascular Neurocognitive Disorder vs Mild Neurocognitive Disorder due to Alzheimer's disease versus cognitive inefficiencies secondary to untreated sleep apnea or some combination thereof    RECOMMENDATIONS:  Driving and Activities of Daily Living    Given the range of cognitive deficits, Ms. Leong is encouraged to continue to refrain from driving.     Ms. Leong will continue to benefit from help in her daily activities particularly in terms of managing medications and finances. Her mother/ family is encouraged to oversee her medications to make sure she is taking them regularly and as prescribed.     It is strongly recommended that a family member accompany Ms. Leong to all appointments to ensure that accurate information is given to providers and instructions are followed. It will be helpful to present the information in brief, repeated segments and have her repeat back the information in her own words to ensure understanding. But ultimately, her caregivers should be in charge of following through with any recommendations.     It is recommended that Ms. Leong not venture into the community independently.  She is at high risk for  being taken advantage of.  In addition, she is at risk for getting lost and turned around and may have significant problems finding her way home.    If not already done, completion of paperwork for advance directives and assignment of healthcare and financial power of  should be considered at this time.     It sounds like Ms. Leong's siblings are ready to provide assistance if/ when her mother is no longer able to do so. If not already, their information should be incorporated into the above legal documents.     To the extent that her family can continue to provide support and assistance, it seems that the current living situation is working.  However, if more support is needed in the future or if her family is unable to provide that support, Ms. Leong would benefit from increased supervision and support in her daily life so as to help her manage daily tasks such as cooking and cleaning as well as keeping track of medications and to ensure her personal safety. An assisted living facility would be ideal so as to allow her some independence while also providing a structured and supportive environment.    Physical and Emotional Health    Given her history of sleep apnea, Ms. Leong is strongly encouraged to resume use of her CPAP machine. Untreated sleep apnea could account for at least some of her cognitive inefficiencies in daily life.     Ms. Leong is encouraged to continue with psychotherapeutic (talk therapy) and psychiatric (medication) interventions to help manage her mood symptoms.     In addition, behavioral activation techniques such as regular exercise (under the guidance of her physician), recreational activities and regular social interaction would likely be effective in helping Ms. Leong to manage her mood.     It is important that Ms. Leong continue to adhere to her medication treatment regimen and follow a healthy diet so as to maintain her physical health, as this can have a  significant impact on her physical, emotional, and cognitive functioning.   Memory and Organization    Ms. Leong is encouraged to continue to engage in stimulating activities, (i.e., reading, card games, puzzles, coloring) to keep her cognitively active.     In her daily life, Ms. Leong will continue to benefit from the use of compensation techniques. That is, she may find it helpful to post reminder notes around the house, make lists, and carry a small calendar so that she can feel more comfortable and confident in her ability to remember information. A daily planner could also be used as a memory book where important information is recorded and organized for future reference.     Ms. Leong should also create a system to establish set locations for certain items (i.e., keys) such that she always knows where to put them upon entering the house and where to look when she needs them. If she would like to keep certain items out of sight (i.e., a wallet), she could set up a specific hidden place to keep items and use that same place so as to ensure she can find the required item when needed.     When required to learn new information, Ms. Leong does best when information is placed within a meaningful context.  Learning will be facilitated if it is made personally meaningful for her and is not presented in a rote fashion (i.e., a list of unrelated information or unconnected tasks to accomplish).     Ms. Leong exhibits memory impairments but does benefit from cues, thus she will do best when provided with reminders to facilitate retrieval of important information.     Ms. Leong will do best in an environment where a lot of routines are established so that she can follow a regular pattern for her daily or weekly routines.     Ms. Leong demonstrates intact basic attention but significant memory impairments, thus, she should not be given instructions for tasks any more than a few minutes in the  future.    Ms. Leong should be aware that she may not be able to process information as quickly and efficiently as she once could. Thus she should allow herself extra time to complete tasks and not try and work under extreme time constraints.   Follow-up    Re-evaluation in 12-18 months is recommended. The current test data can be used as a baseline to which future comparisons can be made.    --------------------------------EXTENDED REPORT--------------------------------  Verbal consent for neuropsychological testing was received following the provision of information about the nature of the evaluation, and the opportunity to ask questions.     HISTORY OF PRESENTING PROBLEM:    Current Interview  Ms. Leong was accompanied by her mother Jeri and was a variable historian. Together they provided the following information.     At the present time, Ms. Leong reports that she experiences some difficulties with forgetfulness but had some trouble articulating the nature of these problems.  She noted that she may forget things that people tell her and sometimes forgets intended tasks or details of conversations.  She denied any difficulties with misplacing items.  She denied any problems with language including word finding difficulties or comprehension (as long as she can hear them).  She indicated that sometimes her attention and focus seems reduced and her thinking a little slowed, but she denied significant problems.  She otherwise denies any cognitive changes.  She was able to describe the ongoing COVID pandemic and appropriate timeline.    Her mother Jeri shared that over the last year (Ms. Leong feels it is less than that) she has noticed declines in her daughter's memory.  She shared that she can say something to her daughter and it seems like she forgets it only a few minutes later.  Her daughter often repeats herself and asks the same questions and this is new.  She added that her daughter has  never been great at learning new card games but she feels it is worse now and she is making many more mistakes than she used to.  She also added that her daughter seems to be making up stories, noting that she will say things to her therapist or others that are simply not true.  She noted that this may have happened in the past but seems to be getting worse now.  During today's interview Ms. Leong shared a story about being evicted due to opening someone else's mail which her mother said never happened.    With regard to the activities of daily living, Ms. Leong reported that she currently lives with her mother and they have been living together in [place] since about 2008 or 2009 and her mother agreed.  Ms. Leong indicated that her mother does all the cooking and her mother agreed.  Ms. Leong stated that they share management of the finances.  Her mother agreed with this although adding that her daughter has never been good with managing money and has never been able to save.  Before her daughter moved in, she had accumulated a lot of debt which her mother helped her pay off and now with a lot of budgeting and structure, she is helping her daughter better manage her money.  Ms. Leong indicated that she manages her own medications and takes them regularly (no pillbox).  Her mother shared that Dr. Whiting had strongly recommended that she oversee and make sure that her daughter is taking her meds regularly. (Records indicate that there have been repeated issues with non-compliance).  Ms. Leong indicated that she stopped driving about a year ago to because she did not want to anymore.  Her mother agreed noting that there was no particular specific but at that time that she quit working, Ms. Leong siblings felt that she should no longer drive; she opted to give up the car and sold it to her brother.  She has not driven since that time and her mother does all the driving.    Of note, Ms.  Salo indicated that she moved in with her mother in 2008 because she was evicted from her other apartment due to opening a neighbors mail.  Her mother shared this was not the case and her daughter moved in because the rent was raised and while she was working she could not afford the rent.    MEDICAL HISTORY:  Ms. Leong's medical history is significant for   Past Medical History:   Diagnosis Date     Acute on chronic respiratory failure with hypoxia (H) 11/15/2016     Bipolar 1 disorder (H)      CAP (community acquired pneumonia) 11/15/2016     Cervical high risk HPV (human papillomavirus) test positive 3/5/2018    3/5/18 NIL pap, +HR HPV (not 16/18) 5/21/21 NIL pap, neg HPV. Plan: await provider     COPD exacerbation (H) 4/24/2021     COPD with exacerbation (H) 11/15/2016     Diabetes mellitus, type II (H)      Hearing loss      Meniere's disease      Pneumonia 4/23/2021     Pneumonia of right lower lobe due to infectious organism 6/11/2019     Ms. Leong's current problem list includes   Patient Active Problem List   Diagnosis     Scoliosis (and kyphoscoliosis), idiopathic     Morbid obesity (H)     Lower Back Pain     Asthma     Urge Incontinence Of Urine     COPD (chronic obstructive pulmonary disease) (H)     Hyperlipidemia     ABDIFATAH on CPAP     Anxiety     Vertigo     Cognitive dysfunction in bipolar disorder (H)     Noncompliance with medications     Dependence on continuous supplemental oxygen     Bipolar I disorder, most recent episode depressed (H)     Runny nose     CAP (community acquired pneumonia) due to Chlamydia species     Type 2 diabetes mellitus with unspecified complications (H)     Chronic respiratory failure with hypoxia (H)     Cervical high risk HPV (human papillomavirus) test positive     Cardiomyopathy, unspecified type (H)     COPD exacerbation (H)     Tobacco use disorder     Hypercarbia     Acute confusion     Hypoxia     Ms. Leong denied any history of stroke, seizure or head  injury with loss of consciousness. She denied having tested positive for COVID or experiencing an illness concerning for COVID.  Her mother agreed.    With regard to exercise Ms. Leong indicated that she recently joined the [employer] and has now been 3 times.  She has been doing stationary bicycle and swimming and hopes to be able to attend more regularly.    Ms. Leong indicated that she is due to see her eye doctor and has been told she needs some type of lens placed on her left eye but her mother was not clear what this was.  (Her mother did share that around age 5 her daughter needed glasses which they were told related to her time in the incubator but she only wore them until about age 11 when they were told she no longer needed them.)  Ms. Leong does currently wear glasses for reading.  She has been experiencing hearing problems for some time estimating almost 20 years and her mother agreed.  She wears hearing aids but did not wear them today and noted that she does not always wear them as she is worried that they will fall out and she will lose them.  She otherwise denied significant sensory changes or disturbance in appetite and her mother agreed.    Ms. Leong indicated that she does experience sleep disturbance both falling and staying asleep and always feels tired.  She described a history of sleep apnea and noted that she is not currently using her CPAP as she needs a hose replaced.  When prompted, she indicated that she feels that she does sleep better when using her CPAP machine.    Diagnostic studies:  A head CT from 12/9/2021 revealed:   IMPRESSION:  1.  No acute intracranial process.  2.  Indeterminate soft tissue lesion over the right vertex scalp measuring up to 1.7 cm. This was also present on CT 12/27/2018. This most likely represents a sebaceous cyst.    Past Surgical History:   Procedure Laterality Date     CYST REMOVAL  01/23/2001     [place] REMOVAL ANAL FISTULA,SUBCUTANEOUS      Description:  Fistula-in-ano Repair;  Recorded: 2010; x2     TONSILLECTOMY       Current medications include (per medical record):   Current Outpatient Medications:      albuterol (PROAIR HFA/PROVENTIL HFA/VENTOLIN HFA) 108 (90 Base) MCG/ACT inhaler, Inhale 2 puffs into the lungs every 6 hours as needed for shortness of breath / dyspnea or wheezing, Disp: 18 g, Rfl: 12     albuterol (PROVENTIL) (2.5 MG/3ML) 0.083% neb solution, Take 1 vial (2.5 mg) by nebulization every 4 hours as needed for shortness of breath or wheezing Take 1 vial four times a day for the next 5 days then go back to as needed, Disp: 240 mL, Rfl: 12     ARIPiprazole (ABILIFY) 15 MG tablet, , Disp: , Rfl:      blood glucose (NO BRAND SPECIFIED) lancets standard, Use to test blood sugar 4 times daily before meals and at bedtime, Disp: 1 each, Rfl: 1     blood glucose (NO BRAND SPECIFIED) test strip, Use to test blood sugar 4 times daily before meals and at bedtime, Disp: 100 strip, Rfl: 0     blood glucose monitoring (ONETOUCH VERIO) meter device kit, Use to test blood sugar 4 times daily before meals and bedtie, Disp: 1 kit, Rfl: 1     dulaglutide (TRULICITY) 0.75 MG/0.5ML pen, Inject 0.75 mg Subcutaneous every 7 days, Disp: 2 mL, Rfl: 3     fluticasone-vilanterol (BREO ELLIPTA) 100-25 MCG/INH inhaler, Inhale 1 puff into the lungs daily, Disp: 30 each, Rfl: 12     metFORMIN (GLUCOPHAGE) 1000 MG tablet, Take 1 tablet (1,000 mg) by mouth 2 times daily (with meals), Disp: 180 tablet, Rfl: 3     simvastatin (ZOCOR) 20 MG tablet, Take 1 tablet (20 mg) by mouth At Bedtime, Disp: 90 tablet, Rfl: 3     tiotropium (SPIRIVA) 18 MCG inhaled capsule, Inhale 1 capsule (18 mcg) into the lungs daily, Disp: 90 capsule, Rfl: 1.    FAMILY HISTORY:   Family medical history is significant for:   Family History   Problem Relation Age of Onset     Aneurysm Father      Heart Disease Father 70.00        bypass in 70s, AAA rupture at age 77      Ulcers Father 76.00      "Pacemaker Mother      Bipolar Disorder Brother      Breast Cancer Maternal Grandmother 51.00     Kidney failure Maternal Grandmother      Cancer Paternal Grandmother         ?? lung     Cancer Paternal Uncle         ?? lung     Mental Illness Maternal Grandfather      Heart Disease Other         uncle     No Known Problems Sister         two siblings abuse chemicals     No Known Problems Brother      Bipolar Disorder Nephew    They described a history of Alzheimer's disease in her mom's side of the family including a maternal grandmother, maternal great grandmother, maternal great aunt.  However, Ms. Leong's mother is currently 87 and experiencing no problems with her memory.  She indicated she was actually tested a few years ago and told that she is just experiencing normal aging.    PSYCHIATRIC AND SUBSTANCE USE HISTORY:  With regard to her psychiatric history, Ms. Leong reported that she was diagnosed with bipolar in her 20s and has been on medications to manage her mood since that time.  She stated that she has also been in psychotherapy for much of her adult life with only short breaks.  Most recently she has been working with her current therapist for about 6 weeks and had about a 6-month break; before she had been working with a different therapist.  She currently sees her psychiatric provider Danny every 3 months.  She sees her therapist via telehealth (over the phone) but has been told that they will resume in person appointments in July.  She does believe therapy is helpful.    When asked about her current mood, Ms. Leong described it as \"okay,\" noting that she experiences ups and downs.  She described how generally she is most happy when she is able to go to the casino.  Her mother agreed but added that she seems to be more down and sad after she talks with her therapist.    Ms. Leong acknowledged a history of psychiatric hospitalization noting that she was most recently hospitalized in 2017 " "and estimates that before that there were 2-3 other hospitalizations and her mom agrees.  She denied any current suicidal ideation, plan, or intent or hallucinations or delusions.    With regard to substance use, Ms. Leong indicated that she is not a big drinker and has never had a history of problematic alcohol use and her mother agreed.  They estimated that her last drink was on Easter and noted that she generally has 1 drink a year on special occasions.  Ms. Leong indicated that she was a previous smoker and stated she quit a \"long time ago\"; her mother estimates it was more like 5 years ago.  At the time, Ms. Leong estimates she was smoking about 1 pack every 3 days.  She denied any history of recreational drug use and her mother agreed.    SOCIAL HISTORY:  Ms. Leong was raised in [place].  Her mother shared that she was born approximately 6 weeks early and weighed 4 pounds.  She stayed an extra month in the hospital in an incubator which later lead to some visual difficulties.  Her mother indicated that there were no delays in reaching developmental milestones.  Her mother also shared that the school had recommended that she be held back in  but her  refused.  She had some difficulties initially learning math but otherwise they denied any history of learning problems or need for intervention. (With prompting and when questioned alone, Ms. Leong's shared that her daughter did struggle a bit in school and was a slow learner). They denied any attention or behavior problems in school.  Ms. Leong did share that she had some difficulty socially which was very challenging for her.  She described herself as an average student in high school earning C/C+ grades and her mother agreed.  She denied any further college or technical training.    Ms. Leong indicated that she worked a variety of jobs describing herself as a \",\" but when questioned further it " seems that mostly she held  positions.  She described her longest job working at [employer] for 9 years as a .  She also worked at [employer]. Most recently she had worked at [employer] as a  for almost 3 years when she retired last July.    Ms. Leong was able to share that she has actually been receiving Social Security (correction) since age 63 and had been working on a part-time basis since then.  Her mother confirmed.    Ms. Leong indicated that she has previously been  twice once for 7 years and once for about a year and a half.  The second marriage which was much shorter ended in divorce approximately 3 years ago.  During that time she had continue to live with her mother.  Ms. Leong stated that she has no children.    BEHAVIORAL OBSERVATIONS:   Ms. Leong arrived on time and accompanied by her mother Jeri to today's appointment. She was appropriately dressed and groomed. She was alert and engaged during the interview. She presented on oxygen and ambulated by using a wheelchair as a walker. A pocket talked was used throughout the evaluation to assist with hearing. Her mood was euthmyic and her affect was appropriately reactive. Rapport was easily established and eye contact was unremarkable. She was pleasant and cooperative. Prosody, and content of speech were grossly normal but rate was somewhat slow. No significant word finding difficulties or paraphasic errors were evident. There was no evidence of a berna thought disorder; no hallucinations or delusions were apparent.     Ms. Leong appeared adequately motivated and engaged easily in the testing component of the evaluation. Her performance was fully intact on embedded measures of objective effort. She attempted all tasks presented to her and worked at a slow but steady pace. She was often slow in her speech and in providing responses to items. Instructions often required repetition, rephrasing and some  simplification to assist with comprehension. She did also request repetition of instructions due to reportedly not hearing adequately. She did not appear overly frustrated by difficult or challenging tasks and responded appropriately to encouragement from the examiner.  Of note, Similarities was discontinued early and an incorrect trial of CTT was given due to administration error.  Otherwise, no significant barriers to testing were observed and the following is judged to be a valid representation of Ms. Leong's current cognitive strengths and weaknesses.    TESTS ADMINISTERED:   Lusby Naming Test (BNT), Brief Visuospatial Memory Test - R Form 1 (BVMT-R), Color TrailsTest (CTT), DKEFS (Color Word Interference, Verbal Fluency), Generalized Anxiety Disorder-7 (VIOLET-7), Geriatric Depression Scale 30 (GDS), Porter Verbal Learning Test - R Form 1 (HVLT-R), Wechsler Abbreviated Scale of Intelligence- Second Edition (WASI-II), WAIS-IV Digit Span, WMS-IV Logical Memory, WRAT-4 Word Reading (blue), and WMS-III (Information and Orientation, Mental Control).    (Raw scores in parentheses)  PPE was not worn by the examiner or the patient.     DESCRIPTIVE PERFORMANCE KEY:    Labels for tests with Normal Distributions  Score Label Standard Score %ile Rank   Exceptionally high score  > 130 > 98   Above average score 120-129 91-97   High average score 110-119 75-90   Average score  25-74   Low average score 80-89 9-24   Below average score 70-79 2-8   Exceptionally low score < 70 < 2     Labels for tests with Non-Normal Distributions  Score Label %ile Rank   Within normal expectations/ limits score (WNL) > 24   Low average score 9-24   Below average score 2-8   Exceptionally low score < 2     The following test results utilize score labels as adapted from Kannan Angelo, J Carlos Jones, Alicia Garsia, ANTHONY Pfeiffer, Afsaneh Baig, Fernando Miller, Mathew Donnelly & Conference Participants (2020):  "American Academy of Clinical Neuropsychology consensus conference statement on uniform labeling of performance test scores, The Clinical Neuropsychologist, DOI: 10.1080/[email or web address]    All scores contain some measure of error; scores are reported here as they are obtained by the individual (without reference to the range of error). These are meant as labels and not interpretation of performance. While other relevant comments regarding task performance are provided below, please see the Assessment, Impressions and Diagnostic Summary sections of this report for interpretation of the scores and the cognitive profile as a whole, including what does and does not constitute impairment.    OPTIMAL PREMORBID INTELLECT:  Optimal premorbid intellectual abilities were estimated as falling in the borderline range based on Ms. Leong's educational and occupational histories and performance on tasks least likely to be affected by acquired brain dysfunction (i.e.,  hold tests ).    SUMMARY OF TEST RESULTS:     Orientation, Attention and Processing Speed  Mental status exam was measured as a below average score for her age (11). She was oriented to personal information (but reported her age as 67, actually 68), place, time, and date and was able to correctly name the most recent president.  She was not sure what city we were in and stated that \"Popeye Marrufo,\" was the current president    Performance on a measure of basic attention and working memory was assessed as a below average score (16). This reflected an average score for basic attention skills (LDF = 5) and working memory scores that ranged from a below average score (LDS = 3) to a low average score (LDB = 3).    A simple sequencing task (181\" ) as well as a speeded color naming task (53\") were both assessed as an exceptionally low scores. A speeded word reading task (32\") was assessed as a low average score.     On a measure of speeded processing and working " memory (Mental Control), she earned a below average score (12).     Language  Sight word reading skills (56) were assessed as an average score.  In contrast, verbal reasoning skills were assessed as an exceptionally low score (VCI = 67).  Specifically, expressive vocabulary (19) was measured as a below average score while verbal abstraction (11) was assessed as an exceptionally low score. Her performance on a measure of semantic fluency was measured as an exceptionally low score (Category Fluency = 16) on one task but as a low average score on a different task (Category Switching Total = 9). Confrontation naming was measured as a low average score (49).    Visuospatial Skills  Overall nonverbal skills were assessed as a below average score (DOREEN = 70). Specifically, performance on both a block construction task (8) and on a pattern completion task (7) was measured as a below average score.    Learning and Memory  Immediate memory for two short stories was measured as a below average score (16). Delayed recall of these stories resulted in a low average score (9, 82% retention). Recognition memory was measured as an exceptionally low score (13/23). She was also administered a measure of rote auditory verbal list learning that required her to learn a series of 12 words over three trials and retain and recall them over a delay. Her initial rate of learning (4,3,6) reflected an exceptionally low score. She retained and recalled 3 words (50% retention) after the delay for an exceptionally low score for delayed recall.  Recognition memory was measured as a low average score as she correctly identified 11 of the original words and made 2 false positive errors.     Immediate nonverbal memory (0,2,1) for a series of 6 geometric figures was measured as an exceptionally low score while delayed recall of these figures (1 detail) resulted in an exceptionally low score. Recognition memory was measured as an exceptionally low score  "as she correctly identified 3 of the original figures and made 1 false positive error.    Executive Functioning  Working memory skills ranged from a below average score (LDS = 3) to a low average score (LDB = 3).A complex sequencing and set shifting task was discontinued at 4 minutes (at 23) and was notable for 1 error.  Her performance on a measure of phonemic fluency resulted in a low average score (21). Semantic set shifting skills were assessed as an exceptionally low score (Category Switching Accuracy = 4). Her ability to inhibit an over-learned response was assessed as an exceptionally low score (134\"). Her performance on this task was notable for 2 errors (average score). On the more challenging set shifting portion of the task in which she had to alternate between providing and inhibiting an over-learned response, she earned an exceptionally low score (156\"). This latter task was notable for 3 errors (average score).    Mood  On the Geriatric Depression Scale-30 (GDS), a self report measure of depressive symptomatology, she obtained a score of 16, placing her in the range of mild symptoms of depression.     On the Generalized Anxiety Disorder-7, a self-report measure of anxiety, she obtained a score of 4, placing her in the range of minimal anxiety.     EVALUATION SERVICES AND TIME:   A clinical interview/neurobehavioral status examination was conducted with the patient and documented. I thoroughly reviewed the medical record, selected the neuropsychological test battery, provided supervision to the individual who administered and scored the neuropsychological test battery, interpreted/integrated patient data and test results, engaged in clinical decision making, treatment planning, report writing/preparation and provided and documented interactive feedback of test results on the day of testing. A trained examiner/technician directly administered and scored 2+ neuropsychological tests. Please see below for a " breakdown of time spent and the associated codes billed for these services.     Services   Time Spent  CPT Codes   Neurobehavioral Status Exam:  (e.g., face-to-face, interpretation, report) 70 minutes 1 x 96116   Neuropsychological Evaluation Services:   (e.g., integration, interpretation, treatment planning, clinical decision making, feedback)   165 minutes   1 x 96132  2 x 96133        Neuropsychological Testing by Trained Examiner/Technician:  (e.g., test administration, scoring, 2+ tests administered)   185 minutes   1 x 96138  5 x 96139     Diagnosis:  Unspecified Neurocognitive Disorder  Rule out: Mild Vascular Neurocognitive Disorder vs Mild Neurocognitive Disorder due to Alzheimer's disease versus cognitive inefficiencies secondary to untreated sleep apnea or some combination thereof    For diagnostic and coding purposes, Ms. Leong has a history of T2DM, HLD, COPD with chronic hypoxic respiratory failure on 2 L supplemental O2, obesity, mild sleep apnea, bipolar 1 disorder, VIOLET, and cardiomyopathy and was referred for an evaluation of Memory loss. Feedback of results was provided on the day of testing.       Mirian Miller, PhD, LP, ABPP  Clinical Neuropsychologist, LP#[id number]  Board Certified in Clinical Neuropsychology    [facility] [address] , [address]  [place] [address]  Phone:  [phone]    The patient was seen for a neuropsychological evaluation for the purposes of diagnostic clarification and treatment planning. 135 minutes of face-to-face testing were provided by this writer. The patient was cooperative with testing. No concerns were brought to my attention. Please see Dr. Miller's report for a detailed description of the charges and interpretation and integration of the findings.      The patient was seen for a neuropsychological evaluation for the purposes of diagnostic clarification and treatment planning.  50 minutes were spent scoring and compiling  test results by this writer. Please see Dr. Miller's report for a detailed description of the charges and interpretation and integration of the findings.      Again, thank you for allowing me to participate in the care of your patient.        Sincerely,        Mirian Miller, PhD LP

## 2023-04-20 ENCOUNTER — VIRTUAL VISIT (OUTPATIENT)
Dept: PHARMACY | Facility: CLINIC | Age: 68
End: 2023-04-20
Attending: FAMILY MEDICINE
Payer: COMMERCIAL

## 2023-04-20 DIAGNOSIS — F31.30 BIPOLAR I DISORDER, MOST RECENT EPISODE DEPRESSED (H): ICD-10-CM

## 2023-04-20 DIAGNOSIS — E78.5 HYPERLIPIDEMIA LDL GOAL <70: ICD-10-CM

## 2023-04-20 DIAGNOSIS — N39.41 URGE INCONTINENCE: ICD-10-CM

## 2023-04-20 DIAGNOSIS — E11.8 TYPE 2 DIABETES MELLITUS WITH UNSPECIFIED COMPLICATIONS (H): Primary | ICD-10-CM

## 2023-04-20 DIAGNOSIS — J44.9 CHRONIC OBSTRUCTIVE PULMONARY DISEASE, UNSPECIFIED COPD TYPE (H): ICD-10-CM

## 2023-04-20 DIAGNOSIS — Z23 IMMUNIZATION DUE: ICD-10-CM

## 2023-04-20 PROCEDURE — 99207 PR NO CHARGE LOS: CPT

## 2023-04-20 RX ORDER — FLUTICASONE FUROATE, UMECLIDINIUM BROMIDE AND VILANTEROL TRIFENATATE 100; 62.5; 25 UG/1; UG/1; UG/1
1 POWDER RESPIRATORY (INHALATION) DAILY
Qty: 60 EACH | Refills: 3 | Status: SHIPPED | OUTPATIENT
Start: 2023-04-20 | End: 2023-06-22

## 2023-04-20 NOTE — PROGRESS NOTES
Medication Therapy Management (MTM) Encounter    ASSESSMENT:                            Medication Adherence/Access: Educated that patient could try to use pill boxes to help with medication organization and to help with remembering to take medications daily. Patient declined suggestion today as she feels she has a good system in place currently. Will continue to monitor.     COPD: Patient would benefit from transitioning to Trelegy inhaler as this has all medications that are contained in current Breo inhaler in addition to a similar medication as Spiriva. Recommended that patient reach out to [facility] team to help with the cost of this medication as patient may qualify to get this medication for free from the drug . Plan in place for patient to continue on current therapy until able to submit Trelegy PAP application.     Type 2 Diabetes: A1c above goal of <7%, hard to have a good understanding of current blood sugars and recommend patient start checking blood sugars regularly to have a better understanding of current blood sugars. Will continue to increase Trulicity dose to help with additional blood sugar control in addition to weight loss benefits. Patient would not be indicated for an ACEi/ARB at this time as BP has been controlled and UACR is not elevated. Would not recommend starting aspirin therapy as patient is above the age recommendation for primary prevention and ASCVD risk score is <10%.     Bipolar: Stable, continues to follow with psychiatry.     Hyperlipidemia: Patient is not meeting more strict LDL goal of <70 per ADA, could consider changing to high intensity statin in the future (either atorvastatin or rosuvastatin).     Overactive bladder: Plan in place for patient to complete current antibiotic therapy to determine if symptoms improve. If symptoms do not resolve, may consider changing to a more affordable anticholinergic such as solifenacin or trying to submit PAP  for Gloriaetrlupe by working with [facility] pharmacy assistance team. Will continue to monitor.     Immunizations: Patient is due for Shingrix vaccine- patient was educated to get this vaccine at their pharmacy.     PLAN:                            1. Start Trelegy inhaler 1 puff once daily- to help with the cost of this medication call [facility] Assistance team at [phone]     2. Increase Trulicity to 1.5mg weekly     3. Start testing your blood sugars in the morning before eating and record those numbers    4.  You are due for your Shingrix vaccine this is a vaccine is to prevent shingles, I would recommend going to your pharmacy     Follow-up: 5/11/2023 at 9:30AM    SUBJECTIVE/OBJECTIVE:                          Arvind Leong is a 68 year old female called for an initial visit. She was referred to me from Dr. Whiting. Patient was accompanied by mother Jeri.     Reason for visit: Comprehensive review/Recent hospital discharge.    Allergies/ADRs: Reviewed in chart  Past Medical History: Reviewed in chart  Tobacco: She reports that she quit smoking about 6 years ago. She has a 40.00 pack-year smoking history. She has never used smokeless tobacco.  Alcohol: not currently using.    Medication Adherence/Access: Patient takes medications directly from bottles.  Patient takes medications 2 time(s) per day. Per patient, misses medication a couple times per week. Note that Jeri her mother is around for support to help remind Arvind to take her medications daily. Merlyn had a doctor visit yesterday and this scared her to remember to take her medications daily. She notes she has bottles put in places around the house which is helpful for her to remember to take them. Medication barriers: cost has been a barrier for many medications.     COPD: Current medications: patient is using Breo Elipta 1 puff once daily, and patient has been using albuterol nebs or albuterol inhaler once a week. Patient also uses  oxygen at home. Was prescribed Spiriva previously but this was too expensive. Used CPAP last night and patient notes good improvement in sleep after using and feeling much better in the morning.  Patient rinses their mouth after using steroid inhaler.  Patient reports breathing has been okay.    Type 2 Diabetes:  Currently taking Trulicity 0.75mg weekly- has 1 more injection tomorrow and metformin 1000mg twice daily. Patient is not experiencing side effects. Notes that Trulicity has been affordable and is about $47 dollars for a 1 month supply.   Blood sugar monitoring: patient tests when she feels that she needs to be tested/symptoms but not testing blood sugars regularly. Usually sugars are around 100-200.  Symptoms of low blood sugar? none  Symptoms of high blood sugar? none  Eye exam: due  Foot exam: up to date  Diet/Exercise: Did not have time to assess today.   Aspirin: Not taking due to age  Statin: Yes: simvastatin 20mg daily   ACEi/ARB: No.   Urine Albumin:   Lab Results   Component Value Date    UMALCR 19.67 03/16/2023      Lab Results   Component Value Date    A1C 8.6 03/16/2023    A1C 9.8 11/10/2022    A1C 8.0 06/13/2022    A1C 7.4 03/25/2022    A1C 7.6 12/09/2021     BP Readings from Last 6 Encounters:   04/06/23 101/47   03/27/23 109/73   03/16/23 112/57   03/15/23 135/67   03/12/23 113/66   11/10/22 116/57     Bipolar: Current therapy includes aripiprazole 15mg daily. Patient notes that this has been very helpful. Currently seeing psychiatry.     Hyperlipidemia: Current therapy includes simvastatin 20mg daily.  Patient reports no significant myalgias or other side effects.  The 10-year ASCVD risk score (Keiko DK, et al., 2019) is: 8.9%    Values used to calculate the score:      Age: 68 years      Sex: Female      Is Non- : No      Diabetic: Yes      Tobacco smoker: No      Systolic Blood Pressure: 101 mmHg      Is BP treated: No      HDL Cholesterol: 36 mg/dL      Total  Cholesterol: 169 mg/dL  Recent Labs   Lab Test 06/13/22  0815 03/25/22  1338   CHOL 169 153   HDL 36* 38*   LDL 80 71   TRIG 267* 219*     Overactive bladder: Current therapy includes ciprofloxacin 500mg twice daily- has only 2 more days left of current therapy. Denies side effects. Patient notes that symptoms have improved a little bit since starting the antibiotic but not as much as she would have liked. Was prescribed Mybetriq in the past that was too expensive.     Immunizations: Patient is due for Shingrix vaccine.     Today's Vitals: There were no vitals taken for this visit.  ----------------  Post Discharge Medication Reconciliation Status: discharge medications reconciled and changed, per note/orders.    I spent 49 minutes with this patient today. All changes were made via collaborative practice agreement with Rema Whiting MD. A copy of the visit note was provided to the patient's provider(s).    A summary of these recommendations was given to the patient.    Mary Paez, PharmD  Medication Therapy Management Resident  [phone]    Preceptor cosignature: Arvind LES Leong was seen independently by Dr. Paez. I have reviewed the assessment and plan. Kirti Barlow, MirnaD, KASSANDRA, BCACP      Telemedicine Visit Details  Type of service:  Telephone visit  Start Time: 9:10 AM  End Time: 9:59 AM     Medication Therapy Recommendations  COPD (chronic obstructive pulmonary disease) (H)    Current Medication: fluticasone-vilanterol (BREO ELLIPTA) 100-25 MCG/INH inhaler   Rationale: More effective medication available - Ineffective medication - Effectiveness   Recommendation: Change Medication - Trelegy Ellipta 100-62.5-25 MCG/ACT Aepb   Status: Accepted per CPA         Immunization due    Rationale: Preventive therapy - Needs additional medication therapy - Indication   Recommendation: Order Vaccine   Status: Accepted per CPA         Type 2 diabetes mellitus with unspecified complications (H)     Current Medication: dulaglutide (TRULICITY) 0.75 MG/0.5ML pen (Discontinued)   Rationale: Dose too low - Dosage too low - Effectiveness   Recommendation: Increase Dose   Status: Accepted per CPA

## 2023-04-20 NOTE — PROGRESS NOTES
The patient was seen for a neuropsychological evaluation for the purposes of diagnostic clarification and treatment planning.  50 minutes were spent scoring and compiling test results by this writer. Please see Dr. Miller's report for a detailed description of the charges and interpretation and integration of the findings.

## 2023-04-21 ENCOUNTER — TELEPHONE (OUTPATIENT)
Dept: FAMILY MEDICINE | Facility: CLINIC | Age: 68
End: 2023-04-21
Payer: COMMERCIAL

## 2023-04-21 NOTE — PATIENT INSTRUCTIONS
"Recommendations from today's MTM visit:                                                      1. Start Trelegy inhaler 1 puff once daily- to help with the cost of this medication, call [facility] Assistance team at [phone]     2. Increase Trulicity to 1.5mg weekly     3. Start testing your blood sugars in the morning before eating and record those numbers    4.  You are due for your Shingrix vaccine this is a vaccine is to prevent shingles, I would recommend going to your pharmacy     Follow-up: 5/11/2023 at 9:30AM    It was great speaking with you today.  I value your experience and would be very thankful for your time in providing feedback in our clinic survey. In the next few days, you may receive an email or text message from [employer] with a link to a survey related to your  clinical pharmacist.\"     To schedule another MTM appointment, please call the clinic directly or you may call the MTM scheduling line at [phone] or toll-free at [phone].     My Clinical Pharmacist's contact information:                                                      Please feel free to contact me with any questions or concerns you have.      Mary Paez, PharmD  Medication Therapy Management Resident  [phone]   "

## 2023-04-24 NOTE — PATIENT INSTRUCTIONS
DIAGNOSTIC IMPRESSIONS (from 4/19/2023 Neuropsychology Consult):    Results  Intact basic attention and basic language skills  Impairments in all other cognitive domains assessed including speed of thinking, verbal reasoning skills, nonverbal reasoning skills, learning, memory, and problem solving skills  Repeat testing can help clarify the extent to which developmental delay versus bipolar disorder versus untreated sleep apnea, versus various medical comorbidities versus a possible Alzheimer's process could be contributing to her presentation    Diagnosis  Unspecified Neurocognitive Disorder  Rule out: Mild Vascular Neurocognitive Disorder vs Mild Neurocognitive Disorder due to Alzheimer's disease versus cognitive inefficiencies secondary to untreated sleep apnea or some combination thereof    RECOMMENDATIONS:  Driving and Activities of Daily Living  Given the range of cognitive deficits, Ms. Leong is encouraged to continue to refrain from driving.   Ms. Leong will continue to benefit from help in her daily activities particularly in terms of managing medications and finances. Her mother/ family is encouraged to oversee her medications to make sure she is taking them regularly and as prescribed.   It is strongly recommended that a family member accompany Ms. Leong to all appointments to ensure that accurate information is given to providers and instructions are followed. It will be helpful to present the information in brief, repeated segments and have her repeat back the information in her own words to ensure understanding. But ultimately, her caregivers should be in charge of following through with any recommendations.   It is recommended that Ms. Leong not venture into the community independently.  She is at high risk for being taken advantage of.  In addition, she is at risk for getting lost and turned around and may have significant problems finding her way home.  If not already done, completion of  paperwork for advance directives and assignment of healthcare and financial power of  should be considered at this time.   It sounds like Ms. Leong's siblings are ready to provide assistance if/ when her mother is no longer able to do so. If not already, their information should be incorporated into the above legal documents.   To the extent that her family can continue to provide support and assistance, it seems that the current living situation is working.  However, if more support is needed in the future or if her family is unable to provide that support, Ms. Leong would benefit from increased supervision and support in her daily life so as to help her manage daily tasks such as cooking and cleaning as well as keeping track of medications and to ensure her personal safety. An assisted living facility would be ideal so as to allow her some independence while also providing a structured and supportive environment.    Physical and Emotional Health  Given her history of sleep apnea, Ms. Leong is strongly encouraged to resume use of her CPAP machine. Untreated sleep apnea could account for at least some of her cognitive inefficiencies in daily life.   Ms. Leong is encouraged to continue with psychotherapeutic (talk therapy) and psychiatric (medication) interventions to help manage her mood symptoms.   In addition, behavioral activation techniques such as regular exercise (under the guidance of her physician), recreational activities and regular social interaction would likely be effective in helping Ms. Leong to manage her mood.   It is important that Ms. Leong continue to adhere to her medication treatment regimen and follow a healthy diet so as to maintain her physical health, as this can have a significant impact on her physical, emotional, and cognitive functioning.   Memory and Organization  Ms. Leong is encouraged to continue to engage in stimulating activities, (i.e., reading, card  games, puzzles, coloring) to keep her cognitively active.   In her daily life, Ms. Leong will continue to benefit from the use of compensation techniques. That is, she may find it helpful to post reminder notes around the house, make lists, and carry a small calendar so that she can feel more comfortable and confident in her ability to remember information. A daily planner could also be used as a memory book where important information is recorded and organized for future reference.   Ms. Leong should also create a system to establish set locations for certain items (i.e., keys) such that she always knows where to put them upon entering the house and where to look when she needs them. If she would like to keep certain items out of sight (i.e., a wallet), she could set up a specific hidden place to keep items and use that same place so as to ensure she can find the required item when needed.   When required to learn new information, Ms. Leong does best when information is placed within a meaningful context.  Learning will be facilitated if it is made personally meaningful for her and is not presented in a rote fashion (i.e., a list of unrelated information or unconnected tasks to accomplish).   Ms. Leong exhibits memory impairments but does benefit from cues, thus she will do best when provided with reminders to facilitate retrieval of important information.   Ms. Leong will do best in an environment where a lot of routines are established so that she can follow a regular pattern for her daily or weekly routines.   Ms. Leong demonstrates intact basic attention but significant memory impairments, thus, she should not be given instructions for tasks any more than a few minutes in the future.  Ms. Leong should be aware that she may not be able to process information as quickly and efficiently as she once could. Thus she should allow herself extra time to complete tasks and not try and work under  extreme time constraints.   Follow-up  Re-evaluation in 12-18 months is recommended. The current test data can be used as a baseline to which future comparisons can be made.

## 2023-04-28 NOTE — TELEPHONE ENCOUNTER
[facility] is not accepting new applications for Trulicity patients. I have talked to patient and let her know

## 2023-05-10 NOTE — PROGRESS NOTES
Medication Therapy Management (MTM) Encounter    ASSESSMENT:                            Medication Adherence/Access: Continuing to work with [place] patient assistance as patient would likely qualify for Trelegy & Ozempic PAP according to the patient's reported annual income.     COPD: Plan in place for patient to transition to Trelegy if they qualify for PAP.     Type 2 Diabetes: Patient reported fasting blood sugars are at/near goal of  however A1c is still above goal of <7%. Would benefit from a dose increase of Trulicity today however, plan to remain on current dose and transition to Ozempic as Ozmepic has a PAP that the patient would likely qualify for. Will continue to optimize doses of GLP1 in the future to help with additional weight loss and blood sugar reduction benefits. Metformin does not require a dose adjustment with the patient's current kidney function. Plan to change simvastatin to a high intensity statin in the future if LDL remains above goal of <70 upon re-check.     PLAN:                            1. Continue Breo until we are able to transition to Trelegy    2. Continue Trulicity 1.5mg weekly until we are able to transition to Ozempic    3. I will reach out to my team to see if we can get you started for Ozempic and Trelegy applications for financial assistance.     Future considerations: change to high intensity statin     Follow-up: After seeing if patient qualifies for Trelegy and Ozempic PAPs.     SUBJECTIVE/OBJECTIVE:                          Arvind Leong is a 68 year old female called for a follow-up visit.  Today's visit is a follow-up MTM visit from 4/20/2023     Reason for visit: Diabetes follow up.    Allergies/ADRs: Reviewed in chart  Past Medical History: Reviewed in chart  Tobacco: She reports that she quit smoking about 6 years ago. She has a 40.00 pack-year smoking history. She has never used smokeless tobacco.  Alcohol: not assessed.     Medication Adherence/Access:  Based on patient reported annual income (~12,000) patient would likely qualify for patient assistance with current medications.     COPD: Current therapy includes Breo 1 puff daily. Have been trying to work with [place] assistance team to see if patient would qualify for PAP, patient notes they thought they called the phone number given at last MTM visit and said they did not qualify but then forgets if they called the correct phone number.     Type 2 Diabetes:  Currently taking Trulicity 1.5mg weekly and metformin 1,000mg twice daily. Patient is not experiencing side effects.  Blood sugar monitorin time(s) daily. Ranges (patient reported):   AM: 119-140.  Symptoms of low blood sugar? none  Symptoms of high blood sugar? none  Eye exam: due  Foot exam: up to date  Diet/Exercise: Patient notes that she is over weight and continues to try to lose weight, exercise has been hard with COPD condition and having oxygen.   Aspirin: Not taking due to age  Statin: Yes: simvastatin 20mg daily   ACEi/ARB: No. BP controlled and UACR not elevated.  Urine Albumin:   Lab Results   Component Value Date    UMALCR 19.67 2023      Lab Results   Component Value Date    A1C 8.6 2023    A1C 9.8 11/10/2022    A1C 8.0 2022    A1C 7.4 2022    A1C 7.6 2021     GFR Estimate   Date Value Ref Range Status   2023 >90 >60 mL/min/1.73m2 Final     Comment:     eGFR calculated using  CKD-EPI equation.   03/15/2023 >90 >60 mL/min/1.73m2 Final     Comment:     eGFR calculated using  CKD-EPI equation.   2023 90 >60 mL/min/1.73m2 Final     Comment:     eGFR calculated using  CKD-EPI equation.   2021 >60 >60 mL/min/1.73m2 Final   2021 >60 >60 mL/min/1.73m2 Final   2021 >60 >60 mL/min/1.73m2 Final     Recent Labs   Lab Test 22  0815 22  1338   CHOL 169 153   HDL 36* 38*   LDL 80 71   TRIG 267* 219*     Today's Vitals: There were no vitals taken for this  visit.  ----------------  I spent 16 minutes with this patient today. All changes were made via collaborative practice agreement with Rema Whiting MD. A copy of the visit note was provided to the patient's provider(s).    A summary of these recommendations was sent via [employer].    Mary Paez, PharmD  Medication Therapy Management Resident  [phone]    Preceptor cosignature: Arvind LES Leong was seen independently by Dr. Paez. I have reviewed the assessment and plan. Kirti Barlow, MirnaD, KASSANDRA, BCACP      Telemedicine Visit Details  Type of service:  Telephone visit  Start Time: 9:44 AM  End Time: 10:00 AM     Medication Therapy Recommendations  COPD (chronic obstructive pulmonary disease) (H)    Current Medication: Fluticasone-Umeclidin-Vilant (TRELEGY ELLIPTA) 100-62.5-25 MCG/ACT oral inhaler   Rationale: Cannot afford medication product - Cost - Adherence   Recommendation: Referral to Service    Status: Patient Agreed - Adherence/Education

## 2023-05-11 ENCOUNTER — VIRTUAL VISIT (OUTPATIENT)
Dept: PHARMACY | Facility: CLINIC | Age: 68
End: 2023-05-11
Payer: COMMERCIAL

## 2023-05-11 DIAGNOSIS — J44.9 CHRONIC OBSTRUCTIVE PULMONARY DISEASE, UNSPECIFIED COPD TYPE (H): Primary | ICD-10-CM

## 2023-05-11 DIAGNOSIS — E11.8 TYPE 2 DIABETES MELLITUS WITH UNSPECIFIED COMPLICATIONS (H): ICD-10-CM

## 2023-05-11 PROCEDURE — 99207 PR NO CHARGE LOS: CPT

## 2023-05-11 NOTE — Clinical Note
Brad Sadler,   Wondering if you could help with both Trelegy & Ozempic applications (to replace current Breo & Trulicity). The patient notes the reached out and said they wouldn't qualify however I don't think they reached out because annual income is about 12,000 so would likely qualify. Do you mind reaching out to them?   Please call the [phone] number then try the second mobile number if that does not work.   Appreciate your help as always!   Thanks,  Mary Paez, PharmD Medication Therapy Management Resident [phone]

## 2023-05-11 NOTE — PATIENT INSTRUCTIONS
"Recommendations from today's MTM visit:                                                      1. Continue Breo until we are able to transition to Trelegy    2. Continue Trulicity 1.5mg weekly until we are able to transition to Ozempic    3. I will reach out to my team to see if we can get you started for Ozempic and Trelegy applications for financial assistance.     Future considerations: change to high intensity statin     Follow-up: After seeing if patient qualifies for Trelegy and Ozempic PAPs    It was great speaking with you today.  I value your experience and would be very thankful for your time in providing feedback in our clinic survey. In the next few days, you may receive an email or text message from [facility] [email or web address] with a link to a survey related to your  clinical pharmacist.\"     To schedule another MTM appointment, please call the clinic directly or you may call the MTM scheduling line at [phone] or toll-free at [phone].     My Clinical Pharmacist's contact information:                                                      Please feel free to contact me with any questions or concerns you have.      Mary Paez, PharmD  Medication Therapy Management Resident  [phone]   "

## 2023-05-15 ENCOUNTER — TELEPHONE (OUTPATIENT)
Dept: PHARMACY | Facility: CLINIC | Age: 68
End: 2023-05-15
Payer: COMMERCIAL

## 2023-05-15 NOTE — TELEPHONE ENCOUNTER
Brad Sadler,     Wondering if you could help with both Trelegy & Ozempic applications (to replace current Breo & Trulicity). The patient notes the reached out and said they wouldn't qualify however I don't think they reached out because annual income is about 12,000 so would likely qualify. Do you mind reaching out to them?     Please call the [phone] number then try the second mobile number if that does not work.     Appreciate your help as always!     Thanks,   Mary Paez, PharmD   Medication Therapy Management Resident   [phone]

## 2023-05-17 NOTE — TELEPHONE ENCOUNTER
Mary Mcnamara McKenzie, [facility]  Well, if the income is close to being correct...  she would not qualify because MA should approve her.  We haven't heard from her.     Have Arvind call us at [phone]. She will need to leave a message as I'm still short my Admin.     Thanks,   Viktoriya

## 2023-05-23 ENCOUNTER — PATIENT OUTREACH (OUTPATIENT)
Dept: CARE COORDINATION | Facility: CLINIC | Age: 68
End: 2023-05-23
Payer: COMMERCIAL

## 2023-05-24 ENCOUNTER — TELEPHONE (OUTPATIENT)
Dept: FAMILY MEDICINE | Facility: CLINIC | Age: 68
End: 2023-05-24
Payer: COMMERCIAL

## 2023-05-24 DIAGNOSIS — J44.1 COPD EXACERBATION (H): Primary | ICD-10-CM

## 2023-05-24 DIAGNOSIS — E11.8 TYPE 2 DIABETES MELLITUS WITH UNSPECIFIED COMPLICATIONS (H): ICD-10-CM

## 2023-05-24 NOTE — TELEPHONE ENCOUNTER
May 23, 2023 I spoke with Arvind, she is in need of financial assistance for medication.    We reviewed the Prescription Assistance Program for manfacturer assistance programs, gross income, insurance and Rx list.    Based on the income information Arvind gave me, her income is to low to qualify for  programs. They would require a denial from Medical Assistance and or Social Security Extra Help program.    I gave Arvind the contact information to [employer] to apply for the Social Security Extra Help program. I also encouraged her to apply for Medical Assistance. I let her know our Care Coordinators/ at her clinic could help her with this and there is no charge for the appointment.    If Arvind is denied for MA or Social Security Extra Help, I told her to save the denial letter and call me. I should be able to help.    Viktoriya Mcnamara  Prescription Assistance Supervisor  Pharmacy Assistance  [phone]

## 2023-05-24 NOTE — TELEPHONE ENCOUNTER
Called and spoke to patient, patient would like a referral to care coordination to help with the applications. I have put in the referral.     Thanks,  Mary Paez, PharmD  Medication Therapy Management Resident  [phone]

## 2023-05-26 ENCOUNTER — PATIENT OUTREACH (OUTPATIENT)
Dept: CARE COORDINATION | Facility: CLINIC | Age: 68
End: 2023-05-26
Payer: COMMERCIAL

## 2023-05-26 NOTE — LETTER
[facility] CARE COORDINATION  [address]  [place] [address]    May 30, 2023    Arvind Leong  [address]   [place] [address]      Dear Arvind,    I am a  clinic community health worker who works with Rema Whiting MD with the [facility]. I wanted to thank you for spending the time to talk with me.  Below is a description of clinic care coordination and how I can further assist you.       The clinic care coordination team is made up of a registered nurse, , financial resource worker and community health worker who understand the health care system. The goal of clinic care coordination is to help you manage your health and improve access to the health care system. Our team works alongside your provider to assist you in determining your health and social needs. We can help you obtain health care and community resources, providing you with necessary information and education. We can work with you through any barriers and develop a care plan that helps coordinate and strengthen the communication between you and your care team.  Our services are voluntary and are offered without charge to you personally.    Please feel free to contact me with any questions or concerns regarding care coordination and what we can offer.      We are focused on providing you with the highest-quality healthcare experience possible.      Sincerely,     Kathy Zhang  Community Health Worker  [facility] Care Coordination   [place], [place], [place], [place] and [facility]  Office: [phone]

## 2023-05-26 NOTE — PROGRESS NOTES
Clinic Care Coordination Contact  [facility]/Voicemail    Clinical Data: Care Coordinator Outreach  Outreach attempted x 1. No VM available.  Plan: Care Coordinator will try to reach patient again in 1-2 business days.    Overview  in need of financial assistance for medication; MA or Senior Linkage Line - Social Security Extra Help program    Kathy Zhang  [facility] Health Worker  [facility] Care Coordination   [facility], [place], [place], [facility]  Office: [phone]

## 2023-05-30 NOTE — PROGRESS NOTES
Clinic Care Coordination Contact  Community Health Worker Initial Outreach    Patient accepts CC: No, patient let CHW know that she is applying for a program to help her with the cost of her medications. Patient will call CHW back if interested in our assistance in the future. Patient will be sent Care Coordination introduction letter for future reference.     Kathy Zhang  [facility] Health Worker  [facility] Care Coordination   [facility], [facility], [facility]  Office: [phone]

## 2023-05-31 ENCOUNTER — TELEPHONE (OUTPATIENT)
Dept: PHARMACY | Facility: CLINIC | Age: 68
End: 2023-05-31
Payer: COMMERCIAL

## 2023-05-31 NOTE — TELEPHONE ENCOUNTER
Called patient to check in on status for applying for financial assistance with medications. Appears that patient may have declined help from care coordination. Home phone had a busy tone so had to leave voicemail on cell phone. Will call patient back if not heard from them sooner.     Mary Paez, PharmD  Medication Therapy Management Resident

## 2023-06-20 ENCOUNTER — NURSE TRIAGE (OUTPATIENT)
Dept: NURSING | Facility: CLINIC | Age: 68
End: 2023-06-20
Payer: COMMERCIAL

## 2023-06-20 NOTE — TELEPHONE ENCOUNTER
"Call from patient asking prednisone. Patient says she has   COPD, can't lay down to sleep, but haven't been using her albuterol regularly. Patient says it is harder for her to walk even short distances, for example, to her car or her mom's trailer. Patient is on 2 lpm of oxygen. She is alert and abl to speak normally.     Patient is a little hard of hearing - not sure if patient heard everything.    Assessment/Disposition: be seen w/i 4 hrs    Patient says she will go to the [facility] today at 10 am.    Reviewed care advice with caller per RN triage protocol guideline.  Advised to call back with worsening symptoms, concerns or questions. Caller verbalized understanding.      Lluvia Lan, RN, BSN  Triage Nurse Advisor              Reason for Disposition    [1] MILD difficulty breathing (e.g., minimal/no SOB at rest, SOB with walking, pulse <100) AND [2] NEW-onset or WORSE than normal    Additional Information    Negative: SEVERE difficulty breathing (e.g., struggling for each breath, speaks in single words)    Negative: [1] Breathing stopped AND [2] hasn't returned    Negative: Choking on something    Negative: Bluish (or gray) lips or face now    Negative: Difficult to awaken or acting confused (e.g., disoriented, slurred speech)    Negative: Passed out (i.e., lost consciousness, collapsed and was not responding)    Negative: Wheezing started suddenly after medicine, an allergic food or bee sting    Negative: Stridor    Negative: Slow, shallow and weak breathing    Negative: Sounds like a life-threatening emergency to the triager    Negative: [1] MODERATE difficulty breathing (e.g., speaks in phrases, SOB even at rest, pulse 100-120) AND [2] NEW-onset or WORSE than normal    Negative: Oxygen level (e.g., pulse oximetry) 90 percent or lower    Negative: Wheezing can be heard across the room    Negative: Drooling or spitting out saliva (because can't swallow)    Negative: History of prior \"blood clot\" in leg or " "lungs (i.e., deep vein thrombosis, pulmonary embolism)    Negative: History of inherited increased risk of blood clots (e.g., Factor 5 Leiden, Anti-thrombin 3, Protein C or Protein S deficiency, Prothrombin mutation)    Negative: Major surgery in the past month    Negative: Hip or leg fracture (broken bone) in past month (or had cast on leg or ankle in past month)    Negative: Illness requiring prolonged bedrest in past month (e.g., immobilization, long hospital stay)    Negative: Long-distance travel in past month (e.g., car, bus, train, plane; with trip lasting 6 or more hours)    Negative: Cancer treatment in past six months (or has cancer now)    Negative: Extra heart beats OR irregular heart beating  (i.e., \"palpitations\")    Negative: Fever > 103 F (39.4 C)    Negative: [1] Fever > 101 F (38.3 C) AND [2] age > 60 years    Negative: [1] Fever > 100.0 F (37.8 C) AND [2] bedridden (e.g., nursing home patient, CVA, chronic illness, recovering from surgery)    Negative: [1] Fever > 100.0 F (37.8 C) AND [2] diabetes mellitus or weak immune system (e.g., HIV positive, cancer chemo, splenectomy, organ transplant, chronic steroids)    Negative: [1] Periods where breathing stops and then resumes normally AND [2] bedridden (e.g., nursing home patient, CVA)    Negative: Pregnant or postpartum (from 0 to 6 weeks after delivery)    Negative: Patient sounds very sick or weak to the triager    Protocols used: BREATHING DIFFICULTY-A-AH      "

## 2023-06-22 ENCOUNTER — TELEPHONE (OUTPATIENT)
Dept: PHARMACY | Facility: CLINIC | Age: 68
End: 2023-06-22
Payer: COMMERCIAL

## 2023-06-22 DIAGNOSIS — E11.8 TYPE 2 DIABETES MELLITUS WITH UNSPECIFIED COMPLICATIONS (H): ICD-10-CM

## 2023-06-22 NOTE — TELEPHONE ENCOUNTER
"Routing refill request to provider for review/approval because:  Labs not current:  HgbA1C  Early refill    Last Written Prescription Date: 5/11/2023   Last Fill Quantity: 2 mL,  # refills: 1   Last office visit provider:  4/06/2023     Requested Prescriptions   Pending Prescriptions Disp Refills     dulaglutide (TRULICITY) 1.5 MG/0.5ML pen 2 mL 1     Sig: Inject 1.5 mg Subcutaneous every 7 days       GLP-1 Agonists Protocol Failed - 6/22/2023  3:29 PM        Failed - HgbA1C in past 3 or 6 months     If HgbA1C is 8 or greater, it needs to be on file within the past 3 months.  If less than 8, must be on file within the past 6 months.     Recent Labs   Lab Test 03/16/23  1124   A1C 8.6*             Passed - Medication is active on med list        Passed - Patient is age 18 or older        Passed - No active pregnancy on record        Passed - Normal serum creatinine on file in past 12 months     Recent Labs   Lab Test 03/16/23  1124   CR 0.69       Ok to refill medication if creatinine is low          Passed - No positive pregnancy test in past 12 months        Passed - Recent (6 mo) or future (30 days) visit within the authorizing provider's specialty     Patient had office visit in the last 6 months or has a visit in the next 30 days with authorizing provider.  See \"Patient Info\" tab in inbasket, or \"Choose Columns\" in Meds & Orders section of the refill encounter.                 Marguerite Maldonado RN 06/22/23 3:29 PM  "

## 2023-06-22 NOTE — TELEPHONE ENCOUNTER
Called and spoke to patient, patient notes that they have submitted their application for MA to the [facility] and now are just waiting to hear back. Will remove Ozempic and Trelegy from patient's medication list for now and wait until patient may be approved for MA to re-consider versus going trough drug manufactures.     MTM follow up scheduled for 7/11/2023 to review medications and obtain updated A1c.

## 2023-06-26 ENCOUNTER — OFFICE VISIT (OUTPATIENT)
Dept: SLEEP MEDICINE | Facility: CLINIC | Age: 68
End: 2023-06-26
Payer: COMMERCIAL

## 2023-06-26 VITALS
DIASTOLIC BLOOD PRESSURE: 52 MMHG | BODY MASS INDEX: 33.9 KG/M2 | OXYGEN SATURATION: 92 % | HEART RATE: 68 BPM | SYSTOLIC BLOOD PRESSURE: 108 MMHG | WEIGHT: 170.7 LBS

## 2023-06-26 DIAGNOSIS — G47.33 OSA ON CPAP: Primary | ICD-10-CM

## 2023-06-26 DIAGNOSIS — J96.11 CHRONIC RESPIRATORY FAILURE WITH HYPOXIA (H): ICD-10-CM

## 2023-06-26 PROCEDURE — 99215 OFFICE O/P EST HI 40 MIN: CPT | Performed by: STUDENT IN AN ORGANIZED HEALTH CARE EDUCATION/TRAINING PROGRAM

## 2023-06-26 ASSESSMENT — SLEEP AND FATIGUE QUESTIONNAIRES
HOW LIKELY ARE YOU TO NOD OFF OR FALL ASLEEP WHILE WATCHING TV: HIGH CHANCE OF DOZING
HOW LIKELY ARE YOU TO NOD OFF OR FALL ASLEEP WHILE LYING DOWN TO REST IN THE AFTERNOON WHEN CIRCUMSTANCES PERMIT: HIGH CHANCE OF DOZING
HOW LIKELY ARE YOU TO NOD OFF OR FALL ASLEEP WHILE SITTING AND READING: HIGH CHANCE OF DOZING
HOW LIKELY ARE YOU TO NOD OFF OR FALL ASLEEP IN A CAR, WHILE STOPPED FOR A FEW MINUTES IN TRAFFIC: MODERATE CHANCE OF DOZING
HOW LIKELY ARE YOU TO NOD OFF OR FALL ASLEEP WHILE SITTING AND TALKING TO SOMEONE: MODERATE CHANCE OF DOZING
HOW LIKELY ARE YOU TO NOD OFF OR FALL ASLEEP WHILE SITTING INACTIVE IN A PUBLIC PLACE: SLIGHT CHANCE OF DOZING
HOW LIKELY ARE YOU TO NOD OFF OR FALL ASLEEP WHILE SITTING QUIETLY AFTER LUNCH WITHOUT ALCOHOL: HIGH CHANCE OF DOZING
HOW LIKELY ARE YOU TO NOD OFF OR FALL ASLEEP WHEN YOU ARE A PASSENGER IN A CAR FOR AN HOUR WITHOUT A BREAK: HIGH CHANCE OF DOZING

## 2023-06-26 NOTE — NURSING NOTE
Return visit has been schedule. AVS printed and given to patient.    Mehdi Mckeon MA  [facility]

## 2023-06-26 NOTE — NURSING NOTE
New DME orders, clinic visit notes, and face sheet was faxed to  have been faxed [facility] at [phone].  Gloria Marie, CMA

## 2023-06-26 NOTE — NURSING NOTE
"Chief Complaint   Patient presents with     CPAP Follow Up       Initial /52   Wt 77.4 kg (170 lb 11.2 oz)   SpO2 92%   BMI 33.90 kg/m   Estimated body mass index is 33.9 kg/m  as calculated from the following:    Height as of 4/6/23: 1.511 m (4' 11.5\").    Weight as of this encounter: 77.4 kg (170 lb 11.2 oz).    Medication Reconciliation: complete      Mehdi Mckeon MA  [facility]  "

## 2023-06-26 NOTE — PATIENT INSTRUCTIONS
MY INFORMATION ON SLEEP APNEA-  Arvind Leong    DOCTOR : Lia Lock MD  SLEEP CENTER :      MY CONTACT NUMBER:    [facility]  [phone]  [facility]      Request tubing to connect O2 to CPAP device: double sided female adapters      For general sleep health questions:   http://sleepeducation.org    For tips about PAP and COVID-19:  https://www.thoracic.org/patients/patient-resources/resources/covid-19-and-home-pap-therapy.pdf    For general info about COVID-19 including vaccines:  https://Ziptask.org/covid19      Continue PAP therapy every night, for all hours that you are sleeping (including naps.)  As always, try to get at least 8 hours of sleep or more each day, keep a regular sleep schedule, and avoid sleep deprivation. Avoid alcohol.  Reasons that you might need a change to your pressure therapy would be weight gain or loss, waking having inadvertently removed your PAP overnight, having previously felt refreshed by sleep with CPAP use and now waking un-refreshed, and return of daytime sleepiness. Also, the development of new medical problems  (such as heart failure, stroke, medications such as narcotics) can sometimes affect breathing at night and change your PAP therapy needs.  Please bring PAP with you if you are hospitalized.  If anticipating surgery be sure to discuss with your surgeon that you have sleep apnea and use PAP therapy.    Maintain your equipment as recommended which includes routine cleaning and replacement of supplies.      Call DME for any questions regarding supplies or maintenance.    [facility], [facility] [phone]    Do not drive on engage in potentially dangerous activities if feeling sleepy.  Please follow up in sleep clinic again in 4 months.        Tips for your PAP use-    Mask fitting tips  Mask fitting exercise:    To improve your mask seal and your mobility at  night, put mask on and secure in place.  Lie down in bed with full pressure and roll to one side, adjust headgear while in that position to eliminate any leaks. Repeat process rolling to other side.     The mask seal does not have to be perfect:   CPAP machines are designed to make up for small leaks. However, you will not tolerate leaks blowing in your eyes so you will need to adjust.   Any leak should only be near or at the bottom of the mask.  We expect your mask to leak slightly at night.    Do not over-tighten the headgear straps, tighter IS NOT better, we expect minimal leak.    First try re-positioning the mask or headgear before tightening the headgear straps.  Mask leaks are expected due to changing sleeping positions. Try pulling the mask away from your skin allowing the cushion to re-inflate will minimize the leak.  If you struggle for a good fit, try turning the CPAP off and then readjust the mask by pulling it away from your face and then turning back on the CPAP.        Humidifier tips  Humidifiers can be adjusted to increase or decrease the amount of moisture according to your comfort level. You may need to adjust this frequently at first, but then might only change it with seasonal weather changes.     Try INCREASING the humidity if:  You experience a dry, irritated nasal passage or throat.  You have a runny, drippy nose or sneezing fits after using CPAP.  You experience nasal congestion during or after CPAP use.    Try DECREASING the humidity if:  You have excessive condensation or  rain out  in the tubing or mask.  Otherwise keep the tubing warm during the night by running it underneath the blankets or pillow.      Clinic visit after initial PAP set-up   Bring your equipment with you to your 5-8 week follow up clinic visit.  We will be extracting your data from the machine if not available from the cloud based [employer].        Travel  Always take your equipment with you when you travel.  If you fly  with your equipment bring it on with you as a carry on.  Medical equipment does not count as a carry on.    If you travel international the machines take 110-240v.  The only adapter needed is the adapter that will fit into the receptacle (outlet).    You may also want to bring an extension cord as many hotel rooms have limited outlets at the bedside.  Do not travel with water in your humidifier chamber.     Cleaning and Maintenance Guidelines    Equipment Frequency Cleaning Method   Mask First Day    Daily      Weekly Soak mask in hot soapy water for 30 minutes, rinse and air dry.  Wipe nasal cushion with a hot soapy (Ivory, baby shampoo) cloth and rinse.  Baby wipes may also be used.  Do not use anti-bacterial soaps,Sandrita  liquid soap, rubbing alcohol, bleach or ammonia.  Wash frame in hot soapy water (Ivory, baby shampoo) rinse and let air dry   Headgear Biweekly Wash in hot soapy water, rinse and air dry   Reusable Gray Filter Weekly Wash in hot soapy water, rinse, put in towel squeeze moisture out, let air dry   Disposable White Filter Check Weekly Replace when brown or gray in color; at least every 2 to 3 months   Humidifier Chamber Daily    Weekly Empty distilled water from humidifier and let air dry    Hand wash in hot soapy water, rinse and air dry   Tubing Weekly Wash in hot soapy water, rinse and let air dry   Mask, Tubing and Humidifier Chamber As needed Disinfect: Soak in 1 part distilled white vinegar to 3 parts hot water for 30 minutes, rinse well and air dry  Not the material headgear        MASK AND SUPPLY REORDERING and EQUIPMENT NEEDS through your DME and per your insurance  Reminder: Most insurance companies will allow for a new mask, headgear, tubing, and reusable gray filter every six months.  Disposable white ultra-fine filters are covered monthly.      HOME AND SAFETY INSTRUCTIONS  Do not use frayed or cracked electrical cords, multi plug adaptors, or switched receptacles  Do not immerse  electrical equipment into water  Assure that electrical cords do not become a tripping hazard

## 2023-06-26 NOTE — PROGRESS NOTES
[facility]     Sleep clinic follow up visit note    Date on this visit: 6/26/2023    Primary Physician: Rema Whiting     History of present illness:  Arvind Leong is a 68 year old female patient with COPD with chronic hypoxic respiratory failure on 2 L supplemental O2, Mild ABDIFATAH, T2DM, HLD, bipolar 1 disorder, VIOLET, and cardiomyopathy Unspecified Neurocognitive Disorder who presents for CPAP Follow Up  . She has mild sleep apnea with an AHI of 14.7, managed with CPAP.     Do you use a CPAP Machine at home: Yes  DME: [facility]  Overall, on a scale of 0-10 how would you rate your CPAP (0 poor, 10 great): five    What type of mask do you use: Nasal Pillow  Is your mask comfortable: Yes  If not, why:      Is your mask leaking: No  If yes, where do you feel it:    How many night per week does the mask leak (0-7):      Do you notice snoring with mask on: No  Do you notice gasping arousals with mask on: Yes  Are you having significant oral or nasal dryness: No  Is the pressure setting comfortable: Yes  If not, why:      What is your typical bedtime:    How long does it take you to go to sleep on PAP therapy:    What time do you typically get out of bed for the day:    How many hours on average per night are you using PAP therapy:  two  How many hours are you sleeping per night:    Do you feel well rested in the morning: No      Respironics  Auto-PAP 5 - 15 cmH2O 30 day usage data:    0% of days with > 4 hours of use. 30/30 days with no use.              SCALES:  EPWORTH SLEEPINESS SCALE       6/26/2023     8:17 AM    Miracle Sleepiness Scale ( ZULLY Lepe  [id number]<br>ESS - [place]/English - Final version - 21 Nov 07 - [facility] [place].)   Sitting and reading High chance of dozing   Watching TV High chance of dozing   Sitting, inactive in a public place (e.g. a theatre or a meeting) Slight chance of dozing   As a passenger in a car for an hour without a break High chance of dozing   Lying  down to rest in the afternoon when circumstances permit High chance of dozing   Sitting and talking to someone Moderate chance of dozing   Sitting quietly after a lunch without alcohol High chance of dozing   In a car, while stopped for a few minutes in traffic Moderate chance of dozing   New Market Score (MC) 20   New Market Score (Sleep) 20       INSOMNIA SEVERITY INDEX (SHAWN)        6/26/2023     8:08 AM   Insomnia Severity Index (SHWAN)   Difficulty falling asleep 1   Difficulty staying asleep 1   Problems waking up too early 1   How SATISFIED/DISSATISFIED are you with your CURRENT sleep pattern? 3   How NOTICEABLE to others do you think your sleep problem is in terms of impairing the quality of your life? 4   How WORRIED/DISTRESSED are you about your current sleep problem? 4   To what extent do you consider your sleep problem to INTERFERE with your daily functioning (e.g. daytime fatigue, mood, ability to function at work/daily chores, concentration, memory, mood, etc.) CURRENTLY? 4   SHAWN Total Score 18     Guidelines for Scoring/Interpretation:  Total score categories:  0-7 = No clinically significant insomnia   8-14 = Subthreshold insomnia   15-21 = Clinical insomnia (moderate severity)  22-28 = Clinical insomnia (severe)  Used via courtesy of www.Lifeenergy.va.gov with permission from Sylvain Delacruz PhD., [facility]      Allergies:    Allergies   Allergen Reactions     Lurasidone Other (See Comments)     Face turned red, (Brand name = Latuda) Face turned red, Face turned red       Medications:    Current Outpatient Medications   Medication Sig Dispense Refill     albuterol (PROAIR HFA/PROVENTIL HFA/VENTOLIN HFA) 108 (90 Base) MCG/ACT inhaler Inhale 2 puffs into the lungs every 6 hours as needed for shortness of breath / dyspnea or wheezing 18 g 12     albuterol (PROVENTIL) (2.5 MG/3ML) 0.083% neb solution Take 1 vial (2.5 mg) by nebulization every 4 hours as needed for shortness of breath or wheezing Take 1  vial four times a day for the next 5 days then go back to as needed 240 mL 12     ARIPiprazole (ABILIFY) 15 MG tablet Take 15 mg by mouth daily       blood glucose (NO BRAND SPECIFIED) lancets standard Use to test blood sugar 4 times daily before meals and at bedtime 1 each 1     blood glucose (NO BRAND SPECIFIED) test strip Use to test blood sugar 4 times daily before meals and at bedtime 100 strip 0     blood glucose monitoring (ONETOUCH VERIO) meter device kit Use to test blood sugar 4 times daily before meals and bedtie 1 kit 1     dulaglutide (TRULICITY) 1.5 MG/0.5ML pen Inject 1.5 mg Subcutaneous every 7 days 2 mL 1     fluticasone-vilanterol (BREO ELLIPTA) 100-25 MCG/INH inhaler Inhale 1 puff into the lungs daily 30 each 12     metFORMIN (GLUCOPHAGE) 1000 MG tablet Take 1 tablet (1,000 mg) by mouth 2 times daily (with meals) 180 tablet 3     simvastatin (ZOCOR) 20 MG tablet Take 1 tablet (20 mg) by mouth At Bedtime 90 tablet 3       Problem List:  Patient Active Problem List    Diagnosis Date Noted     Hypercarbia 12/09/2021     Priority: Medium     Acute confusion 12/09/2021     Priority: Medium     Hypoxia 12/09/2021     Priority: Medium     Cardiomyopathy, unspecified type (H) 07/30/2021     Priority: Medium     Type 2 diabetes mellitus with unspecified complications (H) 11/23/2020     Priority: Medium     Chronic respiratory failure with hypoxia (H) 11/23/2020     Priority: Medium     CAP (community acquired pneumonia) due to Chlamydia species 06/11/2019     Priority: Medium     Runny nose 03/18/2019     Priority: Medium     Bipolar I disorder, most recent episode depressed (H) 03/04/2019     Priority: Medium     COPD (chronic obstructive pulmonary disease) (H)      Priority: Medium     Created by [facility] Annotation: Jan 29 2013  1:24PM - Lily Alejandro: acute   respiratory failure 1/20/2013 (hospitalized 5 days at [facility])--discharged   with supplemental oxygen      Formatting of this  "note might be different from the original.  Created by Conversion  [facility] Annotation: Jan 29 2013  1:24PM - Lily Alejandro: acute   respiratory failure 1/20/2013 (hospitalized 5 days at [facility])--discharged   with supplemental oxygen       ABDIFATAH on CPAP      Priority: Medium     2/17/2014 split-night PSG: AHI 14.7, RDI 14.7, arousal index 13, and titrated to 7 cm H2O       Anxiety      Priority: Medium     Created by Conversion  Replacement Utility updated for latest IMO load      Formatting of this note might be different from the original.  Created by Conversion    Replacement Utility updated for latest IMO load       Dependence on continuous supplemental oxygen 08/23/2018     Priority: Medium     Morbid obesity (H)      Priority: Medium     Created by Conversion         Cervical high risk HPV (human papillomavirus) test positive 03/05/2018     Priority: Medium     3/5/18 NIL pap, +HR HPV (not 16/18)  5/21/21 NIL pap, neg HPV. Plan: cotest in 1 year per provider  05/5/22 Reminder Mychart, Letter  06/3/22 visit no showed appt and 06/24/22--canceled appt  06/27/22 Reminder call-vm full  07/7/22 appt scheduled--notes added, office visit note said \"not ready to do Pap smear today\"  07/15/22 appt scheduled--notes added no showed appt  08/8/22 Reminder call-vm full Final Letter sent  09/8/22 Lost to follow-up for pap tracking, fyi routed to provider    Formatting of this note might be different from the original.  3/5/18 NIL pap, +HR HPV (not 16/18)  5/21/21 NIL pap, neg HPV. Plan: cotest in 1 year per provider       Cognitive dysfunction in bipolar disorder (H) 11/17/2016     Priority: Medium     Noncompliance with medications 11/17/2016     Priority: Medium     Asthma      Priority: Medium     Created by Conversion         Vertigo      Priority: Medium     Created by Conversion         Scoliosis (and kyphoscoliosis), idiopathic      Priority: Medium     Created by Conversion    Replacing diagnoses that were " inactivated after the 10/1/2021 regulatory import.    Formatting of this note might be different from the original.  Created by Conversion       Lower Back Pain      Priority: Medium     Created by Conversion         Urge Incontinence Of Urine      Priority: Medium     Created by Conversion         Hyperlipidemia      Priority: Medium     Created by Conversion         COPD exacerbation (H) 10/06/2009     Priority: Medium     Tobacco use disorder 10/06/2009     Priority: Medium        Past Medical/Surgical History:  Past Medical History:   Diagnosis Date     Acute on chronic respiratory failure with hypoxia (H) 11/15/2016     Bipolar 1 disorder (H)      CAP (community acquired pneumonia) 11/15/2016     Cervical high risk HPV (human papillomavirus) test positive 3/5/2018    3/5/18 NIL pap, +HR HPV (not 16/18) 21 NIL pap, neg HPV. Plan: await provider     COPD exacerbation (H) 2021     COPD with exacerbation (H) 11/15/2016     Diabetes mellitus, type II (H)      Hearing loss      Meniere's disease      Pneumonia 2021     Pneumonia of right lower lobe due to infectious organism 2019     Past Surgical History:   Procedure Laterality Date     CYST REMOVAL  2001     SC REMOVAL ANAL FISTULA,SUBCUTANEOUS      Description: Fistula-in-ano Repair;  Recorded: 2010; x2     TONSILLECTOMY         Social History:  Social History     Socioeconomic History     Marital status:      Spouse name: Not on file     Number of children: Not on file     Years of education: Not on file     Highest education level: Not on file   Occupational History     Not on file   Tobacco Use     Smoking status: Former     Packs/day: 1.00     Years: 40.00     Pack years: 40.00     Types: Cigarettes     Quit date: 2017     Years since quittin.4     Smokeless tobacco: Never   Vaping Use     Vaping Use: Never used   Substance and Sexual Activity     Alcohol use: Yes     Comment: Alcoholic Drinks/day: Occasional       Drug use: No     Sexual activity: Never   Other Topics Concern     Not on file   Social History Narrative    2019The patient lives with her mother.; had worked at [employer] as  but quit 2019.   Quit smoking ; no etoh problems  / x 2 ; no kids     Social Determinants of Health     Financial Resource Strain: Not on file   Food Insecurity: Not on file   Transportation Needs: Not on file   Physical Activity: Not on file   Stress: Not on file   Social Connections: Not on file   Intimate Partner Violence: Not on file   Housing Stability: Not on file       Family History:  Family History   Problem Relation Age of Onset     Aneurysm Father      Heart Disease Father 70.00        bypass in 70s, AAA rupture at age 77      Ulcers Father 76.00     Pacemaker Mother      Bipolar Disorder Brother      Breast Cancer Maternal Grandmother 51.00     Kidney failure Maternal Grandmother      Cancer Paternal Grandmother         ?? lung     Cancer Paternal Uncle         ?? lung     Mental Illness Maternal Grandfather      Heart Disease Other         uncle     No Known Problems Sister         two siblings abuse chemicals     No Known Problems Brother      Bipolar Disorder Nephew        Review of systems  A complete review of systems reviewed by me is negative with the exeption of what has been mentioned in the history of present illness.    Physical Examination:  Vitals: /52   Pulse 68   Wt 77.4 kg (170 lb 11.2 oz)   SpO2 92%   BMI 33.90 kg/m    BMI= Body mass index is 33.9 kg/m .     GENERAL: Healthy, alert and no distress  EYES: Eyes grossly normal to inspection.  No discharge or erythema, or obvious scleral/conjunctival abnormalities.  RESP: No audible wheeze, cough, or visible cyanosis.  No visible retractions or increased work of breathing.    SKIN: Visible skin clear. No significant rash, abnormal pigmentation or lesions.  NEURO: Cranial nerves grossly intact.  Mentation and speech  appropriate for age.  PSYCH: Mentation appears normal, affect normal/bright, judgement and insight intact, normal speech and appearance well-groomed.          Other tests/labs:   I have reviewed the labs and personally reviewed the imaging below and made my comment in the assessment and plan.    Assessment and Plan:    Problem List Items Addressed This Visit        Respiratory    ABDIFATAH on CPAP - Primary     Arvind Leong has Mild Sleep apnea. She is not tolerating PAP well and does not report adequate compliance with PAP therapy. Daytime symptoms are improved and does not report positive benefits with PAP use.   She is not consistently using PAP device due to inability to connect PAP device to O2. She has input adapter for O2 but does not have correct tubing to connect the O2 device to her PAP device.    Prescription provided for renewal of PAP supplies and educated patient and her mother on what equipment was needed to connect O2 to PAP device     Recommended him/her to continue using the CPAP regularly during sleep and instructed  to get  the supplies for the PAP replaced regularly.      Patient instructed to remember to bring PAP with him/her if hospitalized and if anticipating procedure that requires sedation/surgery to be sure to discuss with the provider/surgeon that he/she has sleep apnea and uses PAP therapy.           Relevant Orders    COMPREHENSIVE DME (Completed)    Chronic respiratory failure with hypoxia (H)    Relevant Orders    COMPREHENSIVE DME (Completed)     Patient was strongly advised to avoid driving, operating any heavy machinery or other hazardous situations while drowsy or sleepy.  Patient was counseled on the importance of driving while alert, to pull over if drowsy, or nap before getting into the vehicle if sleepy.      Plan is for Arvind Leong to follow up in about 6 month(s).     The above note was dictated using voice recognition software. Although reviewed after completion,  some word and grammatical error may remain . Please contact the author for any clarifications.    Total time spent reviewing medical records, history and physical examination, review of previous testing and interpretation as well as documentation on this date, 06/26/23: 40 minutes    Lia Lock MD on 10/20/2022   [facility]   Floor [address], [address]   [address] [place] [address]   Appointments: [phone] [facility]   Floor [address], [address]   [address] [place] [address]   Appointments: [phone]     CC: No ref. provider found

## 2023-07-11 ENCOUNTER — LAB (OUTPATIENT)
Dept: LAB | Facility: CLINIC | Age: 68
End: 2023-07-11
Payer: COMMERCIAL

## 2023-07-11 ENCOUNTER — OFFICE VISIT (OUTPATIENT)
Dept: PHARMACY | Facility: CLINIC | Age: 68
End: 2023-07-11
Payer: COMMERCIAL

## 2023-07-11 VITALS — SYSTOLIC BLOOD PRESSURE: 85 MMHG | DIASTOLIC BLOOD PRESSURE: 66 MMHG | HEART RATE: 64 BPM

## 2023-07-11 DIAGNOSIS — E11.8 TYPE 2 DIABETES MELLITUS WITH UNSPECIFIED COMPLICATIONS (H): ICD-10-CM

## 2023-07-11 DIAGNOSIS — J44.9 CHRONIC OBSTRUCTIVE PULMONARY DISEASE, UNSPECIFIED COPD TYPE (H): ICD-10-CM

## 2023-07-11 DIAGNOSIS — E78.5 HYPERLIPIDEMIA LDL GOAL <70: ICD-10-CM

## 2023-07-11 DIAGNOSIS — R09.89 RUNNY NOSE: ICD-10-CM

## 2023-07-11 DIAGNOSIS — E11.8 TYPE 2 DIABETES MELLITUS WITH UNSPECIFIED COMPLICATIONS (H): Primary | ICD-10-CM

## 2023-07-11 LAB
CHOLEST SERPL-MCNC: 127 MG/DL
HBA1C MFR BLD: 6.5 % (ref 0–5.6)
HDLC SERPL-MCNC: 40 MG/DL
LDLC SERPL CALC-MCNC: 48 MG/DL
NONHDLC SERPL-MCNC: 87 MG/DL
TRIGL SERPL-MCNC: 194 MG/DL

## 2023-07-11 PROCEDURE — 80061 LIPID PANEL: CPT

## 2023-07-11 PROCEDURE — 36415 COLL VENOUS BLD VENIPUNCTURE: CPT

## 2023-07-11 PROCEDURE — 83036 HEMOGLOBIN GLYCOSYLATED A1C: CPT

## 2023-07-11 PROCEDURE — 99207 PR NO CHARGE LOS: CPT

## 2023-07-11 RX ORDER — DULAGLUTIDE 3 MG/.5ML
3 INJECTION, SOLUTION SUBCUTANEOUS WEEKLY
Qty: 2 ML | Refills: 1 | Status: SHIPPED | OUTPATIENT
Start: 2023-07-11 | End: 2023-09-26

## 2023-07-11 RX ORDER — ATORVASTATIN CALCIUM 40 MG/1
40 TABLET, FILM COATED ORAL DAILY
Qty: 90 TABLET | Refills: 2 | Status: SHIPPED | OUTPATIENT
Start: 2023-07-11 | End: 2024-01-19

## 2023-07-11 RX ORDER — CETIRIZINE HYDROCHLORIDE 10 MG/1
10 TABLET ORAL DAILY PRN
COMMUNITY
End: 2024-03-04

## 2023-07-11 RX ORDER — TIOTROPIUM BROMIDE 18 UG/1
18 CAPSULE ORAL; RESPIRATORY (INHALATION) DAILY
COMMUNITY
End: 2023-10-12

## 2023-07-11 RX ORDER — FLUTICASONE FUROATE AND VILANTEROL 100; 25 UG/1; UG/1
1 POWDER RESPIRATORY (INHALATION) DAILY
Qty: 60 EACH | Refills: 3 | Status: SHIPPED | OUTPATIENT
Start: 2023-07-11 | End: 2023-10-12

## 2023-07-11 NOTE — PROGRESS NOTES
Medication Therapy Management (MTM) Encounter    ASSESSMENT:                            Medication Adherence/Access: See below for considerations    Type 2 Diabetes: Patient's most recent A1c is above goal of less than 7%.  Patient would benefit from increasing both Trulicity and metformin doses to help with reducing sugar cravings in addition to helping with blood sugar control and weight loss.  Would benefit from obtaining updated A1c today.  Educated that patient would benefit from monitoring blood sugars daily to help to determine efficacy of increased dose of both Trulicity and metformin.    Hyperlipidemia: Recommend that patient switch to high intensity statin as patient could take this in the morning to help with convenience and also help with elevated LDL and triglyceride levels. Patient would benefit from obtaining updated cholesterol labs as a baseline today.  We will then recommend obtaining updated cholesterol levels again in 4 to 12 weeks after starting atorvastatin.    Runny nose: Since patient notes that cetirizine was somewhat effective, would recommend patient try to take daily to determine if that can help with current symptoms.  Additionally educated that patient could try Flonase nasal spray to determine if this would be also effective for current symptoms.  We will continue to monitor.    COPD: Educated the importance of patient taking Breo daily and also to rinse and spit with water after each use.  Plan in place to check in with the patient's pharmacy to determine if Spiriva would now be more affordable as patient waits to hear back from the state on new insurance coverage.    PLAN:                            1. A1c and cholesterol labs today     2. Increase Trulicity to 3mg once weekly after done with 1.5mg dose     3. Start taking Zyrtec (cetirizine) 10mg daily for runny nose symptoms    4. Start trying Flonase (fluticasone) you can use up to 2 sprays into each nostril daily for runny nose  symptoms     5. Rinse and spit with water each time after you use Breo. Make sure you are using Breo 1 puff every day     6. Start taking metformin 1,000mg (1 tablet) twice daily, it helps to take after you have eaten to prevent an upset stomach     7. Start taking atorvastatin 40mg daily and stop taking simvastatin for cholesterol, you can take atorvastatin in the morning with your other medications     8. I will check in with the pharmacy to see if the price of Spiriva has gone down    9. Start checking your blood sugars once daily, you can either check in the morning before eating or 2 hours after a meal.   Goal before eatin-130 mg/dL  Goal 2 hours after a meal: <180 mg/dL    Follow-up: 2023 at 8:30 AM to re-check cholesterol     SUBJECTIVE/OBJECTIVE:                          Arvind Leong is a 68 year old female coming in for a follow-up visit.  Today's visit is a follow-up MT visit from 2023     Reason for visit: Diabetes follow up.    Allergies/ADRs: Reviewed in chart  Past Medical History: Reviewed in chart    Medication Adherence/Access: Patient has had cost concerns and also misses medication doses, specifically simvastatin since she does not take this medication in the morning. She is working on getting [place] thought the state, she has sent in the application but has not heard anything back yet.     Type 2 Diabetes: Current therapy includes   Trulicity 1.5mg weekly on - just picked up another box of this recently. Patient notes that price decreased to $10 was about $40 before.   Metformin 1000mg daily (is prescribed to take twice daily)- patient notes that they are taking just once daily, is not sure why they are just taking once daily but feels that twice daily is too much for her but denies side effects. She has taking the metformin just once daily for a couple of months.  She also notes that metformin has been helpful for her to lose weight.  Aspirin: Not  taking due to age  Blood sugar monitoring: rarely. Patient notes the last time she has been able to check blood sugars was about a month ago.   Diet/Exercise: Notes they have have been eating more sugar recently and is not sure why.  Lab Results   Component Value Date    A1C 6.5 (H) 07/11/2023     Hyperlipidemia: Current therapy includes   simvastatin 20mg daily at bedtime- patient notes that she does not take this every day, only when she can remember to take it. It is a hard pill for her to take, she is not sure why it is hard for her to take likely because she has to take it in the evening and she takes all other medications during the morning.  Patient reports no significant myalgias or other side effects.  Recent Labs   Lab Test 06/13/22  0815 03/25/22  1338   CHOL 169 153   HDL 36* 38*   LDL 80 71   TRIG 267* 219*     Runny nose: Current therapy includes   Patient notes that they are taking something over the counter thinks it is Zyterc liquid gel, notes she took this for a coupe of days and it was somewhat effective, also has nasal sprays at home but not sure what they are called, she notes she does not use these though.  She mentions that she has been having a more of a runny nose recently, thinks it may be due to COPD.    COPD: Current therapy includes   Breo 1 puff daily- patient has not needed to refill yet, notes that she has missed a couple of doses here and there because her mom moves her inhaler. She is not sure if this is helpful   Albuterol inhaler and nebs- patient notes that they use either the inhaler and neb every so often. In an average week she will use it about 4 times a week. She is not sure if this is helpful.   Was prescribed Spiriva in the past but this was too expensive so she never picked it up.     Today's Vitals: BP (!) 85/66   Pulse 64   ----------------  I spent 30 minutes with this patient today. All changes were made via collaborative practice agreement with Rema Whiting,  MD. A copy of the visit note was provided to the patient's provider(s).    A summary of these recommendations was given to the patient.    Mary Peaz, PharmD  Medication Therapy Management Pharmacist   [facility]        Medication Therapy Recommendations  COPD (chronic obstructive pulmonary disease) (H)    Current Medication: fluticasone-vilanterol (BREO ELLIPTA) 100-25 MCG/ACT inhaler   Rationale: Does not understand instructions - Adherence - Adherence   Recommendation: Provide Education - Start taking Breo & Spiriva daily   Status: Patient Agreed - Adherence/Education         Hyperlipidemia LDL goal <70    Current Medication: simvastatin (ZOCOR) 20 MG tablet (Discontinued)   Rationale: Patient forgets to take - Adherence - Adherence   Recommendation: Change Medication - atorvastatin 40 MG tablet   Status: Accepted per CPA         Runny nose    Rationale: Untreated condition - Needs additional medication therapy - Indication   Recommendation: Start Medication - Cetirizine and flonase daily   Status: Patient Agreed - Adherence/Education         Type 2 diabetes mellitus with unspecified complications (H)    Current Medication: dulaglutide (TRULICITY) 1.5 MG/0.5ML pen (Discontinued)   Rationale: Dose too low - Dosage too low - Effectiveness   Recommendation: Increase Dose   Status: Accepted per CPA          Current Medication: metFORMIN (GLUCOPHAGE) 1000 MG tablet   Rationale: Does not understand instructions - Adherence - Adherence   Recommendation: Provide Education   Status: Patient Agreed - Adherence/Education

## 2023-07-11 NOTE — PROGRESS NOTES
Called pharmacy, Spiriva inhaler would now only cost patient $10, pharmacy thinks that patient has met a deductible and this why drug costs may have decreased. Recommended that pharmacy fill this prescription for the patient.     The pharmacy did not have the Breo inhaler on file as it was not originally sent to [facility] pharmacy, will refill and send to the patient's preferred pharmacy.     Mary Paez, PharmD  Medication Therapy Management Pharmacist   [facility]

## 2023-07-11 NOTE — PATIENT INSTRUCTIONS
"Recommendations from today's MTM visit:                                                      1. A1c and cholesterol labs today     2. Increase Trulicity to 3mg once weekly after done with 1.5mg dose     3. Start taking Zyrtec (cetirizine) 10mg daily for runny nose symptoms    4. Start trying Flonase (fluticasone) you can use up to 2 sprays into each nostril daily for runny nose symptoms     5. Rinse and spit with water each time after you use Breo. Make sure you are using Breo 1 puff every day     6. Start taking metformin 1,000mg (1 tablet) twice daily, it helps to take after you have eaten to prevent an upset stomach     7. Start taking atorvastatin 40mg daily and stop taking simvastatin for cholesterol, you can take atorvastatin in the morning with your other medications     8. I will check in with the pharmacy to see if the price of Spiriva has gone down    9. Start checking your blood sugars once daily, you can either check in the morning before eating or 2 hours after a meal.   Goal before eatin-130 mg/dL  Goal 2 hours after a meal: <180 mg/dL    Follow-up: 2023 at 8:30 AM to re-check cholesterol    It was great speaking with you today.  I value your experience and would be very thankful for your time in providing feedback in our clinic survey. In the next few days, you may receive an email or text message from [employer] with a link to a survey related to your  clinical pharmacist.\"     To schedule another MTM appointment, please call the clinic directly or you may call the MTM scheduling line at [phone] or toll-free at [phone].     My Clinical Pharmacist's contact information:                                                      Please feel free to contact me with any questions or concerns you have.     Mary Paez, PharmD  Medication Therapy Management Pharmacist   [facility]   "

## 2023-08-06 ENCOUNTER — HEALTH MAINTENANCE LETTER (OUTPATIENT)
Age: 68
End: 2023-08-06

## 2023-09-18 ENCOUNTER — APPOINTMENT (OUTPATIENT)
Dept: RADIOLOGY | Facility: HOSPITAL | Age: 68
DRG: 193 | End: 2023-09-18
Attending: EMERGENCY MEDICINE
Payer: COMMERCIAL

## 2023-09-18 ENCOUNTER — HOSPITAL ENCOUNTER (INPATIENT)
Facility: HOSPITAL | Age: 68
LOS: 3 days | Discharge: HOME | DRG: 193 | End: 2023-09-21
Attending: EMERGENCY MEDICINE | Admitting: INTERNAL MEDICINE
Payer: COMMERCIAL

## 2023-09-18 DIAGNOSIS — J15.9 COMMUNITY ACQUIRED BACTERIAL PNEUMONIA: ICD-10-CM

## 2023-09-18 DIAGNOSIS — J44.9 CHRONIC OBSTRUCTIVE PULMONARY DISEASE, UNSPECIFIED COPD TYPE (H): ICD-10-CM

## 2023-09-18 DIAGNOSIS — J16.0 CAP (COMMUNITY ACQUIRED PNEUMONIA) DUE TO CHLAMYDIA SPECIES: Primary | ICD-10-CM

## 2023-09-18 LAB
ANION GAP SERPL CALCULATED.3IONS-SCNC: 10 MMOL/L (ref 7–15)
BASOPHILS # BLD MANUAL: 0 10E3/UL (ref 0–0.2)
BASOPHILS NFR BLD MANUAL: 0 %
BUN SERPL-MCNC: 9.1 MG/DL (ref 8–23)
CALCIUM SERPL-MCNC: 9.2 MG/DL (ref 8.8–10.2)
CHLORIDE SERPL-SCNC: 97 MMOL/L (ref 98–107)
CREAT SERPL-MCNC: 0.75 MG/DL (ref 0.51–0.95)
DEPRECATED HCO3 PLAS-SCNC: 31 MMOL/L (ref 22–29)
EGFRCR SERPLBLD CKD-EPI 2021: 87 ML/MIN/1.73M2
EOSINOPHIL # BLD MANUAL: 0 10E3/UL (ref 0–0.7)
EOSINOPHIL NFR BLD MANUAL: 0 %
ERYTHROCYTE [DISTWIDTH] IN BLOOD BY AUTOMATED COUNT: 13 % (ref 10–15)
FLUAV RNA SPEC QL NAA+PROBE: NEGATIVE
FLUBV RNA RESP QL NAA+PROBE: NEGATIVE
GLUCOSE BLDC GLUCOMTR-MCNC: 312 MG/DL (ref 70–99)
GLUCOSE BLDC GLUCOMTR-MCNC: 328 MG/DL (ref 70–99)
GLUCOSE SERPL-MCNC: 196 MG/DL (ref 70–99)
HCT VFR BLD AUTO: 47.9 % (ref 35–47)
HGB BLD-MCNC: 15 G/DL (ref 11.7–15.7)
HOLD SPECIMEN: NORMAL
LACTATE SERPL-SCNC: 1.6 MMOL/L (ref 0.7–2)
LYMPHOCYTES # BLD MANUAL: 4.7 10E3/UL (ref 0.8–5.3)
LYMPHOCYTES NFR BLD MANUAL: 15 %
MCH RBC QN AUTO: 29 PG (ref 26.5–33)
MCHC RBC AUTO-ENTMCNC: 31.3 G/DL (ref 31.5–36.5)
MCV RBC AUTO: 93 FL (ref 78–100)
MONOCYTES # BLD MANUAL: 0.9 10E3/UL (ref 0–1.3)
MONOCYTES NFR BLD MANUAL: 3 %
NEUTROPHILS # BLD MANUAL: 25.4 10E3/UL (ref 1.6–8.3)
NEUTROPHILS NFR BLD MANUAL: 82 %
PLAT MORPH BLD: ABNORMAL
PLATELET # BLD AUTO: 202 10E3/UL (ref 150–450)
POTASSIUM SERPL-SCNC: 3.9 MMOL/L (ref 3.4–5.3)
RBC # BLD AUTO: 5.17 10E6/UL (ref 3.8–5.2)
RBC MORPH BLD: ABNORMAL
RSV RNA SPEC NAA+PROBE: NEGATIVE
SARS-COV-2 RNA RESP QL NAA+PROBE: NEGATIVE
SODIUM SERPL-SCNC: 138 MMOL/L (ref 136–145)
TROPONIN T SERPL HS-MCNC: 10 NG/L
WBC # BLD AUTO: 31 10E3/UL (ref 4–11)

## 2023-09-18 PROCEDURE — 94640 AIRWAY INHALATION TREATMENT: CPT | Mod: 76

## 2023-09-18 PROCEDURE — 96365 THER/PROPH/DIAG IV INF INIT: CPT

## 2023-09-18 PROCEDURE — 250N000009 HC RX 250: Performed by: EMERGENCY MEDICINE

## 2023-09-18 PROCEDURE — 999N000157 HC STATISTIC RCP TIME EA 10 MIN

## 2023-09-18 PROCEDURE — 85007 BL SMEAR W/DIFF WBC COUNT: CPT | Performed by: EMERGENCY MEDICINE

## 2023-09-18 PROCEDURE — 84484 ASSAY OF TROPONIN QUANT: CPT | Performed by: EMERGENCY MEDICINE

## 2023-09-18 PROCEDURE — 99285 EMERGENCY DEPT VISIT HI MDM: CPT | Mod: 25

## 2023-09-18 PROCEDURE — 82310 ASSAY OF CALCIUM: CPT | Performed by: EMERGENCY MEDICINE

## 2023-09-18 PROCEDURE — 258N000003 HC RX IP 258 OP 636: Performed by: EMERGENCY MEDICINE

## 2023-09-18 PROCEDURE — 96375 TX/PRO/DX INJ NEW DRUG ADDON: CPT

## 2023-09-18 PROCEDURE — 94640 AIRWAY INHALATION TREATMENT: CPT

## 2023-09-18 PROCEDURE — 36415 COLL VENOUS BLD VENIPUNCTURE: CPT | Performed by: EMERGENCY MEDICINE

## 2023-09-18 PROCEDURE — 250N000011 HC RX IP 250 OP 636: Mod: JZ | Performed by: INTERNAL MEDICINE

## 2023-09-18 PROCEDURE — 36415 COLL VENOUS BLD VENIPUNCTURE: CPT | Performed by: STUDENT IN AN ORGANIZED HEALTH CARE EDUCATION/TRAINING PROGRAM

## 2023-09-18 PROCEDURE — 71045 X-RAY EXAM CHEST 1 VIEW: CPT

## 2023-09-18 PROCEDURE — 250N000013 HC RX MED GY IP 250 OP 250 PS 637: Performed by: INTERNAL MEDICINE

## 2023-09-18 PROCEDURE — 250N000012 HC RX MED GY IP 250 OP 636 PS 637: Performed by: INTERNAL MEDICINE

## 2023-09-18 PROCEDURE — 99222 1ST HOSP IP/OBS MODERATE 55: CPT | Performed by: INTERNAL MEDICINE

## 2023-09-18 PROCEDURE — 93005 ELECTROCARDIOGRAM TRACING: CPT | Performed by: STUDENT IN AN ORGANIZED HEALTH CARE EDUCATION/TRAINING PROGRAM

## 2023-09-18 PROCEDURE — 120N000001 HC R&B MED SURG/OB

## 2023-09-18 PROCEDURE — 250N000009 HC RX 250: Performed by: INTERNAL MEDICINE

## 2023-09-18 PROCEDURE — 85027 COMPLETE CBC AUTOMATED: CPT | Performed by: EMERGENCY MEDICINE

## 2023-09-18 PROCEDURE — 250N000011 HC RX IP 250 OP 636: Performed by: EMERGENCY MEDICINE

## 2023-09-18 PROCEDURE — 87637 SARSCOV2&INF A&B&RSV AMP PRB: CPT | Performed by: EMERGENCY MEDICINE

## 2023-09-18 PROCEDURE — 83605 ASSAY OF LACTIC ACID: CPT | Performed by: EMERGENCY MEDICINE

## 2023-09-18 RX ORDER — ALBUTEROL SULFATE 90 UG/1
2 AEROSOL, METERED RESPIRATORY (INHALATION) EVERY 6 HOURS PRN
Status: DISCONTINUED | OUTPATIENT
Start: 2023-09-18 | End: 2023-09-21 | Stop reason: HOSPADM

## 2023-09-18 RX ORDER — ARIPIPRAZOLE 5 MG/1
15 TABLET ORAL DAILY
Status: DISCONTINUED | OUTPATIENT
Start: 2023-09-18 | End: 2023-09-21 | Stop reason: HOSPADM

## 2023-09-18 RX ORDER — METHYLPREDNISOLONE SODIUM SUCCINATE 125 MG/2ML
125 INJECTION, POWDER, LYOPHILIZED, FOR SOLUTION INTRAMUSCULAR; INTRAVENOUS ONCE
Status: COMPLETED | OUTPATIENT
Start: 2023-09-18 | End: 2023-09-18

## 2023-09-18 RX ORDER — ACETAMINOPHEN 650 MG/1
650 SUPPOSITORY RECTAL EVERY 6 HOURS PRN
Status: DISCONTINUED | OUTPATIENT
Start: 2023-09-18 | End: 2023-09-21 | Stop reason: HOSPADM

## 2023-09-18 RX ORDER — CEFTRIAXONE 1 G/1
1 INJECTION, POWDER, FOR SOLUTION INTRAMUSCULAR; INTRAVENOUS ONCE
Status: COMPLETED | OUTPATIENT
Start: 2023-09-18 | End: 2023-09-18

## 2023-09-18 RX ORDER — IPRATROPIUM BROMIDE AND ALBUTEROL SULFATE 2.5; .5 MG/3ML; MG/3ML
3 SOLUTION RESPIRATORY (INHALATION)
Status: DISCONTINUED | OUTPATIENT
Start: 2023-09-19 | End: 2023-09-21 | Stop reason: HOSPADM

## 2023-09-18 RX ORDER — IPRATROPIUM BROMIDE AND ALBUTEROL SULFATE 2.5; .5 MG/3ML; MG/3ML
3 SOLUTION RESPIRATORY (INHALATION)
Status: DISCONTINUED | OUTPATIENT
Start: 2023-09-18 | End: 2023-09-18

## 2023-09-18 RX ORDER — METHYLPREDNISOLONE SODIUM SUCCINATE 125 MG/2ML
60 INJECTION, POWDER, LYOPHILIZED, FOR SOLUTION INTRAMUSCULAR; INTRAVENOUS EVERY 8 HOURS
Status: DISCONTINUED | OUTPATIENT
Start: 2023-09-18 | End: 2023-09-19

## 2023-09-18 RX ORDER — LIDOCAINE 40 MG/G
CREAM TOPICAL
Status: DISCONTINUED | OUTPATIENT
Start: 2023-09-18 | End: 2023-09-21 | Stop reason: HOSPADM

## 2023-09-18 RX ORDER — ATORVASTATIN CALCIUM 40 MG/1
40 TABLET, FILM COATED ORAL EVERY EVENING
Status: DISCONTINUED | OUTPATIENT
Start: 2023-09-18 | End: 2023-09-21 | Stop reason: HOSPADM

## 2023-09-18 RX ORDER — AMOXICILLIN 250 MG
1 CAPSULE ORAL 2 TIMES DAILY PRN
Status: DISCONTINUED | OUTPATIENT
Start: 2023-09-18 | End: 2023-09-21 | Stop reason: HOSPADM

## 2023-09-18 RX ORDER — ONDANSETRON 4 MG/1
4 TABLET, ORALLY DISINTEGRATING ORAL EVERY 6 HOURS PRN
Status: DISCONTINUED | OUTPATIENT
Start: 2023-09-18 | End: 2023-09-21 | Stop reason: HOSPADM

## 2023-09-18 RX ORDER — IPRATROPIUM BROMIDE AND ALBUTEROL SULFATE 2.5; .5 MG/3ML; MG/3ML
3 SOLUTION RESPIRATORY (INHALATION) ONCE
Status: COMPLETED | OUTPATIENT
Start: 2023-09-18 | End: 2023-09-18

## 2023-09-18 RX ORDER — ACETAMINOPHEN 325 MG/1
650 TABLET ORAL EVERY 6 HOURS PRN
Status: DISCONTINUED | OUTPATIENT
Start: 2023-09-18 | End: 2023-09-21 | Stop reason: HOSPADM

## 2023-09-18 RX ORDER — NICOTINE POLACRILEX 4 MG
15-30 LOZENGE BUCCAL
Status: DISCONTINUED | OUTPATIENT
Start: 2023-09-18 | End: 2023-09-19

## 2023-09-18 RX ORDER — ONDANSETRON 2 MG/ML
4 INJECTION INTRAMUSCULAR; INTRAVENOUS EVERY 6 HOURS PRN
Status: DISCONTINUED | OUTPATIENT
Start: 2023-09-18 | End: 2023-09-21 | Stop reason: HOSPADM

## 2023-09-18 RX ORDER — ENOXAPARIN SODIUM 100 MG/ML
40 INJECTION SUBCUTANEOUS EVERY 24 HOURS
Status: DISCONTINUED | OUTPATIENT
Start: 2023-09-18 | End: 2023-09-21 | Stop reason: HOSPADM

## 2023-09-18 RX ORDER — AMOXICILLIN 250 MG
2 CAPSULE ORAL 2 TIMES DAILY PRN
Status: DISCONTINUED | OUTPATIENT
Start: 2023-09-18 | End: 2023-09-21 | Stop reason: HOSPADM

## 2023-09-18 RX ORDER — ALBUTEROL SULFATE 0.83 MG/ML
2.5 SOLUTION RESPIRATORY (INHALATION) EVERY 4 HOURS PRN
Status: DISCONTINUED | OUTPATIENT
Start: 2023-09-18 | End: 2023-09-21 | Stop reason: HOSPADM

## 2023-09-18 RX ORDER — CEFTRIAXONE 1 G/1
1 INJECTION, POWDER, FOR SOLUTION INTRAMUSCULAR; INTRAVENOUS EVERY 24 HOURS
Status: DISCONTINUED | OUTPATIENT
Start: 2023-09-19 | End: 2023-09-20

## 2023-09-18 RX ORDER — DEXTROSE MONOHYDRATE 25 G/50ML
25-50 INJECTION, SOLUTION INTRAVENOUS
Status: DISCONTINUED | OUTPATIENT
Start: 2023-09-18 | End: 2023-09-19

## 2023-09-18 RX ADMIN — IPRATROPIUM BROMIDE AND ALBUTEROL SULFATE 3 ML: .5; 3 SOLUTION RESPIRATORY (INHALATION) at 12:27

## 2023-09-18 RX ADMIN — INSULIN ASPART 2 UNITS: 100 INJECTION, SOLUTION INTRAVENOUS; SUBCUTANEOUS at 17:53

## 2023-09-18 RX ADMIN — METFORMIN HYDROCHLORIDE 1000 MG: 500 TABLET ORAL at 18:04

## 2023-09-18 RX ADMIN — AZITHROMYCIN MONOHYDRATE 500 MG: 500 INJECTION, POWDER, LYOPHILIZED, FOR SOLUTION INTRAVENOUS at 12:27

## 2023-09-18 RX ADMIN — ARIPIPRAZOLE 15 MG: 15 TABLET ORAL at 16:17

## 2023-09-18 RX ADMIN — IPRATROPIUM BROMIDE AND ALBUTEROL SULFATE 3 ML: .5; 3 SOLUTION RESPIRATORY (INHALATION) at 22:13

## 2023-09-18 RX ADMIN — CEFTRIAXONE SODIUM 1 G: 1 INJECTION, POWDER, FOR SOLUTION INTRAMUSCULAR; INTRAVENOUS at 11:49

## 2023-09-18 RX ADMIN — IPRATROPIUM BROMIDE AND ALBUTEROL SULFATE 3 ML: .5; 3 SOLUTION RESPIRATORY (INHALATION) at 09:45

## 2023-09-18 RX ADMIN — METHYLPREDNISOLONE SODIUM SUCCINATE 125 MG: 125 INJECTION, POWDER, LYOPHILIZED, FOR SOLUTION INTRAMUSCULAR; INTRAVENOUS at 12:26

## 2023-09-18 RX ADMIN — IPRATROPIUM BROMIDE AND ALBUTEROL SULFATE 3 ML: .5; 3 SOLUTION RESPIRATORY (INHALATION) at 16:34

## 2023-09-18 RX ADMIN — ATORVASTATIN CALCIUM 40 MG: 40 TABLET, FILM COATED ORAL at 19:23

## 2023-09-18 RX ADMIN — METHYLPREDNISOLONE SODIUM SUCCINATE 62.5 MG: 125 INJECTION, POWDER, LYOPHILIZED, FOR SOLUTION INTRAMUSCULAR; INTRAVENOUS at 19:24

## 2023-09-18 ASSESSMENT — ENCOUNTER SYMPTOMS
HEADACHES: 0
CHEST TIGHTNESS: 1
DIZZINESS: 0
LIGHT-HEADEDNESS: 0
VOMITING: 0
DYSURIA: 0
FEVER: 0
CHILLS: 0
ABDOMINAL PAIN: 0
SHORTNESS OF BREATH: 1
WEAKNESS: 0
COUGH: 1
BACK PAIN: 1
FATIGUE: 0

## 2023-09-18 ASSESSMENT — ACTIVITIES OF DAILY LIVING (ADL)
CONCENTRATING,_REMEMBERING_OR_MAKING_DECISIONS_DIFFICULTY: NO
ADLS_ACUITY_SCORE: 41
ADLS_ACUITY_SCORE: 36
ADLS_ACUITY_SCORE: 37
DIFFICULTY_EATING/SWALLOWING: NO
ADLS_ACUITY_SCORE: 37
WALKING_OR_CLIMBING_STAIRS_DIFFICULTY: YES
DRESSING/BATHING_DIFFICULTY: NO
EQUIPMENT_CURRENTLY_USED_AT_HOME: WALKER, STANDARD
ADLS_ACUITY_SCORE: 32
ADLS_ACUITY_SCORE: 33
TRANSFERRING: 1-->ASSISTANCE (EQUIPMENT/PERSON) NEEDED
TOILETING_ISSUES: NO
ADLS_ACUITY_SCORE: 41
ADLS_ACUITY_SCORE: 32
TRANSFERRING: 1-->ASSISTANCE (EQUIPMENT/PERSON) NEEDED (NOT DEVELOPMENTALLY APPROPRIATE)

## 2023-09-18 NOTE — PROGRESS NOTES
RESPIRATORY CARE NOTE  Pt given Duoneb tx as scheduled. BS are diminished pre/post Duoneb. SpO2 and HR remain WNL.     Jenny Lopez, RT

## 2023-09-18 NOTE — ED NOTES
[facility] ED Handoff Report    ED Chief Complaint: SOB- Community Pneomina     ED Diagnosis:  (J15.9) Community acquired bacterial pneumonia  Comment:   Plan:        PMH:    Past Medical History:   Diagnosis Date    Acute on chronic respiratory failure with hypoxia (H) 11/15/2016    Bipolar 1 disorder (H)     CAP (community acquired pneumonia) 11/15/2016    Cervical high risk HPV (human papillomavirus) test positive 3/5/2018    3/5/18 NIL pap, +HR HPV (not 16/18) 5/21/21 NIL pap, neg HPV. Plan: await provider    COPD exacerbation (H) 4/24/2021    COPD with exacerbation (H) 11/15/2016    Diabetes mellitus, type II (H)     Hearing loss     Meniere's disease     Pneumonia 4/23/2021    Pneumonia of right lower lobe due to infectious organism 6/11/2019        Code Status:  Full Code     Falls Risk: Yes Band: Applied    Current Living Situation/Residence: lives with elderly mother      Elimination Status: Continent: [facility]    Activity Level: 2 assist    Patients Preferred Language:  English     Needed: No    Vital Signs:  /53   Pulse 101   Temp 98.9  F (37.2  C) (Oral)   Resp 28   SpO2 93%      Cardiac Rhythm: Sinus Tachy    Pain Score: 5/10    Is the Patient Confused:  No    Last Food or Drink: 09/18/23 at not in the ER    Focused Assessment:  O2 at baseline, due to COPD. O2 dropped to the 70s. Bacterial Pneumonia. 2 L Baseline, here 5L   Purewhick is place   Pain 5/10 from coughing   Nebs  BS AC/HS   Diabetic Diet   [place]- no earing aids here   Sleepy and orientated x4   Diminished lung sounds bilateral        Tests Performed: Done: Labs and Imaging    Treatments Provided:  meds    Family Dynamics/Concerns: No    Family Updated On Visitor Policy: No    Plan of Care Communicated to Family: Yes    Who Was Updated about Plan of Care: Mom was at bedside     Belongings Checklist Done and Signed by Patient: No    Medications sent with patient: insulin pen    Covid: asymptomatic ,  negative    Additional Information:     RN: Israel Ornelas RN   9/18/2023 4:59 PM

## 2023-09-18 NOTE — PLAN OF CARE
"  Problem: Plan of Care - These are the overarching goals to be used throughout the patient stay.    Goal: Patient-Specific Goal (Individualized)  Description: You can add care plan individualizations to a care plan. Examples of Individualization might be:  \"Parent requests to be called daily at 9am for status\", \"I have a hard time hearing out of my right ear\", or \"Do not touch me to wake me up as it startles me\".  9/18/2023 1536 by Erin Doran RN  Outcome: Progressing  9/18/2023 1536 by Erin Doran RN  Outcome: Progressing  9/18/2023 1525 by Erin Doran RN  Outcome: Not Progressing     Problem: Plan of Care - These are the overarching goals to be used throughout the patient stay.    Goal: Optimal Comfort and Wellbeing  9/18/2023 1536 by Erin Doran RN  Outcome: Progressing  9/18/2023 1525 by Erin Doran RN  Outcome: Not Progressing     Problem: Gas Exchange Impaired  Goal: Optimal Gas Exchange  9/18/2023 1536 by Erin Doran RN  Outcome: Not Progressing  9/18/2023 1536 by Erin Doran RN  Outcome: Progressing  9/18/2023 1525 by Erin Doran RN  Outcome: Not Progressing   Goal Outcome Evaluation:         Patient oxygen down to 88% on 41/2 liters via nasal cannula, increased to 5 liters to keep oxygen above 90%.               "

## 2023-09-18 NOTE — H&P
[facility]    History and Physical - Hospitalist Service       Date of Admission:  9/18/2023    Assessment & Plan      Arvind Leong is a 68 year old female with past medical history of bipolar disorder, COPD, chronic hypoxic respiratory failure on home O2, DM2, Ménière's disease, ABDIFATAH on CPAP, pneumonia, obesity, tobacco use disorder who presented with 2 days of productive cough, dyspnea, found to be hypoxic on arrival with leukocytosis and chest x-ray showing RLL infiltrate.     Community-acquired pneumonia.  Obtain sputum and blood cultures.  Continue ceftriaxone and azithromycin.  COPD exacerbation secondary to above.  Continue antibiotics, scheduled nebulizers, IV steroids.  Acute on chronic respiratory failure with hypoxia and hypercapnia.  Secondary to above.  Supplemental O2 to keep O2 sats above 90%  Bipolar disorder.  Resume PTA meds  DM2.  Resume metformin.  Monitor blood sugars with meals and at bedtime.  Might need to add long-acting insulin or NPH for steroid-induced hyperglycemia  Obesity There is no height or weight on file to calculate BMI.     Diet: Combination Diet Moderate Consistent Carb (60 g CHO per Meal) Diet    DVT Prophylaxis: Enoxaparin (Lovenox) SQ  Lara Catheter: Not present  Lines: None     Cardiac Monitoring: None  Code Status: Full Code      Clinically Significant Risk Factors Present on Admission                      # DMII: A1C = 6.5 % (Ref range: 0.0 - 5.6 %) within past 6 months        # COPD: noted on problem list        Disposition Plan      Expected Discharge Date: 09/20/2023                  Luh Zaldivar MD  Hospitalist Service  [facility]  Securely message with [employer] (more info)  Text page via [employer] Paging/Directory     ______________________________________________________________________    Chief Complaint   Shortness of breath, productive cough, shaking    History is obtained from the patient    History of  Present Illness   Arvind Leong is a 68 year old female with past medical history of bipolar disorder, COPD, chronic hypoxic respiratory failure on home O2, DM2, Ménière's disease, ABDIFATAH on CPAP, pneumonia, obesity, tobacco use disorder who presented for evaluation of the above complaints    Patient reports 2 days of increasing shortness of breath, productive cough with yellow and green sputum.  She also endorses shaking chills yesterday but no fevers.  She had some chest pain earlier.  Appetite has been okay.  She uses a walker and lives with her mother.  On initial evaluation in the ER O2 sat 76% on room air, temperature 100.1, tachycardia and tachypnea.  Labs are significant for WBC 31 K.  Chest x-ray with right lower lobe opacities.        Past Medical History    Past Medical History:   Diagnosis Date    Acute on chronic respiratory failure with hypoxia (H) 11/15/2016    Bipolar 1 disorder (H)     CAP (community acquired pneumonia) 11/15/2016    Cervical high risk HPV (human papillomavirus) test positive 3/5/2018    3/5/18 NIL pap, +HR HPV (not 16/18) 5/21/21 NIL pap, neg HPV. Plan: await provider    COPD exacerbation (H) 4/24/2021    COPD with exacerbation (H) 11/15/2016    Diabetes mellitus, type II (H)     Hearing loss     Meniere's disease     Pneumonia 4/23/2021    Pneumonia of right lower lobe due to infectious organism 6/11/2019       Past Surgical History   Past Surgical History:   Procedure Laterality Date    CYST REMOVAL  01/23/2001    [place] REMOVAL ANAL FISTULA,SUBCUTANEOUS      Description: Fist[place]-in-[place] Repair;  Recorded: 01/08/2010; x2    TONSILLECTOMY         Prior to Admission Medications   Prior to Admission Medications   Prescriptions Last Dose Informant Patient Reported? Taking?   ARIPiprazole (ABILIFY) 15 MG tablet 9/17/2023 at am  Yes Yes   Sig: Take 15 mg by mouth daily   ARIPiprazole ER (ABILIFY MAINTENA) 400 MG extended release inj syringe Past Week at unknown  Yes Yes   Sig: Inject 400  mg into the muscle every 28 days   Dulaglutide (TRULICITY) 3 MG/0.5ML SOPN 9/15/2023 at am  No Yes   Sig: Inject 3 mg Subcutaneous once a week   albuterol (PROAIR HFA/PROVENTIL HFA/VENTOLIN HFA) 108 (90 Base) MCG/ACT inhaler Unknown at unknown  No Yes   Sig: Inhale 2 puffs into the lungs every 6 hours as needed for shortness of breath / dyspnea or wheezing   albuterol (PROVENTIL) (2.5 MG/3ML) 0.083% neb solution Unknown at unknown  No Yes   Sig: Take 1 vial (2.5 mg) by nebulization every 4 hours as needed for shortness of breath or wheezing Take 1 vial four times a day for the next 5 days then go back to as needed   atorvastatin (LIPITOR) 40 MG tablet 9/17/2023 at am  No Yes   Sig: Take 1 tablet (40 mg) by mouth daily   blood glucose (NO BRAND SPECIFIED) lancets standard   No No   Sig: Use to test blood sugar 4 times daily before meals and at bedtime   blood glucose (NO BRAND SPECIFIED) test strip   No No   Sig: Use to test blood sugar 4 times daily before meals and at bedtime   blood glucose monitoring (ONETOUCH VERIO) meter device kit   No No   Sig: Use to test blood sugar 4 times daily before meals and bedtie   cetirizine (ZYRTEC) 10 MG tablet Unknown at unknown  Yes Yes   Sig: Take 10 mg by mouth daily as needed for allergies   fluticasone-vilanterol (BREO ELLIPTA) 100-25 MCG/ACT inhaler 9/17/2023 at am  No Yes   Sig: Inhale 1 puff into the lungs daily   metFORMIN (GLUCOPHAGE) 1000 MG tablet 9/17/2023 at pm  No Yes   Sig: Take 1 tablet (1,000 mg) by mouth 2 times daily (with meals)   tiotropium (SPIRIVA) 18 MCG inhaled capsule 9/17/2023 at am  Yes Yes   Sig: Inhale 18 mcg into the lungs daily      Facility-Administered Medications: None        Review of Systems    The 10 point Review of Systems is negative other than noted in the HPI or here.      Physical Exam   Vital Signs: Temp: 99.2  F (37.3  C) Temp src: Oral BP: 128/69 Pulse: 106   Resp: (!) 32 SpO2: 90 % O2 Device: Nasal cannula Oxygen Delivery: 5  LPM  Weight: 0 lbs 0 oz    General Appearance: Mildly tachypneic  Respiratory: Scattered wheezes present  Cardiovascular: Regular rate and rhythm, mild tachycardia  GI: Abdomen soft, nontender  Skin: Warm and dry  Other: Awake and alert, cooperative    Medical Decision Making       65 MINUTES SPENT BY ME on the date of service doing chart review, history, exam, documentation & further activities per the note.  MANAGEMENT DISCUSSED with the following over the past 24 hours: Patient       Data     I have personally reviewed the following data over the past 24 hrs:    31.0 (H)  \   15.0   / 202     138 97 (L) 9.1 /  196 (H)   3.9 31 (H) 0.75 \     Trop: 10 BNP: N/A     Procal: N/A CRP: N/A Lactic Acid: 1.6         Imaging results reviewed over the past 24 hrs:   Recent Results (from the past 24 hour(s))   XR Chest Port 1 View    Narrative    EXAM: XR CHEST PORT 1 VIEW  LOCATION: [facility]  DATE: 9/18/2023    INDICATION: Cough, shortness of breath  COMPARISON: 03/12/2023 and older studies      Impression    IMPRESSION: Large body habitus .    New opacities noted in the right lower lobe partially silhouetting the right hemidiaphragm. If there are appropriate clinical symptoms, findings could certainly reflect a bronchopneumonia instead of atelectasis.      Left lung is clear. Heart and pulmonary vascularity are normal. Moderate S-shaped thoracolumbar scoliosis.

## 2023-09-18 NOTE — ED PROVIDER NOTES
EMERGENCY DEPARTMENT ENCOUNTER      NAME: Arvind Leong  AGE: 68 year old female  YOB: 1955  MRN: [id number]  EVALUATION DATE & TIME: 9/18/2023  8:57 AM    PCP: Rema Whiting    ED PROVIDER: Jennifer Howe DO      Chief Complaint   Patient presents with    Shortness of Breath         FINAL IMPRESSION:  1. Community acquired bacterial pneumonia          ED COURSE & MEDICAL DECISION MAKING:    Pertinent Labs & Imaging studies reviewed. (See chart for details)    9:20 AM I met the patient and performed my initial interview and exam.  12:07 PM Rechecked and updated patient.   12:30 PM Spoke with the Hospitalist, Dr. Zaldivar, who accepts the patient for admission.     68 year old female presents to the Emergency Department for evaluation of cough, shortness of breath.  Patient with history of COPD on 2 L chronically.  She was with her mother.  Endorsing worsening shortness of breath.  She saturating 76% on room air on arrival.  She is requiring 3 L nasal cannula in this ED to keep her at 90%.  She does have wheezing on exam.  Not consistent with ACS.  Not consistent with PE.  She was given DuoNeb therapy.  White count is 30,000.  No recent steroid or antibiotic use.  X-ray is consistent with pneumonia.  She is tachypneic.  Given her work of breathing and concern for failure of outpatient, will admit.  She was given ceftriaxone, azithromycin and methylprednisolone.    At the conclusion of the encounter I discussed the results of all of the tests and the disposition. The questions were answered. The patient or family acknowledged understanding and was agreeable with the care plan.     Medical Decision Making    History:  Supplemental history from: Documented in chart, if applicable  External Record(s) reviewed: Documented in chart, if applicable.    Work Up:  Chart documentation includes differential considered and any EKGs or imaging independently interpreted by provider, where  specified.  In additional to work up documented, I considered the following work up: Documented in chart, if applicable.    External consultation:  Discussion of management with another provider: Documented in chart, if applicable    Complicating factors:  Care impacted by chronic illness: Chronic Lung Disease, Diabetes, Hyperlipidemia, Mental Health, and Smoking / Nicotine Use  Care affected by social determinants of health: Medication Noncompliance    Disposition considerations: Admit.      MEDICATIONS GIVEN IN THE EMERGENCY:  Medications   azithromycin (ZITHROMAX) 500 mg in sodium chloride 0.9 % 250 mL intermittent infusion (500 mg Intravenous $New Bag 9/18/23 1227)   ipratropium - albuterol 0.5 mg/2.5 mg/3 mL (DUONEB) neb solution 3 mL (3 mLs Nebulization $Given 9/18/23 0945)   cefTRIAXone (ROCEPHIN) 1 g vial to attach to  mL bag for ADULTS or NS 50 mL bag for PEDS (0 g Intravenous Stopped 9/18/23 1228)   ipratropium - albuterol 0.5 mg/2.5 mg/3 mL (DUONEB) neb solution 3 mL (3 mLs Nebulization $Given 9/18/23 1227)   methylPREDNISolone sodium succinate (solu-MEDROL) injection 125 mg (125 mg Intravenous $Given 9/18/23 1226)       NEW PRESCRIPTIONS STARTED AT TODAY'S ER VISIT  New Prescriptions    No medications on file          =================================================================    HPI    Patient information was obtained from: Patient and Patient's Mom    Use of : N/A       Arvind Leong is a 68 year old female with a pertinent medical history of COPD, asthma, acute on chronic respiratory failure with hypoxia, ABDIFATAH on CPAP, pneumonia, T2DM, HLD, morbid obesity, tobacco use disorder, bipolar 1 disorder, who presents to this ED via walk-in for evaluation of shortness of breath.    Patient reports that for the past couple of days she has been having shortness of breath and chest pain characterized as tightness that radiates to her back. She also endorses recently having a cough  "productive of green phlegm. She endorses a PMH of COPD and chronically being on 2 L of supplemental oxygen. She endorses using her inhaler when she can find it, and her nebulizer as needed, and she mentions that recently she has been using her nebulizer more than she usually does. She notes she has not taken any steroids \"for a while,\" and states that the last time she took antibiotics was approximately one year ago. She denies any known recent fevers. She also denies any recent vomiting, dizziness, light-headedness, leg swelling, or any other complications at this time.      Patient's mom states that the patient used her nebulizer twice yesterday.      REVIEW OF SYSTEMS   Review of Systems   Constitutional:  Negative for chills, fatigue and fever.   Respiratory:  Positive for cough (productive of green phlegm), chest tightness and shortness of breath.    Cardiovascular:  Positive for chest pain (tightness). Negative for leg swelling.   Gastrointestinal:  Negative for abdominal pain and vomiting.   Genitourinary:  Negative for dysuria.   Musculoskeletal:  Positive for back pain (secondary to chest pain).   Skin: Negative.    Neurological:  Negative for dizziness, syncope, weakness, light-headedness and headaches.   All other systems reviewed and are negative.       PAST MEDICAL HISTORY:  Past Medical History:   Diagnosis Date    Acute on chronic respiratory failure with hypoxia (H) 11/15/2016    Bipolar 1 disorder (H)     CAP (community acquired pneumonia) 11/15/2016    Cervical high risk HPV (human papillomavirus) test positive 3/5/2018    3/5/18 NIL pap, +HR HPV (not 16/18) 5/21/21 NIL pap, neg HPV. Plan: await provider    COPD exacerbation (H) 4/24/2021    COPD with exacerbation (H) 11/15/2016    Diabetes mellitus, type II (H)     Hearing loss     Meniere's disease     Pneumonia 4/23/2021    Pneumonia of right lower lobe due to infectious organism 6/11/2019       PAST SURGICAL HISTORY:  Past Surgical History: "   Procedure Laterality Date    CYST REMOVAL  2001    IA REMOVAL ANAL FISTULA,SUBCUTANEOUS      Description: Fistula-in-ano Repair;  Recorded: 2010; x2    TONSILLECTOMY             CURRENT MEDICATIONS:    albuterol (PROAIR HFA/PROVENTIL HFA/VENTOLIN HFA) 108 (90 Base) MCG/ACT inhaler  albuterol (PROVENTIL) (2.5 MG/3ML) 0.083% neb solution  ARIPiprazole (ABILIFY) 15 MG tablet  atorvastatin (LIPITOR) 40 MG tablet  blood glucose (NO BRAND SPECIFIED) lancets standard  blood glucose (NO BRAND SPECIFIED) test strip  blood glucose monitoring (ONETOUCH VERIO) meter device kit  cetirizine (ZYRTEC) 10 MG tablet  Dulaglutide (TRULICITY) 3 MG/0.5ML SOPN  fluticasone-vilanterol (BREO ELLIPTA) 100-25 MCG/ACT inhaler  metFORMIN (GLUCOPHAGE) 1000 MG tablet  tiotropium (SPIRIVA) 18 MCG inhaled capsule         ALLERGIES:  Allergies   Allergen Reactions    Lurasidone Other (See Comments)     Face turned red, (Brand name = Latuda) Face turned red, Face turned red       FAMILY HISTORY:  Family History   Problem Relation Age of Onset    Aneurysm Father     Heart Disease Father 70.00        bypass in 70s, AAA rupture at age 77     Ulcers Father 76.00    Pacemaker Mother     Bipolar Disorder Brother     Breast Cancer Maternal Grandmother 51.00    Kidney failure Maternal Grandmother     Cancer Paternal Grandmother         ?? lung    Cancer Paternal Uncle         ?? lung    Mental Illness Maternal Grandfather     Heart Disease Other         uncle    No Known Problems Sister         two siblings abuse chemicals    No Known Problems Brother     Bipolar Disorder Nephew        SOCIAL HISTORY:   Social History     Socioeconomic History    Marital status:    Tobacco Use    Smoking status: Former     Packs/day: 1.00     Years: 40.00     Pack years: 40.00     Types: Cigarettes     Quit date: 2017     Years since quittin.6    Smokeless tobacco: Never   Vaping Use    Vaping Use: Never used   Substance and Sexual Activity     Alcohol use: Yes     Comment: Alcoholic Drinks/day: Occasional     Drug use: No    Sexual activity: Never   Social History Narrative    03/2019The patient lives with her mother.; had worked at [employer] as  but quit March 2019.   Quit smoking 2015; no etoh problems  / x 2 ; no kids       VITALS:  /58   Pulse 98   Temp 100.1  F (37.8  C) (Oral)   Resp 24   SpO2 92%     PHYSICAL EXAM    Physical Exam  Constitutional:       General: She is not in acute distress.     Interventions: Nasal cannula in place.   HENT:      Head: Normocephalic and atraumatic.      Mouth/Throat:      Pharynx: Oropharynx is clear.   Eyes:      Pupils: Pupils are equal, round, and reactive to light.   Cardiovascular:      Rate and Rhythm: Normal rate and regular rhythm.      Pulses: Normal pulses.      Heart sounds: Normal heart sounds.   Pulmonary:      Effort: Tachypnea present.      Breath sounds: Wheezing present.   Abdominal:      General: Abdomen is flat. Bowel sounds are normal.      Palpations: Abdomen is soft.      Tenderness: There is no abdominal tenderness.   Musculoskeletal:         General: Normal range of motion.   Skin:     General: Skin is warm and dry.      Capillary Refill: Capillary refill takes less than 2 seconds.   Neurological:      General: No focal deficit present.      Mental Status: She is alert and oriented to person, place, and time.          LAB:  All pertinent labs reviewed and interpreted.  Labs Ordered and Resulted from Time of ED Arrival to Time of ED Departure   BASIC METABOLIC PANEL - Abnormal       Result Value    Sodium 138      Potassium 3.9      Chloride 97 (*)     Carbon Dioxide (CO2) 31 (*)     Anion Gap 10      Urea Nitrogen 9.1      Creatinine 0.75      Calcium 9.2      Glucose 196 (*)     GFR Estimate 87     CBC WITH PLATELETS AND DIFFERENTIAL - Abnormal    WBC Count 31.0 (*)     RBC Count 5.17      Hemoglobin 15.0      Hematocrit 47.9 (*)     MCV 93      MCH 29.0       MCHC 31.3 (*)     RDW 13.0      Platelet Count 202     DIFFERENTIAL - Abnormal    % Neutrophils 82      % Lymphocytes 15      % Monocytes 3      % Eosinophils 0      % Basophils 0      Absolute Neutrophils 25.4 (*)     Absolute Lymphocytes 4.7      Absolute Monocytes 0.9      Absolute Eosinophils 0.0      Absolute Basophils 0.0      RBC Morphology Confirmed RBC Indices      Platelet Assessment        Value: Automated Count Confirmed. Platelet morphology is normal.   TROPONIN T, HIGH SENSITIVITY - Normal    Troponin T, High Sensitivity 10     INFLUENZA A/B, RSV, & SARS-COV2 PCR - Normal    Influenza A PCR Negative      Influenza B PCR Negative      RSV PCR Negative      SARS CoV2 PCR Negative     LACTIC ACID WHOLE BLOOD - Normal    Lactic Acid 1.6         RADIOLOGY:  Reviewed all pertinent imaging. Please see official radiology report.  XR Chest Port 1 View   Final Result   IMPRESSION: Large body habitus .      New opacities noted in the right lower lobe partially silhouetting the right hemidiaphragm. If there are appropriate clinical symptoms, findings could certainly reflect a bronchopneumonia instead of atelectasis.         Left lung is clear. Heart and pulmonary vascularity are normal. Moderate S-shaped thoracolumbar scoliosis.          EKG:    Performed at: 18-SEP-2023, 9:18    Impression: Sinus tachycardia with frequent premature ventricular complexes. Low voltage QRS. Septal infarct, age undetermined. Abnormal ECG.    Rate: 105 BPM  Rhythm: Sinus tachycardia with frequent premature ventricular complexes.  Axis: 42  [place] Interval: 146 ms  QRS Interval: 70 ms  QTc Interval: 404 ms  Comparison: When compared with ECG of 12-MAR-2023 19:30, Premature ventricular complexes are now Present.    I have independently reviewed and interpreted the EKG(s) documented above.    PROCEDURES:   None.      I, Deven Toussaint, am serving as a scribe to document services personally performed by Dr. Jennifer Howe based on my  observation and the provider's statements to me. I, Jennifer Howe DO attest that Deven Perezgistu is acting in a scribe capacity, has observed my performance of the services and has documented them in accordance with my direction.    Jennifer Howe DO  Emergency Medicine  [facility] EMERGENCY DEPARTMENT  [address] [place] [id number]  [phone]  Dept: [phone]       Jennifer Howe DO  09/18/23 1249

## 2023-09-18 NOTE — PHARMACY-ADMISSION MEDICATION HISTORY
Pharmacist Admission Medication History    Admission medication history is complete. The information provided in this note is only as accurate as the sources available at the time of the update.    Medication reconciliation/reorder completed by provider prior to medication history? No    Information Source(s): Patient and CareEverywhere/SureScripts via in-person    Pertinent Information: Merlyn recently received her first dose of Abilify Maintena, she believes last Monday or Tuesday. She was instructed to continue taking oral Abilify for 2 weeks after receiving the first injection, and this is the typical duration of overlap.  No fill history for Spiriva within the past year, however patient reports taking.    Changes made to PTA medication list:  Added: Abilify Maintena  Deleted: None  Changed: Zyrtec (changed to prn)    Medication Affordability:       Allergies reviewed with patient and updates made in EHR: yes    Medication History Completed By: Bridget Saldana RPH 9/18/2023 1:49 PM    Prior to Admission medications    Medication Sig Last Dose Taking? Auth Provider Long Term End Date   albuterol (PROAIR HFA/PROVENTIL HFA/VENTOLIN HFA) 108 (90 Base) MCG/ACT inhaler Inhale 2 puffs into the lungs every 6 hours as needed for shortness of breath / dyspnea or wheezing Unknown at unknown Yes Arnoldo Russo MD Yes    albuterol (PROVENTIL) (2.5 MG/3ML) 0.083% neb solution Take 1 vial (2.5 mg) by nebulization every 4 hours as needed for shortness of breath or wheezing Take 1 vial four times a day for the next 5 days then go back to as needed Unknown at unknown Yes Rema Whiting MD Yes    ARIPiprazole (ABILIFY) 15 MG tablet Take 15 mg by mouth daily 9/17/2023 at am Yes Reported, Patient     ARIPiprazole ER (ABILIFY MAINTENA) 400 MG extended release inj syringe Inject 400 mg into the muscle every 28 days Past Week at unknown Yes Unknown, Entered By History     atorvastatin (LIPITOR) 40 MG tablet Take  1 tablet (40 mg) by mouth daily 9/17/2023 at am Yes Rema Whiting MD Yes    cetirizine (ZYRTEC) 10 MG tablet Take 10 mg by mouth daily as needed for allergies Unknown at unknown Yes Reported, Patient     Dulaglutide (TRULICITY) 3 MG/0.5ML SOPN Inject 3 mg Subcutaneous once a week 9/15/2023 at am Yes Rema Whiting MD     fluticasone-vilanterol (BREO ELLIPTA) 100-25 MCG/ACT inhaler Inhale 1 puff into the lungs daily 9/17/2023 at am Yes Rema Whiting MD     metFORMIN (GLUCOPHAGE) 1000 MG tablet Take 1 tablet (1,000 mg) by mouth 2 times daily (with meals) 9/17/2023 at pm Yes Rema Whiting MD Yes    tiotropium (SPIRIVA) 18 MCG inhaled capsule Inhale 18 mcg into the lungs daily 9/17/2023 at am Yes Reported, Patient No    blood glucose (NO BRAND SPECIFIED) lancets standard Use to test blood sugar 4 times daily before meals and at bedtime   Rema Whiting MD     blood glucose (NO BRAND SPECIFIED) test strip Use to test blood sugar 4 times daily before meals and at bedtime   Rema Whiting MD     blood glucose monitoring (ONETOUCH VERIO) meter device kit Use to test blood sugar 4 times daily before meals and bedtie   Rema Whiting MD

## 2023-09-18 NOTE — ED TRIAGE NOTES
Pt with c/o sob, sats 76 %, pt states forgot to turn her oxygen on.  02 applied and sats up to 80%.  Pt also admits chest pain that goes into her back, states from coughing.

## 2023-09-19 ENCOUNTER — APPOINTMENT (OUTPATIENT)
Dept: PHYSICAL THERAPY | Facility: HOSPITAL | Age: 68
DRG: 193 | End: 2023-09-19
Attending: INTERNAL MEDICINE
Payer: COMMERCIAL

## 2023-09-19 ENCOUNTER — APPOINTMENT (OUTPATIENT)
Dept: OCCUPATIONAL THERAPY | Facility: HOSPITAL | Age: 68
DRG: 193 | End: 2023-09-19
Attending: INTERNAL MEDICINE
Payer: COMMERCIAL

## 2023-09-19 LAB
ANION GAP SERPL CALCULATED.3IONS-SCNC: 8 MMOL/L (ref 7–15)
BUN SERPL-MCNC: 12.6 MG/DL (ref 8–23)
CALCIUM SERPL-MCNC: 9.4 MG/DL (ref 8.8–10.2)
CHLORIDE SERPL-SCNC: 102 MMOL/L (ref 98–107)
CREAT SERPL-MCNC: 0.68 MG/DL (ref 0.51–0.95)
DEPRECATED HCO3 PLAS-SCNC: 29 MMOL/L (ref 22–29)
EGFRCR SERPLBLD CKD-EPI 2021: >90 ML/MIN/1.73M2
ERYTHROCYTE [DISTWIDTH] IN BLOOD BY AUTOMATED COUNT: 12.9 % (ref 10–15)
GLUCOSE BLDC GLUCOMTR-MCNC: 248 MG/DL (ref 70–99)
GLUCOSE BLDC GLUCOMTR-MCNC: 274 MG/DL (ref 70–99)
GLUCOSE BLDC GLUCOMTR-MCNC: 296 MG/DL (ref 70–99)
GLUCOSE BLDC GLUCOMTR-MCNC: 310 MG/DL (ref 70–99)
GLUCOSE SERPL-MCNC: 304 MG/DL (ref 70–99)
HCT VFR BLD AUTO: 46.2 % (ref 35–47)
HGB BLD-MCNC: 14.1 G/DL (ref 11.7–15.7)
HOLD SPECIMEN: NORMAL
LACTATE SERPL-SCNC: 2.8 MMOL/L (ref 0.7–2)
LACTATE SERPL-SCNC: 4.3 MMOL/L (ref 0.7–2)
MCH RBC QN AUTO: 29 PG (ref 26.5–33)
MCHC RBC AUTO-ENTMCNC: 30.5 G/DL (ref 31.5–36.5)
MCV RBC AUTO: 95 FL (ref 78–100)
PLATELET # BLD AUTO: 196 10E3/UL (ref 150–450)
POTASSIUM SERPL-SCNC: 4.9 MMOL/L (ref 3.4–5.3)
RBC # BLD AUTO: 4.86 10E6/UL (ref 3.8–5.2)
SODIUM SERPL-SCNC: 139 MMOL/L (ref 136–145)
WBC # BLD AUTO: 29.2 10E3/UL (ref 4–11)

## 2023-09-19 PROCEDURE — 250N000011 HC RX IP 250 OP 636: Performed by: INTERNAL MEDICINE

## 2023-09-19 PROCEDURE — 120N000001 HC R&B MED SURG/OB

## 2023-09-19 PROCEDURE — 85027 COMPLETE CBC AUTOMATED: CPT | Performed by: INTERNAL MEDICINE

## 2023-09-19 PROCEDURE — 250N000012 HC RX MED GY IP 250 OP 636 PS 637: Performed by: INTERNAL MEDICINE

## 2023-09-19 PROCEDURE — 250N000013 HC RX MED GY IP 250 OP 250 PS 637: Performed by: INTERNAL MEDICINE

## 2023-09-19 PROCEDURE — 97161 PT EVAL LOW COMPLEX 20 MIN: CPT | Mod: GP

## 2023-09-19 PROCEDURE — 999N000157 HC STATISTIC RCP TIME EA 10 MIN

## 2023-09-19 PROCEDURE — 250N000009 HC RX 250: Performed by: INTERNAL MEDICINE

## 2023-09-19 PROCEDURE — 258N000003 HC RX IP 258 OP 636: Performed by: INTERNAL MEDICINE

## 2023-09-19 PROCEDURE — 97165 OT EVAL LOW COMPLEX 30 MIN: CPT | Mod: GO

## 2023-09-19 PROCEDURE — 80048 BASIC METABOLIC PNL TOTAL CA: CPT | Performed by: INTERNAL MEDICINE

## 2023-09-19 PROCEDURE — 83605 ASSAY OF LACTIC ACID: CPT | Performed by: INTERNAL MEDICINE

## 2023-09-19 PROCEDURE — 99232 SBSQ HOSP IP/OBS MODERATE 35: CPT | Performed by: INTERNAL MEDICINE

## 2023-09-19 PROCEDURE — 97535 SELF CARE MNGMENT TRAINING: CPT | Mod: GO

## 2023-09-19 PROCEDURE — 36415 COLL VENOUS BLD VENIPUNCTURE: CPT | Performed by: INTERNAL MEDICINE

## 2023-09-19 PROCEDURE — 94640 AIRWAY INHALATION TREATMENT: CPT | Mod: 76

## 2023-09-19 RX ORDER — GUAIFENESIN 600 MG/1
600 TABLET, EXTENDED RELEASE ORAL 2 TIMES DAILY
Status: DISCONTINUED | OUTPATIENT
Start: 2023-09-19 | End: 2023-09-21 | Stop reason: HOSPADM

## 2023-09-19 RX ORDER — NICOTINE POLACRILEX 4 MG
15-30 LOZENGE BUCCAL
Status: DISCONTINUED | OUTPATIENT
Start: 2023-09-19 | End: 2023-09-21 | Stop reason: HOSPADM

## 2023-09-19 RX ORDER — METHYLPREDNISOLONE SODIUM SUCCINATE 125 MG/2ML
60 INJECTION, POWDER, LYOPHILIZED, FOR SOLUTION INTRAMUSCULAR; INTRAVENOUS EVERY 12 HOURS
Status: COMPLETED | OUTPATIENT
Start: 2023-09-19 | End: 2023-09-20

## 2023-09-19 RX ORDER — DEXTROSE MONOHYDRATE 25 G/50ML
25-50 INJECTION, SOLUTION INTRAVENOUS
Status: DISCONTINUED | OUTPATIENT
Start: 2023-09-19 | End: 2023-09-21 | Stop reason: HOSPADM

## 2023-09-19 RX ADMIN — GUAIFENESIN 600 MG: 600 TABLET ORAL at 20:06

## 2023-09-19 RX ADMIN — IPRATROPIUM BROMIDE AND ALBUTEROL SULFATE 3 ML: 2.5; .5 SOLUTION RESPIRATORY (INHALATION) at 07:45

## 2023-09-19 RX ADMIN — ARIPIPRAZOLE 15 MG: 15 TABLET ORAL at 09:05

## 2023-09-19 RX ADMIN — METFORMIN HYDROCHLORIDE 1000 MG: 500 TABLET ORAL at 18:17

## 2023-09-19 RX ADMIN — IPRATROPIUM BROMIDE AND ALBUTEROL SULFATE 3 ML: 2.5; .5 SOLUTION RESPIRATORY (INHALATION) at 15:02

## 2023-09-19 RX ADMIN — METHYLPREDNISOLONE SODIUM SUCCINATE 62.5 MG: 125 INJECTION, POWDER, LYOPHILIZED, FOR SOLUTION INTRAMUSCULAR; INTRAVENOUS at 04:06

## 2023-09-19 RX ADMIN — GUAIFENESIN 600 MG: 600 TABLET ORAL at 12:49

## 2023-09-19 RX ADMIN — ATORVASTATIN CALCIUM 40 MG: 40 TABLET, FILM COATED ORAL at 20:06

## 2023-09-19 RX ADMIN — HUMAN INSULIN 15 UNITS: 100 INJECTION, SUSPENSION SUBCUTANEOUS at 16:23

## 2023-09-19 RX ADMIN — AZITHROMYCIN MONOHYDRATE 500 MG: 500 INJECTION, POWDER, LYOPHILIZED, FOR SOLUTION INTRAVENOUS at 14:14

## 2023-09-19 RX ADMIN — METHYLPREDNISOLONE SODIUM SUCCINATE 62.5 MG: 125 INJECTION, POWDER, LYOPHILIZED, FOR SOLUTION INTRAMUSCULAR; INTRAVENOUS at 16:13

## 2023-09-19 RX ADMIN — METFORMIN HYDROCHLORIDE 1000 MG: 500 TABLET ORAL at 09:05

## 2023-09-19 RX ADMIN — IPRATROPIUM BROMIDE AND ALBUTEROL SULFATE 3 ML: 2.5; .5 SOLUTION RESPIRATORY (INHALATION) at 20:12

## 2023-09-19 RX ADMIN — ENOXAPARIN SODIUM 40 MG: 40 INJECTION SUBCUTANEOUS at 16:13

## 2023-09-19 RX ADMIN — IPRATROPIUM BROMIDE AND ALBUTEROL SULFATE 3 ML: 2.5; .5 SOLUTION RESPIRATORY (INHALATION) at 12:06

## 2023-09-19 RX ADMIN — CEFTRIAXONE SODIUM 1 G: 1 INJECTION, POWDER, FOR SOLUTION INTRAMUSCULAR; INTRAVENOUS at 12:48

## 2023-09-19 ASSESSMENT — ACTIVITIES OF DAILY LIVING (ADL)
ADLS_ACUITY_SCORE: 34
ADLS_ACUITY_SCORE: 28
ADLS_ACUITY_SCORE: 34
ADLS_ACUITY_SCORE: 28
ADLS_ACUITY_SCORE: 34

## 2023-09-19 NOTE — PROGRESS NOTES
[facility] Treatment Plan    Patient Score: 7  Patient Acuity: 4    Clinical Indication for Therapy: pneumonia, asthma and COPD     Therapy Ordered: Duo neb q4 changed to Duo qid    Assessment Summary: patient with chronic supplemental oxygen use at 2 LPM. Has community acquired bacterial pneumonia, asthmas and COPD. History of tobacco and alcohol use. Home albuterol inhaler and neb done prn.     Now on oxygen at 4 LPM by nasal cannula with SpO2 mid 90's. Lung sounds are diminished before and after neb treatment.      9/18/2023 CXR: IMPRESSION: Large body habitus.  New opacities noted in the right lower lobe partially silhouetting the right hemidiaphragm. If there are appropriate clinical symptoms, findings could certainly reflect a bronchopneumonia instead of atelectasis.  Left lung is clear. Heart and pulmonary vascularity are normal. Moderate S-shaped thoracolumbar scoliosis.    Francisco Hernandez, RT  9/18/2023

## 2023-09-19 NOTE — PLAN OF CARE
Problem: COPD (Chronic Obstructive Pulmonary Disease) Comorbidity  Goal: Maintenance of COPD Symptom Control  9/18/2023 2316 by Char Hall RN  Outcome: Progressing  9/18/2023 2023 by Char Hall RN  Outcome: Progressing     Problem: Pneumonia  Goal: Effective Oxygenation and Ventilation  9/18/2023 2316 by Char Hall RN  Outcome: Progressing     Problem: Gas Exchange Impaired  Goal: Optimal Gas Exchange  9/18/2023 2316 by Char Hall RN  Outcome: Progressing  9/18/2023 2023 by Char Hall RN  Outcome: Progressing  Intervention: Optimize Oxygenation and Ventilation  Recent Flowsheet Documentation  Taken 9/18/2023 1745 by Char Hall RN  Head of Bed (HOB) Positioning: HOB at 30-45 degrees   Goal Outcome Evaluation:       Pt alert and oriented x4, VSS on 4L NC oxygen. [place] in both ears. Denies chest pain and SOB, reports pain with ambulation. Up with assist of 1. [place] in place. Last blood sugar 312, covered. Sputum culture yet to be collected. Slept through cares

## 2023-09-19 NOTE — PLAN OF CARE
Goal Outcome Evaluation:    Alert and oriented. Hard of hearing.  States breathing seems better today. Lungs have crackles bilaterally. Dyspnea on exertion. On 3L/NC.  Stand by assist to the recliner today.   BP soft 95/55 asymptomatic.

## 2023-09-19 NOTE — PROGRESS NOTES
[facility]    Medicine Progress Note - Hospitalist Service    Date of Admission:  2023    Assessment & Plan   68 year old female with past medical history of bipolar disorder, COPD, chronic hypoxic respiratory failure on home O2, DM2, Ménière's disease, ABDIFATAH on CPAP, pneumonia, obesity, tobacco use disorder who presented with 2 days of productive cough, dyspnea, found to be hypoxic on arrival with leukocytosis and chest x-ray showing RLL infiltrate.      Community-acquired pneumonia.  Obtain sputum and blood cultures.  Continue ceftriaxone and azithromycin.  WBC is 29 from 31 but now can expect steroid induced leukocytosis as well.  Clinically better   COPD exacerbation secondary to above.  Continue antibiotics, scheduled nebulizers,  IV steroids to Q 12 h and continue to taper slowly  Acute on chronic respiratory failure with hypoxia and hypercapnia.  Secondary to above.  Supplemental O2 to keep O2 sats above 90%  Bipolar disorder.  Continue PTA meds  DM2.  Continue metformin.  Monitor blood sugars with meals and at bedtime.  Add NPH for steroid-induced hyperglycemia  Obesity There is no height or weight on file to calculate BMI.  Lactic acidosis.  Probably due to hypoxia.  No evidence of sepsis       Diet: Combination Diet Moderate Consistent Carb (60 g CHO per Meal) Diet    DVT Prophylaxis: Enoxaparin (Lovenox) SQ  Lara Catheter: Not present  Lines: None     Cardiac Monitoring: None  Code Status: Full Code      Clinically Significant Risk Factors                        # DMII: A1C = 6.5 % (Ref range: 0.0 - 5.6 %) within past 6 months, PRESENT ON ADMISSION      # COPD: noted on problem list        Disposition Plan      Expected Discharge Date: 2023        Discharge Comments: IV abx          Luh Zaldivar MD  Hospitalist Service  [facility]  Securely message with PharmAssistant (more info)  Text page via [email or web address] Paging/Directory    ______________________________________________________________________    Interval History   Breathing improved  Has less phlegm production   No chest pain     Physical Exam   Vital Signs: Temp: 98  F (36.7  C) Temp src: Oral BP: 117/56 Pulse: 97   Resp: 20 SpO2: 94 % O2 Device: Nasal cannula Oxygen Delivery: 2 LPM  Weight: 0 lbs 0 oz    General Appearance: NAD  Respiratory: Expiratory wheezes improved since yesterday   Cardiovascular: RRR  GI: Abdomen soft and nontender     Medical Decision Making       45 MINUTES SPENT BY ME on the date of service doing chart review, history, exam, documentation & further activities per the note.  MANAGEMENT DISCUSSED with the following over the past 24 hours: patient and RN       Data     I have personally reviewed the following data over the past 24 hrs:    29.2 (H)  \   14.1   / 196     139 102 12.6 /  274 (H)   4.9 29 0.68 \     Procal: N/A CRP: N/A Lactic Acid: 4.3 (HH)         Imaging results reviewed over the past 24 hrs:   No results found for this or any previous visit (from the past 24 hour(s)).

## 2023-09-19 NOTE — PROVIDER NOTIFICATION
Per Dr. Zaldivar, disregard sepsis protocol orders as pt is not septic per provider.  VSS  lactic was drawn based on protocol firing and orders acknowledged.  Ida Archuleta RN

## 2023-09-19 NOTE — TREATMENT PLAN
RESPIRATORY CARE NOTE    Pt found sleeping on 4 lpm nc titrate to 2 lpm with spo2 92-93%, bs diminished, fair dry cough.  Pt uses O2 at home at 2 lpm NC, uses alb prn at home    Received duoneb x3 via mouth piece    BS after tx cont diminished    Cont monitoring    Laura Kim RT  
Home

## 2023-09-19 NOTE — PLAN OF CARE
Problem: Plan of Care - These are the overarching goals to be used throughout the patient stay.    Goal: Optimal Comfort and Wellbeing  Outcome: Progressing     Problem: Gas Exchange Impaired  Goal: Optimal Gas Exchange  Outcome: Progressing   Goal Outcome Evaluation:       Pt alert/oriented, denies pain and SOB. On oxygen 4 L via NC, sat above 90%. Pt is [place], external cath in place. Sputum culture pending. Call light within reach.

## 2023-09-19 NOTE — PROGRESS NOTES
09/19/23 0833   Appointment Info   Signing Clinician's Name / Credentials (PT) Jacqueline Franz PT   Living Environment   People in Home parent(s)  (mother)   Current Living Arrangements mobile home   Home Accessibility stairs to enter home   Number of Stairs, Main Entrance 5   Stair Railings, Main Entrance railings safe and in good condition   Transportation Anticipated family or friend will provide   Self-Care   Equipment Currently Used at Home walker, rolling  (4WW, home O2 2L)   Activity/Exercise/Self-Care Comment independent ADL's; mother does IADL's   General Information   Onset of Illness/Injury or Date of Surgery 09/18/23   Referring Physician Dr. Frost   Patient/Family Therapy Goals Statement (PT) go to brother's birthday party tomorrow   Pertinent History of Current Problem (include personal factors and/or comorbidities that impact the POC) pneumonia; PMH of COPD on home O2, bipolar, Meniere's   Existing Precautions/Restrictions oxygen therapy device and L/min;fall   Cognition   Affect/Mental Status (Cognition) WFL   Orientation Status (Cognition) oriented x 4   Follows Commands (Cognition) WFL   Range of Motion (ROM)   Range of Motion ROM is WFL   Strength (Manual Muscle Testing)   Strength (Manual Muscle Testing) strength is WFL   Bed Mobility   Bed Mobility supine-sit   Supine-Sit [place] (Bed Mobility) minimum assist (75% patient effort)   Transfers   Transfers sit-stand transfer   Sit-Stand Transfer   Sit-Stand [place] (Transfers) modified independence   Assistive Device (Sit-Stand Transfers) walker, front-wheeled   Gait/Stairs (Locomotion)   [place] Level (Gait) modified independence   Assistive Device (Gait) walker, front-wheeled;other (see comments)  (O2 3L NC)   Distance in Feet 150   Pattern (Gait) step-through   Deviations/Abnormal Patterns (Gait) other (see comments)  (O2 3L NC 86% post stairs; recovery to 96% at rest)   Negotiation (Stairs) stairs independence;handrail  location;number of steps;ascending technique;descending technique   [place] Level (Stairs) supervision;verbal cues;nonverbal cues (demo/gesture)   Handrail Location (Stairs) both sides   Number of Steps (Stairs) 5   Ascending Technique (Stairs) step-to-step   Descending Technique (Stairs) step-to-step   Comment, (Gait/Stairs) up/down step stool in room x5   Clinical Impression   Criteria for Skilled Therapeutic Intervention Evaluation only   Clinical Presentation (PT Evaluation Complexity) Stable/Uncomplicated   Clinical Presentation Rationale pt presents as medically diagnosed   Clinical Decision Making (Complexity) low complexity   Anticipated Equipment Needs at Discharge (PT) walker, rolling   Risk & Benefits of therapy have been explained evaluation/treatment results reviewed;patient   PT Total Evaluation Time   PT Eval, Low Complexity Minutes (42198) 25   PT Discharge Planning   PT Plan d/c PT   PT Discharge Recommendation (DC Rec) home with assist   PT Rationale for DC Rec mother assist with IADL's as per prior level of function; pt appears at functional baseline for mobility level   PT Brief overview of current status amb. 150 feet walker mod independent; supervision stairs   Total Session Time   Total Session Time (sum of timed and untimed services) 25

## 2023-09-19 NOTE — PROGRESS NOTES
"Care Management Follow Up    Length of Stay (days): 1    Expected Discharge Date: 09/20/2023     Concerns to be Addressed:   Care progression     Patient plan of care discussed at interdisciplinary rounds: Yes    Anticipated Discharge Disposition:  Home     Anticipated Discharge Services:  Home  Anticipated Discharge DME:  NANCY    Patient/family educated on Medicare website which has current facility and service quality ratings:    Education Provided on the Discharge Plan:  NANCY  Patient/Family in Agreement with the Plan:  NA    Referrals Placed by CM/SW:    Private pay costs discussed: Not applicable    Additional Information:  Chart reviewed. PT/OT discharge rec home with assist. Patient on IV antibiotics for community-acquired pneumonia. Anticipate no CM discharge planning needs.    Social Hx: \"Lives at home with her Mom and has home oxygen (2 liters), CPAP ([employer]).\"    RNCM to follow for medical progression, recommendations, and final discharge plan.     Sandy Cano RN     "

## 2023-09-19 NOTE — PROGRESS NOTES
"Occupational Therapy     09/19/23 3340   Appointment Info   Signing Clinician's Name / Credentials (OT) Melissa Patricia OTR/L   Living Environment   People in Home parent(s)  (mother)   Current Living Arrangements mobile home   Home Accessibility stairs to enter home   Number of Stairs, Main Entrance 5   Stair Railings, Main Entrance railing on right side (ascending)   Living Environment Comments showers w/o GB or shower chair   Self-Care   Usual Activity Tolerance moderate   Current Activity Tolerance moderate   Equipment Currently Used at Home   (4WW; on 2 L O2)   Activity/Exercise/Self-Care Comment Pt reports independence w/ADLs; mother A w/IADLs. Pt is home alone @ times (her mother works)   General Information   Onset of Illness/Injury or Date of Surgery 09/18/23   Referring Physician Luh Zaldivar   Patient/Family Therapy Goal Statement (OT) none stated   Existing Precautions/Restrictions fall;oxygen therapy device and L/min   Limitations/Impairments hearing  (Elem B ears)   General Observations and Info Per chart: \"68 year old female with past medical history of bipolar disorder, COPD, chronic hypoxic respiratory failure on home O2, DM2, Ménière's disease, ABDIFATAH on CPAP, pneumonia, obesity, tobacco use disorder who presented with 2 days of productive cough, dyspnea, found to be hypoxic on arrival with leukocytosis and chest x-ray showing RLL infiltrate.\"   Cognitive Status Examination   Orientation Status orientation to person, place and time   Follows Commands WFL  (occ needs repetition d/t Elem)   Pain Assessment   Patient Currently in Pain No   Posture   Posture not impaired   Range of Motion Comprehensive   General Range of Motion no range of motion deficits identified   Strength Comprehensive (MMT)   General Manual Muscle Testing (MMT) Assessment no strength deficits identified   Bed Mobility   Comment (Bed Mobility) SBA-w/effort sup to sit; sit to sup pt crawled into bed on all fours.   Transfers " "  Transfer Comments SBA/mod I bed, chair & toilet   Balance   Balance Comments Room mobility SBA w/o AD; pt preferred longer distances to use FWW w/mod I   Activities of Daily Living   BADL Assessment/Intervention toileting;grooming;lower body dressing   Lower Body Dressing Assessment/Training   Comment, (Lower Body Dressing) SBA w/effort to doff B socks; max A to don   Grooming Assessment/Training   Comment, (Grooming) SBA washing hands @ sink   Toileting   Comment, (Toileting) SBA-initially deferred using toilet paper \"I only went a little bit\"-wearing adult elastic waist brief   Clinical Impression   Criteria for Skilled Therapeutic Interventions Met (OT) Yes, treatment indicated   OT Diagnosis decreased ADLs   OT Problem List-Impairments impacting ADL activity tolerance impaired   Assessment of Occupational Performance 1-3 Performance Deficits   Identified Performance Deficits LB drsg, decreased activity tolerance, SOB   Planned Therapy Interventions (OT) ADL retraining;progressive activity/exercise;home program guidelines   Clinical Decision Making Complexity (OT) low complexity   Anticipated Equipment Needs Upon Discharge (OT)   (interested in sock aid & reacher; will assess for further equipment needs)   Risk & Benefits of therapy have been explained evaluation/treatment results reviewed;care plan/treatment goals reviewed;participants voiced agreement with care plan;patient   Clinical Impression Comments Pt presents w/decreased ADL activity tolerance & desatuarations w/activity with 3 L vs her baseline of 2 L O2 affected her ability to complete ADLs & bed mob.   OT Total Evaluation Time   OT Eval, Low Complexity Minutes (16847) 15   OT Goals   Therapy Frequency (OT) Daily   OT Predicted Duration/Target Date for Goal Attainment 09/26/23   OT Goals Bed Mobility;Toilet Transfer/Toileting;Lower Body Dressing;Aerobic Activity   OT: Lower Body Dressing Modified independent   OT: Bed Mobility Modified independent "   OT: Toilet Transfer/Toileting Modified independent   OT: Perform aerobic activity with stable cardiovascular response intermittent activity;15 minutes  (while maintaining 90% or greater O2 saturations on O2.)   Self-Care/Home Management   Self-Care/Home Mgmt/ADL, Compensatory, Meal Prep Minutes (53227) 10   Symptoms Noted During/After Treatment (Meal Preparation/Planning Training) fatigue;shortness of breath   Treatment Detail/Skilled Intervention Pt needing cues for sup to sit bed mobility-has standard bed @ home; able to complete w/incr time & effort. Pt reports don/doff socks is a struggle @ baseline & she has been shown LH AE in the past but doesn't use. Pt. expressed interest in trialing again & possible purchase-verbal resources given & plan to practice next session. Additional functional mob w/FWW-O2 w/slight desat to 88-89% on 3 L O2; increased to 93% @ end of session. At end of session, pt back in bed w/alarm activated & call light in hand.   OT Discharge Planning   OT Plan bed mob-cues for tech to minimize SOB/effort, LB drsg-interested in sock aid & reacher, standing krish/act-monitor O2   OT Discharge Recommendation (DC Rec) home with assist   OT Rationale for DC Rec Pt demo mod A (socks) to mod I overall w/ADLs, trfs & functional mob. Pt appears close to her ADL baseline. Rec continued assist for IADLs/appears her mother can continue to provide this level of assist.   OT Brief overview of current status SBA bed mob w/effort, SBA/mod I trfs & functional mob, mod A donning socks   Total Session Time   Timed Code Treatment Minutes 10   Total Session Time (sum of timed and untimed services) 25

## 2023-09-19 NOTE — PROGRESS NOTES
Sepsis protocol has fired in [facility]   VSS, BS at 1620 is 274  insulin administered, pt is A&O  Lab is with pt now to draw lactic. Provider notified via [employer] messaging.  Ida Archuleta RN

## 2023-09-20 LAB
ERYTHROCYTE [DISTWIDTH] IN BLOOD BY AUTOMATED COUNT: 13.2 % (ref 10–15)
GLUCOSE BLDC GLUCOMTR-MCNC: 173 MG/DL (ref 70–99)
GLUCOSE BLDC GLUCOMTR-MCNC: 276 MG/DL (ref 70–99)
GLUCOSE BLDC GLUCOMTR-MCNC: 283 MG/DL (ref 70–99)
GLUCOSE BLDC GLUCOMTR-MCNC: 294 MG/DL (ref 70–99)
HCT VFR BLD AUTO: 45.7 % (ref 35–47)
HGB BLD-MCNC: 14 G/DL (ref 11.7–15.7)
MCH RBC QN AUTO: 29.2 PG (ref 26.5–33)
MCHC RBC AUTO-ENTMCNC: 30.6 G/DL (ref 31.5–36.5)
MCV RBC AUTO: 95 FL (ref 78–100)
PLATELET # BLD AUTO: 195 10E3/UL (ref 150–450)
RBC # BLD AUTO: 4.8 10E6/UL (ref 3.8–5.2)
WBC # BLD AUTO: 25.7 10E3/UL (ref 4–11)

## 2023-09-20 PROCEDURE — 120N000001 HC R&B MED SURG/OB

## 2023-09-20 PROCEDURE — 99232 SBSQ HOSP IP/OBS MODERATE 35: CPT | Performed by: INTERNAL MEDICINE

## 2023-09-20 PROCEDURE — 94640 AIRWAY INHALATION TREATMENT: CPT | Mod: 76

## 2023-09-20 PROCEDURE — 250N000011 HC RX IP 250 OP 636: Mod: JZ | Performed by: INTERNAL MEDICINE

## 2023-09-20 PROCEDURE — 36415 COLL VENOUS BLD VENIPUNCTURE: CPT | Performed by: INTERNAL MEDICINE

## 2023-09-20 PROCEDURE — 94640 AIRWAY INHALATION TREATMENT: CPT

## 2023-09-20 PROCEDURE — 250N000013 HC RX MED GY IP 250 OP 250 PS 637: Performed by: INTERNAL MEDICINE

## 2023-09-20 PROCEDURE — 999N000157 HC STATISTIC RCP TIME EA 10 MIN

## 2023-09-20 PROCEDURE — 85041 AUTOMATED RBC COUNT: CPT | Performed by: INTERNAL MEDICINE

## 2023-09-20 PROCEDURE — 85027 COMPLETE CBC AUTOMATED: CPT | Performed by: INTERNAL MEDICINE

## 2023-09-20 PROCEDURE — 250N000009 HC RX 250: Performed by: INTERNAL MEDICINE

## 2023-09-20 RX ORDER — CEFDINIR 300 MG/1
300 CAPSULE ORAL EVERY 12 HOURS SCHEDULED
Status: DISCONTINUED | OUTPATIENT
Start: 2023-09-20 | End: 2023-09-21 | Stop reason: HOSPADM

## 2023-09-20 RX ORDER — AZITHROMYCIN 250 MG/1
500 TABLET, FILM COATED ORAL DAILY
Status: DISCONTINUED | OUTPATIENT
Start: 2023-09-20 | End: 2023-09-21 | Stop reason: HOSPADM

## 2023-09-20 RX ORDER — PREDNISONE 20 MG/1
40 TABLET ORAL DAILY
Status: DISCONTINUED | OUTPATIENT
Start: 2023-09-21 | End: 2023-09-21 | Stop reason: HOSPADM

## 2023-09-20 RX ADMIN — METHYLPREDNISOLONE SODIUM SUCCINATE 62.5 MG: 125 INJECTION, POWDER, LYOPHILIZED, FOR SOLUTION INTRAMUSCULAR; INTRAVENOUS at 04:13

## 2023-09-20 RX ADMIN — GUAIFENESIN 600 MG: 600 TABLET ORAL at 20:52

## 2023-09-20 RX ADMIN — GUAIFENESIN 600 MG: 600 TABLET ORAL at 08:52

## 2023-09-20 RX ADMIN — CEFDINIR 300 MG: 300 CAPSULE ORAL at 20:52

## 2023-09-20 RX ADMIN — METFORMIN HYDROCHLORIDE 1000 MG: 500 TABLET ORAL at 17:00

## 2023-09-20 RX ADMIN — IPRATROPIUM BROMIDE AND ALBUTEROL SULFATE 3 ML: 2.5; .5 SOLUTION RESPIRATORY (INHALATION) at 07:13

## 2023-09-20 RX ADMIN — METHYLPREDNISOLONE SODIUM SUCCINATE 62.5 MG: 125 INJECTION, POWDER, LYOPHILIZED, FOR SOLUTION INTRAMUSCULAR; INTRAVENOUS at 16:57

## 2023-09-20 RX ADMIN — ATORVASTATIN CALCIUM 40 MG: 40 TABLET, FILM COATED ORAL at 20:52

## 2023-09-20 RX ADMIN — CEFDINIR 300 MG: 300 CAPSULE ORAL at 12:55

## 2023-09-20 RX ADMIN — ARIPIPRAZOLE 15 MG: 15 TABLET ORAL at 08:52

## 2023-09-20 RX ADMIN — IPRATROPIUM BROMIDE AND ALBUTEROL SULFATE 3 ML: 2.5; .5 SOLUTION RESPIRATORY (INHALATION) at 20:00

## 2023-09-20 RX ADMIN — AZITHROMYCIN DIHYDRATE 500 MG: 250 TABLET, FILM COATED ORAL at 12:55

## 2023-09-20 RX ADMIN — IPRATROPIUM BROMIDE AND ALBUTEROL SULFATE 3 ML: 2.5; .5 SOLUTION RESPIRATORY (INHALATION) at 15:19

## 2023-09-20 RX ADMIN — IPRATROPIUM BROMIDE AND ALBUTEROL SULFATE 3 ML: 2.5; .5 SOLUTION RESPIRATORY (INHALATION) at 11:15

## 2023-09-20 RX ADMIN — METFORMIN HYDROCHLORIDE 1000 MG: 500 TABLET ORAL at 08:52

## 2023-09-20 ASSESSMENT — ACTIVITIES OF DAILY LIVING (ADL)
ADLS_ACUITY_SCORE: 30
ADLS_ACUITY_SCORE: 34
ADLS_ACUITY_SCORE: 31
ADLS_ACUITY_SCORE: 30
ADLS_ACUITY_SCORE: 31

## 2023-09-20 NOTE — PLAN OF CARE
Goal Outcome Evaluation:    Pt A/O x4, [place]. Denies pain. Currently on 3L O2. LS dim, crackles RLL and LLL. Sepsis protocol fired, lactic 2.8, no new orders. HS , 2 units given per sliding scale. Moderate carb count diet. SBA out of bed. VSS.    /67 (BP Location: Left arm)   Pulse 77   Temp 97.5  F (36.4  C) (Oral)   Resp 18   Wt 75.9 kg (167 lb 5.3 oz)   SpO2 97%   BMI 33.23 kg/m

## 2023-09-20 NOTE — PROGRESS NOTES
[facility]    Medicine Progress Note - Hospitalist Service    Date of Admission:  9/18/2023    Assessment & Plan   68 year old female with past medical history of bipolar disorder, COPD, chronic hypoxic respiratory failure on home O2, DM2, Ménière's disease, ABDIFATAH on CPAP, pneumonia, obesity, tobacco use disorder who presented with 2 days of productive cough, dyspnea, found to be hypoxic on arrival with leukocytosis and chest x-ray showing RLL infiltrate.  Has been on IV antibiotics, steroids, scheduled nebs and improved significantly     Community-acquired pneumonia.  Completed 48 hrs of IV ceftriaxone and azithromycin, switch to oral cefdinir and azithromycin to complete 5 day course.   WBC is 25 from 31 but can expect steroid induced leukocytosis as well.  Clinically better   COPD exacerbation secondary to above.  Continue antibiotics, scheduled nebulizers, IV steroids.  Switch to oral prednisone tomorrow   Acute on chronic respiratory failure with hypoxia and hypercapnia.  Secondary to above.  Back to baseline O2 requirements.   Bipolar disorder.  Continue PTA meds  DM2.  Continue metformin.  Monitor blood sugars with meals and at bedtime.  Add NPH for steroid-induced hyperglycemia  Obesity Body mass index is 33.23 kg/m .  Lactic acidosis.  Probably due to hypoxia.  No evidence of sepsis       Diet: Combination Diet Moderate Consistent Carb (60 g CHO per Meal) Diet    DVT Prophylaxis: Enoxaparin (Lovenox) SQ  Lara Catheter: Not present  Lines: None     Cardiac Monitoring: None  Code Status: Full Code      Clinically Significant Risk Factors                        # DMII: A1C = 6.5 % (Ref range: 0.0 - 5.6 %) within past 6 months, PRESENT ON ADMISSION      # COPD: noted on problem list        Disposition Plan      Expected Discharge Date: 09/21/2023        Discharge Comments: switched to PO abx   already has O2 at home          Luh Zaldivar MD  Hospitalist Service  [facility]  [facility]  Securely message with [employer] (more info)  Text page via [email or web address] Paging/Directory   ______________________________________________________________________    Interval History   Patient reports feeling better but still dyspneic with exertion   Not much sputum production     Physical Exam   Vital Signs: Temp: 98.5  F (36.9  C) Temp src: Oral BP: 109/58 Pulse: 88   Resp: 28 SpO2: 95 % O2 Device: Nasal cannula with humidification Oxygen Delivery: 2 LPM  Weight: 167 lbs 5.27 oz    General Appearance: NAD  Respiratory: Expiratory wheezes (significantly improved since admission)   Cardiovascular: RRR    Medical Decision Making       45 MINUTES SPENT BY ME on the date of service doing chart review, history, exam, documentation & further activities per the note.  MANAGEMENT DISCUSSED with the following over the past 24 hours: patient        Data     I have personally reviewed the following data over the past 24 hrs:    25.7 (H)  \   14.0   / 195     N/A N/A N/A /  276 (H)   N/A N/A N/A \     Procal: N/A CRP: N/A Lactic Acid: 2.8 (H)

## 2023-09-20 NOTE — PROGRESS NOTES
"Care Management Follow Up    Length of Stay (days): 2    Expected Discharge Date: 09/20/2023     Concerns to be Addressed:   Care progression     Patient plan of care discussed at interdisciplinary rounds: Yes    Anticipated Discharge Disposition:  Home     Anticipated Discharge Services:  Home  Anticipated Discharge DME:  NANCY    Patient/family educated on Medicare website which has current facility and service quality ratings:    Education Provided on the Discharge Plan:  NANCY  Patient/Family in Agreement with the Plan:  NA    Referrals Placed by CM/SW:    Private pay costs discussed: Not applicable    Additional Information:  Per provider patient will discharge to home tomorrow. Already has O2 at home.    Social Hx: \"Lives at home with her Mom and has home oxygen (2 liters), CPAP ([employer]).\"    RNCM to follow for medical progression, recommendations, and final discharge plan.     Sandy Cano RN     "

## 2023-09-20 NOTE — PLAN OF CARE
Goal Outcome Evaluation:         VSS lung sounds crackles, and expiratory  wheezes. . Denies pain. Sats 88% to 92% on 3 L O2 NC.

## 2023-09-20 NOTE — PROGRESS NOTES
RESPIRATORY CARE NOTE     Patient Name: Arvind Leong  Today's Date: 9/20/2023       Pt continues on 2LNC and denies any shortness of breath at this time. BS: fainted wheezes auscultated upper airways and decreased @ [place]. Pt did receive neb txs as ordered. Post neb txs, pt reported relief. Will continue to follow.     George Schultz RRT

## 2023-09-21 ENCOUNTER — APPOINTMENT (OUTPATIENT)
Dept: OCCUPATIONAL THERAPY | Facility: HOSPITAL | Age: 68
DRG: 193 | End: 2023-09-21
Payer: COMMERCIAL

## 2023-09-21 VITALS
TEMPERATURE: 97.5 F | RESPIRATION RATE: 20 BRPM | BODY MASS INDEX: 33.01 KG/M2 | SYSTOLIC BLOOD PRESSURE: 111 MMHG | DIASTOLIC BLOOD PRESSURE: 67 MMHG | OXYGEN SATURATION: 92 % | WEIGHT: 166.23 LBS | HEART RATE: 83 BPM

## 2023-09-21 LAB
ANION GAP SERPL CALCULATED.3IONS-SCNC: 7 MMOL/L (ref 7–15)
BUN SERPL-MCNC: 18.4 MG/DL (ref 8–23)
CALCIUM SERPL-MCNC: 9.1 MG/DL (ref 8.8–10.2)
CHLORIDE SERPL-SCNC: 98 MMOL/L (ref 98–107)
CREAT SERPL-MCNC: 0.61 MG/DL (ref 0.51–0.95)
DEPRECATED HCO3 PLAS-SCNC: 36 MMOL/L (ref 22–29)
EGFRCR SERPLBLD CKD-EPI 2021: >90 ML/MIN/1.73M2
ERYTHROCYTE [DISTWIDTH] IN BLOOD BY AUTOMATED COUNT: 13.1 % (ref 10–15)
GLUCOSE BLDC GLUCOMTR-MCNC: 148 MG/DL (ref 70–99)
GLUCOSE BLDC GLUCOMTR-MCNC: 155 MG/DL (ref 70–99)
GLUCOSE SERPL-MCNC: 139 MG/DL (ref 70–99)
HCT VFR BLD AUTO: 45.3 % (ref 35–47)
HGB BLD-MCNC: 14.1 G/DL (ref 11.7–15.7)
HOLD SPECIMEN: NORMAL
MCH RBC QN AUTO: 29.1 PG (ref 26.5–33)
MCHC RBC AUTO-ENTMCNC: 31.1 G/DL (ref 31.5–36.5)
MCV RBC AUTO: 93 FL (ref 78–100)
PLATELET # BLD AUTO: 233 10E3/UL (ref 150–450)
PLATELET # BLD AUTO: 233 10E3/UL (ref 150–450)
POTASSIUM SERPL-SCNC: 4.7 MMOL/L (ref 3.4–5.3)
RBC # BLD AUTO: 4.85 10E6/UL (ref 3.8–5.2)
SODIUM SERPL-SCNC: 141 MMOL/L (ref 136–145)
WBC # BLD AUTO: 19 10E3/UL (ref 4–11)

## 2023-09-21 PROCEDURE — 82310 ASSAY OF CALCIUM: CPT | Performed by: HOSPITALIST

## 2023-09-21 PROCEDURE — 85014 HEMATOCRIT: CPT | Performed by: HOSPITALIST

## 2023-09-21 PROCEDURE — 999N000157 HC STATISTIC RCP TIME EA 10 MIN

## 2023-09-21 PROCEDURE — 94640 AIRWAY INHALATION TREATMENT: CPT

## 2023-09-21 PROCEDURE — 97530 THERAPEUTIC ACTIVITIES: CPT | Mod: GO

## 2023-09-21 PROCEDURE — 250N000013 HC RX MED GY IP 250 OP 250 PS 637: Performed by: INTERNAL MEDICINE

## 2023-09-21 PROCEDURE — 99239 HOSP IP/OBS DSCHRG MGMT >30: CPT | Performed by: HOSPITALIST

## 2023-09-21 PROCEDURE — 250N000009 HC RX 250: Performed by: INTERNAL MEDICINE

## 2023-09-21 PROCEDURE — 97535 SELF CARE MNGMENT TRAINING: CPT | Mod: GO

## 2023-09-21 PROCEDURE — 250N000012 HC RX MED GY IP 250 OP 636 PS 637: Performed by: INTERNAL MEDICINE

## 2023-09-21 PROCEDURE — 94640 AIRWAY INHALATION TREATMENT: CPT | Mod: 76

## 2023-09-21 PROCEDURE — 85049 AUTOMATED PLATELET COUNT: CPT | Performed by: INTERNAL MEDICINE

## 2023-09-21 PROCEDURE — 36415 COLL VENOUS BLD VENIPUNCTURE: CPT | Performed by: INTERNAL MEDICINE

## 2023-09-21 RX ORDER — ALBUTEROL SULFATE 0.83 MG/ML
2.5 SOLUTION RESPIRATORY (INHALATION) EVERY 4 HOURS PRN
Qty: 240 ML | Refills: 12 | Status: SHIPPED | OUTPATIENT
Start: 2023-09-21

## 2023-09-21 RX ORDER — CEFDINIR 300 MG/1
300 CAPSULE ORAL 2 TIMES DAILY
Qty: 10 CAPSULE | Refills: 0 | Status: SHIPPED | OUTPATIENT
Start: 2023-09-21 | End: 2023-09-26

## 2023-09-21 RX ORDER — AZITHROMYCIN 250 MG/1
250 TABLET, FILM COATED ORAL DAILY
Qty: 2 TABLET | Refills: 0 | Status: SHIPPED | OUTPATIENT
Start: 2023-09-21 | End: 2023-09-23

## 2023-09-21 RX ORDER — PREDNISONE 20 MG/1
40 TABLET ORAL DAILY
Qty: 4 TABLET | Refills: 0 | Status: SHIPPED | OUTPATIENT
Start: 2023-09-22 | End: 2023-09-24

## 2023-09-21 RX ADMIN — METFORMIN HYDROCHLORIDE 1000 MG: 500 TABLET ORAL at 09:24

## 2023-09-21 RX ADMIN — ARIPIPRAZOLE 15 MG: 15 TABLET ORAL at 09:29

## 2023-09-21 RX ADMIN — AZITHROMYCIN DIHYDRATE 500 MG: 250 TABLET, FILM COATED ORAL at 13:11

## 2023-09-21 RX ADMIN — PREDNISONE 40 MG: 20 TABLET ORAL at 09:25

## 2023-09-21 RX ADMIN — GUAIFENESIN 600 MG: 600 TABLET ORAL at 09:25

## 2023-09-21 RX ADMIN — IPRATROPIUM BROMIDE AND ALBUTEROL SULFATE 3 ML: 2.5; .5 SOLUTION RESPIRATORY (INHALATION) at 07:45

## 2023-09-21 RX ADMIN — CEFDINIR 300 MG: 300 CAPSULE ORAL at 09:24

## 2023-09-21 RX ADMIN — IPRATROPIUM BROMIDE AND ALBUTEROL SULFATE 3 ML: 2.5; .5 SOLUTION RESPIRATORY (INHALATION) at 11:36

## 2023-09-21 ASSESSMENT — ACTIVITIES OF DAILY LIVING (ADL)
ADLS_ACUITY_SCORE: 31
ADLS_ACUITY_SCORE: 28
ADLS_ACUITY_SCORE: 28
ADLS_ACUITY_SCORE: 31
ADLS_ACUITY_SCORE: 28

## 2023-09-21 NOTE — PROGRESS NOTES
Care Management Discharge Note    Discharge Date: 09/21/2023       Discharge Disposition: Home    Discharge Services: None    Discharge DME: None    Discharge Transportation: family or friend will provide    Private pay costs discussed: Not applicable    Does the patient's insurance plan have a 3 day qualifying hospital stay waiver?  Yes   Will the waiver be used for post-acute placement? No    PAS Confirmation Code:  NA  Patient/family educated on Medicare website which has current facility and service quality ratings:  NA    Education Provided on the Discharge Plan:  per team  Persons Notified of Discharge Plans: patient per team  Patient/Family in Agreement with the Plan: yes    Handoff Referral Completed: Yes    Additional Information:  Per provider, patient is medically ready for discharge today.   Met patient and her mother at bedside. Family will transport.    Sandy Cano RN

## 2023-09-21 NOTE — PLAN OF CARE
Occupational Therapy Discharge Summary    Reason for therapy discharge:    All goals and outcomes met, no further needs identified.    Progress towards therapy goal(s). See goals on Care Plan in [facility] electronic health record for goal details.  Goals met    Therapy recommendation(s):    No further therapy is recommended.

## 2023-09-21 NOTE — DISCHARGE SUMMARY
[facility] MEDICINE  DISCHARGE SUMMARY     Primary Care Physician: Rema Whiting  Admission Date: 9/18/2023   Discharge Provider: Daiys Tello MD Discharge Date: 9/21/2023   Diet:   Active Diet and Nourishment Order   Procedures     Combination Diet Moderate Consistent Carb (60 g CHO per Meal) Diet     Diet       Code Status: Full Code   Activity: DCACTIVITY: Activity as tolerated        Condition at Discharge: Stable     REASON FOR PRESENTATION(See Admission Note for Details)    SOB    PRINCIPAL & ACTIVE DISCHARGE DIAGNOSES     Principal Problem:    Community acquired bacterial pneumonia      PENDING LABS     Unresulted Labs Ordered in the Past 30 Days of this Admission       No orders found from 8/19/2023 to 9/19/2023.              PROCEDURES ( this hospitalization only)          RECOMMENDATIONS TO OUTPATIENT PROVIDER FOR F/U VISIT     Follow-up Appointments     Follow-up and recommended labs and tests       Follow up with primary care provider, Rema Whiting, within 7 days   for hospital follow- up.  The following labs/tests are recommended: CBC.              DISPOSITION     Home    SUMMARY OF HOSPITAL COURSE:      68F with bipolar disorder, COPD, chronic hypoxic respiratory failure on home O2, DM2, Ménière's disease, ABDIFATAH on CPAP, pneumonia, obesity, tobacco use disorder who presented with 2 days of productive cough, dyspnea, found to be hypoxic on arrival with leukocytosis and chest x-ray showing RLL infiltrate.   Improving with solumedrol and CTX+azithro and bronchodilators.  Discharged to home with family to complete antibiotic and steroid course.     #Community-acquired pneumonia - omnicef and azithro.  Pred x 2 more days.  Leukocytosis improving but not yet normalized.  Follow up with primary care next week.  #ABDIFATAH and likely obesity hypoventilation with chronic hypoxic respiratory failure- O2, CPAP encouraged  #COPD exacerbation secondary to above - pred x 2  more days.  Bronchodilators.      #Acute on chronic respiratory failure with hypoxia and hypercapnia.  Secondary to above.  Supplemental O2 to keep O2 sats above 90%  #Bipolar disorder.  Resume PTA meds  #DM2.  home meds  #morbid Obesity            Discharge Medications with Med changes:     Current Discharge Medication List        START taking these medications    Details   azithromycin (ZITHROMAX) 250 MG tablet Take 1 tablet (250 mg) by mouth daily for 2 days  Qty: 2 tablet, Refills: 0    Associated Diagnoses: CAP (community acquired pneumonia) due to Chlamydia species      cefdinir (OMNICEF) 300 MG capsule Take 1 capsule (300 mg) by mouth 2 times daily for 5 days  Qty: 10 capsule, Refills: 0    Associated Diagnoses: CAP (community acquired pneumonia) due to Chlamydia species      predniSONE (DELTASONE) 20 MG tablet Take 2 tablets (40 mg) by mouth daily for 2 days  Qty: 4 tablet, Refills: 0    Associated Diagnoses: CAP (community acquired pneumonia) due to Chlamydia species           CONTINUE these medications which have NOT CHANGED    Details   albuterol (PROAIR HFA/PROVENTIL HFA/VENTOLIN HFA) 108 (90 Base) MCG/ACT inhaler Inhale 2 puffs into the lungs every 6 hours as needed for shortness of breath / dyspnea or wheezing  Qty: 18 g, Refills: 12    Comments: Pharmacy may dispense brand covered by insurance (Proair, or proventil or ventolin or generic albuterol inhaler)  Associated Diagnoses: Chronic obstructive pulmonary disease, unspecified COPD type (H)      albuterol (PROVENTIL) (2.5 MG/3ML) 0.083% neb solution Take 1 vial (2.5 mg) by nebulization every 4 hours as needed for shortness of breath or wheezing Take 1 vial four times a day for the next 5 days then go back to as needed  Qty: 240 mL, Refills: 12    Associated Diagnoses: Chronic obstructive pulmonary disease, unspecified COPD type (H)      ARIPiprazole (ABILIFY) 15 MG tablet Take 15 mg by mouth daily      ARIPiprazole ER (ABILIFY MAINTENA) 400 MG  extended release inj syringe Inject 400 mg into the muscle every 28 days      atorvastatin (LIPITOR) 40 MG tablet Take 1 tablet (40 mg) by mouth daily  Qty: 90 tablet, Refills: 2    Associated Diagnoses: Hyperlipidemia LDL goal <70      cetirizine (ZYRTEC) 10 MG tablet Take 10 mg by mouth daily as needed for allergies      Dulaglutide (TRULICITY) 3 MG/0.5ML SOPN Inject 3 mg Subcutaneous once a week  Qty: 2 mL, Refills: 1    Associated Diagnoses: Type 2 diabetes mellitus with unspecified complications (H)      fluticasone-vilanterol (BREO ELLIPTA) 100-25 MCG/ACT inhaler Inhale 1 puff into the lungs daily  Qty: 60 each, Refills: 3    Associated Diagnoses: Chronic obstructive pulmonary disease, unspecified COPD type (H)      metFORMIN (GLUCOPHAGE) 1000 MG tablet Take 1 tablet (1,000 mg) by mouth 2 times daily (with meals)  Qty: 180 tablet, Refills: 3    Associated Diagnoses: Type 2 diabetes mellitus with unspecified complications (H)      tiotropium (SPIRIVA) 18 MCG inhaled capsule Inhale 18 mcg into the lungs daily      blood glucose (NO BRAND SPECIFIED) lancets standard Use to test blood sugar 4 times daily before meals and at bedtime  Qty: 1 each, Refills: 1    Associated Diagnoses: Type 2 diabetes mellitus with other specified complication, without long-term current use of insulin (H)      blood glucose (NO BRAND SPECIFIED) test strip Use to test blood sugar 4 times daily before meals and at bedtime  Qty: 100 strip, Refills: 0    Associated Diagnoses: Type 2 diabetes mellitus with other specified complication, without long-term current use of insulin (H)      blood glucose monitoring (ONETOUCH VERIO) meter device kit Use to test blood sugar 4 times daily before meals and bedtie  Qty: 1 kit, Refills: 1    Associated Diagnoses: Type 2 diabetes mellitus with other specified complication, without long-term current use of insulin (H)                   Rationale for medication changes:              Consults     PHYSICAL  THERAPY ADULT IP CONSULT  OCCUPATIONAL THERAPY ADULT IP CONSULT    Immunizations given this encounter     Most Recent Immunizations   Administered Date(s) Administered     COVID-19 Bivalent 12+ (Pfizer) 11/10/2022     COVID-19 MONOVALENT 12+ (Pfizer) 12/16/2021     COVID-19 Monovalent 18+ (Moderna) 03/15/2021     Flu, Unspecified 12/02/2005     Influenza (H1N1) 10/06/2021     Influenza Vaccine 18-64 (Flublok) 11/08/2018     Influenza Vaccine 65+ (Fluzone HD) 11/10/2022     Influenza Vaccine, 6+MO IM (QUADRIVALENT W/PRESERVATIVES) 10/14/2016     Pneumo Conj 13-V (2010&after) 05/21/2021     Pneumococcal 20 valent Conjugate (Prevnar 20) 07/07/2022     Pneumococcal 23 valent 12/18/2012     TDAP (Adacel,Boostrix) 04/17/2018           Anticoagulation Information            SIGNIFICANT IMAGING FINDINGS     Results for orders placed or performed during the hospital encounter of 09/18/23   XR Chest Port 1 View    Impression    IMPRESSION: Large body habitus .    New opacities noted in the right lower lobe partially silhouetting the right hemidiaphragm. If there are appropriate clinical symptoms, findings could certainly reflect a bronchopneumonia instead of atelectasis.      Left lung is clear. Heart and pulmonary vascularity are normal. Moderate S-shaped thoracolumbar scoliosis.         Discharge Orders        Reason for your hospital stay    You were admitted with pneumonia     Follow-up and recommended labs and tests     Follow up with primary care provider, Rema Whiting, within 7 days for hospital follow- up.  The following labs/tests are recommended: CBC.     Activity    Your activity upon discharge: activity as tolerated     Diet    Follow this diet upon discharge: Orders Placed This Encounter      Combination Diet Moderate Consistent Carb (60 g CHO per Meal) Diet       Examination   Physical Exam   Temp:  [97.5  F (36.4  C)-98.6  F (37  C)] 97.5  F (36.4  C)  Pulse:  [71-88] 83  Resp:  [18-28] 20  BP:  (109-130)/(58-67) 111/67  FiO2 (%):  [28 %] 28 %  SpO2:  [92 %-96 %] 92 %  Wt Readings from Last 1 Encounters:   09/20/23 75.4 kg (166 lb 3.6 oz)       Feels well. Eager to go home      Please see EMR for more detailed significant labs, imaging, consultant notes etc.    I, Daisy Tello MD, personally saw the patient today and spent greater than 30 minutes discharging this patient.    Daisy Tello MD  [facility]    CC:Rema Whiting

## 2023-09-21 NOTE — PLAN OF CARE
Goal Outcome Evaluation:         Pt. Ambulates to the bathroom without difficulty. Feels good today as far as strength goes. Appitite good. Eating well. Sugars well controlled. Awaiting discharge per pt. Today.

## 2023-09-21 NOTE — PLAN OF CARE
"  Problem: Plan of Care - These are the overarching goals to be used throughout the patient stay.    Goal: Patient-Specific Goal (Individualized)  Description: You can add care plan individualizations to a care plan. Examples of Individualization might be:  \"Parent requests to be called daily at 9am for status\", \"I have a hard time hearing out of my right ear\", or \"Do not touch me to wake me up as it startles me\".  Outcome: Progressing  Goal: Absence of Hospital-Acquired Illness or Injury  Outcome: Progressing  Intervention: Identify and Manage Fall Risk  Recent Flowsheet Documentation  Taken 9/20/2023 2045 by Zelda Agustin, RN  Safety Promotion/Fall Prevention:   clutter free environment maintained   lighting adjusted   patient and family education   safety round/check completed   room door open  Goal: Optimal Comfort and Wellbeing  Outcome: Progressing     Problem: Pneumonia  Goal: Effective Oxygenation and Ventilation  Outcome: Progressing  Intervention: Optimize Oxygenation and Ventilation  Recent Flowsheet Documentation  Taken 9/20/2023 2045 by Zelda Agustin, RN  Head of Bed (HOB) Positioning: HOB at 20-30 degrees     Pt denies pain and resting in room comfortably.   "

## 2023-09-22 ENCOUNTER — PATIENT OUTREACH (OUTPATIENT)
Dept: CARE COORDINATION | Facility: CLINIC | Age: 68
End: 2023-09-22

## 2023-09-22 LAB
ATRIAL RATE - MUSE: 105 BPM
DIASTOLIC BLOOD PRESSURE - MUSE: NORMAL MMHG
INTERPRETATION ECG - MUSE: NORMAL
P AXIS - MUSE: 60 DEGREES
PR INTERVAL - MUSE: 146 MS
QRS DURATION - MUSE: 70 MS
QT - MUSE: 306 MS
QTC - MUSE: 404 MS
R AXIS - MUSE: 42 DEGREES
SYSTOLIC BLOOD PRESSURE - MUSE: NORMAL MMHG
T AXIS - MUSE: 64 DEGREES
VENTRICULAR RATE- MUSE: 105 BPM

## 2023-09-22 NOTE — PROGRESS NOTES
"Clinic Care Coordination Contact  [facility]: Post-Discharge Note  SITUATION                                                      Admission:    Admission Date: 09/18/23   Reason for Admission: Community acquired bacterial pneumonia  Discharge:   Discharge Date: 09/21/23  Discharge Diagnosis: Community acquired bacterial pneumonia    BACKGROUND                                                      Per hospital discharge summary and inpatient provider notes:    Arvind Leong is a 68 year old female with a pertinent medical history of COPD, asthma, acute on chronic respiratory failure with hypoxia, ABDIFATAH on CPAP, pneumonia, T2DM, HLD, morbid obesity, tobacco use disorder, bipolar 1 disorder, who presents to this ED via walk-in for evaluation of shortness of breath.     Patient reports that for the past couple of days she has been having shortness of breath and chest pain characterized as tightness that radiates to her back. She also endorses recently having a cough productive of green phlegm. She endorses a PMH of COPD and chronically being on 2 L of supplemental oxygen. She endorses using her inhaler when she can find it, and her nebulizer as needed, and she mentions that recently she has been using her nebulizer more than she usually does. She notes she has not taken any steroids \"for a while,\" and states that the last time she took antibiotics was approximately one year ago. She denies any known recent fevers. She also denies any recent vomiting, dizziness, light-headedness, leg swelling, or any other complications at this time.       Patient's mom states that the patient used her nebulizer twice yesterday.      ASSESSMENT           Discharge Assessment  How are you doing now that you are home?: Patient states she is doing okay. Patient states she is getting better.  How are your symptoms? (Red Flag symptoms escalate to triage hotline per guidelines): Improved  Do you feel your condition is stable enough to " be safe at home until your provider visit?: Yes  Does the patient have their discharge instructions? : Yes  Does the patient have questions regarding their discharge instructions? : No  Were you started on any new medications or were there changes to any of your previous medications? : Yes  Does the patient have all of their medications?: Yes  Do you have questions regarding any of your medications? : No  Do you have all of your needed medical supplies or equipment (DME)?  (i.e. oxygen tank, CPAP, cane, etc.): Yes  Discharge follow-up appointment scheduled within 14 calendar days? : Yes  Discharge Follow Up Appointment Date: 09/29/23  Discharge Follow Up Appointment Scheduled with?: Primary Care Provider         Post-op (Clinicians Only)  Did the patient have surgery or a procedure: No    Care Management   Community Health Worker Initial Outreach    CHW Initial Information Gathering:  Referral Source: IP Report  Preferred Hospital: [facility]  [phone]  Preferred Urgent Care: [facility] [phone]  Current living arrangement:: I live in a private home with family  [facility]  Community Resources: None  Supplies Currently Used at Home: Hearing Aid Batteries, Oxygen Tubing/Supplies, Incontinence Supplies, Diabetic Supplies, Nebulizer tubing  Equipment Currently Used at Home: grab bar, tub/shower, walker, rolling  Informal Support system:: Parent  No PCP office visit in Past Year: No       Patient accepts CC: Yes. Patient scheduled for assessment with Ritu Foss RN on 9/27/2023 at 9am. Patient noted desire to discuss help with managing medical conditions.  .         PLAN                                                      Outpatient Plan:  Follow up with primary care provider, Rema Whiting, within 7 days for hospital follow- up.  The following labs/tests are recommended: CBC.      Future Appointments   Date Time Provider  Department Center   9/29/2023  1:30 PM Rema Whiting MD [facility]   10/2/2023  8:00 AM Lia Lock MD [facility]   10/12/2023  9:10 AM Rema Whiting MD [facility]         For any urgent concerns, please contact our 24 hour nurse triage line: [phone] ([phone])         Mirian Bernard RN

## 2023-09-23 ENCOUNTER — NURSE TRIAGE (OUTPATIENT)
Dept: NURSING | Facility: CLINIC | Age: 68
End: 2023-09-23

## 2023-09-23 NOTE — TELEPHONE ENCOUNTER
"\"Super dark eyes and I am worried about it.\" Mother states they are pink.   \"I can't breathe correctly, that is the problem. I just got out of the hospital Thursday. HX of COPD. When I was discharged I did not feel all the way well.\" No home care. She states she does not feel she is worse than when she left the hospital.   \"My vitals are fine. My breathing is horrible. Pulse oximeter =92%. I am on oxygen 2 liters per minute per nasal cannula, wears 24/7. I am also on inhaler: Albuterol neb I last did it yesterday. Advised to use neb now. Horrible breathing=\"I am walking slowly, faster with walker\".   Nebulizer treatment started during this call @ 5:30pm and I feel good already (3 min later). RX 3 times a day.  Advised to continue neb treatments three times per day, as needed.     Unable to complete triage--patient hung up at this point.     Jacqueline Chapman RN Triage Nurse Advisor 5:35 PM 9/23/2023  Reason for Disposition   Diagnosed with Chronic Pulmonary Obstructive Disease (COPD) and on oxygen therapy    Additional Information   Negative: SEVERE difficulty breathing (e.g., struggling for each breath, speaks in single words, pulse > 120)   Negative: Bluish (or gray) lips or face now   Negative: Difficult to awaken or acting confused (e.g., disoriented, slurred speech)   Negative: Slow, shallow and weak breathing   Negative: Sounds like a life-threatening emergency to the triager   Negative: Breathing difficulty is main symptom   Negative: [1] Wheezing (high pitched whistling sound) is main concern AND [2] previous asthma attacks or use of asthma medicines   Negative: SEVERE difficulty breathing (e.g., struggling for each breath, speaks in single words, pulse > 120)   Negative: Bluish (or gray) lips or face now   Negative: Difficult to awaken or acting confused (e.g., disoriented, slurred speech)   Negative: Sounds like a life-threatening emergency to the triager   Negative: [1] MODERATE difficulty breathing (e.g., " speaks in phrases, SOB even at rest) AND [2] worse than normal   Negative: Patient sounds very sick or weak to the triager    Protocols used: Oxygen Monitoring and Iczcsuz-H-AD, COPD Oxygen Monitoring and [id number]-D-YR

## 2023-09-23 NOTE — TELEPHONE ENCOUNTER
Nurse Triage SBAR    Is this a 2nd Level Triage? NO    Situation: Dark Eyes    Background: :Patient was discharged from [facility] on 9/21/203    Assessment: Patient reports black and blue eye lids since discharge on 9/21/2023. She denies taking blood thinners, injury, pain, or swelling. Patient is on 2L O2, her oxygen is at baseline at 92%.    Protocol Recommended Disposition:   According to the protocol, patient should to urgent care to be evaluated.  Care advice given. Patient verbalizes understanding and agrees with plan of care.     Jacy Peña RN  09/23/23 5:24 PM  [facility] Nurse Advisor    Reason for Disposition   Nursing judgment    Additional Information   Negative: Sounds like a life-threatening emergency to the triager   Negative: Information only call about a Well Adult (no illness or injury)   Negative: Caller can't be reached by phone   Negative: Nursing judgment   Negative: Nursing judgment   Negative: Nursing judgment   Negative: Nursing judgment   Negative: Nursing judgment    Protocols used: No Protocol [id number]

## 2023-09-27 ENCOUNTER — PATIENT OUTREACH (OUTPATIENT)
Dept: NURSING | Facility: CLINIC | Age: 68
End: 2023-09-27

## 2023-09-27 ASSESSMENT — ACTIVITIES OF DAILY LIVING (ADL): DEPENDENT_IADLS:: INDEPENDENT

## 2023-09-27 NOTE — PROGRESS NOTES
Clinic Care Coordination Contact  Clinic Care Coordination Contact  OUTREACH    Referral Information:  Referral Source: IP Report    Primary Diagnosis: Respiratory Disorders - other    Chief Complaint   Patient presents with    Clinic Care Coordination - Initial        Universal Utilization: see below  Clinic Utilization  Difficulty keeping appointments:: No  Compliance Concerns: No  No-Show Concerns: No  No PCP office visit in Past Year: No  Utilization      Hospital Admissions  1             ED Visits  3             No Show Count (past year)  2                    Current as of: 9/26/2023  3:24 PM                Clinical Concerns:  Current Medical Concerns:    Patient Active Problem List   Diagnosis    Scoliosis (and kyphoscoliosis), idiopathic    Morbid obesity (H)    Lower Back Pain    Asthma    Urge Incontinence Of Urine    COPD (chronic obstructive pulmonary disease) (H)    Hyperlipidemia    ABDIFATAH on CPAP    Anxiety    Vertigo    Cognitive dysfunction in bipolar disorder (H)    Noncompliance with medications    Dependence on continuous supplemental oxygen    Bipolar I disorder, most recent episode depressed (H)    Runny nose    CAP (community acquired pneumonia) due to Chlamydia species    Type 2 diabetes mellitus with unspecified complications (H)    Chronic respiratory failure with hypoxia (H)    Cervical high risk HPV (human papillomavirus) test positive    Cardiomyopathy, unspecified type (H)    COPD exacerbation (H)    Tobacco use disorder    Hypercarbia    Acute confusion    Hypoxia    Community acquired bacterial pneumonia         Current Behavioral Concerns: see above    Education Provided to patient: yes   Pain  Pain (GOAL):: No  Health Maintenance Reviewed: Due/Overdue Patient declined to schedule/will follow up with PCP (AWV)  Clinical Pathway: None    Medication Management:  Medication review status: Medications reviewed and no changes reported per patient.        Attempted to complete full med rec.   Patient manages medications.  Unable to determine how often she is taking her Nebulizer.  Currently ordered 4 times daily.  She stated that she completed it once this morning and she would do it once more today.  Attempted to educate on the importance of doing it 4 times daily.  Patient stating she was aware.  Offered to refer to MTM and she declined.     Functional Status:  Dependent ADLs:: Independent, Ambulation-walker  Dependent IADLs:: Independent  Bed or wheelchair confined:: No  Mobility Status: Independent w/Device  Fallen 2 or more times in the past year?: No  Any fall with injury in the past year?: No    Living Situation:  Current living arrangement:: I live in a private home with family  Type of residence:: Private home - stairs    Lifestyle & Psychosocial Needs:    Social Determinants of Health     Food Insecurity: Not on file   Depression: At risk (3/16/2023)    PHQ-2     PHQ-2 Score: 4   Housing Stability: Not on file   Tobacco Use: Medium Risk (6/26/2023)    Patient History     Smoking Tobacco Use: Former     Smokeless Tobacco Use: Never     Passive Exposure: Not on file   Financial Resource Strain: Not on file   Alcohol Use: Not on file   Transportation Needs: Not on file   Physical Activity: Not on file   Interpersonal Safety: Not on file   Stress: Not on file   Social Connections: Not on file     Diet:: Regular  Inadequate nutrition (GOAL):: No  Tube Feeding: No  Inadequate activity/exercise (GOAL):: No  Significant changes in sleep pattern (GOAL): No  Transportation means:: Family     Church or spiritual beliefs that impact treatment:: No  Mental health DX:: Yes  Mental health DX how managed:: Medication  Mental health management concern (GOAL):: No  Informal Support system:: Parent        68 yr F PMH: as listed above.  Referral from [facility] from TCM call.  Patient admitted 9/18 to 9/21 for sob, cap.  Today patient stating she is feeling much better.  She has completed her antibx's.  She was able  to have a productive cough last night in her sleep and is feeling 'better' today.  Declining MTM and additional CCC follow up.  Has assistance of her mom and family.  Confirmed PCP appointment 9/29 @ 1:15.  Patient stated an understanding.     Resources and Interventions:  Current Resources:      Community Resources: None  Supplies Currently Used at Home: Hearing Aid Batteries, Oxygen Tubing/Supplies, Incontinence Supplies, Diabetic Supplies, Nebulizer tubing  Equipment Currently Used at Home: grab bar, tub/shower, walker, rolling  Employment Status: retired         Advance Care Plan/Directive  Advanced Care Plans/Directives on file:: No  Advanced Care Plan/Directive Status: Not Applicable    Referrals Placed: None         Care Plan:      Patient/Caregiver understanding: Patient stated an understanding       Future Appointments                [facility]    In 5 days Lia Lock MD [facility]    In 2 weeks Rema Whiting MD [facility]            Plan: Patient to follow up with PCP 9/29.  Not Enrolled to [facility]

## 2023-09-28 ENCOUNTER — APPOINTMENT (OUTPATIENT)
Dept: RADIOLOGY | Facility: HOSPITAL | Age: 68
End: 2023-09-28
Attending: EMERGENCY MEDICINE
Payer: COMMERCIAL

## 2023-09-28 ENCOUNTER — APPOINTMENT (OUTPATIENT)
Dept: CT IMAGING | Facility: HOSPITAL | Age: 68
End: 2023-09-28
Attending: EMERGENCY MEDICINE
Payer: COMMERCIAL

## 2023-09-28 ENCOUNTER — HOSPITAL ENCOUNTER (EMERGENCY)
Facility: HOSPITAL | Age: 68
Discharge: HOME | End: 2023-09-28
Attending: EMERGENCY MEDICINE | Admitting: EMERGENCY MEDICINE
Payer: COMMERCIAL

## 2023-09-28 VITALS
RESPIRATION RATE: 16 BRPM | DIASTOLIC BLOOD PRESSURE: 61 MMHG | TEMPERATURE: 98.9 F | HEIGHT: 55 IN | SYSTOLIC BLOOD PRESSURE: 137 MMHG | BODY MASS INDEX: 38.63 KG/M2 | OXYGEN SATURATION: 91 % | HEART RATE: 96 BPM

## 2023-09-28 DIAGNOSIS — M54.50 ACUTE MIDLINE LOW BACK PAIN WITHOUT SCIATICA: ICD-10-CM

## 2023-09-28 DIAGNOSIS — J06.9 UPPER RESPIRATORY TRACT INFECTION, UNSPECIFIED TYPE: ICD-10-CM

## 2023-09-28 LAB
ALBUMIN UR-MCNC: 20 MG/DL
APPEARANCE UR: CLEAR
BILIRUB UR QL STRIP: NEGATIVE
COLOR UR AUTO: YELLOW
D DIMER PPP FEU-MCNC: 0.84 UG/ML FEU (ref 0–0.5)
GLUCOSE UR STRIP-MCNC: 30 MG/DL
HGB UR QL STRIP: NEGATIVE
HOLD SPECIMEN: NORMAL
HYALINE CASTS: 2 /LPF
KETONES UR STRIP-MCNC: ABNORMAL MG/DL
LEUKOCYTE ESTERASE UR QL STRIP: NEGATIVE
MUCOUS THREADS #/AREA URNS LPF: PRESENT /LPF
NITRATE UR QL: NEGATIVE
PH UR STRIP: 6 [PH] (ref 5–7)
RBC URINE: 2 /HPF
SP GR UR STRIP: 1.02 (ref 1–1.03)
UROBILINOGEN UR STRIP-MCNC: <2 MG/DL
WBC URINE: 5 /HPF

## 2023-09-28 PROCEDURE — 72131 CT LUMBAR SPINE W/O DYE: CPT

## 2023-09-28 PROCEDURE — 36415 COLL VENOUS BLD VENIPUNCTURE: CPT | Performed by: EMERGENCY MEDICINE

## 2023-09-28 PROCEDURE — 71046 X-RAY EXAM CHEST 2 VIEWS: CPT

## 2023-09-28 PROCEDURE — 250N000013 HC RX MED GY IP 250 OP 250 PS 637: Performed by: EMERGENCY MEDICINE

## 2023-09-28 PROCEDURE — 99284 EMERGENCY DEPT VISIT MOD MDM: CPT | Mod: 25

## 2023-09-28 PROCEDURE — 85379 FIBRIN DEGRADATION QUANT: CPT | Performed by: EMERGENCY MEDICINE

## 2023-09-28 PROCEDURE — 99285 EMERGENCY DEPT VISIT HI MDM: CPT | Mod: 25

## 2023-09-28 PROCEDURE — 81001 URINALYSIS AUTO W/SCOPE: CPT | Performed by: EMERGENCY MEDICINE

## 2023-09-28 RX ORDER — IBUPROFEN 600 MG/1
600 TABLET, FILM COATED ORAL EVERY 8 HOURS PRN
Qty: 20 TABLET | Refills: 0 | Status: SHIPPED | OUTPATIENT
Start: 2023-09-28 | End: 2024-03-04

## 2023-09-28 RX ORDER — ACETAMINOPHEN 325 MG/1
650 TABLET ORAL ONCE
Status: COMPLETED | OUTPATIENT
Start: 2023-09-28 | End: 2023-09-28

## 2023-09-28 RX ADMIN — ACETAMINOPHEN 650 MG: 325 TABLET ORAL at 18:49

## 2023-09-28 ASSESSMENT — ACTIVITIES OF DAILY LIVING (ADL): ADLS_ACUITY_SCORE: 37

## 2023-09-28 NOTE — ED PROVIDER NOTES
"EMERGENCY DEPARTMENT ENCOUNTER      NAME: Arvind Leong  AGE: 68 year old female  YOB: 1955  MRN: [id number]  EVALUATION DATE & TIME: 9/28/2023  6:23 PM    PCP: Rema Whiting    ED PROVIDER: Mo Lam MD      Chief Complaint   Patient presents with    Abdominal Pain    Back Pain    Nasal Congestion    Eye Pain         FINAL IMPRESSION:  Acute low back pain  URI    ED COURSE & MEDICAL DECISION MAKING:    Pertinent Labs & Imaging studies reviewed. (See chart for details)  68 year old female presents to the Emergency Department for evaluation of continued shortness of breath and low back pain.  Per review of chart patient recently hospitalized for community-acquired pneumonia from September 18 to September 23.  She reports after discharge she developed achiness in her low back she has not been taking anything for the discomfort.  She is worried about \"still having pneumonia in my body.\"  Patient with a history of cognitive decline and morbid obesity.  Requires supplemental oxygen due to chronic respiratory issues and smoking.  On exam vital signs are unremarkable.  Oxygenation 93% with supplemental O2.  Lungs were clear but diminished.  Cardiac exam unremarkable.  No lower extremity edema.  Patient with moderate tenderness along the lower lumbar area.  Patient likely with simple musculoskeletal discomfort.  However given her cognitive issues and reports we will proceed with CT imaging to assess for compression fracture versus inflammatory process.  We will also obtain chest x-ray to assess for clearing of pneumonia as this has patient very anxious.  We will also obtain D-dimer given her continued shortness of breath as this was not obtained previously.     6:28 PM.  Initial interview and examination of the patient performed  8:21 PM.  Urine analysis without evidence of infection.  Chest x-ray without evidence of continued pneumonia.  Lumbar CT with degenerative changes but no acute " "findings.  D-dimer mildly elevated however when corrected for age and obesity essentially normal.  Given patient's primary issues were of runny nose and low back pain no indications for CT imaging of the chest at this point.  Patient reassured.  Tylenol recommended for continued outpatient management.  At the conclusion of the encounter I discussed the results of all of the tests and the disposition. The questions were answered. The patient or family acknowledged understanding and was agreeable with the care plan.     MEDICATIONS GIVEN IN THE EMERGENCY:  Medications   acetaminophen (TYLENOL) tablet 650 mg (has no administration in time range)       NEW PRESCRIPTIONS STARTED AT TODAY'S ER VISIT  Current Discharge Medication List        START taking these medications    Details   ibuprofen (ADVIL/MOTRIN) 600 MG tablet Take 1 tablet (600 mg) by mouth every 8 hours as needed for moderate pain  Qty: 20 tablet, Refills: 0                 =================================================================    HPI          Arvind Leong is a 68 year old female with a pertinent history of bipolar disorder with cognitive issues, chronic COPD with supplemental O2, recent community-acquired pneumonia who presents for evaluation of low back pain.  Describes an achiness.  Localized along the low lumbar area.  Denies any radiation into her legs.  States she still feels slightly short of breath and is worried about \"continued pneumonia in my body\".  Denies any vomiting.   REVIEW OF SYSTEMS   Review of Systems as noted above otherwise negative    PAST MEDICAL HISTORY:  Past Medical History:   Diagnosis Date    Acute on chronic respiratory failure with hypoxia (H) 11/15/2016    Bipolar 1 disorder (H)     CAP (community acquired pneumonia) 11/15/2016    Cervical high risk HPV (human papillomavirus) test positive 3/5/2018    3/5/18 NIL pap, +HR HPV (not 16/18) 5/21/21 NIL pap, neg HPV. Plan: await provider    COPD exacerbation (H) " 2021    COPD with exacerbation (H) 11/15/2016    Diabetes mellitus, type II (H)     Hearing loss     Meniere's disease     Pneumonia 2021    Pneumonia of right lower lobe due to infectious organism 2019       PAST SURGICAL HISTORY:  Past Surgical History:   Procedure Laterality Date    CYST REMOVAL  2001    TX REMOVAL ANAL FISTULA,SUBCUTANEOUS      Description: Fistula-in-ano Repair;  Recorded: 2010; x2    TONSILLECTOMY             CURRENT MEDICATIONS:    albuterol (PROAIR HFA/PROVENTIL HFA/VENTOLIN HFA) 108 (90 Base) MCG/ACT inhaler  albuterol (PROVENTIL) (2.5 MG/3ML) 0.083% neb solution  ARIPiprazole (ABILIFY) 15 MG tablet  ARIPiprazole ER (ABILIFY MAINTENA) 400 MG extended release inj syringe  atorvastatin (LIPITOR) 40 MG tablet  blood glucose (NO BRAND SPECIFIED) lancets standard  blood glucose (NO BRAND SPECIFIED) test strip  blood glucose monitoring (ONETOUCH VERIO) meter device kit  cetirizine (ZYRTEC) 10 MG tablet  fluticasone-vilanterol (BREO ELLIPTA) 100-25 MCG/ACT inhaler  metFORMIN (GLUCOPHAGE) 1000 MG tablet  tiotropium (SPIRIVA) 18 MCG inhaled capsule  TRULICITY 3 MG/0.5ML SOPN        ALLERGIES:  Allergies   Allergen Reactions    Lurasidone Other (See Comments)     Face turned red, (Brand name = Latuda) Face turned red, Face turned red       FAMILY HISTORY:  Family History   Problem Relation Age of Onset    Aneurysm Father     Heart Disease Father 70.00        bypass in 70s, AAA rupture at age 77     Ulcers Father 76.00    Pacemaker Mother     Bipolar Disorder Brother     Breast Cancer Maternal Grandmother 51.00    Kidney failure Maternal Grandmother     Cancer Paternal Grandmother         ?? lung    Cancer Paternal Uncle         ?? lung    Mental Illness Maternal Grandfather     Heart Disease Other         uncle    No Known Problems Sister         two siblings abuse chemicals    No Known Problems Brother     Bipolar Disorder Nephew        SOCIAL HISTORY:   Social History  "    Socioeconomic History    Marital status:      Spouse name: None    Number of children: None    Years of education: None    Highest education level: None   Tobacco Use    Smoking status: Former     Packs/day: 1.00     Years: 40.00     Pack years: 40.00     Types: Cigarettes     Quit date: 2017     Years since quittin.6    Smokeless tobacco: Never   Vaping Use    Vaping Use: Never used   Substance and Sexual Activity    Alcohol use: Yes     Comment: Alcoholic Drinks/day: Occasional     Drug use: No    Sexual activity: Never   Social History Narrative    2019The patient lives with her mother.; had worked at [employer] as  but quit 2019.   Quit smoking ; no etoh problems  / x 2 ; no kids       VITALS:  /56   Pulse 93   Temp 98.9  F (37.2  C) (Oral)   Resp 16   Ht 1.397 m (4' 7\")   SpO2 93%   BMI 38.63 kg/m      PHYSICAL EXAM    Constitutional: Morbidly obese female in mild distress.    HENT: Normocephalic, Atraumatic, Bilateral external ears normal, Oropharynx normal, mucous membranes moist, Nose normal. Neck-  Normal range of motion, No tenderness, Supple, No stridor.    Eyes: PERRL, EOMI, Conjunctiva normal, No discharge.   Respiratory: Diminished breath sounds, No respiratory distress, No wheezing, Speaks full sentences easily. No cough.    Cardiovascular: Normal heart rate, Regular rhythm,  No murmurs, No rubs, No gallops. Chest wall nontender.    GI: No excessive obesity. Bowel sounds normal, Soft, No tenderness, No masses, No flank tenderness. No rebound or guarding.     Musculoskeletal: Trace edema.  No cyanosis, No clubbing. Good range of motion in all major joints. No tenderness to palpation or major deformities noted.  Mild tenderness along midline lumbar spine.    Integument: Warm, Dry, No erythema, No rash. No petechiae.    Neurologic: Alert & oriented x 3, Normal motor function, Normal sensory function, No focal deficits noted. Normal gait.  "   Psychiatric: Affect normal, Judgment normal, Mood normal. Cooperative.      LAB:  All pertinent labs reviewed and interpreted.     Results for orders placed or performed during the hospital encounter of 09/28/23   Lumbar spine CT w/o contrast     Status: None    Narrative    EXAM: CT LUMBAR SPINE W/O CONTRAST  LOCATION: [facility]  DATE: 9/28/2023    INDICATION: Low back pain  COMPARISON: None.  TECHNIQUE: Routine CT Lumbar Spine without IV contrast. Multiplanar reformats. Dose reduction techniques were used.     FINDINGS:  VERTEBRA: Normal vertebral body heights. 25 degree levoscoliosis apex L2-L3. Exaggerated lordosis. Ankylosed L2-L3 apposing endplates and facets. Ankylosed L5-S1 facets. No fracture or posttraumatic subluxation.     CANAL/FORAMINA: Severe right L2-L3 and left L5-S1 neural foraminal stenoses due to chronic degeneration. No high-grade spinal canal stenosis.    PARASPINAL: Scattered renal calcifications. No hydronephrosis.      Impression    IMPRESSION:  1.  No fracture or posttraumatic subluxation.  2.  25 degrees levoscoliosis apex L2-L3.  3.  Severe right L2-L3 and left L5-S1 neural foraminal stenoses.  4.  Scattered renal calcifications. No hydronephrosis.   Chest XR,  PA & LAT     Status: None    Narrative    EXAM: XR CHEST 2 VIEWS  LOCATION: [facility]  DATE: 9/28/2023    INDICATION: Continued dyspnea, recent community acquired pneumonia  COMPARISON: 09/18/2023      Impression    IMPRESSION: Normal heart size. Prominent pericardial fat pad, better seen on CT 05/20/2022. No evidence for CHF or pneumonia. No pleural effusion or pneumothorax. Moderate lower thoracic and upper lumbar scoliosis.   D dimer quantitative     Status: Abnormal   Result Value Ref Range    D-Dimer Quantitative 0.84 (H) 0.00 - 0.50 ug/mL FEU    Narrative    This D-dimer assay is intended for use in conjunction with a clinical pretest probability assessment model to  exclude pulmonary embolism (PE) and deep venous thrombosis (DVT) in outpatients suspected of PE or DVT. The cut-off value is 0.50 ug/mL FEU.    For patients 50 years of age or older, the application of age-adjusted cut-off values for D-Dimer may increase the specificity without significant effect on sensitivity. The literature suggested calculation age adjusted cut-off in ug/L = age in years x 10 ug/L. The results in this laboratory are reported as ug/mL rather than ug/L. The calculation for age adjusted cut off in ug/mL= age in years x 0.01 ug/mL. For example, the cut off for a 76 year old male is 76 x 0.01 ug/mL = 0.76 ug/mL (760 ug/L).    M Ministerio et al. Age adjusted D-dimer cut-off levels to rule out pulmonary embolism: The ADJUST-PE Study. MUSHTAQ 2014;311:4107-0582.; HJ Virgen et al. Diagnostic accuracy of conventional or age adjusted D-dimer cutoff values in older patients with suspected venous thromboembolism. Systemic review and meta-analysis. BMJ 2013:346:f2492.   UA with Microscopic reflex to Culture     Status: Abnormal    Specimen: Urine, Straight Catheter   Result Value Ref Range    Color Urine Yellow Colorless, Straw, Light Yellow, Yellow    Appearance Urine Clear Clear    Glucose Urine 30 (A) Negative mg/dL    Bilirubin Urine Negative Negative    Ketones Urine Trace (A) Negative mg/dL    Specific Gravity Urine 1.022 1.001 - 1.030    Blood Urine Negative Negative    pH Urine 6.0 5.0 - 7.0    Protein Albumin Urine 20 (A) Negative mg/dL    Urobilinogen Urine <2.0 <2.0 mg/dL    Nitrite Urine Negative Negative    Leukocyte Esterase Urine Negative Negative    Mucus Urine Present (A) None Seen /LPF    RBC Urine 2 <=2 /HPF    WBC Urine 5 <=5 /HPF    Hyaline Casts Urine 2 <=2 /LPF    Narrative    Urine Culture not indicated   [place] Draw     Status: None    Narrative    The following orders were created for panel order [place] Draw.  Procedure                               Abnormality         Status                      ---------                               -----------         ------                     Extra Red Top Tube[[id number]]                               Final result               Extra Green Top (Lithium...[[id number]]                      Final result               Extra Purple Top Tube[[id number]]                            Final result                 Please view results for these tests on the individual orders.   Extra Red Top Tube     Status: None   Result Value Ref Range    Hold Specimen JIC    Extra Green Top (Lithium Heparin) Tube     Status: None   Result Value Ref Range    Hold Specimen JIC    Extra Purple Top Tube     Status: None   Result Value Ref Range    Hold Specimen JIC       RADIOLOGY:  Reviewed all pertinent imaging. Please see official radiology report.  Lumbar spine CT w/o contrast    (Results Pending)   Chest XR,  PA & LAT    (Results Pending)         Mo Lam MD  [facility] EMERGENCY DEPARTMENT  [address] [place] [id number]  [phone]       Mo Lam MD  09/28/23 2024

## 2023-09-28 NOTE — ED TRIAGE NOTES
Patient presents to ED for generalized abdominal pain, back pain, nasal drainage, and eye pain.  Was discharged from hospital 2 weeks ago for abdominal pain and back pain.   hasn't gotten better.   also now has nasal drainage and eye pain.  Normally on 2L O2 at home.   was diagnosed with pneumpnia when in the hospital a couple weeks ago.  Denies fever.      Triage Assessment       Row Name 09/28/23 [id number]       Triage Assessment (Adult)    Airway WDL WDL       Respiratory WDL    Respiratory WDL WDL       Skin Circulation/Temperature WDL    Skin Circulation/Temperature WDL WDL       Cardiac WDL    Cardiac WDL WDL       Peripheral/Neurovascular WDL    Peripheral Neurovascular WDL WDL       Cognitive/Neuro/Behavioral WDL    Cognitive/Neuro/Behavioral WDL WDL

## 2023-10-03 ENCOUNTER — TELEPHONE (OUTPATIENT)
Dept: PHARMACY | Facility: CLINIC | Age: 68
End: 2023-10-03

## 2023-10-03 NOTE — TELEPHONE ENCOUNTER
Called and spoke to patient to check in on medications and see if patient wanted a follow up MTM appointment. Patient notes that things are going well with current medications, she is able to afford them and take them daily as prescribed thus declined MTM visit at this time. Will check in with patient after upcoming PCP appointments.     Patient confirms that she is taking:   Atorvastatin 40mg daily   Metformin 1000mg twice daily   Trulicity 3mg weekly on Fridays   Breo 1 puff once daily   Spiriva 1 puff once daily   Albuterol as needed     She is not taking aripiprazole tablets but instead on the Abilify injection- notes that she will speak to phycologist about going back to the tablets.     Mary Paez, PharmD  Medication Therapy Management Pharmacist   [facility]

## 2023-10-12 ENCOUNTER — OFFICE VISIT (OUTPATIENT)
Dept: FAMILY MEDICINE | Facility: CLINIC | Age: 68
End: 2023-10-12
Payer: COMMERCIAL

## 2023-10-12 VITALS
WEIGHT: 162.6 LBS | OXYGEN SATURATION: 90 % | HEART RATE: 77 BPM | RESPIRATION RATE: 16 BRPM | HEIGHT: 55 IN | DIASTOLIC BLOOD PRESSURE: 67 MMHG | SYSTOLIC BLOOD PRESSURE: 109 MMHG | TEMPERATURE: 98.4 F | BODY MASS INDEX: 37.63 KG/M2

## 2023-10-12 DIAGNOSIS — Z29.11 NEED FOR VACCINATION AGAINST RESPIRATORY SYNCYTIAL VIRUS: ICD-10-CM

## 2023-10-12 DIAGNOSIS — J96.11 CHRONIC RESPIRATORY FAILURE WITH HYPOXIA (H): ICD-10-CM

## 2023-10-12 DIAGNOSIS — Z09 HOSPITAL DISCHARGE FOLLOW-UP: Primary | ICD-10-CM

## 2023-10-12 DIAGNOSIS — G89.29 CHRONIC MIDLINE THORACIC BACK PAIN: ICD-10-CM

## 2023-10-12 DIAGNOSIS — Z11.59 NEED FOR HEPATITIS B SCREENING TEST: ICD-10-CM

## 2023-10-12 DIAGNOSIS — F31.30 BIPOLAR I DISORDER, MOST RECENT EPISODE DEPRESSED (H): ICD-10-CM

## 2023-10-12 DIAGNOSIS — M54.50 CHRONIC MIDLINE LOW BACK PAIN WITHOUT SCIATICA: ICD-10-CM

## 2023-10-12 DIAGNOSIS — Z78.0 POSTMENOPAUSAL STATUS: ICD-10-CM

## 2023-10-12 DIAGNOSIS — M41.20 IDIOPATHIC SCOLIOSIS AND KYPHOSCOLIOSIS: ICD-10-CM

## 2023-10-12 DIAGNOSIS — Z29.11 NEED FOR RSV IMMUNIZATION: ICD-10-CM

## 2023-10-12 DIAGNOSIS — E11.69 TYPE 2 DIABETES MELLITUS WITH OTHER SPECIFIED COMPLICATION, WITHOUT LONG-TERM CURRENT USE OF INSULIN (H): ICD-10-CM

## 2023-10-12 DIAGNOSIS — G89.29 CHRONIC MIDLINE LOW BACK PAIN WITHOUT SCIATICA: ICD-10-CM

## 2023-10-12 DIAGNOSIS — Z12.31 VISIT FOR SCREENING MAMMOGRAM: ICD-10-CM

## 2023-10-12 DIAGNOSIS — M54.6 CHRONIC MIDLINE THORACIC BACK PAIN: ICD-10-CM

## 2023-10-12 DIAGNOSIS — J44.9 CHRONIC OBSTRUCTIVE PULMONARY DISEASE, UNSPECIFIED COPD TYPE (H): ICD-10-CM

## 2023-10-12 DIAGNOSIS — G47.33 OSA ON CPAP: ICD-10-CM

## 2023-10-12 LAB
ALBUMIN SERPL BCG-MCNC: 4.1 G/DL (ref 3.5–5.2)
ALP SERPL-CCNC: 76 U/L (ref 35–104)
ALT SERPL W P-5'-P-CCNC: 23 U/L (ref 0–50)
ANION GAP SERPL CALCULATED.3IONS-SCNC: 8 MMOL/L (ref 7–15)
AST SERPL W P-5'-P-CCNC: 20 U/L (ref 0–45)
BILIRUB SERPL-MCNC: 0.5 MG/DL
BUN SERPL-MCNC: 6.2 MG/DL (ref 8–23)
CALCIUM SERPL-MCNC: 9.4 MG/DL (ref 8.8–10.2)
CHLORIDE SERPL-SCNC: 100 MMOL/L (ref 98–107)
CREAT SERPL-MCNC: 0.69 MG/DL (ref 0.51–0.95)
DEPRECATED HCO3 PLAS-SCNC: 34 MMOL/L (ref 22–29)
EGFRCR SERPLBLD CKD-EPI 2021: >90 ML/MIN/1.73M2
ERYTHROCYTE [DISTWIDTH] IN BLOOD BY AUTOMATED COUNT: 12.9 % (ref 10–15)
GLUCOSE SERPL-MCNC: 124 MG/DL (ref 70–99)
HBA1C MFR BLD: 6.7 % (ref 0–5.6)
HBV SURFACE AB SERPL IA-ACNC: 0 M[IU]/ML
HBV SURFACE AB SERPL IA-ACNC: NONREACTIVE M[IU]/ML
HBV SURFACE AG SERPL QL IA: NONREACTIVE
HCT VFR BLD AUTO: 49.8 % (ref 35–47)
HGB BLD-MCNC: 15.3 G/DL (ref 11.7–15.7)
MCH RBC QN AUTO: 29.1 PG (ref 26.5–33)
MCHC RBC AUTO-ENTMCNC: 30.7 G/DL (ref 31.5–36.5)
MCV RBC AUTO: 95 FL (ref 78–100)
PLATELET # BLD AUTO: 216 10E3/UL (ref 150–450)
POTASSIUM SERPL-SCNC: 4.2 MMOL/L (ref 3.4–5.3)
PROT SERPL-MCNC: 7.2 G/DL (ref 6.4–8.3)
RBC # BLD AUTO: 5.25 10E6/UL (ref 3.8–5.2)
SODIUM SERPL-SCNC: 142 MMOL/L (ref 135–145)
WBC # BLD AUTO: 9.9 10E3/UL (ref 4–11)

## 2023-10-12 PROCEDURE — 85027 COMPLETE CBC AUTOMATED: CPT | Performed by: FAMILY MEDICINE

## 2023-10-12 PROCEDURE — 86706 HEP B SURFACE ANTIBODY: CPT | Performed by: FAMILY MEDICINE

## 2023-10-12 PROCEDURE — 80053 COMPREHEN METABOLIC PANEL: CPT | Performed by: FAMILY MEDICINE

## 2023-10-12 PROCEDURE — 99215 OFFICE O/P EST HI 40 MIN: CPT | Performed by: FAMILY MEDICINE

## 2023-10-12 PROCEDURE — 83036 HEMOGLOBIN GLYCOSYLATED A1C: CPT | Performed by: FAMILY MEDICINE

## 2023-10-12 PROCEDURE — 36415 COLL VENOUS BLD VENIPUNCTURE: CPT | Performed by: FAMILY MEDICINE

## 2023-10-12 PROCEDURE — 87340 HEPATITIS B SURFACE AG IA: CPT | Performed by: FAMILY MEDICINE

## 2023-10-12 RX ORDER — LIDOCAINE 50 MG/G
1 PATCH TOPICAL EVERY 24 HOURS
Qty: 30 PATCH | Refills: 1 | Status: SHIPPED | OUTPATIENT
Start: 2023-10-12 | End: 2024-03-04

## 2023-10-12 RX ORDER — FLUTICASONE FUROATE AND VILANTEROL 100; 25 UG/1; UG/1
1 POWDER RESPIRATORY (INHALATION) DAILY
Qty: 90 EACH | Refills: 3 | Status: SHIPPED | OUTPATIENT
Start: 2023-10-12 | End: 2024-03-13 | Stop reason: ALTCHOICE

## 2023-10-12 RX ORDER — TIOTROPIUM BROMIDE 18 UG/1
18 CAPSULE ORAL; RESPIRATORY (INHALATION) DAILY
Qty: 90 CAPSULE | Refills: 3 | Status: SHIPPED | OUTPATIENT
Start: 2023-10-12 | End: 2024-03-13 | Stop reason: ALTCHOICE

## 2023-10-12 RX ORDER — RESPIRATORY SYNCYTIAL VIRUS VACCINE 120MCG/0.5
0.5 KIT INTRAMUSCULAR ONCE
Qty: 1 EACH | Refills: 0 | Status: CANCELLED | OUTPATIENT
Start: 2023-10-12 | End: 2023-10-12

## 2023-10-12 ASSESSMENT — ANXIETY QUESTIONNAIRES
2. NOT BEING ABLE TO STOP OR CONTROL WORRYING: SEVERAL DAYS
6. BECOMING EASILY ANNOYED OR IRRITABLE: NEARLY EVERY DAY
7. FEELING AFRAID AS IF SOMETHING AWFUL MIGHT HAPPEN: NEARLY EVERY DAY
GAD7 TOTAL SCORE: 16
4. TROUBLE RELAXING: NEARLY EVERY DAY
5. BEING SO RESTLESS THAT IT IS HARD TO SIT STILL: NEARLY EVERY DAY
1. FEELING NERVOUS, ANXIOUS, OR ON EDGE: MORE THAN HALF THE DAYS
GAD7 TOTAL SCORE: 16
3. WORRYING TOO MUCH ABOUT DIFFERENT THINGS: SEVERAL DAYS
IF YOU CHECKED OFF ANY PROBLEMS ON THIS QUESTIONNAIRE, HOW DIFFICULT HAVE THESE PROBLEMS MADE IT FOR YOU TO DO YOUR WORK, TAKE CARE OF THINGS AT HOME, OR GET ALONG WITH OTHER PEOPLE: SOMEWHAT DIFFICULT

## 2023-10-12 ASSESSMENT — PATIENT HEALTH QUESTIONNAIRE - PHQ9
SUM OF ALL RESPONSES TO PHQ QUESTIONS 1-9: 6
10. IF YOU CHECKED OFF ANY PROBLEMS, HOW DIFFICULT HAVE THESE PROBLEMS MADE IT FOR YOU TO DO YOUR WORK, TAKE CARE OF THINGS AT HOME, OR GET ALONG WITH OTHER PEOPLE: NOT DIFFICULT AT ALL
SUM OF ALL RESPONSES TO PHQ QUESTIONS 1-9: 6

## 2023-10-12 NOTE — PROGRESS NOTES
Assessment & Plan     Hospital discharge follow-up  Reviewed hospitalization notes, labs/imaging, recommendation.   She was hospitalized 9/18 to 9/21/2023 for productive cough dyspnea and felt to be hypoxic on arrival with leukocytosis and chest x-ray showing right lower lobe infiltrate.  Patient improved with Solu-Medrol and antibiotics including Omnicef, azithromycin.  Will need blood work today to ensure leukocytosis normalized.  She went back on 9/28/2023 to the emergency room for acute low back pain.  This is acute on chronic.  She has known scoliosis.  We will send her to spine for further evaluation of this.    Need for vaccination against respiratory syncytial virus  Will need to get at pharmacy.     Visit for screening mammogram  - MA SCREENING DIGITAL BILAT - Future  (s+30)    Postmenopausal status  - DEXA HIP/PELVIS/SPINE - Future    Need for RSV immunization  Needs to get at pharmacy.   - respiratory syncytial virus vaccine, bivalent (ABRYSVO) injection 0.5 mL    Need for hepatitis B screening test  If hep B antibody positive will not need repeat hep B vaccine series  - Hepatitis B surface antigen  - Hepatitis B Surface Antibody  - Hepatitis B surface antigen  - Hepatitis B Surface Antibody    Chronic respiratory failure with hypoxia (H)  Due for lab. Was hospitalized 9/18-9/21 for CAP.   - CBC with platelets  - tiotropium (SPIRIVA) 18 MCG inhaled capsule  Dispense: 90 capsule; Refill: 3  - CBC with platelets    Bipolar I disorder, most recent episode depressed (H)  On antipsychotics and follows with psych.    Type 2 diabetes mellitus with other specified complication, without long-term current use of insulin (H)  Due for lab. A1c 6.7 today. Continue current meds (trulicity and metformin) without change. F/up in 3 months.   - Adult Eye  Referral  - Hemoglobin A1c  - Comprehensive metabolic panel (BMP + Alb, Alk Phos, ALT, AST, Total. Bili, TP)  - Hemoglobin A1c  - Comprehensive metabolic panel  "(BMP + Alb, Alk Phos, ALT, AST, Total. Bili, TP)    Scoliosis (and kyphoscoliosis), idiopathic  Chronic midline thoracic back pain  Chronic midline low back pain without sciatica  Recently flaring up. No red flag symptoms. Reviewed CT lumbar done on 9/28 and also previous CT abdomen/pelvic showing part of spine. Will send to spine for further eval and treatment. In the meantime I recommended Tylenol.  We will send off some lidocaine patches as well.  - lidocaine (LIDODERM) 5 % patch  Dispense: 30 patch; Refill: 1  - Spine  Referral    Chronic obstructive pulmonary disease, unspecified COPD type (H)  Discussed her inhalers again. She's only been using spiriva. Discussed she would need both inhalers daily chronic.  Prescriptions sent again. F/up in 3 months. Her breathing seems fine today.   - tiotropium (SPIRIVA) 18 MCG inhaled capsule  Dispense: 90 capsule; Refill: 3  - fluticasone-vilanterol (BREO ELLIPTA) 100-25 MCG/ACT inhaler  Dispense: 90 each; Refill: 3    ABDIFATAH on CPAP  Doesn't want to wear mask. Address again in 3 months.        MED REC REQUIRED  Post Medication Reconciliation Status:  Discharge medications reconciled and changed, see notes/orders  BMI:   Estimated body mass index is 37.79 kg/m  as calculated from the following:    Height as of this encounter: 1.397 m (4' 7\").    Weight as of this encounter: 73.8 kg (162 lb 9.6 oz).       I spent 45 minutes on this encounter, including collecting history from patient and reviewing records from previous providers, examining the patient, explaining and counseling the issues enumerated in the Assessment and Plan (patient given a copy), ordering indicated tests, ordering prescriptions.         Rema Whiting MD  [facility]    Wendy Samuels is a 68 year old, presenting for the following health issues:  Follow Up (Still experiencing back pain)        10/12/2023     8:33 AM   Additional Questions   Roomed by Lily" GRACE,CMA   Accompanied by Mother       History of Present Illness     Asthma:  She presents for follow up of asthma.  She has no cough, no wheezing, and no shortness of breath.  She is using a relief medication a few times a week. She typically misses taking her controller medication 3 time(s) per week. Patient is aware of the following triggers: same as previous visit and gastric reflux. The patient has had a visit to the Emergency Room, Urgent Care or Hospital due to asthma since the last clinic visit. She has been to the Emergency Room or Urgent Care 0 times.    COPD:  She presents for follow up of COPD.  Overall, COPD symptoms are stable since last visit.  She has more than usual fatigue or shortness of breath with exertion and less than usual shortness of breath at rest.  She sometimes coughs and does not have change in sputum. No recent fever. She can walk the length of 1-2 rooms without stopping to rest. She can walk 2 flights of stairs without resting. The patient has had an ED, urgent care, or hospital admission because of COPD since the last visit. She states she has had 2 visit(s) to an ED, Urgent Care, or Hospital due to her COPD.    Mental Health Follow-up:  Patient presents to follow-up on Anxiety.    Patient's anxiety since last visit has been:  No change  The patient is not having other symptoms associated with anxiety.  Any significant life events: No  Patient is feeling anxious or having panic attacks.  Patient has no concerns about alcohol or drug use.    Hyperlipidemia:  She presents for follow up of hyperlipidemia.   She is taking medication to lower cholesterol. She is not having myalgia or other side effects to statin medications.    Hypertension: She presents for follow up of hypertension.  She does check blood pressure  regularly outside of the clinic. Outpatient blood pressures have not been over 140/90. She follows a low salt diet.     She eats 0-1 servings of fruits and vegetables daily. She  "exercises with enough effort to increase her heart rate 3 or less days per week. She is missing 1 dose(s) of medications per week.         Hospital Follow-up Visit:    Hospital/Nursing Home/IP Rehab Facility: [facility]  Date of Admission and Discharge: 9/18-9/21/23 then again seen on 9/28/23 in the ED    Reason(s) for Admission: CAP on 9/18/23 and acute low back pain and URI on 9/28/23    Was your hospitalization related to COVID-19? No   Problems taking medications regularly:  can't remember if she takes 1 or 2 inhalers.   Medication changes since discharge: None  Problems adhering to non-medication therapy:  Yes. Can't remember to take meds.     Summary of hospitalization:  [facility] discharge summary reviewed  Diagnostic Tests/Treatments reviewed.  Follow up needed: none  Other Healthcare Providers Involved in Patient s Care:         Surgical follow-up appointment - I'm sending her to Spine to evaluate scoliosis and back pain.   Update since discharge: stable.  Additional issues: ongoing back pain         Plan of care communicated with patient and family             Review of Systems   Constitutional, HEENT, cardiovascular, pulmonary, gi and gu systems are negative, except as otherwise noted.      Objective    /67 (BP Location: Left arm, Patient Position: Sitting, Cuff Size: Adult Large)   Pulse 77   Temp 98.4  F (36.9  C) (Oral)   Resp 16   Ht 1.397 m (4' 7\")   Wt 73.8 kg (162 lb 9.6 oz)   SpO2 (!) 79%   BMI 37.79 kg/m    Body mass index is 37.79 kg/m .  Physical Exam   GENERAL: healthy, alert and no distress  NECK: no adenopathy, no asymmetry, masses, or scars and thyroid normal to palpation  RESP: lungs clear to auscultation - no rales, rhonchi or wheezes  CV: regular rate and rhythm, normal S1 S2, no S3 or S4, no murmur, click or rub, no peripheral edema and peripheral pulses strong  MS: tender to palpation of the midline thoracic spine, straight leg " raise negative bilaterally.   Diabetic foot exam: normal DP and PT pulses, abnormal monofilament exam        , reduced sensation at all points, and dry cracking heels                  Answers submitted by the patient for this visit:  Patient Health Questionnaire (Submitted on 10/12/2023)  If you checked off any problems, how difficult have these problems made it for you to do your work, take care of things at home, or get along with other people?: Not difficult at all  PHQ9 TOTAL SCORE: 6

## 2023-10-12 NOTE — COMMUNITY RESOURCES LIST (ENGLISH)
10/12/2023   [facility]  N/A  For questions about this resource list or additional care needs, please contact your primary care clinic or care manager.  Phone: [phone]   Email: N/A   Address: [address] [place] [id number]   Hours: N/A        Hotlines and Helplines       Hotline - Housing crisis  1  [facility] Distance: 9.62 miles      Phone/Virtual   [address] [place] [address]  Language: English  Hours: Mon - Sun Open 24 Hours   Phone: (750) 820-6087 Email: communications@[facility].org Website: https://[facility].org/oursaviourshousing/     2  [facility] Distance: 11.08 miles      Phone/Virtual   [address] [place] [address]  Language: English  Hours: Mon - Sun Open 24 Hours   Phone: (779) 618-4543 Email: info@[facility].org Website: http://www.[facility].org          Housing       Coordinated Entry access point  3  [facility] Distance: 5.98 miles      In-Person, Phone/Virtual   [address] [place] [address]  Language: English, Nauruan  Hours: Mon - Fri 8:30 AM - 4:30 PM  Fees: Free   Phone: (335) 329-9870 Email: info@[facility].org Website: https://www.[facility].org/locations/[facility]/     4  [facility] - Coordinated Access to Housing and Shelter ([facility]) - Coordinated Access Distance: 6.14 miles      In-Person, Phone/Virtual   [address] [place] [address]  Language: English  Hours: Mon - Fri 8:00 AM - 4:30 PM  Fees: Free   Phone: (871) 805-1651 Website: https://www.[facility]./residents/assistance-support/assistance/housing-services-support     Drop-in center or day shelter  5  [facility] Distance: 5.21 miles      In-Person   [address] [place] [address]  Language: English  Hours: Mon - Fri 9:00 AM - 4:00 PM  Fees: Free   Phone: (335) 279-9807 Email: frontevertonk@[facility].org Website:  http://[facility].org     6  [facility] Distance: 9.43 miles      In-Person   [address] [place] [address]  Language: English, Montenegrin, Ecuadorean  Hours: Mon - Sat 7:00 AM - 3:00 PM  Fees: Free, Self Pay   Phone: (627) 671-3745 Email: info@[facility] Website: https://www.[facility]/locations/opportunity-center/     Housing search assistance  7  [facility] [place] - Mental Health Crisis Housing Search Assistance Distance: 6.71 miles      In-Person, Phone/Virtual   [address] [place] [address]  Language: English  Hours: Mon - Tue 8:00 AM - 4:30 PM , Wed 8:00 AM - 6:00 PM , Thu - Fri 8:00 AM - 4:30 PM  Fees: Free   Phone: (141) 574-6217 Email: [facility]@co.[facility]. Website: https://www.[facility]./[facility]/health-medical/clinics-services/mental-health/adult-mental-health     8  [facility] - Housing Search Assistance Distance: 7.12 miles      Phone/Virtual   [address] [place] [address]  Language: Icelandic, English, Hmong, [place], Montenegrin, Ecuadorean  Hours: Mon - Fri Appt. Only  Fees: Free   Phone: (975) 224-2382 Website: https://[facility].org/     Shelter for families  9  [facility] Distance: 9.92 miles      In-Person   [address] [place] [address]  Language: English  Hours: Mon - Fri 3:00 PM - 9:00 AM , Sat - Sun Open 24 Hours  Fees: Free   Phone: (569) 937-6475 Ext.1 Website: https://www.[facility].org/2020/07/03/emergency-family-shelter/     Shelter for individuals  10  [facility] [place] [facility] [place] [facility] Distance: 5.96 miles      In-Person   [address] [place] [address]  Language: English  Hours: Mon - Sun 5:00 PM - 10:00 AM  Fees: Free, Self Pay   Phone: (115) 886-5740 Email: info@[facility].org Website:  https://www.[facility].org/locations/[facility]/     11  [facility] Distance: 9.62 miles      In-Person   [address] [place] [address]  Language: English  Hours: Mon - Sun Open 24 Hours  Fees: Free   Phone: (738) 155-4362 Email: communications@[facility].org Website: https://[facility].org/oursaviourshousing/          Important Numbers & Websites       Emergency Services   [address]  [place] [address]  Poison Control   (898) 620-5594  Suicide Prevention Lifeline   (617) 120-2840 (TALK)  Child Abuse Hotline   (595) 871-1217 (4-A-Child)  Sexual Assault Hotline   (850) 922-5207 (HOPE)  National Runaway Safeline   (568) 778-3661 (RUNAWAY)  All-Options Talkline   (308) 677-5999  Substance Abuse Referral   (262) 404-1930 (HELP)

## 2023-10-15 ENCOUNTER — HEALTH MAINTENANCE LETTER (OUTPATIENT)
Age: 68
End: 2023-10-15

## 2023-10-23 ENCOUNTER — TELEPHONE (OUTPATIENT)
Dept: PHARMACY | Facility: CLINIC | Age: 68
End: 2023-10-23

## 2023-10-23 NOTE — TELEPHONE ENCOUNTER
Called patient to check in on medications, patient notes that everything is going fine from a medication standpoint at this time and MTM is not needed. Notes that they are able to afford medications at this time as well.     Will check back in with patient in 3 months.     Mary Paez, PharmD  Medication Therapy Management Pharmacist   [facility]

## 2023-11-16 ENCOUNTER — OFFICE VISIT (OUTPATIENT)
Dept: PHYSICAL MEDICINE AND REHAB | Facility: CLINIC | Age: 68
End: 2023-11-16
Payer: COMMERCIAL

## 2023-11-16 VITALS
HEIGHT: 55 IN | SYSTOLIC BLOOD PRESSURE: 121 MMHG | BODY MASS INDEX: 38.99 KG/M2 | HEART RATE: 91 BPM | DIASTOLIC BLOOD PRESSURE: 73 MMHG | WEIGHT: 168.5 LBS

## 2023-11-16 DIAGNOSIS — M47.12 CERVICAL SPONDYLOSIS WITH MYELOPATHY: Primary | ICD-10-CM

## 2023-11-16 DIAGNOSIS — M41.9 SCOLIOSIS OF THORACOLUMBAR SPINE, UNSPECIFIED SCOLIOSIS TYPE: ICD-10-CM

## 2023-11-16 PROCEDURE — 99204 OFFICE O/P NEW MOD 45 MIN: CPT | Performed by: NURSE PRACTITIONER

## 2023-11-16 ASSESSMENT — PAIN SCALES - GENERAL: PAINLEVEL: NO PAIN (0)

## 2023-11-16 NOTE — PROGRESS NOTES
ASSESSMENT: Arvind Leong is a 68 year old female who presents for consultation at the request of PCP Rema Whiting, who presents today for new patient evaluation of:    -Chronic midline thoracic and low back pain without sciatica, idiopathic scoliosis and kyphoscoliosis    Patient has possible signs of cervical myelopathy on exam today, including hyperreflexia and a positive Akhil.  She describes difficulty walking and urinary incontinence with negative specialist work-up in the last few years.  Recommend further imaging of the cervical spine for further evaluation.  She does not wish to pursue MRI due to her serious claustrophobia.  I am also not sure if this would be possible given her supplemental O2 status.  We will first obtain a CT scan and could consider pursuing an MRI if serious concerns.  We talked about her scoliosis on her lumbar CT scan.  She has quite episodic transient pain at this point.  I recommended physical therapy and use of over-the-counter medications on a consistent basis for pain control.  We will review her CT scan and she will otherwise get started with physical therapy and we will call her regarding her results once available.  I would anticipate she will follow-up routinely in another 6 to 8 weeks of physical therapy for symptom review if no serious concerns.          No data to display                     Diagnoses and all orders for this visit:  Cervical spondylosis with myelopathy  -     CT Cervical Spine w/o Contrast; Future  Scoliosis of thoracolumbar spine, unspecified scoliosis type  -     Physical Therapy Referral; Future      PLAN:  Reviewed spine anatomy and disease process. Discussed diagnosis and treatment options with the patient today. A shared decision making model was used.  The patient's values and choices were respected. The following represents what was discussed and decided upon by the provider and the patient.      -DIAGNOSTIC TESTS:  Images were  personally reviewed and interpreted and explained to patient today using a spine model.   -- I ordered a CT cervical spine without contrast today     -PHYSICAL THERAPY:    --I ordered full course of physical therapy today   Discussed the importance of core strengthening, ROM, stretching exercises with the patient and how each of these entities is important in decreasing pain.  Explained to the patient that the purpose of physical therapy is to teach the patient a home exercise program.  These exercises need to be performed every day in order to decrease pain and prevent future occurrences of pain.        -MEDICATIONS:    -- Recommended over-the-counter Tylenol and over-the-counter ibuprofen as directed as needed for pain control.  I recommended taking this on a consistent basis instead of episodic if she is experiencing soreness that interferes with sleep.  Discussed multiple medication options today with patient. Discussed risks, side effects, and proper use of medications. Patient verbalized understanding.    -INTERVENTIONS:    We did not discuss risks and benefits or role of injections today    -PATIENT EDUCATION:  Total time of 40 minutes, on the day of service, spent with the patient, reviewing the chart, placing orders, and documenting.   -Today we also discussed the pros and cons of the current treatment plan.    -FOLLOW-UP:   We will call regarding CT results    Advised patient to call the Spine Center if symptoms worsen, new numbness or weakness develops in the legs, or if they develop new or worsening problems controlling bladder or bowel function.   ______________________________________________________________________    SUBJECTIVE:    HPI:  Arvind Leong is a 68 year old female with a pertinent history of anxiety, bipolar I disorder with cognitive issues, type 2 diabetes, chronic COPD with supplemental O2, recent community-acquired pneumonia with hospitalization 9/19-21 and treated with omnicef &  azithromycin who presents for evaluation of low back pain.     She then returned to the ED on 9/28 for complaints of acute on chronic achiness in her lower back, localized along the lower lumbar area.  Denied any radiation into her legs. She was also still slightly short of breath and was worried about persistent pneumonia.  CT lumbar spine was done at that time, which showed scoliosis, but no fractures or significant concerns. Chest XR was negative for signs of ongoing pneumonia. She was given tylenol in the ED. She was prescribed ibuprofen 600mg TID PRN for back pain at hospital discharge. She did not take this.    She followed up with her PCP on 10/12 and was referred to the spine center for further evaluation and recommended to utilize lidocaine patches and tylenol as needed.  She states the patches help for a minute, but not after that. She takes tylenol sometimes, but not very often. It does help a great deal when she takes it.    Her back pain is worse at night.  Sometimes it interferes with sleep.  It does not seem to bother her during the day except for increased pain with sneezing to a 6/10. It feels sharp and it lasts for a few seconds, then it goes away. Walking helps relieve it. Her pain today is a 0/10 but earlier today she had increased pain because she sneezed. She denies any pain in her legs, numbness  She is unable to kneel anymore due to arthritis in her knees, but otherwise no significant leg weakness.    She does ask if her urinary incontinence difficulty with control which is new over the last few years could be related to her scoliosis.  She had a specialist work-up for this within the last few years, and they did not find anything wrong with her bladder.  She reports chronic history of slow gait and somewhat poor balance.  She endorses bilateral hand pain and difficulty with  strength which she relates to her arthritis.    She has not done PT  She has done chiropractic care 2-3 yrs ago  which was mildly helpful but not since  She has not had any injections or previous spine surgeries    -Treatment to Date:     -Medications:  Lidocaine patches  Tylenol    Current Outpatient Medications   Medication    lidocaine (LIDODERM) 5 % patch    albuterol (PROAIR HFA/PROVENTIL HFA/VENTOLIN HFA) 108 (90 Base) MCG/ACT inhaler    albuterol (PROVENTIL) (2.5 MG/3ML) 0.083% neb solution    ARIPiprazole (ABILIFY) 15 MG tablet    ARIPiprazole ER (ABILIFY MAINTENA) 400 MG extended release inj syringe    atorvastatin (LIPITOR) 40 MG tablet    blood glucose (NO BRAND SPECIFIED) lancets standard    blood glucose (NO BRAND SPECIFIED) test strip    blood glucose monitoring (ONETOUCH VERIO) meter device kit    cetirizine (ZYRTEC) 10 MG tablet    fluticasone-vilanterol (BREO ELLIPTA) 100-25 MCG/ACT inhaler    ibuprofen (ADVIL/MOTRIN) 600 MG tablet    metFORMIN (GLUCOPHAGE) 1000 MG tablet    tiotropium (SPIRIVA) 18 MCG inhaled capsule    TRULICITY 3 MG/0.5ML SOPN     No current facility-administered medications for this visit.       Allergies   Allergen Reactions    Lurasidone Other (See Comments)     Face turned red, (Brand name = Latuda) Face turned red, Face turned red       Past Medical History:   Diagnosis Date    Acute on chronic respiratory failure with hypoxia (H) 11/15/2016    Bipolar 1 disorder (H)     CAP (community acquired pneumonia) 11/15/2016    Cervical high risk HPV (human papillomavirus) test positive 3/5/2018    3/5/18 NIL pap, +HR HPV (not 16/18) 5/21/21 NIL pap, neg HPV. Plan: await provider    COPD exacerbation (H) 4/24/2021    COPD with exacerbation (H) 11/15/2016    Diabetes mellitus, type II (H)     Hearing loss     Meniere's disease     Pneumonia 4/23/2021    Pneumonia of right lower lobe due to infectious organism 6/11/2019        Patient Active Problem List   Diagnosis    Scoliosis (and kyphoscoliosis), idiopathic    Morbid obesity (H)    Lower Back Pain    Asthma    Urge Incontinence Of Urine     COPD (chronic obstructive pulmonary disease) (H)    Hyperlipidemia    ABDIFATAH on CPAP    Anxiety    Vertigo    Cognitive dysfunction in bipolar disorder (H)    Noncompliance with medications    Dependence on continuous supplemental oxygen    Bipolar I disorder, most recent episode depressed (H)    Runny nose    CAP (community acquired pneumonia) due to Chlamydia species    Type 2 diabetes mellitus with other specified complication, without long-term current use of insulin (H)    Chronic respiratory failure with hypoxia (H)    Cervical high risk HPV (human papillomavirus) test positive    Cardiomyopathy, unspecified type (H)    COPD exacerbation (H)    Tobacco use disorder    Hypercarbia    Acute confusion    Hypoxia    Community acquired bacterial pneumonia       Past Surgical History:   Procedure Laterality Date    CYST REMOVAL  2001    VA REMOVAL ANAL FISTULA,SUBCUTANEOUS      Description: Fistula-in-ano Repair;  Recorded: 2010; x2    TONSILLECTOMY         Family History   Problem Relation Age of Onset    Aneurysm Father     Heart Disease Father 70.00        bypass in 70s, AAA rupture at age 77     Ulcers Father 76.00    Pacemaker Mother     Bipolar Disorder Brother     Breast Cancer Maternal Grandmother 51.00    Kidney failure Maternal Grandmother     Cancer Paternal Grandmother         ?? lung    Cancer Paternal Uncle         ?? lung    Mental Illness Maternal Grandfather     Heart Disease Other         uncle    No Known Problems Sister         two siblings abuse chemicals    No Known Problems Brother     Bipolar Disorder Nephew        Reviewed past medical, surgical, and family history with patient found on new patient intake packet located in EMR Media tab.     SOCIAL HX: prev heavy drinking history but no alcohol use currently, nonsmoker, does not answer to rec drug use    ROS: positive for changes in vision, sob, cough, wheezing, excessive tiredness, anxiety, and depression.  Specifically  "negative for bowel/bladder dysfunction, balance changes, headache, dizziness, foot drop, fevers, chills, appetite changes, nausea/vomiting, unexplained weight loss. Otherwise 13 systems reviewed are negative. Please see the patient's intake questionnaire from today for details.    OBJECTIVE:  /73 (BP Location: Left arm, Patient Position: Sitting, Cuff Size: Adult Regular)   Pulse 91   Ht 4' 7\" (1.397 m)   Wt 168 lb 8 oz (76.4 kg)   BMI 39.16 kg/m      PHYSICAL EXAMINATION:    --CONSTITUTIONAL:  Vital signs as above.  No acute distress.  The patient is well nourished and well groomed.  Appears somewhat frail.  Seated with nasal cannula oxygen.  --PSYCHIATRIC:  Appropriate mood and affect. The patient is awake, alert, oriented to person, place, time and answering questions appropriately with clear speech.  Hard of hearing.  --SKIN:  Skin over the face, bilateral lower extremities, and posterior torso is clean, dry, intact without rashes.    --RESPIRATORY: Normal rhythm and effort. No abnormal accessory muscle breathing patterns noted.   --ABDOMINAL: Mildly distended.    --GROSS MOTOR: Gait is slow, shuffled. Easily arises from a seated position.  Did not attempt toe or heel walking     Some difficulty following multistep commands during exam, questionable effort with lower extremity motor testing    Upper extremity motor testing:  Deltoids 4/5 bilaterally  Triceps 5/5 bilaterally  Biceps 5/5 bilaterally  Handgrips 3/5 bilaterally  Intrinsics 4-/5 bilaterally  Extensors 4-/5 bilaterally    --LOWER EXTREMITY MOTOR TESTING:   Hip flexion: right 5/5, left 5/5  Quads: right 5/5, left 5/5  Hamstrings: right 4/5, left 4/5  Dorsiflexion: right 5/5, left 5/5  Plantar flexion: right 5/5, left 5/5    Great toe MTP extension/EHL: right 5/5, left 5/5    --NEUROLOGICAL: Brisk bilateral upper extremity reflexes bilaterally and brisk bilateral lower extremities bilaterally.Sensation to light touch is intact throughout both " upper and lower extremities. Babinski is equivocal, difficult to assess given foot sensitivity. No clonus.  Akhil is positive bilaterally    --STANDING EXAMINATION:  significant thoracolumbar scoliosis and suspect cervicothoracic scoliosis as well    --MUSCULOSKELETAL:No point tenderness to palpation of thoracic or lumbar spine. No paraspinal thoracic or lumbar musculature tenderness.     Straight leg raising is negative.    --SACROILIAC JOINT: Negative SI joint tenderness to palpation bilaterally.    --VASCULAR:  Bilateral lower extremities are warm without any discoloration.  There is no pitting edema of the bilateral lower extremities.    RESULTS:   Prior medical records from [facility]where were reviewed today.    Imaging: Spine imaging was personally reviewed and interpreted today. The images were shown to the patient and the findings were explained using a spine model.    Narrative & Impression   EXAM: CT LUMBAR SPINE W/O CONTRAST  LOCATION: [facility]  DATE: 9/28/2023     INDICATION: Low back pain  COMPARISON: None.  TECHNIQUE: Routine CT Lumbar Spine without IV contrast. Multiplanar reformats. Dose reduction techniques were used.      FINDINGS:  VERTEBRA: Normal vertebral body heights. 25 degree levoscoliosis apex L2-L3. Exaggerated lordosis. Ankylosed L2-L3 apposing endplates and facets. Ankylosed L5-S1 facets. No fracture or posttraumatic subluxation.      CANAL/FORAMINA: Severe right L2-L3 and left L5-S1 neural foraminal stenoses due to chronic degeneration. No high-grade spinal canal stenosis.     PARASPINAL: Scattered renal calcifications. No hydronephrosis.                                                                      IMPRESSION:  1.  No fracture or posttraumatic subluxation.  2.  25 degrees levoscoliosis apex L2-L3.  3.  Severe right L2-L3 and left L5-S1 neural foraminal stenoses.  4.  Scattered renal calcifications. No hydronephrosis.          Marce LUNDY  [facility]  O. [phone]

## 2023-11-16 NOTE — PATIENT INSTRUCTIONS
You can take ibuprofen 400mg-600mg every 6-8hrs as needed over the counter for your back pain  You can take tylenol 650mg every 6-8hrs as needed over the counter for your back pain as well    ~You have been referred for Physical Therapy to [facility]. They will call you to schedule an appointment.      Scheduling phone number is [phone] for [facility] or [place] location.  If you have not heard from the scheduling office within 2 business days, please call [phone] for ALL other locations.    Discussed the importance of core strengthening, ROM, stretching exercises and how each of these entities is important in decreasing pain and improving long term spine health.  The purpose of physical therapy is to teach you an individualized home exercise program.  These exercises need to be performed every day in order to decrease pain and prevent future occurrences of pain.           We will get more imaging of your neck to rule out any narrowing around your spinal cord which could lead to your difficulty walking and loss of urinary control    Imaging (Xray, CT, or MRI) has been ordered today.   Radiology will call you to schedule. Please call below if you do not hear from them in the next couple of days.     [facility] Scheduling:  Please call [phone] to schedule your image(s) (select option #1).    There are 3 different locations:    [facility]  [address] Imaging - [place]  [address] [facility], [address]   [facility] [address]    [place] [id number] [place] [id number]       ~Please call our [facility] Nurse Navigation line [phone] with any questions or concerns about your treatment plan, if symptoms worsen and you would like to be seen urgently, or if you have any new or worsening numbness, weakness, or problems controlling bladder and  bowel function.  ~You are also welcome to contact Marce Keller via [employer], but please be aware that responses to [employer] message may take 2-3 days due to the high volume of patients seen in clinic.

## 2023-11-16 NOTE — LETTER
11/16/2023         RE: Arvind Leong  [address] [place] [address]        Dear Colleague,    Thank you for referring your patient, Arvind Leong, to the [facility]. Please see a copy of my visit note below.    ASSESSMENT: Arvind Leong is a 68 year old female who presents for consultation at the request of PCP Rema Whiting, who presents today for new patient evaluation of:    -Chronic midline thoracic and low back pain without sciatica, idiopathic scoliosis and kyphoscoliosis    Patient has possible signs of cervical myelopathy on exam today, including hyperreflexia and a positive Akhil.  She describes difficulty walking and urinary incontinence with negative specialist work-up in the last few years.  Recommend further imaging of the cervical spine for further evaluation.  She does not wish to pursue MRI due to her serious claustrophobia.  I am also not sure if this would be possible given her supplemental O2 status.  We will first obtain a CT scan and could consider pursuing an MRI if serious concerns.  We talked about her scoliosis on her lumbar CT scan.  She has quite episodic transient pain at this point.  I recommended physical therapy and use of over-the-counter medications on a consistent basis for pain control.  We will review her CT scan and she will otherwise get started with physical therapy and we will call her regarding her results once available.  I would anticipate she will follow-up routinely in another 6 to 8 weeks of physical therapy for symptom review if no serious concerns.          No data to display                     Diagnoses and all orders for this visit:  Cervical spondylosis with myelopathy  -     CT Cervical Spine w/o Contrast; Future  Scoliosis of thoracolumbar spine, unspecified scoliosis type  -     Physical Therapy Referral; Future      PLAN:  Reviewed spine anatomy and disease process. Discussed diagnosis and  treatment options with the patient today. A shared decision making model was used.  The patient's values and choices were respected. The following represents what was discussed and decided upon by the provider and the patient.      -DIAGNOSTIC TESTS:  Images were personally reviewed and interpreted and explained to patient today using a spine model.   -- I ordered a CT cervical spine without contrast today     -PHYSICAL THERAPY:    --I ordered full course of physical therapy today   Discussed the importance of core strengthening, ROM, stretching exercises with the patient and how each of these entities is important in decreasing pain.  Explained to the patient that the purpose of physical therapy is to teach the patient a home exercise program.  These exercises need to be performed every day in order to decrease pain and prevent future occurrences of pain.        -MEDICATIONS:    -- Recommended over-the-counter Tylenol and over-the-counter ibuprofen as directed as needed for pain control.  I recommended taking this on a consistent basis instead of episodic if she is experiencing soreness that interferes with sleep.  Discussed multiple medication options today with patient. Discussed risks, side effects, and proper use of medications. Patient verbalized understanding.    -INTERVENTIONS:    We did not discuss risks and benefits or role of injections today    -PATIENT EDUCATION:  Total time of 40 minutes, on the day of service, spent with the patient, reviewing the chart, placing orders, and documenting.   -Today we also discussed the pros and cons of the current treatment plan.    -FOLLOW-UP:   We will call regarding CT results    Advised patient to call the Spine Center if symptoms worsen, new numbness or weakness develops in the legs, or if they develop new or worsening problems controlling bladder or bowel function.    ______________________________________________________________________    SUBJECTIVE:    HPI:  Arvind Leong is a 68 year old female with a pertinent history of anxiety, bipolar I disorder with cognitive issues, type 2 diabetes, chronic COPD with supplemental O2, recent community-acquired pneumonia with hospitalization 9/19-21 and treated with omnicef & azithromycin who presents for evaluation of low back pain.     She then returned to the ED on 9/28 for complaints of acute on chronic achiness in her lower back, localized along the lower lumbar area.  Denied any radiation into her legs. She was also still slightly short of breath and was worried about persistent pneumonia.  CT lumbar spine was done at that time, which showed scoliosis, but no fractures or significant concerns. Chest XR was negative for signs of ongoing pneumonia. She was given tylenol in the ED. She was prescribed ibuprofen 600mg TID PRN for back pain at hospital discharge. She did not take this.    She followed up with her PCP on 10/12 and was referred to the spine center for further evaluation and recommended to utilize lidocaine patches and tylenol as needed.  She states the patches help for a minute, but not after that. She takes tylenol sometimes, but not very often. It does help a great deal when she takes it.    Her back pain is worse at night.  Sometimes it interferes with sleep.  It does not seem to bother her during the day except for increased pain with sneezing to a 6/10. It feels sharp and it lasts for a few seconds, then it goes away. Walking helps relieve it. Her pain today is a 0/10 but earlier today she had increased pain because she sneezed. She denies any pain in her legs, numbness  She is unable to kneel anymore due to arthritis in her knees, but otherwise no significant leg weakness.    She does ask if her urinary incontinence difficulty with control which is new over the last few years could be related to her scoliosis.   She had a specialist work-up for this within the last few years, and they did not find anything wrong with her bladder.  She reports chronic history of slow gait and somewhat poor balance.  She endorses bilateral hand pain and difficulty with  strength which she relates to her arthritis.    She has not done PT  She has done chiropractic care 2-3 yrs ago which was mildly helpful but not since  She has not had any injections or previous spine surgeries    -Treatment to Date:     -Medications:  Lidocaine patches  Tylenol    Current Outpatient Medications   Medication     lidocaine (LIDODERM) 5 % patch     albuterol (PROAIR HFA/PROVENTIL HFA/VENTOLIN HFA) 108 (90 Base) MCG/ACT inhaler     albuterol (PROVENTIL) (2.5 MG/3ML) 0.083% neb solution     ARIPiprazole (ABILIFY) 15 MG tablet     ARIPiprazole ER (ABILIFY MAINTENA) 400 MG extended release inj syringe     atorvastatin (LIPITOR) 40 MG tablet     blood glucose (NO BRAND SPECIFIED) lancets standard     blood glucose (NO BRAND SPECIFIED) test strip     blood glucose monitoring (ONETOUCH VERIO) meter device kit     cetirizine (ZYRTEC) 10 MG tablet     fluticasone-vilanterol (BREO ELLIPTA) 100-25 MCG/ACT inhaler     ibuprofen (ADVIL/MOTRIN) 600 MG tablet     metFORMIN (GLUCOPHAGE) 1000 MG tablet     tiotropium (SPIRIVA) 18 MCG inhaled capsule     TRULICITY 3 MG/0.5ML SOPN     No current facility-administered medications for this visit.       Allergies   Allergen Reactions     Lurasidone Other (See Comments)     Face turned red, (Brand name = Latuda) Face turned red, Face turned red       Past Medical History:   Diagnosis Date     Acute on chronic respiratory failure with hypoxia (H) 11/15/2016     Bipolar 1 disorder (H)      CAP (community acquired pneumonia) 11/15/2016     Cervical high risk HPV (human papillomavirus) test positive 3/5/2018    3/5/18 NIL pap, +HR HPV (not 16/18) 5/21/21 NIL pap, neg HPV. Plan: await provider     COPD exacerbation (H) 4/24/2021      COPD with exacerbation (H) 11/15/2016     Diabetes mellitus, type II (H)      Hearing loss      Meniere's disease      Pneumonia 2021     Pneumonia of right lower lobe due to infectious organism 2019        Patient Active Problem List   Diagnosis     Scoliosis (and kyphoscoliosis), idiopathic     Morbid obesity (H)     Lower Back Pain     Asthma     Urge Incontinence Of Urine     COPD (chronic obstructive pulmonary disease) (H)     Hyperlipidemia     ABDIFATAH on CPAP     Anxiety     Vertigo     Cognitive dysfunction in bipolar disorder (H)     Noncompliance with medications     Dependence on continuous supplemental oxygen     Bipolar I disorder, most recent episode depressed (H)     Runny nose     CAP (community acquired pneumonia) due to Chlamydia species     Type 2 diabetes mellitus with other specified complication, without long-term current use of insulin (H)     Chronic respiratory failure with hypoxia (H)     Cervical high risk HPV (human papillomavirus) test positive     Cardiomyopathy, unspecified type (H)     COPD exacerbation (H)     Tobacco use disorder     Hypercarbia     Acute confusion     Hypoxia     Community acquired bacterial pneumonia       Past Surgical History:   Procedure Laterality Date     CYST REMOVAL  2001     CT REMOVAL ANAL FISTULA,SUBCUTANEOUS      Description: Fistula-in-ano Repair;  Recorded: 2010; x2     TONSILLECTOMY         Family History   Problem Relation Age of Onset     Aneurysm Father      Heart Disease Father 70.00        bypass in 70s, AAA rupture at age 77      Ulcers Father 76.00     Pacemaker Mother      Bipolar Disorder Brother      Breast Cancer Maternal Grandmother 51.00     Kidney failure Maternal Grandmother      Cancer Paternal Grandmother         ?? lung     Cancer Paternal Uncle         ?? lung     Mental Illness Maternal Grandfather      Heart Disease Other         uncle     No Known Problems Sister         two siblings abuse chemicals     No  "Known Problems Brother      Bipolar Disorder Nephew        Reviewed past medical, surgical, and family history with patient found on new patient intake packet located in EMR Media tab.     SOCIAL HX: prev heavy drinking history but no alcohol use currently, nonsmoker, does not answer to rec drug use    ROS: positive for changes in vision, sob, cough, wheezing, excessive tiredness, anxiety, and depression.  Specifically negative for bowel/bladder dysfunction, balance changes, headache, dizziness, foot drop, fevers, chills, appetite changes, nausea/vomiting, unexplained weight loss. Otherwise 13 systems reviewed are negative. Please see the patient's intake questionnaire from today for details.    OBJECTIVE:  /73 (BP Location: Left arm, Patient Position: Sitting, Cuff Size: Adult Regular)   Pulse 91   Ht 4' 7\" (1.397 m)   Wt 168 lb 8 oz (76.4 kg)   BMI 39.16 kg/m      PHYSICAL EXAMINATION:    --CONSTITUTIONAL:  Vital signs as above.  No acute distress.  The patient is well nourished and well groomed.  Appears somewhat frail.  Seated with nasal cannula oxygen.  --PSYCHIATRIC:  Appropriate mood and affect. The patient is awake, alert, oriented to person, place, time and answering questions appropriately with clear speech.  Hard of hearing.  --SKIN:  Skin over the face, bilateral lower extremities, and posterior torso is clean, dry, intact without rashes.    --RESPIRATORY: Normal rhythm and effort. No abnormal accessory muscle breathing patterns noted.   --ABDOMINAL: Mildly distended.    --GROSS MOTOR: Gait is slow, shuffled. Easily arises from a seated position.  Did not attempt toe or heel walking     Some difficulty following multistep commands during exam, questionable effort with lower extremity motor testing    Upper extremity motor testing:  Deltoids 4/5 bilaterally  Triceps 5/5 bilaterally  Biceps 5/5 bilaterally  Handgrips 3/5 bilaterally  Intrinsics 4-/5 bilaterally  Extensors 4-/5 " bilaterally    --LOWER EXTREMITY MOTOR TESTING:   Hip flexion: right 5/5, left 5/5  Quads: right 5/5, left 5/5  Hamstrings: right 4/5, left 4/5  Dorsiflexion: right 5/5, left 5/5  Plantar flexion: right 5/5, left 5/5    Great toe MTP extension/EHL: right 5/5, left 5/5    --NEUROLOGICAL: Brisk bilateral upper extremity reflexes bilaterally and brisk bilateral lower extremities bilaterally.Sensation to light touch is intact throughout both upper and lower extremities. Babinski is equivocal, difficult to assess given foot sensitivity. No clonus.  Akhil is positive bilaterally    --STANDING EXAMINATION:  significant thoracolumbar scoliosis and suspect cervicothoracic scoliosis as well    --MUSCULOSKELETAL:No point tenderness to palpation of thoracic or lumbar spine. No paraspinal thoracic or lumbar musculature tenderness.     Straight leg raising is negative.    --SACROILIAC JOINT: Negative SI joint tenderness to palpation bilaterally.    --VASCULAR:  Bilateral lower extremities are warm without any discoloration.  There is no pitting edema of the bilateral lower extremities.    RESULTS:   Prior medical records from [facility]where were reviewed today.    Imaging: Spine imaging was personally reviewed and interpreted today. The images were shown to the patient and the findings were explained using a spine model.    Narrative & Impression   EXAM: CT LUMBAR SPINE W/O CONTRAST  LOCATION: [facility]  DATE: 9/28/2023     INDICATION: Low back pain  COMPARISON: None.  TECHNIQUE: Routine CT Lumbar Spine without IV contrast. Multiplanar reformats. Dose reduction techniques were used.      FINDINGS:  VERTEBRA: Normal vertebral body heights. 25 degree levoscoliosis apex L2-L3. Exaggerated lordosis. Ankylosed L2-L3 apposing endplates and facets. Ankylosed L5-S1 facets. No fracture or posttraumatic subluxation.      CANAL/FORAMINA: Severe right L2-L3 and left L5-S1 neural foraminal  stenoses due to chronic degeneration. No high-grade spinal canal stenosis.     PARASPINAL: Scattered renal calcifications. No hydronephrosis.                                                                      IMPRESSION:  1.  No fracture or posttraumatic subluxation.  2.  25 degrees levoscoliosis apex L2-L3.  3.  Severe right L2-L3 and left L5-S1 neural foraminal stenoses.  4.  Scattered renal calcifications. No hydronephrosis.         Marce NORIEGAP-C  [facility]  O. [phone]             Again, thank you for allowing me to participate in the care of your patient.        Sincerely,        CAMERON Landon CNP

## 2023-12-11 ENCOUNTER — LAB (OUTPATIENT)
Dept: LAB | Facility: CLINIC | Age: 68
End: 2023-12-11
Payer: COMMERCIAL

## 2023-12-11 DIAGNOSIS — Z13.21 SCREENING FOR MALNUTRITION: ICD-10-CM

## 2023-12-11 DIAGNOSIS — Z79.899 NEED FOR PROPHYLACTIC CHEMOTHERAPY: Primary | ICD-10-CM

## 2023-12-11 DIAGNOSIS — Z79.899 NEED FOR PROPHYLACTIC CHEMOTHERAPY: ICD-10-CM

## 2023-12-11 LAB
BASOPHILS # BLD AUTO: 0 10E3/UL (ref 0–0.2)
BASOPHILS NFR BLD AUTO: 0 %
EOSINOPHIL # BLD AUTO: 0.1 10E3/UL (ref 0–0.7)
EOSINOPHIL NFR BLD AUTO: 1 %
ERYTHROCYTE [DISTWIDTH] IN BLOOD BY AUTOMATED COUNT: 12.3 % (ref 10–15)
HBA1C MFR BLD: 6.6 % (ref 0–5.6)
HCT VFR BLD AUTO: 47.9 % (ref 35–47)
HGB BLD-MCNC: 14.8 G/DL (ref 11.7–15.7)
IMM GRANULOCYTES # BLD: 0 10E3/UL
IMM GRANULOCYTES NFR BLD: 0 %
LYMPHOCYTES # BLD AUTO: 3 10E3/UL (ref 0.8–5.3)
LYMPHOCYTES NFR BLD AUTO: 28 %
MCH RBC QN AUTO: 29.1 PG (ref 26.5–33)
MCHC RBC AUTO-ENTMCNC: 30.9 G/DL (ref 31.5–36.5)
MCV RBC AUTO: 94 FL (ref 78–100)
MONOCYTES # BLD AUTO: 0.5 10E3/UL (ref 0–1.3)
MONOCYTES NFR BLD AUTO: 5 %
NEUTROPHILS # BLD AUTO: 7 10E3/UL (ref 1.6–8.3)
NEUTROPHILS NFR BLD AUTO: 65 %
PLATELET # BLD AUTO: 190 10E3/UL (ref 150–450)
RBC # BLD AUTO: 5.08 10E6/UL (ref 3.8–5.2)
WBC # BLD AUTO: 10.8 10E3/UL (ref 4–11)

## 2023-12-11 PROCEDURE — 84443 ASSAY THYROID STIM HORMONE: CPT

## 2023-12-11 PROCEDURE — 84439 ASSAY OF FREE THYROXINE: CPT

## 2023-12-11 PROCEDURE — 85025 COMPLETE CBC W/AUTO DIFF WBC: CPT

## 2023-12-11 PROCEDURE — 36415 COLL VENOUS BLD VENIPUNCTURE: CPT

## 2023-12-11 PROCEDURE — 80061 LIPID PANEL: CPT

## 2023-12-11 PROCEDURE — 83036 HEMOGLOBIN GLYCOSYLATED A1C: CPT

## 2023-12-11 PROCEDURE — 82306 VITAMIN D 25 HYDROXY: CPT

## 2023-12-11 PROCEDURE — 80053 COMPREHEN METABOLIC PANEL: CPT

## 2023-12-12 LAB
ALBUMIN SERPL BCG-MCNC: 4.1 G/DL (ref 3.5–5.2)
ALP SERPL-CCNC: 76 U/L (ref 40–150)
ALT SERPL W P-5'-P-CCNC: 20 U/L (ref 0–50)
ANION GAP SERPL CALCULATED.3IONS-SCNC: 10 MMOL/L (ref 7–15)
AST SERPL W P-5'-P-CCNC: 16 U/L (ref 0–45)
BILIRUB SERPL-MCNC: 0.6 MG/DL
BUN SERPL-MCNC: 10.8 MG/DL (ref 8–23)
CALCIUM SERPL-MCNC: 9.5 MG/DL (ref 8.8–10.2)
CHLORIDE SERPL-SCNC: 100 MMOL/L (ref 98–107)
CHOLEST SERPL-MCNC: 93 MG/DL
CREAT SERPL-MCNC: 0.73 MG/DL (ref 0.51–0.95)
DEPRECATED HCO3 PLAS-SCNC: 34 MMOL/L (ref 22–29)
EGFRCR SERPLBLD CKD-EPI 2021: 90 ML/MIN/1.73M2
FASTING STATUS PATIENT QL REPORTED: NO
GLUCOSE SERPL-MCNC: 161 MG/DL (ref 70–99)
HDLC SERPL-MCNC: 40 MG/DL
LDLC SERPL CALC-MCNC: 21 MG/DL
NONHDLC SERPL-MCNC: 53 MG/DL
POTASSIUM SERPL-SCNC: 4.5 MMOL/L (ref 3.4–5.3)
PROT SERPL-MCNC: 6.9 G/DL (ref 6.4–8.3)
SODIUM SERPL-SCNC: 144 MMOL/L (ref 135–145)
T4 FREE SERPL-MCNC: 1.12 NG/DL (ref 0.9–1.7)
TRIGL SERPL-MCNC: 161 MG/DL
TSH SERPL DL<=0.005 MIU/L-ACNC: 1.28 UIU/ML (ref 0.3–4.2)
VIT D+METAB SERPL-MCNC: 22 NG/ML (ref 20–50)

## 2024-01-12 ENCOUNTER — TRANSFERRED RECORDS (OUTPATIENT)
Dept: MULTI SPECIALTY CLINIC | Facility: CLINIC | Age: 69
End: 2024-01-12

## 2024-01-12 LAB — RETINOPATHY: NORMAL

## 2024-01-19 ENCOUNTER — OFFICE VISIT (OUTPATIENT)
Dept: FAMILY MEDICINE | Facility: CLINIC | Age: 69
End: 2024-01-19
Payer: COMMERCIAL

## 2024-01-19 VITALS
HEIGHT: 55 IN | OXYGEN SATURATION: 90 % | RESPIRATION RATE: 20 BRPM | DIASTOLIC BLOOD PRESSURE: 62 MMHG | BODY MASS INDEX: 37.95 KG/M2 | SYSTOLIC BLOOD PRESSURE: 105 MMHG | TEMPERATURE: 98.1 F | WEIGHT: 164 LBS | HEART RATE: 80 BPM

## 2024-01-19 DIAGNOSIS — F31.30 BIPOLAR I DISORDER, MOST RECENT EPISODE DEPRESSED (H): ICD-10-CM

## 2024-01-19 DIAGNOSIS — G89.29 CHRONIC MIDLINE LOW BACK PAIN WITHOUT SCIATICA: ICD-10-CM

## 2024-01-19 DIAGNOSIS — J96.11 CHRONIC RESPIRATORY FAILURE WITH HYPOXIA (H): ICD-10-CM

## 2024-01-19 DIAGNOSIS — J44.9 CHRONIC OBSTRUCTIVE PULMONARY DISEASE, UNSPECIFIED COPD TYPE (H): ICD-10-CM

## 2024-01-19 DIAGNOSIS — E11.69 TYPE 2 DIABETES MELLITUS WITH OTHER SPECIFIED COMPLICATION, WITHOUT LONG-TERM CURRENT USE OF INSULIN (H): Primary | ICD-10-CM

## 2024-01-19 DIAGNOSIS — E78.5 HYPERLIPIDEMIA LDL GOAL <70: ICD-10-CM

## 2024-01-19 DIAGNOSIS — M54.6 CHRONIC MIDLINE THORACIC BACK PAIN: ICD-10-CM

## 2024-01-19 DIAGNOSIS — I42.9 CARDIOMYOPATHY, UNSPECIFIED TYPE (H): ICD-10-CM

## 2024-01-19 DIAGNOSIS — G89.29 CHRONIC MIDLINE THORACIC BACK PAIN: ICD-10-CM

## 2024-01-19 DIAGNOSIS — M54.50 CHRONIC MIDLINE LOW BACK PAIN WITHOUT SCIATICA: ICD-10-CM

## 2024-01-19 DIAGNOSIS — E66.01 MORBID OBESITY (H): ICD-10-CM

## 2024-01-19 DIAGNOSIS — M41.20 IDIOPATHIC SCOLIOSIS AND KYPHOSCOLIOSIS: ICD-10-CM

## 2024-01-19 PROCEDURE — 99214 OFFICE O/P EST MOD 30 MIN: CPT | Performed by: FAMILY MEDICINE

## 2024-01-19 RX ORDER — DULAGLUTIDE 3 MG/.5ML
3 INJECTION, SOLUTION SUBCUTANEOUS
Qty: 4 ML | Refills: 3 | Status: SHIPPED | OUTPATIENT
Start: 2024-01-19 | End: 2024-06-04

## 2024-01-19 RX ORDER — RESPIRATORY SYNCYTIAL VIRUS VACCINE 120MCG/0.5
0.5 KIT INTRAMUSCULAR ONCE
Qty: 1 EACH | Refills: 0 | Status: CANCELLED | OUTPATIENT
Start: 2024-01-19 | End: 2024-01-19

## 2024-01-19 RX ORDER — ATORVASTATIN CALCIUM 40 MG/1
40 TABLET, FILM COATED ORAL DAILY
Qty: 90 TABLET | Refills: 3 | Status: SHIPPED | OUTPATIENT
Start: 2024-01-19 | End: 2024-05-19

## 2024-01-19 NOTE — PROGRESS NOTES
"  Assessment & Plan     Type 2 diabetes mellitus with other specified complication, without long-term current use of insulin (H)  Well controlled. A1c a month ago was 6.6. continue with current meds. Refills given. F/up in 3 months for annual wellness/med check.  - metFORMIN (GLUCOPHAGE) 1000 MG tablet  Dispense: 180 tablet; Refill: 3  - Dulaglutide (TRULICITY) 3 MG/0.5ML SOPN  Dispense: 4 mL; Refill: 3  - blood glucose (NO BRAND SPECIFIED) test strip  Dispense: 100 strip; Refill: 0  - blood glucose (NO BRAND SPECIFIED) lancets standard  Dispense: 1 each; Refill: 1  - Adult Eye  Referral    Hyperlipidemia LDL goal <70  - atorvastatin (LIPITOR) 40 MG tablet  Dispense: 90 tablet; Refill: 3    Chronic respiratory failure with hypoxia (H)  Chronic obstructive pulmonary disease, unspecified COPD type (H)  On o2. Stable. Breathing good    Bipolar I disorder, most recent episode depressed (H)  On abilify injection now.    Cardiomyopathy, unspecified type (H)  No symptoms    Morbid obesity (H)  Discussed diet/exercise. Difficult to exercise d/t chronic joint pain    Scoliosis (and kyphoscoliosis), idiopathic  Chronic midline thoracic back pain  Chronic midline low back pain without sciatica  Reviewed recent spine visit 11/16/23. Feels that symptoms are stable and doesn't want to do CT ordered by spine. Monitor for now.         BMI  Estimated body mass index is 38.12 kg/m  as calculated from the following:    Height as of this encounter: 1.397 m (4' 7\").    Weight as of this encounter: 74.4 kg (164 lb).           Wendy Samuels is a 68 year old, presenting for the following health issues:  Diabetes        1/19/2024     2:33 PM   Additional Questions   Roomed by Yamini VALLADARES LPN     HPI   Complains of joint pain in her fingers toes knees hips low back.  She was seen by spine specialist in November 2023 and was ordered to have a CT cervical spine however she never followed through and does not want to get a CT " "scan.  She sees psychiatry for bipolar and is now on Abilify injection.  Feels well otherwise.  She is here For follow-up of her diabetes as well.    Diabetes Follow-up    How often are you checking your blood sugar? A few times a month  What time of day are you checking your blood sugars (select all that apply)?  After meals  Have you had any blood sugars above 200?  No  Have you had any blood sugars below 70?  No  What symptoms do you notice when your blood sugar is low?  Shaky and Dizzy  What concerns do you have today about your diabetes? None   Do you have any of these symptoms? (Select all that apply)  No numbness or tingling in feet.  No redness, sores or blisters on feet.  No complaints of excessive thirst.  No reports of blurry vision.  No significant changes to weight.      BP Readings from Last 2 Encounters:   01/19/24 105/62   11/16/23 121/73     Hemoglobin A1C (%)   Date Value   12/11/2023 6.6 (H)   10/12/2023 6.7 (H)     LDL Cholesterol Calculated (mg/dL)   Date Value   12/11/2023 21   07/11/2023 48     LDL Cholesterol Direct   Date Value   12/16/2021 68 mg/dL   01/19/2016 88 mg/dl               Review of Systems  Constitutional, HEENT, cardiovascular, pulmonary, gi and gu systems are negative, except as otherwise noted.      Objective    /62   Pulse 80   Temp 98.1  F (36.7  C) (Oral)   Resp 20   Ht 1.397 m (4' 7\")   Wt 74.4 kg (164 lb)   SpO2 90%   BMI 38.12 kg/m    Body mass index is 38.12 kg/m .  Physical Exam   GENERAL: alert and no distress  NECK: no adenopathy, no asymmetry, masses, or scars  RESP: lungs clear to auscultation - no rales, rhonchi or wheezes  CV: regular rate and rhythm, normal S1 S2, no S3 or S4, no murmur, click or rub, no peripheral edema  MS: no gross musculoskeletal defects noted, no edema  NEURO: Normal strength and tone, mentation intact and speech normal  PSYCH: mentation appears normal, affect normal/bright    Lab on 12/11/2023   Component Date Value Ref Range " Status    Sodium 12/11/2023 144  135 - 145 mmol/L Final    Reference intervals for this test were updated on 09/26/2023 to more accurately reflect our healthy population. There may be differences in the flagging of prior results with similar values performed with this method. Interpretation of those prior results can be made in the context of the updated reference intervals.     Potassium 12/11/2023 4.5  3.4 - 5.3 mmol/L Final    Carbon Dioxide (CO2) 12/11/2023 34 (H)  22 - 29 mmol/L Final    Anion Gap 12/11/2023 10  7 - 15 mmol/L Final    Urea Nitrogen 12/11/2023 10.8  8.0 - 23.0 mg/dL Final    Creatinine 12/11/2023 0.73  0.51 - 0.95 mg/dL Final    GFR Estimate 12/11/2023 90  >60 mL/min/1.73m2 Final    Calcium 12/11/2023 9.5  8.8 - 10.2 mg/dL Final    Chloride 12/11/2023 100  98 - 107 mmol/L Final    Glucose 12/11/2023 161 (H)  70 - 99 mg/dL Final    Alkaline Phosphatase 12/11/2023 76  40 - 150 U/L Final    Reference intervals for this test were updated on 11/14/2023 to more accurately reflect our healthy population. There may be differences in the flagging of prior results with similar values performed with this method. Interpretation of those prior results can be made in the context of the updated reference intervals.    AST 12/11/2023 16  0 - 45 U/L Final    Reference intervals for this test were updated on 6/12/2023 to more accurately reflect our healthy population. There may be differences in the flagging of prior results with similar values performed with this method. Interpretation of those prior results can be made in the context of the updated reference intervals.    ALT 12/11/2023 20  0 - 50 U/L Final    Reference intervals for this test were updated on 6/12/2023 to more accurately reflect our healthy population. There may be differences in the flagging of prior results with similar values performed with this method. Interpretation of those prior results can be made in the context of the updated reference  intervals.      Protein Total 12/11/2023 6.9  6.4 - 8.3 g/dL Final    Albumin 12/11/2023 4.1  3.5 - 5.2 g/dL Final    Bilirubin Total 12/11/2023 0.6  <=1.2 mg/dL Final    Free T4 12/11/2023 1.12  0.90 - 1.70 ng/dL Final    TSH 12/11/2023 1.28  0.30 - 4.20 uIU/mL Final    Vitamin D, Total (25-Hydroxy) 12/11/2023 22  20 - 50 ng/mL Final    optimum levels    Cholesterol 12/11/2023 93  <200 mg/dL Final    Triglycerides 12/11/2023 161 (H)  <150 mg/dL Final    Direct Measure HDL 12/11/2023 40 (L)  >=50 mg/dL Final    LDL Cholesterol Calculated 12/11/2023 21  <=100 mg/dL Final    Non HDL Cholesterol 12/11/2023 53  <130 mg/dL Final    Patient Fasting > 8hrs? 12/11/2023 No   Final    WBC Count 12/11/2023 10.8  4.0 - 11.0 10e3/uL Final    RBC Count 12/11/2023 5.08  3.80 - 5.20 10e6/uL Final    Hemoglobin 12/11/2023 14.8  11.7 - 15.7 g/dL Final    Hematocrit 12/11/2023 47.9 (H)  35.0 - 47.0 % Final    MCV 12/11/2023 94  78 - 100 fL Final    MCH 12/11/2023 29.1  26.5 - 33.0 pg Final    MCHC 12/11/2023 30.9 (L)  31.5 - 36.5 g/dL Final    RDW 12/11/2023 12.3  10.0 - 15.0 % Final    Platelet Count 12/11/2023 190  150 - 450 10e3/uL Final    % Neutrophils 12/11/2023 65  % Final    % Lymphocytes 12/11/2023 28  % Final    % Monocytes 12/11/2023 5  % Final    % Eosinophils 12/11/2023 1  % Final    % Basophils 12/11/2023 0  % Final    % Immature Granulocytes 12/11/2023 0  % Final    Absolute Neutrophils 12/11/2023 7.0  1.6 - 8.3 10e3/uL Final    Absolute Lymphocytes 12/11/2023 3.0  0.8 - 5.3 10e3/uL Final    Absolute Monocytes 12/11/2023 0.5  0.0 - 1.3 10e3/uL Final    Absolute Eosinophils 12/11/2023 0.1  0.0 - 0.7 10e3/uL Final    Absolute Basophils 12/11/2023 0.0  0.0 - 0.2 10e3/uL Final    Absolute Immature Granulocytes 12/11/2023 0.0  <=0.4 10e3/uL Final    Hemoglobin A1C 12/11/2023 6.6 (H)  0.0 - 5.6 % Final    Normal <5.7%   Prediabetes 5.7-6.4%    Diabetes 6.5% or higher     Note: Adopted from ADA consensus guidelines.            Signed Electronically by: Rema Whiting MD

## 2024-02-20 ENCOUNTER — PATIENT OUTREACH (OUTPATIENT)
Dept: FAMILY MEDICINE | Facility: CLINIC | Age: 69
End: 2024-02-20

## 2024-02-20 NOTE — TELEPHONE ENCOUNTER
Patient Quality Outreach    Patient is due for the following:   Physical Annual Wellness Visit    Next Steps:   Patient was scheduled for AWV    Type of outreach:    Chart review performed, no outreach needed.      Questions for provider review:    None           Lily Paz MA

## 2024-03-04 ENCOUNTER — VIRTUAL VISIT (OUTPATIENT)
Dept: PHARMACY | Facility: CLINIC | Age: 69
End: 2024-03-04
Payer: COMMERCIAL

## 2024-03-04 DIAGNOSIS — R09.89 RUNNY NOSE: ICD-10-CM

## 2024-03-04 DIAGNOSIS — E11.8 TYPE 2 DIABETES MELLITUS WITH UNSPECIFIED COMPLICATIONS (H): Primary | ICD-10-CM

## 2024-03-04 DIAGNOSIS — F31.30 BIPOLAR I DISORDER, MOST RECENT EPISODE DEPRESSED (H): ICD-10-CM

## 2024-03-04 DIAGNOSIS — J44.9 CHRONIC OBSTRUCTIVE PULMONARY DISEASE, UNSPECIFIED COPD TYPE (H): ICD-10-CM

## 2024-03-04 DIAGNOSIS — E78.5 HYPERLIPIDEMIA LDL GOAL <70: ICD-10-CM

## 2024-03-04 PROCEDURE — 99207 PR NO CHARGE LOS: CPT | Mod: 93

## 2024-03-04 RX ORDER — FEXOFENADINE HCL 180 MG/1
180 TABLET ORAL DAILY
COMMUNITY
End: 2024-05-19

## 2024-03-04 NOTE — PATIENT INSTRUCTIONS
"Recommendations from today's MTM visit:                                                      I will send a message to Dr. Whiting if you should re-start Spiriva, if so, then we could change you to Trelegy inhaler as this would replace both Spiriva and Breo inhalers.     Follow-up: 4/19/2024, patient request MTM to follow up in 2 months (1 month after Dr. Whiting).     It was great speaking with you today.  I value your experience and would be very thankful for your time in providing feedback in our clinic survey. In the next few days, you may receive an email or text message from [facility] with a link to a survey related to your  clinical pharmacist.\"     To schedule another MTM appointment, please call the clinic directly or you may call the MTM scheduling line at [phone].    My Clinical Pharmacist's contact information:                                                      Please feel free to contact me with any questions or concerns you have.      Mary Paez, PharmD  Medication Therapy Management Pharmacist   [facility]    "

## 2024-03-04 NOTE — PROGRESS NOTES
Medication Therapy Management (MTM) Encounter    ASSESSMENT:                            Medication Adherence/Access: No issues identified.    Diabetes: Stable, recent A1c at goal of <7% and patient tolerating current medications thus recommend continuing on current therapy. Plan in place to get updated A1c and UACR at upcoming PCP follow up visit. No renal dose adjustment required for metformin at this time. Patient recent UACR not elevated thus no indication for ACEi/ARB at this time, additional ASCVD risk score is <10% thus no indication for aspirin therapy.     Hyperlipidemia: Stable, most recent LDL at goal of <70. Patient to continue to work on lifestyle modifications to help with reduced HDL and elevated triglycerides, no additional medication therapy needed at this time.    COPD/Allergies: Patient has not been taking Spiriva, will discuss with Dr. Whiting if patient should re-start, if indicated, could consider changing to Trelegy to replace both Breo and Spiriva to help with convenience and cost. Patient may benefit from obtaining updated spirometry testing to better determine appropriate inhaler therapy.      Bipolar: Stable, continues to follow with psychiatrist outside of [place].     PLAN:                            I will send a message to Dr. Whiting if you should re-start Spiriva, if so, then we could change you to Trelegy inhaler as this would replace both Spiriva and Breo inhalers.     Follow-up: 4/19/2024, patient request MTM to follow up in 2 months (1 month after Dr. Whiting).     SUBJECTIVE/OBJECTIVE:                          Arvind Leong is a 68 year old female called for a follow-up visit from 7/11/2023.       Reason for visit: Comprehensive medication review.    Allergies/ADRs: Reviewed in chart  Past Medical History: Reviewed in chart  Tobacco: She reports that she quit smoking about 7 years ago. Her smoking use included cigarettes. She has a 40 pack-year smoking history. She has  never used smokeless tobacco.  Alcohol: not currently using    Medication Adherence/Access: Patient takes medications directly from bottles.  Patient takes medications 2 time(s) per day.   Per patient, misses medication 0 times per week.   Medication barriers: none, patient notes that all medications are affordable at this time.     Diabetes   Trulicity 3mg weekly on Sundays, patient confirms that she stores all other pens in the refrigerator.   Metformin 1,000mg twice daily   Patient is not experiencing side effects.  Blood sugar monitoring: occasionally; patient not able to give any recent readings.   Current diabetes symptoms: none     Eye exam in the last 12 months? No, patient notes that they have an upcoming eye exam scheduled for 3/13/2024.  Foot exam is up to date  Urine Albumin:   Lab Results   Component Value Date    UMALCR 19.67 03/16/2023      Lab Results   Component Value Date    A1C 6.6 (H) 12/11/2023     GFR Estimate   Date Value Ref Range Status   12/11/2023 90 >60 mL/min/1.73m2 Final   10/12/2023 >90 >60 mL/min/1.73m2 Final   09/21/2023 >90 >60 mL/min/1.73m2 Final   07/07/2021 >60 >60 mL/min/1.73m2 Final   04/26/2021 >60 >60 mL/min/1.73m2 Final   04/24/2021 >60 >60 mL/min/1.73m2 Final     Hyperlipidemia   atorvastatin 40mg daily at night.   Patient reports no significant myalgias or other side effects.     Recent Labs   Lab Test 12/11/23  1059 07/11/23  0904   CHOL 93 127   HDL 40* 40*   LDL 21 48   TRIG 161* 194*     COPD/Allergies:   ICS/LABA - Breo Ellipta 100/25mcg - one puff once daily  LAMA - Spiriva HandiHaler 18 mcg - inhale one capsule once daily is what patient is prescribed but they note that they have not been using for more than a month because they can not find this medication, she is not able to confirm when her last dose was. She is not able to say if it was helpful for breathing when they used it last.   Albuterol (ProAir/Ventolin/Proventil) as needed - patient notes that they are  "using \"every so often\" notes that they are using about once monthly.   Albuterol Nebs as needed - patient notes that they are using once every other week.   Allegra 180mg daily - patient started taking for runny nose related to allergies, patient notes that since starting this has helped with symptoms.   Patient reports no current medication side effects.    Patient reports the following symptoms: none. Notes that breathing is controlled at this time.     Bipolar:  Aripiprazole 400mg IM injection every month (was previously taking aripiprazole tablets) Patient confirms that they are not taking tablets any longer.   Patient follows with psychiatrist outside of [place].   Patient declines side effects since starting injection and notes that mood is controlled at this time.     Today's Vitals: There were no vitals taken for this visit.  ----------------  I spent 31 minutes with this patient today. I offer these suggestions for consideration by Dr. Whiting. A copy of the visit note was provided to the patient's provider(s).    A summary of these recommendations was sent via [employer].    Mary Paez, PharmD  Medication Therapy Management Pharmacist   [facility]     Telemedicine Visit Details  Type of service:  Telephone visit  Start Time:  9:00 AM  End Time:  9:31 AM     Medication Therapy Recommendations  COPD (chronic obstructive pulmonary disease) (H)    Current Medication: tiotropium (SPIRIVA) 18 MCG inhaled capsule (Discontinued)   Rationale: More effective medication available - Ineffective medication - Effectiveness   Recommendation: Change Medication - Trelegy Ellipta 100-62.5-25 MCG/ACT Aepb   Status: Accepted per Provider            "

## 2024-03-04 NOTE — Clinical Note
Brad Whiting,   Patient has not been using Spiriva for an unknown amount of time, I know that they have a follow up scheduled with you in a month, do you want them to re-start taking? If so I think we could change Breo and Spiriva to Trelegy.   Also they would benefit from getting updated spirometry as this could help with the decision as well.   Let me know how I can help.   Mary Paez, PharmD Medication Therapy Management Pharmacist  [facility]

## 2024-03-13 DIAGNOSIS — J44.9 CHRONIC OBSTRUCTIVE PULMONARY DISEASE, UNSPECIFIED COPD TYPE (H): Primary | ICD-10-CM

## 2024-03-13 RX ORDER — FLUTICASONE FUROATE, UMECLIDINIUM BROMIDE AND VILANTEROL TRIFENATATE 100; 62.5; 25 UG/1; UG/1; UG/1
1 POWDER RESPIRATORY (INHALATION) DAILY
Qty: 60 EACH | Refills: 1 | Status: SHIPPED | OUTPATIENT
Start: 2024-03-13

## 2024-03-13 NOTE — PROGRESS NOTES
Pls let patient know I sent a new inhaler for her to take 1 puff daily per pharmacist's recommendation. She can stop breo and spiriva but just do this new inhaler trelegy 1 puff a day. Reducing the amounts of inhaler a day hopefully should help her stay compliant.

## 2024-03-14 NOTE — PROGRESS NOTES
Patient notified of provider's message as written. Patient verbalized understanding.    Encouraged patient to call the clinic with further questions/concerns.     Jose R CRUZ RN  [facility]

## 2024-04-23 ENCOUNTER — TELEPHONE (OUTPATIENT)
Dept: FAMILY MEDICINE | Facility: CLINIC | Age: 69
End: 2024-04-23

## 2024-04-23 NOTE — TELEPHONE ENCOUNTER
Please initiate PA, thank you.   Dulaglutide (TRULICITY) 3 MG/0.5ML SOPN 4 mL 3 1/19/2024 -- No   Sig - Route: Inject 3 mg Subcutaneous every 7 days - Subcutaneous   Sent to pharmacy as: Trulicity 3 MG/0.5ML Subcutaneous Solution Pen-injector (Dulaglutide)   Class: E-Prescribe   Order: [id number]   E-Prescribing Status: Receipt confirmed by pharmacy (1/19/2024  2:53 PM CST)     Type 2 diabetes mellitus with other specified complication, without long-term current use of insulin (H) [E11.69]  - Primary

## 2024-05-08 ENCOUNTER — NURSE TRIAGE (OUTPATIENT)
Dept: FAMILY MEDICINE | Facility: CLINIC | Age: 69
End: 2024-05-08
Payer: COMMERCIAL

## 2024-05-08 NOTE — TELEPHONE ENCOUNTER
Received a call from insurance, the patient's insurance term as of 04/30/2024.  Called and informed pharmacy. They state patient has new insurance and that one covers Trulicity.

## 2024-05-08 NOTE — TELEPHONE ENCOUNTER
"Nurse Triage SBAR    Is this a 2nd Level Triage? YES, LICENSED PRACTITIONER REVIEW IS REQUIRED    Situation: patient having sore throat and runny nose     Background: no    Assessment: Patient having sore throat runny nose. No fever     Protocol Recommended Disposition:   Strep Test Only Visit Today Or Tomorrow    Recommendation: Please adjust appointment if just needing lab appointment for strep test otherwise patient coming in at 410 5/9    Routed to provider    Does the patient meet one of the following criteria for ADS visit consideration? No    Emma Hernadez RN      Reason for Disposition   Sore throat is main symptom and persists > 48 hours   Patient requesting a strep throat test    Answer Assessment - Initial Assessment Questions  1. ONSET: \"When did the throat start hurting?\" (Hours or days ago)       Today   2. SEVERITY: \"How bad is the sore throat?\" (Scale 1-10; mild, moderate or severe)    - MILD (1-3):  Doesn't interfere with eating or normal activities.    - MODERATE (4-7): Interferes with eating some solids and normal activities.    - SEVERE (8-10):  Excruciating pain, interferes with most normal activities.    - SEVERE WITH DYSPHAGIA (10): Can't swallow liquids, drooling.      Mild   3. STREP EXPOSURE: \"Has there been any exposure to strep within the past week?\" If Yes, ask: \"What type of contact occurred?\"       Patient stated she Sat at a dirty table at dinner yesterday thinks that may have caused he getting sick   4.  VIRAL SYMPTOMS: \"Are there any symptoms of a cold, such as a runny nose, cough, hoarse voice or red eyes?\"       Runny nose going on for a while   5. FEVER: \"Do you have a fever?\" If Yes, ask: \"What is your temperature, how was it measured, and when did it start?\"      No    Protocols used: Sore Throat-A-OH    "

## 2024-05-08 NOTE — TELEPHONE ENCOUNTER
[facility]ail Pharmacy Prior Authorization Team   Phone: [phone]    PA Initiation    Medication: TRULICITY 3 MG/0.5ML SC SOPN  Insurance Company: Other (see comments)Comment:  [employer] [phone]  Pharmacy Filling the Rx: [facility] [address] [place], [place] - [address] [place] [address] [address]  Filling Pharmacy Phone: [phone]  Filling Pharmacy Fax:    Start Date: 5/8/2024

## 2024-05-09 ENCOUNTER — OFFICE VISIT (OUTPATIENT)
Dept: FAMILY MEDICINE | Facility: CLINIC | Age: 69
End: 2024-05-09
Payer: COMMERCIAL

## 2024-05-09 VITALS
TEMPERATURE: 98.8 F | SYSTOLIC BLOOD PRESSURE: 113 MMHG | HEART RATE: 82 BPM | WEIGHT: 167.2 LBS | BODY MASS INDEX: 38.69 KG/M2 | HEIGHT: 55 IN | RESPIRATION RATE: 16 BRPM | DIASTOLIC BLOOD PRESSURE: 76 MMHG

## 2024-05-09 DIAGNOSIS — E11.69 TYPE 2 DIABETES MELLITUS WITH OTHER SPECIFIED COMPLICATION, WITHOUT LONG-TERM CURRENT USE OF INSULIN (H): ICD-10-CM

## 2024-05-09 DIAGNOSIS — H61.23 BILATERAL IMPACTED CERUMEN: ICD-10-CM

## 2024-05-09 DIAGNOSIS — J06.9 VIRAL UPPER RESPIRATORY TRACT INFECTION: ICD-10-CM

## 2024-05-09 DIAGNOSIS — H92.03 OTALGIA OF BOTH EARS: ICD-10-CM

## 2024-05-09 DIAGNOSIS — J02.9 SORE THROAT: Primary | ICD-10-CM

## 2024-05-09 LAB
DEPRECATED S PYO AG THROAT QL EIA: NEGATIVE
ERYTHROCYTE [DISTWIDTH] IN BLOOD BY AUTOMATED COUNT: 12.8 % (ref 10–15)
GROUP A STREP BY PCR: NOT DETECTED
HBA1C MFR BLD: 6.6 % (ref 0–5.6)
HCT VFR BLD AUTO: 46.9 % (ref 35–47)
HGB BLD-MCNC: 14.5 G/DL (ref 11.7–15.7)
MCH RBC QN AUTO: 28.8 PG (ref 26.5–33)
MCHC RBC AUTO-ENTMCNC: 30.9 G/DL (ref 31.5–36.5)
MCV RBC AUTO: 93 FL (ref 78–100)
PLATELET # BLD AUTO: 206 10E3/UL (ref 150–450)
RBC # BLD AUTO: 5.04 10E6/UL (ref 3.8–5.2)
WBC # BLD AUTO: 14.7 10E3/UL (ref 4–11)

## 2024-05-09 PROCEDURE — 36415 COLL VENOUS BLD VENIPUNCTURE: CPT | Performed by: FAMILY MEDICINE

## 2024-05-09 PROCEDURE — 85027 COMPLETE CBC AUTOMATED: CPT | Mod: 59 | Performed by: FAMILY MEDICINE

## 2024-05-09 PROCEDURE — 80053 COMPREHEN METABOLIC PANEL: CPT | Performed by: FAMILY MEDICINE

## 2024-05-09 PROCEDURE — 83036 HEMOGLOBIN GLYCOSYLATED A1C: CPT | Performed by: FAMILY MEDICINE

## 2024-05-09 PROCEDURE — 99214 OFFICE O/P EST MOD 30 MIN: CPT | Mod: 25 | Performed by: FAMILY MEDICINE

## 2024-05-09 PROCEDURE — 69209 REMOVE IMPACTED EAR WAX UNI: CPT | Mod: RT | Performed by: FAMILY MEDICINE

## 2024-05-09 PROCEDURE — 69210 REMOVE IMPACTED EAR WAX UNI: CPT | Mod: LT | Performed by: FAMILY MEDICINE

## 2024-05-09 PROCEDURE — 85025 COMPLETE CBC W/AUTO DIFF WBC: CPT | Performed by: FAMILY MEDICINE

## 2024-05-09 PROCEDURE — 87651 STREP A DNA AMP PROBE: CPT | Performed by: FAMILY MEDICINE

## 2024-05-09 RX ORDER — RESPIRATORY SYNCYTIAL VIRUS VACCINE 120MCG/0.5
0.5 KIT INTRAMUSCULAR ONCE
Qty: 1 EACH | Refills: 0 | Status: CANCELLED | OUTPATIENT
Start: 2024-05-09 | End: 2024-05-09

## 2024-05-09 NOTE — PROGRESS NOTES
"  Assessment & Plan     Sore throat  Strep negative. Suspect viral URI. Monitor for now. Advised patient to take tylenol and mucinex for sore throat and runny nose  - Streptococcus A Rapid Screen w/Reflex to PCR  - CBC with platelets  - CBC with platelets  - Group A Streptococcus PCR Throat Swab    Viral upper respiratory tract infection  See above    Otalgia of both ears  No ear infection seen.   - CBC with platelets  - CBC with platelets    Type 2 diabetes mellitus with other specified complication, without long-term current use of insulin (H)  A1c stable. Continue with current meds.  - Albumin Random Urine Quantitative with Creat Ratio  - Hemoglobin A1c  - Comprehensive metabolic panel (BMP + Alb, Alk Phos, ALT, AST, Total. Bili, TP)  - CBC with platelets  - Albumin Random Urine Quantitative with Creat Ratio  - Hemoglobin A1c  - Comprehensive metabolic panel (BMP + Alb, Alk Phos, ALT, AST, Total. Bili, TP)  - CBC with platelets    Bilateral impacted cerumen  Staff flushed out right ear  Lighted curette used to clean out left ear          BMI  Estimated body mass index is 38.86 kg/m  as calculated from the following:    Height as of this encounter: 1.397 m (4' 7\").    Weight as of this encounter: 75.8 kg (167 lb 3.2 oz).         The longitudinal plan of care for the diagnosis(es)/condition(s) as documented were addressed during this visit. Due to the added complexity in care, I will continue to support Arvind in the subsequent management and with ongoing continuity of care.      Subjective   Arvind is a 69 year old, presenting for the following health issues:  Throat Pain (Sore throat x 1 day)        5/9/2024     3:55 PM   Additional Questions   Roomed by Lily EDWARDS CMA   Accompanied by mother Jeri     HPI     Pre-Provider Visit Orders - Rapid Strep  Does the patient have shortness of breath/trouble breathing, an earache, drooling/too much saliva, or any difficulty opening her mouth/moving neck?  No  Does the " "patient have a sore throat and either history of fever >100.4 in the previous 24 hours without a cough or recent exposure to a known case of strep throat? Yes           Review of Systems  Constitutional, HEENT, cardiovascular, pulmonary, gi and gu systems are negative, except as otherwise noted.      Objective    Resp 16   Ht 1.397 m (4' 7\")   Wt 75.8 kg (167 lb 3.2 oz)   BMI 38.86 kg/m    Body mass index is 38.86 kg/m .  Physical Exam   GENERAL: alert and no distress  HEENT: posterior oropharynx mildly erythematous but otherwise unremarkable, cerumen impaction bilaterally, clear nasal discharge.   NECK: no adenopathy, no asymmetry, masses, or scars  RESP: lungs clear to auscultation - no rales, rhonchi or wheezes  CV: regular rate and rhythm, normal S1 S2, no S3 or S4, no murmur, click or rub, no peripheral edema  MS: no gross musculoskeletal defects noted, no edema  NEURO: Normal strength and tone, mentation intact and speech normal  PSYCH: mentation appears normal, affect normal/bright    No results found for this or any previous visit (from the past 24 hour(s)).        Signed Electronically by: Rema Whiting MD    "

## 2024-05-10 ENCOUNTER — OFFICE VISIT (OUTPATIENT)
Dept: FAMILY MEDICINE | Facility: CLINIC | Age: 69
End: 2024-05-10
Payer: COMMERCIAL

## 2024-05-10 ENCOUNTER — NURSE TRIAGE (OUTPATIENT)
Dept: NURSING | Facility: CLINIC | Age: 69
End: 2024-05-10
Payer: COMMERCIAL

## 2024-05-10 VITALS
SYSTOLIC BLOOD PRESSURE: 124 MMHG | HEART RATE: 90 BPM | WEIGHT: 167.4 LBS | BODY MASS INDEX: 38.91 KG/M2 | OXYGEN SATURATION: 100 % | RESPIRATION RATE: 18 BRPM | DIASTOLIC BLOOD PRESSURE: 88 MMHG | TEMPERATURE: 98.2 F

## 2024-05-10 DIAGNOSIS — H92.03 OTALGIA OF BOTH EARS: ICD-10-CM

## 2024-05-10 DIAGNOSIS — J44.1 COPD WITH ACUTE EXACERBATION (H): Primary | ICD-10-CM

## 2024-05-10 DIAGNOSIS — J02.9 SORE THROAT: Primary | ICD-10-CM

## 2024-05-10 DIAGNOSIS — J06.9 VIRAL UPPER RESPIRATORY TRACT INFECTION: ICD-10-CM

## 2024-05-10 LAB
ALBUMIN SERPL BCG-MCNC: 4.3 G/DL (ref 3.5–5.2)
ALP SERPL-CCNC: 92 U/L (ref 40–150)
ALT SERPL W P-5'-P-CCNC: 20 U/L (ref 0–50)
ANION GAP SERPL CALCULATED.3IONS-SCNC: 6 MMOL/L (ref 7–15)
AST SERPL W P-5'-P-CCNC: 15 U/L (ref 0–45)
BASOPHILS # BLD AUTO: 0.1 10E3/UL (ref 0–0.2)
BASOPHILS NFR BLD AUTO: 1 %
BILIRUB SERPL-MCNC: 0.5 MG/DL
BUN SERPL-MCNC: 8.8 MG/DL (ref 8–23)
CALCIUM SERPL-MCNC: 9.1 MG/DL (ref 8.8–10.2)
CHLORIDE SERPL-SCNC: 100 MMOL/L (ref 98–107)
CREAT SERPL-MCNC: 0.74 MG/DL (ref 0.51–0.95)
DEPRECATED HCO3 PLAS-SCNC: 35 MMOL/L (ref 22–29)
EGFRCR SERPLBLD CKD-EPI 2021: 88 ML/MIN/1.73M2
EOSINOPHIL # BLD AUTO: 0.2 10E3/UL (ref 0–0.7)
EOSINOPHIL NFR BLD AUTO: 1 %
ERYTHROCYTE [DISTWIDTH] IN BLOOD BY AUTOMATED COUNT: 12.6 % (ref 10–15)
GLUCOSE SERPL-MCNC: 154 MG/DL (ref 70–99)
HCT VFR BLD AUTO: 47 % (ref 35–47)
HGB BLD-MCNC: 14.6 G/DL (ref 11.7–15.7)
IMM GRANULOCYTES # BLD: 0 10E3/UL
IMM GRANULOCYTES NFR BLD: 0 %
LYMPHOCYTES # BLD AUTO: 2.5 10E3/UL (ref 0.8–5.3)
LYMPHOCYTES NFR BLD AUTO: 17 %
MCH RBC QN AUTO: 28.8 PG (ref 26.5–33)
MCHC RBC AUTO-ENTMCNC: 31.1 G/DL (ref 31.5–36.5)
MCV RBC AUTO: 93 FL (ref 78–100)
MONOCYTES # BLD AUTO: 0.8 10E3/UL (ref 0–1.3)
MONOCYTES NFR BLD AUTO: 6 %
NEUTROPHILS # BLD AUTO: 11.2 10E3/UL (ref 1.6–8.3)
NEUTROPHILS NFR BLD AUTO: 76 %
PLATELET # BLD AUTO: 196 10E3/UL (ref 150–450)
POTASSIUM SERPL-SCNC: 4.7 MMOL/L (ref 3.4–5.3)
PROT SERPL-MCNC: 6.9 G/DL (ref 6.4–8.3)
RBC # BLD AUTO: 5.07 10E6/UL (ref 3.8–5.2)
SODIUM SERPL-SCNC: 141 MMOL/L (ref 135–145)
WBC # BLD AUTO: 14.8 10E3/UL (ref 4–11)

## 2024-05-10 PROCEDURE — 99214 OFFICE O/P EST MOD 30 MIN: CPT | Mod: 25 | Performed by: STUDENT IN AN ORGANIZED HEALTH CARE EDUCATION/TRAINING PROGRAM

## 2024-05-10 PROCEDURE — 94640 AIRWAY INHALATION TREATMENT: CPT | Performed by: STUDENT IN AN ORGANIZED HEALTH CARE EDUCATION/TRAINING PROGRAM

## 2024-05-10 RX ORDER — CIPROFLOXACIN 500 MG/1
500 TABLET, FILM COATED ORAL 2 TIMES DAILY
Qty: 10 TABLET | Refills: 0 | Status: SHIPPED | OUTPATIENT
Start: 2024-05-10 | End: 2024-05-16

## 2024-05-10 RX ORDER — IPRATROPIUM BROMIDE AND ALBUTEROL SULFATE 2.5; .5 MG/3ML; MG/3ML
3 SOLUTION RESPIRATORY (INHALATION) ONCE
Status: COMPLETED | OUTPATIENT
Start: 2024-05-10 | End: 2024-05-10

## 2024-05-10 RX ORDER — PREDNISONE 20 MG/1
60 TABLET ORAL DAILY
Qty: 15 TABLET | Refills: 0 | Status: SHIPPED | OUTPATIENT
Start: 2024-05-10 | End: 2024-05-15

## 2024-05-10 RX ADMIN — IPRATROPIUM BROMIDE AND ALBUTEROL SULFATE 3 ML: 2.5; .5 SOLUTION RESPIRATORY (INHALATION) at 13:06

## 2024-05-10 NOTE — TELEPHONE ENCOUNTER
When she lays down she has non stop coughing. She has COPD. Breathing on pulmonizer off and on. The other day got a virus which lead to strep throat. Dr Whiting told her that. She got home and was told to take Tylenol so she did, extra strength. She had a scratchy throat before taking the pill. Drank tea and warm juice. That made it better.  She started coughing when she lays down for two days so far.  I connected with scheduling for an appointment and advised urgent care if they can't get her in.  Yadira Rajan RN  [facility]      Reason for Disposition   [1] MILD difficulty breathing (e.g., minimal/no SOB at rest, SOB with walking, pulse <100) AND [2] still present when not coughing  (Exception: No change from usual, chronic shortness of breath.)    Additional Information   Negative: SEVERE difficulty breathing (e.g., struggling for each breath, speaks in single words)   Negative: [1] Lips or face are bluish now AND [2] persists when not coughing   Negative: Sounds like a life-threatening emergency to the triager   Negative: Chest pain is main symptom   Negative: [1] Dry cough (non-productive;  no sputum or minimal clear sputum) AND [2] < 3 weeks duration   Negative: [1] Wet cough (productive; white-yellow, yellow, green, or dennis colored sputum) AND [2] < 3 weeks duration   Negative: [1] Previous asthma attacks AND [2] this feels like an asthma attack   Negative: [1] MODERATE difficulty breathing (e.g., speaks in phrases, SOB even at rest, pulse 100-120) AND [2] still present when not coughing   Negative: Chest pain   (Exception: MILD central chest pain, present only when coughing.)   Negative: [1] Increasing difficulty breathing AND [2] always has some difficulty breathing   Negative: Patient sounds very sick or weak to the triager    Protocols used: Cough - Chronic-A-AH

## 2024-05-10 NOTE — PROGRESS NOTES
"Assessment & Plan     COPD with acute exacerbation (H)  - ipratropium - albuterol 0.5 mg/2.5 mg/3 mL (DUONEB) neb solution 3 mL  - predniSONE (DELTASONE) 20 MG tablet  Dispense: 15 tablet; Refill: 0  - ciprofloxacin (CIPRO) 500 MG tablet  Dispense: 10 tablet; Refill: 0    Arvind is a 69yr old woman with COPD, 2L O2 at baseline presenting with COPD exacerbation.  She was off oxygen on arrival with O2 at 87%, which improved to 97% after being placed on 2 L.  Per patient, stopped taking her COPD medication a few days ago, which in combination with a viral illness (neutrophilic leukocytosis)  was likely the trigger. Had wheezing on exam, improved with DuoNeb treatment.  No consolidation or fever which is reassuring against pneumonia. Will treat for COPD exacerbation and educated patient on the need to continue her chronic medications.     Return for In clinic with your primary care provider in 7d.    Samira Bolanos, DO  she/her  [facility]    Subjective     Arvind Leong is a 69 year old female who presents to clinic today for the following health issues:    HPI    Seen yesterday. Negative strep, CBC with neutrophilic leukocytosis  Felt chest heaviness yesterday and today  Some chest tightness today, some improvement with Tylenol  Coughing (dry and productive) when supine  Wheezing with increased mucus production and green mucus  No fever, chills, night sweats, vomiting, diarrhea    COPD, ABDIFATAH, h/o chlamydiae pneumonia. Hospitalized in September  2L O2 at baseline  Trelegy ellipta daily but stopped a few days ago because \"I got lazy\", albuterol prn    Past Medical History:   Diagnosis Date    Acute on chronic respiratory failure with hypoxia (H) 11/15/2016    Bipolar 1 disorder (H)     CAP (community acquired pneumonia) 11/15/2016    Cervical high risk HPV (human papillomavirus) test positive 3/5/2018    3/5/18 NIL pap, +HR HPV (not 16/18) 5/21/21 NIL pap, neg HPV. Plan: await provider    COPD " exacerbation (H) 4/24/2021    COPD with exacerbation (H) 11/15/2016    Diabetes mellitus, type II (H)     Hearing loss     Meniere's disease     Pneumonia 4/23/2021    Pneumonia of right lower lobe due to infectious organism 6/11/2019     Allergies   Allergen Reactions    Lurasidone Other (See Comments)     Face turned red, (Brand name = Latuda) Face turned red, Face turned red     Current Outpatient Medications   Medication Sig Dispense Refill    albuterol (PROAIR HFA/PROVENTIL HFA/VENTOLIN HFA) 108 (90 Base) MCG/ACT inhaler Inhale 2 puffs into the lungs every 6 hours as needed for shortness of breath / dyspnea or wheezing 18 g 12    albuterol (PROVENTIL) (2.5 MG/3ML) 0.083% neb solution Take 1 vial (2.5 mg) by nebulization every 4 hours as needed for shortness of breath or wheezing Take 1 vial four times a day for the next 5 days then go back to as needed 240 mL 12    ARIPiprazole ER (ABILIFY MAINTENA) 400 MG extended release inj syringe Inject 400 mg into the muscle every 28 days      atorvastatin (LIPITOR) 40 MG tablet Take 1 tablet (40 mg) by mouth daily 90 tablet 3    blood glucose (NO BRAND SPECIFIED) lancets standard Use to test blood sugar 4 times daily before meals and at bedtime 1 each 1    blood glucose (NO BRAND SPECIFIED) test strip Use to test blood sugar 4 times daily before meals and at bedtime 100 strip 0    blood glucose monitoring (ONETOUCH VERIO) meter device kit Use to test blood sugar 4 times daily before meals and bedtie 1 kit 1    ciprofloxacin (CIPRO) 500 MG tablet Take 1 tablet (500 mg) by mouth 2 times daily for 5 days 10 tablet 0    Dulaglutide (TRULICITY) 3 MG/0.5ML SOPN Inject 3 mg Subcutaneous every 7 days 4 mL 3    fexofenadine (ALLEGRA) 180 MG tablet Take 180 mg by mouth daily      Fluticasone-Umeclidin-Vilant (TRELEGY ELLIPTA) 100-62.5-25 MCG/ACT oral inhaler Inhale 1 puff into the lungs daily 60 each 1    metFORMIN (GLUCOPHAGE) 1000 MG tablet Take 1 tablet (1,000 mg) by mouth 2  times daily (with meals) 180 tablet 3    predniSONE (DELTASONE) 20 MG tablet Take 3 tablets (60 mg) by mouth daily for 5 days 15 tablet 0     No current facility-administered medications for this visit.     Review of Systems  Constitutional, HEENT, cardiovascular, pulmonary, gi and gu systems are negative, except as otherwise noted.      Objective    /88   Pulse 90   Temp 98.2  F (36.8  C) (Oral)   Resp 18   Wt 75.9 kg (167 lb 6.4 oz)   SpO2 100%   BMI 38.91 kg/m      Physical Exam  HENT:      Mouth/Throat:      Mouth: Mucous membranes are moist.   Eyes:      Pupils: Pupils are equal, round, and reactive to light.   Cardiovascular:      Rate and Rhythm: Normal rate and regular rhythm.   Pulmonary:      Effort: Accessory muscle usage (on 2L O2) present.      Breath sounds: Wheezing (resolved with Duoneb treatment) present.      Comments: Productive cough throughout exam  Skin:     General: Skin is warm.   Neurological:      Mental Status: She is alert and oriented to person, place, and time.        The use of Dragon/[employer] dictation services may have been used to construct the content in this note; any grammatical or spelling errors are non-intentional. Please contact the author of this note directly if you are in need of any clarification.

## 2024-05-16 ENCOUNTER — ANCILLARY PROCEDURE (OUTPATIENT)
Dept: GENERAL RADIOLOGY | Facility: CLINIC | Age: 69
End: 2024-05-16
Attending: FAMILY MEDICINE
Payer: COMMERCIAL

## 2024-05-16 ENCOUNTER — OFFICE VISIT (OUTPATIENT)
Dept: FAMILY MEDICINE | Facility: CLINIC | Age: 69
End: 2024-05-16
Payer: COMMERCIAL

## 2024-05-16 VITALS
HEIGHT: 55 IN | RESPIRATION RATE: 18 BRPM | DIASTOLIC BLOOD PRESSURE: 57 MMHG | BODY MASS INDEX: 38.05 KG/M2 | SYSTOLIC BLOOD PRESSURE: 97 MMHG | HEART RATE: 84 BPM | TEMPERATURE: 98.8 F | WEIGHT: 164.4 LBS | OXYGEN SATURATION: 94 %

## 2024-05-16 DIAGNOSIS — J44.9 CHRONIC OBSTRUCTIVE PULMONARY DISEASE, UNSPECIFIED COPD TYPE (H): ICD-10-CM

## 2024-05-16 DIAGNOSIS — F31.30 BIPOLAR I DISORDER, MOST RECENT EPISODE DEPRESSED (H): ICD-10-CM

## 2024-05-16 DIAGNOSIS — R05.1 ACUTE COUGH: ICD-10-CM

## 2024-05-16 DIAGNOSIS — G47.33 OSA ON CPAP: ICD-10-CM

## 2024-05-16 DIAGNOSIS — H26.9 CATARACT OF LEFT EYE, UNSPECIFIED CATARACT TYPE: ICD-10-CM

## 2024-05-16 DIAGNOSIS — J96.11 CHRONIC RESPIRATORY FAILURE WITH HYPOXIA (H): ICD-10-CM

## 2024-05-16 DIAGNOSIS — Z01.818 PREOP GENERAL PHYSICAL EXAM: Primary | ICD-10-CM

## 2024-05-16 DIAGNOSIS — I42.9 CARDIOMYOPATHY, UNSPECIFIED TYPE (H): ICD-10-CM

## 2024-05-16 DIAGNOSIS — E11.69 TYPE 2 DIABETES MELLITUS WITH OTHER SPECIFIED COMPLICATION, WITHOUT LONG-TERM CURRENT USE OF INSULIN (H): ICD-10-CM

## 2024-05-16 DIAGNOSIS — J44.1 COPD WITH ACUTE EXACERBATION (H): ICD-10-CM

## 2024-05-16 PROCEDURE — 71046 X-RAY EXAM CHEST 2 VIEWS: CPT | Mod: TC | Performed by: RADIOLOGY

## 2024-05-16 PROCEDURE — G2211 COMPLEX E/M VISIT ADD ON: HCPCS | Performed by: FAMILY MEDICINE

## 2024-05-16 PROCEDURE — 99214 OFFICE O/P EST MOD 30 MIN: CPT | Performed by: FAMILY MEDICINE

## 2024-05-16 RX ORDER — RESPIRATORY SYNCYTIAL VIRUS VACCINE 120MCG/0.5
0.5 KIT INTRAMUSCULAR ONCE
Qty: 1 EACH | Refills: 0 | Status: CANCELLED | OUTPATIENT
Start: 2024-05-16 | End: 2024-05-16

## 2024-05-16 RX ORDER — DOXYCYCLINE HYCLATE 100 MG
100 TABLET ORAL 2 TIMES DAILY
Qty: 14 TABLET | Refills: 0 | Status: ON HOLD | OUTPATIENT
Start: 2024-05-16 | End: 2024-05-21

## 2024-05-16 RX ORDER — CODEINE PHOSPHATE AND GUAIFENESIN 10; 100 MG/5ML; MG/5ML
1-2 SOLUTION ORAL EVERY 6 HOURS PRN
Qty: 120 ML | Refills: 0 | Status: SHIPPED | OUTPATIENT
Start: 2024-05-16 | End: 2024-07-10

## 2024-05-16 RX ORDER — PREDNISONE 20 MG/1
TABLET ORAL
Qty: 18 TABLET | Refills: 0 | Status: ON HOLD | OUTPATIENT
Start: 2024-05-16 | End: 2024-05-21

## 2024-05-16 RX ORDER — ALBUTEROL SULFATE 90 UG/1
2 AEROSOL, METERED RESPIRATORY (INHALATION) EVERY 6 HOURS PRN
Qty: 18 G | Refills: 12 | Status: SHIPPED | OUTPATIENT
Start: 2024-05-16

## 2024-05-16 ASSESSMENT — PATIENT HEALTH QUESTIONNAIRE - PHQ9
10. IF YOU CHECKED OFF ANY PROBLEMS, HOW DIFFICULT HAVE THESE PROBLEMS MADE IT FOR YOU TO DO YOUR WORK, TAKE CARE OF THINGS AT HOME, OR GET ALONG WITH OTHER PEOPLE: SOMEWHAT DIFFICULT
SUM OF ALL RESPONSES TO PHQ QUESTIONS 1-9: 17
SUM OF ALL RESPONSES TO PHQ QUESTIONS 1-9: 17

## 2024-05-16 NOTE — PROGRESS NOTES
Preoperative Evaluation  [facility]  [address] [facility] [address]  Phone: [phone]  Fax: [phone]  Primary Provider: Rema Whiting MD  Pre-op Performing Provider: Rema Whiting MD  May 16, 2024             5/16/2024   Surgical Information   What procedure is being done? cat left eye   Facility or Hospital where procedure/surgery will be performed: [facility]   Who is doing the procedure / surgery? dc   Date of surgery / procedure: may 23   Time of surgery / procedure: dont know   Where do you plan to recover after surgery? at home with family     Fax number for surgical facility: [phone]    Assessment & Plan     The proposed surgical procedure is considered LOW risk.    Preop general physical exam  Cataract of left eye, unspecified cataract type  Not optimized for surgery. She's having acute COPD exacerbation being treated today with prednisone taper and antibiotics. Advised patient to reschedule cataract surgery and once COPD exacerbation resolves she should be optimized for surgery.     Acute cough  COPD with acute exacerbation (H)  Going on for a week. Went to walk in clinic over the weekend. Repeat XR today per my review no new finding, no pneumonia seen. She was given 5 days of prednisone from walk in, I will extend her prednisone taper as below and put her on doxycycline.   - XR Chest 2 Views  - predniSONE (DELTASONE) 20 MG tablet  Dispense: 18 tablet; Refill: 0  - doxycycline hyclate (VIBRA-TABS) 100 MG tablet  Dispense: 14 tablet; Refill: 0  - guaiFENesin-codeine (ROBITUSSIN AC) 100-10 MG/5ML solution  Dispense: 120 mL; Refill: 0    Chronic obstructive pulmonary disease, unspecified COPD type (H)  See above.   - albuterol (PROAIR HFA/PROVENTIL HFA/VENTOLIN HFA) 108 (90 Base) MCG/ACT inhaler  Dispense: 18 g; Refill: 12  - guaiFENesin-codeine (ROBITUSSIN AC) 100-10 MG/5ML solution  Dispense: 120 mL; Refill: 0    Chronic  respiratory failure with hypoxia (H)  Needs 2L of O2, wasn't wearing O2 when walking into clinic as her portable tank ran out. She will call [employer] for replacement.     Type 2 diabetes mellitus with other specified complication, without long-term current use of insulin (H)  Has been stable. A1c last week was 6.6., well controlled.    Bipolar I disorder, most recent episode depressed (H)  Stable on antipsychotic    Cardiomyopathy, unspecified type (H)  History of this but patient asymptomatic.    ABDIFATAH on CPAP  Stable.         Depression Screening Follow Up        Follow Up Actions Taken  Crisis resource information provided in After Visit Summary      Implanted Device   - Type of device: portable O2 Patient advised to bring device information on day of surgery.       - No identified additional risk factors other than previously addressed    Antiplatelet or Anticoagulation Medication Instructions   - Bleeding risk is low for this procedure (e.g. dental, skin, cataract).    Additional Medication Instructions  Take all scheduled medications on the day of surgery    Recommendation  Surgery is not recommended due to current COPD exacerbation. I have notified the surgeons office of this issue. I advised rescheduling once COPD exacerbation resolves.     The longitudinal plan of care for the diagnosis(es)/condition(s) as documented were addressed during this visit. Due to the added complexity in care, I will continue to support Arvind in the subsequent management and with ongoing continuity of care.        Subjective   Arvind is a 69 year old, presenting for the following:  Pre-Op Exam and Cough (Coughing x 6 days. /fatigued)          5/9/2024     3:55 PM   Additional Questions   Roomed by Lily EDWARDS CMA   Accompanied by mother Jeri     Via the Health Maintenance questionnaire, the patient has reported the following services have been completed -Eye Exam: clinic 2024-01-12, this information has been sent to the  abstraction team.  HPI related to upcoming procedure: reduced left eye vision.         5/16/2024   Pre-Op Questionnaire   Have you ever had a heart attack or stroke? No   Have you ever had surgery on your heart or blood vessels, such as a stent placement, a coronary artery bypass, or surgery on an artery in your head, neck, heart, or legs? No   Do you have chest pain with activity? No   Do you have a history of heart failure? No   Do you currently have a cold, bronchitis or symptoms of other infection? (!) YES COPD exacerbation   Do you have a cough, shortness of breath, or wheezing? (!) YES COPD exacerbation   Do you or anyone in your family have previous history of blood clots? No   Do you or does anyone in your family have a serious bleeding problem such as prolonged bleeding following surgeries or cuts? No   Have you ever had problems with anemia or been told to take iron pills? No   Have you had any abnormal blood loss such as black, tarry or bloody stools, or abnormal vaginal bleeding? No   Have you ever had a blood transfusion? No   Are you willing to have a blood transfusion if it is medically needed before, during, or after your surgery? (!) NO unlikely that patient will need blood transfusion   Have you or any of your relatives ever had problems with anesthesia? No   Do you have sleep apnea, excessive snoring or daytime drowsiness? (!) YES   Do you have a CPAP machine? Yes   Do you have any artifical heart valves or other implanted medical devices like a pacemaker, defibrillator, or continuous glucose monitor? No   Do you have artificial joints? No   Are you allergic to latex? No     Health Care Directive  Patient does not have a Health Care Directive or Living Will: Discussed advance care planning with patient; information given to patient to review.    Preoperative Review of    reviewed - no record of controlled substances prescribed.      Status of Chronic Conditions:  See problem list for active  medical problems.  Problems all longstanding and stable, except as noted/documented.  See ROS for pertinent symptoms related to these conditions.    Patient Active Problem List    Diagnosis Date Noted    Community acquired bacterial pneumonia 09/18/2023     Priority: Medium    Hypercarbia 12/09/2021     Priority: Medium    Acute confusion 12/09/2021     Priority: Medium    Hypoxia 12/09/2021     Priority: Medium    Cardiomyopathy, unspecified type (H) 07/30/2021     Priority: Medium    Chronic respiratory failure with hypoxia (H) 11/23/2020     Priority: Medium    Type 2 diabetes mellitus with other specified complication, without long-term current use of insulin (H)      Priority: Medium    CAP (community acquired pneumonia) due to Chlamydia species 06/11/2019     Priority: Medium    Runny nose 03/18/2019     Priority: Medium    Bipolar I disorder, most recent episode depressed (H) 03/04/2019     Priority: Medium    COPD (chronic obstructive pulmonary disease) (H)      Priority: Medium     Created by Joox Annotation: Jan 29 2013  1:24PM - Lily Alejandro: acute   respiratory failure 1/20/2013 (hospitalized 5 days at [facility])--discharged   with supplemental oxygen      Formatting of this note might be different from the original.  Created by Joox Annotation: Jan 29 2013  1:24PM - Lily Alejandro: acute   respiratory failure 1/20/2013 (hospitalized 5 days at [facility])--discharged   with supplemental oxygen      ABDIFATAH on CPAP      Priority: Medium     2/17/2014 split-night PSG: AHI 14.7, RDI 14.7, arousal index 13, and titrated to 7 cm H2O      Anxiety      Priority: Medium     Created by Conversion  Replacement Utility updated for latest IMO load      Formatting of this note might be different from the original.  Created by Conversion    Replacement Utility updated for latest IMO load      Dependence on continuous supplemental oxygen 08/23/2018     Priority: Medium    Morbid  "obesity (H)      Priority: Medium     Created by Conversion        Cervical high risk HPV (human papillomavirus) test positive 03/05/2018     Priority: Medium     3/5/18 NIL pap, +HR HPV (not 16/18)  5/21/21 NIL pap, neg HPV. Plan: cotest in 1 year per provider  05/5/22 Reminder Mychart, Letter  06/3/22 visit no showed appt and 06/24/22--canceled appt  06/27/22 Reminder call-vm full  07/7/22 appt scheduled--notes added, office visit note said \"not ready to do Pap smear today\"  07/15/22 appt scheduled--notes added no showed appt  08/8/22 Reminder call-vm full Final Letter sent  09/8/22 Lost to follow-up for pap tracking, fyi routed to provider    Formatting of this note might be different from the original.  3/5/18 NIL pap, +HR HPV (not 16/18)  5/21/21 NIL pap, neg HPV. Plan: cotest in 1 year per provider      Cognitive dysfunction in bipolar disorder (H) 11/17/2016     Priority: Medium    Noncompliance with medications 11/17/2016     Priority: Medium    Asthma      Priority: Medium     Created by Conversion        Vertigo      Priority: Medium     Created by Conversion        Scoliosis (and kyphoscoliosis), idiopathic      Priority: Medium     Created by Conversion    Replacing diagnoses that were inactivated after the 10/1/2021 regulatory import.    Formatting of this note might be different from the original.  Created by Conversion      Lower Back Pain      Priority: Medium     Created by Conversion        Urge Incontinence Of Urine      Priority: Medium     Created by Conversion        Hyperlipidemia      Priority: Medium     Created by Conversion        COPD exacerbation (H) 10/06/2009     Priority: Medium    Tobacco use disorder 10/06/2009     Priority: Medium      Past Medical History:   Diagnosis Date    Acute on chronic respiratory failure with hypoxia (H) 11/15/2016    Bipolar 1 disorder (H)     CAP (community acquired pneumonia) 11/15/2016    Cervical high risk HPV (human papillomavirus) test positive " 3/5/2018    3/5/18 NIL pap, +HR HPV (not 16/18) 5/21/21 NIL pap, neg HPV. Plan: await provider    COPD exacerbation (H) 4/24/2021    COPD with exacerbation (H) 11/15/2016    Diabetes mellitus, type II (H)     Hearing loss     Meniere's disease     Pneumonia 4/23/2021    Pneumonia of right lower lobe due to infectious organism 6/11/2019     Past Surgical History:   Procedure Laterality Date    CYST REMOVAL  01/23/2001    [place] REMOVAL ANAL FISTULA,SUBCUTANEOUS      Description: Fistula-in-ano Repair;  Recorded: 01/08/2010; x2    TONSILLECTOMY       Current Outpatient Medications   Medication Sig Dispense Refill    albuterol (PROAIR HFA/PROVENTIL HFA/VENTOLIN HFA) 108 (90 Base) MCG/ACT inhaler Inhale 2 puffs into the lungs every 6 hours as needed for shortness of breath / dyspnea or wheezing 18 g 12    albuterol (PROVENTIL) (2.5 MG/3ML) 0.083% neb solution Take 1 vial (2.5 mg) by nebulization every 4 hours as needed for shortness of breath or wheezing Take 1 vial four times a day for the next 5 days then go back to as needed 240 mL 12    ARIPiprazole ER (ABILIFY MAINTENA) 400 MG extended release inj syringe Inject 400 mg into the muscle every 28 days      atorvastatin (LIPITOR) 40 MG tablet Take 1 tablet (40 mg) by mouth daily 90 tablet 3    blood glucose (NO BRAND SPECIFIED) lancets standard Use to test blood sugar 4 times daily before meals and at bedtime 1 each 1    blood glucose (NO BRAND SPECIFIED) test strip Use to test blood sugar 4 times daily before meals and at bedtime 100 strip 0    blood glucose monitoring (ONETOUCH VERIO) meter device kit Use to test blood sugar 4 times daily before meals and bedtie 1 kit 1    Dulaglutide (TRULICITY) 3 MG/0.5ML SOPN Inject 3 mg Subcutaneous every 7 days 4 mL 3    fexofenadine (ALLEGRA) 180 MG tablet Take 180 mg by mouth daily      Fluticasone-Umeclidin-Vilant (TRELEGY ELLIPTA) 100-62.5-25 MCG/ACT oral inhaler Inhale 1 puff into the lungs daily 60 each 1    metFORMIN  "(GLUCOPHAGE) 1000 MG tablet Take 1 tablet (1,000 mg) by mouth 2 times daily (with meals) 180 tablet 3       Allergies   Allergen Reactions    Lurasidone Other (See Comments)     Face turned red, (Brand name = Latuda) Face turned red, Face turned red        Social History     Tobacco Use    Smoking status: Former     Current packs/day: 0.00     Average packs/day: 1 pack/day for 40.0 years (40.0 ttl pk-yrs)     Types: Cigarettes     Start date: 1977     Quit date: 2017     Years since quittin.2    Smokeless tobacco: Never   Substance Use Topics    Alcohol use: Yes     Comment: Alcoholic Drinks/day: Occasional      Family History   Problem Relation Age of Onset    Aneurysm Father     Heart Disease Father 70.00        bypass in 70s, AAA rupture at age 77     Ulcers Father 76.00    Pacemaker Mother     Bipolar Disorder Brother     Breast Cancer Maternal Grandmother 51.00    Kidney failure Maternal Grandmother     Cancer Paternal Grandmother         ?? lung    Cancer Paternal Uncle         ?? lung    Mental Illness Maternal Grandfather     Heart Disease Other         uncle    No Known Problems Sister         two siblings abuse chemicals    No Known Problems Brother     Bipolar Disorder Nephew      History   Drug Use No             Review of Systems  Constitutional, HEENT, cardiovascular, pulmonary, gi and gu systems are negative, except as otherwise noted.    Objective    BP 97/57 (BP Location: Left arm, Patient Position: Sitting, Cuff Size: Adult Regular)   Pulse 84   Temp 98.8  F (37.1  C) (Oral)   Resp 18   Ht 1.397 m (4' 7\")   Wt 74.6 kg (164 lb 6.4 oz)   SpO2 (!) 80%   BMI 38.21 kg/m     Estimated body mass index is 38.21 kg/m  as calculated from the following:    Height as of this encounter: 1.397 m (4' 7\").    Weight as of this encounter: 74.6 kg (164 lb 6.4 oz).    Physical Exam   GENERAL: alert and mild-moderate distress  HEENT: posterior oropharynx mildly erythematous but otherwise " unremarkable, cerumen impaction bilaterally, clear nasal discharge.   NECK: no adenopathy, no asymmetry, masses, or scars  RESP: wheezing throughout   CV: regular rate and rhythm, normal S1 S2, no S3 or S4, no murmur, click or rub, no peripheral edema  MS: no gross musculoskeletal defects noted, no edema  NEURO: Normal strength and tone, mentation intact and speech normal  PSYCH: mentation appears normal, affect normal/bright    Answers submitted by the patient for this visit:  Patient Health Questionnaire (Submitted on 5/16/2024)  If you checked off any problems, how difficult have these problems made it for you to do your work, take care of things at home, or get along with other people?: Somewhat difficult  PHQ9 TOTAL SCORE: 17

## 2024-05-16 NOTE — PATIENT INSTRUCTIONS

## 2024-05-19 ENCOUNTER — APPOINTMENT (OUTPATIENT)
Dept: CT IMAGING | Facility: HOSPITAL | Age: 69
End: 2024-05-19
Payer: COMMERCIAL

## 2024-05-19 ENCOUNTER — HOSPITAL ENCOUNTER (OUTPATIENT)
Facility: HOSPITAL | Age: 69
Setting detail: OBSERVATION
Discharge: HOME | End: 2024-05-21
Attending: EMERGENCY MEDICINE | Admitting: EMERGENCY MEDICINE
Payer: COMMERCIAL

## 2024-05-19 ENCOUNTER — APPOINTMENT (OUTPATIENT)
Dept: RADIOLOGY | Facility: HOSPITAL | Age: 69
End: 2024-05-19
Attending: EMERGENCY MEDICINE
Payer: COMMERCIAL

## 2024-05-19 DIAGNOSIS — J44.1 COPD EXACERBATION (H): Primary | ICD-10-CM

## 2024-05-19 DIAGNOSIS — J44.1 COPD WITH ACUTE EXACERBATION (H): ICD-10-CM

## 2024-05-19 DIAGNOSIS — R05.1 ACUTE COUGH: ICD-10-CM

## 2024-05-19 LAB
ANION GAP SERPL CALCULATED.3IONS-SCNC: 11 MMOL/L (ref 7–15)
BASE EXCESS BLDV CALC-SCNC: 5.6 MMOL/L (ref -3–3)
BASOPHILS # BLD AUTO: 0 10E3/UL (ref 0–0.2)
BASOPHILS NFR BLD AUTO: 0 %
BUN SERPL-MCNC: 13.8 MG/DL (ref 8–23)
CALCIUM SERPL-MCNC: 9.4 MG/DL (ref 8.8–10.2)
CHLORIDE SERPL-SCNC: 97 MMOL/L (ref 98–107)
CREAT SERPL-MCNC: 0.71 MG/DL (ref 0.51–0.95)
D DIMER PPP FEU-MCNC: 0.42 UG/ML FEU (ref 0–0.5)
DEPRECATED HCO3 PLAS-SCNC: 29 MMOL/L (ref 22–29)
EGFRCR SERPLBLD CKD-EPI 2021: >90 ML/MIN/1.73M2
EOSINOPHIL # BLD AUTO: 0 10E3/UL (ref 0–0.7)
EOSINOPHIL NFR BLD AUTO: 0 %
ERYTHROCYTE [DISTWIDTH] IN BLOOD BY AUTOMATED COUNT: 13.7 % (ref 10–15)
FLUAV RNA SPEC QL NAA+PROBE: NEGATIVE
FLUBV RNA RESP QL NAA+PROBE: NEGATIVE
GLUCOSE BLDC GLUCOMTR-MCNC: 320 MG/DL (ref 70–99)
GLUCOSE BLDC GLUCOMTR-MCNC: 323 MG/DL (ref 70–99)
GLUCOSE BLDC GLUCOMTR-MCNC: 343 MG/DL (ref 70–99)
GLUCOSE SERPL-MCNC: 338 MG/DL (ref 70–99)
HCO3 BLDV-SCNC: 31 MMOL/L (ref 21–28)
HCT VFR BLD AUTO: 45.3 % (ref 35–47)
HGB BLD-MCNC: 14.1 G/DL (ref 11.7–15.7)
IMM GRANULOCYTES # BLD: 0.1 10E3/UL
IMM GRANULOCYTES NFR BLD: 1 %
LYMPHOCYTES # BLD AUTO: 0.7 10E3/UL (ref 0.8–5.3)
LYMPHOCYTES NFR BLD AUTO: 6 %
MCH RBC QN AUTO: 28.8 PG (ref 26.5–33)
MCHC RBC AUTO-ENTMCNC: 31.1 G/DL (ref 31.5–36.5)
MCV RBC AUTO: 93 FL (ref 78–100)
MONOCYTES # BLD AUTO: 0.1 10E3/UL (ref 0–1.3)
MONOCYTES NFR BLD AUTO: 1 %
NEUTROPHILS # BLD AUTO: 10.3 10E3/UL (ref 1.6–8.3)
NEUTROPHILS NFR BLD AUTO: 92 %
NRBC # BLD AUTO: 0 10E3/UL
NRBC BLD AUTO-RTO: 0 /100
NT-PROBNP SERPL-MCNC: 76 PG/ML (ref 0–900)
O2/TOTAL GAS SETTING VFR VENT: 36 %
OXYHGB MFR BLDV: 82 % (ref 70–75)
PCO2 BLDV: 57 MM HG (ref 40–50)
PH BLDV: 7.35 [PH] (ref 7.32–7.43)
PLATELET # BLD AUTO: 213 10E3/UL (ref 150–450)
PO2 BLDV: 48 MM HG (ref 25–47)
POTASSIUM SERPL-SCNC: 4.2 MMOL/L (ref 3.4–5.3)
RBC # BLD AUTO: 4.89 10E6/UL (ref 3.8–5.2)
RSV RNA SPEC NAA+PROBE: NEGATIVE
SAO2 % BLDV: 82.8 % (ref 70–75)
SARS-COV-2 RNA RESP QL NAA+PROBE: NEGATIVE
SODIUM SERPL-SCNC: 137 MMOL/L (ref 135–145)
TROPONIN T SERPL HS-MCNC: 8 NG/L
WBC # BLD AUTO: 11.1 10E3/UL (ref 4–11)

## 2024-05-19 PROCEDURE — 258N000003 HC RX IP 258 OP 636: Performed by: EMERGENCY MEDICINE

## 2024-05-19 PROCEDURE — 36415 COLL VENOUS BLD VENIPUNCTURE: CPT | Performed by: EMERGENCY MEDICINE

## 2024-05-19 PROCEDURE — 85379 FIBRIN DEGRADATION QUANT: CPT | Performed by: EMERGENCY MEDICINE

## 2024-05-19 PROCEDURE — 93005 ELECTROCARDIOGRAM TRACING: CPT | Performed by: EMERGENCY MEDICINE

## 2024-05-19 PROCEDURE — 999N000156 HC STATISTIC RCP CONSULT EA 30 MIN

## 2024-05-19 PROCEDURE — 82805 BLOOD GASES W/O2 SATURATION: CPT | Performed by: EMERGENCY MEDICINE

## 2024-05-19 PROCEDURE — 96361 HYDRATE IV INFUSION ADD-ON: CPT

## 2024-05-19 PROCEDURE — 85041 AUTOMATED RBC COUNT: CPT | Performed by: EMERGENCY MEDICINE

## 2024-05-19 PROCEDURE — 84484 ASSAY OF TROPONIN QUANT: CPT | Performed by: EMERGENCY MEDICINE

## 2024-05-19 PROCEDURE — G0378 HOSPITAL OBSERVATION PER HR: HCPCS

## 2024-05-19 PROCEDURE — 80048 BASIC METABOLIC PNL TOTAL CA: CPT | Performed by: EMERGENCY MEDICINE

## 2024-05-19 PROCEDURE — 96365 THER/PROPH/DIAG IV INF INIT: CPT

## 2024-05-19 PROCEDURE — 250N000009 HC RX 250

## 2024-05-19 PROCEDURE — 82962 GLUCOSE BLOOD TEST: CPT

## 2024-05-19 PROCEDURE — 83880 ASSAY OF NATRIURETIC PEPTIDE: CPT | Performed by: EMERGENCY MEDICINE

## 2024-05-19 PROCEDURE — 96375 TX/PRO/DX INJ NEW DRUG ADDON: CPT

## 2024-05-19 PROCEDURE — 71046 X-RAY EXAM CHEST 2 VIEWS: CPT

## 2024-05-19 PROCEDURE — 99223 1ST HOSP IP/OBS HIGH 75: CPT | Mod: FS

## 2024-05-19 PROCEDURE — 999N000157 HC STATISTIC RCP TIME EA 10 MIN

## 2024-05-19 PROCEDURE — 94640 AIRWAY INHALATION TREATMENT: CPT

## 2024-05-19 PROCEDURE — 87637 SARSCOV2&INF A&B&RSV AMP PRB: CPT | Performed by: EMERGENCY MEDICINE

## 2024-05-19 PROCEDURE — 250N000011 HC RX IP 250 OP 636

## 2024-05-19 PROCEDURE — 96376 TX/PRO/DX INJ SAME DRUG ADON: CPT

## 2024-05-19 PROCEDURE — 71250 CT THORAX DX C-: CPT

## 2024-05-19 PROCEDURE — 99285 EMERGENCY DEPT VISIT HI MDM: CPT | Mod: 25

## 2024-05-19 PROCEDURE — 250N000009 HC RX 250: Performed by: EMERGENCY MEDICINE

## 2024-05-19 PROCEDURE — 250N000012 HC RX MED GY IP 250 OP 636 PS 637

## 2024-05-19 PROCEDURE — 99207 PR APP CREDIT; MD BILLING SHARED VISIT: CPT | Mod: FS | Performed by: INTERNAL MEDICINE

## 2024-05-19 RX ORDER — IPRATROPIUM BROMIDE AND ALBUTEROL SULFATE 2.5; .5 MG/3ML; MG/3ML
3 SOLUTION RESPIRATORY (INHALATION)
Status: DISCONTINUED | OUTPATIENT
Start: 2024-05-19 | End: 2024-05-20

## 2024-05-19 RX ORDER — ACETAMINOPHEN 650 MG/1
650 SUPPOSITORY RECTAL EVERY 4 HOURS PRN
Status: DISCONTINUED | OUTPATIENT
Start: 2024-05-19 | End: 2024-05-21 | Stop reason: HOSPADM

## 2024-05-19 RX ORDER — FLUTICASONE FUROATE AND VILANTEROL 100; 25 UG/1; UG/1
1 POWDER RESPIRATORY (INHALATION) DAILY
Status: DISCONTINUED | OUTPATIENT
Start: 2024-05-20 | End: 2024-05-21 | Stop reason: HOSPADM

## 2024-05-19 RX ORDER — ONDANSETRON 2 MG/ML
4 INJECTION INTRAMUSCULAR; INTRAVENOUS EVERY 6 HOURS PRN
Status: DISCONTINUED | OUTPATIENT
Start: 2024-05-19 | End: 2024-05-21 | Stop reason: HOSPADM

## 2024-05-19 RX ORDER — AMOXICILLIN 250 MG
1 CAPSULE ORAL 2 TIMES DAILY PRN
Status: DISCONTINUED | OUTPATIENT
Start: 2024-05-19 | End: 2024-05-21 | Stop reason: HOSPADM

## 2024-05-19 RX ORDER — CALCIUM CARBONATE 500 MG/1
1000 TABLET, CHEWABLE ORAL 4 TIMES DAILY PRN
Status: DISCONTINUED | OUTPATIENT
Start: 2024-05-19 | End: 2024-05-21 | Stop reason: HOSPADM

## 2024-05-19 RX ORDER — DEXTROSE MONOHYDRATE 25 G/50ML
25-50 INJECTION, SOLUTION INTRAVENOUS
Status: DISCONTINUED | OUTPATIENT
Start: 2024-05-19 | End: 2024-05-21 | Stop reason: HOSPADM

## 2024-05-19 RX ORDER — DOXYCYCLINE 100 MG/10ML
100 INJECTION, POWDER, LYOPHILIZED, FOR SOLUTION INTRAVENOUS EVERY 12 HOURS
Status: DISCONTINUED | OUTPATIENT
Start: 2024-05-19 | End: 2024-05-20

## 2024-05-19 RX ORDER — ACETAMINOPHEN 325 MG/1
650 TABLET ORAL EVERY 4 HOURS PRN
Status: DISCONTINUED | OUTPATIENT
Start: 2024-05-19 | End: 2024-05-21 | Stop reason: HOSPADM

## 2024-05-19 RX ORDER — IPRATROPIUM BROMIDE AND ALBUTEROL SULFATE 2.5; .5 MG/3ML; MG/3ML
3 SOLUTION RESPIRATORY (INHALATION) ONCE
Status: COMPLETED | OUTPATIENT
Start: 2024-05-19 | End: 2024-05-19

## 2024-05-19 RX ORDER — AMOXICILLIN 250 MG
2 CAPSULE ORAL 2 TIMES DAILY PRN
Status: DISCONTINUED | OUTPATIENT
Start: 2024-05-19 | End: 2024-05-21 | Stop reason: HOSPADM

## 2024-05-19 RX ORDER — ALBUTEROL SULFATE 90 UG/1
2 AEROSOL, METERED RESPIRATORY (INHALATION) EVERY 6 HOURS PRN
Status: DISCONTINUED | OUTPATIENT
Start: 2024-05-19 | End: 2024-05-21 | Stop reason: HOSPADM

## 2024-05-19 RX ORDER — NICOTINE POLACRILEX 4 MG
15-30 LOZENGE BUCCAL
Status: DISCONTINUED | OUTPATIENT
Start: 2024-05-19 | End: 2024-05-21 | Stop reason: HOSPADM

## 2024-05-19 RX ORDER — METHYLPREDNISOLONE SODIUM SUCCINATE 40 MG/ML
40 INJECTION, POWDER, LYOPHILIZED, FOR SOLUTION INTRAMUSCULAR; INTRAVENOUS 3 TIMES DAILY
Status: DISCONTINUED | OUTPATIENT
Start: 2024-05-19 | End: 2024-05-20

## 2024-05-19 RX ORDER — HYDRALAZINE HYDROCHLORIDE 20 MG/ML
10 INJECTION INTRAMUSCULAR; INTRAVENOUS EVERY 4 HOURS PRN
Status: DISCONTINUED | OUTPATIENT
Start: 2024-05-19 | End: 2024-05-21 | Stop reason: HOSPADM

## 2024-05-19 RX ORDER — HYDRALAZINE HYDROCHLORIDE 10 MG/1
10 TABLET, FILM COATED ORAL EVERY 4 HOURS PRN
Status: DISCONTINUED | OUTPATIENT
Start: 2024-05-19 | End: 2024-05-21 | Stop reason: HOSPADM

## 2024-05-19 RX ORDER — ONDANSETRON 4 MG/1
4 TABLET, ORALLY DISINTEGRATING ORAL EVERY 6 HOURS PRN
Status: DISCONTINUED | OUTPATIENT
Start: 2024-05-19 | End: 2024-05-21 | Stop reason: HOSPADM

## 2024-05-19 RX ORDER — LIDOCAINE 40 MG/G
CREAM TOPICAL
Status: DISCONTINUED | OUTPATIENT
Start: 2024-05-19 | End: 2024-05-21 | Stop reason: HOSPADM

## 2024-05-19 RX ADMIN — IPRATROPIUM BROMIDE AND ALBUTEROL SULFATE 3 ML: .5; 3 SOLUTION RESPIRATORY (INHALATION) at 19:33

## 2024-05-19 RX ADMIN — IPRATROPIUM BROMIDE AND ALBUTEROL SULFATE 3 ML: .5; 3 SOLUTION RESPIRATORY (INHALATION) at 13:39

## 2024-05-19 RX ADMIN — IPRATROPIUM BROMIDE AND ALBUTEROL SULFATE 3 ML: .5; 3 SOLUTION RESPIRATORY (INHALATION) at 17:15

## 2024-05-19 RX ADMIN — SODIUM CHLORIDE 500 ML: 9 INJECTION, SOLUTION INTRAVENOUS at 14:43

## 2024-05-19 RX ADMIN — METHYLPREDNISOLONE SODIUM SUCCINATE 40 MG: 40 INJECTION, POWDER, FOR SOLUTION INTRAMUSCULAR; INTRAVENOUS at 19:44

## 2024-05-19 RX ADMIN — INSULIN ASPART 2 UNITS: 100 INJECTION, SOLUTION INTRAVENOUS; SUBCUTANEOUS at 17:49

## 2024-05-19 RX ADMIN — DOXYCYCLINE 100 MG: 100 INJECTION, POWDER, LYOPHILIZED, FOR SOLUTION INTRAVENOUS at 16:24

## 2024-05-19 RX ADMIN — IPRATROPIUM BROMIDE AND ALBUTEROL SULFATE 3 ML: .5; 3 SOLUTION RESPIRATORY (INHALATION) at 14:30

## 2024-05-19 RX ADMIN — METHYLPREDNISOLONE SODIUM SUCCINATE 40 MG: 40 INJECTION, POWDER, FOR SOLUTION INTRAMUSCULAR; INTRAVENOUS at 16:23

## 2024-05-19 ASSESSMENT — ACTIVITIES OF DAILY LIVING (ADL)
ADLS_ACUITY_SCORE: 38
DEPENDENT_IADLS:: INDEPENDENT
ADLS_ACUITY_SCORE: 38
ADLS_ACUITY_SCORE: 42
ADLS_ACUITY_SCORE: 38
ADLS_ACUITY_SCORE: 42

## 2024-05-19 ASSESSMENT — COLUMBIA-SUICIDE SEVERITY RATING SCALE - C-SSRS
2. HAVE YOU ACTUALLY HAD ANY THOUGHTS OF KILLING YOURSELF IN THE PAST MONTH?: NO
1. IN THE PAST MONTH, HAVE YOU WISHED YOU WERE DEAD OR WISHED YOU COULD GO TO SLEEP AND NOT WAKE UP?: NO
6. HAVE YOU EVER DONE ANYTHING, STARTED TO DO ANYTHING, OR PREPARED TO DO ANYTHING TO END YOUR LIFE?: NO

## 2024-05-19 NOTE — PLAN OF CARE
Goal Outcome Evaluation:    Pt AO x4. Denies pain. On 2L O2. Able to make needs known. Up with SBA.

## 2024-05-19 NOTE — PHARMACY-ADMISSION MEDICATION HISTORY
Pharmacist Admission Medication History    Admission medication history is complete. The information provided in this note is only as accurate as the sources available at the time of the update.    Information Source(s): Patient, Family member, and CareEverywhere/SureScripts via in-person    Pertinent Information:   - Doxycycline/prednisone: started 5/16, had one dose of each today  - Trulicity: takes every Sunday  - Robitussin: has but not tried    Changes made to PTA medication list:  Added: None  Deleted: Atorvastatin, allegra  Changed: None    Allergies reviewed with patient and updates made in EHR: yes    Medication History Completed By: Casandra Guillen [facility] 5/19/2024 2:50 PM    PTA Med List   Medication Sig Note Last Dose    albuterol (PROAIR HFA/PROVENTIL HFA/VENTOLIN HFA) 108 (90 Base) MCG/ACT inhaler Inhale 2 puffs into the lungs every 6 hours as needed for shortness of breath or wheezing  5/18/2024 at PRN    doxycycline hyclate (VIBRA-TABS) 100 MG tablet Take 1 tablet (100 mg) by mouth 2 times daily for 7 days  5/19/2024 at AM    Dulaglutide (TRULICITY) 3 MG/0.5ML SOPN Inject 3 mg Subcutaneous every 7 days 5/19/2024: Sundays 5/12/2024 at Noon    Fluticasone-Umeclidin-Vilant (TRELEGY ELLIPTA) 100-62.5-25 MCG/ACT oral inhaler Inhale 1 puff into the lungs daily  5/19/2024 at AM    guaiFENesin-codeine (ROBITUSSIN AC) 100-10 MG/5ML solution Take 5-10 mLs by mouth every 6 hours as needed for cough  Unknown at PRN    metFORMIN (GLUCOPHAGE) 1000 MG tablet Take 1 tablet (1,000 mg) by mouth 2 times daily (with meals)  5/18/2024 at PM    predniSONE (DELTASONE) 20 MG tablet Take 2 tablets (40 mg) by mouth daily for 5 days, THEN 1 tablet (20 mg) daily for 5 days, THEN 0.5 tablets (10 mg) daily for 5 days.  5/19/2024 at AM

## 2024-05-19 NOTE — TREATMENT PLAN
RCAT Treatment Plan    Patient Score: 12  Patient Acuity: 3    Clinical Indication for Therapy: bronchospasm, home regimen, rhonchi on auscultation, and history of mucous producing disease    Therapy Ordered: continue current therapy    Assessment Summary: pt here shortness of breath and cough. She is a former 1 pack/day tobacco smoker. CT 5/19 bibasilar bronchiectasis. RR 18-20, LS expiratory wheezes, NPC, she is on 2L NC SpO2 92%.       Roge Sosa, RT  5/19/2024

## 2024-05-19 NOTE — H&P
[facility]    History and Physical - Hospitalist Service       Date of Admission:  5/19/2024    Assessment & Plan      Arvind Leong is a 69 year old female, history of diabetes, hyperlipidemia, COPD, and pneumonia, who presents with shortness of breath and cough. The patient reports she has had an ongoing cough since Thursday with clear foamy phlegm. The patient is on 2 liters of oxygen at home at baseline.  On arrival to the hospital patient's O2 sats were 85% on room on 2 L additional oxygen needed patient sats 93% on 4 L.  Patient states she feels improved.  CT of chest obtained results pending.  Patient was receiving oral steroids and antibiotics will continue with IV while in hospital.  DuoNebs as needed per RT. patient likely to discharge in 1 to 2 days if symptoms improve and no new symptoms develop.  PT/OT to assess for safe discharge plan to home.       # COPD exacerbation  Chest x-ray:Reciprocal thoracolumbar spinal curvature. Localized opacity in the medial left base is likely atelectasis. No right lung opacities.Unchanged heart and mediastinal contour.No pleural effusion or pneumothorax.  IV doxycycline  IV prednisone  Sputum culture results pending  CT without contrast chest rule out pneumonia:Bibasilar bronchiectasis with bronchial wall thickening and mild tree-in-bud infiltrates, most pronounced within the right lower lobe. These findings are consistent with small airways disease/bronchiolitis.  Bibasilar and medial right upper and right middle lobar consolidation more likely reflecting subsegmental atelectasis rather than pneumonia. No pleural effusion.   DuoNebs as needed per RT  Viral respiratory swab negative    # Diabetes mellitus  Metformin held  Accu-Cheks reviewed and are stable  Sliding scale insulin    # Leukocytosis  WBC 11.1 today  CT chest rule out pneumonia-negative for pneumonia  Trend CBC in a.m.          Diet: Moderate Consistent Carb (60 g CHO per Meal)  "Diet    DVT Prophylaxis: Pneumatic Compression Devices  Lara Catheter: Not present  Lines: None     Cardiac Monitoring: None  Code Status: Full Code      Clinically Significant Risk Factors Present on Admission                      # DMII: A1C = 6.6 % (Ref range: 0.0 - 5.6 %) within past 6 months    # Obesity: Estimated body mass index is 38.81 kg/m  as calculated from the following:    Height as of this encounter: 1.397 m (4' 7\").    Weight as of this encounter: 75.8 kg (167 lb).       # Asthma: noted on problem list        Disposition Plan     Medically Ready for Discharge: Anticipated in 2-4 Days         The patient's care was discussed with the Attending Physician, Dr. Kennedy .    Susie Valenzuela NP  Hospitalist Service  [facility]  Securely message with [employer] (more info)  Text page via [facility] Paging/Directory     ______________________________________________________________________    Chief Complaint   Increased shortness of breath and cough    History is obtained from the patient and chart    History of Present Illness     Arvind Leong is a 69 year old female, history of diabetes, hyperlipidemia, COPD, and pneumonia, who presents to this ED by walk in for evaluation of shortness of breath and cough.    The patient reports she has had an ongoing cough since Thursday  with clear foamy phlegm and explains that there is wheezing in her abdomen. Patient endorses shortness of breath and when walking short distances, it worsens. Additionally, the patient is on 2 liters of oxygen at home. Denies cold symptoms, fever, nausea, vomiting, chest pain, and abdominal pain. On arrival to the hospital patient's O2 sats were 85% on room on 2 L additional oxygen needed patient sats 93% on 4 L.  CT of chest obtained results showed no pneumonia. Patient was receiving oral steroids and antibiotics from clinic will continue with IV while in hospital.  DuoNebs as needed per RT. Patient states she feels " improved nebulizers.     Past Medical History    Past Medical History:   Diagnosis Date    Acute on chronic respiratory failure with hypoxia (H) 11/15/2016    Bipolar 1 disorder (H)     CAP (community acquired pneumonia) 11/15/2016    Cervical high risk HPV (human papillomavirus) test positive 3/5/2018    3/5/18 NIL pap, +HR HPV (not 16/18) 5/21/21 NIL pap, neg HPV. Plan: await provider    COPD exacerbation (H) 4/24/2021    COPD with exacerbation (H) 11/15/2016    Diabetes mellitus, type II (H)     Hearing loss     Meniere's disease     Pneumonia 4/23/2021    Pneumonia of right lower lobe due to infectious organism 6/11/2019       Past Surgical History   Past Surgical History:   Procedure Laterality Date    CYST REMOVAL  01/23/2001    IA REMOVAL ANAL FISTULA,SUBCUTANEOUS      Description: Fistula-in-ano Repair;  Recorded: 01/08/2010; x2    TONSILLECTOMY         Prior to Admission Medications   Prior to Admission Medications   Prescriptions Last Dose Informant Patient Reported? Taking?   ARIPiprazole ER (ABILIFY MAINTENA) 400 MG extended release inj syringe 5/16/2024  Yes No   Sig: Inject 400 mg into the muscle every 28 days   Dulaglutide (TRULICITY) 3 MG/0.5ML SOPN 5/12/2024 at Noon  No Yes   Sig: Inject 3 mg Subcutaneous every 7 days   Fluticasone-Umeclidin-Vilant (TRELEGY ELLIPTA) 100-62.5-25 MCG/ACT oral inhaler 5/19/2024 at AM  No Yes   Sig: Inhale 1 puff into the lungs daily   albuterol (PROAIR HFA/PROVENTIL HFA/VENTOLIN HFA) 108 (90 Base) MCG/ACT inhaler 5/18/2024 at PRN  No Yes   Sig: Inhale 2 puffs into the lungs every 6 hours as needed for shortness of breath or wheezing   albuterol (PROVENTIL) (2.5 MG/3ML) 0.083% neb solution   No No   Sig: Take 1 vial (2.5 mg) by nebulization every 4 hours as needed for shortness of breath or wheezing Take 1 vial four times a day for the next 5 days then go back to as needed   blood glucose (NO BRAND SPECIFIED) lancets standard   No No   Sig: Use to test blood sugar 4  times daily before meals and at bedtime   blood glucose (NO BRAND SPECIFIED) test strip   No No   Sig: Use to test blood sugar 4 times daily before meals and at bedtime   blood glucose monitoring (ONETOUCH VERIO) meter device kit   No No   Sig: Use to test blood sugar 4 times daily before meals and bedtie   doxycycline hyclate (VIBRA-TABS) 100 MG tablet 2024 at AM  No Yes   Sig: Take 1 tablet (100 mg) by mouth 2 times daily for 7 days   guaiFENesin-codeine (ROBITUSSIN AC) 100-10 MG/5ML solution Unknown at PRN  No Yes   Sig: Take 5-10 mLs by mouth every 6 hours as needed for cough   metFORMIN (GLUCOPHAGE) 1000 MG tablet 2024 at PM  No Yes   Sig: Take 1 tablet (1,000 mg) by mouth 2 times daily (with meals)   predniSONE (DELTASONE) 20 MG tablet 2024 at AM  No Yes   Sig: Take 2 tablets (40 mg) by mouth daily for 5 days, THEN 1 tablet (20 mg) daily for 5 days, THEN 0.5 tablets (10 mg) daily for 5 days.      Facility-Administered Medications: None        Review of Systems    The 10 point Review of Systems is negative other than noted in the HPI or here.     Social History   I have reviewed this patient's social history and updated it with pertinent information if needed.  Social History     Tobacco Use    Smoking status: Former     Current packs/day: 0.00     Average packs/day: 1 pack/day for 40.0 years (40.0 ttl pk-yrs)     Types: Cigarettes     Start date: 1977     Quit date: 2017     Years since quittin.2    Smokeless tobacco: Never   Vaping Use    Vaping status: Never Used   Substance Use Topics    Alcohol use: Yes     Comment: Alcoholic Drinks/day: Occasional     Drug use: No         Family History   I have reviewed this patient's family history and updated it with pertinent information if needed.  Family History   Problem Relation Age of Onset    Aneurysm Father     Heart Disease Father 70.00        bypass in 70s, AAA rupture at age 77     Ulcers Father 76.00    Pacemaker Mother      Bipolar Disorder Brother     Breast Cancer Maternal Grandmother 51.00    Kidney failure Maternal Grandmother     Cancer Paternal Grandmother         ?? lung    Cancer Paternal Uncle         ?? lung    Mental Illness Maternal Grandfather     Heart Disease Other         uncle    No Known Problems Sister         two siblings abuse chemicals    No Known Problems Brother     Bipolar Disorder Nephew          Allergies   Allergies   Allergen Reactions    Lurasidone Other (See Comments)     Face turned red, (Brand name = Latuda) Face turned red, Face turned red        Physical Exam   Vital Signs: Temp: 98.3  F (36.8  C) Temp src: Oral BP: 111/58 Pulse: 96   Resp: 27 SpO2: 94 % O2 Device: Nasal cannula Oxygen Delivery: 3 LPM  Weight: 167 lbs 0 oz    Constitutional: awake, alert, cooperative, no apparent distress, and appears stated age  Hematologic / Lymphatic: no cervical lymphadenopathy and no supraclavicular lymphadenopathy  Respiratory: Tachypnea, diminished to auscultation bilaterally, no crackles expiratory wheezing bilateral bases   Cardiovascular: Normal apical impulse, regular rate and rhythm, normal S1 and S2, no S3 or S4, and no murmur noted  GI: No scars, normal bowel sounds, soft, non-distended, non-tender, no masses palpated, no hepatosplenomegally  Skin: no bruising or bleeding, normal skin color, texture, turgor, no redness, warmth, or swelling, no rashes, and no lesions  Musculoskeletal: There is no redness, warmth, or swelling of the joints.  Full range of motion noted.  Motor strength is 5 out of 5 all extremities bilaterally.  Tone is normal.  Neurologic: Awake, alert, oriented to name, place and time.  Cranial nerves II-XII are grossly intact.  Motor is 5 out of 5 bilaterally.  Cerebellar finger to nose, heel to shin intact.  Sensory is intact.  Babinski down going, Romberg negative, and gait is normal.  Neuropsychiatric: General: normal, calm, and normal eye contact    Medical Decision Making       75  MINUTES SPENT BY ME on the date of service doing chart review, history, exam, documentation & further activities per the note.      Data     I have personally reviewed the following data over the past 24 hrs:    11.1 (H)  \   14.1   / 213     137 97 (L) 13.8 /  320 (H)   4.2 29 0.71 \     Trop: 8 BNP: 76     INR:  N/A PTT:  N/A   D-dimer:  0.42 Fibrinogen:  N/A       Imaging results reviewed over the past 24 hrs:   Recent Results (from the past 24 hour(s))   Chest XR,  PA & LAT    Narrative    EXAM: XR CHEST 2 VIEWS  LOCATION: [facility]  DATE: 5/19/2024    INDICATION: cough, wheezing; rule out pulmonary edema  COMPARISON: Frontal and lateral views of the chest 5/16/2024      Impression    IMPRESSION:     Reciprocal thoracolumbar spinal curvature. Localized opacity in the medial left base is likely atelectasis. No right lung opacities.    Unchanged heart and mediastinal contours.    No pleural effusion or pneumothorax.   CT Chest w/o Contrast    Narrative    EXAM: CT CHEST W/O CONTRAST  LOCATION: [facility]  DATE: 5/19/2024    INDICATION: Cough, wheezing, abnormal chest x-ray  COMPARISON: Chest x-ray the same date, CT chest 12/9/2021  TECHNIQUE: CT chest without IV contrast. Multiplanar reformats were obtained. Dose reduction techniques were used.  CONTRAST: None.    FINDINGS:   LUNGS AND PLEURA: Endotracheal and right-sided endobronchial secretions. Basilar predominant bronchial wall thickening with mild bronchiectasis. Bibasilar tree-in-bud opacities which are most pronounced within the right lower lobe. Subsegmental   atelectasis within the medial right upper and middle lobes. Mild left lower lobar dependent consolidation likely reflecting atelectasis.    MEDIASTINUM/AXILLAE: No thoracic adenopathy. Normal heart size and no pericardial effusion.    CORONARY ARTERY CALCIFICATION: None.    UPPER ABDOMEN: Small nonobstructing left renal calculi.    MUSCULOSKELETAL:  Spinal degenerative changes. Moderate to severe dextroscoliosis.      Impression    IMPRESSION:   1.  Bibasilar bronchiectasis with bronchial wall thickening and mild tree-in-bud infiltrates, most pronounced within the right lower lobe. These findings are consistent with small airways disease/bronchiolitis.  2.  Bibasilar and medial right upper and right middle lobar consolidation more likely reflecting subsegmental atelectasis rather than pneumonia. No pleural effusion.

## 2024-05-19 NOTE — CONSULTS
Care Management Initial Consult    General Information  Assessment completed with: Patient, Parents, patient, mom Jeri  Type of CM/SW Visit: Initial Assessment    Primary Care Provider verified and updated as needed: Yes   Readmission within the last 30 days: no previous admission in last 30 days      Reason for Consult: discharge planning  Advance Care Planning: Advance Care Planning Reviewed: no concerns identified          Communication Assessment  Patient's communication style: spoken language (English or Bilingual)             Cognitive  Cognitive/Neuro/Behavioral: WDL                      Living Environment:   People in home: parent(s)  Jeri Samuels  Current living Arrangements: mobile home      Able to return to prior arrangements: yes       Family/Social Support:  Care provided by: self  Provides care for: no one  Marital Status:   Parent(s)          Description of Support System: Supportive, Involved    Support Assessment: Patient communicates needs well met, Adequate family and caregiver support    Current Resources:   Patient receiving home care services: No     Community Resources: County Work[facility]  Equipment currently used at home: glucometer, other (see comments) (O2 concentrator, neb machine)  Supplies currently used at home: Hearing Aid Batteries, Oxygen Tubing/Supplies, Incontinence Supplies, Diabetic Supplies, Nebulizer tubing    Employment/Financial:  Employment Status:          Financial Concerns:             Does the patient's insurance plan have a 3 day qualifying hospital stay waiver?  Yes     Which insurance plan 3 day waiver is available? Alternative insurance waiver    Will the waiver be used for post-acute placement? Undetermined at this time    Lifestyle & Psychosocial Needs:  Social Determinants of Health     Food Insecurity: Low Risk  (10/12/2023)    Food Insecurity     Within the past 12 months, did you worry that your food would run out before you got money to buy more?:  No     Within the past 12 months, did the food you bought just not last and you didn t have money to get more?: No   Depression: At risk (5/16/2024)    PHQ-2     PHQ-2 Score: 4   Housing Stability: High Risk (10/12/2023)    Housing Stability     Do you have housing? : No     Are you worried about losing your housing?: No   Tobacco Use: Medium Risk (5/19/2024)    Patient History     Smoking Tobacco Use: Former     Smokeless Tobacco Use: Never     Passive Exposure: Not on file   Financial Resource Strain: Low Risk  (10/12/2023)    Financial Resource Strain     Within the past 12 months, have you or your family members you live with been unable to get utilities (heat, electricity) when it was really needed?: No   Alcohol Use: Not on file   Transportation Needs: Low Risk  (10/12/2023)    Transportation Needs     Within the past 12 months, has lack of transportation kept you from medical appointments, getting your medicines, non-medical meetings or appointments, work, or from getting things that you need?: No   Physical Activity: Not on file   Interpersonal Safety: Low Risk  (1/19/2024)    Interpersonal Safety     Do you feel physically and emotionally safe where you currently live?: Yes     Within the past 12 months, have you been hit, slapped, kicked or otherwise physically hurt by someone?: No     Within the past 12 months, have you been humiliated or emotionally abused in other ways by your partner or ex-partner?: No   Stress: Not on file   Social Connections: Not on file   Health Literacy: Not on file       Functional Status:  Prior to admission patient needed assistance:   Dependent ADLs:: Independent  Dependent IADLs:: Independent  Assesssment of Functional Status: Not at  functional baseline    Mental Health Status:          Chemical Dependency Status:                Values/Beliefs:  Spiritual, Cultural Beliefs, Alevism Practices, Values that affect care:                 Additional Information:  Met with  patient and her mom Jeri at the bedside for initial assessment. Introduced self and care management role. Patient lives with mom in her mobile home. She is independent with her ADLs and mom assists with her IADLs. She has home O2 through [facility]. She also has a neb machine that she owns.      CHANDA discussed. Therapy consults pending. CM to follow hospital progression, treatment team recommendations and assist with discharge planning as needed. Mom to transport home.    Melissa Myrick RN

## 2024-05-19 NOTE — ED TRIAGE NOTES
Pt states that she has been ill for the last nine days with cough and shortness of breath. Pt states that she has been coughing up clear phlegm. Pt endorses shortness of breath. No increased work of breathing at rest. Pt was hypoxic after walking into triage, down to 85%. Pt is on her home oxygen at 2 LPM by nasal cannula at baseline. Pt increased to 4 LPM by nasal cannula, oxygen saturation up to 92%. Pt has history of COPD.      Triage Assessment (Adult)       Row Name 05/19/24 1241          Triage Assessment    Airway WDL WDL        Respiratory WDL    Respiratory WDL rhythm/pattern;cough     Rhythm/Pattern, Respiratory shortness of breath;unlabored;pattern regular     Cough Frequency frequent     Cough Type productive        Skin Circulation/Temperature WDL    Skin Circulation/Temperature WDL WDL        Cardiac WDL    Cardiac WDL WDL        Peripheral/Neurovascular WDL    Peripheral Neurovascular WDL WDL        Cognitive/Neuro/Behavioral WDL    Cognitive/Neuro/Behavioral WDL WDL

## 2024-05-19 NOTE — ED PROVIDER NOTES
Emergency Department Encounter     Evaluation Date & Time:   No admission date for patient encounter.    CHIEF COMPLAINT:  Shortness of Breath and Cough      Triage Note:       Impression and Plan       FINAL IMPRESSION:    ICD-10-CM    1. COPD with acute exacerbation (H)  J44.1           ED COURSE & MEDICAL DECISION MAKIN:58 AM Met with and introduced myself to the patient. Discussed history and plan of care.   2:21 PM Spoke with Susie, nurse practitioner, regarding patient's symptoms and further plan of care.       69 year old female, history of COPD with chronic respiratory failure (on home O2 at 2L), cardiomyopathy, DM, HLD, ABDIFATAH and bipolar disorder, who presents for evaluation of shortness of breath associated with a cough productive of clear and sometimes foamy sputum x 3-4 days. She reports rhinorrhea, but denies fevers and other URI symptoms. She has had wheezing. No chest pain.      She has been seen in clinic twice for her symptoms and was started on prednisone and doxycycline, which she has been taking without improvement.     On presentation, patient hypoxic to 85% on her baseline supplemental O2 (2L); improved to 92-96% on 4L.    On exam, she has symmetrical breath sounds with diffuse inspiratory and expiratory wheezes with mild rhonchi right upper field; no rales.     Patient placed on cardiac monitor, IV access established and blood sent for labs.    Cardiac etiology considered.  EKG performed and demonstrated NSR with no ischemic changes.  Troponin WNL (8); I do not suspect ACS.    PE considered.  Patient low probability for which a D-dimer was checked and WNL (0.42).  Risks of CTA chest felt to outweigh benefit.  CXR performed and demonstrated a localized opacity in the medial left base that is likely atelectasis; no right lung opacities. No pleural effusion or pneumothorax.    No clinical, radiographic or laboratory (BNP 76) evidence of acute CHF.    Patient given a DuoNeb with improvement  in her symptoms. On re-examination, wheezing is improved now with coarse breath sounds diffusely.     COVID, influenza and RSV tests negative.    Given that patient has been taking prednisone and doxycycline (last dose was earlier today), no additional steroids or antibiotics were administered.    Labs otherwise remarkable for minimal leukocytosis (WBC 11.1) with no anemia (Hb 14.1).  She has no significant electrolyte derangements or renal impairment.  Serum glucose is elevated (338) with no associated acidosis or elevated anion gap to suggest DKA; judicious IV fluids initiated.    Given hypoxia on her baseline O2 despite steroids, antibiotics and bronchodilators, decision made to admit patient for further management and monitoring. Patient stable throughout ED course.      At the conclusion of the encounter I discussed the results of all the tests and the disposition. The questions were answered. The patient and family acknowledged understanding and were agreeable with the care plan.    Medical Decision Making    History:  Obtained supplemental history:Supplemental history obtained?: Documented in chart Mother   Reviewed external records: External records reviewed?: Other: Chart review of 5/16/24 at [facility].     External consultation:  Did you consider but not order tests?: Work up considered but not performed and documented in chart, if applicable  Did you interpret images independently?: Independent interpretation of ECG and images noted in documentation, when applicable.  Consultation discussion with other provider:Did you involve another provider (consultant, , pharmacy, etc.)?: I discussed the care with another health care provider, see documentation for details. Hospitalist    Complicating factors:  Care impacted by chronic illness:Diabetes, Heart Disease, Hyperlipidemia, Mental Health, and Other: COPD and pneumonia  Care significantly affected by social determinants of  health:N/A    Disposition considerations: Admit.    MEDICATIONS GIVEN IN THE EMERGENCY DEPARTMENT:  Medications   sodium chloride 0.9% BOLUS 500 mL (500 mLs Intravenous $New Bag 5/19/24 1443)   ipratropium - albuterol 0.5 mg/2.5 mg/3 mL (DUONEB) neb solution 3 mL (3 mLs Nebulization $Given 5/19/24 1339)   ipratropium - albuterol 0.5 mg/2.5 mg/3 mL (DUONEB) neb solution 3 mL (3 mLs Nebulization $Given 5/19/24 1430)       NEW PRESCRIPTIONS STARTED AT TODAY'S ED VISIT:  New Prescriptions    No medications on file       HPI     HPI     Arvind Leong is a 69 year old female, history of COPD with chronic respiratory failure (on home O2 at 2L), cardiomyopathy, DM, HLD, ABDIFATAH and bipolar disorder, who presents to this ED by walk in for evaluation of shortness of breath and cough.     The patient reports an ongoing cough since Thursday or Friday (x 3-4 days) productive of clear and sometimes foamy sputum. She reports rhinorrhea, but denies fevers, body aches and other URI symptoms. She has associated shortness of breath with wheezing. Denies chest pain. Mother reports that symptoms have been worsening despite using her rescue bronchodilators, preventative inhaled steroid and being on prednisone and doxycycline prescribed by clinic.     She has otherwise been in her usual state of health and denies abdominal pain, nausea, vomiting, diarrhea or other concerns.    Per chart review patient presented to [facility] on 5/16/24 for evaluation of pre-op exam and cough. Patient's was having acute COPD exacerbation being treated that day with prednisone taper and antibiotics, she was advised to reschedule cataract surgery and once COPD exacerbation resolves she should be optimized for surgery.     REVIEW OF SYSTEMS:  All other systems reviewed and are negative.      Medical History     Past Medical History:   Diagnosis Date    Acute on chronic respiratory failure with hypoxia (H) 11/15/2016    Bipolar 1  disorder (H)     CAP (community acquired pneumonia) 11/15/2016    Cervical high risk HPV (human papillomavirus) test positive 3/5/2018    COPD exacerbation (H) 2021    COPD with exacerbation (H) 11/15/2016    Diabetes mellitus, type II (H)     Hearing loss     Meniere's disease     Pneumonia 2021    Pneumonia of right lower lobe due to infectious organism 2019       Past Surgical History:   Procedure Laterality Date    CYST REMOVAL  2001    ND REMOVAL ANAL FISTULA,SUBCUTANEOUS      Description: Fistula-in-ano Repair;  Recorded: 2010; x2    TONSILLECTOMY         Family History   Problem Relation Age of Onset    Aneurysm Father     Heart Disease Father 70.00        bypass in 70s, AAA rupture at age 77     Ulcers Father 76.00    Pacemaker Mother     Bipolar Disorder Brother     Breast Cancer Maternal Grandmother 51.00    Kidney failure Maternal Grandmother     Cancer Paternal Grandmother         ?? lung    Cancer Paternal Uncle         ?? lung    Mental Illness Maternal Grandfather     Heart Disease Other         uncle    No Known Problems Sister         two siblings abuse chemicals    No Known Problems Brother     Bipolar Disorder Nephew        Social History     Tobacco Use    Smoking status: Former     Current packs/day: 0.00     Average packs/day: 1 pack/day for 40.0 years (40.0 ttl pk-yrs)     Types: Cigarettes     Start date: 1977     Quit date: 2017     Years since quittin.2    Smokeless tobacco: Never   Vaping Use    Vaping status: Never Used   Substance Use Topics    Alcohol use: Yes     Comment: Alcoholic Drinks/day: Occasional     Drug use: No       albuterol (PROAIR HFA/PROVENTIL HFA/VENTOLIN HFA) 108 (90 Base) MCG/ACT inhaler  doxycycline hyclate (VIBRA-TABS) 100 MG tablet  Dulaglutide (TRULICITY) 3 MG/0.5ML SOPN  Fluticasone-Umeclidin-Vilant (TRELEGY ELLIPTA) 100-62.5-25 MCG/ACT oral inhaler  guaiFENesin-codeine (ROBITUSSIN AC) 100-10 MG/5ML solution  metFORMIN  "(GLUCOPHAGE) 1000 MG tablet  predniSONE (DELTASONE) 20 MG tablet  albuterol (PROVENTIL) (2.5 MG/3ML) 0.083% neb solution  ARIPiprazole ER (ABILIFY MAINTENA) 400 MG extended release inj syringe  blood glucose (NO BRAND SPECIFIED) lancets standard  blood glucose (NO BRAND SPECIFIED) test strip  blood glucose monitoring (ONETOUCH VERIO) meter device kit        Physical Exam     First Vitals:  Patient Vitals for the past 24 hrs:   BP Temp Temp src Pulse Resp SpO2 Height Weight   05/19/24 1339 -- -- -- 79 18 96 % -- --   05/19/24 1305 104/54 -- -- 82 -- -- -- --   05/19/24 1255 110/57 -- -- 79 -- -- -- --   05/19/24 1244 -- -- -- -- -- 92 % -- --   05/19/24 1239 130/66 98.3  F (36.8  C) Oral 95 24 (!) 85 % 1.397 m (4' 7\") 75.8 kg (167 lb)       PHYSICAL EXAM:   Physical Exam    GENERAL: Awake, alert.  In no acute distress.   HEENT: Normocephalic, atraumatic.   NECK: No stridor.  PULMONARY: Symmetrical breath sounds without distress.  She has diffuse inspiratory and expiratory wheezes with mild rhonchi right upper field; no rales.   CARDIO: Regular rate and rhythm.  No significant murmur, rub or gallop.  Radial pulses strong and symmetrical.  ABDOMINAL: Abdomen soft, non-distended and non-tender to palpation.   EXTREMITIES: No lower extremity swelling or edema.      NEURO: Alert and oriented to person, place and time.  Cranial nerves grossly intact.  No focal motor deficit.  PSYCH: Normal mood and affect.    Results     LAB:  All pertinent labs reviewed and interpreted  Labs Ordered and Resulted from Time of ED Arrival to Time of ED Departure   BASIC METABOLIC PANEL - Abnormal       Result Value    Sodium 137      Potassium 4.2      Chloride 97 (*)     Carbon Dioxide (CO2) 29      Anion Gap 11      Urea Nitrogen 13.8      Creatinine 0.71      GFR Estimate >90      Calcium 9.4      Glucose 338 (*)    BLOOD GAS VENOUS - Abnormal    pH Venous 7.35      pCO2 Venous 57 (*)     pO2 Venous 48 (*)     Bicarbonate Venous 31 (*)  "    Base Excess/Deficit Venous 5.6 (*)     FIO2 36      Oxyhemoglobin Venous 82 (*)     O2 Sat, Venous 82.8 (*)    CBC WITH PLATELETS AND DIFFERENTIAL - Abnormal    WBC Count 11.1 (*)     RBC Count 4.89      Hemoglobin 14.1      Hematocrit 45.3      MCV 93      MCH 28.8      MCHC 31.1 (*)     RDW 13.7      Platelet Count 213      % Neutrophils 92      % Lymphocytes 6      % Monocytes 1      % Eosinophils 0      % Basophils 0      % Immature Granulocytes 1      NRBCs per 100 WBC 0      Absolute Neutrophils 10.3 (*)     Absolute Lymphocytes 0.7 (*)     Absolute Monocytes 0.1      Absolute Eosinophils 0.0      Absolute Basophils 0.0      Absolute Immature Granulocytes 0.1      Absolute NRBCs 0.0     TROPONIN T, HIGH SENSITIVITY - Normal    Troponin T, High Sensitivity 8     NT PROBNP INPATIENT - Normal    N terminal Pro BNP Inpatient 76     INFLUENZA A/B, RSV, & SARS-COV2 PCR - Normal    Influenza A PCR Negative      Influenza B PCR Negative      RSV PCR Negative      SARS CoV2 PCR Negative     D DIMER QUANTITATIVE - Normal    D-Dimer Quantitative 0.42         RADIOLOGY:  Chest XR,  PA & LAT   Final Result   IMPRESSION:       Reciprocal thoracolumbar spinal curvature. Localized opacity in the medial left base is likely atelectasis. No right lung opacities.      Unchanged heart and mediastinal contours.      No pleural effusion or pneumothorax.          EC2024, 12:57; NSR with rate of 82 bpm; normal intervals; normal conduction; no ST-T wave changes consistent with ACS or pericarditis; compared to previous EKG dated 2023, the rate has decreased by 23 bpm, PVCs no longer present, criteria for septal infarct no longer present    EKG independently reviewed and interpreted by Marce Hilton MD        I, Cookie Veliz, am serving as a scribe to document services personally performed by Marce Hilton MD based on my observation and the provider's statements to me. I, Marce Hilton MD attest that Cookie Veliz is  acting in a scribe capacity, has observed my performance of the services and has documented them in accordance with my direction.    Marce Hilton MD  Emergency Medicine  [facility] EMERGENCY DEPARTMENT           Marce Hilton MD  05/19/24 1946

## 2024-05-20 ENCOUNTER — APPOINTMENT (OUTPATIENT)
Dept: PHYSICAL THERAPY | Facility: HOSPITAL | Age: 69
End: 2024-05-20
Payer: COMMERCIAL

## 2024-05-20 ENCOUNTER — APPOINTMENT (OUTPATIENT)
Dept: OCCUPATIONAL THERAPY | Facility: HOSPITAL | Age: 69
End: 2024-05-20
Payer: COMMERCIAL

## 2024-05-20 LAB
ANION GAP SERPL CALCULATED.3IONS-SCNC: 6 MMOL/L (ref 7–15)
BUN SERPL-MCNC: 12.4 MG/DL (ref 8–23)
CALCIUM SERPL-MCNC: 8.9 MG/DL (ref 8.8–10.2)
CHLORIDE SERPL-SCNC: 104 MMOL/L (ref 98–107)
CREAT SERPL-MCNC: 0.62 MG/DL (ref 0.51–0.95)
DEPRECATED HCO3 PLAS-SCNC: 29 MMOL/L (ref 22–29)
EGFRCR SERPLBLD CKD-EPI 2021: >90 ML/MIN/1.73M2
ERYTHROCYTE [DISTWIDTH] IN BLOOD BY AUTOMATED COUNT: 13.7 % (ref 10–15)
GLUCOSE BLDC GLUCOMTR-MCNC: 158 MG/DL (ref 70–99)
GLUCOSE BLDC GLUCOMTR-MCNC: 184 MG/DL (ref 70–99)
GLUCOSE BLDC GLUCOMTR-MCNC: 231 MG/DL (ref 70–99)
GLUCOSE BLDC GLUCOMTR-MCNC: 250 MG/DL (ref 70–99)
GLUCOSE BLDC GLUCOMTR-MCNC: 264 MG/DL (ref 70–99)
GLUCOSE SERPL-MCNC: 181 MG/DL (ref 70–99)
HCT VFR BLD AUTO: 44.7 % (ref 35–47)
HGB BLD-MCNC: 14.2 G/DL (ref 11.7–15.7)
MCH RBC QN AUTO: 29.4 PG (ref 26.5–33)
MCHC RBC AUTO-ENTMCNC: 31.8 G/DL (ref 31.5–36.5)
MCV RBC AUTO: 93 FL (ref 78–100)
PLATELET # BLD AUTO: 228 10E3/UL (ref 150–450)
POTASSIUM SERPL-SCNC: 4.4 MMOL/L (ref 3.4–5.3)
PROCALCITONIN SERPL IA-MCNC: 0.08 NG/ML
RBC # BLD AUTO: 4.83 10E6/UL (ref 3.8–5.2)
SODIUM SERPL-SCNC: 139 MMOL/L (ref 135–145)
WBC # BLD AUTO: 16.3 10E3/UL (ref 4–11)

## 2024-05-20 PROCEDURE — 96361 HYDRATE IV INFUSION ADD-ON: CPT

## 2024-05-20 PROCEDURE — G0378 HOSPITAL OBSERVATION PER HR: HCPCS

## 2024-05-20 PROCEDURE — 250N000011 HC RX IP 250 OP 636

## 2024-05-20 PROCEDURE — 96376 TX/PRO/DX INJ SAME DRUG ADON: CPT

## 2024-05-20 PROCEDURE — 84145 PROCALCITONIN (PCT): CPT | Performed by: STUDENT IN AN ORGANIZED HEALTH CARE EDUCATION/TRAINING PROGRAM

## 2024-05-20 PROCEDURE — 250N000009 HC RX 250

## 2024-05-20 PROCEDURE — 94640 AIRWAY INHALATION TREATMENT: CPT

## 2024-05-20 PROCEDURE — 96366 THER/PROPH/DIAG IV INF ADDON: CPT

## 2024-05-20 PROCEDURE — 85027 COMPLETE CBC AUTOMATED: CPT

## 2024-05-20 PROCEDURE — 250N000013 HC RX MED GY IP 250 OP 250 PS 637

## 2024-05-20 PROCEDURE — 97161 PT EVAL LOW COMPLEX 20 MIN: CPT | Mod: GP

## 2024-05-20 PROCEDURE — 99207 PR APP CREDIT; MD BILLING SHARED VISIT: CPT | Mod: FS

## 2024-05-20 PROCEDURE — 97165 OT EVAL LOW COMPLEX 30 MIN: CPT | Mod: GO

## 2024-05-20 PROCEDURE — 36415 COLL VENOUS BLD VENIPUNCTURE: CPT

## 2024-05-20 PROCEDURE — 999N000157 HC STATISTIC RCP TIME EA 10 MIN

## 2024-05-20 PROCEDURE — 250N000012 HC RX MED GY IP 250 OP 636 PS 637

## 2024-05-20 PROCEDURE — 82962 GLUCOSE BLOOD TEST: CPT

## 2024-05-20 PROCEDURE — 99232 SBSQ HOSP IP/OBS MODERATE 35: CPT | Mod: FS | Performed by: STUDENT IN AN ORGANIZED HEALTH CARE EDUCATION/TRAINING PROGRAM

## 2024-05-20 PROCEDURE — 250N000013 HC RX MED GY IP 250 OP 250 PS 637: Performed by: INTERNAL MEDICINE

## 2024-05-20 PROCEDURE — 80048 BASIC METABOLIC PNL TOTAL CA: CPT

## 2024-05-20 RX ORDER — METHYLPREDNISOLONE SODIUM SUCCINATE 40 MG/ML
40 INJECTION, POWDER, LYOPHILIZED, FOR SOLUTION INTRAMUSCULAR; INTRAVENOUS EVERY 12 HOURS
Status: DISCONTINUED | OUTPATIENT
Start: 2024-05-20 | End: 2024-05-20

## 2024-05-20 RX ORDER — IPRATROPIUM BROMIDE AND ALBUTEROL SULFATE 2.5; .5 MG/3ML; MG/3ML
3 SOLUTION RESPIRATORY (INHALATION) EVERY 6 HOURS PRN
Status: DISCONTINUED | OUTPATIENT
Start: 2024-05-20 | End: 2024-05-21 | Stop reason: HOSPADM

## 2024-05-20 RX ORDER — BENZONATATE 100 MG/1
100 CAPSULE ORAL 3 TIMES DAILY PRN
Status: DISCONTINUED | OUTPATIENT
Start: 2024-05-20 | End: 2024-05-21 | Stop reason: HOSPADM

## 2024-05-20 RX ORDER — DOXYCYCLINE 100 MG/1
100 CAPSULE ORAL EVERY 12 HOURS SCHEDULED
Status: DISCONTINUED | OUTPATIENT
Start: 2024-05-20 | End: 2024-05-21

## 2024-05-20 RX ORDER — PREDNISONE 20 MG/1
40 TABLET ORAL DAILY
Status: DISCONTINUED | OUTPATIENT
Start: 2024-05-20 | End: 2024-05-21

## 2024-05-20 RX ADMIN — PREDNISONE 40 MG: 20 TABLET ORAL at 17:57

## 2024-05-20 RX ADMIN — DOXYCYCLINE HYCLATE 100 MG: 100 CAPSULE ORAL at 17:57

## 2024-05-20 RX ADMIN — IPRATROPIUM BROMIDE AND ALBUTEROL SULFATE 3 ML: .5; 3 SOLUTION RESPIRATORY (INHALATION) at 08:05

## 2024-05-20 RX ADMIN — DOXYCYCLINE 100 MG: 100 INJECTION, POWDER, LYOPHILIZED, FOR SOLUTION INTRAVENOUS at 03:19

## 2024-05-20 RX ADMIN — INSULIN ASPART 2 UNITS: 100 INJECTION, SOLUTION INTRAVENOUS; SUBCUTANEOUS at 16:49

## 2024-05-20 RX ADMIN — IPRATROPIUM BROMIDE AND ALBUTEROL SULFATE 3 ML: .5; 3 SOLUTION RESPIRATORY (INHALATION) at 12:10

## 2024-05-20 RX ADMIN — FLUTICASONE FUROATE AND VILANTEROL TRIFENATATE 1 PUFF: 100; 25 POWDER RESPIRATORY (INHALATION) at 09:08

## 2024-05-20 RX ADMIN — BENZONATATE 100 MG: 100 CAPSULE ORAL at 12:57

## 2024-05-20 RX ADMIN — INSULIN ASPART 1 UNITS: 100 INJECTION, SOLUTION INTRAVENOUS; SUBCUTANEOUS at 12:50

## 2024-05-20 RX ADMIN — METHYLPREDNISOLONE SODIUM SUCCINATE 40 MG: 40 INJECTION, POWDER, FOR SOLUTION INTRAMUSCULAR; INTRAVENOUS at 09:07

## 2024-05-20 RX ADMIN — IPRATROPIUM BROMIDE AND ALBUTEROL SULFATE 3 ML: .5; 3 SOLUTION RESPIRATORY (INHALATION) at 15:30

## 2024-05-20 RX ADMIN — UMECLIDINIUM 1 PUFF: 62.5 AEROSOL, POWDER ORAL at 09:07

## 2024-05-20 RX ADMIN — INSULIN ASPART 1 UNITS: 100 INJECTION, SOLUTION INTRAVENOUS; SUBCUTANEOUS at 09:06

## 2024-05-20 ASSESSMENT — ACTIVITIES OF DAILY LIVING (ADL)
ADLS_ACUITY_SCORE: 42

## 2024-05-20 NOTE — PROGRESS NOTES
"[facility]    Medicine Progress Note - Hospitalist Service    Date of Admission:  5/19/2024    Assessment & Plan      Arvind Leogn is a 69 year old female, history of diabetes, hyperlipidemia, Bipolar,COPD, and pneumonia, who presents with shortness of breath and cough. The patient reports she has had an ongoing cough since Thursday with \"clear foamy phlegm\". The patient is on 2 liters of oxygen at home at baseline.  On arrival to the hospital patient's O2 sats were 85% on room on 2 L additional oxygen needed patient sats 93% on 4 L.      Back to baseline home oxygen of 2L.  Patient states she is feeling better but still has a cough.        COPD exacerbation  -Chest x-ray:Reciprocal thoracolumbar spinal curvature. Localized opacity in the medial left base is likely atelectasis. No right lung opacities.Unchanged heart and mediastinal contour.No pleural effusion or pneumothorax.  -IV doxycycline-Change to Q12 hoours  -IV solu-medrol-WBC 18.3. Change to po prednisone  -Sputum culture-needs to be collected  -Procalcitonin-0.06  CT chest :Bibasilar bronchiectasis with bronchial wall thickening and mild tree-in-bud infiltrates, most pronounced within the right lower lobe. These findings are consistent with small airways disease/bronchiolitis.  Bibasilar and medial right upper and right middle lobar consolidation more likely reflecting subsegmental atelectasis rather than pneumonia. No pleural effusion.   DuoNebs as needed per RT  -Viral respiratory swab negative  -Tessalon pearls for cough  -PTA inhaler    Leukocytosis  WBC 11.1 on admit. 18.3 today 2/2 steroid use            Diet: Moderate Consistent Carb (60 g CHO per Meal) Diet    DVT Prophylaxis: Pneumatic Compression Devices  Lara Catheter: Not present  Lines: None     Cardiac Monitoring: None  Code Status: Full Code      Clinically Significant Risk Factors Present on Admission                      # DMII: A1C = 6.6 % (Ref range: 0.0 - " "5.6 %) within past 6 months    # Obesity: Estimated body mass index is 38.81 kg/m  as calculated from the following:    Height as of this encounter: 1.397 m (4' 7\").    Weight as of this encounter: 75.8 kg (167 lb).       # Asthma: noted on problem list        Disposition Plan     Medically Ready for Discharge: Anticipated Tomorrow           The patient's care was discussed with the Attending Physician, Dr. Larson, Patient, and Patient's Family.    Quita Min NP  Hospitalist Service  [facility]  Securely message with [employer] (more info)  Text page via [facility] Paging/Directory   ______________________________________________________________________    Interval History       Physical Exam   Vital Signs: Temp: 98.2  F (36.8  C) Temp src: Oral BP: 113/62 Pulse: 74   Resp: 20 SpO2: 92 % O2 Device: Nasal cannula Oxygen Delivery: 2 LPM  Weight: 167 lbs 0 oz    Constitutional: awake, alert, cooperative, no apparent distress, and appears stated age  Respiratory: no increased work of breathing, good air exchange, and rhonchi right base, right middle lobe, and left base, wheeze right base, right middle lobe, and left base  Cardiovascular: Normal apical impulse, regular rate and rhythm, normal S1 and S2, no S3 or S4, and no murmur noted  GI: No scars, normal bowel sounds, soft, non-distended, non-tender, no masses palpated, no hepatosplenomegally    Medical Decision Making       40 MINUTES SPENT BY ME on the date of service doing chart review, history, exam, documentation & further activities per the note.  MANAGEMENT DISCUSSED with the following over the past 24 hours: Dr. Larson   NOTE(S)/MEDICAL RECORDS REVIEWED over the past 24 hours: Dr. Larson       Data     I have personally reviewed the following data over the past 24 hrs:    16.3 (H)  \   14.2   / 228     139 104 12.4 /  184 (H)   4.4 29 0.62 \     Trop: 8 BNP: 76     Procal: 0.08 CRP: N/A Lactic Acid: N/A       INR:  N/A PTT:  N/A "   D-dimer:  0.42 Fibrinogen:  N/A       Imaging results reviewed over the past 24 hrs:   Recent Results (from the past 24 hour(s))   Chest XR,  PA & LAT    Narrative    EXAM: XR CHEST 2 VIEWS  LOCATION: [facility]  DATE: 5/19/2024    INDICATION: cough, wheezing; rule out pulmonary edema  COMPARISON: Frontal and lateral views of the chest 5/16/2024      Impression    IMPRESSION:     Reciprocal thoracolumbar spinal curvature. Localized opacity in the medial left base is likely atelectasis. No right lung opacities.    Unchanged heart and mediastinal contours.    No pleural effusion or pneumothorax.   CT Chest w/o Contrast    Narrative    EXAM: CT CHEST W/O CONTRAST  LOCATION: [facility]  DATE: 5/19/2024    INDICATION: Cough, wheezing, abnormal chest x-ray  COMPARISON: Chest x-ray the same date, CT chest 12/9/2021  TECHNIQUE: CT chest without IV contrast. Multiplanar reformats were obtained. Dose reduction techniques were used.  CONTRAST: None.    FINDINGS:   LUNGS AND PLEURA: Endotracheal and right-sided endobronchial secretions. Basilar predominant bronchial wall thickening with mild bronchiectasis. Bibasilar tree-in-bud opacities which are most pronounced within the right lower lobe. Subsegmental   atelectasis within the medial right upper and middle lobes. Mild left lower lobar dependent consolidation likely reflecting atelectasis.    MEDIASTINUM/AXILLAE: No thoracic adenopathy. Normal heart size and no pericardial effusion.    CORONARY ARTERY CALCIFICATION: None.    UPPER ABDOMEN: Small nonobstructing left renal calculi.    MUSCULOSKELETAL: Spinal degenerative changes. Moderate to severe dextroscoliosis.      Impression    IMPRESSION:   1.  Bibasilar bronchiectasis with bronchial wall thickening and mild tree-in-bud infiltrates, most pronounced within the right lower lobe. These findings are consistent with small airways disease/bronchiolitis.  2.  Bibasilar and  medial right upper and right middle lobar consolidation more likely reflecting subsegmental atelectasis rather than pneumonia. No pleural effusion.

## 2024-05-20 NOTE — PLAN OF CARE
Goal Outcome Evaluation:      Plan of Care Reviewed With: patient  Overall Patient Progress: no change    Patient alert and oriented x4. Patient is hard of hearing and has hearing aids at home. Patient is currently on 3L NC to keep sats about 90%. Patient is on 2 liters at baseline. Patient brought in own regulator and is in her room. Last blood sugar was 343 at 21:13. Patient is SBA for mobility. Patient does not have Cardiac Adult Monitoring orders. Patient is not on any electrolyte protocols. Patient is being followed by OT/PT. Patient is on a Mod Carb diet. Patient received Observation information. Patient is pleasant and agreeable to assessments and treatments.   Provided education or trying to obtain sample for bacterial culture - patient said she would provide but so far no luck. Cup is within reach for sample in the event that changes.

## 2024-05-20 NOTE — PLAN OF CARE
PRIMARY DIAGNOSIS: ACUTE PAIN  OUTPATIENT/OBSERVATION GOALS TO BE MET BEFORE DISCHARGE:  1. Pain Status: Improved-controlled with oral pain medications.    2. Return to near baseline physical activity: Yes    3. Cleared for discharge by consultants (if involved): Yes    Discharge Planner Nurse   Safe discharge environment identified: Yes  Barriers to discharge: Yes       Entered by: Gretta Tracey RN 05/20/2024 12:50 AM     Please review provider order for any additional goals.   Nurse to notify provider when observation goals have been met and patient is ready for discharge.Goal Outcome Evaluation:

## 2024-05-20 NOTE — PROGRESS NOTES
05/20/24 1005   Appointment Info   Signing Clinician's Name / Credentials (OT) Cynthia SEYMOUR moni SaulFausto SHOAIB/L   Living Environment   People in Home parent(s)   Current Living Arrangements mobile home   Home Accessibility stairs to enter home   Number of Stairs, Main Entrance 4   Living Environment Comments walk-in shower with grab rails   Self-Care   Current Activity Tolerance moderate   Fall history within last six months no   Activity/Exercise/Self-Care Comment Pt is independent with dressing, bathing, and toileting.  Pt has assist with housekeeping, laundry, cooking, meds   General Information   Onset of Illness/Injury or Date of Surgery 05/19/24   Referring Physician Warns   Patient/Family Therapy Goal Statement (OT) home   Additional Occupational Profile Info/Pertinent History of Current Problem Pt admitted with cough, SOB   Existing Precautions/Restrictions oxygen therapy device and L/min   Cognitive Status Examination   Cognitive Status Comments Pt has slow responses, [place], mother gives a lot of history.  Pt likely at baseline.   Visual Perception   Visual Impairment/Limitations WFL   Sensory   Sensory Quick Adds sensation intact   Posture   Posture not impaired   Range of Motion Comprehensive   General Range of Motion no range of motion deficits identified   Strength Comprehensive (MMT)   General Manual Muscle Testing (MMT) Assessment no strength deficits identified   Bed Mobility   Comment (Bed Mobility) SBA   Transfers   Transfer Comments SBA.CGA  (Pt walked with and without walker.)   Balance   Balance Comments SBA   Activities of Daily Living   BADL Assessment/Intervention   (Pt needed assist to don socks and put underwear over feet.  Pt could not seem to problem solve as set up was different than home.  Pt able to reach to fee and able to cross feet up.)   Clinical Impression   Criteria for Skilled Therapeutic Interventions Met (OT) Evaluation only   OT Diagnosis Impaired ADL independence   OT  Problem List-Impairments impacting ADL activity tolerance impaired;balance   Assessment of Occupational Performance 1-3 Performance Deficits   Risk & Benefits of therapy have been explained evaluation/treatment results reviewed;participants included;patient;daughter   Clinical Impression Comments Pt seen bedside for OT eval.  Pt is likely at baseline for ADLs.  Pt and family do not feel pt has further OT needs.  Recommend home iwth family support as needed.   OT Total Evaluation Time   OT Eval, Low Complexity Minutes (49093) 15   OT Discharge Planning   OT Plan Eval only   OT Discharge Recommendation (DC Rec) home with assist   OT Rationale for DC Rec Recommend home iwth family support as needed.   OT Brief overview of current status SBA/CGA   Total Session Time   Total Session Time (sum of timed and untimed services) 15

## 2024-05-20 NOTE — PROGRESS NOTES
Patient admitted to room 4 at approximately 2015 via wheel chair from emergency room.  Reason for Admission: Cough and SOB  Report received from: note - no one called   Patient was accompanied by Self.  Discharge transportation provided by: unknown transport from ED  Patient ambulated/transferred:  with one assist. self.  Patient is alert and orientated x 4.  Outpatient Observation education provided to: (patient, family, friend) patient  MDRO Education done if applicable (MRSA, VRE, etc)  Safety risks were identified during admission:  none.   Yellow risk/fall band applied:  No  Detailed Belongings: home oxygen, purse, shirt and sweater - patient has own pants on at present  After consideration - falls band - yellow was placed due to patient stated dizziness....

## 2024-05-20 NOTE — PLAN OF CARE
"PRIMARY DIAGNOSIS: \"GENERIC\" NURSING  OUTPATIENT/OBSERVATION GOALS TO BE MET BEFORE DISCHARGE:  ADLs back to baseline: Yes    Activity and level of assistance: Up with standby assistance.    Pain status: Pain free.    Return to near baseline physical activity: Yes     Discharge Planner Nurse   Safe discharge environment identified: Yes  Barriers to discharge:  IV medication       Entered by: Stefan Jack RN 05/20/2024 1:58 PM     Please review provider order for any additional goals.   Nurse to notify provider when observation goals have been met and patient is ready for discharge.Goal Outcome Evaluation:       VSS on baseline 2L O2. A&Ox4. Denies pain. Up SBA.                 "

## 2024-05-20 NOTE — PROGRESS NOTES
"Physical Therapy        05/20/24 1000   Appointment Info   Signing Clinician's Name / Credentials (PT) Arlette Schmitt DPT   Living Environment   People in Home parent(s)   Current Living Arrangements mobile home   Home Accessibility stairs to enter home   Number of Stairs, Main Entrance 4   Stair Railings, Main Entrance railings safe and in good condition   Living Environment Comments walk-in shower with bars   Self-Care   Activity/Exercise/Self-Care Comment independent with ADLs and mobility, mother does IADLs, pt seldom drives   General Information   Onset of Illness/Injury or Date of Surgery 05/19/24   Referring Physician Susie Valenzuela NP   Patient/Family Therapy Goals Statement (PT) get rid of this cough   Pertinent History of Current Problem (include personal factors and/or comorbidities that impact the POC) 69 year old female, history of diabetes, hyperlipidemia, Bipolar,COPD, and pneumonia, who presents with shortness of breath and cough. The patient reports she has had an ongoing cough since Thursday with \"clear foamy phlegm\". The patient is on 2 liters of oxygen at home at baseline.  On arrival to the hospital patient's O2 sats were 85% on room on 2 L additional oxygen needed patient sats 93% on 4 L.   Cognition   Cognitive Status Comments pt appears to be at baseline   Pain Assessment   Patient Currently in Pain No   Integumentary/Edema   Integumentary/Edema no deficits were identifed   Posture    Posture Not impaired   Range of Motion (ROM)   Range of Motion ROM is WNL   Strength (Manual Muscle Testing)   Strength (Manual Muscle Testing) strength is WFL   Bed Mobility   Comment, (Bed Mobility) MI supine to sit with rail   Transfers   Comment, (Transfers) independent sit <> stand   Gait/Stairs (Locomotion)   Comment, (Gait/Stairs) 10' without AD, 120' with FWW; SBA without LOB   Balance   Balance no deficits were identified   Sensory Examination   Sensory Perception patient reports no sensory changes "   Coordination   Coordination no deficits were identified   Muscle Tone   Muscle Tone no deficits were identified   Clinical Impression   Criteria for Skilled Therapeutic Intervention No problems identified which require skilled intervention   Clinical Presentation (PT Evaluation Complexity) stable   Clinical Presentation Rationale presents as medically diagnosed   Clinical Decision Making (Complexity) low complexity   Risk & Benefits of therapy have been explained evaluation/treatment results reviewed;participants included;patient;mother   PT Total Evaluation Time   PT Eval, Low Complexity Minutes (36737) 15   PT Discharge Planning   PT Discharge Recommendation (DC Rec) home with assist   PT Rationale for DC Rec functionally at baseline   PT Brief overview of current status amb 120' with FWW, no acute PT needs   Total Session Time   Total Session Time (sum of timed and untimed services) 15         Arlette Schmitt, DPT 5/20/2024

## 2024-05-20 NOTE — PLAN OF CARE
"PRIMARY DIAGNOSIS: \"GENERIC\" NURSING  OUTPATIENT/OBSERVATION GOALS TO BE MET BEFORE DISCHARGE:  ADLs back to baseline: Yes    Activity and level of assistance: Up with standby assistance.    Pain status: Pain free.    Return to near baseline physical activity: Yes     Discharge Planner Nurse   Safe discharge environment identified: Yes  Barriers to discharge:  IV medications       Entered by: Stefan Jack RN 05/20/2024 2:01 PM     Please review provider order for any additional goals.   Nurse to notify provider when observation goals have been met and patient is ready for discharge.Goal Outcome Evaluation:       IV abx switched to PO. PRN tessalon given for cough. Worked w/ PT/OT.                  "

## 2024-05-20 NOTE — PLAN OF CARE
Problem: Adult Inpatient Plan of Care  Goal: Optimal Comfort and Wellbeing  5/20/2024 0622 by Gretta Tracey RN  Outcome: Progressing  5/20/2024 0622 by Gretta Tracey RN  Outcome: Progressing  Intervention: Monitor Pain and Promote Comfort  Recent Flowsheet Documentation  Taken 5/20/2024 0330 by Gretta Tracey RN  Pain Management Interventions: rest  Taken 5/20/2024 0003 by Gretta Tracey RN  Pain Management Interventions: rest     Problem: Adult Inpatient Plan of Care  Goal: Optimal Comfort and Wellbeing  Intervention: Monitor Pain and Promote Comfort  Recent Flowsheet Documentation  Taken 5/20/2024 0330 by Gretta Tracey RN  Pain Management Interventions: rest  Taken 5/20/2024 0003 by Gretta Tracey RN  Pain Management Interventions: rest     Problem: Pain Acute  Goal: Optimal Pain Control and Function  Outcome: Progressing  Intervention: Develop Pain Management Plan  Recent Flowsheet Documentation  Taken 5/20/2024 0330 by Gretta Tracey RN  Pain Management Interventions: rest  Taken 5/20/2024 0003 by Gretta Tracey RN  Pain Management Interventions: rest  Intervention: Prevent or Manage Pain  Recent Flowsheet Documentation  Taken 5/20/2024 0428 by Gretta Tracey RN  Medication Review/Management: medications reviewed  Taken 5/20/2024 0008 by Gretta Tracey RN  Medication Review/Management: medications reviewed     Problem: Adult Inpatient Plan of Care  Goal: Absence of Hospital-Acquired Illness or Injury  Intervention: Prevent Skin Injury  Recent Flowsheet Documentation  Taken 5/20/2024 0428 by Gretta Tracey RN  Body Position: position changed independently  Taken 5/20/2024 0008 by Gretta Tracey RN  Body Position: position changed independently   Goal Outcome Evaluation:       Patient admitted for COPD with acute exacerbation, Cough and SOB.  Frequent cough during the night. Denies pain. PT/OT consult. VSS. On 3L oxygen. AOX4. Moderate consistent carbs diet. Right PIV  saline locked. Ambulated to the bathroom once during the night. Had a good night sleep.

## 2024-05-20 NOTE — ED NOTES
"[facility] ED Handoff Report    ED Chief Complaint: COPD exacerbation     ED Diagnosis:  (J44.1) COPD with acute exacerbation (H)  Comment:   Plan: IV ABX       PMH:    Past Medical History:   Diagnosis Date    Acute on chronic respiratory failure with hypoxia (H) 11/15/2016    Bipolar 1 disorder (H)     CAP (community acquired pneumonia) 11/15/2016    Cervical high risk HPV (human papillomavirus) test positive 3/5/2018    3/5/18 NIL pap, +HR HPV (not 16/18) 5/21/21 NIL pap, neg HPV. Plan: await provider    COPD exacerbation (H) 4/24/2021    COPD with exacerbation (H) 11/15/2016    Diabetes mellitus, type II (H)     Hearing loss     Meniere's disease     Pneumonia 4/23/2021    Pneumonia of right lower lobe due to infectious organism 6/11/2019        Code Status:  Full Code     Falls Risk: No Band: Not applicable    Current Living Situation/Residence:      Elimination Status: Continent: Yes     Activity Level: SBA    Patients Preferred Language:  English     Needed: No    Vital Signs:  BP 90/52 (BP Location: Left arm, Patient Position: Semi-Hairston's)   Pulse 94   Temp 98.7  F (37.1  C) (Oral)   Resp 28   Ht 1.397 m (4' 7\")   Wt 75.8 kg (167 lb)   SpO2 93%   BMI 38.81 kg/m       Cardiac Rhythm: NSR    Pain Score: 0/10    Is the Patient Confused:  No    Last Food or Drink: 05/19/24 at 1800    Focused Assessment:      Tests Performed: Done: Labs and Imaging    Treatments Provided: IV ABX and Nebs    Family Dynamics/Concerns: No    Family Updated On Visitor Policy: No    Plan of Care Communicated to Family: No    Who Was Updated about Plan of Care: Family aware    Belongings Checklist Done and Signed by Patient: Yes    Belongings Sent with Patient: Yes     Medications sent with patient: No    Additional Information:     RN: Gosia Powell RN 5/19/2024 7:10 PM       "

## 2024-05-21 ENCOUNTER — TELEPHONE (OUTPATIENT)
Dept: PULMONOLOGY | Facility: CLINIC | Age: 69
End: 2024-05-21

## 2024-05-21 VITALS
HEIGHT: 55 IN | WEIGHT: 167 LBS | OXYGEN SATURATION: 92 % | DIASTOLIC BLOOD PRESSURE: 64 MMHG | RESPIRATION RATE: 15 BRPM | SYSTOLIC BLOOD PRESSURE: 114 MMHG | BODY MASS INDEX: 38.65 KG/M2 | TEMPERATURE: 98 F | HEART RATE: 74 BPM

## 2024-05-21 LAB
BACTERIA SPT CULT: NORMAL
GLUCOSE BLDC GLUCOMTR-MCNC: 197 MG/DL (ref 70–99)
GLUCOSE BLDC GLUCOMTR-MCNC: 234 MG/DL (ref 70–99)
GRAM STAIN RESULT: NORMAL

## 2024-05-21 PROCEDURE — 87070 CULTURE OTHR SPECIMN AEROBIC: CPT

## 2024-05-21 PROCEDURE — 250N000013 HC RX MED GY IP 250 OP 250 PS 637

## 2024-05-21 PROCEDURE — 99239 HOSP IP/OBS DSCHRG MGMT >30: CPT | Mod: FS

## 2024-05-21 PROCEDURE — 99207 PR APP CREDIT; MD BILLING SHARED VISIT: CPT | Mod: FS | Performed by: STUDENT IN AN ORGANIZED HEALTH CARE EDUCATION/TRAINING PROGRAM

## 2024-05-21 PROCEDURE — 82962 GLUCOSE BLOOD TEST: CPT

## 2024-05-21 PROCEDURE — G0378 HOSPITAL OBSERVATION PER HR: HCPCS

## 2024-05-21 PROCEDURE — 250N000012 HC RX MED GY IP 250 OP 636 PS 637

## 2024-05-21 RX ORDER — PREDNISONE 20 MG/1
20 TABLET ORAL DAILY
Status: DISCONTINUED | OUTPATIENT
Start: 2024-05-25 | End: 2024-05-21 | Stop reason: HOSPADM

## 2024-05-21 RX ORDER — PREDNISONE 5 MG/1
10 TABLET ORAL DAILY
Status: DISCONTINUED | OUTPATIENT
Start: 2024-05-28 | End: 2024-05-21 | Stop reason: HOSPADM

## 2024-05-21 RX ORDER — PREDNISONE 10 MG/1
TABLET ORAL
Qty: 18 TABLET | Refills: 0 | Status: SHIPPED | OUTPATIENT
Start: 2024-05-22 | End: 2024-05-31

## 2024-05-21 RX ORDER — BENZONATATE 100 MG/1
100 CAPSULE ORAL 3 TIMES DAILY PRN
Qty: 12 CAPSULE | Refills: 0 | Status: SHIPPED | OUTPATIENT
Start: 2024-05-21 | End: 2024-07-10

## 2024-05-21 RX ADMIN — INSULIN ASPART 1 UNITS: 100 INJECTION, SOLUTION INTRAVENOUS; SUBCUTANEOUS at 08:57

## 2024-05-21 RX ADMIN — UMECLIDINIUM 1 PUFF: 62.5 AEROSOL, POWDER ORAL at 08:57

## 2024-05-21 RX ADMIN — FLUTICASONE FUROATE AND VILANTEROL TRIFENATATE 1 PUFF: 100; 25 POWDER RESPIRATORY (INHALATION) at 08:57

## 2024-05-21 RX ADMIN — DOXYCYCLINE HYCLATE 100 MG: 100 CAPSULE ORAL at 08:57

## 2024-05-21 RX ADMIN — PREDNISONE 40 MG: 20 TABLET ORAL at 08:57

## 2024-05-21 ASSESSMENT — ACTIVITIES OF DAILY LIVING (ADL)
ADLS_ACUITY_SCORE: 42

## 2024-05-21 NOTE — PLAN OF CARE
Problem: Adult Inpatient Plan of Care  Goal: Absence of Hospital-Acquired Illness or Injury  Intervention: Identify and Manage Fall Risk  Recent Flowsheet Documentation  Taken 5/21/2024 0200 by Waldo Lan, RN  Safety Promotion/Fall Prevention:   treat underlying cause   treat reversible contributory factors   room near nurse's station   room door open   patient and family education   nonskid shoes/slippers when out of bed   lighting adjusted   clutter free environment maintained   activity supervised     Problem: Gas Exchange Impaired  Goal: Optimal Gas Exchange  Outcome: Progressing   Goal Outcome Evaluation:       A&Ox4. Vitals stable on 2L oxygen via nasal cannula. SOB w/ exertion, reporting improvement with breathing. Pt unable to cough up sputum. Sputum sample still needs to be collected.

## 2024-05-21 NOTE — PLAN OF CARE
"    PRIMARY DIAGNOSIS: \"GENERIC\" NURSING  OUTPATIENT/OBSERVATION GOALS TO BE MET BEFORE DISCHARGE:  ADLs back to baseline: No    Activity and level of assistance: Up with standby assistance.    Pain status: Pain free.    Return to near baseline physical activity: No     Discharge Planner Nurse   Safe discharge environment identified: Yes  Barriers to discharge: Yes       Entered by: Waldo Lan RN 05/21/2024 2:32 AM     Please review provider order for any additional goals.   Nurse to notify provider when observation goals have been met and patient is ready for discharge.  "

## 2024-05-21 NOTE — PLAN OF CARE
PRIMARY DIAGNOSIS:COPD acute exacerbation     OUTPATIENT/OBSERVATION GOALS TO BE MET BEFORE DISCHARGE  1. Orthostatic performed: No    2. Tolerating PO medications: Yes    3. Return to near baseline physical activity: Yes    4. Cleared for discharge by consultants (if involved): N/A    Discharge Planner Nurse   Safe discharge environment identified:  TBD  Barriers to discharge:  discharge orders        Entered by: Ida Archuleta RN 05/20/2024 9:28 PM     Please review provider order for any additional goals.   Nurse to notify provider when observation goals have been met and patient is ready for discharge.Goal Outcome Evaluation:

## 2024-05-21 NOTE — DISCHARGE SUMMARY
"[facility]  Hospitalist Discharge Summary      Date of Admission:  5/19/2024  Date of Discharge:  5/21/2024  Discharging Provider: Quita Min NP  Discharge Service: Hospitalist Service    Discharge Diagnoses   COPD exacerbation    Clinically Significant Risk Factors     # DMII: A1C = 6.6 % (Ref range: 0.0 - 5.6 %) within past 6 months  # Obesity: Estimated body mass index is 38.81 kg/m  as calculated from the following:    Height as of this encounter: 1.397 m (4' 7\").    Weight as of this encounter: 75.8 kg (167 lb).       Follow-ups Needed After Discharge   Follow-up Appointments     Follow-up and recommended labs and tests       Follow up with primary care provider, Rema Whiting, within 7 days   for hospital follow- up.  No follow up labs or test are needed.    Follow up with pulmonology as recommended. [phone]            Unresulted Labs Ordered in the Past 30 Days of this Admission       No orders found from 4/19/2024 to 5/20/2024.            Discharge Disposition   Discharged to home  Condition at discharge: Stable    Hospital Course     Arvind Leong is a 69 year old female, history of diabetes, hyperlipidemia, Bipolar,COPD, and pneumonia, who presents with shortness of breath and cough. The patient reports she has had an ongoing cough since Thursday with \"clear foamy phlegm\". The patient is on 2 liters of oxygen at home at baseline.  On arrival to the hospital patient's O2 sats were 85% on room on 2 L additional oxygen needed patient sats 93% on 4 L.      Back to baseline home oxygen of 2L.  Patient states she is feeling better but still has a cough.  Reports Tessalon Perles have helped. Patient and mother agree with plan  to discharge home today.        COPD exacerbation  -Chest x-ray:Reciprocal thoracolumbar spinal curvature. Localized opacity in the medial left base is likely atelectasis. No right lung opacities.Unchanged heart and mediastinal contour.No " pleural effusion or pneumothorax.  -IV doxycycline-Change to Q12 hours.  Discontinued for discharge.  Course complete  -IV solu-medrol-WBC 18.3. Change to po prednisone.  Prednisone taper at discharge.  -Sputum culture-needs to be collected.  Has not produced sputum.  Will discontinue order  -Procalcitonin-0.06  CT chest :Bibasilar bronchiectasis with bronchial wall thickening and mild tree-in-bud infiltrates, most pronounced within the right lower lobe. These findings are consistent with small airways disease/bronchiolitis.  Bibasilar and medial right upper and right middle lobar consolidation more likely reflecting subsegmental atelectasis rather than pneumonia. No pleural effusion.   DuoNebs as needed per RT. home nebulizers as needed at discharge  -Viral respiratory swab negative  -Tessalon pearls for cough  -PTA inhaler    Leukocytosis  WBC 11.1 on admit. 18.3 today 2/2 steroid use      Consultations This Hospital Stay   PHYSICAL THERAPY ADULT IP CONSULT  OCCUPATIONAL THERAPY ADULT IP CONSULT  CARE MANAGEMENT / SOCIAL WORK IP CONSULT    Code Status   Full Code    Time Spent on this Encounter   I, Quita Min NP, personally saw the patient today and spent greater than 30 minutes discharging this patient.       Quita Min NP  [facility] EXTENDED RECOVERY AND SHORT STAY  [address] [place] [id number]  Phone: [phone]  Fax: [phone]  ______________________________________________________________________    Physical Exam   Vital Signs: Temp: 98  F (36.7  C) Temp src: Oral BP: 114/64 Pulse: 74   Resp: 15 SpO2: 92 % O2 Device: Nasal cannula Oxygen Delivery: 2 LPM  Weight: 167 lbs 0 oz  Constitutional: awake, alert, cooperative, no apparent distress, and appears stated age  Respiratory: no increased work of breathing, good air exchange, and rhonchi right base, right middle lobe, and left base  Cardiovascular: Normal apical impulse, regular rate and rhythm,  normal S1 and S2, no S3 or S4, and no murmur noted  GI: No scars, normal bowel sounds, soft, non-distended, non-tender, no masses palpated, no hepatosplenomegally       Primary Care Physician   Rema Whiting    Discharge Orders      Reason for your hospital stay    COPD exacerbation     Follow-up and recommended labs and tests     Follow up with primary care provider, Rema Whiting, within 7 days for hospital follow- up.  No follow up labs or test are needed.    Follow up with pulmonology as recommended. [phone]     Activity    Your activity upon discharge: activity as tolerated     Diet    Follow this diet upon discharge: Orders Placed This Encounter      Moderate Consistent Carb (60 g CHO per Meal) Diet       Significant Results and Procedures   Results for orders placed or performed during the hospital encounter of 05/19/24   Chest XR,  PA & LAT    Narrative    EXAM: XR CHEST 2 VIEWS  LOCATION: [facility]  DATE: 5/19/2024    INDICATION: cough, wheezing; rule out pulmonary edema  COMPARISON: Frontal and lateral views of the chest 5/16/2024      Impression    IMPRESSION:     Reciprocal thoracolumbar spinal curvature. Localized opacity in the medial left base is likely atelectasis. No right lung opacities.    Unchanged heart and mediastinal contours.    No pleural effusion or pneumothorax.   CT Chest w/o Contrast    Narrative    EXAM: CT CHEST W/O CONTRAST  LOCATION: [facility]  DATE: 5/19/2024    INDICATION: Cough, wheezing, abnormal chest x-ray  COMPARISON: Chest x-ray the same date, CT chest 12/9/2021  TECHNIQUE: CT chest without IV contrast. Multiplanar reformats were obtained. Dose reduction techniques were used.  CONTRAST: None.    FINDINGS:   LUNGS AND PLEURA: Endotracheal and right-sided endobronchial secretions. Basilar predominant bronchial wall thickening with mild bronchiectasis. Bibasilar tree-in-bud opacities which are most pronounced  within the right lower lobe. Subsegmental   atelectasis within the medial right upper and middle lobes. Mild left lower lobar dependent consolidation likely reflecting atelectasis.    MEDIASTINUM/AXILLAE: No thoracic adenopathy. Normal heart size and no pericardial effusion.    CORONARY ARTERY CALCIFICATION: None.    UPPER ABDOMEN: Small nonobstructing left renal calculi.    MUSCULOSKELETAL: Spinal degenerative changes. Moderate to severe dextroscoliosis.      Impression    IMPRESSION:   1.  Bibasilar bronchiectasis with bronchial wall thickening and mild tree-in-bud infiltrates, most pronounced within the right lower lobe. These findings are consistent with small airways disease/bronchiolitis.  2.  Bibasilar and medial right upper and right middle lobar consolidation more likely reflecting subsegmental atelectasis rather than pneumonia. No pleural effusion.         Discharge Medications   Current Discharge Medication List        START taking these medications    Details   benzonatate (TESSALON) 100 MG capsule Take 1 capsule (100 mg) by mouth 3 times daily as needed for cough  Qty: 12 capsule, Refills: 0    Associated Diagnoses: Acute cough           CONTINUE these medications which have CHANGED    Details   predniSONE (DELTASONE) 10 MG tablet Take 3 tablets (30 mg) by mouth daily for 3 days, THEN 2 tablets (20 mg) daily for 3 days, THEN 1 tablet (10 mg) daily for 3 days.  Qty: 18 tablet, Refills: 0    Associated Diagnoses: COPD exacerbation (H)           CONTINUE these medications which have NOT CHANGED    Details   albuterol (PROAIR HFA/PROVENTIL HFA/VENTOLIN HFA) 108 (90 Base) MCG/ACT inhaler Inhale 2 puffs into the lungs every 6 hours as needed for shortness of breath or wheezing  Qty: 18 g, Refills: 12    Comments: Pharmacy may dispense brand covered by insurance (Proair, or proventil or ventolin or generic albuterol inhaler)  Associated Diagnoses: Chronic obstructive pulmonary disease, unspecified COPD type  (H)      Dulaglutide (TRULICITY) 3 MG/0.5ML SOPN Inject 3 mg Subcutaneous every 7 days  Qty: 4 mL, Refills: 3    Associated Diagnoses: Type 2 diabetes mellitus with other specified complication, without long-term current use of insulin (H)      Fluticasone-Umeclidin-Vilant (TRELEGY ELLIPTA) 100-62.5-25 MCG/ACT oral inhaler Inhale 1 puff into the lungs daily  Qty: 60 each, Refills: 1    Associated Diagnoses: Chronic obstructive pulmonary disease, unspecified COPD type (H)      guaiFENesin-codeine (ROBITUSSIN AC) 100-10 MG/5ML solution Take 5-10 mLs by mouth every 6 hours as needed for cough  Qty: 120 mL, Refills: 0    Associated Diagnoses: COPD with acute exacerbation (H); Chronic obstructive pulmonary disease, unspecified COPD type (H); Acute cough      metFORMIN (GLUCOPHAGE) 1000 MG tablet Take 1 tablet (1,000 mg) by mouth 2 times daily (with meals)  Qty: 180 tablet, Refills: 3    Associated Diagnoses: Type 2 diabetes mellitus with other specified complication, without long-term current use of insulin (H)      albuterol (PROVENTIL) (2.5 MG/3ML) 0.083% neb solution Take 1 vial (2.5 mg) by nebulization every 4 hours as needed for shortness of breath or wheezing Take 1 vial four times a day for the next 5 days then go back to as needed  Qty: 240 mL, Refills: 12    Associated Diagnoses: Chronic obstructive pulmonary disease, unspecified COPD type (H)      ARIPiprazole ER (ABILIFY MAINTENA) 400 MG extended release inj syringe Inject 400 mg into the muscle every 28 days      blood glucose (NO BRAND SPECIFIED) lancets standard Use to test blood sugar 4 times daily before meals and at bedtime  Qty: 1 each, Refills: 1    Associated Diagnoses: Type 2 diabetes mellitus with other specified complication, without long-term current use of insulin (H)      blood glucose (NO BRAND SPECIFIED) test strip Use to test blood sugar 4 times daily before meals and at bedtime  Qty: 100 strip, Refills: 0    Associated Diagnoses: Type 2  diabetes mellitus with other specified complication, without long-term current use of insulin (H)      blood glucose monitoring (ONETOUCH VERIO) meter device kit Use to test blood sugar 4 times daily before meals and bedtie  Qty: 1 kit, Refills: 1    Associated Diagnoses: Type 2 diabetes mellitus with other specified complication, without long-term current use of insulin (H)           STOP taking these medications       doxycycline hyclate (VIBRA-TABS) 100 MG tablet Comments:   Reason for Stopping:             Allergies   Allergies   Allergen Reactions    Lurasidone Other (See Comments)     Face turned red, (Brand name = Latuda) Face turned red, Face turned red

## 2024-05-21 NOTE — TELEPHONE ENCOUNTER
5/21 left message, returning her call to set up a follow up. Patient was last seen 7/19/2022 for COPD. Patient  was in ED last 5/19/2024.

## 2024-05-21 NOTE — PROGRESS NOTES
Care Management Discharge Note    Discharge Date: 05/21/2024       Discharge Disposition:  Home with mom    Discharge Services:  No skilled services    Discharge DME:  Home Oxygen through [employer].    Discharge Transportation: family or friend will provide    Private pay costs discussed: Not applicable    Handoff Referral Completed: Yes    Additional Information:  Chart reviewed. Patient will discharge home with her mother who is present for discharge.  No further PT needs per rehab. Recommend home, ambulating with a walker.  Patient lives with mom in her mobile home. She is independent with her ADLs and mom assists with her IADLs. She has home O2 through [employer]. She also has a neb machine that she owns.     Brigida Kaminski, KERRIESW

## 2024-05-21 NOTE — PLAN OF CARE
"    PRIMARY DIAGNOSIS: \"GENERIC\" NURSING  OUTPATIENT/OBSERVATION GOALS TO BE MET BEFORE DISCHARGE:  ADLs back to baseline: No    Activity and level of assistance: Up with standby assistance.    Pain status: Pain free.    Return to near baseline physical activity: No     Discharge Planner Nurse   Safe discharge environment identified: Yes  Barriers to discharge: Yes       Entered by: Waldo Lan RN 05/21/2024 5:43 AM     Please review provider order for any additional goals.   Nurse to notify provider when observation goals have been met and patient is ready for discharge.  "

## 2024-05-22 ENCOUNTER — PATIENT OUTREACH (OUTPATIENT)
Dept: CARE COORDINATION | Facility: CLINIC | Age: 69
End: 2024-05-22
Payer: COMMERCIAL

## 2024-05-22 ENCOUNTER — TELEPHONE (OUTPATIENT)
Dept: PULMONOLOGY | Facility: CLINIC | Age: 69
End: 2024-05-22

## 2024-05-22 NOTE — PROGRESS NOTES
Clinic Care Coordination Contact  Transitions of Care Outreach  Chief Complaint   Patient presents with    Clinic Care Coordination - Post Hospital       Most Recent Admission Date: 5/19/2024   Most Recent Admission Diagnosis: COPD with acute exacerbation (H) - J44.1     Most Recent Discharge Date: 5/21/2024   Most Recent Discharge Diagnosis: COPD with acute exacerbation (H) - J44.1  COPD exacerbation (H) - J44.1  Acute cough - R05.1     Transitions of Care Assessment    Discharge Assessment  How are you doing now that you are home?: Spoke with Merlyn.  She stated she is doing and feeling well.  O2 sat 92% on 2L.  States she is still coughing but states it is 'better than it was'.  Denies any phlegm.  Has all medications and denies any questions.  patient has a county worker and denied any CCC needs.  How are your symptoms? (Red Flag symptoms escalate to triage hotline per guidelines): Improved  Do you know how to contact your clinic care team if you have future questions or changes to your health status? : Yes  Does the patient have their discharge instructions? : Yes  Does the patient have questions regarding their discharge instructions? : No  Were you started on any new medications or were there changes to any of your previous medications? : Yes  Does the patient have all of their medications?: Yes  Do you have questions regarding any of your medications? : No  Do you have all of your needed medical supplies or equipment (DME)?  (i.e. oxygen tank, CPAP, cane, etc.): Yes    Post-op ([facility] Only)  If the patient had a surgery or procedure, do they have any questions for a nurse?: No    Post-op (Clinicians Only)  Did the patient have surgery or a procedure: No  Fever: No  Chills: No  Eating & Drinking: eating and drinking without complaints/concerns  PO Intake: regular diet  Bowel Function: normal  Date of last BM: 05/22/24  Urinary Status: voiding without complaint/concerns    Spoke with Merlyn.  See note  above.  She has been able to schedule all post hospital follow up appointments.  She denied any CCC needs.  She has a county worker that is also available for any additional needs.    Follow up Plan     Discharge Follow-Up  Discharge follow up appointment scheduled in alignment with recommended follow up timeframe or Transitions of Risk Category? (Low = within 30 days; Moderate= within 14 days; High= within 7 days): Yes  Discharge Follow Up Appointment Date: 05/30/24  Discharge Follow Up Appointment Scheduled with?: Primary Care Provider    Future Appointments   Date Time Provider Department Center   5/30/2024  4:30 PM Rema Whiting MD [facility]   11/11/2024  8:00 AM Arnoldo Russo MD [facility]       Outpatient Plan as outlined on AVS reviewed with patient.    For any urgent concerns, please contact our 24 hour nurse triage line: [phone] ([phone])       Ritu Foss RN

## 2024-05-22 NOTE — TELEPHONE ENCOUNTER
Left message on verified voicemail to call back and schedule hospital follow up with Dr. Balbuena or NP  ----- Message from Sandrita Allen RN sent at 5/21/2024  2:41 PM CDT -----  Regarding: FW: Needs follow up after hosptial visit    ----- Message -----  From: Connie Brink RN  Sent: 5/21/2024   2:02 PM CDT  To: Sandrita Allen RN; [facility]  Subject: Needs follow up after hosptial visit             Hi,    Patient left message on our line, but she is a [facility] patient. She stated she needs to set up follow up after her recent hospital visit. If some one can contact her from your clinic.    Thanks  Ashely Brink, RN  ILD Care Coordinator  [phone]

## 2024-05-26 LAB
ATRIAL RATE - MUSE: 82 BPM
DIASTOLIC BLOOD PRESSURE - MUSE: 57 MMHG
INTERPRETATION ECG - MUSE: NORMAL
P AXIS - MUSE: 76 DEGREES
PR INTERVAL - MUSE: 136 MS
QRS DURATION - MUSE: 76 MS
QT - MUSE: 382 MS
QTC - MUSE: 446 MS
R AXIS - MUSE: 70 DEGREES
SYSTOLIC BLOOD PRESSURE - MUSE: 110 MMHG
T AXIS - MUSE: 66 DEGREES
VENTRICULAR RATE- MUSE: 82 BPM

## 2024-05-30 ENCOUNTER — OFFICE VISIT (OUTPATIENT)
Dept: FAMILY MEDICINE | Facility: CLINIC | Age: 69
End: 2024-05-30
Payer: COMMERCIAL

## 2024-05-30 VITALS
WEIGHT: 164 LBS | DIASTOLIC BLOOD PRESSURE: 57 MMHG | SYSTOLIC BLOOD PRESSURE: 116 MMHG | BODY MASS INDEX: 37.95 KG/M2 | OXYGEN SATURATION: 96 % | TEMPERATURE: 97.4 F | HEIGHT: 55 IN | HEART RATE: 87 BPM | RESPIRATION RATE: 16 BRPM

## 2024-05-30 DIAGNOSIS — F31.30 BIPOLAR I DISORDER, MOST RECENT EPISODE DEPRESSED (H): ICD-10-CM

## 2024-05-30 DIAGNOSIS — J44.1 COPD WITH ACUTE EXACERBATION (H): ICD-10-CM

## 2024-05-30 DIAGNOSIS — R05.1 ACUTE COUGH: ICD-10-CM

## 2024-05-30 DIAGNOSIS — E11.69 TYPE 2 DIABETES MELLITUS WITH OTHER SPECIFIED COMPLICATION, WITHOUT LONG-TERM CURRENT USE OF INSULIN (H): ICD-10-CM

## 2024-05-30 DIAGNOSIS — J44.9 CHRONIC OBSTRUCTIVE PULMONARY DISEASE, UNSPECIFIED COPD TYPE (H): ICD-10-CM

## 2024-05-30 DIAGNOSIS — J96.11 CHRONIC RESPIRATORY FAILURE WITH HYPOXIA (H): ICD-10-CM

## 2024-05-30 DIAGNOSIS — Z09 HOSPITAL DISCHARGE FOLLOW-UP: Primary | ICD-10-CM

## 2024-05-30 PROCEDURE — 82043 UR ALBUMIN QUANTITATIVE: CPT | Performed by: FAMILY MEDICINE

## 2024-05-30 PROCEDURE — 82570 ASSAY OF URINE CREATININE: CPT | Performed by: FAMILY MEDICINE

## 2024-05-30 PROCEDURE — 99495 TRANSJ CARE MGMT MOD F2F 14D: CPT | Performed by: FAMILY MEDICINE

## 2024-05-30 RX ORDER — RESPIRATORY SYNCYTIAL VIRUS VACCINE 120MCG/0.5
0.5 KIT INTRAMUSCULAR ONCE
Qty: 1 EACH | Refills: 0 | Status: CANCELLED | OUTPATIENT
Start: 2024-05-30 | End: 2024-05-30

## 2024-05-30 RX ORDER — FLUTICASONE PROPIONATE 50 MCG
1 SPRAY, SUSPENSION (ML) NASAL DAILY
Qty: 9.9 ML | Refills: 1 | Status: SHIPPED | OUTPATIENT
Start: 2024-05-30

## 2024-05-30 RX ORDER — PREDNISONE 10 MG/1
TABLET ORAL
Qty: 18 TABLET | Refills: 0 | Status: SHIPPED | OUTPATIENT
Start: 2024-05-30 | End: 2024-06-08

## 2024-05-30 NOTE — PROGRESS NOTES
"  Assessment & Plan     Hospital discharge follow-up  Still wheezing. Will do another prednisone taper.   - predniSONE (DELTASONE) 10 MG tablet  Dispense: 18 tablet; Refill: 0    Acute cough  Still coughing. Will do another prednisone taper as well as adding on flonase and omeprazole to see if helpful for her cough.  - omeprazole (PRILOSEC) 20 MG DR capsule  Dispense: 90 capsule; Refill: 1  - fluticasone (FLONASE) 50 MCG/ACT nasal spray  Dispense: 9.9 mL; Refill: 1  - predniSONE (DELTASONE) 10 MG tablet  Dispense: 18 tablet; Refill: 0    COPD with acute exacerbation (H)  Reviewed hospital records.   - predniSONE (DELTASONE) 10 MG tablet  Dispense: 18 tablet; Refill: 0    Chronic obstructive pulmonary disease, unspecified COPD type (H)  Chronic respiratory failure with hypoxia (H)  Has appointment with pulmonology soon.    Type 2 diabetes mellitus with other specified complication, without long-term current use of insulin (H)  Has been well controlled.  - Albumin Random Urine Quantitative with Creat Ratio  - Albumin Random Urine Quantitative with Creat Ratio    Bipolar I disorder, most recent episode depressed (H)  stable          MED REC REQUIRED  Post Medication Reconciliation Status:  Discharge medications reconciled and changed, see notes/orders  BMI  Estimated body mass index is 38.12 kg/m  as calculated from the following:    Height as of this encounter: 1.397 m (4' 7\").    Weight as of this encounter: 74.4 kg (164 lb).         I spent 45 minutes on this encounter, including collecting history from patient and reviewing records from previous providers, examining the patient, explaining and counseling the issues enumerated in the Assessment and Plan (patient given a copy), ordering indicated tests, ordering prescriptions.       Wendy Samuels is a 69 year old, presenting for the following health issues:  Hospital F/U (05/19-05/21 for SOB and cough)        5/30/2024     3:51 PM   Additional Questions " "  Roomed by Lily EDWARDS CMA   Accompanied by Mother     [facility]        Hospital Follow-up Visit:    Hospital/Nursing Home/IP Rehab Facility: [facility]  Date of Admission: 5/19  Date of Discharge: 5/21  Reason(s) for Admission: copd exacerbation, cough  Was the patient in the ICU or did the patient experience delirium during hospitalization?  No  Do you have any other stressors you would like to discuss with your provider? No    Problems taking medications regularly:  None  Medication changes since discharge: prednisone taper  Problems adhering to non-medication therapy:  None    Summary of hospitalization:  [facility] discharge summary reviewed  Diagnostic Tests/Treatments reviewed.  Follow up needed: with pulmonology  Other Healthcare Providers Involved in Patient s Care:         None  Update since discharge: fluctuating course.         Plan of care communicated with patient and family                 Review of Systems  Constitutional, HEENT, cardiovascular, pulmonary, gi and gu systems are negative, except as otherwise noted.      Objective    /57 (BP Location: Left arm, Patient Position: Sitting, Cuff Size: Adult Regular)   Pulse 87   Temp 97.4  F (36.3  C) (Oral)   Resp 16   Ht 1.397 m (4' 7\")   Wt 74.4 kg (164 lb)   SpO2 96%   BMI 38.12 kg/m    Body mass index is 38.12 kg/m .  Physical Exam   GENERAL: alert and no distress  NECK: no adenopathy, no asymmetry, masses, or scars  RESP: wheezing throughout  CV: regular rate and rhythm, normal S1 S2, no S3 or S4, no murmur, click or rub, no peripheral edema  ABDOMEN: soft, nontender  MS: no gross musculoskeletal defects noted, no edema  NEURO: Normal strength and tone, mentation intact and speech normal  PSYCH: mentation appears normal, affect normal/bright    Admission on 05/19/2024, Discharged on 05/21/2024   Component Date Value Ref Range Status    Sodium 05/19/2024 137  135 - 145 mmol/L Final    Reference " intervals for this test were updated on 09/26/2023 to more accurately reflect our healthy population. There may be differences in the flagging of prior results with similar values performed with this method. Interpretation of those prior results can be made in the context of the updated reference intervals.     Potassium 05/19/2024 4.2  3.4 - 5.3 mmol/L Final    Chloride 05/19/2024 97 (L)  98 - 107 mmol/L Final    Carbon Dioxide (CO2) 05/19/2024 29  22 - 29 mmol/L Final    Anion Gap 05/19/2024 11  7 - 15 mmol/L Final    Urea Nitrogen 05/19/2024 13.8  8.0 - 23.0 mg/dL Final    Creatinine 05/19/2024 0.71  0.51 - 0.95 mg/dL Final    GFR Estimate 05/19/2024 >90  >60 mL/min/1.73m2 Final    Calcium 05/19/2024 9.4  8.8 - 10.2 mg/dL Final    Glucose 05/19/2024 338 (H)  70 - 99 mg/dL Final    Troponin T, High Sensitivity 05/19/2024 8  <=14 ng/L Final    Either a High Sensitivity Troponin T baseline (0 hours) value = 100 ng/L, or an increase in High Sensitivity Troponin T = 7 ng/L at 2 hours compared to 0 hours (2-0 hours), suggests myocardial injury, and urgent clinical attention is required.    If the 2-0 hours increase is <7 ng/L, a High Sensitivity Troponin T result above gender-specific reference ranges warrants further evaluation.   Recommendations for further evaluation include correlation with clinical decision-making tool (e.g., HEART), a 3rd High Sensitivity Troponin T test 2 hours after the 2nd (a 20% change from baseline would represent concern), admission for observation, close PCC/cardiology follow-up, or urgent outpatient provocative testing.    N terminal Pro BNP Inpatient 05/19/2024 76  0 - 900 pg/mL Final    Reference range shown and results flagged as abnormal are suggested inpatient cut points for confirming diagnosis if CHF in an acute setting. Establishing a baseline value for each individual patient is useful for follow-up. An inpatient or emergency department NT-proPBNP <300 pg/mL effectively rules out  acute CHF, with 99% negative predictive value.    The outpatient non-acute reference range for ruling out CHF is:  0-125 pg/mL (age 18 to less than 75)  0-450 pg/mL (age 75 yrs and older)     Influenza A PCR 05/19/2024 Negative  Negative Final    Influenza B PCR 05/19/2024 Negative  Negative Final    RSV PCR 05/19/2024 Negative  Negative Final    SARS CoV2 PCR 05/19/2024 Negative  Negative Final    NEGATIVE: SARS-CoV-2 (COVID-19) RNA not detected, presumed negative.    Systolic Blood Pressure 05/19/2024 110  mmHg Final    Diastolic Blood Pressure 05/19/2024 57  mmHg Final    Ventricular Rate 05/19/2024 82  BPM Final    Atrial Rate 05/19/2024 82  BPM Final    [place] Interval 05/19/2024 136  ms Final    QRS Duration 05/19/2024 76  ms Final    QT 05/19/2024 382  ms Final    QTc 05/19/2024 446  ms Final    P Axis 05/19/2024 76  degrees Final    R AXIS 05/19/2024 70  degrees Final    T Axis 05/19/2024 66  degrees Final    Interpretation ECG 05/19/2024    Final                    Value:Sinus rhythm  Low voltage QRS  Borderline ECG  When compared with ECG of 18-SEP-2023 09:18,  Premature ventricular complexes are no longer Present  Criteria for Septal infarct are no longer Present  Nonspecific T wave abnormality no longer evident in Lateral leads  Confirmed by SEE ED PROVIDER NOTE FOR, ECG INTERPRETATION ([id number]),  HÉCTOR ZAMAN ([id number]) on 5/26/2024 1:36:02 AM      D-Dimer Quantitative 05/19/2024 0.42  0.00 - 0.50 ug/mL FEU Final    pH Venous 05/19/2024 7.35  7.32 - 7.43 Final    pCO2 Venous 05/19/2024 57 (H)  40 - 50 mm Hg Final    pO2 Venous 05/19/2024 48 (H)  25 - 47 mm Hg Final    Bicarbonate Venous 05/19/2024 31 (H)  21 - 28 mmol/L Final    Base Excess/Deficit Venous 05/19/2024 5.6 (H)  -3.0 - 3.0 mmol/L Final    FIO2 05/19/2024 36   Final    Oxyhemoglobin Venous 05/19/2024 82 (H)  70 - 75 % Final    O2 Sat, Venous 05/19/2024 82.8 (H)  70.0 - 75.0 % Final    WBC Count 05/19/2024 11.1 (H)  4.0 - 11.0 10e3/uL Final     RBC Count 05/19/2024 4.89  3.80 - 5.20 10e6/uL Final    Hemoglobin 05/19/2024 14.1  11.7 - 15.7 g/dL Final    Hematocrit 05/19/2024 45.3  35.0 - 47.0 % Final    MCV 05/19/2024 93  78 - 100 fL Final    MCH 05/19/2024 28.8  26.5 - 33.0 pg Final    MCHC 05/19/2024 31.1 (L)  31.5 - 36.5 g/dL Final    RDW 05/19/2024 13.7  10.0 - 15.0 % Final    Platelet Count 05/19/2024 213  150 - 450 10e3/uL Final    % Neutrophils 05/19/2024 92  % Final    % Lymphocytes 05/19/2024 6  % Final    % Monocytes 05/19/2024 1  % Final    % Eosinophils 05/19/2024 0  % Final    % Basophils 05/19/2024 0  % Final    % Immature Granulocytes 05/19/2024 1  % Final    NRBCs per 100 WBC 05/19/2024 0  <1 /100 Final    Absolute Neutrophils 05/19/2024 10.3 (H)  1.6 - 8.3 10e3/uL Final    Absolute Lymphocytes 05/19/2024 0.7 (L)  0.8 - 5.3 10e3/uL Final    Absolute Monocytes 05/19/2024 0.1  0.0 - 1.3 10e3/uL Final    Absolute Eosinophils 05/19/2024 0.0  0.0 - 0.7 10e3/uL Final    Absolute Basophils 05/19/2024 0.0  0.0 - 0.2 10e3/uL Final    Absolute Immature Granulocytes 05/19/2024 0.1  <=0.4 10e3/uL Final    Absolute NRBCs 05/19/2024 0.0  10e3/uL Final    Culture 05/21/2024 >10 Squamous epithelial cells/low power field indicates oral contamination. Please recollect.   Final    Gram Stain Result 05/21/2024 >10 Squamous epithelial cells/low power field   Final    Gram Stain Result 05/21/2024 >25 PMNs/low power field   Final    Gram Stain Result 05/21/2024 3+ Mixed taiwo   Final    GLUCOSE BY METER POCT 05/19/2024 320 (H)  70 - 99 mg/dL Final    GLUCOSE BY METER POCT 05/19/2024 323 (H)  70 - 99 mg/dL Final    GLUCOSE BY METER POCT 05/19/2024 343 (H)  70 - 99 mg/dL Final    Sodium 05/20/2024 139  135 - 145 mmol/L Final    Reference intervals for this test were updated on 09/26/2023 to more accurately reflect our healthy population. There may be differences in the flagging of prior results with similar values performed with this method. Interpretation of those  prior results can be made in the context of the updated reference intervals.     Potassium 05/20/2024 4.4  3.4 - 5.3 mmol/L Final    Chloride 05/20/2024 104  98 - 107 mmol/L Final    Carbon Dioxide (CO2) 05/20/2024 29  22 - 29 mmol/L Final    Anion Gap 05/20/2024 6 (L)  7 - 15 mmol/L Final    Urea Nitrogen 05/20/2024 12.4  8.0 - 23.0 mg/dL Final    Creatinine 05/20/2024 0.62  0.51 - 0.95 mg/dL Final    GFR Estimate 05/20/2024 >90  >60 mL/min/1.73m2 Final    Calcium 05/20/2024 8.9  8.8 - 10.2 mg/dL Final    Glucose 05/20/2024 181 (H)  70 - 99 mg/dL Final    WBC Count 05/20/2024 16.3 (H)  4.0 - 11.0 10e3/uL Final    RBC Count 05/20/2024 4.83  3.80 - 5.20 10e6/uL Final    Hemoglobin 05/20/2024 14.2  11.7 - 15.7 g/dL Final    Hematocrit 05/20/2024 44.7  35.0 - 47.0 % Final    MCV 05/20/2024 93  78 - 100 fL Final    MCH 05/20/2024 29.4  26.5 - 33.0 pg Final    MCHC 05/20/2024 31.8  31.5 - 36.5 g/dL Final    RDW 05/20/2024 13.7  10.0 - 15.0 % Final    Platelet Count 05/20/2024 228  150 - 450 10e3/uL Final    GLUCOSE BY METER POCT 05/20/2024 231 (H)  70 - 99 mg/dL Final    GLUCOSE BY METER POCT 05/20/2024 158 (H)  70 - 99 mg/dL Final    Procalcitonin 05/20/2024 0.08  <0.50 ng/mL Final    Comment: Interpretation and Recommendations     <0.5 ng/mL:   Systemic bacterial infection unlikely. Local bacterial infection is possible.     0.5-1.99 ng/mL:   Systemic bacterial infection possible, but various other conditions are known to induce PCT as well.     >=2.00 ng/mL:   Systemic bacterial infection likely, unless other causes are known.     Decision to start antibiotics should not be based on procalcitonin level alone. See Procalcitonin Guidance document for more details. [email or web address]-[email or web address]    Factors that may affect PCT levels (not all-inclusive):     - Increased PCT level           Severe trauma/burns           Invasive surgery           Cooling  therapy after cardiac arrest/surgery           Treatment with agents which stimulate cytokines           Acute kidney injury           Chronic kidney disease and end stage renal disease           Acute graft vs host disease           Non-specific shock                            causing decreased organ perfusion and/or infarction       - Normal or unchanged PCT level           Early in infections (if low and infection is suspected, repeating in 6-12 hours is recommended)           Chronic infections (endocarditis, osteomyelitis, prosthetic device/graft infections)           Localized infections (cellulitis, wound infections, intra-abdominal abscess)     Note: PCT has not been extensively studied in pregnancy/breastfeeding, pediatrics, severe immunosuppression, and cystic fibrosis.    GLUCOSE BY METER POCT 05/20/2024 184 (H)  70 - 99 mg/dL Final    GLUCOSE BY METER POCT 05/20/2024 250 (H)  70 - 99 mg/dL Final    GLUCOSE BY METER POCT 05/20/2024 264 (H)  70 - 99 mg/dL Final    GLUCOSE BY METER POCT 05/21/2024 234 (H)  70 - 99 mg/dL Final    GLUCOSE BY METER POCT 05/21/2024 197 (H)  70 - 99 mg/dL Final           Signed Electronically by: Rema Whiting MD

## 2024-05-31 LAB
CREAT UR-MCNC: 71.9 MG/DL
MICROALBUMIN UR-MCNC: 12 MG/L
MICROALBUMIN/CREAT UR: 16.69 MG/G CR (ref 0–25)

## 2024-06-03 DIAGNOSIS — E11.69 TYPE 2 DIABETES MELLITUS WITH OTHER SPECIFIED COMPLICATION, WITHOUT LONG-TERM CURRENT USE OF INSULIN (H): ICD-10-CM

## 2024-06-04 RX ORDER — DULAGLUTIDE 3 MG/.5ML
INJECTION, SOLUTION SUBCUTANEOUS
Qty: 4 ML | Refills: 1 | Status: SHIPPED | OUTPATIENT
Start: 2024-06-04 | End: 2024-09-20

## 2024-06-06 ENCOUNTER — TELEPHONE (OUTPATIENT)
Dept: FAMILY MEDICINE | Facility: CLINIC | Age: 69
End: 2024-06-06
Payer: COMMERCIAL

## 2024-06-06 NOTE — TELEPHONE ENCOUNTER
General Call    Contacts         Type Contact Phone/Fax    06/06/2024 12:32 PM CDT Phone (Incoming) Arvind Leong (Self) [phone] (H)          Reason for Call:  patient called to see if we received letter from her dental office. I checked with Dr. Whiting and this is on her desk to be worked on. Patient was informed and she will be waiting to hear when this is finished.     Could we send this information to you in [employer] or would you prefer to receive a phone call?:   Patient would prefer a phone call   Okay to leave a detailed message?: Yes at Cell number on file:    Telephone Information:   Mobile [phone]

## 2024-06-25 ENCOUNTER — OFFICE VISIT (OUTPATIENT)
Dept: FAMILY MEDICINE | Facility: CLINIC | Age: 69
End: 2024-06-25
Payer: COMMERCIAL

## 2024-06-25 VITALS
DIASTOLIC BLOOD PRESSURE: 63 MMHG | OXYGEN SATURATION: 92 % | SYSTOLIC BLOOD PRESSURE: 100 MMHG | BODY MASS INDEX: 38.12 KG/M2 | TEMPERATURE: 98 F | RESPIRATION RATE: 16 BRPM | WEIGHT: 164 LBS | HEART RATE: 79 BPM

## 2024-06-25 DIAGNOSIS — M25.521 RIGHT ELBOW PAIN: Primary | ICD-10-CM

## 2024-06-25 PROCEDURE — 99213 OFFICE O/P EST LOW 20 MIN: CPT

## 2024-06-25 RX ORDER — METHYLPREDNISOLONE 4 MG
TABLET, DOSE PACK ORAL
Qty: 21 TABLET | Refills: 0 | Status: SHIPPED | OUTPATIENT
Start: 2024-06-25 | End: 2024-07-10

## 2024-06-25 NOTE — PROGRESS NOTES
URGENT CARE  Assessment & Plan   Assessment:   Arvind Leong is a 69 year old female who's clinical presentation today is consistent with:   1. Right elbow pain - subacute   - Orthopedic  Referral; Future  - methylPREDNISolone (MEDROL DOSEPAK) 4 MG tablet therapy pack  Plan:  will treat patient at this time symptomatically and supportively, this will include encouraging: using NSAIDs/Tylenol and steroids to help decrease pain and inflammation, resting, applying ice/heat as needed, compression and elevation;  x-rays are not indicated today given patient did not have an accident or injury and I am not suspicious of a fracture.  Educated patient to follow-up with ortho in the next 1-2 weeks for further evaluation and reassessment, also discussed to return immediatly  if symptoms worsen after today's visit.   No alarm signs or symptoms present   Differential Diagnoses for this patient's chief complaint that I considered include:  fracture, dislocation, Ligamentous vs tendon pathology, musculoskeletal injury, soft tissue injury     Patient is} agreeable to treatment plan and state they will follow-up if symptoms do not improve and/or if symptoms worsen   see patient's AVS 'monitor for' section for specific patient instructions given and discussed regarding what to watch for and when to follow up    [facility]      ______________________________________________________________________      Subjective     HPI: Arvind Leong  is a 69 year old  female who presents today for evaluation the following concerns:   Patient presents endorsing right elbow pain which started months ago    Patient states this pain is  related to a traumatic injury/accident and is new    patient states that they sleep on that side and now its sore    Patient localizes the pain to the inner aspect, and states there is no radiation of the pain to the shoulder or wrist    patient denies any  "associated} symptoms such as swelling, redness or bruising   Patient states their: Skin is intact}. ROM is \"normal\", has full range}, and their strength is normal}   Patient reports sensation is without numbness or tingling.   Self care to this point includes: nothing yet    Review of Systems:  Pertinent review of systems as reflected in HPI, otherwise negative.     Objective    Physical Exam:  Vitals:    06/25/24 1106   BP: 100/63   Pulse: 79   Resp: 16   Temp: 98  F (36.7  C)   SpO2: 92%   Weight: 74.4 kg (164 lb)      General:   alert and oriented, no acute distress, non ill-appearing   Vital signs reviewed: afebrile and normotensive   msk:   Right elbow:   no erythema, ecchymosis, or inflammation present   Slight tenderness/pain with palpation   No  decreased range of motion with extension, flexion, pronation, supination.   Strength with pronation +4, supination+4  Temperature  equal} to body temperature, no crepitus, no gross deformity, joint  laxity, skin intact, and no lacerations present.    ______________________________________________________________________    I explained my diagnostic considerations and recommendations to the patient, who voiced understanding and agreement with the treatment plan.   All questions were answered.   We discussed potential side effects, risks and benefits of any prescribed or recommended therapies, as well as expectations for response to treatments.  Please see AVS for any patient instructions & handouts given.   Patient was advised to contact the Nurse Care Line, their Primary Care provider, Urgent Care, or the Emergency Department if there are new or worsening symptoms, or call 911 for emergencies.  "

## 2024-07-02 NOTE — PROGRESS NOTES
ASSESSMENT & PLAN         Today we discussed the underlying etiology/pathology of patient's   1. Adhesive capsulitis of right shoulder    2. COPD with acute exacerbation (H)    3. Bipolar I disorder, most recent episode depressed (H)    4. Dependence on continuous supplemental oxygen    5. Morbid obesity (H)    6. Primary osteoarthritis of right shoulder      -We discussed today that patient's right upper extremity symptoms reproduced with actually testing her right shoulder with no evidence of pain at the level of the elbow to palpation or range of motion of the elbow.  Symptoms reproduced with passive and active range of motion of the right shoulder and rotator cuff testing with referred pain through the proximal upper extremity to the elbow on the right side.  - Patient has findings of adhesive capsulitis of the right shoulder with loss of active and passive range of motion with reproduction of symptoms.  Based on x-ray I cannot rule out partial rotator cuff tear as well as there are some degenerative changes of the glenohumeral joint noted radiographically  - Patient has numerous medical morbidities and we agreed to proceed with a right shoulder intra-articular cortisone injection with referral to physical therapy to work on restoring passive range of motion and progressing to active range of motion as able of the right shoulder.  - X-rays reviewed with the patient today of her shoulder and elbow.  Elbow x-rays are largely unremarkable.  Shoulder x-rays show subacromial degeneration/spur formation consistent with shoulder impingement as well as early degenerative changes of the glenohumeral joint and AC joint.  -We discussed that the injection should help decrease pain but she needs to attend physical therapy on a regular basis to work on restoration of range of motion and function of her right shoulder at least for the next 2-3 months.    -Call direct clinic number [[phone]] at any time with questions or  "concerns in regards to your recent office visit with me.     Jose Coffman PA-C  [facility]    This note was completed in part using a voice recognition software, any grammatical or context distortion are unintentional and inherent to the software.       1. Adhesive capsulitis of right shoulder    2. COPD with acute exacerbation (H)    3. Bipolar I disorder, most recent episode depressed (H)    4. Dependence on continuous supplemental oxygen    5. Morbid obesity (H)    6. Primary osteoarthritis of right shoulder      Steroid injection of the right shoulder: intra-articular  was performed today in clinic, 7/9/2024 by Jose Coffman PA-C  Time spent today with patient was separate/exclusive from performing the associated procedure.       Patient instructions after cortisone injection:   - Do not soak in a hot tub, bath or swimming pool for 48 hours to minimize risk of infection to injection site  - Ok to shower for daily cleaning  - Apply ice today to injection site for 10-15 minutes up to 3 times a day and only do your normal amounts of activity to prevent increased inflammation and pain for the next few days  - The lidocaine, \"numbing medicine\" (what is giving you pain relief right now) will likely wear off in 1-2 hours.  Once the lidocaine wears off you may note symptoms similar to before you received the injection and this is normal and expected. The corticosteroid which is designed to give prolonged relief of your symptoms will not reach maximum effect/relief until approximately 1-2 weeks from date of injection.   -In a small percentage of people, cortisone can cause flushing/redness in the face. This usually lasts for 1-3 days and resolves. Cool compress to the face, low dose benadryl and Ibuprofen/Tylenol can help if this happens.  -If you are diabetic you will notice increased blood sugar levels for up to a week due to your cortisone injection. Adjust your insulin sliding scale to " "correct your blood sugar levels.  If you are diabetic and take only oral/pill glycemic control medications  please continue with your normal daily dosing.  If any concerns in regards to your blood sugars or diabetic medications please address this with your primary care provider managing your diabetes.    -Rarely for a small percentage of patients, the area injected can become more painful and difficult to move for 24-36 hours after your injection due to a local inflammation response to the injection.  This is called a \"cortisone flare\".   It will resolve over a couple days on its own.  Rest, gentle motion of the joint, Tylenol, NSAID's (ibuprofen, Aleve) and ice to the area are all helpful to minimize discomfort.  -Cortisone injections can be repeated anytime after 4-6 months if your pain returns but should only be repeated if you are symptomatic and not to be done for preventative reasons.   -Proceeding with cortisone injection will delay surgical consideration such as total joint arthroplasty/joint replacement for the joint that was injected for a minimum of 4 months from the date of cortisone injection.    Follow up for repeat assessment and possible repeat injection if pain returns.         SUBJECTIVE  Arvind Leong is a/an 69 year old female who is seen in consultation at the request of  Savi Shen C.N.P. for evaluation of right elbow pain. The patient is seen by themselves.  Expanded HPI: Patient was hospitalized 5/19/2024 with shortness of breath and wheezing.  Patient states that she was having shoulder pain at that time mainly nocturnally.  She was placed on a prednisone taper for acute exasperation of underlying COPD.  Oral prednisone was extended again on 5/30/2024.  Subsequently patient was seen in family practice on 06/25/2024 complaining of right elbow discomfort and was placed on another dose of prednisone.  Prednisone has not benefited patient's current right elbow/shoulder concerns.  Patient " states that she had similar symptoms in her left arm in the past and she received some sort of injection done in [place] 15 years ago but patient does not recall who provided that injection but she believes it was in her left shoulder.  Patient is right-hand dominant.  She has difficulty and pain with activities that involve shoulder and arm motion-example is bingo.  Patient is on chronic portable oxygen.  She denies any symptoms in the forearm, wrist or hand on the right side with no numbness or tingling.    Onset: At least 8 week(s) ago. Reports insidious onset without acute precipitating event.  Location of Pain: right proximal shoulder/biceps, distal and proximal attachments  Rating of Pain at worst: 10/10  Rating of Pain Currently: 0/10  Worsened by: weightbearing with right arm, raising arm, elbow flexion (patient is RHD)  Better with: resting with elbow supported on pillow  Treatments tried: prednisone, Tylenol  Quality: sharp, unbearable, miserable, tender  Associated symptoms: no distal numbness or tingling; denies swelling or warmth; clicking in elbow with pronation and supination  Orthopedic history: YES - Date: >15 years ago; similar pain in left shoulder, patient states pain resolved with steroid injection  Relevant surgical history: NO  Social history: social history: retired; going back to work as a  at [employer]    Past Medical History:   Diagnosis Date    Acute on chronic respiratory failure with hypoxia (H) 11/15/2016    Bipolar 1 disorder (H)     CAP (community acquired pneumonia) 11/15/2016    Cervical high risk HPV (human papillomavirus) test positive 3/5/2018    3/5/18 NIL pap, +HR HPV (not 16/18) 5/21/21 NIL pap, neg HPV. Plan: await provider    COPD exacerbation (H) 4/24/2021    COPD with exacerbation (H) 11/15/2016    Diabetes mellitus, type II (H)     Hearing loss     Meniere's disease     Pneumonia 4/23/2021    Pneumonia of right lower lobe due to infectious organism 6/11/2019      Social History     Socioeconomic History    Marital status:    Tobacco Use    Smoking status: Former     Current packs/day: 0.00     Average packs/day: 1 pack/day for 40.0 years (40.0 ttl pk-yrs)     Types: Cigarettes     Start date: 1977     Quit date: 2017     Years since quittin.4    Smokeless tobacco: Never   Vaping Use    Vaping status: Never Used   Substance and Sexual Activity    Alcohol use: Yes     Comment: Alcoholic Drinks/day: Occasional     Drug use: No    Sexual activity: Never   Social History Narrative    2019The patient lives with her mother.; had worked at [employer] as  but quit 2019.   Quit smoking ; no etoh problems  / x 2 ; no kids     Social Determinants of Health     Financial Resource Strain: Low Risk  (10/12/2023)    Financial Resource Strain     Within the past 12 months, have you or your family members you live with been unable to get utilities (heat, electricity) when it was really needed?: No   Food Insecurity: Low Risk  (10/12/2023)    Food Insecurity     Within the past 12 months, did you worry that your food would run out before you got money to buy more?: No     Within the past 12 months, did the food you bought just not last and you didn t have money to get more?: No   Transportation Needs: Low Risk  (10/12/2023)    Transportation Needs     Within the past 12 months, has lack of transportation kept you from medical appointments, getting your medicines, non-medical meetings or appointments, work, or from getting things that you need?: No   Interpersonal Safety: Low Risk  (2024)    Interpersonal Safety     Do you feel physically and emotionally safe where you currently live?: Yes     Within the past 12 months, have you been hit, slapped, kicked or otherwise physically hurt by someone?: No     Within the past 12 months, have you been humiliated or emotionally abused in other ways by your partner or ex-partner?: No   Housing  Stability: High Risk (10/12/2023)    Housing Stability     Do you have housing? : No     Are you worried about losing your housing?: No         Patient's past medical, surgical, social, and family histories were personally reviewed today and no changes are noted.    REVIEW OF SYSTEMS:  10 point ROS is negative other than symptoms noted above in HPI, Past Medical History or as stated below  Constitutional: NEGATIVE for fever, chills, change in weight  Skin: NEGATIVE for worrisome rashes, moles or lesions  GI/: NEGATIVE for bowel or bladder changes  Neuro: NEGATIVE for weakness, dizziness or paresthesias    OBJECTIVE:  Vital signs as noted in EPIC for 7/2/2024  General: healthy, alert and in no distress  HEENT: no scleral icterus or conjunctival erythema  Skin: no suspicious lesions or rash. No jaundice.  CV: no pedal edema  Resp: normal respiratory effort without conversational dyspnea-patient on portable oxygen with nasal cannula  Psych: normal mood and affect  Gait: normal steady gait with appropriate coordination and balance  Neuro: Normal light sensory exam of lower extremity      MSK:  Exam shows a 69-year-old female who ambulates full weightbearing with portable nasal cannula oxygen.  She provides her own history.  She is unaccompanied to today's office visit.  She carries her portable oxygen in her left hand.  Patient has some central obesity.  Mood and affect appear to be stable.  Examination of both upper extremities show no obvious bruising, swelling or ecchymosis.  No previous surgical incisions from the right shoulder all the way down to the hand.  Patient has normal pain-free range of motion of the elbow with repetitive flexion and extension with the shoulder stabilized close to her body.  Negative Finklestein's test.  No pain throughout the flexor or extensor musculature of the forearm.  No tenderness over the medial or lateral condyle.  No pain over the distal bicep tendon or anterior cubital fossa.   No pain throughout the muscle belly of the biceps or triceps.  Patient is nontender at the sternoclavicular joint and sternum.  No pain at the clavicle or AC joint.  Patient is tender in the anterior subacromial space.  No pain on the deltoid or posterior shoulder girdle.  Patient has significantly decreased active range of motion of the right shoulder only able to forward elevate to approximately 90 degrees with significant compensation and lateral leaning to the left to try to elevate her arm.  Passively I can only bring her to 95 degrees with significant pain and guarding.  Abduction is to approximately 60 degrees.  Internal rotation is only to the right buttock.  Left arm shows elevation about 130 degrees with abduction to 90 degrees internal rotation to the L2 spinous process.  Patient does have increased discomfort and decreased range of motion of the shoulder with impingement testing as well as reverse impingement testing.  Rotator cuff strength is slightly decreased with resisted external rotation.  Internal rotation appears to be normal mild discomfort and mild weakness noted with empty can testing.  Radial pulses are +2 and symmetric.  Patient is grossly neurovascularly intact throughout the right upper extremity.  Acceptable range of motion of the C-spine.            Personal Independent visualization of the below images done today:  Three-view x-ray of the patient's right shoulder and three-view x-ray of the patient's right elbow are obtained and reviewed today.  Right elbow x-rays are largely unremarkable.  No evidence of fracture or dislocation.  Joint spaces maintained  X-rays of the shoulder show subacromial acromial spurring and degeneration with associated AC joint arthritis.  Mild degenerative changes noted of the right glenohumeral joint.  Subacromial space maintained.  No evidence of fracture or dislocation    Large Joint Injection/Arthocentesis: R glenohumeral joint    Date/Time: 7/9/2024 2:43  PM    Performed by: Jose Coffman PA-C  Authorized by: Jose Coffman PA-C    Indications:  Pain  Needle Size:  22 G  Guidance: landmark guided    Approach:  Posterolateral  Location:  Shoulder      Site:  R glenohumeral joint  Medications:  40 mg triamcinolone 40 MG/ML; 2 mL BUPivacaine 0.5 %; 4 mL lidocaine 1 %  Outcome:  Tolerated well, no immediate complications  Procedure discussed: discussed risks, benefits, and alternatives    Consent Given by:  Patient  Timeout: timeout called immediately prior to procedure    Prep: patient was prepped and draped in usual sterile fashion          Patient's conditions were thoroughly discussed during today's visit with total time reviewing patient's previous medical records/history/radiology, face-to-face examination and discussion and plan of care with the patient and documentation being 30 minutes for today's clinical visit  Jose Coffman PA-C  [facility]    This note was completed in part using a voice recognition software, any grammatical or context distortion are unintentional and inherent to the software.

## 2024-07-09 ENCOUNTER — OFFICE VISIT (OUTPATIENT)
Dept: ORTHOPEDICS | Facility: CLINIC | Age: 69
End: 2024-07-09
Payer: COMMERCIAL

## 2024-07-09 ENCOUNTER — ANCILLARY PROCEDURE (OUTPATIENT)
Dept: GENERAL RADIOLOGY | Facility: CLINIC | Age: 69
End: 2024-07-09
Attending: PHYSICIAN ASSISTANT
Payer: COMMERCIAL

## 2024-07-09 VITALS — HEIGHT: 55 IN | BODY MASS INDEX: 39.34 KG/M2 | WEIGHT: 170 LBS

## 2024-07-09 DIAGNOSIS — F31.30 BIPOLAR I DISORDER, MOST RECENT EPISODE DEPRESSED (H): ICD-10-CM

## 2024-07-09 DIAGNOSIS — M75.01 ADHESIVE CAPSULITIS OF RIGHT SHOULDER: Primary | ICD-10-CM

## 2024-07-09 DIAGNOSIS — M25.521 RIGHT ELBOW PAIN: ICD-10-CM

## 2024-07-09 DIAGNOSIS — Z99.81 DEPENDENCE ON CONTINUOUS SUPPLEMENTAL OXYGEN: ICD-10-CM

## 2024-07-09 DIAGNOSIS — M25.511 PAIN IN JOINT OF RIGHT SHOULDER: ICD-10-CM

## 2024-07-09 DIAGNOSIS — J44.1 COPD WITH ACUTE EXACERBATION (H): ICD-10-CM

## 2024-07-09 DIAGNOSIS — E66.01 MORBID OBESITY (H): ICD-10-CM

## 2024-07-09 DIAGNOSIS — M19.011 PRIMARY OSTEOARTHRITIS OF RIGHT SHOULDER: ICD-10-CM

## 2024-07-09 PROCEDURE — 20610 DRAIN/INJ JOINT/BURSA W/O US: CPT | Mod: RT | Performed by: PHYSICIAN ASSISTANT

## 2024-07-09 PROCEDURE — 73030 X-RAY EXAM OF SHOULDER: CPT | Mod: TC | Performed by: RADIOLOGY

## 2024-07-09 PROCEDURE — 73080 X-RAY EXAM OF ELBOW: CPT | Mod: TC | Performed by: RADIOLOGY

## 2024-07-09 PROCEDURE — 99203 OFFICE O/P NEW LOW 30 MIN: CPT | Mod: 25 | Performed by: PHYSICIAN ASSISTANT

## 2024-07-09 RX ORDER — TRIAMCINOLONE ACETONIDE 40 MG/ML
40 INJECTION, SUSPENSION INTRA-ARTICULAR; INTRAMUSCULAR
Status: SHIPPED | OUTPATIENT
Start: 2024-07-09

## 2024-07-09 RX ORDER — BUPIVACAINE HYDROCHLORIDE 5 MG/ML
2 INJECTION, SOLUTION PERINEURAL
Status: SHIPPED | OUTPATIENT
Start: 2024-07-09

## 2024-07-09 RX ORDER — LIDOCAINE HYDROCHLORIDE 10 MG/ML
4 INJECTION, SOLUTION INFILTRATION; PERINEURAL
Status: SHIPPED | OUTPATIENT
Start: 2024-07-09

## 2024-07-09 RX ADMIN — LIDOCAINE HYDROCHLORIDE 4 ML: 10 INJECTION, SOLUTION INFILTRATION; PERINEURAL at 14:43

## 2024-07-09 RX ADMIN — TRIAMCINOLONE ACETONIDE 40 MG: 40 INJECTION, SUSPENSION INTRA-ARTICULAR; INTRAMUSCULAR at 14:43

## 2024-07-09 RX ADMIN — BUPIVACAINE HYDROCHLORIDE 2 ML: 5 INJECTION, SOLUTION PERINEURAL at 14:43

## 2024-07-09 NOTE — PATIENT INSTRUCTIONS
Today we discussed the underlying etiology/pathology of patient's   1. Adhesive capsulitis of right shoulder    2. COPD with acute exacerbation (H)    3. Bipolar I disorder, most recent episode depressed (H)    4. Dependence on continuous supplemental oxygen    5. Morbid obesity (H)    6. Primary osteoarthritis of right shoulder      -We discussed today that patient's right upper extremity symptoms reproduced with actually testing her right shoulder with no evidence of pain at the level of the elbow to palpation or range of motion of the elbow.  Symptoms reproduced with passive and active range of motion of the right shoulder and rotator cuff testing with referred pain through the proximal upper extremity to the elbow on the right side.  - Patient has findings of adhesive capsulitis of the right shoulder with loss of active and passive range of motion with reproduction of symptoms.  Based on x-ray I cannot rule out partial rotator cuff tear as well as there are some degenerative changes of the glenohumeral joint noted radiographically  - Patient has numerous medical morbidities and we agreed to proceed with a right shoulder intra-articular cortisone injection with referral to physical therapy to work on restoring passive range of motion and progressing to active range of motion as able of the right shoulder.  - X-rays reviewed with the patient today of her shoulder and elbow.  Elbow x-rays are largely unremarkable.  Shoulder x-rays show subacromial degeneration/spur formation consistent with shoulder impingement as well as early degenerative changes of the glenohumeral joint and AC joint.  -We discussed that the injection should help decrease pain but she needs to attend physical therapy on a regular basis to work on restoration of range of motion and function of her right shoulder at least for the next 2-3 months.    -Call direct clinic number [[phone]] at any time with questions or concerns in regards  "to your recent office visit with me.     Jose Coffman PA-C  [facility]    This note was completed in part using a voice recognition software, any grammatical or context distortion are unintentional and inherent to the software.       1. Adhesive capsulitis of right shoulder    2. COPD with acute exacerbation (H)    3. Bipolar I disorder, most recent episode depressed (H)    4. Dependence on continuous supplemental oxygen    5. Morbid obesity (H)    6. Primary osteoarthritis of right shoulder      Steroid injection of the right shoulder: intra-articular  was performed today in clinic, 7/9/2024 by Jose Coffman PA-C  Time spent today with patient was separate/exclusive from performing the associated procedure.       Patient instructions after cortisone injection:   - Do not soak in a hot tub, bath or swimming pool for 48 hours to minimize risk of infection to injection site  - Ok to shower for daily cleaning  - Apply ice today to injection site for 10-15 minutes up to 3 times a day and only do your normal amounts of activity to prevent increased inflammation and pain for the next few days  - The lidocaine, \"numbing medicine\" (what is giving you pain relief right now) will likely wear off in 1-2 hours.  Once the lidocaine wears off you may note symptoms similar to before you received the injection and this is normal and expected. The corticosteroid which is designed to give prolonged relief of your symptoms will not reach maximum effect/relief until approximately 1-2 weeks from date of injection.   -In a small percentage of people, cortisone can cause flushing/redness in the face. This usually lasts for 1-3 days and resolves. Cool compress to the face, low dose benadryl and Ibuprofen/Tylenol can help if this happens.  -If you are diabetic you will notice increased blood sugar levels for up to a week due to your cortisone injection. Adjust your insulin sliding scale to correct your blood " "sugar levels.  If you are diabetic and take only oral/pill glycemic control medications  please continue with your normal daily dosing.  If any concerns in regards to your blood sugars or diabetic medications please address this with your primary care provider managing your diabetes.    -Rarely for a small percentage of patients, the area injected can become more painful and difficult to move for 24-36 hours after your injection due to a local inflammation response to the injection.  This is called a \"cortisone flare\".   It will resolve over a couple days on its own.  Rest, gentle motion of the joint, Tylenol, NSAID's (ibuprofen, Aleve) and ice to the area are all helpful to minimize discomfort.  -Cortisone injections can be repeated anytime after 4-6 months if your pain returns but should only be repeated if you are symptomatic and not to be done for preventative reasons.   -Proceeding with cortisone injection will delay surgical consideration such as total joint arthroplasty/joint replacement for the joint that was injected for a minimum of 4 months from the date of cortisone injection.    Follow up for repeat assessment and possible repeat injection if pain returns.           "

## 2024-07-10 ENCOUNTER — OFFICE VISIT (OUTPATIENT)
Dept: FAMILY MEDICINE | Facility: CLINIC | Age: 69
End: 2024-07-10
Payer: COMMERCIAL

## 2024-07-10 VITALS
HEIGHT: 55 IN | DIASTOLIC BLOOD PRESSURE: 60 MMHG | TEMPERATURE: 98.4 F | WEIGHT: 170.2 LBS | RESPIRATION RATE: 16 BRPM | HEART RATE: 80 BPM | OXYGEN SATURATION: 92 % | SYSTOLIC BLOOD PRESSURE: 104 MMHG | BODY MASS INDEX: 39.39 KG/M2

## 2024-07-10 DIAGNOSIS — Z01.818 PREOP GENERAL PHYSICAL EXAM: Primary | ICD-10-CM

## 2024-07-10 DIAGNOSIS — J44.9 CHRONIC OBSTRUCTIVE PULMONARY DISEASE, UNSPECIFIED COPD TYPE (H): ICD-10-CM

## 2024-07-10 DIAGNOSIS — H26.9 CATARACT OF LEFT EYE, UNSPECIFIED CATARACT TYPE: ICD-10-CM

## 2024-07-10 DIAGNOSIS — E11.69 TYPE 2 DIABETES MELLITUS WITH OTHER SPECIFIED COMPLICATION, WITHOUT LONG-TERM CURRENT USE OF INSULIN (H): ICD-10-CM

## 2024-07-10 PROCEDURE — 99214 OFFICE O/P EST MOD 30 MIN: CPT | Performed by: PHYSICIAN ASSISTANT

## 2024-07-10 RX ORDER — KETOROLAC TROMETHAMINE 5 MG/ML
SOLUTION OPHTHALMIC
COMMUNITY
Start: 2024-06-28

## 2024-07-10 RX ORDER — RESPIRATORY SYNCYTIAL VIRUS VACCINE 120MCG/0.5
0.5 KIT INTRAMUSCULAR ONCE
Qty: 1 EACH | Refills: 0 | Status: CANCELLED | OUTPATIENT
Start: 2024-07-10 | End: 2024-07-10

## 2024-07-10 RX ORDER — PREDNISOLONE ACETATE 10 MG/ML
SUSPENSION/ DROPS OPHTHALMIC
COMMUNITY
Start: 2024-06-28

## 2024-07-10 ASSESSMENT — PATIENT HEALTH QUESTIONNAIRE - PHQ9
10. IF YOU CHECKED OFF ANY PROBLEMS, HOW DIFFICULT HAVE THESE PROBLEMS MADE IT FOR YOU TO DO YOUR WORK, TAKE CARE OF THINGS AT HOME, OR GET ALONG WITH OTHER PEOPLE: VERY DIFFICULT
SUM OF ALL RESPONSES TO PHQ QUESTIONS 1-9: 17
SUM OF ALL RESPONSES TO PHQ QUESTIONS 1-9: 17

## 2024-07-10 NOTE — PROGRESS NOTES
Preoperative Evaluation  [facility]  [address] [facility] [address]  Phone: [phone]  Fax: [phone]  Primary Provider: Rema Whiting MD  Pre-op Performing Provider: Lit Lopez PA-C  Jul 10, 2024             7/10/2024   Surgical Information   What procedure is being done? cateract   Facility or Hospital where procedure/surgery will be performed: [facility]   Who is doing the procedure / surgery? dr amato   Date of surgery / procedure: 8   Time of surgery / procedure: morning   Where do you plan to recover after surgery? at home with family        Fax number for surgical facility: [phone]    Assessment & Plan     The proposed surgical procedure is considered LOW risk.    Preop general physical exam  Stable exam.  Low risk surgery.  Cleared    Cataract of left eye, unspecified cataract type      Chronic obstructive pulmonary disease, unspecified COPD type (H)  Chronic history on 2 L nasal cannula satting at 92%.  Appears at baseline.  Patient states no monitored or general anesthesia as needed for this procedure.  Recommending she maintain her oxygen use during procedure.  Oxygen levels to be monitored closely during procedure.    Type 2 diabetes mellitus with other specified complication, without long-term current use of insulin (H)  Past history.  Recent A1c's and sugars within normal range.  Discuss ongoing mild weight gain.  Discussed diabetes education.  She would be interested in this.  Referral placed.    Depression Screening Follow Up        Follow Up Actions Taken  Crisis resource information provided in After Visit Summary  Referred patient back to current mental health provider.            Preoperative Medication Instructions  Antiplatelet or Anticoagulation Medication Instructions   - Bleeding risk is low for this procedure (e.g. dental, skin, cataract).    Additional Medication Instructions  Take all scheduled medications on the day of  surgery EXCEPT for modifications listed below:   - metformin: DO NOT TAKE day of surgery.   - GLP-1 Non-anesthesia cases (trulicity): DO NOT TAKE day of surgery    Recommendation  Approval given to proceed with proposed procedure, without further diagnostic evaluation.    Wendy Samuels is a 69 year old, presenting for the following:  Pre-Op Exam (Cataract Surgery left eye. Surgery scheduled for August. )          7/10/2024     9:15 AM   Additional Questions   Roomed by Ida CORDERO CMA         7/10/2024   Forms   Any forms needing to be completed Yes        HPI related to upcoming procedure: Past history of left cataract.  The above procedure was deemed next best at the management.        7/10/2024   Pre-Op Questionnaire   Have you ever had a heart attack or stroke? No   Have you ever had surgery on your heart or blood vessels, such as a stent placement, a coronary artery bypass, or surgery on an artery in your head, neck, heart, or legs? No   Do you have chest pain with activity? No   Do you have a history of heart failure? No   Do you currently have a cold, bronchitis or symptoms of other infection? No   Do you have a cough, shortness of breath, or wheezing? (!) YES no worsening at baseline.   Do you or anyone in your family have previous history of blood clots? No   Do you or does anyone in your family have a serious bleeding problem such as prolonged bleeding following surgeries or cuts? No   Have you ever had problems with anemia or been told to take iron pills? No   Have you had any abnormal blood loss such as black, tarry or bloody stools, or abnormal vaginal bleeding? No   Have you ever had a blood transfusion? No   Are you willing to have a blood transfusion if it is medically needed before, during, or after your surgery? (!) NO    Have you or any of your relatives ever had problems with anesthesia? No   Do you have sleep apnea, excessive snoring or daytime drowsiness? (!) YES   Do you have a CPAP  machine? Yes   Do you have any artifical heart valves or other implanted medical devices like a pacemaker, defibrillator, or continuous glucose monitor? No   Do you have artificial joints? No   Are you allergic to latex? No        Health Care Directive  Patient does not have a Health Care Directive or Living Will: Discussed advance care planning with patient; however, patient declined at this time.    Preoperative Review of    reviewed - no record of controlled substances prescribed.      Status of Chronic Conditions:  See problem list for active medical problems.  Problems all longstanding and stable, except as noted/documented.  See ROS for pertinent symptoms related to these conditions.    COPD - Patient has a longstanding history of moderate-severe COPD . Patient has been doing well overall noting COUGH and WHEEZING and continues on medication regimen consisting of below without adverse reactions or side effects.    DEPRESSION - Patient has a long history of Depression of moderate severity requiring medication for control with recent symptoms being stable..Current symptoms of depression include depressed mood, hopelessness, fatigue.     DIABETES - Patient has a longstanding history of DiabetesType Type II . Patient is being treated with diet, oral agents, and GLP-1 agonists and denies significant side effects. Control has been good. Complicating factors include but are not limited to: morbid obesity .     Patient Active Problem List    Diagnosis Date Noted    COPD with acute exacerbation (H) 05/19/2024     Priority: Medium    Community acquired bacterial pneumonia 09/18/2023     Priority: Medium    Hypercarbia 12/09/2021     Priority: Medium    Acute confusion 12/09/2021     Priority: Medium    Hypoxia 12/09/2021     Priority: Medium    Cardiomyopathy, unspecified type (H) 07/30/2021     Priority: Medium    Chronic respiratory failure with hypoxia (H) 11/23/2020     Priority: Medium    Type 2 diabetes  "mellitus with other specified complication, without long-term current use of insulin (H)      Priority: Medium    CAP (community acquired pneumonia) due to Chlamydia species 06/11/2019     Priority: Medium    Runny nose 03/18/2019     Priority: Medium    Bipolar I disorder, most recent episode depressed (H) 03/04/2019     Priority: Medium    COPD (chronic obstructive pulmonary disease) (H)      Priority: Medium     Created by Conversion  smsPREP [facility] Annotation: Jan 29 2013  1:24PM - Lily Alejandro: acute   respiratory failure 1/20/2013 (hospitalized 5 days at [facility])--discharged   with supplemental oxygen      Formatting of this note might be different from the original.  Created by Conversion  smsPREP [facility] Annotation: Jan 29 2013  1:24PM - Lily Alejandro: acute   respiratory failure 1/20/2013 (hospitalized 5 days at [facility])--discharged   with supplemental oxygen      ABDIFATAH on CPAP      Priority: Medium     2/17/2014 split-night PSG: AHI 14.7, RDI 14.7, arousal index 13, and titrated to 7 cm H2O      Anxiety      Priority: Medium     Created by Conversion  Replacement Utility updated for latest IMO load      Formatting of this note might be different from the original.  Created by Conversion    Replacement Utility updated for latest IMO load      Dependence on continuous supplemental oxygen 08/23/2018     Priority: Medium    Morbid obesity (H)      Priority: Medium     Created by Conversion        Cervical high risk HPV (human papillomavirus) test positive 03/05/2018     Priority: Medium     3/5/18 NIL pap, +HR HPV (not 16/18)  5/21/21 NIL pap, neg HPV. Plan: cotest in 1 year per provider  05/5/22 Reminder Ivannat, Letter  06/3/22 visit no showed appt and 06/24/22--canceled appt  06/27/22 Reminder call-vm full  07/7/22 appt scheduled--notes added, office visit note said \"not ready to do Pap smear today\"  07/15/22 appt scheduled--notes added no showed appt  08/8/22 Reminder call-vm full Final Letter " sent  09/8/22 Lost to follow-up for pap tracking, fyi routed to provider    Formatting of this note might be different from the original.  3/5/18 NIL pap, +HR HPV (not 16/18)  5/21/21 NIL pap, neg HPV. Plan: cotest in 1 year per provider      Cognitive dysfunction in bipolar disorder (H) 11/17/2016     Priority: Medium    Noncompliance with medications 11/17/2016     Priority: Medium    Asthma      Priority: Medium     Created by Conversion        Vertigo      Priority: Medium     Created by Conversion        Scoliosis (and kyphoscoliosis), idiopathic      Priority: Medium     Created by Conversion    Replacing diagnoses that were inactivated after the 10/1/2021 regulatory import.    Formatting of this note might be different from the original.  Created by Conversion      Lower Back Pain      Priority: Medium     Created by Conversion        Urge Incontinence Of Urine      Priority: Medium     Created by Conversion        Hyperlipidemia      Priority: Medium     Created by Conversion        COPD exacerbation (H) 10/06/2009     Priority: Medium    Tobacco use disorder 10/06/2009     Priority: Medium      Past Medical History:   Diagnosis Date    Acute on chronic respiratory failure with hypoxia (H) 11/15/2016    Bipolar 1 disorder (H)     CAP (community acquired pneumonia) 11/15/2016    Cervical high risk HPV (human papillomavirus) test positive 3/5/2018    3/5/18 NIL pap, +HR HPV (not 16/18) 5/21/21 NIL pap, neg HPV. Plan: await provider    COPD exacerbation (H) 4/24/2021    COPD with exacerbation (H) 11/15/2016    Diabetes mellitus, type II (H)     Hearing loss     Meniere's disease     Pneumonia 4/23/2021    Pneumonia of right lower lobe due to infectious organism 6/11/2019     Past Surgical History:   Procedure Laterality Date    CYST REMOVAL  01/23/2001    DE REMOVAL ANAL FISTULA,SUBCUTANEOUS      Description: Fistula-in-ano Repair;  Recorded: 01/08/2010; x2    TONSILLECTOMY       Current Outpatient Medications    Medication Sig Dispense Refill    albuterol (PROAIR HFA/PROVENTIL HFA/VENTOLIN HFA) 108 (90 Base) MCG/ACT inhaler Inhale 2 puffs into the lungs every 6 hours as needed for shortness of breath or wheezing 18 g 12    albuterol (PROVENTIL) (2.5 MG/3ML) 0.083% neb solution Take 1 vial (2.5 mg) by nebulization every 4 hours as needed for shortness of breath or wheezing Take 1 vial four times a day for the next 5 days then go back to as needed 240 mL 12    ARIPiprazole ER (ABILIFY MAINTENA) 400 MG extended release inj syringe Inject 400 mg into the muscle every 28 days      blood glucose (NO BRAND SPECIFIED) lancets standard Use to test blood sugar 4 times daily before meals and at bedtime 1 each 1    blood glucose (NO BRAND SPECIFIED) test strip Use to test blood sugar 4 times daily before meals and at bedtime 100 strip 0    blood glucose monitoring (ONETOUCH VERIO) meter device kit Use to test blood sugar 4 times daily before meals and bedtie 1 kit 1    Dulaglutide (TRULICITY) 3 MG/0.5ML SOPN INJECT 3 MG  SUBCUTANEOUSLY ONCE A WEEK 4 mL 1    fluticasone (FLONASE) 50 MCG/ACT nasal spray Spray 1 spray into both nostrils daily 9.9 mL 1    Fluticasone-Umeclidin-Vilant (TRELEGY ELLIPTA) 100-62.5-25 MCG/ACT oral inhaler Inhale 1 puff into the lungs daily 60 each 1    ketorolac (ACULAR) 0.5 % ophthalmic solution INSTILL 1 DROP INTO LEFT EYE TWICE DAILY START AFTER SURGERY. USE UNTIL BOTTLE IS FINISHED      metFORMIN (GLUCOPHAGE) 1000 MG tablet Take 1 tablet (1,000 mg) by mouth 2 times daily (with meals) 180 tablet 3    prednisoLONE acetate (PRED FORTE) 1 % ophthalmic suspension INSTILL 1 DROP INTO LEFT EYE 4 TIMES DAILY FOR 7 DAYS THEN THREE TIMES DAILY FOR 7 DAYS THEN TWICE DAILY FOR 7 DAYS THEN ONCE DAILY FOR 7 DAYS THEN STOP START AFTER SURGERY         Allergies   Allergen Reactions    Lurasidone Other (See Comments)     Face turned red, (Brand name = Latuda) Face turned red, Face turned red        Social History  "    Tobacco Use    Smoking status: Former     Current packs/day: 0.00     Average packs/day: 1 pack/day for 40.0 years (40.0 ttl pk-yrs)     Types: Cigarettes     Start date: 1977     Quit date: 2017     Years since quittin.4     Passive exposure: Current    Smokeless tobacco: Never   Substance Use Topics    Alcohol use: Yes     Comment: Alcoholic Drinks/day: Occasional      Family History   Problem Relation Age of Onset    Aneurysm Father     Heart Disease Father 70.00        bypass in 70s, AAA rupture at age 77     Ulcers Father 76.00    Pacemaker Mother     Bipolar Disorder Brother     Breast Cancer Maternal Grandmother 51.00    Kidney failure Maternal Grandmother     Cancer Paternal Grandmother         ?? lung    Cancer Paternal Uncle         ?? lung    Mental Illness Maternal Grandfather     Heart Disease Other         uncle    No Known Problems Sister         two siblings abuse chemicals    No Known Problems Brother     Bipolar Disorder Nephew      History   Drug Use No             Review of Systems  Constitutional, HEENT, cardiovascular, pulmonary, GI, , musculoskeletal, neuro, skin, endocrine and psych systems are negative, except as otherwise noted.    Objective    /60 (BP Location: Left arm, Patient Position: Sitting, Cuff Size: Adult Large)   Pulse 80   Temp 98.4  F (36.9  C) (Oral)   Resp 16   Ht 1.397 m (4' 7\")   Wt 77.2 kg (170 lb 3.2 oz)   SpO2 92%   BMI 39.56 kg/m     Estimated body mass index is 39.56 kg/m  as calculated from the following:    Height as of this encounter: 1.397 m (4' 7\").    Weight as of this encounter: 77.2 kg (170 lb 3.2 oz).  Physical Exam  GENERAL: alert, no distress, and obese  EYES: Eyes grossly normal to inspection, PERRL and conjunctivae and sclerae normal  HENT: ear canals and TM's normal, nose and mouth without ulcers or lesions  NECK: no adenopathy, no asymmetry, masses, or scars  RESP: no rales , no rhonchi, and expiratory wheezes mild " throughout.  CV: regular rate and rhythm, normal S1 S2, no S3 or S4, no murmur, click or rub, no peripheral edema  ABDOMEN: soft, nontender, no hepatosplenomegaly, no masses and bowel sounds normal  MS: no gross musculoskeletal defects noted, no edema  SKIN: no suspicious lesions or rashes  NEURO: Normal strength and tone, mentation intact and speech normal  PSYCH: mentation appears normal, affect normal/bright  LYMPH: no cervical adenopathy    Recent Labs   Lab Test 05/20/24  0738 05/19/24  1301 05/09/24  1617 12/11/23  1059   HGB 14.2 14.1 14.6  14.5 14.8    213 196  206 190    137 141 144   POTASSIUM 4.4 4.2 4.7 4.5   CR 0.62 0.71 0.74 0.73   A1C  --   --  6.6* 6.6*        Diagnostics  No labs were ordered during this visit.   No EKG required for low risk surgery (cataract, skin procedure, breast biopsy, etc).    Revised Cardiac Risk Index (RCRI)  The patient has the following serious cardiovascular risks for perioperative complications:   - No serious cardiac risks = 0 points     RCRI Interpretation: 0 points: Class I (very low risk - 0.4% complication rate)         Signed Electronically by: Lit Lopez PA-C  Copy of this evaluation report is provided to requesting physician.         Answers submitted by the patient for this visit:  Patient Health Questionnaire (Submitted on 7/10/2024)  If you checked off any problems, how difficult have these problems made it for you to do your work, take care of things at home, or get along with other people?: Very difficult  PHQ9 TOTAL SCORE: 17

## 2024-07-10 NOTE — PATIENT INSTRUCTIONS
How to Take Your Medication Before Surgery  Preoperative Medication Instructions   Antiplatelet or Anticoagulation Medication Instructions   - Bleeding risk is low for this procedure (e.g. dental, skin, cataract).    Additional Medication Instructions  Take all scheduled medications on the day of surgery EXCEPT for modifications listed below:   - metformin: DO NOT TAKE day of surgery.   - GLP-1 Non-anesthesia cases (trulicity): DO NOT TAKE day of surgery       Patient Education   Preparing for Your Surgery  Getting started  A nurse will call you to review your health history and instructions. They will give you an arrival time based on your scheduled surgery time. Please be ready to share:  Your doctor's clinic name and phone number  Your medical, surgical, and anesthesia history  A list of allergies and sensitivities  A list of medicines, including herbal treatments and over-the-counter drugs  Whether the patient has a legal guardian (ask how to send us the papers in advance)  Please tell us if you're pregnant--or if there's any chance you might be pregnant. Some surgeries may injure a fetus (unborn baby), so they require a pregnancy test. Surgeries that are safe for a fetus don't always need a test, and you can choose whether to have one.   If you have a child who's having surgery, please ask for a copy of Preparing for Your Child's Surgery.    Preparing for surgery  Within 10 to 30 days of surgery: Have a pre-op exam (sometimes called an H&P, or History and Physical). This can be done at a clinic or pre-operative center.  If you're having a , you may not need this exam. Talk to your care team.  At your pre-op exam, talk to your care team about all medicines you take. If you need to stop any medicines before surgery, ask when to start taking them again.  We do this for your safety. Many medicines can make you bleed too much during surgery. Some change how well surgery (anesthesia) drugs work.  Call your  insurance company to let them know you're having surgery. (If you don't have insurance, call [phone].)  Call your clinic if there's any change in your health. This includes signs of a cold or flu (sore throat, runny nose, cough, rash, fever). It also includes a scrape or scratch near the surgery site.  If you have questions on the day of surgery, call your hospital or surgery center.  Eating and drinking guidelines  For your safety: Unless your surgeon tells you otherwise, follow the guidelines below.  Eat and drink as usual until 8 hours before you arrive for surgery. After that, no food or milk.  Drink clear liquids until 2 hours before you arrive. These are liquids you can see through, like water, Gatorade, and Propel Water. They also include plain black coffee and tea (no cream or milk), candy, and breath mints. You can spit out gum when you arrive.  If you drink alcohol: Stop drinking it the night before surgery.  If your care team tells you to take medicine on the morning of surgery, it's okay to take it with a sip of water.  Preventing infection  Shower or bathe the night before and morning of your surgery. Follow the instructions your clinic gave you. (If no instructions, use regular soap.)  Don't shave or clip hair near your surgery site. We'll remove the hair if needed.  Don't smoke or vape the morning of surgery. You may chew nicotine gum up to 2 hours before surgery. A nicotine patch is okay.  Note: Some surgeries require you to completely quit smoking and nicotine. Check with your surgeon.  Your care team will make every effort to keep you safe from infection. We will:  Clean our hands often with soap and water (or an alcohol-based hand rub).  Clean the skin at your surgery site with a special soap that kills germs.  Give you a special gown to keep you warm. (Cold raises the risk of infection.)  Wear special hair covers, masks, gowns and gloves during surgery.  Give antibiotic medicine, if  prescribed. Not all surgeries need antibiotics.  What to bring on the day of surgery  Photo ID and insurance card  Copy of your health care directive, if you have one  Glasses and hearing aids (bring cases)  You can't wear contacts during surgery  Inhaler and eye drops, if you use them (tell us about these when you arrive)  CPAP machine or breathing device, if you use them  A few personal items, if spending the night  If you have . . .  A pacemaker, ICD (cardiac defibrillator) or other implant: Bring the ID card.  An implanted stimulator: Bring the remote control.  A legal guardian: Bring a copy of the certified (court-stamped) guardianship papers.  Please remove any jewelry, including body piercings. Leave jewelry and other valuables at home.  If you're going home the day of surgery  You must have a responsible adult drive you home. They should stay with you overnight as well.  If you don't have someone to stay with you, and you aren't safe to go home alone, we may keep you overnight. Insurance often won't pay for this.  After surgery  If it's hard to control your pain or you need more pain medicine, please call your surgeon's office.  Questions?   If you have any questions for your care team, list them here: _________________________________________________________________________________________________________________________________________________________________________ ____________________________________ ____________________________________ ____________________________________  For informational purposes only. Not to replace the advice of your health care provider. Copyright   2003, 2019 [facility]. All rights reserved. Clinically reviewed by Destiny Taylor MD. [employer] 785515 - REV 12/22.

## 2024-07-23 ENCOUNTER — TELEPHONE (OUTPATIENT)
Dept: ORTHOPEDICS | Facility: CLINIC | Age: 69
End: 2024-07-23
Payer: COMMERCIAL

## 2024-07-23 NOTE — CONFIDENTIAL NOTE
I was able to speak to the patient today to see how her the right shoulder is doing status post corticosteroid injection.  She states that her shoulder pain is improved but is still partially there.  We discussed that she has not scheduled formal therapy yet.  She states that she is having some family stressors with her mother currently being hospitalized and limitations with a 1 vehicle.  Once her mom is out of the hospital she states that she will call and schedule physical therapy.  We again discussed that with adhesive capsulitis that the injection can help decrease pain but physical therapy is the key to restoring range of motion, function and decreasing pain long-term which certainly can take up to a year or more to improve.  Patient otherwise will follow-up as needed.  Jose Coffman PA-C

## 2024-08-12 ENCOUNTER — TELEPHONE (OUTPATIENT)
Dept: FAMILY MEDICINE | Facility: CLINIC | Age: 69
End: 2024-08-12
Payer: COMMERCIAL

## 2024-08-12 DIAGNOSIS — E78.5 HYPERLIPIDEMIA LDL GOAL <70: Primary | ICD-10-CM

## 2024-08-12 NOTE — TELEPHONE ENCOUNTER
Home Health Care    Reason for call:  Verbal order and message     Orders are needed for this patient.  Start of care today.   Skilled Nursing: One time every other week for 4 weeks for bupivacaine injection and health monitor. The next visit is the week of 9/6 for the injection. 2 PRN for any changes and condition.      Medication:  There's Atorvastatin on patient's med list but she states that she never took it. Also, on the list it states Metformin 1000 twice daily but patient only takes once a day. [facility] pharmacy does not have Trulicity in stock and requesting prescription send to a different pharmacy. She does not take Lidocaine.     Pt Provider: Dr. Whiting    Phone Number Homecare Nurse can be reached at: [phone]    Okay to leave a detailed message?: Yes

## 2024-08-13 RX ORDER — ATORVASTATIN CALCIUM 40 MG/1
40 TABLET, FILM COATED ORAL DAILY
Qty: 90 TABLET | Refills: 3 | Status: SHIPPED | OUTPATIENT
Start: 2024-08-13

## 2024-08-13 NOTE — TELEPHONE ENCOUNTER
Called patient and LMTCB. Please see notation below. Patient needs to take 1000mg metformin BID. Provider also recommends statin medication, please inquire if patient is willing to take statin and which pharmacy she would like medication sent to. Please route response to PCP

## 2024-08-13 NOTE — TELEPHONE ENCOUNTER
Patient returned call to clinic. Patient verbalizes understanding regarding the Metformin. In addition, patient is willing to start a statin medication.     Manny Briscoe RN

## 2024-08-16 ENCOUNTER — MEDICAL CORRESPONDENCE (OUTPATIENT)
Dept: HEALTH INFORMATION MANAGEMENT | Facility: CLINIC | Age: 69
End: 2024-08-16
Payer: COMMERCIAL

## 2024-08-26 ENCOUNTER — TELEPHONE (OUTPATIENT)
Dept: FAMILY MEDICINE | Facility: CLINIC | Age: 69
End: 2024-08-26
Payer: COMMERCIAL

## 2024-08-26 NOTE — TELEPHONE ENCOUNTER
Incoming call from Whit- Bindu with home care. She is wondering why patient received her last abilify dosage and how often she gets injections. Per chart review this medication is prescribed by psychiatry. RN will reach out to psychiatrist for this information.

## 2024-09-01 ENCOUNTER — HOSPITAL ENCOUNTER (EMERGENCY)
Facility: HOSPITAL | Age: 69
Discharge: HOME | End: 2024-09-01
Attending: EMERGENCY MEDICINE | Admitting: EMERGENCY MEDICINE
Payer: COMMERCIAL

## 2024-09-01 ENCOUNTER — APPOINTMENT (OUTPATIENT)
Dept: CT IMAGING | Facility: HOSPITAL | Age: 69
End: 2024-09-01
Attending: EMERGENCY MEDICINE
Payer: COMMERCIAL

## 2024-09-01 ENCOUNTER — NURSE TRIAGE (OUTPATIENT)
Dept: NURSING | Facility: CLINIC | Age: 69
End: 2024-09-01
Payer: COMMERCIAL

## 2024-09-01 ENCOUNTER — OFFICE VISIT (OUTPATIENT)
Dept: FAMILY MEDICINE | Facility: CLINIC | Age: 69
End: 2024-09-01
Payer: COMMERCIAL

## 2024-09-01 VITALS
BODY MASS INDEX: 38.42 KG/M2 | WEIGHT: 166 LBS | RESPIRATION RATE: 20 BRPM | SYSTOLIC BLOOD PRESSURE: 130 MMHG | HEIGHT: 55 IN | DIASTOLIC BLOOD PRESSURE: 60 MMHG | HEART RATE: 81 BPM | TEMPERATURE: 97 F | OXYGEN SATURATION: 96 %

## 2024-09-01 VITALS
WEIGHT: 166.1 LBS | TEMPERATURE: 98.7 F | BODY MASS INDEX: 38.61 KG/M2 | OXYGEN SATURATION: 90 % | HEART RATE: 88 BPM | DIASTOLIC BLOOD PRESSURE: 85 MMHG | SYSTOLIC BLOOD PRESSURE: 123 MMHG

## 2024-09-01 DIAGNOSIS — F31.30 BIPOLAR I DISORDER, MOST RECENT EPISODE DEPRESSED (H): Primary | ICD-10-CM

## 2024-09-01 DIAGNOSIS — F32.A DEPRESSION, UNSPECIFIED DEPRESSION TYPE: ICD-10-CM

## 2024-09-01 DIAGNOSIS — M54.50 MIDLINE LOW BACK PAIN WITHOUT SCIATICA, UNSPECIFIED CHRONICITY: ICD-10-CM

## 2024-09-01 PROBLEM — F41.1 GAD (GENERALIZED ANXIETY DISORDER): Status: ACTIVE | Noted: 2024-09-01

## 2024-09-01 PROBLEM — F31.4 BIPOLAR DISORDER, CURRENT EPISODE DEPRESSED, SEVERE, WITHOUT PSYCHOTIC FEATURES (H): Status: ACTIVE | Noted: 2019-03-02

## 2024-09-01 LAB
ALBUMIN UR-MCNC: 20 MG/DL
ANION GAP SERPL CALCULATED.3IONS-SCNC: 11 MMOL/L (ref 7–15)
APPEARANCE UR: CLEAR
BASE EXCESS BLDV CALC-SCNC: 7.8 MMOL/L (ref -3–3)
BASE EXCESS BLDV CALC-SCNC: 9.8 MMOL/L (ref -3–3)
BASOPHILS # BLD AUTO: 0.1 10E3/UL (ref 0–0.2)
BASOPHILS NFR BLD AUTO: 1 %
BILIRUB UR QL STRIP: NEGATIVE
BUN SERPL-MCNC: 8.3 MG/DL (ref 8–23)
CALCIUM SERPL-MCNC: 8.9 MG/DL (ref 8.8–10.4)
CHLORIDE SERPL-SCNC: 99 MMOL/L (ref 98–107)
COLOR UR AUTO: ABNORMAL
CREAT SERPL-MCNC: 0.6 MG/DL (ref 0.51–0.95)
CRP SERPL-MCNC: <3 MG/L
EGFRCR SERPLBLD CKD-EPI 2021: >90 ML/MIN/1.73M2
EOSINOPHIL # BLD AUTO: 0.1 10E3/UL (ref 0–0.7)
EOSINOPHIL NFR BLD AUTO: 1 %
ERYTHROCYTE [DISTWIDTH] IN BLOOD BY AUTOMATED COUNT: 12.8 % (ref 10–15)
ERYTHROCYTE [SEDIMENTATION RATE] IN BLOOD BY WESTERGREN METHOD: 7 MM/HR (ref 0–30)
GLUCOSE SERPL-MCNC: 109 MG/DL (ref 70–99)
GLUCOSE UR STRIP-MCNC: NEGATIVE MG/DL
HCO3 BLDV-SCNC: 34 MMOL/L (ref 21–28)
HCO3 BLDV-SCNC: 36 MMOL/L (ref 21–28)
HCO3 SERPL-SCNC: 30 MMOL/L (ref 22–29)
HCT VFR BLD AUTO: 45.6 % (ref 35–47)
HGB BLD-MCNC: 14.2 G/DL (ref 11.7–15.7)
HGB UR QL STRIP: NEGATIVE
IMM GRANULOCYTES # BLD: 0 10E3/UL
IMM GRANULOCYTES NFR BLD: 0 %
KETONES UR STRIP-MCNC: NEGATIVE MG/DL
LEUKOCYTE ESTERASE UR QL STRIP: ABNORMAL
LYMPHOCYTES # BLD AUTO: 2.2 10E3/UL (ref 0.8–5.3)
LYMPHOCYTES NFR BLD AUTO: 20 %
MCH RBC QN AUTO: 29.7 PG (ref 26.5–33)
MCHC RBC AUTO-ENTMCNC: 31.1 G/DL (ref 31.5–36.5)
MCV RBC AUTO: 95 FL (ref 78–100)
MONOCYTES # BLD AUTO: 0.6 10E3/UL (ref 0–1.3)
MONOCYTES NFR BLD AUTO: 5 %
MUCOUS THREADS #/AREA URNS LPF: PRESENT /LPF
NEUTROPHILS # BLD AUTO: 8.3 10E3/UL (ref 1.6–8.3)
NEUTROPHILS NFR BLD AUTO: 74 %
NITRATE UR QL: NEGATIVE
NRBC # BLD AUTO: 0 10E3/UL
NRBC BLD AUTO-RTO: 0 /100
O2/TOTAL GAS SETTING VFR VENT: 28 %
O2/TOTAL GAS SETTING VFR VENT: 28 %
OXYHGB MFR BLDV: 71 % (ref 70–75)
OXYHGB MFR BLDV: 93 % (ref 70–75)
PCO2 BLDV: 70 MM HG (ref 40–50)
PCO2 BLDV: 73 MM HG (ref 40–50)
PH BLDV: 7.3 [PH] (ref 7.32–7.43)
PH BLDV: 7.3 [PH] (ref 7.32–7.43)
PH UR STRIP: 6 [PH] (ref 5–7)
PLATELET # BLD AUTO: 199 10E3/UL (ref 150–450)
PO2 BLDV: 41 MM HG (ref 25–47)
PO2 BLDV: 71 MM HG (ref 25–47)
POTASSIUM SERPL-SCNC: 4 MMOL/L (ref 3.4–5.3)
RBC # BLD AUTO: 4.78 10E6/UL (ref 3.8–5.2)
RBC URINE: <1 /HPF
SAO2 % BLDV: 72.4 % (ref 70–75)
SAO2 % BLDV: 93.8 % (ref 70–75)
SODIUM SERPL-SCNC: 140 MMOL/L (ref 135–145)
SP GR UR STRIP: 1.02 (ref 1–1.03)
TRANSITIONAL EPI: <1 /HPF
TSH SERPL DL<=0.005 MIU/L-ACNC: 0.76 UIU/ML (ref 0.3–4.2)
UROBILINOGEN UR STRIP-MCNC: <2 MG/DL
WBC # BLD AUTO: 11.3 10E3/UL (ref 4–11)
WBC URINE: 10 /HPF

## 2024-09-01 PROCEDURE — 82805 BLOOD GASES W/O2 SATURATION: CPT | Performed by: EMERGENCY MEDICINE

## 2024-09-01 PROCEDURE — 250N000011 HC RX IP 250 OP 636: Performed by: EMERGENCY MEDICINE

## 2024-09-01 PROCEDURE — 81001 URINALYSIS AUTO W/SCOPE: CPT | Performed by: EMERGENCY MEDICINE

## 2024-09-01 PROCEDURE — 99214 OFFICE O/P EST MOD 30 MIN: CPT | Performed by: PHYSICIAN ASSISTANT

## 2024-09-01 PROCEDURE — 74177 CT ABD & PELVIS W/CONTRAST: CPT

## 2024-09-01 PROCEDURE — 36415 COLL VENOUS BLD VENIPUNCTURE: CPT | Performed by: EMERGENCY MEDICINE

## 2024-09-01 PROCEDURE — 85652 RBC SED RATE AUTOMATED: CPT | Performed by: EMERGENCY MEDICINE

## 2024-09-01 PROCEDURE — 86140 C-REACTIVE PROTEIN: CPT | Performed by: EMERGENCY MEDICINE

## 2024-09-01 PROCEDURE — 84443 ASSAY THYROID STIM HORMONE: CPT | Performed by: EMERGENCY MEDICINE

## 2024-09-01 PROCEDURE — 80048 BASIC METABOLIC PNL TOTAL CA: CPT | Performed by: EMERGENCY MEDICINE

## 2024-09-01 PROCEDURE — 99285 EMERGENCY DEPT VISIT HI MDM: CPT | Mod: 25

## 2024-09-01 PROCEDURE — 999N000104 CT LUMBAR SPINE RECONSTRUCTED

## 2024-09-01 PROCEDURE — 85025 COMPLETE CBC W/AUTO DIFF WBC: CPT | Performed by: EMERGENCY MEDICINE

## 2024-09-01 RX ORDER — IOPAMIDOL 755 MG/ML
81 INJECTION, SOLUTION INTRAVASCULAR ONCE
Status: COMPLETED | OUTPATIENT
Start: 2024-09-01 | End: 2024-09-01

## 2024-09-01 RX ADMIN — IOPAMIDOL 81 ML: 755 INJECTION, SOLUTION INTRAVENOUS at 17:43

## 2024-09-01 ASSESSMENT — ACTIVITIES OF DAILY LIVING (ADL)
ADLS_ACUITY_SCORE: 38

## 2024-09-01 ASSESSMENT — COLUMBIA-SUICIDE SEVERITY RATING SCALE - C-SSRS
2. HAVE YOU ACTUALLY HAD ANY THOUGHTS OF KILLING YOURSELF IN THE PAST MONTH?: NO
6. HAVE YOU EVER DONE ANYTHING, STARTED TO DO ANYTHING, OR PREPARED TO DO ANYTHING TO END YOUR LIFE?: NO
1. IN THE PAST MONTH, HAVE YOU WISHED YOU WERE DEAD OR WISHED YOU COULD GO TO SLEEP AND NOT WAKE UP?: NO

## 2024-09-01 NOTE — ED PROVIDER NOTES
EMERGENCY DEPARTMENT NOTE     Name: Arvind Leong    Age/Sex: 69 year old female   MRN: [id number]   Evaluation Date & Time:  9/1/2024  3:24 PM    PCP:    Rema Whiting   ED Provider: Mo Cherry D.O.       CHIEF COMPLAINT    Psychiatric Evaluation     HISTORY OF PRESENT ILLNESS   Arvind Leong is a 69 year old year old female with a relevant past history of oxygen dependent COPD, ABDIFATAH, NIDDM bipolar 1 disorder, who presents to the ED  for mental health evaluation.  Patient endorses symptoms of depression including loss of interest in normal activities, depressed mood with decreased physical activity since June.  Patient denies feeling overwhelmed and has no suicidal ideation or intent.  Patient had followed up with a therapist but has not seen them recently on the mother reports her scheduled to see them early next week.  On other review of systems patient complained of low back pain has been present for last 1 to 2 weeks.  Its localized to the midline back nonradicular.  She denies any numbness or weakness in her legs, no difficulty with bowel or bladder control.  Patient denied fever, cough, shortness of breath over baseline, chest pain.  Patient has possible intermittent abdominal pain when but this is associated with the low back pain when she arises from a chair and is not persistent.  She denied dysuria hematuria or urinary frequency.  No nausea vomiting or diarrhea.  All other review of systems were negative        DIAGNOSIS & DISPOSITION/MEDICAL DECISION MAKING     1. Depression, unspecified depression type    2. Midline low back pain without sciatica, unspecified chronicity        EMERGENCY DEPARTMENT COURSE   3:31 PM I met with the patient to gather history and to perform my initial exam.  We discussed treatment options and the plan for care while in the Emergency Department.  Triage vital signs: /81 pulse 76 respiratory rate 20 SpO2 RA 93% temp 97  Differential diagnosis  considered included but not limited to:  Exacerbation of bipolar disorder, endocrine process including hypothyroidism, electrolyte derangement, CO2 retention, infectious process including urinary tract infection.  Patient reports back pain of the last several weeks nonradicular.  Considered compression fracture, discitis, spinal epidural abscess, central cord syndrome    MDM:  ED Course as of 09/01/24 2134   Sun Sep 01, 2024   1941 Laboratory evaluation reviewed.  Patient has what appears to be chronic CO2 retention.  She has CPAP at home which she has not been using and is encouraged to reresume.  Chest x-ray CBC, basic metabolic profile within normal limits.  Urinalysis without evidence of infection.  TSH normal.  CRP and sed rate nonelevated.  CT of the abdomen and pelvis and CT of the lumbar spine without acute process.  Patient currently awaiting DEC assessment   2129 DEC asessor called and case was discussed.  Currently depression seems to be more situational secondary to chronic hearing loss and conflict with her mother over communication.  Patient has been noncompliant with her medications.  DEC  is arranging for outpatient psychiatric follow-up and patient will be given mental health resources.  Patient and mother comfortable with discharge.  I encouraged the patient to restart her previously prescribed medications abilify   Suspect low back pain is musculoskeletal in etiology.  Patient is neurologically intact and low suspicion for central cord syndrome ESR and CRP nonelevated and low suspicion for discitis or spinal epidural abscess and further evaluation with MRI not pursued  DEC  assessed the patient she is felt to be appropriate for continued outpatient mental health management.  Patient has a therapist appointment scheduled next week and will keep this appointment.  Patient was encouraged to use her CPAP at night as prescribed.  I recommended patient use Tylenol ibuprofen for  management of lower back pain  I also recommend the patient follow-up with her primary care physician Dr. Whiting this week.  Return criteria discussed since the patient has worsening depression, develops any thoughts of self-harm, has any acute respiratory difficulty, worsening back pain not improved with Tylenol or ibuprofen, numbness or weakness in the legs difficulty with bowel or bladder control, recurrent abdominal pain not improved with Tylenol ibuprofen that persists and if these occur will emergency department.  Discharge Vital Signs:  PROCEDURES:   None  Diagnostic studies:  CT Lumbar Spine Reconstructed   Final Result   IMPRESSION:   1.  No fracture or posttraumatic subluxation.   2.  Thoracolumbar levocurvature measuring 26 degrees.   3.  No severe spinal canal stenosis.   4.  Severe foraminal stenosis left at L2-3. Moderate to severe foraminal stenosis left at L5-S1.      CT Abdomen Pelvis w Contrast   Final Result   IMPRESSION:    1.  No acute abnormality in the abdomen or pelvis.        Labs Ordered and Resulted from Time of ED Arrival to Time of ED Departure   BASIC METABOLIC PANEL - Abnormal       Result Value    Sodium 140      Potassium 4.0      Chloride 99      Carbon Dioxide (CO2) 30 (*)     Anion Gap 11      Urea Nitrogen 8.3      Creatinine 0.60      GFR Estimate >90      Calcium 8.9      Glucose 109 (*)    ROUTINE UA WITH MICROSCOPIC REFLEX TO CULTURE - Abnormal    Color Urine Light Yellow      Appearance Urine Clear      Glucose Urine Negative      Bilirubin Urine Negative      Ketones Urine Negative      Specific Gravity Urine 1.019      Blood Urine Negative      pH Urine 6.0      Protein Albumin Urine 20 (*)     Urobilinogen Urine <2.0      Nitrite Urine Negative      Leukocyte Esterase Urine 75 Luis/uL (*)     Mucus Urine Present (*)     RBC Urine <1      WBC Urine 10 (*)     Transitional Epithelials Urine <1     BLOOD GAS VENOUS - Abnormal    pH Venous 7.30 (*)     pCO2 Venous 73 (*)      pO2 Venous 41      Bicarbonate Venous 36 (*)     Base Excess/Deficit Venous 9.8 (*)     FIO2 28      Oxyhemoglobin Venous 71      O2 Sat, Venous 72.4     CBC WITH PLATELETS AND DIFFERENTIAL - Abnormal    WBC Count 11.3 (*)     RBC Count 4.78      Hemoglobin 14.2      Hematocrit 45.6      MCV 95      MCH 29.7      MCHC 31.1 (*)     RDW 12.8      Platelet Count 199      % Neutrophils 74      % Lymphocytes 20      % Monocytes 5      % Eosinophils 1      % Basophils 1      % Immature Granulocytes 0      NRBCs per 100 WBC 0      Absolute Neutrophils 8.3      Absolute Lymphocytes 2.2      Absolute Monocytes 0.6      Absolute Eosinophils 0.1      Absolute Basophils 0.1      Absolute Immature Granulocytes 0.0      Absolute NRBCs 0.0     BLOOD GAS VENOUS - Abnormal    pH Venous 7.30 (*)     pCO2 Venous 70 (*)     pO2 Venous 71 (*)     Bicarbonate Venous 34 (*)     Base Excess/Deficit Venous 7.8 (*)     FIO2 28      Oxyhemoglobin Venous 93 (*)     O2 Sat, Venous 93.8 (*)    TSH WITH FREE T4 REFLEX - Normal    TSH 0.76     CRP INFLAMMATION - Normal    CRP Inflammation <3.00     ERYTHROCYTE SEDIMENTATION RATE AUTO - Normal    Erythrocyte Sedimentation Rate 7       ED INTERVENTIONS     Medications   iopamidol (ISOVUE-370) solution 81 mL (81 mLs Intravenous $Given 9/1/24 6305)     TOTAL CRITICAL CARE TIME (EXCLUDING PROCEDURES): Not applicable      DISCHARGE MEDICATIONS        Review of your medicines        UNREVIEWED medicines. Ask your doctor about these medicines        Dose / Directions   * albuterol (2.5 MG/3ML) 0.083% neb solution  Commonly known as: PROVENTIL  Used for: Chronic obstructive pulmonary disease, unspecified COPD type (H)      Dose: 2.5 mg  Take 1 vial (2.5 mg) by nebulization every 4 hours as needed for shortness of breath or wheezing Take 1 vial four times a day for the next 5 days then go back to as needed  Quantity: 240 mL  Refills: 12     * albuterol 108 (90 Base) MCG/ACT inhaler  Commonly known as:  PROAIR HFA/PROVENTIL HFA/VENTOLIN HFA  Used for: Chronic obstructive pulmonary disease, unspecified COPD type (H)      Dose: 2 puff  Inhale 2 puffs into the lungs every 6 hours as needed for shortness of breath or wheezing  Quantity: 18 g  Refills: 12     ARIPiprazole  mg extended release inj syringe  Commonly known as: ABILIFY MAINTENA      Dose: 400 mg  Inject 400 mg into the muscle every 28 days  Refills: 0     atorvastatin 40 MG tablet  Commonly known as: LIPITOR  Used for: Hyperlipidemia LDL goal <70      Dose: 40 mg  Take 1 tablet (40 mg) by mouth daily  Quantity: 90 tablet  Refills: 3     fluticasone 50 MCG/ACT nasal spray  Commonly known as: FLONASE  Used for: Acute cough      Dose: 1 spray  Spray 1 spray into both nostrils daily  Quantity: 9.9 mL  Refills: 1     ketorolac 0.5 % ophthalmic solution  Commonly known as: ACULAR      INSTILL 1 DROP INTO LEFT EYE TWICE DAILY START AFTER SURGERY. USE UNTIL BOTTLE IS FINISHED  Refills: 0     metFORMIN 1000 MG tablet  Commonly known as: GLUCOPHAGE  Used for: Type 2 diabetes mellitus with other specified complication, without long-term current use of insulin (H)      Dose: 1,000 mg  Take 1 tablet (1,000 mg) by mouth 2 times daily (with meals)  Quantity: 180 tablet  Refills: 3     prednisoLONE acetate 1 % ophthalmic suspension  Commonly known as: PRED FORTE      INSTILL 1 DROP INTO LEFT EYE 4 TIMES DAILY FOR 7 DAYS THEN THREE TIMES DAILY FOR 7 DAYS THEN TWICE DAILY FOR 7 DAYS THEN ONCE DAILY FOR 7 DAYS THEN STOP START AFTER SURGERY  Refills: 0     Trelegy Ellipta 100-62.5-25 MCG/ACT oral inhaler  Used for: Chronic obstructive pulmonary disease, unspecified COPD type (H)  Generic drug: Fluticasone-Umeclidin-Vilant      Dose: 1 puff  Inhale 1 puff into the lungs daily  Quantity: 60 each  Refills: 1     Trulicity 3 MG/0.5ML Sopn  Used for: Type 2 diabetes mellitus with other specified complication, without long-term current use of insulin (H)  Generic drug:  Dulaglutide      INJECT 3 MG  SUBCUTANEOUSLY ONCE A WEEK  Quantity: 4 mL  Refills: 1           * This list has 2 medication(s) that are the same as other medications prescribed for you. Read the directions carefully, and ask your doctor or other care provider to review them with you.                CONTINUE these medicines which have NOT CHANGED        Dose / Directions   blood glucose lancets standard  Commonly known as: NO BRAND SPECIFIED  Used for: Type 2 diabetes mellitus with other specified complication, without long-term current use of insulin (H)      Use to test blood sugar 4 times daily before meals and at bedtime  Quantity: 1 each  Refills: 1     blood glucose monitoring meter device kit  Used for: Type 2 diabetes mellitus with other specified complication, without long-term current use of insulin (H)      Use to test blood sugar 4 times daily before meals and bedtie  Quantity: 1 kit  Refills: 1     blood glucose test strip  Commonly known as: NO BRAND SPECIFIED  Used for: Type 2 diabetes mellitus with other specified complication, without long-term current use of insulin (H)      Use to test blood sugar 4 times daily before meals and at bedtime  Quantity: 100 strip  Refills: 0            DISPOSITION: Home    Medical Decision Making    History:  Supplemental history from: NA  External Record(s) reviewed:   Primary care office visit from earlier today reviewed:  Bipolar I disorder, most recent episode depressed (H)  Patient presents to the clinic today for what I suspect is worsening depression.  She is significantly tearful throughout the visit today.  She says she is not feeling like herself.  She does not feel safe at home.  She is really struggling living with her mother as she feels constantly put down by her mom.  Her mom continues to tell her that she is significantly lazy.  These negative comments and patient's lack of confidence in herself has compiled into a significant depression.  Due to this  worsening symptoms and the acuteness of patient's mental health I did recommend that she go to the ER.  She would benefit from inpatient treatment.  Patient agrees with this plan and plans to go to the ER right now.  She denies any active suicidal thoughts or plans to hurt herself or others.     I spent 30 minutes with patient spent counseling and coordinating patient's care as well as discussing patient's plan of care, documenting in EMR and reviewing PMH.       Work Up:  Emergent/Severe conditions considered and evaluated for: See differential diagnosis  I independently reviewed and interpreted EKG  In additional to work up documented, I considered the following work up: NA  Medications given that require intensive monitoring for toxicity: NA    External consultation:  Discussion of management with another provider: NANCY    Complicating factors:  Care impacted by chronic illness: COPD, bipolar disorder  Care affected by social determinants of health: Access to medical care    Disposition considerations: Considered admission but after current assessment discharged to home  Prescriptions considered/prescribed: None    No MIPS measures identified.  At the conclusion of the encounter I discussed the results of all of the tests and the disposition. The questions were answered. The patient or family acknowledged understanding and was agreeable with the care plan.            INFORMATION SOURCE AND LIMITATIONS    History/Exam limitations: None  Patient information was obtained from: Patient and her mother  Use of : N/A        REVIEW OF SYSTEMS:   All other systems reviewed and are negative except as noted above in HPI.    PATIENT HISTORY     Past Medical History:   Diagnosis Date    Acute on chronic respiratory failure with hypoxia (H) 11/15/2016    Bipolar 1 disorder (H)     CAP (community acquired pneumonia) 11/15/2016    Cervical high risk HPV (human papillomavirus) test positive 3/5/2018    COPD exacerbation  (H) 4/24/2021    COPD with exacerbation (H) 11/15/2016    Diabetes mellitus, type II (H)     Hearing loss     Meniere's disease     Pneumonia 4/23/2021    Pneumonia of right lower lobe due to infectious organism 6/11/2019     Patient Active Problem List   Diagnosis    Scoliosis (and kyphoscoliosis), idiopathic    Morbid obesity (H)    Lower Back Pain    Asthma    Urge Incontinence Of Urine    COPD (chronic obstructive pulmonary disease) (H)    Hyperlipidemia    ABDIFATAH on CPAP    Anxiety    Vertigo    Cognitive dysfunction in bipolar disorder (H)    Noncompliance with medications    Dependence on continuous supplemental oxygen    Bipolar I disorder, most recent episode depressed (H)    Runny nose    CAP (community acquired pneumonia) due to Chlamydia species    Type 2 diabetes mellitus with other specified complication, without long-term current use of insulin (H)    Chronic respiratory failure with hypoxia (H)    Cervical high risk HPV (human papillomavirus) test positive    Cardiomyopathy, unspecified type (H)    COPD exacerbation (H)    Tobacco use disorder    Hypercarbia    Acute confusion    Hypoxia    Community acquired bacterial pneumonia    COPD with acute exacerbation (H)     Past Surgical History:   Procedure Laterality Date    CYST REMOVAL  01/23/2001    [place] REMOVAL ANAL FISTULA,SUBCUTANEOUS      Description: Fistula-in-ano Repair;  Recorded: 01/08/2010; x2    TONSILLECTOMY         Allergies   Allergen Reactions    Lurasidone Other (See Comments)     Face turned red, (Brand name = Latuda) Face turned red, Face turned red       OUTPATIENT MEDICATIONS     New Prescriptions    No medications on file      Vitals:    09/01/24 1859 09/01/24 1930 09/01/24 2000 09/01/24 2030   BP: 122/62 116/61 122/58 117/57   Pulse: 79 82 85 81   Resp:       Temp:       SpO2: 99% 98% 98% 96%   Weight:       Height:           Physical Exam   Constitutional: Oriented to person, place, and time. Appears well-developed and  well-nourished.   Cardiovascular: Normal rate, regular rhythm and normal heart sounds.    Pulmonary/Chest: Normal effort  and breath sounds normal.   Abdominal: Soft. Bowel sounds are normal.  Global tenderness in the lower abdomen right lower quadrant versus suprapubic  Musculoskeletal: Tenderness to the dorsal spinous process of the lumbar spine  Neurological: Alert and oriented to person, place, and time. Normal strength. No sensory deficit. No cranial nerve deficit.  Skin: Skin is warm and dry.   Psychiatric: Depressed mood and affect. Behavior is normal. Thought content normal.     DIAGNOSTICS    LABORATORY FINDINGS (REVIEWED AND INTERPRETED):  Labs Ordered and Resulted from Time of ED Arrival to Time of ED Departure   BASIC METABOLIC PANEL - Abnormal       Result Value    Sodium 140      Potassium 4.0      Chloride 99      Carbon Dioxide (CO2) 30 (*)     Anion Gap 11      Urea Nitrogen 8.3      Creatinine 0.60      GFR Estimate >90      Calcium 8.9      Glucose 109 (*)    ROUTINE UA WITH MICROSCOPIC REFLEX TO CULTURE - Abnormal    Color Urine Light Yellow      Appearance Urine Clear      Glucose Urine Negative      Bilirubin Urine Negative      Ketones Urine Negative      Specific Gravity Urine 1.019      Blood Urine Negative      pH Urine 6.0      Protein Albumin Urine 20 (*)     Urobilinogen Urine <2.0      Nitrite Urine Negative      Leukocyte Esterase Urine 75 Luis/uL (*)     Mucus Urine Present (*)     RBC Urine <1      WBC Urine 10 (*)     Transitional Epithelials Urine <1     BLOOD GAS VENOUS - Abnormal    pH Venous 7.30 (*)     pCO2 Venous 73 (*)     pO2 Venous 41      Bicarbonate Venous 36 (*)     Base Excess/Deficit Venous 9.8 (*)     FIO2 28      Oxyhemoglobin Venous 71      O2 Sat, Venous 72.4     CBC WITH PLATELETS AND DIFFERENTIAL - Abnormal    WBC Count 11.3 (*)     RBC Count 4.78      Hemoglobin 14.2      Hematocrit 45.6      MCV 95      MCH 29.7      MCHC 31.1 (*)     RDW 12.8      Platelet  Count 199      % Neutrophils 74      % Lymphocytes 20      % Monocytes 5      % Eosinophils 1      % Basophils 1      % Immature Granulocytes 0      NRBCs per 100 WBC 0      Absolute Neutrophils 8.3      Absolute Lymphocytes 2.2      Absolute Monocytes 0.6      Absolute Eosinophils 0.1      Absolute Basophils 0.1      Absolute Immature Granulocytes 0.0      Absolute NRBCs 0.0     BLOOD GAS VENOUS - Abnormal    pH Venous 7.30 (*)     pCO2 Venous 70 (*)     pO2 Venous 71 (*)     Bicarbonate Venous 34 (*)     Base Excess/Deficit Venous 7.8 (*)     FIO2 28      Oxyhemoglobin Venous 93 (*)     O2 Sat, Venous 93.8 (*)    TSH WITH FREE T4 REFLEX - Normal    TSH 0.76     CRP INFLAMMATION - Normal    CRP Inflammation <3.00     ERYTHROCYTE SEDIMENTATION RATE AUTO - Normal    Erythrocyte Sedimentation Rate 7           IMAGING (REVIEWED AND INTERPRETED):  CT Lumbar Spine Reconstructed   Final Result   IMPRESSION:   1.  No fracture or posttraumatic subluxation.   2.  Thoracolumbar levocurvature measuring 26 degrees.   3.  No severe spinal canal stenosis.   4.  Severe foraminal stenosis left at L2-3. Moderate to severe foraminal stenosis left at L5-S1.      CT Abdomen Pelvis w Contrast   Final Result   IMPRESSION:    1.  No acute abnormality in the abdomen or pelvis.                        Mo Cherry D.O.  EMERGENCY MEDICINE   09/01/24  [facility] EMERGENCY DEPARTMENT  [address] [id number] [phone]  Dept: [phone]     Mo Cherry DO  09/01/24 4869

## 2024-09-01 NOTE — ED TRIAGE NOTES
Pt referred to ER for worsening depression. Pt denies SI/HI. States she feels safe at home and in her relationships while alone in triage. Differs from note from the clinic.

## 2024-09-01 NOTE — PROGRESS NOTES
"  Assessment & Plan     Bipolar I disorder, most recent episode depressed (H)  Patient presents to the clinic today for what I suspect is worsening depression.  She is significantly tearful throughout the visit today.  She says she is not feeling like herself.  She does not feel safe at home.  She is really struggling living with her mother as she feels constantly put down by her mom.  Her mom continues to tell her that she is significantly lazy.  These negative comments and patient's lack of confidence in herself has compiled into a significant depression.  Due to this worsening symptoms and the acuteness of patient's mental health I did recommend that she go to the ER.  She would benefit from inpatient treatment.  Patient agrees with this plan and plans to go to the ER right now.  She denies any active suicidal thoughts or plans to hurt herself or others.    I spent 30 minutes with patient spent counseling and coordinating patient's care as well as discussing patient's plan of care, documenting in EMR and reviewing PMH.        BMI  Estimated body mass index is 38.61 kg/m  as calculated from the following:    Height as of 7/10/24: 1.397 m (4' 7\").    Weight as of this encounter: 75.3 kg (166 lb 1.6 oz).             No follow-ups on file.    Wendy Samuels is a 69 year old, presenting for the following health issues:  Blood Sugar Problem (Pt stated that she wants her blood sugar to be checked.)    HPI   69-year-old female presents to the clinic today for multiple concerns.  She states that she has really been struggling over the last few weeks.  She lives with her mom who has been being \"really mean \"to her.  She is constantly telling her that she is lazy and that she is not motivated.  Her mom states that she does not need any help but she just needs to stay active.  Her mom yells at her a lot about her inactivity and her health.  Patient states that she thinks she is too fat.  She does not want to go to [facility] because she she is too fat.  She is yelled at by her mom for not showering every day.  She states she showers maybe every other day.  She has a tough time helping around the house and getting in and out of bed as she is out of shape.  She also has COPD so it is hard for her to go long distances without getting significantly tired.  Her mom forces her to walk around the house constantly throughout the day.  She met a really nice man a few weeks ago but never got his number.  She cannot stop thinking about that.  She is really struggling with her mental health right now.  She does not feel like herself.  She does have an appointment with her psychiatrist on Tuesday but feels like she cannot wait until then.          Review of Systems  Constitutional, HEENT, cardiovascular, pulmonary, gi and gu systems are negative, except as otherwise noted.      Objective    /85   Pulse 88   Temp 98.7  F (37.1  C) (Oral)   Wt 75.3 kg (166 lb 1.6 oz)   SpO2 90%   BMI 38.61 kg/m    Body mass index is 38.61 kg/m .  Physical Exam   GENERAL: alert and no distress  SKIN: no suspicious lesions or rashes  PSYCH: tearful, anxious, and ruminating on topics            Signed Electronically by: ISABEL Mccrary

## 2024-09-01 NOTE — TELEPHONE ENCOUNTER
"Nurse Triage SBAR    Is this a 2nd Level Triage? YES, LICENSED PRACTITIONER REVIEW IS REQUIRED    Situation: Pt reports weakness    Background: Pt reports \"my mother told me I am lazy and I don't help her on nothing, I don't take a shower or dress myself properly anymore, I am a little overweight, trying to lose weight\". Pt reports her main symptoms is \"my mother want's to know why I am so lazy\". Pt reports increased weakness started \"maybe a week ago\". Pt reports she is able to stand up on her own and walk to the bathroom without her walker. Pt reports she last checked her blood glucose \"a month ago\" and is unable to check at time of call. Pt reports \"don't drink enough\". Pt reports she last urinated \"a little while ago, a little bit\". Pt gave verbal consent to speak with her mother who reports \"pt lies, she doesn't tell the truth\" about what she eats.     Assessment: Acute weakness, cause unknown    Protocol Recommended Disposition:   Go to ED Now (Or PCP Triage)    Recommendation: Second level triage      Paged to provider    Does the patient meet one of the following criteria for ADS visit consideration? 16+ years old, with an [facility] PCP     TIP  Providers, please consider if this condition is appropriate for management at one of our Acute and Diagnostic Services sites.     If patient is a good candidate, please use dotphrase <dot>triageresponse and select Refer to ADS to document.      Provider consult indicated.     Reason for page: Second level triage     Page sent to Dr. Conchita Saenz by RN at 1:34 pm.     Melvi Pringle RN          Provider, Dr. Dr. Conchita Saenz, returning page to Nurse Advisors at 1:37 pm    Provider recommended plan of care: UC visit now    Pt notified and verbalized understanding, agrees to plan.     Melvi Pringle RN             Reason for Disposition   Patient sounds very sick or weak to the triager    Additional Information   Negative: SEVERE difficulty breathing (e.g., struggling for " "each breath, speaks in single words)   Negative: Shock suspected (e.g., cold/pale/clammy skin, too weak to stand, low BP, rapid pulse)   Negative: Difficult to awaken or acting confused (e.g., disoriented, slurred speech)   Negative: [1] Fainted > 15 minutes ago AND [2] still feels too weak or dizzy to stand   Negative: [1] SEVERE weakness (i.e., unable to walk or barely able to walk, requires support) AND [2] new-onset or worsening   Negative: Sounds like a life-threatening emergency to the triager   Negative: Weakness of the face, arm or leg on one side of the body   Negative: [1] Diabetes mellitus AND [2] weakness from low blood sugar (i.e., < 60 mg/dl or 3.5 mmol/l)   Negative: Heat exhaustion suspected (i.e., dehydration from heat exposure)   Negative: Chest pain   Negative: Vomiting is main symptom   Negative: Diarrhea is main symptom   Negative: Difficulty breathing   Negative: Heart beating < 50 beats per minute OR > 140 beats per minute   Negative: Extra heartbeats, irregular heart beating, or heart is beating very fast  (i.e., \"palpitations\")   Negative: Follows large amount of bleeding (e.g., from vomiting, rectum, vagina  (Exception: Small brief weakness from sight of a small amount blood.)   Negative: Black or tarry bowel movements   Negative: [1] Drinking very little AND [2] dehydration suspected (e.g., no urine > 12 hours, very dry mouth, very lightheaded)    Protocols used: Weakness (Generalized) and Fatigue-A-AH    "

## 2024-09-01 NOTE — PATIENT INSTRUCTIONS
I suspect your symptoms are related to worsening depression. Going to the ER is the best way to get directly admitted to see if you can get into an inpatient psy group.

## 2024-09-02 NOTE — ED NOTES
Pt used bedpan in bed with one assist. Cleaned up pt, put on new brief, and put pt clothes back on with one assist.

## 2024-09-02 NOTE — DISCHARGE INSTRUCTIONS
Restart your Abilify and also your CPAP at night.  Follow-up with outpatient mental health services as discussed with the DEC .  Take ibuprofen 400 mg every 8 hours and Tylenol 650 m g every 6 hours for pain management of low back pain.  If your depression is worsening and you develop any thoughts of self-harm or   you have worsening back pain despite the use of pain medications or develop any progressive symptoms including fever, abdominal pain, numbness or weakness in your legs, difficulty with bowel or bladder control return to the emergency department.        Writer encourage Pt to take her medications as prescribed and consistently.  Writer recommended Pt to keep all of scheduled appointments with her outpatient service providers.  Writer recommended Pt to continue follow up with her current outpatient psychiatry for medication management and engage in new outpatient therapy service to improve her coping skills.  Pt would benefit from getting a new hearing aid to improve her hearing and using C-PAP machine consistently for her sleep.  DEC coordinator will contact Pt within the next 1 or 2 business days to ensure coordination of care and provide assistance with appointments.    Below is a list of FREE Mental Health Options in the [facility] Area:    [facility] ([facility] [place] [place])  Serves those in emotional crisis with 24-hour, seven-day-a-week crisis counseling, assessment, referral, and medication management.   Suicidal: [phone] Consultation: [phone]  [address] [place] [address] 24/7 Crisis Intervention Center     [facility]  [phone]  Serves those in need of free outpatient mental health care  Hours: Mon, Wed, Fri 1-3pm; Mon-Thurs 6:30-8:30pm    [facility]  [address] [place] [id number]  [phone]         Date: Monday, 9/9/2024  Time: 2:00 pm - 3:00 pm  Provider: Jerrica GARCÍA  [facility]  Location: [facility]  [address], [address], [place] [address]  Phone: [phone]  Type: Teletherapy    Scheduling Instructions  [email or web address] When possible clients should be asked to email us at [email or web address] or call and leave us a message at [phone] to let us know they have an appointment scheduled through [facility]. We will then send the appropriate paperwork to their email to complete before the session. Please have client sign an LEMUEL for appropriate records release when presenting for mental health care. Please fax LEMUEL and relevant records to our attention at our secure fax: [phone]  Patient Instructions  Our building has ample parking. Enter into either door and proceed to the second level, [address]. Our website is [email or web address] All paperwork is required to be completed electronically and 24 hours in advance of your appointment. If paperwork isn't completed, the appointment may be cancelled. Appointments begin on the hour, it is helpful to arrive 5 to 10 minutes ahead of time. For telehealth appointments a link will be sent to the email on file 10-15 min prior to your session.

## 2024-09-02 NOTE — CONSULTS
Diagnostic Evaluation Consultation  Crisis Assessment    Patient Name: Arvind Leong  Age:  69 year old  Legal Sex: female  Gender Identity: female  Pronouns:   Race: White  Ethnicity: Not  or   Language: English      Patient was assessed: Virtual: [address]   Crisis Assessment Start Date: 09/01/24  Crisis Assessment Start Time: 2041  Crisis Assessment Stop Time: 2116  Patient location: [facility] EMERGENCY DEPARTMENT                                 Referral Data and Chief Complaint  Arvind Leong presents to the ED with family/friends, per community partner(s). Patient is presenting to the ED for the following concerns: Health stressors, Depression, Significant behavioral change, Worsening psychosocial stress, Anxiety.   Factors that make the mental health crisis life threatening or complex are:  Pt was seen at the [facility] clinic earlier today for her diabetes and blood sugar problem.  Pt was referred to the ER for worsening of depression and stress at home.  Pt has declining in her daily functioning and non-compliance issues.  Pt has hearing problem but does not wear her hearing aid consistently and not using her C-PAP machine for her sleep apnea.  Pt also not taking her medications consistently..      Informed Consent and Assessment Methods  Explained the crisis assessment process, including applicable information disclosures and limits to confidentiality, assessed understanding of the process, and obtained consent to proceed with the assessment.  Assessment methods included conducting a formal interview with patient, review of medical records, collaboration with medical staff, and obtaining relevant collateral information from family and community providers when available.  : done     Patient response to interventions: eager to participate, acceptance expressed, verbalizes understanding, needs reinforcement  Coping skills were attempted to reduce the crisis:   "reading, watching TV, movies, listening to music, playing Bingo     History of the Crisis   Pt is a 69 year old White female with history of bipolar disorder, anxiety, COPD and type 2 diabetes.  Pt was seen at the [facility] clinic earlier today for her diabetes and blood sugar problem. Pt was referred to the ER for worsening of depression and stress at home as her mom brought her to the ER.  Pt remarked, \"Because of worsening of depression.\" as her reason for visiting the ER today.  Pt has severe hearing issues and was not wearing her hearing aid as her old hearing aid got broken and she needed to get new ones.  Pt had difficulty hearing this writer and understanding the questions.  Writer spoke with louder volume and repeated questions multiple times.  Pt reported she was feeling depressed because her mom was yelling at her to do things at home.  Pt acknowleged that she did not do much at home besides just sitting around.  Pt also shared she was stressed about meeting someone at recent social event and not able to follow up with this person who she fell in love with.  Pt denied any other stressors or triggers.  Pt endorsed increased depression, isolation, worry and anxiety.  Pt reported having poor sleep and disrupted appetite.  Pt denied having acute psychosis and christopher.  Pt denied having suicidal and homicidal ideations.  Pt denied having access to firearms, history of SIB and previous suicide attempt.    Brief Psychosocial History  Family:  , Children no  Support System:  Parent(s), Sibling(s)  Employment Status:  retired, unemployed  Source of Income:  unable to assess  Financial Environmental Concerns:  unemployed  Current Hobbies:  reading, social media/computer activities, television/movies/videos, music, games, group/social activities  Barriers in Personal Life:  behavioral concerns, mental health concerns, emotional concerns, lack of motivation, medical conditions/precautions    Significant " Clinical History  Current Anxiety Symptoms:  anxious, racing thoughts, excessive worry  Current Depression/Trauma:  apathy, crying or feels like crying, hopelessness, sadness, impaired decision making, helplessness, withdrawl/isolation, difficulty concentrating  Current Somatic Symptoms:  excessive worry, anxious, racing thoughts  Current Psychosis/Thought Disturbance:     Current Eating Symptoms:  loss of appetite, increased appetite  Chemical Use History:  Alcohol: None  Benzodiazepines: None  Opiates: None  Cocaine: None  Marijuana: None  Other Use: None   Past diagnosis:  Bipolar Disorder, Depression, Anxiety Disorder  Family history:  Bipolar Disorder  Past treatment:  Primary Care, Psychiatric Medication Management, Inpatient Hospitalization, Case management  Details of most recent treatment:  Pt reported current outpatient psychiatry for medication management and .  Pt noted she did not have a therapist.  Other relevant history:  Pt shared her dad passed away, as she was survived by her mom, 2 brothers and 1 sister.  Pt noted she had 3 brothers but one of them .  Pt reported she was  and has no children.  Pt reported she was retired as currently unemployed.  Pt has history of COPD and diabetes type 2 as her medical conditions.  Pt denied history of legal issues and being abused.       Collateral Information  Is there collateral information: Yes     Collateral information name, relationship, phone number:  Jeri Cody [phone]    What happened today: Jeri reported Pt was seen at the urgent care clinic earlier today, then the provider referred her to the ER for further evaluation due to her depression.     What is different about patient's functioning: Jeri reported Pt has not been functioning much at home as she did not do anything besides sitting around, sleeping and eating.  Jeri reported Pt has not been taking her medications consistently and not being active.  Jeri  reported Pt did not do any exercise and she was the one doing all the household tasks as she was getting frustrated with Pt.  Jeri reported Pt also has poor self-care and hygiene issues.     Concern about alcohol/drug use:      What do you think the patient needs:      Has patient made comments about wanting to kill themselves/others: no    If d/c is recommended, can they take part in safety/aftercare planning:  yes    Additional collateral information:  Jeri reviewed and agreed with outpatient mental health service recommendations for Pt.     Risk Assessment  Marshall Suicide Severity Rating Scale Full Clinical Version:  Suicidal Ideation  Q1 Wish to be Dead (Lifetime): No  Q2 Non-Specific Active Suicidal Thoughts (Lifetime): No  Q6 Suicide Behavior (Lifetime): no     Suicidal Behavior (Lifetime)  Actual Attempt (Lifetime): No  Has subject engaged in non-suicidal self-injurious behavior? (Lifetime): No  Interrupted Attempts (Lifetime): No  Aborted or Self-Interrupted Attempt (Lifetime): No  Preparatory Acts or Behavior (Lifetime): No    Marshall Suicide Severity Rating Scale Recent:   Suicidal Ideation (Recent)  Q1 Wished to be Dead (Past Month): no  Q2 Suicidal Thoughts (Past Month): no  Level of Risk per Screen: no risks indicated     Suicidal Behavior (Recent)  Actual Attempt (Past 3 Months): No  Has subject engaged in non-suicidal self-injurious behavior? (Past 3 Months): No  Interrupted Attempts (Past 3 Months): No  Aborted or Self-Interrupted Attempt (Past 3 Months): No  Preparatory Acts or Behavior (Past 3 Months): No    Environmental or Psychosocial Events: loss of a relationship due to divorce/separation, challenging interpersonal relationships, work or task failure, helplessness/hopelessness, recent life events (see comment), other life stressors, worsening chronic illness, social isolation  Protective Factors: Protective Factors: lives in a responsibly safe and stable environment, help seeking,  supportive ongoing medical and mental health care relationships, able to access care without barriers, constructive use of leisure time, enjoyable activities, resilience, reality testing ability    Does the patient have thoughts of harming others? Feels Like Hurting Others: no  Previous Attempt to Hurt Others: no  Current presentation:  (Pt was depressed and anxious.)  Is the patient engaging in sexually inappropriate behavior?: no    Is the patient engaging in sexually inappropriate behavior?  no        Mental Status Exam   Affect: Constricted  Appearance: Appropriate  Attention Span/Concentration: Inattentive, Attentive  Eye Contact: Variable, Engaged    Fund of Knowledge: Appropriate, Delayed   Language /Speech Content: Fluent  Language /Speech Volume: Normal  Language /Speech Rate/Productions: Normal, Slow  Recent Memory: Variable  Remote Memory: Variable  Mood: Anxious, Apathetic, Depressed, Sad  Orientation to Person: Yes   Orientation to Place: Yes  Orientation to Time of Day: Yes  Orientation to Date: Yes     Situation (Do they understand why they are here?): Yes  Psychomotor Behavior: Normal  Thought Content: Clear  Thought Form: Intact     Mini-Cog Assessment  Number of Words Recalled:    Clock-Drawing Test: 0 Abnormal (Pt unable to participate in her mini cog assessment due to hearing issues.)   Three Item Recall: 0 objects recalled  Mini-Cog Total Score: 0     Medication  Psychotropic medications:   Medication Orders - Psychiatric (From admission, onward)      None             Current Care Team  Patient Care Team:  Rema Whiting MD as PCP - General (Family Medicine)  Rema Whiting MD as Assigned PCP  Meera Paez [facility] as Assigned MTM Pharmacist  Mirian Miller, PhD LP as Assigned Behavioral Health Provider  Marce Keller APRN CNP as Assigned Neuroscience Provider  Jose Coffman PA-C as Assigned Musculoskeletal Provider  Elena Mcpherson [facility] as Pharmacist  (Pharmacist)    Diagnosis  Patient Active Problem List   Diagnosis Code    Scoliosis (and kyphoscoliosis), idiopathic M41.20    Morbid obesity (H) E66.01    Lower Back Pain M54.50    Asthma J45.909    Urge Incontinence Of Urine N39.41    COPD (chronic obstructive pulmonary disease) (H) J44.9    Hyperlipidemia E78.5    ABDIFATAH on CPAP G47.33    Anxiety F41.9    Vertigo R42    Cognitive dysfunction in bipolar disorder (H) F06.8, F31.9    Noncompliance with medications Z91.148    Dependence on continuous supplemental oxygen Z99.81    Bipolar I disorder, most recent episode depressed (H) F31.30    Runny nose R09.89    CAP (community acquired pneumonia) due to Chlamydia species J16.0    Type 2 diabetes mellitus with other specified complication, without long-term current use of insulin (H) E11.69    Chronic respiratory failure with hypoxia (H) J96.11    Cervical high risk HPV (human papillomavirus) test positive R87.810    Bipolar disorder, current episode depressed, severe, without psychotic features (H) F31.4    Cardiomyopathy, unspecified type (H) I42.9    COPD exacerbation (H) J44.1    Tobacco use disorder F17.200    Hypercarbia R06.89    Acute confusion R41.0    Hypoxia R09.02    Community acquired bacterial pneumonia J15.9    COPD with acute exacerbation (H) J44.1    VIOLET (generalized anxiety disorder) F41.1       Primary Problem This Admission  Active Hospital Problems    VIOLET (generalized anxiety disorder)      Bipolar disorder, current episode depressed, severe, without psychotic features (H)        Clinical Summary and Substantiation of Recommendations   Pt presenting in the ER today due to worsening of depression and stress at home.  Pt was seen at the [facility] clinic earlier today for her diabetes and blood sugar problem. Pt was referred to the ER for worsening of depression and stress at home as her mom brought her to the ER.  Pt has severe hearing issues and was not wearing her hearing aid as her old hearing  aid got broken and she needed to get new ones. Pt had difficulty hearing this writer and understanding the questions. Writer spoke with louder volume and repeated questions multiple times. Pt reported she was feeling depressed because her mom was yelling at her to do things at home. Pt acknowleged that she did not do much at home besides just sitting around. Pt also shared she was stressed about meeting someone at recent social event and not able to follow up with this person who she fell in love with. Pt denied any other stressors or triggers. Pt endorsed increased depression, isolation, worry and anxiety. Pt reported having poor sleep and disrupted appetite. Pt denied having acute psychosis and christopher. Pt denied having suicidal and homicidal ideations. Pt denied having access to firearms, history of SIB and previous suicide attempt.  Pt's mom is 89 year old and she also has hearing issues.  Pt was able to engage in her DEC Aftercare plan as she felt safe to return home.  Both Pt and her mom have hearing problems which were contributing to their communication and relationship issues at home.  Pt has been non-compliant with wearing her hearing aids, using C-PAP machine and taking her medicaitons as her mom gets really frustrated with her.  Pt was not imminent danger to herself or to others.  Pt was appropriate for outpatient mental health services.                          Patient coping skills attempted to reduce the crisis:  reading, watching TV, movies, listening to music, playing Bingo    Disposition  Recommended disposition: Individual Therapy, Medication Management        Reviewed case and recommendations with attending provider. Attending Name: Mo Cherry DO       Attending concurs with disposition: yes       Patient and/or validated legal guardian concurs with disposition:   yes       Final disposition:  discharge    Legal status on admission:      Assessment Details   Total duration spent with the  patient: 35 min     CPT code(s) utilized: 31061 - Psychotherapy for Crisis - 60 (30-74*) min    Andry Del Valle [facility] Hospital[facility]AVNA, Psychotherapist  DEC - Triage & Transition Services  Callback: [phone]

## 2024-09-05 ENCOUNTER — HOSPITAL ENCOUNTER (EMERGENCY)
Facility: HOSPITAL | Age: 69
Discharge: HOME | End: 2024-09-05
Attending: STUDENT IN AN ORGANIZED HEALTH CARE EDUCATION/TRAINING PROGRAM | Admitting: STUDENT IN AN ORGANIZED HEALTH CARE EDUCATION/TRAINING PROGRAM
Payer: COMMERCIAL

## 2024-09-05 ENCOUNTER — APPOINTMENT (OUTPATIENT)
Dept: RADIOLOGY | Facility: HOSPITAL | Age: 69
End: 2024-09-05
Attending: STUDENT IN AN ORGANIZED HEALTH CARE EDUCATION/TRAINING PROGRAM
Payer: COMMERCIAL

## 2024-09-05 ENCOUNTER — APPOINTMENT (OUTPATIENT)
Dept: CT IMAGING | Facility: HOSPITAL | Age: 69
End: 2024-09-05
Attending: STUDENT IN AN ORGANIZED HEALTH CARE EDUCATION/TRAINING PROGRAM
Payer: COMMERCIAL

## 2024-09-05 VITALS
RESPIRATION RATE: 16 BRPM | DIASTOLIC BLOOD PRESSURE: 74 MMHG | HEIGHT: 55 IN | SYSTOLIC BLOOD PRESSURE: 126 MMHG | TEMPERATURE: 97.8 F | OXYGEN SATURATION: 98 % | BODY MASS INDEX: 38.42 KG/M2 | HEART RATE: 77 BPM | WEIGHT: 166 LBS
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
## 2024-09-05 DIAGNOSIS — S00.81XA FACIAL ABRASION, INITIAL ENCOUNTER: ICD-10-CM

## 2024-09-05 PROCEDURE — 99284 EMERGENCY DEPT VISIT MOD MDM: CPT | Mod: 25

## 2024-09-05 PROCEDURE — 70450 CT HEAD/BRAIN W/O DYE: CPT

## 2024-09-05 PROCEDURE — 72125 CT NECK SPINE W/O DYE: CPT

## 2024-09-05 PROCEDURE — 250N000013 HC RX MED GY IP 250 OP 250 PS 637: Performed by: STUDENT IN AN ORGANIZED HEALTH CARE EDUCATION/TRAINING PROGRAM

## 2024-09-05 PROCEDURE — 73110 X-RAY EXAM OF WRIST: CPT | Mod: LT

## 2024-09-05 PROCEDURE — 73130 X-RAY EXAM OF HAND: CPT | Mod: LT

## 2024-09-05 RX ORDER — ACETAMINOPHEN 325 MG/1
650 TABLET ORAL ONCE
Status: COMPLETED | OUTPATIENT
Start: 2024-09-05 | End: 2024-09-05

## 2024-09-05 RX ADMIN — ACETAMINOPHEN 650 MG: 325 TABLET ORAL at 19:34

## 2024-09-05 ASSESSMENT — ACTIVITIES OF DAILY LIVING (ADL)
ADLS_ACUITY_SCORE: 38

## 2024-09-05 ASSESSMENT — COLUMBIA-SUICIDE SEVERITY RATING SCALE - C-SSRS
1. IN THE PAST MONTH, HAVE YOU WISHED YOU WERE DEAD OR WISHED YOU COULD GO TO SLEEP AND NOT WAKE UP?: NO
2. HAVE YOU ACTUALLY HAD ANY THOUGHTS OF KILLING YOURSELF IN THE PAST MONTH?: NO

## 2024-09-05 NOTE — ED TRIAGE NOTES
Patient arrives from home via [facility] after a fall   O2 baseline 2L, tripped over the rug and fell face first on concrete. Possible LOC. EMS helped off the ground, with some lightheadedness.     VSS 80s  106/82    No Piv placed     EMS has placed a C collar     Patient complaints of left hand pain

## 2024-09-05 NOTE — ED PROVIDER NOTES
EMERGENCY DEPARTMENT ENCOUNTER      NAME: Arvind Leong  AGE: 69 year old female  YOB: 1955  MRN: [id number]  EVALUATION DATE & TIME: No admission date for patient encounter.    PCP: Rema Whiting    ED PROVIDER: Edenilson Rosenbaum MD      Chief Complaint   Patient presents with    Fall         FINAL IMPRESSION:  1. Ground-level fall    2. Facial abrasion, initial encounter          ED COURSE & MEDICAL DECISION MAKING:    Pertinent Labs & Imaging studies reviewed. (See chart for details)  69 year old female presents to the Emergency Department for evaluation of ground-level fall, facial abrasion    ED Course as of 09/05/24 2148   Thu Sep 05, 2024   1928 Patient is a 69-year-old female with history of morbid obesity, asthma/COPD on 2 L at baseline, cardiomyopathy, type 2 diabetes who presents emergency department following ground-level fall earlier today.  Patient states she was walking outside when she tripped over a rug and fell onto her face.  Uncertain if she lost consciousness or not.  Had a nosebleed afterwards and also has some pain in the left hand/wrist.  Denies any neck pain or back pain.  No chest or abdominal pain.  Denies any pain in the hips or lower extremities.  No nausea or vomiting.   states she has had chronic shortness of breath for years but no worsening.  Denies any fevers at home.    Exam patient is in no acute distress.  Saturating 92% and above on her home O2 at 2 L.  She has dried blood in the left and right nares but no septal hematoma, superficial abrasion over the nasal bridge with no bony tenderness over the nasal bone or midface, EOMs full and intact no signs of entrapment.  She was placed in a c-collar by EMS but no midline tenderness on my exam.  No tenderness palpation of the left shoulder or elbow but does have some tenderness with palpation of the left lateral hand and wrist.  No snuffbox tenderness.  2+ radial pulses bilaterally.  No significant lower extremity  edema.    Fall sounds mechanical in nature without any presyncope type symptoms.  Will obtain a CT of the head and C-spine given her ground-level fall with head strike and unclear loss of consciousness.  Also obtain plain films of the left hand and wrist.   2146 CT head and CT C-spine are unremarkable for acute traumatic injuries.  Plain films of the left hand and wrist are also unremarkable for acute traumatic fracture or malalignment.    Upon repeat evaluation patient's pain was well-controlled.  C-collar removed and has no ongoing neck pain.  Discussed imaging results with patient she is reassured.  Feels comfortable with discharge home.  Patient discharged stable condition.     7:20 PM I met and evaluated the patient.       Medical Decision Making  Obtained supplemental history:Supplemental history obtained?: No  Reviewed external records: External records reviewed?: No  Care impacted by chronic illness:Chronic Lung Disease, Mental Health, and Smoking / Nicotine Use  Care significantly affected by social determinants of health:N/A  Did you consider but not order tests?: Work up considered but not performed and documented in chart, if applicable  Did you interpret images independently?: Independent interpretation of ECG and images noted in documentation, when applicable.  Consultation discussion with other provider:Did you involve another provider (consultant, MH, pharmacy, etc.)?: No  Discharge. No recommendations on prescription strength medication(s). I considered admission, but discharged patient after significant clinical improvement.     Adult Minor Head Trauma: Adult Minor Head Trauma: The patient is MODERATE of HIGH risk for traumatic brain jury, this Head CT was ordered based on the following risk factors: Age 65 years or older        At the conclusion of the encounter I discussed the results of all of the tests and the disposition. The questions were answered. The patient or family acknowledged  understanding and was agreeable with the care plan.     0 minutes of critical care time     MEDICATIONS GIVEN IN THE EMERGENCY:  Medications   acetaminophen (TYLENOL) tablet 650 mg (650 mg Oral $Given 9/5/24 1934)       NEW PRESCRIPTIONS STARTED AT TODAY'S ER VISIT  New Prescriptions    No medications on file          =================================================================    [facility]    Patient information was obtained from: Patient    Use of : N/A      Arvind Leong is a 69 year old female with a pertinent history of COPD, diabetes mellitus II, hyperlipidemia, chronic respiratory failure with hypoxia,  tobacco use disorder, and vertigo who presents to this ED by EMS for evaluation of a fall.     Patient was getting out her car when she tripped over a rug and fell onto her face. She had a nosebleed immediately post-fall and is unsure if she lost consciousness. Patient endorses left arm pain.     Patient is normally on 2 L of oxygen at home for her COPD.     Denies any chest, abdominal, back, hip, or leg pain. No new shortness of breath. Not on any blood thinners.       REVIEW OF SYSTEMS   Refer to the [facility]    PAST MEDICAL HISTORY:  Past Medical History:   Diagnosis Date    Acute on chronic respiratory failure with hypoxia (H) 11/15/2016    Bipolar 1 disorder (H)     CAP (community acquired pneumonia) 11/15/2016    Cervical high risk HPV (human papillomavirus) test positive 3/5/2018    3/5/18 NIL pap, +HR HPV (not 16/18) 5/21/21 NIL pap, neg HPV. Plan: await provider    COPD exacerbation (H) 4/24/2021    COPD with exacerbation (H) 11/15/2016    Diabetes mellitus, type II (H)     Hearing loss     Meniere's disease     Pneumonia 4/23/2021    Pneumonia of right lower lobe due to infectious organism 6/11/2019       PAST SURGICAL HISTORY:  Past Surgical History:   Procedure Laterality Date    CYST REMOVAL  01/23/2001    DC REMOVAL ANAL FISTULA,SUBCUTANEOUS      Description: Fistula-in-ano Repair;   Recorded: 2010; x2    TONSILLECTOMY             CURRENT MEDICATIONS:    albuterol (PROAIR HFA/PROVENTIL HFA/VENTOLIN HFA) 108 (90 Base) MCG/ACT inhaler  albuterol (PROVENTIL) (2.5 MG/3ML) 0.083% neb solution  ARIPiprazole ER (ABILIFY MAINTENA) 400 MG extended release inj syringe  atorvastatin (LIPITOR) 40 MG tablet  blood glucose (NO BRAND SPECIFIED) lancets standard  blood glucose (NO BRAND SPECIFIED) test strip  blood glucose monitoring (ONETOUCH VERIO) meter device kit  Dulaglutide (TRULICITY) 3 MG/0.5ML SOPN  fluticasone (FLONASE) 50 MCG/ACT nasal spray  Fluticasone-Umeclidin-Vilant (TRELEGY ELLIPTA) 100-62.5-25 MCG/ACT oral inhaler  ketorolac (ACULAR) 0.5 % ophthalmic solution  metFORMIN (GLUCOPHAGE) 1000 MG tablet  prednisoLONE acetate (PRED FORTE) 1 % ophthalmic suspension        ALLERGIES:  Allergies   Allergen Reactions    Lurasidone Other (See Comments)     Face turned red, (Brand name = Latuda) Face turned red, Face turned red       FAMILY HISTORY:  Family History   Problem Relation Age of Onset    Aneurysm Father     Heart Disease Father 70.00        bypass in 70s, AAA rupture at age 77     Ulcers Father 76.00    Pacemaker Mother     Bipolar Disorder Brother     Breast Cancer Maternal Grandmother 51.00    Kidney failure Maternal Grandmother     Cancer Paternal Grandmother         ?? lung    Cancer Paternal Uncle         ?? lung    Mental Illness Maternal Grandfather     Heart Disease Other         uncle    No Known Problems Sister         two siblings abuse chemicals    No Known Problems Brother     Bipolar Disorder Nephew        SOCIAL HISTORY:   Social History     Socioeconomic History    Marital status:    Tobacco Use    Smoking status: Former     Current packs/day: 0.00     Average packs/day: 1 pack/day for 40.0 years (40.0 ttl pk-yrs)     Types: Cigarettes     Start date: 1977     Quit date: 2017     Years since quittin.5     Passive exposure: Current    Smokeless  "tobacco: Never   Vaping Use    Vaping status: Never Used   Substance and Sexual Activity    Alcohol use: Yes     Comment: Alcoholic Drinks/day: Occasional     Drug use: No    Sexual activity: Never   Social History Narrative    03/2019The patient lives with her mother.; had worked at [employer] as  but quit March 2019.   Quit smoking 2015; no etoh problems  / x 2 ; no kids     Social Determinants of Health     Financial Resource Strain: Low Risk  (10/12/2023)    Financial Resource Strain     Within the past 12 months, have you or your family members you live with been unable to get utilities (heat, electricity) when it was really needed?: No   Food Insecurity: Low Risk  (10/12/2023)    Food Insecurity     Within the past 12 months, did you worry that your food would run out before you got money to buy more?: No     Within the past 12 months, did the food you bought just not last and you didn t have money to get more?: No   Transportation Needs: Low Risk  (10/12/2023)    Transportation Needs     Within the past 12 months, has lack of transportation kept you from medical appointments, getting your medicines, non-medical meetings or appointments, work, or from getting things that you need?: No   Interpersonal Safety: Low Risk  (1/19/2024)    Interpersonal Safety     Do you feel physically and emotionally safe where you currently live?: Yes     Within the past 12 months, have you been hit, slapped, kicked or otherwise physically hurt by someone?: No     Within the past 12 months, have you been humiliated or emotionally abused in other ways by your partner or ex-partner?: No   Housing Stability: High Risk (10/12/2023)    Housing Stability     Do you have housing? : No     Are you worried about losing your housing?: No       VITALS:  /54   Pulse 81   Temp 97.8  F (36.6  C)   Resp 16   Ht 1.397 m (4' 7\")   Wt 75.3 kg (166 lb)   SpO2 95%   BMI 38.58 kg/m      PHYSICAL EXAM    Constitutional: Well " developed, Well nourished, NAD,  HENT: Normocephalic, Atraumatic,  mucous membranes moist. Superficial abrasion to nasal bridge with dried blood in her left and right nares, no septal hematoma.   Neck- trachea midline, No stridor.    Eyes:EOMI, Conjunctiva normal, No discharge.   Respiratory: No respiratory distress, No wheezing. Diminished breath sounds to bases bilaterally.    Cardiovascular: Normal heart rate, Regular rhythm, No murmurs.   Abdominal: Soft, No tenderness, No rebound or guarding.     Musculoskeletal: no deformity or malalignment. No midline or c-spine tenderness. No snuffbox tenderness.  Integument: Warm, Dry, No erythema.    Neurologic: Alert & oriented x 3.  Psychiatric: Affect normal, Cooperative.      LAB:  All pertinent labs reviewed and interpreted.  Results for orders placed or performed during the hospital encounter of 09/05/24   XR Hand Left G/E 3 Views    Impression    IMPRESSION: Left hand and wrist pain negative for acute fracture or joint malalignment. Moderate-severe degenerative osteoarthritic changes throughout the hand and wrist. Generalized soft tissue swelling.   CT Head w/o Contrast    Impression    IMPRESSION:    1.  No evidence of acute intracranial hemorrhage or mass effect.   CT Cervical Spine w/o Contrast    Impression    IMPRESSION:  1.  No evidence of acute fracture or subluxation of the cervical spine by CT imaging.  2.  Degenerative cervical spondylosis as described above.   XR Wrist Left G/E 3 Views    Impression    IMPRESSION: Left hand and wrist pain negative for acute fracture or joint malalignment. Moderate-severe degenerative osteoarthritic changes throughout the hand and wrist. Generalized soft tissue swelling.       RADIOLOGY:  Reviewed all pertinent imaging. Please see official radiology report.  CT Cervical Spine w/o Contrast   Final Result   IMPRESSION:   1.  No evidence of acute fracture or subluxation of the cervical spine by CT imaging.   2.  Degenerative  cervical spondylosis as described above.      CT Head w/o Contrast   Final Result   IMPRESSION:     1.  No evidence of acute intracranial hemorrhage or mass effect.      XR Wrist Left G/E 3 Views   Final Result   IMPRESSION: Left hand and wrist pain negative for acute fracture or joint malalignment. Moderate-severe degenerative osteoarthritic changes throughout the hand and wrist. Generalized soft tissue swelling.      XR Hand Left G/E 3 Views   Final Result   IMPRESSION: Left hand and wrist pain negative for acute fracture or joint malalignment. Moderate-severe degenerative osteoarthritic changes throughout the hand and wrist. Generalized soft tissue swelling.          EKG:        PROCEDURES:         [facility] Documentation:   CMS Diagnoses:              I, Cat Lan, am serving as a scribe to document services personally performed by Edenilson Rosenbaum MD based on my observation and the provider's statements to me. I, Edenilson Rosenbaum MD, attest that Cat Edyta is acting in a scribe capacity, has observed my performance of the services and has documented them in accordance with my direction.    Edenilson Rosenbaum MD  [facility] EMERGENCY DEPARTMENT  [address] [place] [id number]  [phone]      Edenilson Rosenbaum MD  09/05/24 2148       Edenilson Rosenbaum MD  09/06/24 0028

## 2024-09-06 NOTE — DISCHARGE INSTRUCTIONS
Fortunately her CTs and x-rays in the emergency department today did not show any broken bones or dangerous bleeding.  You likely have some bruising over your face and left hand over the next few days and you may take Tylenol as needed for pain relief.  Otherwise would recommend routine follow-up with your primary care doctor.  If you develop recurrent vomiting, intractable pain or other concerning symptoms please return to the emergency department for repeat evaluation.

## 2024-09-08 ENCOUNTER — TELEPHONE (OUTPATIENT)
Dept: BEHAVIORAL HEALTH | Facility: CLINIC | Age: 69
End: 2024-09-08
Payer: COMMERCIAL

## 2024-09-08 NOTE — PROGRESS NOTES
PHYSICAL THERAPY EVALUATION  Type of Visit: Evaluation        Fall Risk Screen:  Fall screen completed by: PT  Have you fallen 2 or more times in the past year?: No  Have you fallen and had an injury in the past year?: Yes (Fell on cement at home 9/5 - tripped on a rubber mat)  Is patient a fall risk?: Yes  Fall screen comments: Hit her nose when falling and on her left hand/arm which still hurts    Subjective   Pt is 69 year-old woman with chief complaint R shoulder pain/ROM limitations. PMH significant for COPD, bipolar I, O2 dependence. She reports that it is hard to reach with the R arm d/t pain. She fell recently tripping over a rubber mat and is hurting more in her left hand. She is R arm dominant.       Presenting condition or subjective complaint: shoulder pain  Date of onset: 07/09/24 (date of order)    Relevant medical history:     Dates & types of surgery:      Prior diagnostic imaging/testing results:       EXAM: XR SHOULDER RIGHT G/E 3 VIEWS  LOCATION: [facility]  DATE: 7/9/2024     INDICATION:  Pain in joint of right shoulder  COMPARISON: None.                                                                      IMPRESSION: The right glenohumeral and acromioclavicular joints are negative for fracture or dislocation. There is mild degenerative change at both joints.  Prior therapy history for the same diagnosis, illness or injury:        Prior Level of Function  Transfers: Independent  Ambulation: Assistive equipment - uses a FWW daily  ADL: Independent      Living Environment  Social support: With family members   Type of home: House   Stairs to enter the home:         Ramp: No   Stairs inside the home: No       Help at home:    Equipment owned:       Employment:      Hobbies/Interests:      Patient goals for therapy:  Reach better with right arm     Pain assessment: Pain present  Location: right elbow/upper arm /Rating: 10/10 with movement/reaching     Objective   SHOULDER  EVALUATION    INTEGUMENTARY (edema, incisions): Pt has bandaids on right hand and swelling/bruising on left hand from recent fall  POSTURE: rounded shoulders    BALANCE/PROPRIOCEPTION:   WEIGHTBEARING ALIGNMENT:   ROM:   (Degrees) Left AROM Left PROM Right AROM  Right PROM   Shoulder Flexion 109 without pain  71 with max pain 95 with pain   Shoulder Extension       Shoulder Abduction 104 without pain  82 with mild pain 85 with pain   Shoulder Adduction       Shoulder Internal Rotation       Shoulder External Rotation 62 without pain  35 without pain 60 without pain   Shoulder Horizontal Abduction       Shoulder Horizontal Adduction       Shoulder Flexion ER       Shoulder Flexion IR       Elbow Extension       Elbow Flexion       *Challenging to perform PROM testing d/t muscle guarding and difficulty following directions      STRENGTH:   Pain: - none + mild ++ moderate +++ severe  Strength Scale: 0-5/5 Left - DNT d/t recent fall and pain in left hand Right   Shoulder Flexion  3-, - (none)   Shoulder Extension     Shoulder Abduction  3-, ++ (mod), +++ (severe)   Shoulder Adduction     Shoulder Internal Rotation  5   Shoulder External Rotation  4-, + (mild)   Shoulder Horizontal Abduction     Shoulder Horizontal Adduction     Elbow Flexion  4-   Elbow Extension  5   Mid Trap     Lower Trap     Rhomboid     Serratus Anterior     *Pt did not respond to questions about pain for some of the muscle testing  *overuse of upper traps with scap squeeze, could not adjust well with cueing    FLEXIBILITY:   SPECIAL TESTS:   PALPATION: tender right shoulder: biceps long/short head tendons, pec minor, supraspinatus tendon, infraspinatus tendon, upper trap and levator scap insertion  JOINT MOBILITY: hypomobile right shoulder inferior and posterior glides  CERVICAL SCREEN:     Assessment & Plan   CLINICAL IMPRESSIONS  Medical Diagnosis: Adhesive capsulitis of right shoulder, Primary osteoarthritis of right shoulder    Treatment  Diagnosis: Decreased ROM of R shoulder, impaired strength of R shoulder   Impression/Assessment: Pt is 69 year-old woman with chief complaint R shoulder pain/ROM limitations. PMH significant for COPD, bipolar I, O2 dependence. Pt was limited during eval d/t recent fall affecting her left side; in addition, she is hard of hearing and had difficulty following directions (also required min-mod A for bed mobility). She demonstrates decreased R shoulder strength and ROM, difficult to pinpoint joint vs. soft tissue restriction d/t the limitations noted above. She would benefit from skilled PT to maximize use of the R shoulder.     Clinical Decision Making (Complexity):  Clinical Presentation: Stable/Uncomplicated  Clinical Presentation Rationale: based on medical and personal factors listed in PT evaluation  Clinical Decision Making (Complexity): Moderate complexity    PLAN OF CARE  Treatment Interventions:  Interventions: Manual Therapy, Neuromuscular Re-education, Therapeutic Activity, Therapeutic Exercise, Self-Care/Home Management    Long Term Goals     PT Goal 1  Goal Identifier: R shoulder AROM  Goal Description: Pt will improve R shoulder AROM to >100 deg into flx and abd to allow her to reach overhead.  Target Date: 12/07/24  PT Goal 2  Goal Identifier: Strength  Goal Description: Pt will demonstrate R shoulder strength of 4-/5 in all directions to allow her to perform ADLs with improved efficiency.  Target Date: 12/07/24      Frequency of Treatment: every 2 weeks  Duration of Treatment: up to 6 visits, up to 90 days         Risks and benefits of evaluation/treatment have been explained.   Patient/Family/caregiver agrees with Plan of Care.     Evaluation Time:     PT Eval, Moderate Complexity Minutes (07593): 23       Signing Clinician: Linn Garcia PT        [facility]                                                                                   OUTPATIENT PHYSICAL  THERAPY      PLAN OF TREATMENT FOR OUTPATIENT REHABILITATION   Patient's Last Name, First Name, Arvind Brito YOB: 1955   Provider's Name   [facility]   Medical Record No.  [id number]     Onset Date: 07/09/24 (date of order)  Start of Care Date: 09/09/24     Medical Diagnosis:  Adhesive capsulitis of right shoulder, Primary osteoarthritis of right shoulder      PT Treatment Diagnosis:  Decreased ROM of R shoulder, impaired strength of R shoulder Plan of Treatment  Frequency/Duration: every 2 weeks/ up to 6 visits, up to 90 days    Certification date from 09/09/24 to 12/07/24         See note for plan of treatment details and functional goals     Linn Garcia, PT                         I CERTIFY THE NEED FOR THESE SERVICES FURNISHED UNDER        THIS PLAN OF TREATMENT AND WHILE UNDER MY CARE     (Physician attestation of this document indicates review and certification of the therapy plan).              Referring Provider:  Jose Coffman    Initial Assessment  See Epic Evaluation- Start of Care Date: 09/09/24

## 2024-09-09 ENCOUNTER — THERAPY VISIT (OUTPATIENT)
Dept: PHYSICAL THERAPY | Facility: REHABILITATION | Age: 69
End: 2024-09-09
Payer: COMMERCIAL

## 2024-09-09 ENCOUNTER — TELEPHONE (OUTPATIENT)
Dept: FAMILY MEDICINE | Facility: CLINIC | Age: 69
End: 2024-09-09

## 2024-09-09 DIAGNOSIS — M19.011 PRIMARY OSTEOARTHRITIS OF RIGHT SHOULDER: ICD-10-CM

## 2024-09-09 DIAGNOSIS — R29.898 IMPAIRED STRENGTH OF SHOULDER MUSCLES: ICD-10-CM

## 2024-09-09 DIAGNOSIS — M25.611 DECREASED ROM OF RIGHT SHOULDER: Primary | ICD-10-CM

## 2024-09-09 DIAGNOSIS — M75.01 ADHESIVE CAPSULITIS OF RIGHT SHOULDER: ICD-10-CM

## 2024-09-09 PROCEDURE — 97110 THERAPEUTIC EXERCISES: CPT | Mod: GP | Performed by: PHYSICAL THERAPIST

## 2024-09-09 PROCEDURE — 97162 PT EVAL MOD COMPLEX 30 MIN: CPT | Mod: GP | Performed by: PHYSICAL THERAPIST

## 2024-09-09 ASSESSMENT — ACTIVITIES OF DAILY LIVING (ADL)
PLEASE_INDICATE_YOR_PRIMARY_REASON_FOR_REFERRAL_TO_THERAPY:: SHOULDER
AT_ITS_WORST?: 10

## 2024-09-09 NOTE — TELEPHONE ENCOUNTER
Nara with home care calling to request prescription for Abilify injection sent to pharmacy for patient. States multiple refill requests have been placed. Per chart review, this medication is listed as historical and I do not see any refill requests. Unable to find previous prescriptions for injectable Abilify in chart. Advised to confirm who the prescriber is and contact their office for refills. Nara stated understanding and is in agreement with plan.    Arabella Corona RN

## 2024-09-10 ENCOUNTER — PATIENT OUTREACH (OUTPATIENT)
Dept: CARE COORDINATION | Facility: CLINIC | Age: 69
End: 2024-09-10
Payer: COMMERCIAL

## 2024-09-10 NOTE — LETTER
Arvind Leong  [address] [place] [address]   [place] [address]    Dear Arvind Leong,      I am a team member within the Connected Care Resource Center with [facility]. I recently tried to reach you to ensure you were doing well following a recent visit within our health system. I also wanted to take this chance to introduce Clinic Care Coordination.     Below is a description of Clinic Care Coordination and how this team can further assist you:       The Clinic Care Coordination team is made up of a Registered Nurse, , Financial Resource Worker, and a Community Health Worker who understand and can help navigate the health care system. The goal of clinic care coordination is to help you manage your health, improve access to care, and achieve optimal health outcomes. They work alongside your provider to assist you in determining your health and social needs, obtain health care and community resources, and provide you with necessary information and education. Clinic Care Coordination can work with you through any barriers and develop a care plan that helps coordinate and strengthen the relationship between you and your care team.    If you wish to connect with the Clinic Care Coordination Team, please let your [facility] Primary Care Provider or Clinic Care Team know and they can place a referral. The Clinic Care Coordination team will then reach out by phone to further support you.    We are focused on providing you with the highest-quality healthcare experience possible.    Sincerely,   Swetha DIAZ  Community Health Worker    Connected Care Resources   [facility]

## 2024-09-10 NOTE — PROGRESS NOTES
[facility] Care Resource Center Contact  [facility]/Voicemail     Clinical Data: Care Coordination ED-sourced Outreach-     Outreach attempted x 2.  Left message on patient's voicemail, providing [facility]'s 24/7 scheduling and nurse triage phone number [phone] ([phone]) for questions/concerns and/or to schedule an appt with an [facility] provider.      Care Coordination introduction letter with explanation of Clinic Care Coordination services sent to patient via [employer]. Clinic Care Coordination services remain available via referral if needed.    Plan: [facility] will do no further outreaches at this time.       ANGI Han  [facility] Resource [place], [facility]    *Connected Care Resource Team does NOT follow patient ongoing. Referrals are identified based on internal discharge reports and the outreach is to ensure patient has an understanding of their discharge instructions.

## 2024-09-13 ENCOUNTER — MEDICAL CORRESPONDENCE (OUTPATIENT)
Dept: HEALTH INFORMATION MANAGEMENT | Facility: CLINIC | Age: 69
End: 2024-09-13
Payer: COMMERCIAL

## 2024-09-20 ENCOUNTER — OFFICE VISIT (OUTPATIENT)
Dept: FAMILY MEDICINE | Facility: CLINIC | Age: 69
End: 2024-09-20
Payer: COMMERCIAL

## 2024-09-20 VITALS
RESPIRATION RATE: 24 BRPM | BODY MASS INDEX: 38.88 KG/M2 | TEMPERATURE: 98.1 F | HEART RATE: 87 BPM | OXYGEN SATURATION: 92 % | WEIGHT: 168 LBS | SYSTOLIC BLOOD PRESSURE: 103 MMHG | HEIGHT: 55 IN | DIASTOLIC BLOOD PRESSURE: 65 MMHG

## 2024-09-20 DIAGNOSIS — E11.69 TYPE 2 DIABETES MELLITUS WITH OTHER SPECIFIED COMPLICATION, WITHOUT LONG-TERM CURRENT USE OF INSULIN (H): Primary | ICD-10-CM

## 2024-09-20 DIAGNOSIS — G89.29 CHRONIC RIGHT SHOULDER PAIN: ICD-10-CM

## 2024-09-20 DIAGNOSIS — M25.511 CHRONIC RIGHT SHOULDER PAIN: ICD-10-CM

## 2024-09-20 DIAGNOSIS — H91.93 DECREASED HEARING OF BOTH EARS: ICD-10-CM

## 2024-09-20 DIAGNOSIS — M19.042 OSTEOARTHRITIS OF LEFT HAND, UNSPECIFIED OSTEOARTHRITIS TYPE: ICD-10-CM

## 2024-09-20 DIAGNOSIS — J96.11 CHRONIC RESPIRATORY FAILURE WITH HYPOXIA (H): ICD-10-CM

## 2024-09-20 DIAGNOSIS — J44.9 CHRONIC OBSTRUCTIVE PULMONARY DISEASE, UNSPECIFIED COPD TYPE (H): ICD-10-CM

## 2024-09-20 DIAGNOSIS — M79.645 PAIN OF FINGER OF LEFT HAND: ICD-10-CM

## 2024-09-20 DIAGNOSIS — F31.30 BIPOLAR I DISORDER, MOST RECENT EPISODE DEPRESSED (H): ICD-10-CM
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
## 2024-09-20 LAB
EST. AVERAGE GLUCOSE BLD GHB EST-MCNC: 148 MG/DL
HBA1C MFR BLD: 6.8 % (ref 0–5.6)

## 2024-09-20 PROCEDURE — G2211 COMPLEX E/M VISIT ADD ON: HCPCS | Performed by: FAMILY MEDICINE

## 2024-09-20 PROCEDURE — 83036 HEMOGLOBIN GLYCOSYLATED A1C: CPT | Performed by: FAMILY MEDICINE

## 2024-09-20 PROCEDURE — 99214 OFFICE O/P EST MOD 30 MIN: CPT | Performed by: FAMILY MEDICINE

## 2024-09-20 PROCEDURE — 36415 COLL VENOUS BLD VENIPUNCTURE: CPT | Performed by: FAMILY MEDICINE

## 2024-09-20 RX ORDER — DULAGLUTIDE 3 MG/.5ML
3 INJECTION, SOLUTION SUBCUTANEOUS
Qty: 6 ML | Refills: 3 | Status: SHIPPED | OUTPATIENT
Start: 2024-09-20 | End: 2024-09-20

## 2024-09-20 ASSESSMENT — ANXIETY QUESTIONNAIRES
8. IF YOU CHECKED OFF ANY PROBLEMS, HOW DIFFICULT HAVE THESE MADE IT FOR YOU TO DO YOUR WORK, TAKE CARE OF THINGS AT HOME, OR GET ALONG WITH OTHER PEOPLE?: SOMEWHAT DIFFICULT
GAD7 TOTAL SCORE: 8
GAD7 TOTAL SCORE: 8
7. FEELING AFRAID AS IF SOMETHING AWFUL MIGHT HAPPEN: NOT AT ALL
GAD7 TOTAL SCORE: 8

## 2024-09-20 ASSESSMENT — PATIENT HEALTH QUESTIONNAIRE - PHQ9
SUM OF ALL RESPONSES TO PHQ QUESTIONS 1-9: 15
SUM OF ALL RESPONSES TO PHQ QUESTIONS 1-9: 15

## 2024-09-20 NOTE — PROGRESS NOTES
"  Assessment & Plan     Type 2 diabetes mellitus with other specified complication, without long-term current use of insulin (H)  1C slightly worsened to 6.8.  Has not been able to get Trulicity for the last few months because of being out of stock.  Will switch her to Mounjaro.  Follow-up in 3 months.  - Hemoglobin A1c  - tirzepatide (MOUNJARO) 10 MG/0.5ML pen  Dispense: 6 mL; Refill: 3  - Hemoglobin A1c    Bipolar I disorder, most recent episode depressed (H)  Seeing a provider at [facility] and [facility].  On Abilify injection    Chronic obstructive pulmonary disease, unspecified COPD type (H)  Wants [employer] oxygen portable machine instead. Renewed home oxygen order and asked staff to fax to [employer]  - Home Oxygen Order for DME - ONLY FOR DME    Chronic right shoulder pain  Last steroid shot 7/9/2024. Return in a couple weeks for another shot.     Pain of finger of left hand  Fall, subsequent encounter  Osteoarthritis of left hand, unspecified osteoarthritis type  Reviewed ED visit 9/5/2024. She fell on concrete after she tripped. Xrs unremarkable except djd.  Continue to have ongoing left hand finger pain.  Will send to hand surgery for further evaluation and treatment  - Orthopedic  Referral    Chronic respiratory failure with hypoxia (H)  See above  - Home Oxygen Order for DME - ONLY FOR DME    Decreased hearing of both ears  - Adult Audiology  Referral            BMI  Estimated body mass index is 39.05 kg/m  as calculated from the following:    Height as of this encounter: 1.397 m (4' 7\").    Weight as of this encounter: 76.2 kg (168 lb).       Depression Screening Follow Up        9/20/2024    11:12 AM   PHQ   PHQ-9 Total Score 15   Q9: Thoughts of better off dead/self-harm past 2 weeks Not at all         9/20/2024    11:12 AM   Last PHQ-9   1.  Little interest or pleasure in doing things 3   2.  Feeling down, depressed, or hopeless 2   3.  Trouble falling or staying asleep, or sleeping too " much 3   4.  Feeling tired or having little energy 3   5.  Poor appetite or overeating 3   6.  Feeling bad about yourself 0   7.  Trouble concentrating 0   8.  Moving slowly or restless 1   Q9: Thoughts of better off dead/self-harm past 2 weeks 0   PHQ-9 Total Score 15         Follow Up Actions Taken  Crisis resource information provided in After Visit Summary  Referred patient back to current mental health provider.       The longitudinal plan of care for the diagnosis(es)/condition(s) as documented were addressed during this visit. Due to the added complexity in care, I will continue to support Arvind in the subsequent management and with ongoing continuity of care.      Subjective   Arvind is a 69 year old, presenting for the following health issues:  Depression        9/20/2024    11:27 AM   Additional Questions   Roomed by Yamini VALLADARES LPN     History of Present Illness       COPD:  She presents for follow up of COPD.  Overall, COPD symptoms are stable since last visit.  She has same as usual fatigue or shortness of breath with exertion and same as usual shortness of breath at rest.  She sometimes coughs and does not have change in sputum. No recent fever. She can walk less than 10 feet without stopping to rest. She can walk 1 flights of stairs without resting. The patient has had an ED, urgent care, or hospital admission because of COPD since the last visit. She states she has had 1 visit(s) to an ED, Urgent Care, or Hospital due to her COPD.    Mental Health Follow-up:  Patient presents to follow-up on Depression.Patient's depression since last visit has been:  Medium  The patient is not having other symptoms associated with depression.      Any significant life events: No  Patient is not feeling anxious or having panic attacks.  Patient has no concerns about alcohol or drug use.    Diabetes:   She presents for follow up of diabetes.  She is checking home blood glucose a few times a month.   She checks blood  "glucose at bedtime.  Blood glucose is sometimes over 200 and never under 70. She is aware of hypoglycemia symptoms including dizziness.    She has no concerns regarding her diabetes at this time.   She is not experiencing numbness or burning in feet, excessive thirst, blurry vision, weight changes or redness, sores or blisters on feet.           Hypertension: She presents for follow up of hypertension.  She does not check blood pressure  regularly outside of the clinic. Outpatient blood pressures have not been over 140/90. She follows a low salt diet.     She eats 0-1 servings of fruits and vegetables daily.She consumes 0 sweetened beverage(s) daily.She exercises with enough effort to increase her heart rate 9 or less minutes per day.  She exercises with enough effort to increase her heart rate 3 or less days per week.   She is taking medications regularly.     PHQ-9 score:        9/20/2024    11:12 AM   PHQ   PHQ-9 Total Score 15   Q9: Thoughts of better off dead/self-harm past 2 weeks Not at all         9/20/2024    11:14 AM   VIOLET-7 SCORE   Total Score 8 (mild anxiety)   Total Score 8           Chief Complaint   Patient presents with    Depression           Review of Systems  Constitutional, HEENT, cardiovascular, pulmonary, gi and gu systems are negative, except as otherwise noted.      Objective    /65   Pulse 87   Temp 98.1  F (36.7  C) (Oral)   Resp 24   Ht 1.397 m (4' 7\")   Wt 76.2 kg (168 lb)   SpO2 92%   BMI 39.05 kg/m    Body mass index is 39.05 kg/m .  Physical Exam   GENERAL: alert and no distress  NECK: no adenopathy, no asymmetry, masses, or scars  RESP: lungs clear to auscultation - no rales, rhonchi or wheezes  CV: regular rate and rhythm, normal S1 S2, no S3 or S4, no murmur, click or rub, no peripheral edema  MS: no gross musculoskeletal defects noted, no edema  SKIN: no suspicious lesions or rashes  NEURO: Normal strength and tone, mentation intact and speech normal  PSYCH: mentation " appears normal, affect normal/bright  Diabetic foot exam: normal DP and PT pulses, no trophic changes or ulcerative lesions, and normal sensory exam    Results for orders placed or performed in visit on 09/20/24 (from the past 24 hour(s))   Hemoglobin A1c   Result Value Ref Range    Estimated Average Glucose 148 (H) <117 mg/dL    Hemoglobin A1C 6.8 (H) 0.0 - 5.6 %           Signed Electronically by: Rema Whiting MD

## 2024-09-22 ENCOUNTER — APPOINTMENT (OUTPATIENT)
Dept: CT IMAGING | Facility: HOSPITAL | Age: 69
End: 2024-09-22
Attending: EMERGENCY MEDICINE
Payer: COMMERCIAL

## 2024-09-22 ENCOUNTER — NURSE TRIAGE (OUTPATIENT)
Dept: NURSING | Facility: CLINIC | Age: 69
End: 2024-09-22
Payer: COMMERCIAL

## 2024-09-22 ENCOUNTER — HOSPITAL ENCOUNTER (EMERGENCY)
Facility: HOSPITAL | Age: 69
Discharge: HOME | End: 2024-09-22
Attending: EMERGENCY MEDICINE | Admitting: EMERGENCY MEDICINE
Payer: COMMERCIAL

## 2024-09-22 VITALS
BODY MASS INDEX: 39.04 KG/M2 | DIASTOLIC BLOOD PRESSURE: 57 MMHG | WEIGHT: 167.99 LBS | OXYGEN SATURATION: 96 % | RESPIRATION RATE: 27 BRPM | SYSTOLIC BLOOD PRESSURE: 125 MMHG | HEART RATE: 102 BPM

## 2024-09-22 DIAGNOSIS — J44.1 COPD EXACERBATION (H): ICD-10-CM

## 2024-09-22 LAB
ALBUMIN SERPL BCG-MCNC: 4.3 G/DL (ref 3.5–5.2)
ALP SERPL-CCNC: 101 U/L (ref 40–150)
ALT SERPL W P-5'-P-CCNC: 22 U/L (ref 0–50)
ANION GAP SERPL CALCULATED.3IONS-SCNC: 11 MMOL/L (ref 7–15)
AST SERPL W P-5'-P-CCNC: 18 U/L (ref 0–45)
BASOPHILS # BLD AUTO: 0.1 10E3/UL (ref 0–0.2)
BASOPHILS NFR BLD AUTO: 0 %
BILIRUB DIRECT SERPL-MCNC: <0.2 MG/DL (ref 0–0.3)
BILIRUB SERPL-MCNC: 0.5 MG/DL
BUN SERPL-MCNC: 10.2 MG/DL (ref 8–23)
CALCIUM SERPL-MCNC: 9.5 MG/DL (ref 8.8–10.4)
CHLORIDE SERPL-SCNC: 100 MMOL/L (ref 98–107)
CREAT SERPL-MCNC: 0.77 MG/DL (ref 0.51–0.95)
CRP SERPL-MCNC: <3 MG/L
EGFRCR SERPLBLD CKD-EPI 2021: 84 ML/MIN/1.73M2
EOSINOPHIL # BLD AUTO: 0.2 10E3/UL (ref 0–0.7)
EOSINOPHIL NFR BLD AUTO: 1 %
ERYTHROCYTE [DISTWIDTH] IN BLOOD BY AUTOMATED COUNT: 12.4 % (ref 10–15)
FLUAV RNA SPEC QL NAA+PROBE: NEGATIVE
FLUBV RNA RESP QL NAA+PROBE: NEGATIVE
GLUCOSE SERPL-MCNC: 187 MG/DL (ref 70–99)
HCO3 SERPL-SCNC: 32 MMOL/L (ref 22–29)
HCT VFR BLD AUTO: 48 % (ref 35–47)
HGB BLD-MCNC: 14.9 G/DL (ref 11.7–15.7)
IMM GRANULOCYTES # BLD: 0.1 10E3/UL
IMM GRANULOCYTES NFR BLD: 0 %
LYMPHOCYTES # BLD AUTO: 3.7 10E3/UL (ref 0.8–5.3)
LYMPHOCYTES NFR BLD AUTO: 24 %
MAGNESIUM SERPL-MCNC: 2 MG/DL (ref 1.7–2.3)
MCH RBC QN AUTO: 29.6 PG (ref 26.5–33)
MCHC RBC AUTO-ENTMCNC: 31 G/DL (ref 31.5–36.5)
MCV RBC AUTO: 95 FL (ref 78–100)
MONOCYTES # BLD AUTO: 0.8 10E3/UL (ref 0–1.3)
MONOCYTES NFR BLD AUTO: 5 %
NEUTROPHILS # BLD AUTO: 10.9 10E3/UL (ref 1.6–8.3)
NEUTROPHILS NFR BLD AUTO: 69 %
NRBC # BLD AUTO: 0 10E3/UL
NRBC BLD AUTO-RTO: 0 /100
NT-PROBNP SERPL-MCNC: 102 PG/ML (ref 0–900)
PLATELET # BLD AUTO: 214 10E3/UL (ref 150–450)
POTASSIUM SERPL-SCNC: 4.1 MMOL/L (ref 3.4–5.3)
PROCALCITONIN SERPL IA-MCNC: 0.07 NG/ML
PROT SERPL-MCNC: 7.2 G/DL (ref 6.4–8.3)
RBC # BLD AUTO: 5.03 10E6/UL (ref 3.8–5.2)
RSV RNA SPEC NAA+PROBE: NEGATIVE
SARS-COV-2 RNA RESP QL NAA+PROBE: NEGATIVE
SODIUM SERPL-SCNC: 143 MMOL/L (ref 135–145)
TSH SERPL DL<=0.005 MIU/L-ACNC: 1.19 UIU/ML (ref 0.3–4.2)
WBC # BLD AUTO: 15.8 10E3/UL (ref 4–11)

## 2024-09-22 PROCEDURE — 85004 AUTOMATED DIFF WBC COUNT: CPT | Performed by: EMERGENCY MEDICINE

## 2024-09-22 PROCEDURE — 83735 ASSAY OF MAGNESIUM: CPT | Performed by: EMERGENCY MEDICINE

## 2024-09-22 PROCEDURE — 999N000157 HC STATISTIC RCP TIME EA 10 MIN

## 2024-09-22 PROCEDURE — 80053 COMPREHEN METABOLIC PANEL: CPT | Performed by: EMERGENCY MEDICINE

## 2024-09-22 PROCEDURE — 71275 CT ANGIOGRAPHY CHEST: CPT

## 2024-09-22 PROCEDURE — 250N000011 HC RX IP 250 OP 636: Performed by: EMERGENCY MEDICINE

## 2024-09-22 PROCEDURE — 250N000009 HC RX 250: Performed by: EMERGENCY MEDICINE

## 2024-09-22 PROCEDURE — 86140 C-REACTIVE PROTEIN: CPT | Performed by: EMERGENCY MEDICINE

## 2024-09-22 PROCEDURE — 84443 ASSAY THYROID STIM HORMONE: CPT | Performed by: EMERGENCY MEDICINE

## 2024-09-22 PROCEDURE — 36415 COLL VENOUS BLD VENIPUNCTURE: CPT | Performed by: EMERGENCY MEDICINE

## 2024-09-22 PROCEDURE — 94640 AIRWAY INHALATION TREATMENT: CPT

## 2024-09-22 PROCEDURE — 99285 EMERGENCY DEPT VISIT HI MDM: CPT | Mod: 25

## 2024-09-22 PROCEDURE — 87637 SARSCOV2&INF A&B&RSV AMP PRB: CPT | Performed by: EMERGENCY MEDICINE

## 2024-09-22 PROCEDURE — 96365 THER/PROPH/DIAG IV INF INIT: CPT | Mod: 59

## 2024-09-22 PROCEDURE — 84145 PROCALCITONIN (PCT): CPT | Performed by: EMERGENCY MEDICINE

## 2024-09-22 PROCEDURE — 82248 BILIRUBIN DIRECT: CPT | Performed by: EMERGENCY MEDICINE

## 2024-09-22 PROCEDURE — 83880 ASSAY OF NATRIURETIC PEPTIDE: CPT | Performed by: EMERGENCY MEDICINE

## 2024-09-22 PROCEDURE — 93005 ELECTROCARDIOGRAM TRACING: CPT | Performed by: EMERGENCY MEDICINE

## 2024-09-22 RX ORDER — PREDNISONE 20 MG/1
TABLET ORAL
Qty: 10 TABLET | Refills: 0 | Status: SHIPPED | OUTPATIENT
Start: 2024-09-23

## 2024-09-22 RX ORDER — MAGNESIUM SULFATE HEPTAHYDRATE 40 MG/ML
2 INJECTION, SOLUTION INTRAVENOUS ONCE
Status: COMPLETED | OUTPATIENT
Start: 2024-09-22 | End: 2024-09-22

## 2024-09-22 RX ORDER — IPRATROPIUM BROMIDE AND ALBUTEROL SULFATE 2.5; .5 MG/3ML; MG/3ML
3 SOLUTION RESPIRATORY (INHALATION) ONCE
Status: COMPLETED | OUTPATIENT
Start: 2024-09-22 | End: 2024-09-22

## 2024-09-22 RX ORDER — IOPAMIDOL 755 MG/ML
90 INJECTION, SOLUTION INTRAVASCULAR ONCE
Status: COMPLETED | OUTPATIENT
Start: 2024-09-22 | End: 2024-09-22

## 2024-09-22 RX ADMIN — MAGNESIUM SULFATE HEPTAHYDRATE 2 G: 40 INJECTION, SOLUTION INTRAVENOUS at 04:36

## 2024-09-22 RX ADMIN — IPRATROPIUM BROMIDE AND ALBUTEROL SULFATE 3 ML: .5; 3 SOLUTION RESPIRATORY (INHALATION) at 04:56

## 2024-09-22 RX ADMIN — IOPAMIDOL 90 ML: 755 INJECTION, SOLUTION INTRAVENOUS at 05:30

## 2024-09-22 ASSESSMENT — COLUMBIA-SUICIDE SEVERITY RATING SCALE - C-SSRS
6. HAVE YOU EVER DONE ANYTHING, STARTED TO DO ANYTHING, OR PREPARED TO DO ANYTHING TO END YOUR LIFE?: NO
2. HAVE YOU ACTUALLY HAD ANY THOUGHTS OF KILLING YOURSELF IN THE PAST MONTH?: NO
1. IN THE PAST MONTH, HAVE YOU WISHED YOU WERE DEAD OR WISHED YOU COULD GO TO SLEEP AND NOT WAKE UP?: NO

## 2024-09-22 ASSESSMENT — ACTIVITIES OF DAILY LIVING (ADL)
ADLS_ACUITY_SCORE: 38
ADLS_ACUITY_SCORE: 38

## 2024-09-22 NOTE — ED TRIAGE NOTES
She has had SOB for the last few days. Does not have any pain at this time. She called for EMS and they gave 125mg solumedrol and a duo neb. She improved with both. She feels her breathing is close to her normal again. Denies cough or fevers. She initially had sats of 87% during the transition of oxygen supply. She is now back up to 96% on 2Lpm.

## 2024-09-22 NOTE — DISCHARGE INSTRUCTIONS
Take the prednisone as prescribed to help reduce the inflammation in your lungs.    Continue your previously-prescribed inhaler & nebulizer as prescribed.    Follow up with your Primary Care provider in 2 days for a recheck.    Return to the Emergency Department for any difficulty breathing, persistent vomiting, new or worsening symptoms, or any other concerns.

## 2024-09-22 NOTE — TELEPHONE ENCOUNTER
"Nurse Triage SBAR    Is this a 2nd Level Triage? NO    Situation:  Difficulty Breathing    Background: Patient has COPD. States that it is giving her problems.Patient states that SOB started today. Patient currently wearing a CPAP but states that she cannot breathe when she lays, denies cough, denies fever    Assessment: Patient has COPD. States that it is giving her problems.Patient states that SOB started today. Patient currently wearing a CPAP but states that she cannot breathe when she lays, denies cough, denies fever    Protocol Recommended Disposition:   Go to ED now    Recommendation:  Care advice given           Does the patient meet one of the following criteria for ADS visit consideration? 16+ years old, with an [facility] PCP     TIP  Providers, please consider if this condition is appropriate for management at one of our Acute and Diagnostic Services sites.     If patient is a good candidate, please use dotphrase <dot>triageresponse and select Refer to ADS to document.    Patient states that she is \" going to lay down because her mother is sleeping and she is her ride\" although patient reports that breathing sx worsen when she tries to lay down    Reason for Disposition   [1] MODERATE difficulty breathing (e.g., speaks in phrases, SOB even at rest, pulse 100-120) AND [2] NEW-onset or WORSE than normal    Additional Information   Negative: SEVERE difficulty breathing (e.g., struggling for each breath, speaks in single words)   Negative: [1] Breathing stopped AND [2] hasn't returned   Negative: Choking on something   Negative: Bluish (or gray) lips or face now   Negative: Difficult to awaken or acting confused (e.g., disoriented, slurred speech)   Negative: Passed out (i.e., lost consciousness, collapsed and was not responding)   Negative: Wheezing started suddenly after medicine, an allergic food or bee sting   Negative: Stridor (harsh sound while breathing in)   Negative: Slow, shallow and weak breathing   " Negative: Sounds like a life-threatening emergency to the triager    Protocols used: Breathing Difficulty-A-AH

## 2024-09-22 NOTE — ED NOTES
Bed: JNED-05  Expected date: 9/22/24  Expected time:   Means of arrival:   Comments:  Feng  69 female  COPD exacerbation, VSS, Neb

## 2024-09-22 NOTE — ED PROVIDER NOTES
EMERGENCY DEPARTMENT ENCOUNTER      NAME: Arvind Leong  AGE: 69 year old female  YOB: 1955  MRN: [id number]  EVALUATION DATE & TIME: 9/22/2024  4:15 AM    PCP: Rema Whiting    ED PROVIDER: Danny Ansari M.D.      Chief Complaint   Patient presents with    Shortness of Breath         IMPRESSION  1. COPD exacerbation (H)        PLAN  - prednisone 40mg daily x5 days  - close PCP follow up  - discharge to home    ED COURSE & MEDICAL DECISION MAKING    ED Course as of 09/22/24 0623   Sun Sep 22, 2024   0623 69yoF with history of COPD (baseline 2L NC home O2), anxiety, cognitive dysfunction with bipolar presenting per EMS from home for evaluation of shortness of breath. Reports tonight she had increased wheezing & shortness of breath when lying flat in bed; no chest pain or pressure. No cough, fevers, sweats, chills. No leg pain/swelling. Notes she lives with her mother and they recently got a cat which she thinks her COPD is reacting to. States she has been using her inhalers & nebulizers as prescribed. Given Duoneb x1 and 125mg IV solumedrol en route per EMS prior to arrival; states she feels much better after this.    Normal vitals on presentation. Calm on exam with mild diffuse expiratory wheezing to lungs with normal work of breathing, no stridor, no peripheral edema or calf tenderness, benign abdomen, clear mentation.    Most suspect COPD per presentation. Workup overall unremarkable for other cause with unremarkable EKG, CT chest with no PE/pneumonia/pulmonary edema, BNP within normal limits (doubt CHF), COVID/influenza/RSV swab negative, no DON, no anemia, no glaring electrolyte abnormality.    Feeling improved after additional Duoneb & IV mag. Satting 93% on baseline 2L NC with clear lungs & normal work of breathing. Patient able to tolerate PO and walk without difficulty. Will treat for COPD exacerbation with prednisone burst; doubt benefit to antibiotics with no purulent sputum and  procal 0.07. Patient comfortable with discharge at this time. Return precautions and need for PCP follow up discussed and understood. No further questions at the time of discharge.       --------------------------------------------------------------------------------   --------------------------------------------------------------------------------     CT chest independently reviewed & interpreted by me: no PE, no pneumothorax, no pneumonia, no pulmonary edema.      This patient involved a high degree of complexity in medical decision making, as significant risks were present and assessed. Recent encounters & results in medical record reviewed by me.    All workup (i.e. any EKG/labs/imaging as per charting below) reviewed and independently interpreted by me. See respective sections for details.    Broad differential considered for this patient, including but not limited to:  COPD exacerbation, CHF, pneumonia, sepsis, anemia, ACS, PE, acute aortic syndrome, pneumothorax, Boerhaave's, tamponade, electrolyte derangement, DON      See additional MDM below if interested.    MEDICATIONS GIVEN IN THE EMERGENCY DEPARTMENT  Medications   ipratropium - albuterol 0.5 mg/2.5 mg/3 mL (DUONEB) neb solution 3 mL (3 mLs Nebulization $Given 9/22/24 5364)   magnesium sulfate 2 g in 50 mL sterile water intermittent infusion (0 g Intravenous Stopped 9/22/24 4816)   iopamidol (ISOVUE-370) solution 90 mL (90 mLs Intravenous $Given 9/22/24 7939)       NEW PRESCRIPTIONS STARTED AT TODAY'S ER VISIT  Current Discharge Medication List        START taking these medications    Details   predniSONE (DELTASONE) 20 MG tablet Take two tablets (= 40mg) each day for 5 (five) days  Qty: 10 tablet, Refills: 0           CONTINUE these medications which have NOT CHANGED    Details   albuterol (PROAIR HFA/PROVENTIL HFA/VENTOLIN HFA) 108 (90 Base) MCG/ACT inhaler Inhale 2 puffs into the lungs every 6 hours as needed for shortness of breath or  wheezing  Qty: 18 g, Refills: 12    Comments: Pharmacy may dispense brand covered by insurance (Proair, or proventil or ventolin or generic albuterol inhaler)  Associated Diagnoses: Chronic obstructive pulmonary disease, unspecified COPD type      albuterol (PROVENTIL) (2.5 MG/3ML) 0.083% neb solution Take 1 vial (2.5 mg) by nebulization every 4 hours as needed for shortness of breath or wheezing Take 1 vial four times a day for the next 5 days then go back to as needed  Qty: 240 mL, Refills: 12    Associated Diagnoses: Chronic obstructive pulmonary disease, unspecified COPD type      ARIPiprazole ER (ABILIFY MAINTENA) 400 MG extended release inj syringe Inject 400 mg into the muscle every 28 days      atorvastatin (LIPITOR) 40 MG tablet Take 1 tablet (40 mg) by mouth daily  Qty: 90 tablet, Refills: 3    Associated Diagnoses: Hyperlipidemia LDL goal <70      blood glucose (NO BRAND SPECIFIED) lancets standard Use to test blood sugar 4 times daily before meals and at bedtime  Qty: 1 each, Refills: 1    Associated Diagnoses: Type 2 diabetes mellitus with other specified complication, without long-term current use of insulin      blood glucose (NO BRAND SPECIFIED) test strip Use to test blood sugar 4 times daily before meals and at bedtime  Qty: 100 strip, Refills: 0    Associated Diagnoses: Type 2 diabetes mellitus with other specified complication, without long-term current use of insulin      blood glucose monitoring (ONETOUCH VERIO) meter device kit Use to test blood sugar 4 times daily before meals and bedtie  Qty: 1 kit, Refills: 1    Associated Diagnoses: Type 2 diabetes mellitus with other specified complication, without long-term current use of insulin      fluticasone (FLONASE) 50 MCG/ACT nasal spray Spray 1 spray into both nostrils daily  Qty: 9.9 mL, Refills: 1    Associated Diagnoses: Acute cough      Fluticasone-Umeclidin-Vilant (TRELEGY ELLIPTA) 100-62.5-25 MCG/ACT oral inhaler Inhale 1 puff into the  lungs daily  Qty: 60 each, Refills: 1    Associated Diagnoses: Chronic obstructive pulmonary disease, unspecified COPD type      ketorolac (ACULAR) 0.5 % ophthalmic solution INSTILL 1 DROP INTO LEFT EYE TWICE DAILY START AFTER SURGERY. USE UNTIL BOTTLE IS FINISHED      metFORMIN (GLUCOPHAGE) 1000 MG tablet Take 1 tablet (1,000 mg) by mouth 2 times daily (with meals)  Qty: 180 tablet, Refills: 3    Associated Diagnoses: Type 2 diabetes mellitus with other specified complication, without long-term current use of insulin      prednisoLONE acetate (PRED FORTE) 1 % ophthalmic suspension INSTILL 1 DROP INTO LEFT EYE 4 TIMES DAILY FOR 7 DAYS THEN THREE TIMES DAILY FOR 7 DAYS THEN TWICE DAILY FOR 7 DAYS THEN ONCE DAILY FOR 7 DAYS THEN STOP START AFTER SURGERY      tirzepatide (MOUNJARO) 10 MG/0.5ML pen Inject 10 mg subcutaneously every 7 days.  Qty: 6 mL, Refills: 3    Associated Diagnoses: Type 2 diabetes mellitus with other specified complication, without long-term current use of insulin                 =================================================================      HPI  Arvind Leong is a 69 year old female with history of COPD (baseline 2L NC home O2), anxiety, cognitive dysfunction with bipolar presenting per EMS from home for evaluation of shortness of breath. Reports tonight she had increased wheezing & shortness of breath when lying flat in bed; no chest pain or pressure. No cough, fevers, sweats, chills. No leg pain/swelling. Notes she lives with her mother and they recently got a cat which she thinks her COPD is reacting to. States she has been using her inhalers & nebulizers as prescribed. Given Duoneb x1 and 125mg IV solumedrol en route per EMS prior to arrival; states she feels much better after this.          --------------- MEDICAL HISTORY ---------------  PAST MEDICAL HISTORY:  Reviewed by me.  Past Medical History:   Diagnosis Date    Acute on chronic respiratory failure with hypoxia (H)  11/15/2016    Bipolar 1 disorder (H)     CAP (community acquired pneumonia) 11/15/2016    Cervical high risk HPV (human papillomavirus) test positive 3/5/2018    3/5/18 NIL pap, +HR HPV (not 16/18) 5/21/21 NIL pap, neg HPV. Plan: await provider    COPD exacerbation (H) 4/24/2021    COPD with exacerbation (H) 11/15/2016    Diabetes mellitus, type II (H)     Hearing loss     Meniere's disease     Pneumonia 4/23/2021    Pneumonia of right lower lobe due to infectious organism 6/11/2019     Patient Active Problem List   Diagnosis    Scoliosis (and kyphoscoliosis), idiopathic    Morbid obesity (H)    Lower Back Pain    Asthma    Urge Incontinence Of Urine    COPD (chronic obstructive pulmonary disease) (H)    Hyperlipidemia    ABDIFATAH on CPAP    Anxiety    Vertigo    Cognitive dysfunction in bipolar disorder (H)    Noncompliance with medications    Dependence on continuous supplemental oxygen    Bipolar I disorder, most recent episode depressed (H)    Runny nose    CAP (community acquired pneumonia) due to Chlamydia species    Type 2 diabetes mellitus with other specified complication, without long-term current use of insulin (H)    Chronic respiratory failure with hypoxia (H)    Cervical high risk HPV (human papillomavirus) test positive    Bipolar disorder, current episode depressed, severe, without psychotic features (H)    Cardiomyopathy, unspecified type (H)    COPD exacerbation (H)    Tobacco use disorder    Hypercarbia    Acute confusion    Hypoxia    Community acquired bacterial pneumonia    COPD with acute exacerbation (H)    VIOLET (generalized anxiety disorder)       PAST SURGICAL HISTORY:  Reviewed by me.  Past Surgical History:   Procedure Laterality Date    CYST REMOVAL  01/23/2001    ME REMOVAL ANAL FISTULA,SUBCUTANEOUS      Description: Fistula-in-ano Repair;  Recorded: 01/08/2010; x2    TONSILLECTOMY         CURRENT MEDICATIONS:    Reviewed by me.    Current Facility-Administered Medications:     2.0 mL  bupivacaine (MARCAINE) 0.5% injection (50 mL vial), 2 mL, , , , 2 mL at 07/09/24 1443    lidocaine 1 % injection 4 mL, 4 mL, , , , 4 mL at 07/09/24 1443    triamcinolone (KENALOG-40) injection 40 mg, 40 mg, , , , 40 mg at 07/09/24 1443    Current Outpatient Medications:     [START ON 9/23/2024] predniSONE (DELTASONE) 20 MG tablet, Take two tablets (= 40mg) each day for 5 (five) days, Disp: 10 tablet, Rfl: 0    albuterol (PROAIR HFA/PROVENTIL HFA/VENTOLIN HFA) 108 (90 Base) MCG/ACT inhaler, Inhale 2 puffs into the lungs every 6 hours as needed for shortness of breath or wheezing, Disp: 18 g, Rfl: 12    albuterol (PROVENTIL) (2.5 MG/3ML) 0.083% neb solution, Take 1 vial (2.5 mg) by nebulization every 4 hours as needed for shortness of breath or wheezing Take 1 vial four times a day for the next 5 days then go back to as needed, Disp: 240 mL, Rfl: 12    ARIPiprazole ER (ABILIFY MAINTENA) 400 MG extended release inj syringe, Inject 400 mg into the muscle every 28 days, Disp: , Rfl:     atorvastatin (LIPITOR) 40 MG tablet, Take 1 tablet (40 mg) by mouth daily, Disp: 90 tablet, Rfl: 3    blood glucose (NO BRAND SPECIFIED) lancets standard, Use to test blood sugar 4 times daily before meals and at bedtime, Disp: 1 each, Rfl: 1    blood glucose (NO BRAND SPECIFIED) test strip, Use to test blood sugar 4 times daily before meals and at bedtime, Disp: 100 strip, Rfl: 0    blood glucose monitoring (ONETOUCH VERIO) meter device kit, Use to test blood sugar 4 times daily before meals and bedtie, Disp: 1 kit, Rfl: 1    fluticasone (FLONASE) 50 MCG/ACT nasal spray, Spray 1 spray into both nostrils daily, Disp: 9.9 mL, Rfl: 1    Fluticasone-Umeclidin-Vilant (TRELEGY ELLIPTA) 100-62.5-25 MCG/ACT oral inhaler, Inhale 1 puff into the lungs daily, Disp: 60 each, Rfl: 1    ketorolac (ACULAR) 0.5 % ophthalmic solution, INSTILL 1 DROP INTO LEFT EYE TWICE DAILY START AFTER SURGERY. USE UNTIL BOTTLE IS FINISHED, Disp: , Rfl:     metFORMIN  (GLUCOPHAGE) 1000 MG tablet, Take 1 tablet (1,000 mg) by mouth 2 times daily (with meals), Disp: 180 tablet, Rfl: 3    prednisoLONE acetate (PRED FORTE) 1 % ophthalmic suspension, INSTILL 1 DROP INTO LEFT EYE 4 TIMES DAILY FOR 7 DAYS THEN THREE TIMES DAILY FOR 7 DAYS THEN TWICE DAILY FOR 7 DAYS THEN ONCE DAILY FOR 7 DAYS THEN STOP START AFTER SURGERY, Disp: , Rfl:     tirzepatide (MOUNJARO) 10 MG/0.5ML pen, Inject 10 mg subcutaneously every 7 days., Disp: 6 mL, Rfl: 3    ALLERGIES:  Reviewed by me.  Allergies   Allergen Reactions    Lurasidone Other (See Comments)     Face turned red, (Brand name = Latuda) Face turned red, Face turned red       FAMILY HISTORY:  Reviewed by me.  Family History   Problem Relation Age of Onset    Aneurysm Father     Heart Disease Father 70.00        bypass in 70s, AAA rupture at age 77     Ulcers Father 76.00    Pacemaker Mother     Bipolar Disorder Brother     Breast Cancer Maternal Grandmother 51.00    Kidney failure Maternal Grandmother     Cancer Paternal Grandmother         ?? lung    Cancer Paternal Uncle         ?? lung    Mental Illness Maternal Grandfather     Heart Disease Other         uncle    No Known Problems Sister         two siblings abuse chemicals    No Known Problems Brother     Bipolar Disorder Nephew        SOCIAL HISTORY:   Reviewed by me.  Social History     Socioeconomic History    Marital status:    Tobacco Use    Smoking status: Former     Current packs/day: 0.00     Average packs/day: 1 pack/day for 40.0 years (40.0 ttl pk-yrs)     Types: Cigarettes     Start date: 1977     Quit date: 2017     Years since quittin.6     Passive exposure: Past    Smokeless tobacco: Never   Vaping Use    Vaping status: Never Used   Substance and Sexual Activity    Alcohol use: Yes     Comment: Alcoholic Drinks/day: Occasional     Drug use: No    Sexual activity: Never   Social History Narrative    2019The patient lives with her mother.; had worked at  [employer]  but quit March 2019.   Quit smoking 2015; no etoh problems  / x 2 ; no kids     Social Determinants of Health     Financial Resource Strain: Low Risk  (10/12/2023)    Financial Resource Strain     Within the past 12 months, have you or your family members you live with been unable to get utilities (heat, electricity) when it was really needed?: No   Food Insecurity: Low Risk  (10/12/2023)    Food Insecurity     Within the past 12 months, did you worry that your food would run out before you got money to buy more?: No     Within the past 12 months, did the food you bought just not last and you didn t have money to get more?: No   Transportation Needs: Low Risk  (10/12/2023)    Transportation Needs     Within the past 12 months, has lack of transportation kept you from medical appointments, getting your medicines, non-medical meetings or appointments, work, or from getting things that you need?: No   Interpersonal Safety: Low Risk  (1/19/2024)    Interpersonal Safety     Do you feel physically and emotionally safe where you currently live?: Yes     Within the past 12 months, have you been hit, slapped, kicked or otherwise physically hurt by someone?: No     Within the past 12 months, have you been humiliated or emotionally abused in other ways by your partner or ex-partner?: No   Housing Stability: High Risk (10/12/2023)    Housing Stability     Do you have housing? : No     Are you worried about losing your housing?: No         --------------- PHYSICAL EXAM ---------------  Nursing notes and vitals independently reviewed by me.  VITALS:  Vitals:    09/22/24 0530 09/22/24 0545 09/22/24 0600 09/22/24 0615   BP:  128/62 125/57    Pulse: 103 102 104 102   Resp:       SpO2: 97% 94% 94% 96%   Weight:           PHYSICAL EXAM:    General:  alert, interactive, no distress  Eyes:  conjunctivae clear, conjugate gaze  HENT:  atraumatic, nose with no rhinorrhea, oropharynx clear  Neck:  no  meningismus  Cardiovascular:  HR 90s during exam, regular rhythm, no murmurs, brisk cap refill  Chest:  no chest wall tenderness  Pulmonary:  no stridor, normal phonation, normal work of breathing, mild diffuse expiratory wheezing throughout lungs with no crackles  Abdomen:  soft, nondistended, nontender  :  no CVA tenderness  Back:  no midline spinal tenderness  Musculoskeletal:  no pretibial edema, no calf tenderness. Gross ROM intact to joints of extremities with no obvious deformities.  Skin:  warm, dry, no rash  Neuro:  awake, alert, answers questions appropriately, follows commands, moves all limbs  Psych:  calm, normal affect      --------------- RESULTS ---------------  EKG:    Reviewed and independently interpreted by me.  - sinus tachycardia at 107bpm, no ST or T wave changes, normal intervals  - increased rate from 82bpm prior with otherwise no changes from prior on 5/19/24  My read.    LAB:  Reviewed and independently interpreted by me.  Results for orders placed or performed during the hospital encounter of 09/22/24   CT Chest Pulmonary Embolism w Contrast    Impression    IMPRESSION:  1.  No pulmonary embolus or other acute process in the chest.  2.  Nonobstructive nephrolithiasis on the left incompletely imaged.   Basic metabolic panel   Result Value Ref Range    Sodium 143 135 - 145 mmol/L    Potassium 4.1 3.4 - 5.3 mmol/L    Chloride 100 98 - 107 mmol/L    Carbon Dioxide (CO2) 32 (H) 22 - 29 mmol/L    Anion Gap 11 7 - 15 mmol/L    Urea Nitrogen 10.2 8.0 - 23.0 mg/dL    Creatinine 0.77 0.51 - 0.95 mg/dL    GFR Estimate 84 >60 mL/min/1.73m2    Calcium 9.5 8.8 - 10.4 mg/dL    Glucose 187 (H) 70 - 99 mg/dL   Hepatic function panel   Result Value Ref Range    Protein Total 7.2 6.4 - 8.3 g/dL    Albumin 4.3 3.5 - 5.2 g/dL    Bilirubin Total 0.5 <=1.2 mg/dL    Alkaline Phosphatase 101 40 - 150 U/L    AST 18 0 - 45 U/L    ALT 22 0 - 50 U/L    Bilirubin Direct <0.20 0.00 - 0.30 mg/dL   Result Value Ref  Range    Magnesium 2.0 1.7 - 2.3 mg/dL   TSH with free T4 reflex   Result Value Ref Range    TSH 1.19 0.30 - 4.20 uIU/mL   Nt probnp inpatient (BNP)   Result Value Ref Range    N terminal Pro BNP Inpatient 102 0 - 900 pg/mL   Result Value Ref Range    Procalcitonin 0.07 <0.50 ng/mL   Result Value Ref Range    CRP Inflammation <3.00 <5.00 mg/L   Symptomatic Influenza A/B, RSV, & SARS-CoV2 PCR (COVID-19) Nose    Specimen: Nose; Swab   Result Value Ref Range    Influenza A PCR Negative Negative    Influenza B PCR Negative Negative    RSV PCR Negative Negative    SARS CoV2 PCR Negative Negative   CBC with platelets and differential   Result Value Ref Range    WBC Count 15.8 (H) 4.0 - 11.0 10e3/uL    RBC Count 5.03 3.80 - 5.20 10e6/uL    Hemoglobin 14.9 11.7 - 15.7 g/dL    Hematocrit 48.0 (H) 35.0 - 47.0 %    MCV 95 78 - 100 fL    MCH 29.6 26.5 - 33.0 pg    MCHC 31.0 (L) 31.5 - 36.5 g/dL    RDW 12.4 10.0 - 15.0 %    Platelet Count 214 150 - 450 10e3/uL    % Neutrophils 69 %    % Lymphocytes 24 %    % Monocytes 5 %    % Eosinophils 1 %    % Basophils 0 %    % Immature Granulocytes 0 %    NRBCs per 100 WBC 0 <1 /100    Absolute Neutrophils 10.9 (H) 1.6 - 8.3 10e3/uL    Absolute Lymphocytes 3.7 0.8 - 5.3 10e3/uL    Absolute Monocytes 0.8 0.0 - 1.3 10e3/uL    Absolute Eosinophils 0.2 0.0 - 0.7 10e3/uL    Absolute Basophils 0.1 0.0 - 0.2 10e3/uL    Absolute Immature Granulocytes 0.1 <=0.4 10e3/uL    Absolute NRBCs 0.0 10e3/uL         RADIOLOGY:  Reviewed and independently interpreted by me. Please see official radiology report.  Recent Results (from the past 24 hour(s))   CT Chest Pulmonary Embolism w Contrast    Narrative    EXAM: CT CHEST PULMONARY EMBOLISM W CONTRAST  LOCATION: [facility]  DATE: 9/22/2024    INDICATION: SOB, tachy, hx COPD  COMPARISON: CT of the abdomen and pelvis 9/1/2024. CT of the chest without contrast 5/19/2024. CTA chest 12/9/2021.  TECHNIQUE: CT chest pulmonary angiogram  during arterial phase injection of IV contrast. Multiplanar reformats and MIP reconstructions were performed. Dose reduction techniques were used.   CONTRAST: isovue 370 90ml    FINDINGS:  ANGIOGRAM CHEST: No pulmonary embolus. No aortic aneurysm or dissection.    LUNGS AND PLEURA: Mild atelectasis in both lower lungs. No acute lung consolidation, pleural effusion or pneumothorax.    MEDIASTINUM/AXILLAE: Normal.    CORONARY ARTERY CALCIFICATION: None.    UPPER ABDOMEN: A few nonobstructing intrarenal stones at the upper pole left kidney measuring up to 4 mm are included in the field-of-view.    MUSCULOSKELETAL: Degenerative changes and moderate convex right scoliosis of the thoracic spine.      Impression    IMPRESSION:  1.  No pulmonary embolus or other acute process in the chest.  2.  Nonobstructive nephrolithiasis on the left incompletely imaged.         PROCEDURES:   Procedures   --------------------------------------------------------------------------------   Cardiac telemetry monitoring ordered by me secondary to the patient's history of shortness of breath and to monitor the patient for dysrhythmia. Reviewed & independently interpreted by me. Revealed sinus rhythm.  --------------------------------------------------------------------------------               --------------- ADDITIONAL MDM ---------------  MIPS:  CT Pulmonary Angiogram:Patient is moderate to high risk for PE.    History:  - I considered systemic symptoms of the presenting illness.  - Supplemental history from:       -- patient, EMS  - External Record(s) reviewed:       -- Inpatient/outpatient record (clinic visit 9/20/24), prior labs (blood 9/20/24), prior imaging (CT head/c-spine 9/5/24)       -- see above ED course & MDM for further details    Workup:  - Chart documentation above includes differential considered and any EKGs or imaging independently interpreted by provider.  - emergent/severe conditions considered and evaluated for: see  above differential & MDM  - In additional to work up documented, I considered the following work up:       -- CTA chest/abdomen/pelvis       -- see above ED course & MDM for further details    External Consultation:  - Discussion of management with another provider:       -- ED pharmacist re: meds       -- see above charting for additional    Complicating Factors:  - Care impacted by chronic illness:       -- see above MDM, past medical history, & problem list  - Care affected by social determinants of health:       -- see above social history       -- Access to Affordable Healthcare       -- Access to Medical Care    Disposition Considerations:  - Discharge       -- I considered escalation of care with admission to the hospital, but ultimately discharged the patient given reassuring workup, no ongoing symptoms       -- I recommended the patient continue their current prescription strength medication(s) as charted above in current medications list       -- I prescribed prescription strength medication(s) as charted above       -- I recommended over-the-counter medication(s) as charted above & in discharge instructions         Danny Ansari MD  09/22/24  Emergency Medicine  [facility] EMERGENCY DEPARTMENT  [address] [place] [id number]  [phone]  Dept: [phone]     Danny Ansari MD  09/22/24 0627

## 2024-09-23 ENCOUNTER — TELEPHONE (OUTPATIENT)
Dept: FAMILY MEDICINE | Facility: CLINIC | Age: 69
End: 2024-09-23
Payer: COMMERCIAL

## 2024-09-23 NOTE — TELEPHONE ENCOUNTER
General Call    Contacts       Contact Date/Time Type Contact Phone/Fax    09/23/2024 10:37 AM CDT Phone (Incoming) Arvind Leong (Self) [phone] (H)          Reason for Call:  patient wants her order for oxygen machine sent to [employer] [facility].   FAX: [phone]  I did fax this along with office visit notes pertaining to this need.

## 2024-09-25 NOTE — TELEPHONE ENCOUNTER
General Call    Contacts       Contact Date/Time Type Contact Phone/Fax    09/23/2024 10:37 AM CDT Phone (Incoming) Arvind Leong (Self) [phone] (H)    09/25/2024 08:46 AM CDT Phone (Incoming) Salo Arvind SALDANA (Self) [phone] (H)          Reason for Call:  patient called in because she needs her oxygen saturation amount for oxygen home equipment sent to the fax below in this thread.       Could we send this information to you [email or web address] or would you prefer to receive a phone call?:   Patient would prefer a phone call   Okay to leave a detailed message?: Yes at Cell number on file:    Telephone Information:   Mobile [phone]

## 2024-09-25 NOTE — TELEPHONE ENCOUNTER
Reviewing fax, it looks like patient needs to complete a oxygen saturation test at a visit. Unsure if this was done or could possibly be done at upcoming 10/11 appointment. Routing to care team for review and assistance. Thanks!    Arabella Corona RN

## 2024-09-26 NOTE — TELEPHONE ENCOUNTER
Patient calling back regarding oxygen saturation test. Looks like message has been sent to care team regarding testing and when it can be completed. Updated patient as she did not know when her next appointment was and that if another appointment needed to be made regarding oxygen testing she would be contacted      Emma Hernadez RN

## 2024-09-27 ENCOUNTER — MEDICAL CORRESPONDENCE (OUTPATIENT)
Dept: HEALTH INFORMATION MANAGEMENT | Facility: CLINIC | Age: 69
End: 2024-09-27
Payer: COMMERCIAL

## 2024-09-27 NOTE — TELEPHONE ENCOUNTER
Oxygen saturation walk test needs to be done within 30 days. LMTCB to schedule patient on the nurse/MA schedule to complete this test.

## 2024-09-29 ENCOUNTER — HEALTH MAINTENANCE LETTER (OUTPATIENT)
Age: 69
End: 2024-09-29

## 2024-09-30 LAB
ATRIAL RATE - MUSE: 107 BPM
DIASTOLIC BLOOD PRESSURE - MUSE: 75 MMHG
INTERPRETATION ECG - MUSE: NORMAL
P AXIS - MUSE: 78 DEGREES
PR INTERVAL - MUSE: 140 MS
QRS DURATION - MUSE: 74 MS
QT - MUSE: 344 MS
QTC - MUSE: 459 MS
R AXIS - MUSE: 75 DEGREES
SYSTOLIC BLOOD PRESSURE - MUSE: 139 MMHG
T AXIS - MUSE: 62 DEGREES
VENTRICULAR RATE- MUSE: 107 BPM

## 2024-10-11 ENCOUNTER — MEDICAL CORRESPONDENCE (OUTPATIENT)
Dept: HEALTH INFORMATION MANAGEMENT | Facility: CLINIC | Age: 69
End: 2024-10-11

## 2024-10-11 ENCOUNTER — OFFICE VISIT (OUTPATIENT)
Dept: FAMILY MEDICINE | Facility: CLINIC | Age: 69
End: 2024-10-11
Payer: COMMERCIAL

## 2024-10-11 VITALS
WEIGHT: 169.4 LBS | TEMPERATURE: 98.7 F | OXYGEN SATURATION: 93 % | DIASTOLIC BLOOD PRESSURE: 63 MMHG | HEIGHT: 55 IN | BODY MASS INDEX: 39.2 KG/M2 | HEART RATE: 78 BPM | RESPIRATION RATE: 24 BRPM | SYSTOLIC BLOOD PRESSURE: 114 MMHG

## 2024-10-11 DIAGNOSIS — H91.93 BILATERAL HEARING LOSS, UNSPECIFIED HEARING LOSS TYPE: ICD-10-CM

## 2024-10-11 DIAGNOSIS — Z12.31 VISIT FOR SCREENING MAMMOGRAM: ICD-10-CM

## 2024-10-11 DIAGNOSIS — J44.9 CHRONIC OBSTRUCTIVE PULMONARY DISEASE, UNSPECIFIED COPD TYPE (H): ICD-10-CM

## 2024-10-11 DIAGNOSIS — G89.29 CHRONIC RIGHT SHOULDER PAIN: ICD-10-CM

## 2024-10-11 DIAGNOSIS — M25.511 CHRONIC RIGHT SHOULDER PAIN: ICD-10-CM

## 2024-10-11 DIAGNOSIS — N39.41 URGE INCONTINENCE: ICD-10-CM

## 2024-10-11 DIAGNOSIS — J96.11 CHRONIC RESPIRATORY FAILURE WITH HYPOXIA (H): Primary | ICD-10-CM

## 2024-10-11 DIAGNOSIS — Z78.0 POSTMENOPAUSAL STATUS: ICD-10-CM

## 2024-10-11 PROCEDURE — 99214 OFFICE O/P EST MOD 30 MIN: CPT | Mod: 25 | Performed by: FAMILY MEDICINE

## 2024-10-11 PROCEDURE — 20610 DRAIN/INJ JOINT/BURSA W/O US: CPT | Performed by: FAMILY MEDICINE

## 2024-10-11 RX ORDER — OXYBUTYNIN CHLORIDE 5 MG/1
5 TABLET, EXTENDED RELEASE ORAL DAILY
Qty: 90 TABLET | Refills: 1 | Status: SHIPPED | OUTPATIENT
Start: 2024-10-11 | End: 2024-10-18

## 2024-10-11 RX ORDER — TRIAMCINOLONE ACETONIDE 40 MG/ML
40 INJECTION, SUSPENSION INTRA-ARTICULAR; INTRAMUSCULAR ONCE
Status: COMPLETED | OUTPATIENT
Start: 2024-10-11 | End: 2024-10-11

## 2024-10-11 RX ADMIN — TRIAMCINOLONE ACETONIDE 40 MG: 40 INJECTION, SUSPENSION INTRA-ARTICULAR; INTRAMUSCULAR at 15:38

## 2024-10-11 NOTE — PROGRESS NOTES
Assessment & Plan       Chronic respiratory failure with hypoxia (H)  2.   Chronic obstructive pulmonary disease, unspecified COPD type (H)  Qualifies for oxygen supplementation continuously. See oxygen walk test result            Oxygen saturation walk test     Patient oxygen saturation on RA at rest is 90%  Oxygen saturation while ambulating 150ft on RA is 86%.        Oxygen continuous dose testing  While ambulating 150ft on 2LPM continuous dose, oxygen saturation is 93%.      DME Provider: [employer]     3.  Chronic right shoulder pain  Procedure:  Right shoulder injection  Diagnosis:  Acromioclavicular joint pain.    Procedure note:  Date/Time: 10/11/24  Performed by: MD Johanne  Authorized by: MD Johanne   Indications: pain   Body area: right shoulder  Joint: right acromioclavicular joint  Local anesthesia used: no   Anesthesia:  Local anesthesia used: no   Sedation:  Patient sedated: no  Risk discussion: discussed complications infection and potential bleeding.     Preparation: Patient was prepped and draped in the usual sterile fashion.  Needle size: 25 G  Ultrasound guidance: no  Approach: lateral  Methylprednisolone amount: 40 mg/ml into right shoulder   Lidocaine 1% amount: 4 mL  Patient tolerance: Patient tolerated the procedure well with no immediate complications.    - triamcinolone (KENALOG-40) injection 40 mg  - Large Joint/Bursa injection and/or drainage (Shoulder, Knee)  - lidocaine 1 % 4 mL      4.  Urge Incontinence Of Urine  Myrebetriq too expensive. Will do oxybutynin.  - oxyBUTYnin ER (DITROPAN XL) 5 MG 24 hr tablet  Dispense: 90 tablet; Refill: 1    5.  Visit for screening mammogram  - MA Screening Bilateral w/ Hai    6.  Bilateral hearing loss, unspecified hearing loss type  - Adult Audiology  Referral    7.  Postmenopausal status  - DEXA HIP/PELVIS/SPINE - Future            BMI  Estimated body mass index is 39.37 kg/m  as calculated from the following:     "Height as of this encounter: 1.397 m (4' 7\").    Weight as of this encounter: 76.8 kg (169 lb 6.4 oz).           Wendy Samuels is a 69 year old, presenting for the following health issues:  Shoulder Pain (Right shoulder cortisone injection)        10/11/2024     3:08 PM   Additional Questions   Roomed by Yamini VALLADARES LPN     HPI     Chief Complaint   Patient presents with    Shoulder Pain     Right shoulder cortisone injection     Needs Oxygen walk test to re-qualify for oxygen supplementation.          Review of Systems  Constitutional, HEENT, cardiovascular, pulmonary, gi and gu systems are negative, except as otherwise noted.      Objective    /63   Pulse 78   Temp 98.7  F (37.1  C) (Oral)   Resp 24   Ht 1.397 m (4' 7\")   Wt 76.8 kg (169 lb 6.4 oz)   SpO2 93%   BMI 39.37 kg/m    Body mass index is 39.37 kg/m .  Physical Exam   GENERAL: alert and no distress  MS: no gross musculoskeletal defects noted, no edema  SKIN: no suspicious lesions or rashes  NEURO: Normal strength and tone, mentation intact and speech normal  PSYCH: mentation appears normal, affect normal/bright    Admission on 09/22/2024, Discharged on 09/22/2024   Component Date Value Ref Range Status    Sodium 09/22/2024 143  135 - 145 mmol/L Final    Potassium 09/22/2024 4.1  3.4 - 5.3 mmol/L Final    Chloride 09/22/2024 100  98 - 107 mmol/L Final    Carbon Dioxide (CO2) 09/22/2024 32 (H)  22 - 29 mmol/L Final    Anion Gap 09/22/2024 11  7 - 15 mmol/L Final    Urea Nitrogen 09/22/2024 10.2  8.0 - 23.0 mg/dL Final    Creatinine 09/22/2024 0.77  0.51 - 0.95 mg/dL Final    GFR Estimate 09/22/2024 84  >60 mL/min/1.73m2 Final    eGFR calculated using 2021 CKD-EPI equation.    Calcium 09/22/2024 9.5  8.8 - 10.4 mg/dL Final    Reference intervals for this test were updated on 7/16/2024 to reflect our healthy population more accurately. There may be differences in the flagging of prior results with similar values performed with this method. " Those prior results can be interpreted in the context of the updated reference intervals.    Glucose 09/22/2024 187 (H)  70 - 99 mg/dL Final    Protein Total 09/22/2024 7.2  6.4 - 8.3 g/dL Final    Albumin 09/22/2024 4.3  3.5 - 5.2 g/dL Final    Bilirubin Total 09/22/2024 0.5  <=1.2 mg/dL Final    Alkaline Phosphatase 09/22/2024 101  40 - 150 U/L Final    AST 09/22/2024 18  0 - 45 U/L Final    ALT 09/22/2024 22  0 - 50 U/L Final    Bilirubin Direct 09/22/2024 <0.20  0.00 - 0.30 mg/dL Final    Magnesium 09/22/2024 2.0  1.7 - 2.3 mg/dL Final    TSH 09/22/2024 1.19  0.30 - 4.20 uIU/mL Final    N terminal Pro BNP Inpatient 09/22/2024 102  0 - 900 pg/mL Final    Reference range shown and results flagged as abnormal are suggested inpatient cut points for confirming diagnosis if CHF in an acute setting. Establishing a baseline value for each individual patient is useful for follow-up. An inpatient or emergency department NT-proPBNP <300 pg/mL effectively rules out acute CHF, with 99% negative predictive value.    The outpatient non-acute reference range for ruling out CHF is:  0-125 pg/mL (age 18 to less than 75)  0-450 pg/mL (age 75 yrs and older)     Procalcitonin 09/22/2024 0.07  <0.50 ng/mL Final    Comment: Interpretation and Recommendations     <0.5 ng/mL:   Systemic bacterial infection unlikely. Local bacterial infection is possible.     0.5-1.99 ng/mL:   Systemic bacterial infection possible, but various other conditions are known to induce PCT as well.     >=2.00 ng/mL:   Systemic bacterial infection likely, unless other causes are known.     Decision to start antibiotics should not be based on procalcitonin level alone. See Procalcitonin Guidance document for more details. https://[employer]/files/[facility]/documents/adult-procalcitonin-guidance-on-vcwpijwlhgb07386.pdf    Factors that may affect PCT levels (not all-inclusive):     - Increased PCT level           Severe trauma/burns           Invasive surgery            Cooling therapy after cardiac arrest/surgery           Treatment with agents which stimulate cytokines           Acute kidney injury           Chronic kidney disease and end stage renal disease           Acute graft vs host disease           Non-specific shock                            causing decreased organ perfusion and/or infarction       - Normal or unchanged PCT level           Early in infections (if low and infection is suspected, repeating in 6-12 hours is recommended)           Chronic infections (endocarditis, osteomyelitis, prosthetic device/graft infections)           Localized infections (cellulitis, wound infections, intra-abdominal abscess)     Note: PCT has not been extensively studied in pregnancy/breastfeeding, pediatrics, severe immunosuppression, and cystic fibrosis.    CRP Inflammation 09/22/2024 <3.00  <5.00 mg/L Final    Influenza A PCR 09/22/2024 Negative  Negative Final    Influenza B PCR 09/22/2024 Negative  Negative Final    RSV PCR 09/22/2024 Negative  Negative Final    SARS CoV2 PCR 09/22/2024 Negative  Negative Final    NEGATIVE: SARS-CoV-2 (COVID-19) RNA not detected, presumed negative.    WBC Count 09/22/2024 15.8 (H)  4.0 - 11.0 10e3/uL Final    RBC Count 09/22/2024 5.03  3.80 - 5.20 10e6/uL Final    Hemoglobin 09/22/2024 14.9  11.7 - 15.7 g/dL Final    Hematocrit 09/22/2024 48.0 (H)  35.0 - 47.0 % Final    MCV 09/22/2024 95  78 - 100 fL Final    MCH 09/22/2024 29.6  26.5 - 33.0 pg Final    MCHC 09/22/2024 31.0 (L)  31.5 - 36.5 g/dL Final    RDW 09/22/2024 12.4  10.0 - 15.0 % Final    Platelet Count 09/22/2024 214  150 - 450 10e3/uL Final    % Neutrophils 09/22/2024 69  % Final    % Lymphocytes 09/22/2024 24  % Final    % Monocytes 09/22/2024 5  % Final    % Eosinophils 09/22/2024 1  % Final    % Basophils 09/22/2024 0  % Final    % Immature Granulocytes 09/22/2024 0  % Final    NRBCs per 100 WBC 09/22/2024 0  <1 /100 Final    Absolute Neutrophils 09/22/2024 10.9 (H)  1.6 -  8.3 10e3/uL Final    Absolute Lymphocytes 09/22/2024 3.7  0.8 - 5.3 10e3/uL Final    Absolute Monocytes 09/22/2024 0.8  0.0 - 1.3 10e3/uL Final    Absolute Eosinophils 09/22/2024 0.2  0.0 - 0.7 10e3/uL Final    Absolute Basophils 09/22/2024 0.1  0.0 - 0.2 10e3/uL Final    Absolute Immature Granulocytes 09/22/2024 0.1  <=0.4 10e3/uL Final    Absolute NRBCs 09/22/2024 0.0  10e3/uL Final    Systolic Blood Pressure 09/22/2024 139  mmHg Final    Diastolic Blood Pressure 09/22/2024 75  mmHg Final    Ventricular Rate 09/22/2024 107  BPM Final    Atrial Rate 09/22/2024 107  BPM Final    AK Interval 09/22/2024 140  ms Final    QRS Duration 09/22/2024 74  ms Final    QT 09/22/2024 344  ms Final    QTc 09/22/2024 459  ms Final    P Axis 09/22/2024 78  degrees Final    R AXIS 09/22/2024 75  degrees Final    T Axis 09/22/2024 62  degrees Final    Interpretation ECG 09/22/2024    Final                    Value:Sinus tachycardia with Premature atrial complexes  Low voltage QRS  Borderline ECG  When compared with ECG of 19-May-2024 12:57,  Premature atrial complexes are now Present  Confirmed by SEE ED PROVIDER NOTE FOR, ECG INTERPRETATION ([id number]),  HÉCTOR ZAMAN ([id number]) on 9/30/2024 2:24:38 AM           Signed Electronically by: Rema Whiting MD

## 2024-10-15 ENCOUNTER — NURSE TRIAGE (OUTPATIENT)
Dept: FAMILY MEDICINE | Facility: CLINIC | Age: 69
End: 2024-10-15
Payer: COMMERCIAL

## 2024-10-15 NOTE — TELEPHONE ENCOUNTER
Nurse Triage SBAR    Is this a 2nd Level Triage? YES, LICENSED PRACTITIONER REVIEW IS REQUIRED    Situation:  patient calling in with concern that the new urinary frequency medication,oxybutynin, is not working.    Background: Hx of urge incontinence. Patient was on myrebetriq previously, though it is too expensive so she was switched to oxybutynin on 10/11/24    Assessment: ?Patient reports that since starting oxybutynin her symptoms have WORSENED. She reports frequent uncontrolled urination.     No fever, chills, flank pain, dysuria.    Protocol Recommended Disposition:   See in Office Today    Recommendation: Please advise, dose change vs new med vs visit?     Routed to provider    Does the patient meet one of the following criteria for ADS visit consideration? 16+ years old, with an [facility] PCP     TIP  Providers, please consider if this condition is appropriate for management at one of our Acute and Diagnostic Services sites.     If patient is a good candidate, please use dotphrase <dot>triageresponse and select Refer to ADS to document.    Reason for Disposition   Can't control passage of urine (i.e., urinary incontinence) and new-onset (< 2 weeks) or worsening    Protocols used: Urinary Symptoms-A-OH

## 2024-10-15 NOTE — TELEPHONE ENCOUNTER
Called patient and LMTCB. If patient returns call please relay provider notation below. Patient should be seen in walk-in care as we do not have an appointment at [facility] at this time.

## 2024-10-15 NOTE — TELEPHONE ENCOUNTER
She may have a uti. Please ask her to be seen. I'm out she can schedule with partner or go to walk in clinic.

## 2024-10-18 ENCOUNTER — OFFICE VISIT (OUTPATIENT)
Dept: FAMILY MEDICINE | Facility: CLINIC | Age: 69
End: 2024-10-18
Payer: COMMERCIAL

## 2024-10-18 VITALS
WEIGHT: 170 LBS | RESPIRATION RATE: 24 BRPM | SYSTOLIC BLOOD PRESSURE: 116 MMHG | BODY MASS INDEX: 39.34 KG/M2 | OXYGEN SATURATION: 96 % | HEART RATE: 76 BPM | HEIGHT: 55 IN | DIASTOLIC BLOOD PRESSURE: 68 MMHG | TEMPERATURE: 97.8 F

## 2024-10-18 DIAGNOSIS — N39.41 URGE INCONTINENCE OF URINE: Primary | ICD-10-CM

## 2024-10-18 LAB
ALBUMIN UR-MCNC: NEGATIVE MG/DL
APPEARANCE UR: CLEAR
BACTERIA #/AREA URNS HPF: ABNORMAL /HPF
BILIRUB UR QL STRIP: NEGATIVE
COLOR UR AUTO: YELLOW
GLUCOSE UR STRIP-MCNC: NEGATIVE MG/DL
HGB UR QL STRIP: NEGATIVE
KETONES UR STRIP-MCNC: NEGATIVE MG/DL
LEUKOCYTE ESTERASE UR QL STRIP: ABNORMAL
NITRATE UR QL: NEGATIVE
PH UR STRIP: 6.5 [PH] (ref 5–8)
RBC #/AREA URNS AUTO: ABNORMAL /HPF
SP GR UR STRIP: 1.02 (ref 1–1.03)
SQUAMOUS #/AREA URNS AUTO: ABNORMAL /LPF
UROBILINOGEN UR STRIP-ACNC: 0.2 E.U./DL
WBC #/AREA URNS AUTO: ABNORMAL /HPF

## 2024-10-18 PROCEDURE — 81001 URINALYSIS AUTO W/SCOPE: CPT | Performed by: PHYSICIAN ASSISTANT

## 2024-10-18 PROCEDURE — 99213 OFFICE O/P EST LOW 20 MIN: CPT | Performed by: PHYSICIAN ASSISTANT

## 2024-10-18 NOTE — PROGRESS NOTES
"  Assessment & Plan     Urge incontinence of urine  Patient is here today for urine leaking and urinary frequency.   UA today was normal. No indication of infection.  Suspect this may be related to her age and body habitus as well as caffeine intake. Recommend cutting back on caffeine intake. Referral to Urology placed if persistent issue.   - UA Macroscopic with reflex to Microscopic and Culture  - UA Macroscopic with reflex to Microscopic and Culture  - UA Microscopic with Reflex to Culture  - Adult Urology  Referral      BMI  Estimated body mass index is 39.51 kg/m  as calculated from the following:    Height as of this encounter: 1.397 m (4' 7\").    Weight as of this encounter: 77.1 kg (170 lb).   Weight management plan: Patient referred to endocrine and/or weight management specialty      Risks, benefits and alternatives were discussed with patient. Agreeable to the plan of care.        Subjective   Arvind is a 69 year old, presenting for the following health issues:  Urinary issues (Urine leaking.  She gets like this periodically.  Was started on bladder medications.  Seems worse since starting that.  Since starting that she has to pee more.  Needed to change her brief up to 3 times a day. ) and Cyst (Has a cyst near vaginal area.  She can feel it and it is itchy.  Not sure if that could be causing some of her urinary issues. )    HPI     Pre-Provider Visit Orders- Urinalysis UA/UC  Patient reports the following symptoms:  possible urinary tract infection (UTI)   Does the patient report any of the following symptoms: vaginal discharge, vaginal itching, possible yeast infection, has a urinary catheter in place, or unable to void in a specimen cup?  No        Patient is here today for possible UTI  Complains of frequent urination and urine leakage.  She stopped her Oxybutynin because she felt this was making it worse  Denies belly pain or bloody urine        Review of Systems  Constitutional, HEENT, " "cardiovascular, pulmonary, gi and gu systems are negative, except as otherwise noted.      Objective    /68   Pulse 76   Temp 97.8  F (36.6  C) (Oral)   Resp 24   Ht 1.397 m (4' 7\")   Wt 77.1 kg (170 lb)   SpO2 96%   BMI 39.51 kg/m    Body mass index is 39.51 kg/m .  Physical Exam   GENERAL: alert and no distress  NECK: no adenopathy, no asymmetry, masses, or scars  RESP: lungs clear to auscultation - no rales, rhonchi or wheezes  CV: regular rate and rhythm, normal S1 S2, no S3 or S4, no murmur, click or rub, no peripheral edema  ABDOMEN: soft, nontender, no hepatosplenomegaly, no masses and bowel sounds normal   (female): normal female external genitalia, normal urethral meatus, normal vaginal mucosa  MS: no gross musculoskeletal defects noted, no edema    Results for orders placed or performed in visit on 10/18/24 (from the past 24 hour(s))   UA Macroscopic with reflex to Microscopic and Culture    Specimen: Urine, Clean Catch   Result Value Ref Range    Color Urine Yellow Colorless, Straw, Light Yellow, Yellow    Appearance Urine Clear Clear    Glucose Urine Negative Negative mg/dL    Bilirubin Urine Negative Negative    Ketones Urine Negative Negative mg/dL    Specific Gravity Urine 1.020 1.005 - 1.030    Blood Urine Negative Negative    pH Urine 6.5 5.0 - 8.0    Protein Albumin Urine Negative Negative mg/dL    Urobilinogen Urine 0.2 0.2, 1.0 E.U./dL    Nitrite Urine Negative Negative    Leukocyte Esterase Urine Trace (A) Negative   UA Microscopic with Reflex to Culture   Result Value Ref Range    Bacteria Urine None Seen None Seen /HPF    RBC Urine None Seen 0-2 /HPF /HPF    WBC Urine 0-5 0-5 /HPF /HPF    Squamous Epithelials Urine Few (A) None Seen /LPF    Narrative    Urine Culture not indicated           Signed Electronically by: Sadia Luu PA-C    "

## 2024-10-25 NOTE — ED TRIAGE NOTES
Injection given without difficulties.Bandaid applied. Patient instructed to stay in the clinic for 15 minutes. Patient verbalized understanding and will notify nurse with any complaints.   amb to triage.  Pt on 2 liters home O2 for copd hx.  Pt states for last 3 days exertional SOB progressively worse.

## 2024-11-01 ENCOUNTER — TRANSFERRED RECORDS (OUTPATIENT)
Dept: MULTI SPECIALTY CLINIC | Facility: CLINIC | Age: 69
End: 2024-11-01

## 2024-11-01 LAB — RETINOPATHY: NORMAL

## 2024-11-06 ENCOUNTER — OFFICE VISIT (OUTPATIENT)
Dept: URGENT CARE | Facility: URGENT CARE | Age: 69
End: 2024-11-06
Payer: COMMERCIAL

## 2024-11-06 ENCOUNTER — HOSPITAL ENCOUNTER (OUTPATIENT)
Dept: GENERAL RADIOLOGY | Facility: HOSPITAL | Age: 69
Discharge: HOME | End: 2024-11-06
Attending: PHYSICIAN ASSISTANT | Admitting: PHYSICIAN ASSISTANT
Payer: COMMERCIAL

## 2024-11-06 VITALS
OXYGEN SATURATION: 95 % | RESPIRATION RATE: 18 BRPM | SYSTOLIC BLOOD PRESSURE: 124 MMHG | TEMPERATURE: 98 F | HEART RATE: 90 BPM | DIASTOLIC BLOOD PRESSURE: 71 MMHG

## 2024-11-06 DIAGNOSIS — J44.1 COPD WITH ACUTE EXACERBATION (H): Primary | ICD-10-CM

## 2024-11-06 LAB
ANION GAP SERPL CALCULATED.3IONS-SCNC: 8 MMOL/L (ref 7–15)
BASOPHILS # BLD AUTO: 0 10E3/UL (ref 0–0.2)
BASOPHILS NFR BLD AUTO: 0 %
BUN SERPL-MCNC: 7.8 MG/DL (ref 8–23)
CALCIUM SERPL-MCNC: 9.4 MG/DL (ref 8.8–10.4)
CHLORIDE SERPL-SCNC: 99 MMOL/L (ref 98–107)
CREAT SERPL-MCNC: 0.72 MG/DL (ref 0.51–0.95)
EGFRCR SERPLBLD CKD-EPI 2021: >90 ML/MIN/1.73M2
EOSINOPHIL # BLD AUTO: 0.1 10E3/UL (ref 0–0.7)
EOSINOPHIL NFR BLD AUTO: 1 %
ERYTHROCYTE [DISTWIDTH] IN BLOOD BY AUTOMATED COUNT: 12.1 % (ref 10–15)
FLUAV RNA SPEC QL NAA+PROBE: NEGATIVE
FLUBV RNA RESP QL NAA+PROBE: NEGATIVE
GLUCOSE SERPL-MCNC: 181 MG/DL (ref 70–99)
HCO3 SERPL-SCNC: 34 MMOL/L (ref 22–29)
HCT VFR BLD AUTO: 46.9 % (ref 35–47)
HGB BLD-MCNC: 14.4 G/DL (ref 11.7–15.7)
IMM GRANULOCYTES # BLD: 0 10E3/UL
IMM GRANULOCYTES NFR BLD: 0 %
LYMPHOCYTES # BLD AUTO: 2 10E3/UL (ref 0.8–5.3)
LYMPHOCYTES NFR BLD AUTO: 17 %
MCH RBC QN AUTO: 29.3 PG (ref 26.5–33)
MCHC RBC AUTO-ENTMCNC: 30.7 G/DL (ref 31.5–36.5)
MCV RBC AUTO: 95 FL (ref 78–100)
MONOCYTES # BLD AUTO: 0.6 10E3/UL (ref 0–1.3)
MONOCYTES NFR BLD AUTO: 5 %
NEUTROPHILS # BLD AUTO: 9.2 10E3/UL (ref 1.6–8.3)
NEUTROPHILS NFR BLD AUTO: 77 %
PLATELET # BLD AUTO: 181 10E3/UL (ref 150–450)
POTASSIUM SERPL-SCNC: 4.6 MMOL/L (ref 3.4–5.3)
RBC # BLD AUTO: 4.92 10E6/UL (ref 3.8–5.2)
RSV RNA SPEC NAA+PROBE: NEGATIVE
SARS-COV-2 RNA RESP QL NAA+PROBE: NEGATIVE
SODIUM SERPL-SCNC: 141 MMOL/L (ref 135–145)
WBC # BLD AUTO: 12 10E3/UL (ref 4–11)

## 2024-11-06 PROCEDURE — 36415 COLL VENOUS BLD VENIPUNCTURE: CPT | Performed by: PHYSICIAN ASSISTANT

## 2024-11-06 PROCEDURE — 71046 X-RAY EXAM CHEST 2 VIEWS: CPT

## 2024-11-06 PROCEDURE — 99214 OFFICE O/P EST MOD 30 MIN: CPT | Performed by: PHYSICIAN ASSISTANT

## 2024-11-06 PROCEDURE — 87637 SARSCOV2&INF A&B&RSV AMP PRB: CPT | Performed by: PHYSICIAN ASSISTANT

## 2024-11-06 PROCEDURE — 85025 COMPLETE CBC W/AUTO DIFF WBC: CPT | Performed by: PHYSICIAN ASSISTANT

## 2024-11-06 PROCEDURE — 80048 BASIC METABOLIC PNL TOTAL CA: CPT | Performed by: PHYSICIAN ASSISTANT

## 2024-11-06 RX ORDER — AZITHROMYCIN 500 MG/1
500 TABLET, FILM COATED ORAL DAILY
Qty: 5 TABLET | Refills: 0 | Status: SHIPPED | OUTPATIENT
Start: 2024-11-06 | End: 2024-11-11

## 2024-11-06 RX ORDER — BENZONATATE 100 MG/1
100 CAPSULE ORAL 3 TIMES DAILY PRN
Qty: 30 CAPSULE | Refills: 0 | Status: SHIPPED | OUTPATIENT
Start: 2024-11-06 | End: 2024-11-16

## 2024-11-06 NOTE — PROGRESS NOTES
Patient presents with:  Urgent Care: Cough/wheezing X2 days ago       Clinical Decision Making:  Influenza testing was negative.  RSV test is pending.  COVID-19 screening test is pending.  CBC shows leukocytosis with left shift and chest x-ray did show infiltrates on the left and right basilar lung fields.  Basic Metabolic panel showed random elevated blood glucose. Patient is treated with respiratory antibiotic with zithromax and will continue with albuterol inhaler, symptomatic care was gone over. Expected course of resolution and indication for return was gone over and questions were answered to patient/parent's satisfaction before discharge.        ICD-10-CM    1. COPD with acute exacerbation (H)  J44.1 CBC with platelets and differential     Basic metabolic panel     XR Chest 2 Views     Symptomatic Influenza A/B, RSV, & SARS-CoV2 PCR (COVID-19) Nasopharyngeal     azithromycin (ZITHROMAX) 500 MG tablet     benzonatate (TESSALON) 100 MG capsule     CANCELED: Symptomatic COVID-19 Virus (Coronavirus) by PCR Nose     CANCELED: RSV rapid antigen     CANCELED: Influenza A & B Antigen - Clinic Collect          Patient Instructions   You were seen today for acute bronchitis. This is likely due to a viral illness.    Symptom management:  - Get plenty of rest  - Avoid smoking and second hand smoke  - May take tylenol or ibuprofen for fever/discomfort  - Drink plenty of non-caffeinated fluids  - Use nasal steroid spray for sinus congestion  - Albuterol inhaler may be used every 6 hours as needed for chest tightness      Reasons to be seen in the emergency room:  - Develop a fever of 100.4 or higher  - Cough changes, coughing up blood, or become short of breath  - Neck stiffness  - Chest pain  - Severe headache  - Unable to tolerate eating or drinking fluids    Otherwise, if no symptom improvement after 5 days, follow-up with your primary care provider.        HPI:  Arvind Leong is a 69 year old female who has a past  medical history of diabetes mellitus type 2, COPD with continuous supplemental oxygenation, obesity, obstructive sleep apnea on CPAP, bipolar disorder, cardiomyopathy who presents today ostensibly for a 2-day history of cough that is productive of phlegm with shortness of breath with activity.  At this time she is not having chest arm or jaw pain diaphoresis pleuritic chest pain syncope presyncope heart palpitations nausea vomiting diarrhea no reported headache sore throat abdominal pain or skin rash.  She is having supplemental oxygenation with oxygenation via nasal cannula.  Has not had COVID screening at home.  No reported household contacts with mother who she lives with with sickness.  Last hemoglobin A1c was reviewed in the chart was 6.6 on 5/9/2024.     History obtained from chart review and the patient.    Problem List:  2024-09: VIOLET (generalized anxiety disorder)  2024-05: COPD with acute exacerbation (H)  2023-09: Community acquired bacterial pneumonia  2021-12: Hypercarbia  2021-12: Acute confusion  2021-12: Hypoxia  2021-07: Cardiomyopathy, unspecified type (H)  2021-04: COPD exacerbation (H)  2021-04: Pneumonia  2020-11: Chronic respiratory failure with hypoxia (H)  2019-06: CAP (community acquired pneumonia) due to Chlamydia species  2019-06: Pneumonia of right lower lobe due to infectious organism  2019-03: Runny nose  2019-03: Bipolar I disorder, most recent episode depressed (H)  2019-03: Bipolar disorder, current episode depressed, severe, without   psychotic features (H)  2019-03: Psychosis, unspecified psychosis type (H)  2018-08: Dependence on continuous supplemental oxygen  2018-08: Cardiomyopathy, unspecified type (H)  2018-03: Cervical high risk HPV (human papillomavirus) test positive  2016-11: Cognitive dysfunction in bipolar disorder (H)  2016-11: Noncompliance with medications  2016-11: Bipolar disorder, current episode mixed, moderate (H)  2016-11: Acute on chronic respiratory failure  with hypoxia (H)  2016: COPD with exacerbation (H)  2016: SIRS (systemic inflammatory response syndrome) (H)  2016: COPD with acute exacerbation (H)  2014: COPD exacerbation (H)  2009-10: COPD exacerbation (H)  2009-10: Tobacco use disorder  Scoliosis (and kyphoscoliosis), idiopathic  Morbid obesity (H)  Lower Back Pain  Asthma  Urge Incontinence Of Urine  COPD (chronic obstructive pulmonary disease) (H)  Hyperlipidemia  ABDIFATAH on CPAP  Anxiety  Vertigo  Type 2 diabetes mellitus with other specified complication, without   long-term current use of insulin (H)  Bipolar Disorder  Type 2 diabetes mellitus (H)  Bipolar affective disorder (H)  Acute respiratory failure with hypoxia (H)  Uncontrolled diabetes mellitus      Past Medical History:   Diagnosis Date    Acute on chronic respiratory failure with hypoxia (H) 11/15/2016    Bipolar 1 disorder (H)     CAP (community acquired pneumonia) 11/15/2016    Cervical high risk HPV (human papillomavirus) test positive 3/5/2018    3/5/18 NIL pap, +HR HPV (not 16/18) 21 NIL pap, neg HPV. Plan: await provider    COPD exacerbation (H) 2021    COPD with exacerbation (H) 11/15/2016    Diabetes mellitus, type II (H)     Hearing loss     Meniere's disease     Pneumonia 2021    Pneumonia of right lower lobe due to infectious organism 2019       Social History     Tobacco Use    Smoking status: Former     Current packs/day: 0.00     Average packs/day: 1 pack/day for 40.0 years (40.0 ttl pk-yrs)     Types: Cigarettes     Start date: 1977     Quit date: 2017     Years since quittin.7     Passive exposure: Past    Smokeless tobacco: Never   Substance Use Topics    Alcohol use: Yes     Comment: Alcoholic Drinks/day: Occasional        Review of Systems  As above in HPI otherwise negative.    Vitals:    24 1119   BP: 124/71   Pulse: 90   Resp: 18   Temp: 98  F (36.7  C)   SpO2: 95%       General: Patient is presenting in a wheelchair with  oxygenation supplementation with nasal cannula, afebrile.  Is interacting with provider very well speaking full sentences not short of breath  HEENT: Head is normocephalic atraumatic   eyes are PERRL EOMI sclera anicteric   TMs are clear bilaterally  Throat is clear and no exudate  No cervical lymphadenopathy present  LUNGS: Prolonged expiratory wheezes in the bilateral mid lung fields.  Normal respiratory effort and excursion.   HEART: Regular rate and rhythm  Skin: Without rash non-diaphoretic    Physical Exam      Labs:  Results for orders placed or performed in visit on 11/06/24   XR Chest 2 Views     Status: None    Narrative    EXAM: XR CHEST 2 VIEWS  LOCATION: [facility]  DATE: 11/6/2024    INDICATION: COPD exacerbation  COMPARISON: CTA chest 9/22/2024      Impression    IMPRESSION: Left greater than right basilar opacities, favored to represent atelectasis over infection. No pleural effusion or pneumothorax. Stable heart size and mediastinal contours. Dextrocurvature of the thoracic spine.   Basic metabolic panel     Status: Abnormal   Result Value Ref Range    Sodium 141 135 - 145 mmol/L    Potassium 4.6 3.4 - 5.3 mmol/L    Chloride 99 98 - 107 mmol/L    Carbon Dioxide (CO2) 34 (H) 22 - 29 mmol/L    Anion Gap 8 7 - 15 mmol/L    Urea Nitrogen 7.8 (L) 8.0 - 23.0 mg/dL    Creatinine 0.72 0.51 - 0.95 mg/dL    GFR Estimate >90 >60 mL/min/1.73m2    Calcium 9.4 8.8 - 10.4 mg/dL    Glucose 181 (H) 70 - 99 mg/dL   CBC with platelets and differential     Status: Abnormal   Result Value Ref Range    WBC Count 12.0 (H) 4.0 - 11.0 10e3/uL    RBC Count 4.92 3.80 - 5.20 10e6/uL    Hemoglobin 14.4 11.7 - 15.7 g/dL    Hematocrit 46.9 35.0 - 47.0 %    MCV 95 78 - 100 fL    MCH 29.3 26.5 - 33.0 pg    MCHC 30.7 (L) 31.5 - 36.5 g/dL    RDW 12.1 10.0 - 15.0 %    Platelet Count 181 150 - 450 10e3/uL    % Neutrophils 77 %    % Lymphocytes 17 %    % Monocytes 5 %    % Eosinophils 1 %    % Basophils 0 %    %  Immature Granulocytes 0 %    Absolute Neutrophils 9.2 (H) 1.6 - 8.3 10e3/uL    Absolute Lymphocytes 2.0 0.8 - 5.3 10e3/uL    Absolute Monocytes 0.6 0.0 - 1.3 10e3/uL    Absolute Eosinophils 0.1 0.0 - 0.7 10e3/uL    Absolute Basophils 0.0 0.0 - 0.2 10e3/uL    Absolute Immature Granulocytes 0.0 <=0.4 10e3/uL   CBC with platelets and differential     Status: Abnormal    Narrative    The following orders were created for panel order CBC with platelets and differential.  Procedure                               Abnormality         Status                     ---------                               -----------         ------                     CBC with platelets and d...[[id number]]  Abnormal            Final result                 Please view results for these tests on the individual orders.       Radiology:  I have personally ordered and preliminarily reviewed the following xray, I have noted infiltrates on the left and right lower lung fields    At the end of the encounter, I discussed results, diagnosis, medications. Discussed red flags for immediate return to clinic/ER, as well as indications for follow up if no improvement. Patient understood and agreed to plan. Patient was stable for discharge.

## 2024-11-07 ENCOUNTER — NURSE TRIAGE (OUTPATIENT)
Dept: NURSING | Facility: CLINIC | Age: 69
End: 2024-11-07
Payer: COMMERCIAL

## 2024-11-08 ENCOUNTER — APPOINTMENT (OUTPATIENT)
Dept: RADIOLOGY | Facility: HOSPITAL | Age: 69
End: 2024-11-08
Attending: EMERGENCY MEDICINE
Payer: COMMERCIAL

## 2024-11-08 ENCOUNTER — HOSPITAL ENCOUNTER (EMERGENCY)
Facility: HOSPITAL | Age: 69
Discharge: HOME | End: 2024-11-08
Attending: EMERGENCY MEDICINE | Admitting: EMERGENCY MEDICINE
Payer: COMMERCIAL

## 2024-11-08 ENCOUNTER — APPOINTMENT (OUTPATIENT)
Dept: CT IMAGING | Facility: HOSPITAL | Age: 69
End: 2024-11-08
Attending: EMERGENCY MEDICINE
Payer: COMMERCIAL

## 2024-11-08 VITALS
OXYGEN SATURATION: 94 % | TEMPERATURE: 98.3 F | HEART RATE: 91 BPM | SYSTOLIC BLOOD PRESSURE: 95 MMHG | RESPIRATION RATE: 22 BRPM | DIASTOLIC BLOOD PRESSURE: 45 MMHG

## 2024-11-08 DIAGNOSIS — J44.1 COPD EXACERBATION (H): ICD-10-CM

## 2024-11-08 DIAGNOSIS — M54.50 BILATERAL LOW BACK PAIN WITHOUT SCIATICA, UNSPECIFIED CHRONICITY: ICD-10-CM

## 2024-11-08 PROCEDURE — 94640 AIRWAY INHALATION TREATMENT: CPT | Performed by: EMERGENCY MEDICINE

## 2024-11-08 PROCEDURE — 72131 CT LUMBAR SPINE W/O DYE: CPT

## 2024-11-08 PROCEDURE — 99284 EMERGENCY DEPT VISIT MOD MDM: CPT | Mod: 25 | Performed by: EMERGENCY MEDICINE

## 2024-11-08 PROCEDURE — 71046 X-RAY EXAM CHEST 2 VIEWS: CPT

## 2024-11-08 PROCEDURE — 250N000009 HC RX 250: Performed by: EMERGENCY MEDICINE

## 2024-11-08 RX ORDER — IPRATROPIUM BROMIDE AND ALBUTEROL SULFATE 2.5; .5 MG/3ML; MG/3ML
3 SOLUTION RESPIRATORY (INHALATION) ONCE
Status: COMPLETED | OUTPATIENT
Start: 2024-11-08 | End: 2024-11-08

## 2024-11-08 RX ORDER — PREDNISONE 20 MG/1
TABLET ORAL
Qty: 10 TABLET | Refills: 0 | Status: SHIPPED | OUTPATIENT
Start: 2024-11-08

## 2024-11-08 RX ADMIN — IPRATROPIUM BROMIDE AND ALBUTEROL SULFATE 3 ML: .5; 3 SOLUTION RESPIRATORY (INHALATION) at 03:43

## 2024-11-08 ASSESSMENT — ACTIVITIES OF DAILY LIVING (ADL)
ADLS_ACUITY_SCORE: 0

## 2024-11-08 NOTE — ED NOTES
Bed: JNED-28  Expected date: 11/8/24  Expected time:   Means of arrival:   Comments:  Feng  69 male non traumatic back pain

## 2024-11-08 NOTE — ED PROVIDER NOTES
EMERGENCY DEPARTMENT ENCOUNTER      NAME: Arvind Leong  AGE: 69 year old female  YOB: 1955  MRN: [id number]  EVALUATION DATE & TIME: 11/8/2024  3:11 AM    PCP: Rema Whiting    ED PROVIDER: Martell Camara MD      Chief Complaint   Patient presents with    Back Pain         FINAL IMPRESSION:  1. Bilateral low back pain without sciatica, unspecified chronicity    2. COPD exacerbation (H)          ED COURSE & MEDICAL DECISION MAKING:    Pertinent Labs & Imaging studies reviewed. (See chart for details)  69 year old female presents to the Emergency Department for evaluation of low oxygen saturations at home but when EMS arrived her oxygen saturation were normal then she mention she has back pain and wanted to be seen    Summary she is got chronic back pain.  Never seen by spine.  Recently seen in clinic and x-ray done that showed possible pneumonia is only treated with a Z-Lenin with no steroid    On my exam she is not overly wheezy or having any increased work of breathing and is on her baseline oxygen    Positive right leg raise to left leg.  No weakness.  Plan for x-ray and CT lumbar spine    CT lumbar spine no is no civic and change compared to her most recent CT imaging in September.  Will give steroids have a follow-up with [facility].  X-ray shows no improvement compared to an x-ray few days ago.  Will prescribe prednisone for presumed COPD exacerbation, add on Augmentin to the current azithromycin that she is currently on for COPD exacerbation    Plan for discharge home follow-up primary care doctor    ED Course as of 11/08/24 0717   Fri Nov 08, 2024   0313 Per chart review seen in the ER in September 2024 for low back pain.  CT imaging at that point did not show any acute changes   0315 Seen in urgent care 2 days ago November 6, 2024 prescribe Z-Lenin   0412 Per nursing EMS reportedly was called out because patient was having low oxygen but oxygen was normal on her usual oxygen  settings.  On my exam no wheezing or rhonchi.  And then for EMS she mention she was having low back pain.  Unclear if this back pain is actually chronic.  Did have back pain mentioned in September of CT imaging that shows foraminal stenosis.  Does have producible pain with lifting her left leg.  Will obtain CT imaging.  Cauda equina unlikely.  Discitis unlikely.  Will obtain chest x-ray since she was recently started on a Z-Lenin for possible pneumonia   0413 DuoNeb since she reportedly was having low oxygen at home       Medical Decision Making  Obtained supplemental history:Supplemental history obtained?: No  Reviewed external records: External records reviewed?: Outpatient Record: 11/4/24 [facility] urgent care visit for COPD excerbation  Care impacted by chronic illness:Diabetes, Hyperlipidemia, Mental Health, Smoking / Nicotine Use, and Other: scoliosis, lower back pan, COPD, ABDIFATAH on CPAP  Did you consider but not order tests?: Work up considered but not performed and documented in chart, if applicable  Did you interpret images independently?: Independent interpretation of ECG and images noted in documentation, when applicable.  Consultation discussion with other provider:Did you involve another provider (consultant, , pharmacy, etc.)?: No  Discharge. I prescribed additional prescription strength medication(s) as charted. N/A.    MIPS: Not Applicable            At the conclusion of the encounter I discussed the results of all of the tests and the disposition. The questions were answered. The patient or family acknowledged understanding and was agreeable with the care plan.       MEDICATIONS GIVEN IN THE EMERGENCY:  Medications   ipratropium - albuterol 0.5 mg/2.5 mg/3 mL (DUONEB) neb solution 3 mL (3 mLs Nebulization $Given 11/8/24 0343)       NEW PRESCRIPTIONS STARTED AT TODAY'S ER VISIT  Discharge Medication List as of 11/8/2024  6:11 AM        START taking these medications    Details    amoxicillin-clavulanate (AUGMENTIN) 875-125 MG tablet Take 1 tablet by mouth 2 times daily for 7 days., Disp-14 tablet, R-0, Local Print      predniSONE (DELTASONE) 20 MG tablet Take two tablets (= 40mg) each day for 5 (five) days, Disp-10 tablet, R-0, Local Print                =================================================================    TRIAGE ASSESSMENT:        HPI    Patient information was obtained from: Patient    Use of : N/A         Arvind Leong is a 69 year old female with a pertinent history of low back pain, scoliosis, cardiomyopathy, T2DM, COPD, HLD, and anxiety who presents to this ED via EMS for evaluation of back pain.    Patient is a poor historian. She endorses a history of chronic low back pain which she comes in tonight with left-sided low back pain. She also has COPD and chronically is on 2 liters of oxygen at home. She noticed that her oxygen kept fluctuating so she initially called 911 for low oxygen sats. She was seen on 11/6 for cough and shortness of breath. She had a xray done and was discharged with zithromax. Patient denies fevers or cough.     Per chart review, patient was seen at [facility] urgent care for COPD excerbation on 11/6/24. Patient reported a 2 day history of productive cough and shortness of breath. Influenza, RSV, and COVID negative. Chest xray showed infiltrates on the left and right basilar lung zavala.  Lisan was discharged with respiratory antibiotic with zithromax and will continue with albuterol inhaler,       REVIEW OF SYSTEMS   Review of Systems See HPI    PAST MEDICAL HISTORY:  Past Medical History:   Diagnosis Date    Acute on chronic respiratory failure with hypoxia (H) 11/15/2016    Bipolar 1 disorder (H)     CAP (community acquired pneumonia) 11/15/2016    Cervical high risk HPV (human papillomavirus) test positive 3/5/2018    3/5/18 NIL pap, +HR HPV (not 16/18) 5/21/21 NIL pap, neg HPV. Plan: await provider    COPD  exacerbation (H) 2021    COPD with exacerbation (H) 11/15/2016    Diabetes mellitus, type II (H)     Hearing loss     Meniere's disease     Pneumonia 2021    Pneumonia of right lower lobe due to infectious organism 2019       PAST SURGICAL HISTORY:  Past Surgical History:   Procedure Laterality Date    CYST REMOVAL  2001    SD REMOVAL ANAL FISTULA,SUBCUTANEOUS      Description: Fistula-in-ano Repair;  Recorded: 2010; x2    TONSILLECTOMY             CURRENT MEDICATIONS:    amoxicillin-clavulanate (AUGMENTIN) 875-125 MG tablet  predniSONE (DELTASONE) 20 MG tablet  albuterol (PROAIR HFA/PROVENTIL HFA/VENTOLIN HFA) 108 (90 Base) MCG/ACT inhaler  albuterol (PROVENTIL) (2.5 MG/3ML) 0.083% neb solution  ARIPiprazole ER (ABILIFY MAINTENA) 400 MG extended release inj syringe  atorvastatin (LIPITOR) 40 MG tablet  azithromycin (ZITHROMAX) 500 MG tablet  benzonatate (TESSALON) 100 MG capsule  blood glucose (NO BRAND SPECIFIED) lancets standard  blood glucose (NO BRAND SPECIFIED) test strip  blood glucose monitoring (ONETOUCH VERIO) meter device kit  fluticasone (FLONASE) 50 MCG/ACT nasal spray  Fluticasone-Umeclidin-Vilant (TRELEGY ELLIPTA) 100-62.5-25 MCG/ACT oral inhaler  metFORMIN (GLUCOPHAGE) 1000 MG tablet  tirzepatide (MOUNJARO) 10 MG/0.5ML pen        ALLERGIES:  Allergies   Allergen Reactions    Lurasidone Other (See Comments)     Face turned red, (Brand name = Latuda) Face turned red, Face turned red       FAMILY HISTORY:  Family History   Problem Relation Age of Onset    Aneurysm Father     Heart Disease Father 70.00        bypass in 70s, AAA rupture at age 77     Ulcers Father 76.00    Pacemaker Mother     Bipolar Disorder Brother     Breast Cancer Maternal Grandmother 51.00    Kidney failure Maternal Grandmother     Cancer Paternal Grandmother         ?? lung    Cancer Paternal Uncle         ?? lung    Mental Illness Maternal Grandfather     Heart Disease Other         uncle    No Known  Problems Sister         two siblings abuse chemicals    No Known Problems Brother     Bipolar Disorder Nephew        SOCIAL HISTORY:   Social History     Socioeconomic History    Marital status:    Tobacco Use    Smoking status: Former     Current packs/day: 0.00     Average packs/day: 1 pack/day for 40.0 years (40.0 ttl pk-yrs)     Types: Cigarettes     Start date: 1977     Quit date: 2017     Years since quittin.7     Passive exposure: Past    Smokeless tobacco: Never   Vaping Use    Vaping status: Never Used   Substance and Sexual Activity    Alcohol use: Yes     Comment: Alcoholic Drinks/day: Occasional     Drug use: No    Sexual activity: Never   Social History Narrative    2019The patient lives with her mother.; had worked at [employer] as  but quit 2019.   Quit smoking ; no etoh problems  / x 2 ; no kids     Social Drivers of Health     Financial Resource Strain: Low Risk  (10/12/2023)    Financial Resource Strain     Within the past 12 months, have you or your family members you live with been unable to get utilities (heat, electricity) when it was really needed?: No   Food Insecurity: Low Risk  (10/12/2023)    Food Insecurity     Within the past 12 months, did you worry that your food would run out before you got money to buy more?: No     Within the past 12 months, did the food you bought just not last and you didn t have money to get more?: No   Transportation Needs: Low Risk  (10/12/2023)    Transportation Needs     Within the past 12 months, has lack of transportation kept you from medical appointments, getting your medicines, non-medical meetings or appointments, work, or from getting things that you need?: No   Interpersonal Safety: Low Risk  (10/18/2024)    Interpersonal Safety     Do you feel physically and emotionally safe where you currently live?: Yes     Within the past 12 months, have you been hit, slapped, kicked or otherwise physically hurt by  someone?: No     Within the past 12 months, have you been humiliated or emotionally abused in other ways by your partner or ex-partner?: No   Housing Stability: High Risk (10/12/2023)    Housing Stability     Do you have housing? : No     Are you worried about losing your housing?: No       VITALS:  BP 95/45   Pulse 91   Temp 98.3  F (36.8  C) (Oral)   Resp 22   SpO2 94%     PHYSICAL EXAM      Vitals: BP 95/45   Pulse 91   Temp 98.3  F (36.8  C) (Oral)   Resp 22   SpO2 94%   General: Appears in no acute distress, alert, interactive. Appears drowsy.  Eyes: Conjunctivae non-injected. Sclera anicteric.  HENT: Atraumatic.  Neck: Supple.  Respiratory/Chest: Respiration unlabored. 2 liters NC, baseline.  No wheezing or rhonchi  Abdomen: non distended  Musculoskeletal: Normal extremities. No edema or erythema.  Back: Low back tenderness.  Positive straight leg raise on the left  Skin: Normal color. No rash or diaphoresis.  Neurologic: Face symmetric, moves all extremities spontaneously. Speech clear.  Psychiatric: Oriented to person, place, and time. Affect appropriate.    LAB:  All pertinent labs reviewed and interpreted.  Results for orders placed or performed during the hospital encounter of 11/08/24   CT Lumbar Spine w/o Contrast    Impression    IMPRESSION:  1.  No fracture or posttraumatic subluxation.  2.  Stable moderate levoscoliosis.  3.  Multilevel foraminal narrowing as described above.   Chest XR,  PA & LAT    Impression    IMPRESSION: Stable cardiomediastinal silhouette. Mild right perihilar infiltrate. Stable basilar opacities. Thoracic dextrocurvature. Degenerative change osseous structures.       RADIOLOGY:  Reviewed all pertinent imaging. Please see official radiology report.  CT Lumbar Spine w/o Contrast   Final Result   IMPRESSION:   1.  No fracture or posttraumatic subluxation.   2.  Stable moderate levoscoliosis.   3.  Multilevel foraminal narrowing as described above.      Chest XR,  PA & LAT    Final Result   IMPRESSION: Stable cardiomediastinal silhouette. Mild right perihilar infiltrate. Stable basilar opacities. Thoracic dextrocurvature. Degenerative change osseous structures.            I, Tammy Faustinong, am serving as a scribe to document services personally performed by Martell Camara MD based on my observation and the provider's statements to me. I, Martell Camara MD, attest that Tammy Lan is acting in a scribe capacity, has observed my performance of the services and has documented them in accordance with my direction.    Martell Camara MD  [facility] EMERGENCY DEPARTMENT  [facility] [facility] [id number]  [phone]        Martell Camara MD  11/08/24 0719

## 2024-11-08 NOTE — DISCHARGE INSTRUCTIONS
I placed a referral to [facility].  They should contact you in a few days to arrange follow-up.  Recommend prednisone for 5 days to help with your breathing as well as your back pain.  Recommend continue your Z-Lenin as prescribed from clinic with started Augmentin as well.  Return to the ER for worsening symptom

## 2024-11-08 NOTE — TELEPHONE ENCOUNTER
Nurse Triage SBAR    Is this a 2nd Level Triage? NO    Situation: COPD Exacerbation    Background: Patient uses 2L of home oxygen. She was prescribed azithromycin yesterday at , negative swabs.    Assessment: Patient reports unable to get comfortable due to difficulty breathing. She reports oxygen is usually 95%, currently 90-91%. She reports severe difficulty breathing with sitting or laying down.    Protocol Recommended Disposition:   Call  Now    Recommendation: According to the protocol, patient should call EMS.  Care advice given. Patient verbalizes understanding and agrees with plan of care. Plans to call 911.    Jacy Peña RN  11/07/24 7:50 PM  [facility] Nurse Advisor    Reason for Disposition   SEVERE difficulty breathing (e.g., struggling for each breath, speaks in single words, pulse > 120)    Protocols used: COPD Oxygen Monitoring and Ttddcoa-B-XP

## 2024-11-08 NOTE — ED TRIAGE NOTES
Patient initially called 911 due to low sats. Sats were 96-97% when EMS arrived. Patient then reports lower back pain of 6-7/10 started 2 days ago. Patient is known to have COPD and on O2 at 2LPM via nasal cannula.

## 2024-11-12 ENCOUNTER — PATIENT OUTREACH (OUTPATIENT)
Dept: CARE COORDINATION | Facility: CLINIC | Age: 69
End: 2024-11-12
Payer: COMMERCIAL

## 2024-11-12 NOTE — PROGRESS NOTES
[facility] Contact  [facility]/Voicemail     Clinical Data: Care Coordination ED-sourced Outreach-     Outreach attempted x 2.  Left message on patient's voicemail, providing [facility]'s 24/7 scheduling and nurse triage phone number [phone] ([phone]) for questions/concerns and/or to schedule an appt with an [facility] provider.      Care Coordination introduction letter with explanation of Clinic Care Coordination services sent to patient via [employer]. Clinic Care Coordination services remain available via referral if needed.    Plan: [facility] will do no further outreaches at this time.       ANGI Claire  Connected Care Resource [place], [facility]    *Connected Care Resource Team does NOT follow patient ongoing. Referrals are identified based on internal discharge reports and the outreach is to ensure patient has an understanding of their discharge instructions.

## 2024-11-12 NOTE — LETTER
Arvind Leong  [address] [place] [address]   [place] [address]    Dear Arvind Leong,      I am a team member within the [facility] with [facility]. I recently tried to reach you to ensure you were doing well following a recent visit within our health system. I also wanted to take this chance to introduce Clinic Care Coordination.     Below is a description of Clinic Care Coordination and how this team can further assist you:       The Clinic Care Coordination team is made up of a Registered Nurse, , Financial Resource Worker, and a Community Health Worker who understand and can help navigate the health care system. The goal of clinic care coordination is to help you manage your health, improve access to care, and achieve optimal health outcomes. They work alongside your provider to assist you in determining your health and social needs, obtain health care and community resources, and provide you with necessary information and education. Clinic Care Coordination can work with you through any barriers and develop a care plan that helps coordinate and strengthen the relationship between you and your care team.    If you wish to connect with the Clinic Care Coordination Team, please let your [facility] Primary Care Provider or Clinic Care Team know and they can place a referral. The Clinic Care Coordination team will then reach out by phone to further support you.    We are focused on providing you with the highest-quality healthcare experience possible.    Sincerely,   Your care team with [facility]

## 2024-11-14 ENCOUNTER — OFFICE VISIT (OUTPATIENT)
Dept: UROLOGY | Facility: CLINIC | Age: 69
End: 2024-11-14
Attending: PHYSICIAN ASSISTANT
Payer: COMMERCIAL

## 2024-11-14 VITALS
WEIGHT: 169 LBS | OXYGEN SATURATION: 93 % | BODY MASS INDEX: 39.28 KG/M2 | HEART RATE: 90 BPM | DIASTOLIC BLOOD PRESSURE: 63 MMHG | SYSTOLIC BLOOD PRESSURE: 111 MMHG

## 2024-11-14 DIAGNOSIS — N39.41 URGE INCONTINENCE OF URINE: Primary | ICD-10-CM

## 2024-11-14 DIAGNOSIS — E11.69 TYPE 2 DIABETES MELLITUS WITH OTHER SPECIFIED COMPLICATION, WITHOUT LONG-TERM CURRENT USE OF INSULIN (H): ICD-10-CM

## 2024-11-14 DIAGNOSIS — N95.2 ATROPHIC VAGINITIS: ICD-10-CM

## 2024-11-14 PROCEDURE — 99204 OFFICE O/P NEW MOD 45 MIN: CPT | Performed by: MASSAGE THERAPIST

## 2024-11-14 RX ORDER — ESTRADIOL 0.1 MG/G
2 CREAM VAGINAL
Qty: 42.5 G | Refills: 4 | Status: SHIPPED | OUTPATIENT
Start: 2024-11-15

## 2024-11-14 RX ORDER — TOLTERODINE 4 MG/1
4 CAPSULE, EXTENDED RELEASE ORAL DAILY
Qty: 90 CAPSULE | Refills: 3 | Status: SHIPPED | OUTPATIENT
Start: 2024-11-14

## 2024-11-14 NOTE — PROGRESS NOTES
CC: Urge Incontinence     HPI:  Arvind Leong is a 69 year old female with acute chronic respiratory failure, DM 11, asked to be seen in consultation by Sadia HALL for urinary urgency.  This problem has been going on for for about 2 years and has been getting worse.  It is associated with urge incontinence. She reports its worse when moving from hot to cold. She has multiple episodes of incontinence per day and has been using 3 briefs per day.  She has had tried myrbetriq (which was too expensive) and oxybutynin 5 mg which is not helping.  The patient voids q6 hours.      Last hgb A1c 6.8     Fluids:   Water- 2 glasses   Soda- 1  Juice- 1    She denies any dysuria, nocturia, hematuria, pyuria, hesitency, intermittency, feeling of incomplete emptying, or any recent hx of UTI's or stones.    The patient some constipation or splinting.  She is not sexually active denies pelvic pain.   She denies any vaginal bulge. She has Neurological and balance problems, oxygen dependant.     Obstetric Hx:  She is .      Current Outpatient Medications   Medication Sig Dispense Refill    albuterol (PROAIR HFA/PROVENTIL HFA/VENTOLIN HFA) 108 (90 Base) MCG/ACT inhaler Inhale 2 puffs into the lungs every 6 hours as needed for shortness of breath or wheezing 18 g 12    albuterol (PROVENTIL) (2.5 MG/3ML) 0.083% neb solution Take 1 vial (2.5 mg) by nebulization every 4 hours as needed for shortness of breath or wheezing Take 1 vial four times a day for the next 5 days then go back to as needed 240 mL 12    amoxicillin-clavulanate (AUGMENTIN) 875-125 MG tablet Take 1 tablet by mouth 2 times daily for 7 days. 14 tablet 0    ARIPiprazole ER (ABILIFY MAINTENA) 400 MG extended release inj syringe Inject 400 mg into the muscle every 28 days      atorvastatin (LIPITOR) 40 MG tablet Take 1 tablet (40 mg) by mouth daily 90 tablet 3    benzonatate (TESSALON) 100 MG capsule Take 1 capsule (100 mg) by mouth 3 times daily as needed for  cough. 30 capsule 0    blood glucose (NO BRAND SPECIFIED) lancets standard Use to test blood sugar 4 times daily before meals and at bedtime 1 each 1    blood glucose (NO BRAND SPECIFIED) test strip Use to test blood sugar 4 times daily before meals and at bedtime 100 strip 0    blood glucose monitoring (ONETOUCH VERIO) meter device kit Use to test blood sugar 4 times daily before meals and bedtie 1 kit 1    fluticasone (FLONASE) 50 MCG/ACT nasal spray Spray 1 spray into both nostrils daily 9.9 mL 1    Fluticasone-Umeclidin-Vilant (TRELEGY ELLIPTA) 100-62.5-25 MCG/ACT oral inhaler Inhale 1 puff into the lungs daily 60 each 1    metFORMIN (GLUCOPHAGE) 1000 MG tablet Take 1 tablet (1,000 mg) by mouth 2 times daily (with meals) 180 tablet 3    predniSONE (DELTASONE) 20 MG tablet Take two tablets (= 40mg) each day for 5 (five) days 10 tablet 0    tirzepatide (MOUNJARO) 10 MG/0.5ML pen Inject 10 mg subcutaneously every 7 days. 6 mL 3     Allergies   Allergen Reactions    Lurasidone Other (See Comments)     Face turned red, (Brand name = Latuda) Face turned red, Face turned red     Past Medical History:   Diagnosis Date    Acute on chronic respiratory failure with hypoxia (H) 11/15/2016    Bipolar 1 disorder (H)     CAP (community acquired pneumonia) 11/15/2016    Cervical high risk HPV (human papillomavirus) test positive 3/5/2018    3/5/18 NIL pap, +HR HPV (not 16/18) 5/21/21 NIL pap, neg HPV. Plan: await provider    COPD exacerbation (H) 4/24/2021    COPD with exacerbation (H) 11/15/2016    Diabetes mellitus, type II (H)     Hearing loss     Meniere's disease     Pneumonia 4/23/2021    Pneumonia of right lower lobe due to infectious organism 6/11/2019     Past Surgical History:   Procedure Laterality Date    CYST REMOVAL  01/23/2001    VT REMOVAL ANAL FISTULA,SUBCUTANEOUS      Description: Fistula-in-ano Repair;  Recorded: 01/08/2010; x2    TONSILLECTOMY        Family History   Problem Relation Age of Onset    Aneurysm  Father     Heart Disease Father 70.00        bypass in 70s, AAA rupture at age 77     Ulcers Father 76.00    Pacemaker Mother     Bipolar Disorder Brother     Breast Cancer Maternal Grandmother 51.00    Kidney failure Maternal Grandmother     Cancer Paternal Grandmother         ?? lung    Cancer Paternal Uncle         ?? lung    Mental Illness Maternal Grandfather     Heart Disease Other         uncle    No Known Problems Sister         two siblings abuse chemicals    No Known Problems Brother     Bipolar Disorder Nephew      Social History     Socioeconomic History    Marital status:    Tobacco Use    Smoking status: Former     Current packs/day: 0.00     Average packs/day: 1 pack/day for 40.0 years (40.0 ttl pk-yrs)     Types: Cigarettes     Start date: 1977     Quit date: 2017     Years since quittin.7     Passive exposure: Past    Smokeless tobacco: Never   Vaping Use    Vaping status: Never Used   Substance and Sexual Activity    Alcohol use: Yes     Comment: Alcoholic Drinks/day: Occasional     Drug use: No    Sexual activity: Never   Social History Narrative    2019The patient lives with her mother.; had worked at [employer] as  but quit 2019.   Quit smoking ; no etoh problems  / x 2 ; no kids     Social Drivers of Health     Financial Resource Strain: Low Risk  (10/12/2023)    Financial Resource Strain     Within the past 12 months, have you or your family members you live with been unable to get utilities (heat, electricity) when it was really needed?: No   Food Insecurity: Low Risk  (10/12/2023)    Food Insecurity     Within the past 12 months, did you worry that your food would run out before you got money to buy more?: No     Within the past 12 months, did the food you bought just not last and you didn t have money to get more?: No   Transportation Needs: Low Risk  (10/12/2023)    Transportation Needs     Within the past 12 months, has lack of  transportation kept you from medical appointments, getting your medicines, non-medical meetings or appointments, work, or from getting things that you need?: No   Interpersonal Safety: Low Risk  (10/18/2024)    Interpersonal Safety     Do you feel physically and emotionally safe where you currently live?: Yes     Within the past 12 months, have you been hit, slapped, kicked or otherwise physically hurt by someone?: No     Within the past 12 months, have you been humiliated or emotionally abused in other ways by your partner or ex-partner?: No   Housing Stability: High Risk (10/12/2023)    Housing Stability     Do you have housing? : No     Are you worried about losing your housing?: No       REVIEW OF SYSTEMS  =================  C: NEGATIVE for fever, chills, change in weight  I: NEGATIVE for worrisome rashes, moles or lesions  E/M: NEGATIVE for ear, mouth and throat problems  R: NEGATIVE for significant cough or SHORTNESS OF BREATH  CV:  NEGATIVE for chest pain, palpitations or peripheral edema  GI: NEGATIVE for nausea, abdominal pain, heartburn, or change in bowel habits  NEURO: NEGATIVE numbness/weakness  : see HPI  PSYCH: NEGATIVE depression/anxiety  LYmph: no new enlarged lymph nodes  Ortho: no new trauma/movements        Physical Exam:  /63   Pulse 90   Wt 76.7 kg (169 lb)   SpO2 93%   BMI 39.28 kg/m    Patient is pleasant, in no acute distress, good general condition.  HEENT:  Normalcephalic, atraumatic  Lung: no evidence of respiratory distress    Abdomen: Soft, nondistended, non tender. No masses. No rebound or guarding.  :  Normal external genitalia and introitus, moderate atrophic changes   Unkept appearance, stool in labia folds          Urethral hypermobility >30 degrees          Stress incontinence not demonstrated with coughing          No cystocele or rectocele          [place]  is  well supported          Pelvic floor muscles of normal tonicity, non tender  Skin: Warm and dry.  No  redness.  Neuro: grossly normal  Psych normal mood and affect  Musculoskeletal  moving all extremities        Office Visit on 11/06/2024   Component Date Value Ref Range Status    Sodium 11/06/2024 141  135 - 145 mmol/L Final    Potassium 11/06/2024 4.6  3.4 - 5.3 mmol/L Final    Chloride 11/06/2024 99  98 - 107 mmol/L Final    Carbon Dioxide (CO2) 11/06/2024 34 (H)  22 - 29 mmol/L Final    Anion Gap 11/06/2024 8  7 - 15 mmol/L Final    Urea Nitrogen 11/06/2024 7.8 (L)  8.0 - 23.0 mg/dL Final    Creatinine 11/06/2024 0.72  0.51 - 0.95 mg/dL Final    GFR Estimate 11/06/2024 >90  >60 mL/min/1.73m2 Final    eGFR calculated using 2021 CKD-EPI equation.    Calcium 11/06/2024 9.4  8.8 - 10.4 mg/dL Final    Reference intervals for this test were updated on 7/16/2024 to reflect our healthy population more accurately. There may be differences in the flagging of prior results with similar values performed with this method. Those prior results can be interpreted in the context of the updated reference intervals.    Glucose 11/06/2024 181 (H)  70 - 99 mg/dL Final    WBC Count 11/06/2024 12.0 (H)  4.0 - 11.0 10e3/uL Final    RBC Count 11/06/2024 4.92  3.80 - 5.20 10e6/uL Final    Hemoglobin 11/06/2024 14.4  11.7 - 15.7 g/dL Final    Hematocrit 11/06/2024 46.9  35.0 - 47.0 % Final    MCV 11/06/2024 95  78 - 100 fL Final    MCH 11/06/2024 29.3  26.5 - 33.0 pg Final    MCHC 11/06/2024 30.7 (L)  31.5 - 36.5 g/dL Final    RDW 11/06/2024 12.1  10.0 - 15.0 % Final    Platelet Count 11/06/2024 181  150 - 450 10e3/uL Final    % Neutrophils 11/06/2024 77  % Final    % Lymphocytes 11/06/2024 17  % Final    % Monocytes 11/06/2024 5  % Final    % Eosinophils 11/06/2024 1  % Final    % Basophils 11/06/2024 0  % Final    % Immature Granulocytes 11/06/2024 0  % Final    Absolute Neutrophils 11/06/2024 9.2 (H)  1.6 - 8.3 10e3/uL Final    Absolute Lymphocytes 11/06/2024 2.0  0.8 - 5.3 10e3/uL Final    Absolute Monocytes 11/06/2024 0.6  0.0 - 1.3  10e3/uL Final    Absolute Eosinophils 11/06/2024 0.1  0.0 - 0.7 10e3/uL Final    Absolute Basophils 11/06/2024 0.0  0.0 - 0.2 10e3/uL Final    Absolute Immature Granulocytes 11/06/2024 0.0  <=0.4 10e3/uL Final    Influenza A PCR 11/06/2024 Negative  Negative Final    Influenza B PCR 11/06/2024 Negative  Negative Final    RSV PCR 11/06/2024 Negative  Negative Final    SARS CoV2 PCR 11/06/2024 Negative  Negative Final    NEGATIVE: SARS-CoV-2 (COVID-19) RNA not detected, presumed negative.         ASSESSMENT and PLAN:    1. Urge incontinence of urine (Primary)  Different management options were discussed with the patient including observation, Kegel exercises, biofeedback, PT, medication, Botox, and Interstim. Patient elects trial of Tolterodine 4 mg q AM, indication of use and side effects discussed.   - Adult Urology [facility] Referral  - tolterodine ER (DETROL LA) 4 MG 24 hr capsule; Take 1 capsule (4 mg) by mouth daily.  Dispense: 90 capsule; Refill: 3    2. Atrophic vaginitis  Etiology explained. Discussed hygiene improvement.   Will start estrace indication of use and side effects discussed, and proper application technique.   - estradiol (ESTRACE) 0.1 MG/GM vaginal cream; Place 2 g vaginally three times a week.  Dispense: 42.5 g; Refill: 4    3. Type 2 diabetes mellitus with other specified complication, without long-term current use of insulin (H)  Behavior modification to discussed including limiting sugar, have strict diabetic control. List of bladder irritants provided.     Follow up in 6 months to review progress.    Thank you for allowing me to participate in Ms. Leong's care.  I will keep you updated on her progress.    CAMERON Avelar CNP

## 2024-11-14 NOTE — PATIENT INSTRUCTIONS
"Brad Leong, it was nice to meet you!    Thank you for allowing us the privilege of caring for you. We hope we provided you with the excellent service you deserve.   Please let us know if there is anything else we can do for you.  We want you to be completely satisfied with your care experience.    To ensure the quality of our services, you may be receiving a patient satisfaction survey from an independent patient satisfaction monitoring company.    The greatest compliment you can give is a \"Likely to Recommend.\"    Your visit was with CAMERON Avelar CNP today.    Instructions per today's visit:     Work on limiting sugar in your diet   Start taking the Tolterodine (Detrol) 4 mg take this in the morning   Estrogen cream place a dime sized amount on your finger and place around/in vagina three times a week (Monday, Wednesday, Friday) at bedtime    ___________________________________________________________________________  Important contact and scheduling information:  Please call our contact center at [phone] to schedule your next appointments or to reach our nurse triage line.  Please call during clinic hours Monday through Friday 8:00a - 4:30p if you have questions.  You can contact us anytime via [employer] and we will reply during clinic hours.    Lab results will be communicated through My Chart or letter (if My Chart not used). Please call the clinic if you have not received communication after 1 week or if you have any questions.?  __________________________________________________________________________    If labs were ordered today:    Please make an appointment to have them drawn at your convenience.     To schedule the Lab Appointment using [employer]:  Select \"Schedule an Appointment\"  Select \"Lab Only\"  Answer simple questions about where you would like to be seen and your type of insurance  For \"Which locations work for you?, select the location and set up the appointment.          List " of Common Bladder Irritants  Alcoholic beverages  Apples and apple juice  Cantaloupe  Carbonated beverages  Chili and spicy foods  Chocolate  Citrus fruit  Coffee (including decaffeinated)  Cranberries and cranberry juice  Grapes  Guava  Milk Products: milk, cheese, cottage cheese, yogurt, ice cream  Peaches  Pineapple  Plums  Strawberries  Sugar especially artificial sweeteners, saccharin, aspartame, corn sweeteners, honey, fructose, sucrose, lactose  Tea  Tomatoes and tomato juice  Vitamin B complex  Vinegar    Most people are not sensitive to ALL of these products; your goal is to find the foods that make YOUR symptoms worse    Try eliminating one or more of these foods from your diet and see if your symptoms improve. If your bladder symptoms are related to dietary factors, strict adherence to a diet that  eliminates the food should bring marked relief within 10 days. Once you are feeling better, you can begin to add foods back into your diet, one at a time. If symptoms return, you will be able to identify the irritant.

## 2024-12-02 ENCOUNTER — NURSE TRIAGE (OUTPATIENT)
Dept: NURSING | Facility: CLINIC | Age: 69
End: 2024-12-02
Payer: COMMERCIAL

## 2024-12-03 NOTE — TELEPHONE ENCOUNTER
Nurse Triage SBAR    Is this a 2nd Level Triage? NO    Situation: New onset Low Back Pain.    Background: Arvind reports Pain to her Central Low Back that she first noticed on Thu, 11/28    Pain is worse after she has laid on her mother's couch for a few hours.    Arvind has a hx of COPD and she is concerned that the Back Pain is a sign of Pneumonia.    Assessment:   - Low back pain rated ~7/10  - Walks slowly. Uses a cane - started using ~1m ago.  - Has not taken any OTC pain reliever - doesn't have any  - Has not tried heat or ice  - Denies increased/worsening SOB  - Denies Fever.    Protocol Recommended Disposition:   See PCP Within 3 Days  Care Advice reviewed    Scheduling offered. Pt will call clinic in the morning.    Does the patient meet one of the following criteria for ADS visit consideration? 16+ years old, with an [facility] PCP     TIP  Providers, please consider if this condition is appropriate for management at one of our Acute and Diagnostic Services sites.     If patient is a good candidate, please use dotphrase <dot>triageresponse and select Refer to ADS to document.    Indigo Mosqueda, DEANNE  [facility] Nurse Advisors      Reason for Disposition   [1] MODERATE back pain (e.g., interferes with normal activities) AND [2] present > 3 days    Additional Information   Negative: Passed out (i.e., lost consciousness, collapsed and was not responding)   Negative: Shock suspected (e.g., cold/pale/clammy skin, too weak to stand, low BP, rapid pulse)   Negative: Sounds like a life-threatening emergency to the triager   Negative: [1] SEVERE back pain (e.g., excruciating) AND [2] sudden onset AND [3] age > 60 years   Negative: [1] Unable to urinate (or only a few drops) > 4 hours AND [2] bladder feels very full (e.g., palpable bladder or strong urge to urinate)   Negative: [1] Loss of bladder or bowel control (urine or bowel incontinence; wetting self, leaking stool) AND [2] new-onset   Negative: Numbness  "in groin or rectal area (i.e., loss of sensation)   Negative: [1] SEVERE abdominal pain AND [2] present > 1 hour   Negative: [1] Abdominal pain AND [2] age > 60 years   Negative: Weakness of a leg or foot (e.g., unable to bear weight, dragging foot)   Negative: Unable to walk   Negative: Patient sounds very sick or weak to the triager   Negative: [1] SEVERE back pain (e.g., excruciating, unable to do any normal activities) AND [2] not improved 2 hours after pain medicine   Negative: [1] Pain radiates into the thigh or further down the leg AND [2] both legs   Negative: [1] Fever > 100.0 F (37.8 C) AND [2] flank pain (i.e., in side, below ribs and above hip)   Negative: [1] Pain or burning with passing urine (urination) AND [2] flank pain (i.e., in side, below ribs and above hip)   Negative: [1] Numbness in an arm or hand (i.e., loss of sensation) AND [2] upper back pain   Negative: Numbness in a leg or foot (i.e., loss of sensation)   Negative: High-risk adult (e.g., history of cancer, HIV, or IV drug use)   Negative: Soft tissue infection (e.g., abscess, cellulitis) or other serious infection (e.g., bacteremia) in last 2 weeks   Negative: [1] Fever AND [2] no symptoms of UTI  (Exception: Has generalized muscle pains, not localized back pain.)   Negative: Rash in same area as pain (may be described as \"small blisters\")   Negative: Blood in urine (red, pink, or tea-colored)    Protocols used: Back Pain-A-AH    "

## 2024-12-10 DIAGNOSIS — E11.69 TYPE 2 DIABETES MELLITUS WITH OTHER SPECIFIED COMPLICATION, WITHOUT LONG-TERM CURRENT USE OF INSULIN (H): ICD-10-CM

## 2024-12-10 NOTE — TELEPHONE ENCOUNTER
The patient called stating that she cannot find her One Touch glucose kit and mentioned that her mother cleaned her room, it may have been moved. She is requesting that an urgent prescription be sent to the pharmacy.

## 2024-12-12 RX ORDER — BLOOD-GLUCOSE METER
EACH MISCELLANEOUS
Qty: 1 KIT | Refills: 1 | Status: SHIPPED | OUTPATIENT
Start: 2024-12-12

## 2024-12-19 ENCOUNTER — TELEPHONE (OUTPATIENT)
Dept: FAMILY MEDICINE | Facility: CLINIC | Age: 69
End: 2024-12-19
Payer: COMMERCIAL

## 2024-12-19 NOTE — TELEPHONE ENCOUNTER
General Call    Contacts       Contact Date/Time Type Contact Phone/Fax    12/19/2024 11:30 AM CST Phone (Incoming) Arvind Leong (Self) [phone] (H)          Reason for Call:  Patient would like to know if she can get her cortisone shot at her follow up appt on Marvin 10.     What are your questions or concerns:  NA    Date of last appointment with provider: 10/11/2024 was the last injection.     Could we send this information to you in [employer] or would you prefer to receive a phone call?:   Patient would like to be contacted via [employer]

## 2025-01-10 PROBLEM — J44.1 COPD WITH ACUTE EXACERBATION (H): Status: RESOLVED | Noted: 2024-05-19 | Resolved: 2025-01-10

## 2025-01-21 ENCOUNTER — NURSE TRIAGE (OUTPATIENT)
Dept: NURSING | Facility: CLINIC | Age: 70
End: 2025-01-21
Payer: COMMERCIAL

## 2025-01-21 DIAGNOSIS — Z53.9 DIAGNOSIS NOT YET DEFINED: Primary | ICD-10-CM

## 2025-01-21 NOTE — TELEPHONE ENCOUNTER
"Pt reports SOB, \"I'm struggling to breathe and my blood sugar is 220. I ate a pice of cake at 10:30 pm.\"    O2 sat 92% on 2L, pulse 96.  Pt reports feeling warm, \"I can't lay down, I can't get rest.\"    Pt has not tried albuterol inhaler, FNA recommended 2 puffs orally now due to SOB, then call 911 per protocol. She does plan to drink water to lower her blood sugar, FNA advised BG is not concerning. Call 911 due to SOB. Pt verbalized understanding.     Gena James RN/[place] Nurse Advisor          Reason for Disposition   SEVERE difficulty breathing (e.g., struggling for each breath, speaks in single words, pulse > 120)    Protocols used: COPD Oxygen Monitoring and [id number]    "

## 2025-02-05 DIAGNOSIS — J44.9 CHRONIC OBSTRUCTIVE PULMONARY DISEASE, UNSPECIFIED COPD TYPE (H): ICD-10-CM

## 2025-02-05 RX ORDER — ALBUTEROL SULFATE 90 UG/1
2 INHALANT RESPIRATORY (INHALATION) EVERY 6 HOURS PRN
Qty: 18 G | Refills: 12 | Status: CANCELLED | OUTPATIENT
Start: 2025-02-05

## 2025-02-06 RX ORDER — ALBUTEROL SULFATE 90 UG/1
2 INHALANT RESPIRATORY (INHALATION) EVERY 6 HOURS PRN
Qty: 18 G | Refills: 2 | Status: SHIPPED | OUTPATIENT
Start: 2025-02-06

## 2025-02-11 ENCOUNTER — TELEPHONE (OUTPATIENT)
Dept: FAMILY MEDICINE | Facility: CLINIC | Age: 70
End: 2025-02-11
Payer: COMMERCIAL

## 2025-02-11 NOTE — TELEPHONE ENCOUNTER
Home Care is calling regarding an established patient with [facility].    Requesting orders from: Rema Whiting  Provider is following patient: Yes  Is this a 60-day recertification request?  Yes    Orders Requested    Skilled Nursing  Request for recertification   Frequency:  1x every 4 weeks for Abilify injection    Also needs Med list faxed to: [phone].     Information was gathered and will be sent to provider for review.  RN will contact Home Care with information after provider review.  Confirmed ok to leave a detailed message with call back.  Contact information confirmed and updated as needed.    Suzette Martinez RN

## 2025-02-12 ENCOUNTER — MEDICAL CORRESPONDENCE (OUTPATIENT)
Dept: HEALTH INFORMATION MANAGEMENT | Facility: CLINIC | Age: 70
End: 2025-02-12
Payer: COMMERCIAL

## 2025-02-18 DIAGNOSIS — Z53.9 DIAGNOSIS NOT YET DEFINED: Primary | ICD-10-CM

## 2025-02-18 PROCEDURE — G0179 MD RECERTIFICATION HHA PT: HCPCS | Performed by: FAMILY MEDICINE

## 2025-02-19 DIAGNOSIS — Z53.9 DIAGNOSIS NOT YET DEFINED: Primary | ICD-10-CM

## 2025-02-19 PROCEDURE — G0179 MD RECERTIFICATION HHA PT: HCPCS | Performed by: FAMILY MEDICINE

## 2025-02-19 PROCEDURE — 99207 PR MD RECERTIFICATION HHA PT: CPT | Performed by: FAMILY MEDICINE

## 2025-03-03 ENCOUNTER — MEDICAL CORRESPONDENCE (OUTPATIENT)
Dept: HEALTH INFORMATION MANAGEMENT | Facility: CLINIC | Age: 70
End: 2025-03-03
Payer: COMMERCIAL

## 2025-03-27 ENCOUNTER — TELEPHONE (OUTPATIENT)
Dept: FAMILY MEDICINE | Facility: CLINIC | Age: 70
End: 2025-03-27
Payer: COMMERCIAL

## 2025-03-27 NOTE — TELEPHONE ENCOUNTER
Attempted to reach Arvind SALDANA Salo in regards to their atorvastatin. Left voicemail, with callback number, requesting return call. Filled only once in Jan of 2024. Patient is appearing as an open statin gap as a patient with diabetes.      Karla Gomez, PharmD, [facility]  Pharmacy   [phone]

## 2025-04-02 ENCOUNTER — PATIENT OUTREACH (OUTPATIENT)
Dept: CARE COORDINATION | Facility: CLINIC | Age: 70
End: 2025-04-02
Payer: COMMERCIAL

## 2025-04-03 DIAGNOSIS — Z12.11 COLON CANCER SCREENING: ICD-10-CM

## 2025-04-10 ENCOUNTER — MEDICAL CORRESPONDENCE (OUTPATIENT)
Dept: HEALTH INFORMATION MANAGEMENT | Facility: CLINIC | Age: 70
End: 2025-04-10
Payer: COMMERCIAL

## 2025-04-14 ENCOUNTER — MEDICAL CORRESPONDENCE (OUTPATIENT)
Dept: HEALTH INFORMATION MANAGEMENT | Facility: CLINIC | Age: 70
End: 2025-04-14
Payer: COMMERCIAL

## 2025-04-17 ENCOUNTER — ORDERS ONLY (AUTO-RELEASED) (OUTPATIENT)
Dept: URGENT CARE | Facility: CLINIC | Age: 70
End: 2025-04-17
Payer: COMMERCIAL

## 2025-04-17 DIAGNOSIS — Z12.11 COLON CANCER SCREENING: ICD-10-CM

## 2025-04-20 DIAGNOSIS — J44.9 CHRONIC OBSTRUCTIVE PULMONARY DISEASE, UNSPECIFIED COPD TYPE (H): ICD-10-CM

## 2025-04-21 RX ORDER — ALBUTEROL SULFATE 90 UG/1
2 INHALANT RESPIRATORY (INHALATION) EVERY 6 HOURS PRN
Qty: 18 G | Refills: 2 | Status: SHIPPED | OUTPATIENT
Start: 2025-04-21

## 2025-05-06 ENCOUNTER — NURSE TRIAGE (OUTPATIENT)
Dept: NURSING | Facility: CLINIC | Age: 70
End: 2025-05-06
Payer: COMMERCIAL

## 2025-05-06 NOTE — TELEPHONE ENCOUNTER
"Triage call  Patient calling to report that she is not sleeping  she has Copd and when she lays down she just can't sleep so she gets up.She denies pain. States her mother had something over the counter that helped her sleep but her mother threw it away.    Per protocol see PCP with in 3 days.  Care advice given.  Verbalizes understanding and agrees with plan. Transferred to scheduling.    Ana Munguia RN   [facility] Nurse Advisor  5:01 AM 5/6/2025       Reason for Disposition   Requesting medication for sleep (\"sleeping pill\")    Additional Information   Negative: Difficulty breathing   Negative: Depression is suspected   Negative: Traumatic Brain Injury (TBI) is suspected   Negative: Suspected alcohol withdrawal or unhealthy use of alcohol (drinking too much)   Negative: Suspected substance use (drug use), addiction, or withdrawal   Negative: [1] Pain is causing insomnia AND [2] pain is not a chronic symptom (recurrent or ongoing AND present > 4 weeks)   Negative: [1] Insomnia persists > 1 week AND [2] no improvement after using Care Advice   Negative: Insomnia interferes with work or school   Negative: [1] Pain is causing insomnia AND [2] pain is a chronic symptom (recurrent or ongoing AND present > 4 weeks)    Protocols used: Insomnia-A-AH    "

## 2025-05-07 ENCOUNTER — TELEPHONE (OUTPATIENT)
Dept: FAMILY MEDICINE | Facility: CLINIC | Age: 70
End: 2025-05-07

## 2025-05-07 ENCOUNTER — OFFICE VISIT (OUTPATIENT)
Dept: FAMILY MEDICINE | Facility: CLINIC | Age: 70
End: 2025-05-07
Payer: COMMERCIAL

## 2025-05-07 ENCOUNTER — ANCILLARY PROCEDURE (OUTPATIENT)
Dept: GENERAL RADIOLOGY | Facility: CLINIC | Age: 70
End: 2025-05-07
Attending: PHYSICIAN ASSISTANT
Payer: COMMERCIAL

## 2025-05-07 ENCOUNTER — HOSPITAL ENCOUNTER (EMERGENCY)
Facility: HOSPITAL | Age: 70
Discharge: HOME | End: 2025-05-07
Attending: EMERGENCY MEDICINE | Admitting: EMERGENCY MEDICINE
Payer: COMMERCIAL

## 2025-05-07 VITALS
WEIGHT: 173 LBS | OXYGEN SATURATION: 99 % | BODY MASS INDEX: 40.04 KG/M2 | DIASTOLIC BLOOD PRESSURE: 68 MMHG | RESPIRATION RATE: 20 BRPM | SYSTOLIC BLOOD PRESSURE: 133 MMHG | HEIGHT: 55 IN | HEART RATE: 88 BPM | TEMPERATURE: 98.4 F

## 2025-05-07 VITALS
RESPIRATION RATE: 24 BRPM | TEMPERATURE: 98.6 F | OXYGEN SATURATION: 92 % | SYSTOLIC BLOOD PRESSURE: 132 MMHG | DIASTOLIC BLOOD PRESSURE: 58 MMHG | BODY MASS INDEX: 40.07 KG/M2 | HEART RATE: 84 BPM | WEIGHT: 173.13 LBS | HEIGHT: 55 IN

## 2025-05-07 DIAGNOSIS — J44.9 CHRONIC OBSTRUCTIVE PULMONARY DISEASE, UNSPECIFIED COPD TYPE (H): ICD-10-CM

## 2025-05-07 DIAGNOSIS — J44.1 COPD EXACERBATION (H): ICD-10-CM

## 2025-05-07 DIAGNOSIS — I42.9 CARDIOMYOPATHY, UNSPECIFIED TYPE (H): ICD-10-CM

## 2025-05-07 DIAGNOSIS — G47.00 INSOMNIA, UNSPECIFIED TYPE: ICD-10-CM

## 2025-05-07 DIAGNOSIS — Z86.79 HX OF CARDIOMYOPATHY: ICD-10-CM

## 2025-05-07 DIAGNOSIS — R06.03 RESPIRATORY DISTRESS: ICD-10-CM

## 2025-05-07 DIAGNOSIS — R06.09 DOE (DYSPNEA ON EXERTION): Primary | ICD-10-CM

## 2025-05-07 DIAGNOSIS — G47.33 OSA ON CPAP: ICD-10-CM

## 2025-05-07 DIAGNOSIS — R06.00 PND (PAROXYSMAL NOCTURNAL DYSPNEA): ICD-10-CM

## 2025-05-07 DIAGNOSIS — E11.9 TYPE 2 DIABETES MELLITUS WITHOUT COMPLICATION, WITHOUT LONG-TERM CURRENT USE OF INSULIN (H): ICD-10-CM

## 2025-05-07 LAB
ALBUMIN SERPL BCG-MCNC: 3.8 G/DL (ref 3.5–5.2)
ALP SERPL-CCNC: 75 U/L (ref 40–150)
ALT SERPL W P-5'-P-CCNC: 17 U/L (ref 0–50)
ANION GAP SERPL CALCULATED.3IONS-SCNC: 7 MMOL/L (ref 7–15)
AST SERPL W P-5'-P-CCNC: 14 U/L (ref 0–45)
BASOPHILS # BLD AUTO: 0.1 10E3/UL (ref 0–0.2)
BASOPHILS NFR BLD AUTO: 1 %
BILIRUB SERPL-MCNC: 0.5 MG/DL
BUN SERPL-MCNC: 12.8 MG/DL (ref 8–23)
CALCIUM SERPL-MCNC: 9.4 MG/DL (ref 8.8–10.4)
CHLORIDE SERPL-SCNC: 100 MMOL/L (ref 98–107)
CREAT SERPL-MCNC: 0.96 MG/DL (ref 0.51–0.95)
D DIMER PPP FEU-MCNC: 0.4 UG/ML FEU (ref 0–0.5)
EGFRCR SERPLBLD CKD-EPI 2021: 64 ML/MIN/1.73M2
EOSINOPHIL # BLD AUTO: 0.1 10E3/UL (ref 0–0.7)
EOSINOPHIL NFR BLD AUTO: 1 %
ERYTHROCYTE [DISTWIDTH] IN BLOOD BY AUTOMATED COUNT: 12 % (ref 10–15)
GLUCOSE SERPL-MCNC: 180 MG/DL (ref 70–99)
HCO3 SERPL-SCNC: 37 MMOL/L (ref 22–29)
HCT VFR BLD AUTO: 44.1 % (ref 35–47)
HGB BLD-MCNC: 13.3 G/DL (ref 11.7–15.7)
IMM GRANULOCYTES # BLD: 0.1 10E3/UL
IMM GRANULOCYTES NFR BLD: 0 %
INR PPP: 1 (ref 0.85–1.15)
LACTATE SERPL-SCNC: 1.4 MMOL/L (ref 0.7–2)
LYMPHOCYTES # BLD AUTO: 2.2 10E3/UL (ref 0.8–5.3)
LYMPHOCYTES NFR BLD AUTO: 17 %
MAGNESIUM SERPL-MCNC: 1.9 MG/DL (ref 1.7–2.3)
MCH RBC QN AUTO: 28.2 PG (ref 26.5–33)
MCHC RBC AUTO-ENTMCNC: 30.2 G/DL (ref 31.5–36.5)
MCV RBC AUTO: 93 FL (ref 78–100)
MONOCYTES # BLD AUTO: 0.7 10E3/UL (ref 0–1.3)
MONOCYTES NFR BLD AUTO: 5 %
NEUTROPHILS # BLD AUTO: 9.6 10E3/UL (ref 1.6–8.3)
NEUTROPHILS NFR BLD AUTO: 76 %
NRBC # BLD AUTO: 0 10E3/UL
NRBC BLD AUTO-RTO: 0 /100
NT-PROBNP SERPL-MCNC: 106 PG/ML
NT-PROBNP SERPL-MCNC: 111 PG/ML (ref 0–353)
PLATELET # BLD AUTO: 191 10E3/UL (ref 150–450)
POTASSIUM SERPL-SCNC: 4.5 MMOL/L (ref 3.4–5.3)
PROT SERPL-MCNC: 6.3 G/DL (ref 6.4–8.3)
PROTHROMBIN TIME: 13.5 SECONDS (ref 11.8–14.8)
RBC # BLD AUTO: 4.72 10E6/UL (ref 3.8–5.2)
SODIUM SERPL-SCNC: 144 MMOL/L (ref 135–145)
TROPONIN T SERPL HS-MCNC: 12 NG/L
TROPONIN T SERPL HS-MCNC: 9 NG/L
WBC # BLD AUTO: 12.6 10E3/UL (ref 4–11)

## 2025-05-07 PROCEDURE — 85004 AUTOMATED DIFF WBC COUNT: CPT | Performed by: EMERGENCY MEDICINE

## 2025-05-07 PROCEDURE — 83880 ASSAY OF NATRIURETIC PEPTIDE: CPT | Performed by: EMERGENCY MEDICINE

## 2025-05-07 PROCEDURE — 99214 OFFICE O/P EST MOD 30 MIN: CPT | Performed by: PHYSICIAN ASSISTANT

## 2025-05-07 PROCEDURE — 250N000011 HC RX IP 250 OP 636: Performed by: EMERGENCY MEDICINE

## 2025-05-07 PROCEDURE — 85379 FIBRIN DEGRADATION QUANT: CPT | Performed by: EMERGENCY MEDICINE

## 2025-05-07 PROCEDURE — 85610 PROTHROMBIN TIME: CPT | Performed by: EMERGENCY MEDICINE

## 2025-05-07 PROCEDURE — 93005 ELECTROCARDIOGRAM TRACING: CPT | Performed by: STUDENT IN AN ORGANIZED HEALTH CARE EDUCATION/TRAINING PROGRAM

## 2025-05-07 PROCEDURE — 83605 ASSAY OF LACTIC ACID: CPT | Performed by: EMERGENCY MEDICINE

## 2025-05-07 PROCEDURE — 84155 ASSAY OF PROTEIN SERUM: CPT | Performed by: EMERGENCY MEDICINE

## 2025-05-07 PROCEDURE — 83735 ASSAY OF MAGNESIUM: CPT | Performed by: EMERGENCY MEDICINE

## 2025-05-07 PROCEDURE — G2211 COMPLEX E/M VISIT ADD ON: HCPCS | Performed by: PHYSICIAN ASSISTANT

## 2025-05-07 PROCEDURE — 96374 THER/PROPH/DIAG INJ IV PUSH: CPT | Mod: 59

## 2025-05-07 PROCEDURE — 83880 ASSAY OF NATRIURETIC PEPTIDE: CPT | Performed by: PHYSICIAN ASSISTANT

## 2025-05-07 PROCEDURE — 36415 COLL VENOUS BLD VENIPUNCTURE: CPT | Performed by: PHYSICIAN ASSISTANT

## 2025-05-07 PROCEDURE — 93005 ELECTROCARDIOGRAM TRACING: CPT

## 2025-05-07 PROCEDURE — 36415 COLL VENOUS BLD VENIPUNCTURE: CPT | Performed by: EMERGENCY MEDICINE

## 2025-05-07 PROCEDURE — 84484 ASSAY OF TROPONIN QUANT: CPT | Performed by: EMERGENCY MEDICINE

## 2025-05-07 PROCEDURE — 71046 X-RAY EXAM CHEST 2 VIEWS: CPT | Mod: TC | Performed by: RADIOLOGY

## 2025-05-07 PROCEDURE — 99285 EMERGENCY DEPT VISIT HI MDM: CPT | Mod: 25

## 2025-05-07 PROCEDURE — 250N000011 HC RX IP 250 OP 636: Mod: JZ | Performed by: EMERGENCY MEDICINE

## 2025-05-07 RX ORDER — PREDNISONE 20 MG/1
40 TABLET ORAL DAILY
Qty: 10 TABLET | Refills: 0 | Status: SHIPPED | OUTPATIENT
Start: 2025-05-07

## 2025-05-07 RX ORDER — BLOOD-GLUCOSE METER
EACH MISCELLANEOUS
Qty: 1 KIT | Refills: 1 | Status: SHIPPED | OUTPATIENT
Start: 2025-05-07

## 2025-05-07 RX ORDER — FLUTICASONE FUROATE, UMECLIDINIUM BROMIDE AND VILANTEROL TRIFENATATE 100; 62.5; 25 UG/1; UG/1; UG/1
1 POWDER RESPIRATORY (INHALATION) DAILY
Qty: 60 EACH | Refills: 5 | Status: SHIPPED | OUTPATIENT
Start: 2025-05-07

## 2025-05-07 RX ORDER — ALBUTEROL SULFATE 0.83 MG/ML
2.5 SOLUTION RESPIRATORY (INHALATION) EVERY 4 HOURS PRN
Qty: 240 ML | Refills: 12 | Status: SHIPPED | OUTPATIENT
Start: 2025-05-07

## 2025-05-07 RX ORDER — METHYLPREDNISOLONE SODIUM SUCCINATE 125 MG/2ML
125 INJECTION INTRAMUSCULAR; INTRAVENOUS ONCE
Status: COMPLETED | OUTPATIENT
Start: 2025-05-07 | End: 2025-05-07

## 2025-05-07 RX ORDER — PREDNISONE 20 MG/1
TABLET ORAL
Qty: 10 TABLET | Refills: 0 | Status: SHIPPED | OUTPATIENT
Start: 2025-05-07

## 2025-05-07 RX ORDER — IOPAMIDOL 755 MG/ML
90 INJECTION, SOLUTION INTRAVASCULAR ONCE
Status: COMPLETED | OUTPATIENT
Start: 2025-05-07 | End: 2025-05-07

## 2025-05-07 RX ADMIN — IOPAMIDOL 90 ML: 755 INJECTION, SOLUTION INTRAVENOUS at 18:28

## 2025-05-07 RX ADMIN — METHYLPREDNISOLONE SODIUM SUCCINATE 125 MG: 125 INJECTION, POWDER, FOR SOLUTION INTRAMUSCULAR; INTRAVENOUS at 16:25

## 2025-05-07 ASSESSMENT — ENCOUNTER SYMPTOMS
FEVER: 0
SHORTNESS OF BREATH: 1
FATIGUE: 1

## 2025-05-07 ASSESSMENT — ACTIVITIES OF DAILY LIVING (ADL)
ADLS_ACUITY_SCORE: 58

## 2025-05-07 NOTE — ED TRIAGE NOTES
Pt arrives to triage with mother with c/o SOB and generalized weakness. Per mother, sx started over a week ago but have gotten worse gradually. Was seen at [facility] this AM and was told she has pneumonia and that she should come to the ER for treatment. Pt has COPD, on 2L via NC baseline     Triage Assessment (Adult)       Row Name 05/07/25 1522          Triage Assessment    Airway WDL WDL        Respiratory WDL    Respiratory WDL X;rhythm/pattern     Rhythm/Pattern, Respiratory shortness of breath;dyspnea upon exertion

## 2025-05-07 NOTE — PROGRESS NOTES
Assessment & Plan     NICHOLSON (dyspnea on exertion)  History per patient.  Evidence of respiratory distress with x-ray findings.  Satting at 92% with oxygen.  Unable to lay flat due to significant shortness of breath.  This in combination with radiographic evidence of pleural effusion and high risk patient for respiratory failure and mortality, recommending immediate ER evaluation and likely admission.  Potential pneumonia versus COPD exacerbation versus acute heart failure.  - BNP-N terminal pro; Future  - BNP-N terminal pro    PND (paroxysmal nocturnal dyspnea)  As above  - BNP-N terminal pro; Future  - BNP-N terminal pro    Insomnia, unspecified type  May use melatonin.  Manage as above however  - melatonin 3 MG tablet; Take 1 tablet (3 mg) by mouth nightly as needed for sleep.    Respiratory distress  As above.  Originally plans were to treat as COPD exacerbation.  This was prior to radiographic findings returning.  Pending radiology review  - XR Chest 2 Views; Future  - predniSONE (DELTASONE) 20 MG tablet; Take 2 tablets (40 mg) by mouth daily.      (E11.9) Type 2 diabetes mellitus without complication, without long-term current use of insulin (H)          Refill needed new monitor not working  - blood glucose monitoring (ONETOUCH VERIO) meter device kit; Use to test blood sugar 4 times daily before meals and bedtie    Chronic obstructive pulmonary disease, unspecified COPD type (H)  Likely noncompliance to her trilogy likely playing a role to above.  To reestablish with pulmonology and have repeat PFTs and to discuss noncompliance of CPAP.  May benefit from BiPAP given severity.  Will restart trilogy as well.  Albuterol nebs to be scheduled.  - Adult Pulmonary Medicine  Referral; Future  - Fluticasone-Umeclidin-Vilant (TRELEGY ELLIPTA) 100-62.5-25 MCG/ACT oral inhaler; Inhale 1 puff into the lungs daily.  - albuterol (PROVENTIL) (2.5 MG/3ML) 0.083% neb solution; Take 1 vial (2.5 mg) by nebulization  every 4 hours as needed for shortness of breath or wheezing. Take 1 vial four times a day for the next 5 days then go back to as needed  Medication use and side effects discussed with the patient. Patient is in complete understanding and agreement with plan.     Cardiomyopathy, unspecified type (H)  As above  - Adult Pulmonary Medicine  Referral; Future    ABDIFATAH on CPAP      Hx of cardiomyopathy  As above   - BNP-N terminal pro; Future  - BNP-N terminal pro        Wendy Samuels is a 70 year old, presenting for the following health issues:  No chief complaint on file.        1/10/2025     1:41 PM   Additional Questions   Roomed by Yamini VALLADARSE LPN     HPI        Patient is a 70-year-old female with a past history of severe COPD as well as past cardiomyopathy, sleep apnea, bipolar disorder followed by psychiatry, history of noncompliance with medications.  She presents today for evaluation of a chronic but worsening insomnia.  She describes this as difficulty breathing and shortness of breath with lying flat.  Typically uses 3 pillows at night still resulting in shortness of breath.  Not using CPAP at night.  Using persistent 2 L of oxygen full-time.  She also admits to worsening dyspnea on exertion.  Can no longer walk 1 block without significant shortness of breath.  No fevers or chills.  No chest pains.  No palpitations.  She states she is using her trilogy however lost this a few days ago and has not used in the last few days however per chart review does not appear it has been refilled in over 1 year.  She also has nebulizer at home and has not used this.  States her mother was using an over-the-counter sleep aid which has helped her in the past.  She does not recall the name of this.    Patient also has a past history of type 2 diabetes well-controlled on current regimen.  Has follow-up with PCP later this month for routine diabetes follow-up    She does not check her oxygen at home while lying flat but  "has an oximeter and the ability to do so                Objective    /58 (BP Location: Left arm, Patient Position: Sitting, Cuff Size: Adult Regular)   Pulse 84   Temp 98.6  F (37  C) (Oral)   Resp 24   Ht 1.397 m (4' 7\")   Wt 78.5 kg (173 lb 2 oz)   SpO2 92%   BMI 40.24 kg/m    Body mass index is 40.24 kg/m .  Physical Exam   GENERAL: alert, no distress, and fatigued  NECK: no adenopathy, no asymmetry, masses, or scars  RESP: rales R lower posterior, rhonchi throughout, expiratory wheezes throughout, and inspiratory wheezes throughout  CV: occasional premature beats, normal S1 S2, no S3 or S4, and no murmur, click or rub  NEURO: Normal strength and tone, mentation intact and speech normal  PSYCH: mentation appears normal, affect normal/bright    Xray - Reviewed and interpreted by me.  Significant pleural effusion left-sided inability to visualize heart border.  Pending radiology review.        Signed Electronically by: Lit Lopez PA-C  The longitudinal plan of care for the diagnosis(es)/condition(s) as documented were addressed during this visit. Due to the added complexity in care, will continue to support Arvind in the subsequent management and with ongoing continuity of care.    "

## 2025-05-07 NOTE — ED PROVIDER NOTES
EMERGENCY DEPARTMENT ENCOUNTER      NAME: Arvind Leong  AGE: 70 year old female  YOB: 1955  MRN: [id number]  EVALUATION DATE & TIME: No admission date for patient encounter.    PCP: Rema Whiting    ED PROVIDER: Remington Ruffin MD     Chief Complaint   Patient presents with    Shortness of Breath     FINAL IMPRESSION:  1. COPD exacerbation (H)        ED COURSE & MEDICAL DECISION MAKING:    Pertinent Labs & Imaging studies reviewed. (See chart for details)  70 year old female presents to the Emergency Department for evaluation of shortness of breath.  Patient reporting escalating shortness of breath over the past couple of days.  History of chronic respiratory failure secondary to COPD.  History of previous pneumonia.  Seen at urgent care.  X-ray obtained at outside facility with read from clinicians at that facility concerning for effusion and pneumonia and subsequently sent to emergency department for evaluation.  Formal x-ray read by radiology was pending at the time of patient's transfer to our facility.  I assessed the patient out in the waiting room at arrival.  She was not in respiratory distress.  She is baseline on 2 L oxygen and maintaining oxygenation at 97% on her baseline O2 requirement.  Examination overall was reassuring.  I independently reviewed and interpreted chest x-ray emergency department  and did not appreciate acute findings.  I contacted radiology and requested that the x-ray read be transition from routine to stat so that we could get official radiology read here in the emergency department.  Official read per radiology no significant acute findings were identified no pneumonia.  Tiny pleural effusion.  Proceed with additional workup in the emergency department.  Laboratory testing notable for troponin of 12.  Repeat pending.  BNP was negative.  White blood cell count mildly elevated at 12.6.  Metabolic panel notable for glucose of 180.  D-dimer was negative.   Ultimately I did decide to proceed with CT scan imaging to evaluate for possible occult pneumonia and this was negative for acute findings.  Patient has received steroids.  Currently awaiting repeat troponin and then will reexamine patient.    8:36 PM  Waiting on the posting of the patient's repeat troponin.  This has been complicated by the analyzer being down here at [facility] and so the laboratory testing had to be sent out to another facility.  I had a long discussion with the patient and she is feeling fine at this point essentially feels back to her baseline.  I think overall her symptoms are consistent with a mild COPD exacerbation.  No additional findings that I think would necessitate admission to the hospital and the patient and her family member would prefer and are comfortable with discharge home.  So, overall plan of care at this time is for follow-up on the pending troponin and if appropriate would anticipate discharge and follow-up in outpatient basis.    Troponin improved vital signs stable patient appears appropriate for discharge and close outpatient monitoring and follow-up.  Reviewed treatment plan return precautions and follow-up plan prior to discharge patient and mother are comfortable the plan of care.         Medical Decision Making  Discharge. I prescribed additional prescription strength medication(s) as charted. I considered admission, but discharged the patient after share decision making conversation.    MIPS (CTPE, Dental pain, Lara, Sinusitis, Asthma/COPD, Head Trauma): CT Pulmonary Angiogram:Patient is moderate to high risk for PE.    SEPSIS: None    4:03 PM I introduced myself to the patient, obtained relevant past history and performed my initial examination. Plan for care discussed during their ED stay and patient is agreeable.     At the conclusion of the encounter I discussed the results of all of the tests and the disposition. The questions were answered. The patient or  "family acknowledged understanding and was agreeable with the care plan.       MEDICATIONS GIVEN IN THE EMERGENCY:  Medications   methylPREDNISolone Na Suc (solu-MEDROL) injection 125 mg (125 mg Intravenous $Given 5/7/25 9385)   iopamidol (ISOVUE-370) solution 90 mL (90 mLs Intravenous $Given 5/7/25 4377)       NEW PRESCRIPTIONS STARTED AT TODAY'S ER VISIT  New Prescriptions    PREDNISONE (DELTASONE) 20 MG TABLET    Take two tablets (= 40mg) each day for 5 (five) days     Modified Medications    No medications on file       =================================================================    HPI    Patient information was obtained from: The patient     Use of : N/A         Arvind Leong is a 70 year old female with a pertinent history of ABDIFATAH on CPAP, chronic respiratory failure, type II diabetes, hyperlipidemia, cardiomyopathy, and anxiety who presents to this ED for evaluation of shortness of breath.    Per chart review, patient was seen at [facility] earlier today (5/7) due to difficulty breathing and shortness of breath. Saturations at 92% on 2L. CXR obtained with radiologist impression as follows: \"Hypoinflated lungs with left basilar opacities, probably atelectasis, perhaps with a tiny pleural effusion. No pneumothorax. Stable cardiomediastinal silhouette. Degenerative changes in the spine with dextroconvex curvature.\" Prescribed melatonin, prednisone and recommended to ER for immediate evaluation.     Patient noticed she had felt really tired and short of breath a few days ago. She does have inhalers at home but those have provided no relief. Confirms to be on 2L baseline supplemental nasal canula given history of chronic obstructive pulmonary disease, noting she had quit smoking. Denies use of anticoagulants. Otherwise denies fevers and chest pain. Of note, patient does have some left leg swelling.     REVIEW OF SYSTEMS   Review of Systems   Constitutional:  Positive for " fatigue. Negative for fever.   Respiratory:  Positive for shortness of breath.    Cardiovascular:  Positive for leg swelling (left). Negative for chest pain.   All other systems reviewed and are negative.         PAST MEDICAL HISTORY  Past Medical History:   Diagnosis Date    Acute on chronic respiratory failure with hypoxia (H) 11/15/2016    Bipolar 1 disorder (H)     CAP (community acquired pneumonia) 11/15/2016    Cervical high risk HPV (human papillomavirus) test positive 3/5/2018    3/5/18 NIL pap, +HR HPV (not 16/18) 5/21/21 NIL pap, neg HPV. Plan: await provider    COPD exacerbation (H) 4/24/2021    COPD with exacerbation (H) 11/15/2016    Diabetes mellitus, type II (H)     Hearing loss     Meniere's disease     Pneumonia 4/23/2021    Pneumonia of right lower lobe due to infectious organism 6/11/2019       PAST SOCIAL HISTORY  Past Surgical History:   Procedure Laterality Date    CYST REMOVAL  01/23/2001    MA REMOVAL ANAL FISTULA,SUBCUTANEOUS      Description: Fistula-in-ano Repair;  Recorded: 01/08/2010; x2    TONSILLECTOMY         CURRENT MEDICATIONS  predniSONE (DELTASONE) 20 MG tablet  albuterol (PROAIR HFA/PROVENTIL HFA/VENTOLIN HFA) 108 (90 Base) MCG/ACT inhaler  albuterol (PROVENTIL) (2.5 MG/3ML) 0.083% neb solution  ARIPiprazole ER (ABILIFY MAINTENA) 400 MG extended release inj syringe  atorvastatin (LIPITOR) 40 MG tablet  blood glucose (NO BRAND SPECIFIED) lancets standard  blood glucose (NO BRAND SPECIFIED) test strip  blood glucose monitoring (ONETOUCH VERIO) meter device kit  fluticasone (FLONASE) 50 MCG/ACT nasal spray  Fluticasone-Umeclidin-Vilant (TRELEGY ELLIPTA) 100-62.5-25 MCG/ACT oral inhaler  Lancets (ONETOUCH DELICA PLUS [id number]) MISC  melatonin 3 MG tablet  metFORMIN (GLUCOPHAGE) 1000 MG tablet  predniSONE (DELTASONE) 20 MG tablet  Tirzepatide 12.5 MG/0.5ML SOAJ  tolterodine ER (DETROL LA) 4 MG 24 hr capsule        ALLERGIES  Allergies   Allergen Reactions    Lurasidone Other (See  Comments)     Face turned red, (Brand name = Latuda) Face turned red, Face turned red       FAMILY HISTORY  Family History   Problem Relation Age of Onset    Aneurysm Father     Heart Disease Father 70.00        bypass in 70s, AAA rupture at age 77     Ulcers Father 76.00    Pacemaker Mother     Bipolar Disorder Brother     Breast Cancer Maternal Grandmother 51.00    Kidney failure Maternal Grandmother     Cancer Paternal Grandmother         ?? lung    Cancer Paternal Uncle         ?? lung    Mental Illness Maternal Grandfather     Heart Disease Other         uncle    No Known Problems Sister         two siblings abuse chemicals    No Known Problems Brother     Bipolar Disorder Nephew        SOCIAL HISTORY  Social History     Socioeconomic History    Marital status:    Tobacco Use    Smoking status: Former     Current packs/day: 0.00     Average packs/day: 1 pack/day for 40.0 years (40.0 ttl pk-yrs)     Types: Cigarettes     Start date: 1977     Quit date: 2017     Years since quittin.2     Passive exposure: Past    Smokeless tobacco: Never   Vaping Use    Vaping status: Never Used   Substance and Sexual Activity    Alcohol use: Yes     Comment: Alcoholic Drinks/day: Occasional     Drug use: No    Sexual activity: Never   Social History Narrative    2019The patient lives with her mother.; had worked at [employer] as  but quit 2019.   Quit smoking ; no etoh problems  / x 2 ; no kids     Social Drivers of Health     Financial Resource Strain: Low Risk  (10/12/2023)    Financial Resource Strain     Within the past 12 months, have you or your family members you live with been unable to get utilities (heat, electricity) when it was really needed?: No   Food Insecurity: Low Risk  (10/12/2023)    Food Insecurity     Within the past 12 months, did you worry that your food would run out before you got money to buy more?: No     Within the past 12 months, did the food you  "bought just not last and you didn t have money to get more?: No   Transportation Needs: Low Risk  (10/12/2023)    Transportation Needs     Within the past 12 months, has lack of transportation kept you from medical appointments, getting your medicines, non-medical meetings or appointments, work, or from getting things that you need?: No   Interpersonal Safety: Low Risk  (5/7/2025)    Interpersonal Safety     Do you feel physically and emotionally safe where you currently live?: Yes     Within the past 12 months, have you been hit, slapped, kicked or otherwise physically hurt by someone?: No     Within the past 12 months, have you been humiliated or emotionally abused in other ways by your partner or ex-partner?: No   Housing Stability: High Risk (10/12/2023)    Housing Stability     Do you have housing? : No     Are you worried about losing your housing?: No       PHYSICAL EXAM    /68   Pulse 88   Temp 98.4  F (36.9  C) (Oral)   Resp 20   Ht 1.397 m (4' 7\")   Wt 78.5 kg (173 lb)   SpO2 99%   BMI 40.21 kg/m    PHYSICAL EXAM    Constitutional: Chronically ill-appearing elderly female.  HENT: Normocephalic, Atraumatic, Bilateral external ears normal, Oropharynx normal, mucous membranes moist, Nose normal. Neck-  Normal range of motion, No tenderness, Supple, No stridor.   Eyes: PERRL, EOMI, Conjunctiva normal, No discharge.   Respiratory: No tachypnea, some faint wheezing appreciated, decreased breath sounds at the bases but overall reasonable air entry throughout.  No rhonchi or wheezing.  Cardiovascular: Normal heart rate, Regular rhythm, No murmurs Chest wall nontender.    GI:  Soft, No tenderness, No masses, No flank tenderness. No rebound or guarding.  : No cva tenderness    Musculoskeletal: 2+ DP pulses.  Mild asymmetrical edema left greater than right to examination no cyanosis. Good range of motion in all major joints. No tenderness to palpation. No tenderness of the CTLS spine.   Integument: " Warm, Dry, No erythema, No rash. No petechiae.   Neurologic: Alert & oriented x 3, Normal motor function, Normal sensory function, No focal deficits noted.   Psychiatric: Affect normal, Judgment normal, Mood normal. Cooperative.     LAB:  All pertinent labs reviewed and interpreted.  Results for orders placed or performed during the hospital encounter of 05/07/25   US Lower Extremity Venous Duplex Left    Impression    IMPRESSION:  1.  No deep venous thrombosis in the left lower extremity.   CT Chest Pulmonary Embolism w Contrast    Impression    IMPRESSION:  1.  No evidence for pulmonary embolism or other acute abnormality in the chest.   INR   Result Value Ref Range    INR 1.00 0.85 - 1.15    PT 13.5 11.8 - 14.8 Seconds   Comprehensive metabolic panel   Result Value Ref Range    Sodium 144 135 - 145 mmol/L    Potassium 4.5 3.4 - 5.3 mmol/L    Carbon Dioxide (CO2) 37 (H) 22 - 29 mmol/L    Anion Gap 7 7 - 15 mmol/L    Urea Nitrogen 12.8 8.0 - 23.0 mg/dL    Creatinine 0.96 (H) 0.51 - 0.95 mg/dL    GFR Estimate 64 >60 mL/min/1.73m2    Calcium 9.4 8.8 - 10.4 mg/dL    Chloride 100 98 - 107 mmol/L    Glucose 180 (H) 70 - 99 mg/dL    Alkaline Phosphatase 75 40 - 150 U/L    AST 14 0 - 45 U/L    ALT 17 0 - 50 U/L    Protein Total 6.3 (L) 6.4 - 8.3 g/dL    Albumin 3.8 3.5 - 5.2 g/dL    Bilirubin Total 0.5 <=1.2 mg/dL   Lactic acid whole blood with 1x repeat in 2 hr when >2   Result Value Ref Range    Lactic Acid, Initial 1.4 0.7 - 2.0 mmol/L   Result Value Ref Range    Troponin T, High Sensitivity 12 <=14 ng/L   NT-proBNP   Result Value Ref Range    NT-proBNP 106 pg/mL   Result Value Ref Range    Magnesium 1.9 1.7 - 2.3 mg/dL   CBC with platelets and differential   Result Value Ref Range    WBC Count 12.6 (H) 4.0 - 11.0 10e3/uL    RBC Count 4.72 3.80 - 5.20 10e6/uL    Hemoglobin 13.3 11.7 - 15.7 g/dL    Hematocrit 44.1 35.0 - 47.0 %    MCV 93 78 - 100 fL    MCH 28.2 26.5 - 33.0 pg    MCHC 30.2 (L) 31.5 - 36.5 g/dL    RDW  12.0 10.0 - 15.0 %    Platelet Count 191 150 - 450 10e3/uL    % Neutrophils 76 %    % Lymphocytes 17 %    % Monocytes 5 %    % Eosinophils 1 %    % Basophils 1 %    % Immature Granulocytes 0 %    NRBCs per 100 WBC 0 <1 /100    Absolute Neutrophils 9.6 (H) 1.6 - 8.3 10e3/uL    Absolute Lymphocytes 2.2 0.8 - 5.3 10e3/uL    Absolute Monocytes 0.7 0.0 - 1.3 10e3/uL    Absolute Eosinophils 0.1 0.0 - 0.7 10e3/uL    Absolute Basophils 0.1 0.0 - 0.2 10e3/uL    Absolute Immature Granulocytes 0.1 <=0.4 10e3/uL    Absolute NRBCs 0.0 10e3/uL   D dimer quantitative   Result Value Ref Range    D-Dimer Quantitative 0.40 0.00 - 0.50 ug/mL FEU   Result Value Ref Range    Troponin T, High Sensitivity 9 <=14 ng/L       RADIOLOGY:  Reviewed all pertinent imaging. Please see official radiology report.  CT Chest Pulmonary Embolism w Contrast   Final Result   IMPRESSION:   1.  No evidence for pulmonary embolism or other acute abnormality in the chest.      US Lower Extremity Venous Duplex Left   Final Result   IMPRESSION:   1.  No deep venous thrombosis in the left lower extremity.          EKG:    Performed at: 15:49:12    Impression: Low voltage QRS, borderline ECG    Rate: 80 bpm  Rhythm: Sinus rhythm with premature supraventricular complexes  P-R-T Axis: 73 / 63 / 58  [place] Interval: 152 ms  QRS Interval: 70 ms  QTc Interval: 454 ms  Comparison: ECG of 9/22/2024    I have independently reviewed and interpreted the EKG(s) documented above.        I, Jer Lemus, am serving as a scribe to document services personally performed by Remington Ruffin MD, based on my observation and the provider's statements to me. I, Remington Ruffin MD, attest that Jer Lemus is acting in a scribe capacity, has observed my performance of the services and has documented them in accordance with my direction.    Remington Ruffin MD   [facility] EMERGENCY DEPARTMENT  [facility] [facility] [id number] [phone]        Remington Ruffin MD  05/07/25 2090

## 2025-05-07 NOTE — TELEPHONE ENCOUNTER
Was able to reach patient and relayed information and message from provider      Emma Hernaedz RN

## 2025-05-07 NOTE — TELEPHONE ENCOUNTER
Please call patient.  I have reviewed x-ray.  Significant effusion/pneumonia seen.  This was unexpected and given the extent of this as well as her low oxygen levels and inability to lay flat due to shortness of breath, she needs to present to the emergency room immediately.    Onel Lopez PA-C

## 2025-05-12 LAB
ATRIAL RATE - MUSE: 80 BPM
DIASTOLIC BLOOD PRESSURE - MUSE: NORMAL MMHG
INTERPRETATION ECG - MUSE: NORMAL
P AXIS - MUSE: 73 DEGREES
PR INTERVAL - MUSE: 152 MS
QRS DURATION - MUSE: 70 MS
QT - MUSE: 394 MS
QTC - MUSE: 454 MS
R AXIS - MUSE: 63 DEGREES
SYSTOLIC BLOOD PRESSURE - MUSE: NORMAL MMHG
T AXIS - MUSE: 58 DEGREES
VENTRICULAR RATE- MUSE: 80 BPM

## 2025-05-23 DIAGNOSIS — Z53.9 DIAGNOSIS NOT YET DEFINED: Primary | ICD-10-CM

## 2025-05-23 PROCEDURE — G0179 MD RECERTIFICATION HHA PT: HCPCS | Performed by: FAMILY MEDICINE

## 2025-05-29 ENCOUNTER — NURSE TRIAGE (OUTPATIENT)
Dept: NURSING | Facility: CLINIC | Age: 70
End: 2025-05-29
Payer: COMMERCIAL
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
## 2025-06-02 ENCOUNTER — PATIENT OUTREACH (OUTPATIENT)
Dept: CARE COORDINATION | Facility: CLINIC | Age: 70
End: 2025-06-02
Payer: COMMERCIAL

## 2025-06-02 DIAGNOSIS — E11.69 TYPE 2 DIABETES MELLITUS WITH OTHER SPECIFIED COMPLICATION, WITHOUT LONG-TERM CURRENT USE OF INSULIN (H): ICD-10-CM

## 2025-06-02 NOTE — PROGRESS NOTES
Clinic Care Coordination Contact  [facility]/Voicemail    Clinical Data: Care Coordinator Outreach    Outreach Documentation Number of Outreach Attempt   6/2/2025  11:55 AM 1       Left message on patient's voicemail with call back information and requested return call.      Plan: Care Coordinator will try to reach patient again in 1-2 business days.    ANGI Dye  [phone]  [facility]
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
## 2025-06-03 NOTE — TELEPHONE ENCOUNTER
Patient Returning Call    Reason for call:  Returning a call from Care Coordination     Information relayed to patient:  Confirming care coordination called back    Patient has additional questions:  Yes    What are your questions/concerns: Patient would like a call back around 10:00 am     Who does the patient want to speak with:  Care Corrdinator    Is an  needed?:  No      Could we send this information to you in [facility] or would you prefer to receive a phone call?:   Patient would prefer a phone call   Okay to leave a detailed message?: Yes at Home number on file [phone] (home)

## 2025-06-04 ENCOUNTER — HOSPITAL ENCOUNTER (INPATIENT)
Facility: HOSPITAL | Age: 70
End: 2025-06-04
Attending: EMERGENCY MEDICINE | Admitting: INTERNAL MEDICINE
Payer: COMMERCIAL

## 2025-06-04 ENCOUNTER — APPOINTMENT (OUTPATIENT)
Dept: CT IMAGING | Facility: HOSPITAL | Age: 70
End: 2025-06-04
Attending: EMERGENCY MEDICINE
Payer: COMMERCIAL

## 2025-06-04 ENCOUNTER — PATIENT OUTREACH (OUTPATIENT)
Dept: CARE COORDINATION | Facility: CLINIC | Age: 70
End: 2025-06-04
Payer: COMMERCIAL

## 2025-06-04 ENCOUNTER — NURSE TRIAGE (OUTPATIENT)
Dept: NURSING | Facility: CLINIC | Age: 70
End: 2025-06-04
Payer: COMMERCIAL

## 2025-06-04 DIAGNOSIS — J15.9 COMMUNITY ACQUIRED BACTERIAL PNEUMONIA: Primary | ICD-10-CM

## 2025-06-04 DIAGNOSIS — R09.02 HYPOXIA: ICD-10-CM

## 2025-06-04 DIAGNOSIS — J96.02 ACUTE RESPIRATORY FAILURE WITH HYPOXIA AND HYPERCAPNIA (H): ICD-10-CM

## 2025-06-04 DIAGNOSIS — J45.901 EXACERBATION OF ASTHMA, UNSPECIFIED ASTHMA SEVERITY, UNSPECIFIED WHETHER PERSISTENT: ICD-10-CM

## 2025-06-04 DIAGNOSIS — J96.01 ACUTE RESPIRATORY FAILURE WITH HYPOXIA AND HYPERCAPNIA (H): ICD-10-CM

## 2025-06-04 LAB
ANION GAP SERPL CALCULATED.3IONS-SCNC: 1 MMOL/L (ref 7–15)
BASE EXCESS BLDV CALC-SCNC: 10.9 MMOL/L (ref -3–3)
BASOPHILS # BLD AUTO: 0.1 10E3/UL (ref 0–0.2)
BASOPHILS NFR BLD AUTO: 1 %
BUN SERPL-MCNC: 12.2 MG/DL (ref 8–23)
CALCIUM SERPL-MCNC: 9 MG/DL (ref 8.8–10.4)
CHLORIDE SERPL-SCNC: 102 MMOL/L (ref 98–107)
CREAT SERPL-MCNC: 0.79 MG/DL (ref 0.51–0.95)
EGFRCR SERPLBLD CKD-EPI 2021: 81 ML/MIN/1.73M2
EOSINOPHIL # BLD AUTO: 0.2 10E3/UL (ref 0–0.7)
EOSINOPHIL NFR BLD AUTO: 2 %
ERYTHROCYTE [DISTWIDTH] IN BLOOD BY AUTOMATED COUNT: 12.3 % (ref 10–15)
FLUAV RNA SPEC QL NAA+PROBE: NEGATIVE
FLUBV RNA RESP QL NAA+PROBE: NEGATIVE
GLUCOSE SERPL-MCNC: 166 MG/DL (ref 70–99)
HCO3 BLDV-SCNC: 43 MMOL/L (ref 21–28)
HCO3 SERPL-SCNC: 42 MMOL/L (ref 22–29)
HCT VFR BLD AUTO: 43.6 % (ref 35–47)
HGB BLD-MCNC: 13.2 G/DL (ref 11.7–15.7)
IMM GRANULOCYTES # BLD: 0 10E3/UL
IMM GRANULOCYTES NFR BLD: 0 %
LYMPHOCYTES # BLD AUTO: 3.9 10E3/UL (ref 0.8–5.3)
LYMPHOCYTES NFR BLD AUTO: 35 %
MCH RBC QN AUTO: 28.9 PG (ref 26.5–33)
MCHC RBC AUTO-ENTMCNC: 30.3 G/DL (ref 31.5–36.5)
MCV RBC AUTO: 96 FL (ref 78–100)
MONOCYTES # BLD AUTO: 0.7 10E3/UL (ref 0–1.3)
MONOCYTES NFR BLD AUTO: 7 %
NEUTROPHILS # BLD AUTO: 6.2 10E3/UL (ref 1.6–8.3)
NEUTROPHILS NFR BLD AUTO: 56 %
NRBC # BLD AUTO: 0 10E3/UL
NRBC BLD AUTO-RTO: 0 /100
NT-PROBNP SERPL-MCNC: 117 PG/ML (ref 0–353)
O2/TOTAL GAS SETTING VFR VENT: 60 %
OXYHGB MFR BLDV: 56 % (ref 70–75)
PCO2 BLDV: 104 MM HG (ref 40–50)
PH BLDV: 7.23 [PH] (ref 7.32–7.43)
PLATELET # BLD AUTO: 201 10E3/UL (ref 150–450)
PO2 BLDV: 35 MM HG (ref 25–47)
POTASSIUM SERPL-SCNC: 4.3 MMOL/L (ref 3.4–5.3)
RBC # BLD AUTO: 4.56 10E6/UL (ref 3.8–5.2)
RSV RNA SPEC NAA+PROBE: NEGATIVE
SAO2 % BLDV: 56.8 % (ref 70–75)
SARS-COV-2 RNA RESP QL NAA+PROBE: NEGATIVE
SODIUM SERPL-SCNC: 145 MMOL/L (ref 135–145)
TROPONIN T SERPL HS-MCNC: 12 NG/L
WBC # BLD AUTO: 11.1 10E3/UL (ref 4–11)

## 2025-06-04 PROCEDURE — 5A09357 ASSISTANCE WITH RESPIRATORY VENTILATION, LESS THAN 24 CONSECUTIVE HOURS, CONTINUOUS POSITIVE AIRWAY PRESSURE: ICD-10-PCS | Performed by: EMERGENCY MEDICINE

## 2025-06-04 PROCEDURE — 999N000157 HC STATISTIC RCP TIME EA 10 MIN

## 2025-06-04 PROCEDURE — 71275 CT ANGIOGRAPHY CHEST: CPT

## 2025-06-04 PROCEDURE — 85004 AUTOMATED DIFF WBC COUNT: CPT | Performed by: EMERGENCY MEDICINE

## 2025-06-04 PROCEDURE — 82374 ASSAY BLOOD CARBON DIOXIDE: CPT | Performed by: EMERGENCY MEDICINE

## 2025-06-04 PROCEDURE — 94660 CPAP INITIATION&MGMT: CPT

## 2025-06-04 PROCEDURE — 36415 COLL VENOUS BLD VENIPUNCTURE: CPT | Performed by: STUDENT IN AN ORGANIZED HEALTH CARE EDUCATION/TRAINING PROGRAM

## 2025-06-04 PROCEDURE — 84484 ASSAY OF TROPONIN QUANT: CPT | Performed by: EMERGENCY MEDICINE

## 2025-06-04 PROCEDURE — 83880 ASSAY OF NATRIURETIC PEPTIDE: CPT | Performed by: EMERGENCY MEDICINE

## 2025-06-04 PROCEDURE — 99291 CRITICAL CARE FIRST HOUR: CPT | Mod: 25

## 2025-06-04 PROCEDURE — 94640 AIRWAY INHALATION TREATMENT: CPT | Mod: 76

## 2025-06-04 PROCEDURE — 250N000011 HC RX IP 250 OP 636: Performed by: EMERGENCY MEDICINE

## 2025-06-04 PROCEDURE — 82805 BLOOD GASES W/O2 SATURATION: CPT | Performed by: EMERGENCY MEDICINE

## 2025-06-04 PROCEDURE — 250N000009 HC RX 250: Performed by: EMERGENCY MEDICINE

## 2025-06-04 PROCEDURE — 85014 HEMATOCRIT: CPT | Performed by: EMERGENCY MEDICINE

## 2025-06-04 PROCEDURE — 93005 ELECTROCARDIOGRAM TRACING: CPT | Performed by: EMERGENCY MEDICINE

## 2025-06-04 PROCEDURE — 96374 THER/PROPH/DIAG INJ IV PUSH: CPT

## 2025-06-04 PROCEDURE — 87637 SARSCOV2&INF A&B&RSV AMP PRB: CPT | Performed by: EMERGENCY MEDICINE

## 2025-06-04 PROCEDURE — 80048 BASIC METABOLIC PNL TOTAL CA: CPT | Performed by: EMERGENCY MEDICINE

## 2025-06-04 RX ORDER — METHYLPREDNISOLONE SODIUM SUCCINATE 125 MG/2ML
125 INJECTION INTRAMUSCULAR; INTRAVENOUS ONCE
Status: COMPLETED | OUTPATIENT
Start: 2025-06-04 | End: 2025-06-04

## 2025-06-04 RX ORDER — IOPAMIDOL 755 MG/ML
80 INJECTION, SOLUTION INTRAVASCULAR ONCE
Status: COMPLETED | OUTPATIENT
Start: 2025-06-05 | End: 2025-06-05

## 2025-06-04 RX ORDER — IPRATROPIUM BROMIDE AND ALBUTEROL SULFATE 2.5; .5 MG/3ML; MG/3ML
3 SOLUTION RESPIRATORY (INHALATION)
Status: COMPLETED | OUTPATIENT
Start: 2025-06-04 | End: 2025-06-04

## 2025-06-04 RX ADMIN — IPRATROPIUM BROMIDE AND ALBUTEROL SULFATE 3 ML: .5; 3 SOLUTION RESPIRATORY (INHALATION) at 23:14

## 2025-06-04 RX ADMIN — IPRATROPIUM BROMIDE AND ALBUTEROL SULFATE 3 ML: .5; 3 SOLUTION RESPIRATORY (INHALATION) at 23:16

## 2025-06-04 RX ADMIN — IPRATROPIUM BROMIDE AND ALBUTEROL SULFATE 3 ML: .5; 3 SOLUTION RESPIRATORY (INHALATION) at 23:15

## 2025-06-04 RX ADMIN — METHYLPREDNISOLONE SODIUM SUCCINATE 125 MG: 125 INJECTION, POWDER, LYOPHILIZED, FOR SOLUTION INTRAMUSCULAR; INTRAVENOUS at 23:29

## 2025-06-04 ASSESSMENT — ACTIVITIES OF DAILY LIVING (ADL): ADLS_ACUITY_SCORE: 58

## 2025-06-05 VITALS
BODY MASS INDEX: 40.87 KG/M2 | WEIGHT: 176.6 LBS | TEMPERATURE: 98.1 F | RESPIRATION RATE: 27 BRPM | DIASTOLIC BLOOD PRESSURE: 62 MMHG | SYSTOLIC BLOOD PRESSURE: 130 MMHG | HEART RATE: 93 BPM | HEIGHT: 55 IN | OXYGEN SATURATION: 93 %

## 2025-06-05 PROBLEM — J96.02 ACUTE RESPIRATORY FAILURE WITH HYPOXIA AND HYPERCAPNIA (H): Status: ACTIVE | Noted: 2025-06-05

## 2025-06-05 PROBLEM — J96.01 ACUTE RESPIRATORY FAILURE WITH HYPOXIA AND HYPERCAPNIA (H): Status: ACTIVE | Noted: 2025-06-05

## 2025-06-05 PROBLEM — J45.901 EXACERBATION OF ASTHMA, UNSPECIFIED ASTHMA SEVERITY, UNSPECIFIED WHETHER PERSISTENT: Status: ACTIVE | Noted: 2025-06-05

## 2025-06-05 LAB
ALBUMIN SERPL BCG-MCNC: 3.8 G/DL (ref 3.5–5.2)
ALP SERPL-CCNC: 87 U/L (ref 40–150)
ALT SERPL W P-5'-P-CCNC: 19 U/L (ref 0–50)
ANION GAP SERPL CALCULATED.3IONS-SCNC: 10 MMOL/L (ref 7–15)
AST SERPL W P-5'-P-CCNC: 15 U/L (ref 0–45)
B-OH-BUTYR SERPL-SCNC: <0.18 MMOL/L
BASE EXCESS BLDV CALC-SCNC: 1.2 MMOL/L (ref -3–3)
BASE EXCESS BLDV CALC-SCNC: 1.8 MMOL/L (ref -3–3)
BASE EXCESS BLDV CALC-SCNC: 4.8 MMOL/L (ref -3–3)
BASE EXCESS BLDV CALC-SCNC: 5.3 MMOL/L (ref -3–3)
BASE EXCESS BLDV CALC-SCNC: 7.9 MMOL/L (ref -3–3)
BASOPHILS # BLD AUTO: 0 10E3/UL (ref 0–0.2)
BASOPHILS NFR BLD AUTO: 0 %
BILIRUB SERPL-MCNC: 0.3 MG/DL
BUN SERPL-MCNC: 12.6 MG/DL (ref 8–23)
CALCIUM SERPL-MCNC: 9 MG/DL (ref 8.8–10.4)
CHLORIDE SERPL-SCNC: 100 MMOL/L (ref 98–107)
CREAT SERPL-MCNC: 0.77 MG/DL (ref 0.51–0.95)
EGFRCR SERPLBLD CKD-EPI 2021: 83 ML/MIN/1.73M2
EOSINOPHIL # BLD AUTO: 0 10E3/UL (ref 0–0.7)
EOSINOPHIL NFR BLD AUTO: 0 %
ERYTHROCYTE [DISTWIDTH] IN BLOOD BY AUTOMATED COUNT: 12.2 % (ref 10–15)
GLUCOSE BLDC GLUCOMTR-MCNC: 229 MG/DL (ref 70–99)
GLUCOSE BLDC GLUCOMTR-MCNC: 255 MG/DL (ref 70–99)
GLUCOSE BLDC GLUCOMTR-MCNC: 271 MG/DL (ref 70–99)
GLUCOSE BLDC GLUCOMTR-MCNC: 286 MG/DL (ref 70–99)
GLUCOSE BLDC GLUCOMTR-MCNC: 336 MG/DL (ref 70–99)
GLUCOSE SERPL-MCNC: 302 MG/DL (ref 70–99)
HCO3 BLDV-SCNC: 30 MMOL/L (ref 21–28)
HCO3 BLDV-SCNC: 31 MMOL/L (ref 21–28)
HCO3 BLDV-SCNC: 35 MMOL/L (ref 21–28)
HCO3 BLDV-SCNC: 36 MMOL/L (ref 21–28)
HCO3 BLDV-SCNC: 39 MMOL/L (ref 21–28)
HCO3 SERPL-SCNC: 31 MMOL/L (ref 22–29)
HCT VFR BLD AUTO: 42.1 % (ref 35–47)
HGB BLD-MCNC: 12.7 G/DL (ref 11.7–15.7)
HOLD SPECIMEN: NORMAL
IMM GRANULOCYTES # BLD: 0.1 10E3/UL
IMM GRANULOCYTES NFR BLD: 1 %
LACTATE SERPL-SCNC: 2.2 MMOL/L (ref 0.7–2)
LACTATE SERPL-SCNC: 3.7 MMOL/L (ref 0.7–2)
LACTATE SERPL-SCNC: 4.6 MMOL/L (ref 0.7–2)
LACTATE SERPL-SCNC: 6.1 MMOL/L (ref 0.7–2)
LACTATE SERPL-SCNC: 7.4 MMOL/L (ref 0.7–2)
LYMPHOCYTES # BLD AUTO: 0.4 10E3/UL (ref 0.8–5.3)
LYMPHOCYTES NFR BLD AUTO: 3 %
MAGNESIUM SERPL-MCNC: 1.9 MG/DL (ref 1.7–2.3)
MAGNESIUM SERPL-MCNC: 2 MG/DL (ref 1.7–2.3)
MCH RBC QN AUTO: 28.5 PG (ref 26.5–33)
MCHC RBC AUTO-ENTMCNC: 30.2 G/DL (ref 31.5–36.5)
MCV RBC AUTO: 95 FL (ref 78–100)
MONOCYTES # BLD AUTO: 0.1 10E3/UL (ref 0–1.3)
MONOCYTES NFR BLD AUTO: 1 %
NEUTROPHILS # BLD AUTO: 14.8 10E3/UL (ref 1.6–8.3)
NEUTROPHILS NFR BLD AUTO: 96 %
NRBC # BLD AUTO: 0 10E3/UL
NRBC BLD AUTO-RTO: 0 /100
O2/TOTAL GAS SETTING VFR VENT: 30 %
O2/TOTAL GAS SETTING VFR VENT: 35 %
O2/TOTAL GAS SETTING VFR VENT: 35 %
O2/TOTAL GAS SETTING VFR VENT: 40 %
O2/TOTAL GAS SETTING VFR VENT: 40 %
OXYHGB MFR BLDV: 56 % (ref 70–75)
OXYHGB MFR BLDV: 82 % (ref 70–75)
OXYHGB MFR BLDV: 87 % (ref 70–75)
OXYHGB MFR BLDV: 88 % (ref 70–75)
OXYHGB MFR BLDV: 88 % (ref 70–75)
PCO2 BLDV: 65 MM HG (ref 40–50)
PCO2 BLDV: 72 MM HG (ref 40–50)
PCO2 BLDV: 79 MM HG (ref 40–50)
PCO2 BLDV: 82 MM HG (ref 40–50)
PCO2 BLDV: 95 MM HG (ref 40–50)
PH BLDV: 7.22 [PH] (ref 7.32–7.43)
PH BLDV: 7.24 [PH] (ref 7.32–7.43)
PH BLDV: 7.24 [PH] (ref 7.32–7.43)
PH BLDV: 7.25 [PH] (ref 7.32–7.43)
PH BLDV: 7.27 [PH] (ref 7.32–7.43)
PLATELET # BLD AUTO: 201 10E3/UL (ref 150–450)
PO2 BLDV: 34 MM HG (ref 25–47)
PO2 BLDV: 50 MM HG (ref 25–47)
PO2 BLDV: 58 MM HG (ref 25–47)
PO2 BLDV: 59 MM HG (ref 25–47)
PO2 BLDV: 59 MM HG (ref 25–47)
POTASSIUM SERPL-SCNC: 4.1 MMOL/L (ref 3.4–5.3)
PROT SERPL-MCNC: 6.4 G/DL (ref 6.4–8.3)
RBC # BLD AUTO: 4.45 10E6/UL (ref 3.8–5.2)
SAO2 % BLDV: 57.2 % (ref 70–75)
SAO2 % BLDV: 82.4 % (ref 70–75)
SAO2 % BLDV: 87.6 % (ref 70–75)
SAO2 % BLDV: 88.5 % (ref 70–75)
SAO2 % BLDV: 89.5 % (ref 70–75)
SODIUM SERPL-SCNC: 141 MMOL/L (ref 135–145)
TROPONIN T SERPL HS-MCNC: 9 NG/L
WBC # BLD AUTO: 15.4 10E3/UL (ref 4–11)

## 2025-06-05 PROCEDURE — 250N000011 HC RX IP 250 OP 636: Mod: JW | Performed by: INTERNAL MEDICINE

## 2025-06-05 PROCEDURE — 250N000013 HC RX MED GY IP 250 OP 250 PS 637: Performed by: INTERNAL MEDICINE

## 2025-06-05 PROCEDURE — 94660 CPAP INITIATION&MGMT: CPT

## 2025-06-05 PROCEDURE — 99223 1ST HOSP IP/OBS HIGH 75: CPT | Mod: AI | Performed by: INTERNAL MEDICINE

## 2025-06-05 PROCEDURE — 94640 AIRWAY INHALATION TREATMENT: CPT

## 2025-06-05 PROCEDURE — 258N000003 HC RX IP 258 OP 636: Performed by: INTERNAL MEDICINE

## 2025-06-05 PROCEDURE — 84484 ASSAY OF TROPONIN QUANT: CPT | Performed by: EMERGENCY MEDICINE

## 2025-06-05 PROCEDURE — 82962 GLUCOSE BLOOD TEST: CPT

## 2025-06-05 PROCEDURE — 250N000011 HC RX IP 250 OP 636: Performed by: INTERNAL MEDICINE

## 2025-06-05 PROCEDURE — 120N000001 HC R&B MED SURG/OB

## 2025-06-05 PROCEDURE — 250N000012 HC RX MED GY IP 250 OP 636 PS 637: Performed by: INTERNAL MEDICINE

## 2025-06-05 PROCEDURE — 82805 BLOOD GASES W/O2 SATURATION: CPT | Performed by: INTERNAL MEDICINE

## 2025-06-05 PROCEDURE — 87040 BLOOD CULTURE FOR BACTERIA: CPT | Performed by: INTERNAL MEDICINE

## 2025-06-05 PROCEDURE — 99223 1ST HOSP IP/OBS HIGH 75: CPT | Performed by: INTERNAL MEDICINE

## 2025-06-05 PROCEDURE — 99207 PR NO BILLABLE SERVICE THIS VISIT: CPT | Performed by: INTERNAL MEDICINE

## 2025-06-05 PROCEDURE — 83605 ASSAY OF LACTIC ACID: CPT | Performed by: INTERNAL MEDICINE

## 2025-06-05 PROCEDURE — 272N000272 HC CONTINUOUS NEBULIZER MICRO PUMP

## 2025-06-05 PROCEDURE — 250N000009 HC RX 250: Performed by: INTERNAL MEDICINE

## 2025-06-05 PROCEDURE — 999N000156 HC STATISTIC RCP CONSULT EA 30 MIN

## 2025-06-05 PROCEDURE — 999N000157 HC STATISTIC RCP TIME EA 10 MIN

## 2025-06-05 PROCEDURE — 85048 AUTOMATED LEUKOCYTE COUNT: CPT | Performed by: INTERNAL MEDICINE

## 2025-06-05 PROCEDURE — 82805 BLOOD GASES W/O2 SATURATION: CPT | Performed by: EMERGENCY MEDICINE

## 2025-06-05 PROCEDURE — 999N000185 HC STATISTIC TRANSPORT TIME EA 15 MIN

## 2025-06-05 PROCEDURE — 36415 COLL VENOUS BLD VENIPUNCTURE: CPT | Performed by: INTERNAL MEDICINE

## 2025-06-05 PROCEDURE — 94640 AIRWAY INHALATION TREATMENT: CPT | Mod: 76

## 2025-06-05 PROCEDURE — 80053 COMPREHEN METABOLIC PANEL: CPT | Performed by: INTERNAL MEDICINE

## 2025-06-05 PROCEDURE — 82010 KETONE BODYS QUAN: CPT | Performed by: INTERNAL MEDICINE

## 2025-06-05 PROCEDURE — 250N000011 HC RX IP 250 OP 636: Performed by: EMERGENCY MEDICINE

## 2025-06-05 PROCEDURE — 36415 COLL VENOUS BLD VENIPUNCTURE: CPT | Performed by: EMERGENCY MEDICINE

## 2025-06-05 PROCEDURE — 85004 AUTOMATED DIFF WBC COUNT: CPT | Performed by: INTERNAL MEDICINE

## 2025-06-05 PROCEDURE — 83735 ASSAY OF MAGNESIUM: CPT | Performed by: INTERNAL MEDICINE

## 2025-06-05 RX ORDER — ACETAMINOPHEN 325 MG/1
650 TABLET ORAL EVERY 4 HOURS PRN
Status: ACTIVE | OUTPATIENT
Start: 2025-06-05

## 2025-06-05 RX ORDER — FLUTICASONE FUROATE AND VILANTEROL 100; 25 UG/1; UG/1
1 POWDER RESPIRATORY (INHALATION) DAILY
Status: DISPENSED | OUTPATIENT
Start: 2025-06-05

## 2025-06-05 RX ORDER — FLUTICASONE PROPIONATE 50 MCG
1 SPRAY, SUSPENSION (ML) NASAL DAILY
Status: DISPENSED | OUTPATIENT
Start: 2025-06-05

## 2025-06-05 RX ORDER — IPRATROPIUM BROMIDE AND ALBUTEROL SULFATE 2.5; .5 MG/3ML; MG/3ML
3 SOLUTION RESPIRATORY (INHALATION)
Status: DISPENSED | OUTPATIENT
Start: 2025-06-05

## 2025-06-05 RX ORDER — PREDNISONE 20 MG/1
20 TABLET ORAL DAILY
Status: ACTIVE | OUTPATIENT
Start: 2025-06-12 | End: 2025-06-15

## 2025-06-05 RX ORDER — NICOTINE POLACRILEX 4 MG
15-30 LOZENGE BUCCAL
Status: ACTIVE | OUTPATIENT
Start: 2025-06-05

## 2025-06-05 RX ORDER — CALCIUM CARBONATE 500 MG/1
1000 TABLET, CHEWABLE ORAL 4 TIMES DAILY PRN
Status: ACTIVE | OUTPATIENT
Start: 2025-06-05

## 2025-06-05 RX ORDER — METHYLPREDNISOLONE SODIUM SUCCINATE 125 MG/2ML
60 INJECTION INTRAMUSCULAR; INTRAVENOUS EVERY 8 HOURS
Status: COMPLETED | OUTPATIENT
Start: 2025-06-05 | End: 2025-06-05

## 2025-06-05 RX ORDER — TOLTERODINE 2 MG/1
4 CAPSULE, EXTENDED RELEASE ORAL DAILY
Status: DISPENSED | OUTPATIENT
Start: 2025-06-05

## 2025-06-05 RX ORDER — ONDANSETRON 2 MG/ML
4 INJECTION INTRAMUSCULAR; INTRAVENOUS EVERY 6 HOURS PRN
Status: ACTIVE | OUTPATIENT
Start: 2025-06-05

## 2025-06-05 RX ORDER — DOXYCYCLINE 100 MG/10ML
100 INJECTION, POWDER, LYOPHILIZED, FOR SOLUTION INTRAVENOUS EVERY 12 HOURS
Status: DISPENSED | OUTPATIENT
Start: 2025-06-05

## 2025-06-05 RX ORDER — SODIUM CHLORIDE 9 MG/ML
INJECTION, SOLUTION INTRAVENOUS CONTINUOUS
Status: ACTIVE | OUTPATIENT
Start: 2025-06-05

## 2025-06-05 RX ORDER — PREDNISONE 20 MG/1
40 TABLET ORAL DAILY
Status: ACTIVE | OUTPATIENT
Start: 2025-06-06 | End: 2025-06-09

## 2025-06-05 RX ORDER — IPRATROPIUM BROMIDE AND ALBUTEROL SULFATE 2.5; .5 MG/3ML; MG/3ML
3 SOLUTION RESPIRATORY (INHALATION)
Status: COMPLETED | OUTPATIENT
Start: 2025-06-05 | End: 2025-06-05

## 2025-06-05 RX ORDER — IPRATROPIUM BROMIDE AND ALBUTEROL SULFATE 2.5; .5 MG/3ML; MG/3ML
3 SOLUTION RESPIRATORY (INHALATION) EVERY 4 HOURS PRN
Status: ACTIVE | OUTPATIENT
Start: 2025-06-05

## 2025-06-05 RX ORDER — DEXTROSE MONOHYDRATE 25 G/50ML
25-50 INJECTION, SOLUTION INTRAVENOUS
Status: ACTIVE | OUTPATIENT
Start: 2025-06-05

## 2025-06-05 RX ORDER — ATORVASTATIN CALCIUM 40 MG/1
40 TABLET, FILM COATED ORAL DAILY
Status: DISPENSED | OUTPATIENT
Start: 2025-06-05

## 2025-06-05 RX ORDER — ONDANSETRON 4 MG/1
4 TABLET, ORALLY DISINTEGRATING ORAL EVERY 6 HOURS PRN
Status: ACTIVE | OUTPATIENT
Start: 2025-06-05

## 2025-06-05 RX ORDER — PREDNISONE 5 MG/1
10 TABLET ORAL DAILY
Status: ACTIVE | OUTPATIENT
Start: 2025-06-15 | End: 2025-06-18

## 2025-06-05 RX ADMIN — DOXYCYCLINE 100 MG: 100 INJECTION, POWDER, LYOPHILIZED, FOR SOLUTION INTRAVENOUS at 08:07

## 2025-06-05 RX ADMIN — SODIUM CHLORIDE: 0.9 INJECTION, SOLUTION INTRAVENOUS at 17:04

## 2025-06-05 RX ADMIN — METHYLPREDNISOLONE SODIUM SUCCINATE 62.5 MG: 125 INJECTION, POWDER, FOR SOLUTION INTRAMUSCULAR; INTRAVENOUS at 16:27

## 2025-06-05 RX ADMIN — INSULIN ASPART 4 UNITS: 100 INJECTION, SOLUTION INTRAVENOUS; SUBCUTANEOUS at 12:24

## 2025-06-05 RX ADMIN — FLUTICASONE FUROATE AND VILANTEROL TRIFENATATE 1 PUFF: 100; 25 POWDER RESPIRATORY (INHALATION) at 08:33

## 2025-06-05 RX ADMIN — DOXYCYCLINE 100 MG: 100 INJECTION, POWDER, LYOPHILIZED, FOR SOLUTION INTRAVENOUS at 20:22

## 2025-06-05 RX ADMIN — IPRATROPIUM BROMIDE AND ALBUTEROL SULFATE 3 ML: .5; 3 SOLUTION RESPIRATORY (INHALATION) at 19:21

## 2025-06-05 RX ADMIN — FLUTICASONE PROPIONATE 1 SPRAY: 50 SPRAY, METERED NASAL at 08:34

## 2025-06-05 RX ADMIN — IPRATROPIUM BROMIDE AND ALBUTEROL SULFATE 3 ML: .5; 3 SOLUTION RESPIRATORY (INHALATION) at 04:46

## 2025-06-05 RX ADMIN — SODIUM CHLORIDE 500 ML: 0.9 INJECTION, SOLUTION INTRAVENOUS at 08:08

## 2025-06-05 RX ADMIN — IOPAMIDOL 80 ML: 755 INJECTION, SOLUTION INTRAVENOUS at 00:04

## 2025-06-05 RX ADMIN — UMECLIDINIUM 1 PUFF: 62.5 AEROSOL, POWDER ORAL at 08:33

## 2025-06-05 RX ADMIN — SODIUM CHLORIDE 1000 ML: 0.9 INJECTION, SOLUTION INTRAVENOUS at 10:36

## 2025-06-05 RX ADMIN — INSULIN ASPART 4 UNITS: 100 INJECTION, SOLUTION INTRAVENOUS; SUBCUTANEOUS at 08:06

## 2025-06-05 RX ADMIN — TOLTERODINE 4 MG: 2 CAPSULE, EXTENDED RELEASE ORAL at 08:32

## 2025-06-05 RX ADMIN — IPRATROPIUM BROMIDE AND ALBUTEROL SULFATE 3 ML: .5; 3 SOLUTION RESPIRATORY (INHALATION) at 02:33

## 2025-06-05 RX ADMIN — INSULIN ASPART 6 UNITS: 100 INJECTION, SOLUTION INTRAVENOUS; SUBCUTANEOUS at 17:58

## 2025-06-05 RX ADMIN — IPRATROPIUM BROMIDE AND ALBUTEROL SULFATE 3 ML: .5; 3 SOLUTION RESPIRATORY (INHALATION) at 16:31

## 2025-06-05 RX ADMIN — METHYLPREDNISOLONE SODIUM SUCCINATE 62.5 MG: 125 INJECTION, POWDER, FOR SOLUTION INTRAMUSCULAR; INTRAVENOUS at 08:18

## 2025-06-05 RX ADMIN — ATORVASTATIN CALCIUM 40 MG: 40 TABLET, FILM COATED ORAL at 20:22

## 2025-06-05 ASSESSMENT — ACTIVITIES OF DAILY LIVING (ADL)
ADLS_ACUITY_SCORE: 44
ADLS_ACUITY_SCORE: 60
ADLS_ACUITY_SCORE: 60
ADLS_ACUITY_SCORE: 58
ADLS_ACUITY_SCORE: 44
ADLS_ACUITY_SCORE: 58
ADLS_ACUITY_SCORE: 58
ADLS_ACUITY_SCORE: 60
ADLS_ACUITY_SCORE: 60
ADLS_ACUITY_SCORE: 58
ADLS_ACUITY_SCORE: 58
ADLS_ACUITY_SCORE: 60
ADLS_ACUITY_SCORE: 60
ADLS_ACUITY_SCORE: 61
ADLS_ACUITY_SCORE: 58
ADLS_ACUITY_SCORE: 60
ADLS_ACUITY_SCORE: 44
ADLS_ACUITY_SCORE: 58
ADLS_ACUITY_SCORE: 60
ADLS_ACUITY_SCORE: 44
ADLS_ACUITY_SCORE: 58
ADLS_ACUITY_SCORE: 44
ADLS_ACUITY_SCORE: 58

## 2025-06-05 NOTE — PLAN OF CARE
Problem: Adult Inpatient Plan of Care  Goal: Plan of Care Review  Description: The Plan of Care Review/Shift note should be completed every shift.  The Outcome Evaluation is a brief statement about your assessment that the patient is improving, declining, or no change.  This information will be displayed automatically on your shift  note.  Outcome: Progressing  Flowsheets (Taken 6/5/2025 0336)  Plan of Care Reviewed With: patient  Overall Patient Progress: improving   Goal Outcome Evaluation:      Plan of Care Reviewed With: patient    Overall Patient Progress: improvingOverall Patient Progress: improving             Assumed care for this patient on this date at 0200.  Admitted inpatient with acute respiratory failure with hypoxia and hypercapnia.  Patient is on continuous BIPAP.VBG every 2 hours.  Last VBG at 0400 was 79, trended down from 82 at 0200.  RT following for scheduled nebs and BIPAP.  Tele monitoring, ST.  Magnesium and potassium protocol.

## 2025-06-05 NOTE — SIGNIFICANT EVENT
MD informed by [employer] messaging at 0736 of lactic acid of 7.4.  MD responded at 0740 and new orders placed.     Di Sunshine RN

## 2025-06-05 NOTE — PROGRESS NOTES
Bipap settings I/E increased from 12/6 to 16/6. Patient is tolerating current settings.     RT will continue to follow.     Citlali Phillips, RT

## 2025-06-05 NOTE — H&P
[facility]    History and Physical - Hospitalist Service       Date of Admission:  6/4/2025    Assessment & Plan   Arvind Leong is a 70 year old female with a medical history significant for multiple medical comorbidities which includes a history of COPD with chronic respiratory failure,  obstructive sleep apnea, cardiomyopathy, chronic low back pain, type II DM, bipolar disorder and other medical comorbidities who is admitted with COPD exacerbation and acute on chronic respiratory failure.    Patient Active Problem List   Diagnosis    Scoliosis (and kyphoscoliosis), idiopathic    Morbid obesity (H)    Lower Back Pain    Asthma    Urge Incontinence Of Urine    Hyperlipidemia    ABDIFATAH on CPAP    Anxiety    Vertigo    Cognitive dysfunction in bipolar disorder (H)    Noncompliance with medications    Dependence on continuous supplemental oxygen    Bipolar I disorder, most recent episode depressed (H)    Runny nose    CAP (community acquired pneumonia) due to Chlamydia species    Type 2 diabetes mellitus with other specified complication, without long-term current use of insulin (H)    Chronic respiratory failure with hypoxia (H)    Cervical high risk HPV (human papillomavirus) test positive    Bipolar disorder, current episode depressed, severe, without psychotic features (H)    Cardiomyopathy, unspecified type (H)    Tobacco use disorder    Hypercarbia    Acute confusion    Hypoxia    Community acquired bacterial pneumonia    VIOLET (generalized anxiety disorder)    Acute respiratory failure with hypoxia and hypercapnia (H)    Exacerbation of asthma, unspecified asthma severity, unspecified whether persistent     #Acute on chronic respiratory failure  -Likely precipitated by poor air quality  -Continue COPD treatment with steroids, DuoNebs, doxycycline andtrelegy  -appreciate pulm input    #COPD exacerbation  -doxy  -Solumedrol  -Trelegy  -Duonebs  -Pulm input  -BIPAP PRN    #Chronic back  "pain  -tylenol as needed  - increase coverage with stronger pain meds if needed    #Cardiomyopathy  -EKG- sinus tachy with PVCs  -Check mag  -keep mag>2    #Type II DM  -accu checks, sliding scale insulin  BS gola 100-140  -hold mounjaro  -hold metformin  - may benefit from low dose lantis  -not ordered yet     #Obstructive sleep apnea  -CPAP when stable /BIPAP for now  -  #Morbid obesity  - on mounjaro    #Bipolar disorder  -on Abilify injections every 28 days.     #Anxiety disorder  -continue Abilify    Rest of patient's medical problems management remains unchanged    Diet:  ADA diet  DVT Prophylaxis: Pneumatic Compression Devices  Lara Catheter: Not present  Lines: None     Cardiac Monitoring: None  Code Status:  Full code    Clinically Significant Risk Factors Present on Admission                       # Acute Hypercapnic Respiratory Failure: based on venous blood gas results.  Continue supplemental oxygen and ventilatory support as indicated.       # DMII: A1C = 7.7 % (Ref range: 0.0 - 5.6 %) within past 6 months    # Morbid Obesity: Estimated body mass index is 41.05 kg/m  as calculated from the following:    Height as of this encounter: 1.397 m (4' 7\").    Weight as of this encounter: 80.1 kg (176 lb 9.6 oz).       # Financial/Environmental Concerns:    # Asthma: noted on problem list        Disposition Plan     Medically Ready for Discharge: Anticipated in 2-4 Days           Carina Willett MD  Hospitalist Service  [facility]  Securely message with [employer] (more info)  Text page via [employer] Paging/Directory     ______________________________________________________________________    Chief Complaint   Shortness of breath        History of Present Illness   Arvind Lorenzodominiquemisha is a 70 year old female with a medical history significant for multiple medical comorbidities which includes a history of COPD with chronic respiratory failure,  obstructive sleep apnea, cardiomyopathy, chronic " low back pain, type II DM, bipolar disorder and other medical comorbidities who is admitted with COPD exacerbation and acute on chronic respiratory failure.      Patient was seen in the ER on admission.  She was awake alert oriented to place person and time on BiPAP at FiO2 of 40% and arousable.  Per history, she was brought in by ambulance due to worsening shortness of breath.  EMS found her with oxygen saturation 65% on 2 L of oxygen.  She was placed on 8 L of oxygen with oxygen saturation 97% on room air.    70 F, COPD ex., initial O2 sats 65%, now 97% on 8L     Patient reports her shortness of breath has progressively worsened with the that worsening of the air quality.  This has progressively worsened with poor response to her nebulizers.  She called EMS and was brought to the ER for further evaluation.    Patient is a full code.    Preliminary labs done in the ER showed a BMP which was unremarkable except for bicarb of 31.  Blood glucose of 302, VBG with an initial CO2 of 104 and a pH of 7.23.  CBC white count of 11, hemoglobin 13 platelets 201 otherwise unremarkable COVID was negative RSV negative influenza negative.  CT chest was negative.    ER intervention included below    MEDICATIONS GIVEN IN THE EMERGENCY:  Medications   ipratropium - albuterol 0.5 mg/2.5 mg/3 mL (DUONEB) neb solution 3 mL (has no administration in time range)   methylPREDNISolone Na Suc (solu-MEDROL) injection 62.5 mg (has no administration in time range)   methylPREDNISolone Na Suc (solu-MEDROL) injection 125 mg (125 mg Intravenous $Given 6/4/25 2329)   ipratropium - albuterol 0.5 mg/2.5 mg/3 mL (DUONEB) neb solution 3 mL (3 mLs Nebulization $Given 6/4/25 2316)   iopamidol (ISOVUE-370) solution 80 mL (80 mLs Intravenous $Given 6/5/25 0004)     Patient improved with ER intervention.  Will continue steroids and current treatment.  Doxycycline will be added.        Past Medical History    Past Medical History:   Diagnosis Date    Acute  on chronic respiratory failure with hypoxia (H) 11/15/2016    Bipolar 1 disorder (H)     CAP (community acquired pneumonia) 11/15/2016    Cervical high risk HPV (human papillomavirus) test positive 3/5/2018    3/5/18 NIL pap, +HR HPV (not 16/18) 5/21/21 NIL pap, neg HPV. Plan: await provider    COPD exacerbation (H) 4/24/2021    COPD with exacerbation (H) 11/15/2016    Diabetes mellitus, type II (H)     Hearing loss     Meniere's disease     Pneumonia 4/23/2021    Pneumonia of right lower lobe due to infectious organism 6/11/2019       Past Surgical History   Past Surgical History:   Procedure Laterality Date    CYST REMOVAL  01/23/2001    CT REMOVAL ANAL FISTULA,SUBCUTANEOUS      Description: Fistula-in-ano Repair;  Recorded: 01/08/2010; x2    TONSILLECTOMY         Prior to Admission Medications   Prior to Admission Medications   Prescriptions Last Dose Informant Patient Reported? Taking?   ARIPiprazole ER (ABILIFY MAINTENA) 400 MG extended release inj syringe 5/29/2025 Morning  Yes Yes   Sig: Inject 400 mg into the muscle every 28 days   Fluticasone-Umeclidin-Vilant (TRELEGY ELLIPTA) 100-62.5-25 MCG/ACT oral inhaler 6/3/2025 Evening  No Yes   Sig: Inhale 1 puff into the lungs daily.   Lancets (ONETOUCH DELICA PLUS [id number]) MISC   Yes No   Sig: TEST FOUR TIMES DAILY BEFORE MEALS AND AT BEDTIME   albuterol (PROAIR HFA/PROVENTIL HFA/VENTOLIN HFA) 108 (90 Base) MCG/ACT inhaler   No Yes   Sig: INHALE 2 PUFFS INTO THE LUNGS EVERY 6 HOURS AS NEEDED FOR SHORTNESS OF BREATH OR WHEEZING   albuterol (PROVENTIL) (2.5 MG/3ML) 0.083% neb solution 6/3/2025 Evening  No Yes   Sig: Take 1 vial (2.5 mg) by nebulization every 4 hours as needed for shortness of breath or wheezing. Take 1 vial four times a day for the next 5 days then go back to as needed   atorvastatin (LIPITOR) 40 MG tablet 6/3/2025 Evening  No Yes   Sig: Take 1 tablet (40 mg) by mouth daily.   blood glucose (NO BRAND SPECIFIED) lancets standard   No No   Sig: Use  to test blood sugar 4 times daily before meals and at bedtime   blood glucose (NO BRAND SPECIFIED) test strip   No No   Sig: Use to test blood sugar 4 times daily before meals and at bedtime   blood glucose monitoring (ONETOUCH VERIO) meter device kit   No No   Sig: Use to test blood sugar 4 times daily before meals and bedtie   fluticasone (FLONASE) 50 MCG/ACT nasal spray Past Week Noon  No Yes   Sig: Spray 1 spray into both nostrils daily   melatonin 3 MG tablet Bedtime  No Yes   Sig: Take 1 tablet (3 mg) by mouth nightly as needed for sleep.   metFORMIN (GLUCOPHAGE) 1000 MG tablet 6/3/2025 Morning  No Yes   Sig: Take 1 tablet (1,000 mg) by mouth 2 times daily (with meals)   tirzepatide (MOUNJARO) 15 MG/0.5ML SOAJ auto-injector pen 2025 Noon  No Yes   Sig: Inject 0.5 mLs (15 mg) subcutaneously once a week.   tolterodine ER (DETROL LA) 4 MG 24 hr capsule 6/3/2025 Morning  No Yes   Sig: Take 1 capsule (4 mg) by mouth daily.      Facility-Administered Medications: None        Review of Systems    The 10 point Review of Systems is negative other than noted in the HPI or here.     Social History   I have reviewed this patient's social history and updated it with pertinent information if needed.  Social History     Tobacco Use    Smoking status: Former     Current packs/day: 0.00     Average packs/day: 1 pack/day for 40.0 years (40.0 ttl pk-yrs)     Types: Cigarettes     Start date: 1977     Quit date: 2017     Years since quittin.3     Passive exposure: Past    Smokeless tobacco: Never   Vaping Use    Vaping status: Never Used   Substance Use Topics    Alcohol use: Yes     Comment: Alcoholic Drinks/day: Occasional     Drug use: No         Family History   I have reviewed this patient's family history and updated it with pertinent information if needed.  Family History   Problem Relation Age of Onset    Aneurysm Father     Heart Disease Father 70.00        bypass in 70s, AAA rupture at age 77      Ulcers Father 76.00    Pacemaker Mother     Bipolar Disorder Brother     Breast Cancer Maternal Grandmother 51.00    Kidney failure Maternal Grandmother     Cancer Paternal Grandmother         ?? lung    Cancer Paternal Uncle         ?? lung    Mental Illness Maternal Grandfather     Heart Disease Other         uncle    No Known Problems Sister         two siblings abuse chemicals    No Known Problems Brother     Bipolar Disorder Nephew          Allergies   Allergies   Allergen Reactions    Lurasidone Other (See Comments)     Face turned red, (Brand name = Latuda) Face turned red, Face turned red        Physical Exam   Vital Signs: Temp: 98.5  F (36.9  C)   BP: 123/60 Pulse: (!) 123   Resp: (!) 32 SpO2: 92 % O2 Device: BiPAP/CPAP Oxygen Delivery: 4 LPM  Weight: 176 lbs 9.6 oz      General Aox3, appropriate affect, NAD, on BIPAP with FIP2-40%  HEENT  dry MM, EOMI, PERRL  Chest decreased A/E with diffused wheezing  Heart RRR, No M/R/G, tachycardia  Abd- Soft, NT, BS+  - Deferred,   Extremity- Moving all extremities, No digital clubbing,   No edema  Neuro- Aox3, moving all extremtiy  gait not checked  Skin  Has no tattoo, No skin rash     Medical Decision Making       85 MINUTES SPENT BY ME on the date of service doing chart review, history, exam, documentation & further activities per the note.      ------------------ MEDICAL DECISION MAKING ------------------------------------------------------------------------------------------------------  MANAGEMENT DISCUSSED with the following over the past 24 hours:         Data   ------------------------- PAST 24 HR DATA REVIEWED -----------------------------------------------    I have personally reviewed the following data over the past 24 hrs:    11.1 (H)  \   13.2   / 201     145 102 12.2 /  166 (H)   4.3 42 (H) 0.79 \     Trop: 9 BNP: 117       Imaging results reviewed over the past 24 hrs:   Recent Results (from the past 24 hours)   CT Chest Pulmonary Embolism w  Contrast    Narrative    EXAM: CT CHEST PULMONARY EMBOLISM W CONTRAST  LOCATION: [facility]  DATE: 6/5/2025    INDICATION: sob  COMPARISON: 5/7/2025.  TECHNIQUE: CT chest pulmonary angiogram during arterial phase injection of IV contrast. Multiplanar reformats and MIP reconstructions were performed. Dose reduction techniques were used.   CONTRAST: ISOVUE 370 80ML    FINDINGS:  ANGIOGRAM CHEST: Pulmonary arteries are normal caliber and negative for pulmonary emboli. Thoracic aorta is negative for dissection. No CT evidence of right heart strain.    LUNGS AND PLEURA: Mild atelectasis and scarring in the lower lungs. Chronic atelectasis in the right middle lobe anteriorly.    MEDIASTINUM/AXILLAE: There is no lymph node enlargement. The thoracic aorta is tortuous. No aortic aneurysm or dissection. The heart size is normal.    CORONARY ARTERY CALCIFICATION: None.    UPPER ABDOMEN: No acute upper abdominal abnormality.    MUSCULOSKELETAL: Degenerative disease and scoliosis in the spine.      Impression    IMPRESSION:  1.  There is no pulmonary embolus, aortic aneurysm or dissection. No acute abnormality.

## 2025-06-05 NOTE — MEDICATION SCRIBE - ADMISSION MEDICATION HISTORY
Medication Scribe Admission Medication History    Admission medication history is complete. The information provided in this note is only as accurate as the sources available at the time of the update.    Information Source(s): Patient via in-person    Pertinent Information:     Changes made to PTA medication list:  Added: None  Deleted: None  Changed: None    Allergies reviewed with patient and updates made in EHR: yes    Medication History Completed By: Aklilu Gebreyesus 6/4/2025 11:53 PM    PTA Med List   Medication Sig Last Dose/Taking    albuterol (PROAIR HFA/PROVENTIL HFA/VENTOLIN HFA) 108 (90 Base) MCG/ACT inhaler INHALE 2 PUFFS INTO THE LUNGS EVERY 6 HOURS AS NEEDED FOR SHORTNESS OF BREATH OR WHEEZING Taking As Needed    albuterol (PROVENTIL) (2.5 MG/3ML) 0.083% neb solution Take 1 vial (2.5 mg) by nebulization every 4 hours as needed for shortness of breath or wheezing. Take 1 vial four times a day for the next 5 days then go back to as needed 6/3/2025 Evening    ARIPiprazole ER (ABILIFY MAINTENA) 400 MG extended release inj syringe Inject 400 mg into the muscle every 28 days 5/29/2025 Morning    atorvastatin (LIPITOR) 40 MG tablet Take 1 tablet (40 mg) by mouth daily. 6/3/2025 Evening    fluticasone (FLONASE) 50 MCG/ACT nasal spray Spray 1 spray into both nostrils daily Past Week Noon    Fluticasone-Umeclidin-Vilant (TRELEGY ELLIPTA) 100-62.5-25 MCG/ACT oral inhaler Inhale 1 puff into the lungs daily. 6/3/2025 Evening    melatonin 3 MG tablet Take 1 tablet (3 mg) by mouth nightly as needed for sleep. Bedtime    metFORMIN (GLUCOPHAGE) 1000 MG tablet Take 1 tablet (1,000 mg) by mouth 2 times daily (with meals) 6/3/2025 Morning    tirzepatide (MOUNJARO) 15 MG/0.5ML SOAJ auto-injector pen Inject 0.5 mLs (15 mg) subcutaneously once a week. 5/28/2025 Noon    tolterodine ER (DETROL LA) 4 MG 24 hr capsule Take 1 capsule (4 mg) by mouth daily. 6/3/2025 Morning

## 2025-06-05 NOTE — ED NOTES
Bed: [facility]-30  Expected date:   Expected time:   Means of arrival:   Comments:  Room 18

## 2025-06-05 NOTE — ED NOTES
Bed: [place] [address]  Expected date: 6/4/25  Expected time: 10:45 PM  Means of arrival: Ambulance  Comments:  Feng, 70 F, COPD ex., initial O2 sats 65%, now 97% on 8L

## 2025-06-05 NOTE — CARE PLAN
Patient arrived via cart from ED. Patient transferred to bed via a slide board.Patient attached to telemetry monitor.

## 2025-06-05 NOTE — PROGRESS NOTES
Patient placed on BIPAP S/T 12/6, R16, 30% for hypercapnia. Patient is tolerating BIPAP well. RT will continue to follow.     Citlali Phillips, RT

## 2025-06-05 NOTE — TELEPHONE ENCOUNTER
Shortness of breath. Shortness of breath at rest.  She will call 911.    Yadira Rajan RN  [facility]    Reason for Disposition   [1] MODERATE difficulty breathing (e.g., speaks in phrases, SOB even at rest, pulse 100-120) AND [2] NEW-onset or WORSE than normal    Additional Information   Negative: SEVERE difficulty breathing (e.g., struggling for each breath, speaks in single words)   Negative: [1] Breathing stopped AND [2] hasn't returned   Negative: Choking on something   Negative: Bluish (or gray) lips or face now   Negative: Difficult to awaken or acting confused (e.g., disoriented, slurred speech)   Negative: Passed out (i.e., lost consciousness, collapsed and was not responding)   Negative: Wheezing started suddenly after medicine, an allergic food or bee sting   Negative: Stridor (harsh sound while breathing in)   Negative: Slow, shallow and weak breathing   Negative: Sounds like a life-threatening emergency to the triager   Negative: Chest pain   Negative: [1] Wheezing (high pitched whistling sound) AND [2] previous asthma attacks or use of asthma medicines   Negative: [1] Difficulty breathing AND [2] only present when coughing   Negative: [1] Difficulty breathing AND [2] only from stuffy or runny nose   Negative: [1] Difficulty breathing AND [2] within 14 days of COVID-19 Exposure    Protocols used: Breathing Difficulty-A-AH

## 2025-06-05 NOTE — PROGRESS NOTES
Patient was seen in the ED this morning. She reports her SOB improved. Her CO2 has gradually returned to her baseline. She is able to get weaned off the biPAP and remains stable on the NC oxygen. She reports using 2 LPM home oxygen.  Pulmonary input appreciated.  Her lactic acid was high. No signs of infection. Improving with IVF. Will continue IVF and monitor lactic acid.     [facility] medicine service  Josephine Ramirez MD

## 2025-06-05 NOTE — ED PROVIDER NOTES
EMERGENCY DEPARTMENT ENCOUNTER      NAME: Arvind Leong  AGE: 70 year old female  YOB: 1955  MRN: [id number]  EVALUATION DATE & TIME: 6/4/2025 10:48 PM    PCP: Rema Whiting    ED PROVIDER: Shanta Herrera MD    Chief Complaint   Patient presents with    Shortness of Breath         FINAL IMPRESSION:  1. Acute respiratory failure with hypoxia and hypercapnia (H)    2. Exacerbation of asthma, unspecified asthma severity, unspecified whether persistent          ED COURSE & MEDICAL DECISION MAKING:    Pertinent Labs & Imaging studies reviewed. (See chart for details)  70 year old female with history of asthma, chronic hypoxic respiratory failure on 2 L O2, ABDIFATAH on CPAP, cardiomyopathy who presents to the Emergency Department for evaluation of shortness of breath.  Feeling increasing short of breath over the course the last 2 days, initially was responding to home neb/inhaler but acutely worse today.  Found 60% on her 2 L home O2 per EMS.  Got a DuoNeb, albuterol neb en route and sats improved into the 90s.  Here patient has some faint expiratory wheezing, but is speaking in full sentences and although she is slightly tachypneic is not in any respiratory distress.  Differential includes asthma exacerbation, viral syndrome, pneumonia, pulmonary edema, pulmonary embolism, CHF exacerbation, symptomatic anemia arrhythmia.  Less likely ACS.    Patient met by myself on EMS arrival, placed on monitor, IV established and blood obtained.  Twelve-lead EKG shows sinus tachycardia.  Patient was given Solu-Medrol, DuoNebs x 3.  CBC, BMP, BNP, troponin unremarkable.  COVID/influenza/RSV swab negative.  Initial VBG shows hypercapnic respiratory failure with PCO2 of 104 and PH of 7.2.  Placed on BiPAP.  With this, serial VBG shows PCO2 is normalizing.  CT PE study obtained and negative.  Will be admitted to medicine for further management.      ED Course as of 06/05/25 0116   Wed Jun 04, 2025   2314 RT at bedside  will place on BiPAP   2314 PCO2 Venous(!!): 104  Prev in 50s-70s   Thu Jun 05, 2025   0023 CT Chest Pulmonary Embolism w Contrast  CT independently interpreted by myself without visualized central PE   0103 Admitted to Dr. Willett       Medical Decision Making  I obtained history from EMS  I reviewed the EMR: Outpatient Record: Nurse triage line call today  Admit.    MIPS (CTPE, Dental pain, Lara, Sinusitis, Asthma/COPD, Head Trauma): CT Pulmonary Angiogram:CT PA was ordered for reason(s) other than PE.    SEPSIS: None          At the conclusion of the encounter I discussed the results of all of the tests and the disposition. The questions were answered. The patient or family acknowledged understanding and was agreeable with the care plan.    CRITICAL CARE:  Critical Care  Performed by: Shanta Herrera MD  Authorized by: Shanta Herrera MD     Total critical care time: 47 minutes  Criticalcare time was exclusive of separately billable procedures and treating other patients.  Critical care was necessary to treat or prevent imminent or life-threatening deterioration of the following conditions: Hypercapnic and hypoxic respiratory failure  Critical care was time spent personally by me on the following activities: development of treatment plan with patient or surrogate, discussions with consultants, examination of patient, evaluation of patient's response totreatment, obtaining history from patient or surrogate, ordering and performing treatments and interventions, ordering and review of laboratory studies, ordering and review of radiographic studies and re-evaluation ofpatient's condition, this excludes any separately billable procedures.      MEDICATIONS GIVEN IN THE EMERGENCY:  Medications   ipratropium - albuterol 0.5 mg/2.5 mg/3 mL (DUONEB) neb solution 3 mL (has no administration in time range)   methylPREDNISolone Na Suc (solu-MEDROL) injection 62.5 mg (has no administration in time range)   methylPREDNISolone Na  Suc (solu-MEDROL) injection 125 mg (125 mg Intravenous $Given 6/4/25 8866)   ipratropium - albuterol 0.5 mg/2.5 mg/3 mL (DUONEB) neb solution 3 mL (3 mLs Nebulization $Given 6/4/25 2316)   iopamidol (ISOVUE-370) solution 80 mL (80 mLs Intravenous $Given 6/5/25 0004)       NEW PRESCRIPTIONS STARTED AT TODAY'S ER VISIT  New Prescriptions    No medications on file          =================================================================    HPI    Patient information was obtained from: Patient    Use of Intrepreter: N/A       Arvind Leong is a 70 year old female with pertinent medical history of COPD, chronic respiratory failure on 2L, who presents with shortness of breath. Patient noticed she is more short of breath while walking around the house for the last 2 days. Today, shortness of breath is acutely worse. She has a nebulizer and inhaler at home that she did not attempt as she was too short of breath, so she called EMS. EMS arrived and walked her to the ambulance, patient was unable to speak in full sentences as she was short of breath. Her initial sats were in the mid 60s. EMS gave a DuoNeb and she was put on 6L oxygen, her sats kalpana to 80% and she was able to speak in full sentences.        PAST MEDICAL HISTORY:  Past Medical History:   Diagnosis Date    Acute on chronic respiratory failure with hypoxia (H) 11/15/2016    Bipolar 1 disorder (H)     CAP (community acquired pneumonia) 11/15/2016    Cervical high risk HPV (human papillomavirus) test positive 3/5/2018    3/5/18 NIL pap, +HR HPV (not 16/18) 5/21/21 NIL pap, neg HPV. Plan: await provider    COPD exacerbation (H) 4/24/2021    COPD with exacerbation (H) 11/15/2016    Diabetes mellitus, type II (H)     Hearing loss     Meniere's disease     Pneumonia 4/23/2021    Pneumonia of right lower lobe due to infectious organism 6/11/2019       PAST SURGICAL HISTORY:  Past Surgical History:   Procedure Laterality Date    CYST REMOVAL  01/23/2001    [place]  REMOVAL ANAL FISTULA,SUBCUTANEOUS      Description: Fistula-in-ano Repair;  Recorded: 01/08/2010; x2    TONSILLECTOMY         CURRENT MEDICATIONS:    Prior to Admission Medications   Prescriptions Last Dose Informant Patient Reported? Taking?   ARIPiprazole ER (ABILIFY MAINTENA) 400 MG extended release inj syringe 5/29/2025 Morning  Yes Yes   Sig: Inject 400 mg into the muscle every 28 days   Fluticasone-Umeclidin-Vilant (TRELEGY ELLIPTA) 100-62.5-25 MCG/ACT oral inhaler 6/3/2025 Evening  No Yes   Sig: Inhale 1 puff into the lungs daily.   Lancets (ONETOUCH DELICA PLUS [id number]) MISC   Yes No   Sig: TEST FOUR TIMES DAILY BEFORE MEALS AND AT BEDTIME   albuterol (PROAIR HFA/PROVENTIL HFA/VENTOLIN HFA) 108 (90 Base) MCG/ACT inhaler   No Yes   Sig: INHALE 2 PUFFS INTO THE LUNGS EVERY 6 HOURS AS NEEDED FOR SHORTNESS OF BREATH OR WHEEZING   albuterol (PROVENTIL) (2.5 MG/3ML) 0.083% neb solution 6/3/2025 Evening  No Yes   Sig: Take 1 vial (2.5 mg) by nebulization every 4 hours as needed for shortness of breath or wheezing. Take 1 vial four times a day for the next 5 days then go back to as needed   atorvastatin (LIPITOR) 40 MG tablet 6/3/2025 Evening  No Yes   Sig: Take 1 tablet (40 mg) by mouth daily.   blood glucose (NO BRAND SPECIFIED) lancets standard   No No   Sig: Use to test blood sugar 4 times daily before meals and at bedtime   blood glucose (NO BRAND SPECIFIED) test strip   No No   Sig: Use to test blood sugar 4 times daily before meals and at bedtime   blood glucose monitoring (ONETOUCH VERIO) meter device kit   No No   Sig: Use to test blood sugar 4 times daily before meals and bedtie   fluticasone (FLONASE) 50 MCG/ACT nasal spray Past Week Noon  No Yes   Sig: Spray 1 spray into both nostrils daily   melatonin 3 MG tablet Bedtime  No Yes   Sig: Take 1 tablet (3 mg) by mouth nightly as needed for sleep.   metFORMIN (GLUCOPHAGE) 1000 MG tablet 6/3/2025 Morning  No Yes   Sig: Take 1 tablet (1,000 mg) by mouth 2  times daily (with meals)   tirzepatide (MOUNJARO) 15 MG/0.5ML SOAJ auto-injector pen 2025 Noon  No Yes   Sig: Inject 0.5 mLs (15 mg) subcutaneously once a week.   tolterodine ER (DETROL LA) 4 MG 24 hr capsule 6/3/2025 Morning  No Yes   Sig: Take 1 capsule (4 mg) by mouth daily.      Facility-Administered Medications: None       ALLERGIES:  Allergies   Allergen Reactions    Lurasidone Other (See Comments)     Face turned red, (Brand name = Latuda) Face turned red, Face turned red       FAMILY HISTORY:  Family History   Problem Relation Age of Onset    Aneurysm Father     Heart Disease Father 70.00        bypass in 70s, AAA rupture at age 77     Ulcers Father 76.00    Pacemaker Mother     Bipolar Disorder Brother     Breast Cancer Maternal Grandmother 51.00    Kidney failure Maternal Grandmother     Cancer Paternal Grandmother         ?? lung    Cancer Paternal Uncle         ?? lung    Mental Illness Maternal Grandfather     Heart Disease Other         uncle    No Known Problems Sister         two siblings abuse chemicals    No Known Problems Brother     Bipolar Disorder Nephew        SOCIAL HISTORY:  Social History     Tobacco Use    Smoking status: Former     Current packs/day: 0.00     Average packs/day: 1 pack/day for 40.0 years (40.0 ttl pk-yrs)     Types: Cigarettes     Start date: 1977     Quit date: 2017     Years since quittin.3     Passive exposure: Past    Smokeless tobacco: Never   Vaping Use    Vaping status: Never Used   Substance Use Topics    Alcohol use: Yes     Comment: Alcoholic Drinks/day: Occasional     Drug use: No        VITALS:  Patient Vitals for the past 24 hrs:   BP Temp Pulse Resp SpO2 Height Weight   25 0102 -- -- (!) 123 29 (!) 91 % -- --   25 0100 124/58 -- (!) 123 -- (!) 90 % -- --   25 0043 123/60 -- (!) 123 (!) 32 92 % -- --   25 0020 -- -- (!) 128 30 95 % -- --   25 2341 -- -- (!) 125 (!) 33 98 % -- --   25 2314 -- -- -- -- 94  "% -- --   06/04/25 2304 -- -- 111 (!) 42 94 % -- --   06/04/25 2259 -- -- -- 30 -- -- --   06/04/25 2258 -- 98.5  F (36.9  C) -- -- -- -- --   06/04/25 2251 -- -- -- -- -- 1.397 m (4' 7\") 80.1 kg (176 lb 9.6 oz)   06/04/25 2249 127/85 -- 105 -- 98 % -- --       PHYSICAL EXAM    General Appearance: Well-appearing, well-nourished  Head:  Normocephalic  Eyes:  conjunctiva/corneas clear  ENT:   membranes are moist without pallor  Neck:  Supple  Cardio:  Tachycardic and regular rhythm, no murmur/gallop/rub  Pulm: Residual EMS albuterol nebulizer going. 98% on 6L. Breath sounds present bilaterally with wheezing. Tachypneic but not in any respiratory distress speaking in full sentences.   Back:  No CVA tenderness, normal ROM  Abdomen:  Obese. Soft, non-tender, non distended,no rebound or guarding.  Extremities:  no cyanosis or edema  Skin:  Skin warm, dry, no rashes  Neuro:  Alert and oriented ×3     RADIOLOGY/LABS:  Reviewed all pertinent imaging. Please see official radiology report. All pertinent labs reviewed and interpreted.    Results for orders placed or performed during the hospital encounter of 06/04/25   CT Chest Pulmonary Embolism w Contrast    Impression    IMPRESSION:  1.  There is no pulmonary embolus, aortic aneurysm or dissection. No acute abnormality.   Extra Blue Top Tube   Result Value Ref Range    Hold Specimen JIC    Extra Red Top Tube   Result Value Ref Range    Hold Specimen JIC    Extra Green Top (Lithium Heparin) Tube   Result Value Ref Range    Hold Specimen JIC    Extra Purple Top Tube   Result Value Ref Range    Hold Specimen JIC    Extra Green Top (Lithium Heparin) ON ICE   Result Value Ref Range    Hold Specimen JIC    Extra Heparinized Syringe   Result Value Ref Range    Hold Specimen JIC    Basic metabolic panel   Result Value Ref Range    Sodium 145 135 - 145 mmol/L    Potassium 4.3 3.4 - 5.3 mmol/L    Chloride 102 98 - 107 mmol/L    Carbon Dioxide (CO2) 42 (H) 22 - 29 mmol/L    Anion Gap 1 " (L) 7 - 15 mmol/L    Urea Nitrogen 12.2 8.0 - 23.0 mg/dL    Creatinine 0.79 0.51 - 0.95 mg/dL    GFR Estimate 81 >60 mL/min/1.73m2    Calcium 9.0 8.8 - 10.4 mg/dL    Glucose 166 (H) 70 - 99 mg/dL   NT-proBNP   Result Value Ref Range    NT-proBNP 117 0 - 353 pg/mL   Result Value Ref Range    Troponin T, High Sensitivity 12 <=14 ng/L   Blood gas venous   Result Value Ref Range    pH Venous 7.23 (L) 7.32 - 7.43    pCO2 Venous 104 (HH) 40 - 50 mm Hg    pO2 Venous 35 25 - 47 mm Hg    Bicarbonate Venous 43 (H) 21 - 28 mmol/L    Base Excess/Deficit Venous 10.9 (H) -3.0 - 3.0 mmol/L    FIO2 60     Oxyhemoglobin Venous 56 (L) 70 - 75 %    O2 Sat, Venous 56.8 (L) 70.0 - 75.0 %   Influenza A/B, RSV and SARS-CoV2 PCR (COVID-19) Nasopharyngeal    Specimen: Nasopharyngeal; Swab   Result Value Ref Range    Influenza A PCR Negative Negative    Influenza B PCR Negative Negative    RSV PCR Negative Negative    SARS CoV2 PCR Negative Negative   CBC with platelets and differential   Result Value Ref Range    WBC Count 11.1 (H) 4.0 - 11.0 10e3/uL    RBC Count 4.56 3.80 - 5.20 10e6/uL    Hemoglobin 13.2 11.7 - 15.7 g/dL    Hematocrit 43.6 35.0 - 47.0 %    MCV 96 78 - 100 fL    MCH 28.9 26.5 - 33.0 pg    MCHC 30.3 (L) 31.5 - 36.5 g/dL    RDW 12.3 10.0 - 15.0 %    Platelet Count 201 150 - 450 10e3/uL    % Neutrophils 56 %    % Lymphocytes 35 %    % Monocytes 7 %    % Eosinophils 2 %    % Basophils 1 %    % Immature Granulocytes 0 %    NRBCs per 100 WBC 0 <1 /100    Absolute Neutrophils 6.2 1.6 - 8.3 10e3/uL    Absolute Lymphocytes 3.9 0.8 - 5.3 10e3/uL    Absolute Monocytes 0.7 0.0 - 1.3 10e3/uL    Absolute Eosinophils 0.2 0.0 - 0.7 10e3/uL    Absolute Basophils 0.1 0.0 - 0.2 10e3/uL    Absolute Immature Granulocytes 0.0 <=0.4 10e3/uL    Absolute NRBCs 0.0 10e3/uL   Result Value Ref Range    Troponin T, High Sensitivity 9 <=14 ng/L   Blood gas venous   Result Value Ref Range    pH Venous 7.22 (L) 7.32 - 7.43    pCO2 Venous 95 (HH) 40 - 50  mm Hg    pO2 Venous 34 25 - 47 mm Hg    Bicarbonate Venous 39 (H) 21 - 28 mmol/L    Base Excess/Deficit Venous 7.9 (H) -3.0 - 3.0 mmol/L    FIO2 30     Oxyhemoglobin Venous 56 (L) 70 - 75 %    O2 Sat, Venous 57.2 (L) 70.0 - 75.0 %       EKG:  Performed at: 10:58 PM    Impression: Sinus tachycardia with premature supraventricular complexes. Low voltage QRS. Borderline ECG.      Rate: 108  Rhythm: Sinus tachycardia  Axis: 68  WA Interval: 140  QRS Interval: 72  QTc Interval: 455  Comparison: No significant change.    I have independently reviewed and interpreted the EKG(s) documented above.      The creation of this record is based on the scribe s observations of the work being performed by Shanta Herrera MD and the provider s statements to them. It was created on her behalf by Dina Youngblood, a trained medical scribe. This document has been checked and approved by the attending provider.    Shanta Herrera MD  Emergency Medicine  [facility] EMERGENCY DEPARTMENT  [address] [place] [id number]  [phone]  Dept: [phone]     Shanta Herrera MD  06/05/25 0119

## 2025-06-05 NOTE — CONSULTS
PULMONARY MEDICINE CONSULT  6/5/2025    Admit Date: 6/4/2025  CODE: Full Code    Reason for Consult: acute on chronic respiratory failure with hypercapnia and hypoxia    Assessment/Plan:   70 year old female former smoker with a history of ABDIFATAH/OHS nonadherent to CPAP, labeled with COPD but with non-obstructive PFT, marked scoliosis, bipolar disorder and neurocognitive delay, obesity, DM2, Meniere disease, presented on 6/4 with a couple of weeks of worsening dyspnea, wheezing, found to have hypercapnia.    Acute on chronic respiratory failure with hypercapnia and hypoxia: Primarily due to untreated ABDIFATAH/OHS. Also with marked scoliosis that may be contributing. PFT in 2016 showed severe preserved-ratio impaired spirometry (not consistent with COPD), though did have some air trapping. Patient last seen in pulmonary clinic by Dr. Balbuena in 2022, was supposed to follow up in 6 months. She has a CPAP at home but has not been using it. She has neurocognitive delay and cannot say why she is not using it. Also forgets her home low-dose fluticasone-umeclidinium-vilanterol several times per week; again, she is not sure why she forgets to take it. She is clinically improving, feels close to baseline. No pneumonia on CT.    Plan:  - hold long-acting inhaler while inpatient and use scheduled nebulized bronchodilators  - transition to prednisone taper starting tomorrow  - 5 days of doxycycline or azithromycin should be sufficient  - continue home low-dose fluticasone-umeclidinium-vilanterol  - advised her to start using he home CPAP  - can use BiPAP at night as needed during the day in the hospital  - titrate oxygen to target SpO2 88-92%; she has home oxygen  - I contacted schedulers to get her in for follow-up with Dr. Balbuena; advised her to follow up as recommended  - will sign off but please call if needed    Abdirahman Stahl MD  Pulmonary and Critical Care Medicine  [facility]  [place]  Office  [phone]  he/him                                                                                                                                                      CCx: acute on chronic respiratory failure with hypercapnia and hypoxia    HPI: 70 year old female former smoker with a history of ABDIFATAH/OHS nonadherent to CPAP, labeled with COPD but with non-obstructive PFT, marked scoliosis, bipolar disorder and neurocognitive delay, obesity, DM2, Meniere disease, presented on  with a couple of weeks of worsening dyspnea, wheezing, found to have hypercapnia. Poor historian. Several weeks of worsening dyspnea and wheezing. No worsening cough. Quit smoking 5 years ago per mother, 10 years ago per patient. Lost to pulmonary follow-up, last saw Esha in , was supposed to follow up in 6 months but did not schedule it. She feels close to baseline, breathing improved.                                                                                                                                                        Past Medical History:  former smoker with a history of severe oxygen-dependent COPD, ABDIFATAH nonadherent to CPAP, marked scoliosis, bipolar disorder and neurocognitive delay, obesity, DM2, Meniere disease    Past Surgical History  Past Surgical History:   Procedure Laterality Date    CYST REMOVAL  2001    MO REMOVAL ANAL FISTULA,SUBCUTANEOUS      Description: Fistula-in-ano Repair;  Recorded: 2010; x2    TONSILLECTOMY         Allergies:  Allergies   Allergen Reactions    Lurasidone Other (See Comments)     Face turned red, (Brand name = Latuda) Face turned red, Face turned red       PTA medications:  (Not in a hospital admission)      Family Hx:  Family History   Problem Relation Age of Onset    Aneurysm Father     Heart Disease Father 70.00        bypass in 70s, AAA rupture at age 77     Ulcers Father 76.00    Pacemaker Mother     Bipolar Disorder Brother     Breast Cancer Maternal  Grandmother 51.00    Kidney failure Maternal Grandmother     Cancer Paternal Grandmother         ?? lung    Cancer Paternal Uncle         ?? lung    Mental Illness Maternal Grandfather     Heart Disease Other         uncle    No Known Problems Sister         two siblings abuse chemicals    No Known Problems Brother     Bipolar Disorder Nephew        Social Hx:  Social History     Socioeconomic History    Marital status:      Spouse name: Not on file    Number of children: Not on file    Years of education: Not on file    Highest education level: Not on file   Occupational History    Not on file   Tobacco Use    Smoking status: Former     Current packs/day: 0.00     Average packs/day: 1 pack/day for 40.0 years (40.0 ttl pk-yrs)     Types: Cigarettes     Start date: 1977     Quit date: 2017     Years since quittin.3     Passive exposure: Past    Smokeless tobacco: Never   Vaping Use    Vaping status: Never Used   Substance and Sexual Activity    Alcohol use: Yes     Comment: Alcoholic Drinks/day: Occasional     Drug use: No    Sexual activity: Never   Other Topics Concern    Not on file   Social History Narrative    2019The patient lives with her mother.; had worked at [employer] as  but quit 2019.   Quit smoking ; no etoh problems  / x 2 ; no kids     Social Drivers of Health     Financial Resource Strain: Low Risk  (10/12/2023)    Financial Resource Strain     Within the past 12 months, have you or your family members you live with been unable to get utilities (heat, electricity) when it was really needed?: No   Food Insecurity: Low Risk  (10/12/2023)    Food Insecurity     Within the past 12 months, did you worry that your food would run out before you got money to buy more?: No     Within the past 12 months, did the food you bought just not last and you didn t have money to get more?: No   Transportation Needs: Low Risk  (10/12/2023)    Transportation Needs      "Within the past 12 months, has lack of transportation kept you from medical appointments, getting your medicines, non-medical meetings or appointments, work, or from getting things that you need?: No   Physical Activity: Not on file   Stress: Not on file   Social Connections: Not on file   Interpersonal Safety: Low Risk  (5/7/2025)    Interpersonal Safety     Do you feel physically and emotionally safe where you currently live?: Yes     Within the past 12 months, have you been hit, slapped, kicked or otherwise physically hurt by someone?: No     Within the past 12 months, have you been humiliated or emotionally abused in other ways by your partner or ex-partner?: No   Housing Stability: High Risk (10/12/2023)    Housing Stability     Do you have housing? : No     Are you worried about losing your housing?: No       Exam/Data:     Vitals  BP 94/52   Pulse 89   Temp 97.6  F (36.4  C) (Axillary)   Resp 28   Ht 1.397 m (4' 7\")   Wt 80.1 kg (176 lb 9.6 oz)   SpO2 94%   BMI 41.05 kg/m    BP - Mean:  [] 67  No intake/output data recorded.  Weight change:   [unfilled]  EXAM:  BP 94/52   Pulse 89   Temp 97.6  F (36.4  C) (Axillary)   Resp 28   Ht 1.397 m (4' 7\")   Wt 80.1 kg (176 lb 9.6 oz)   SpO2 94%   BMI 41.05 kg/m      Intake/Output last 3 shifts:  No intake/output data recorded.  Intake/Output this shift:  No intake/output data recorded.    Physical Exam  Gen: alert, oriented, no distress  HEENT: NT, no ART  CV: RRR, no m/g/r  Resp: diminished bilaterally with prolonged expiratory phase  Abd: soft, nontender, BS+  Skin: no rashes or lesions  Ext: no edema  Neuro: PERRL, nonfocal exam    ROS: A complete 10-system review of systems was obtained and is negative with the exception of what is noted in the history of present illness.    Medications:     Current Facility-Administered Medications   Medication Dose Route Frequency Provider Last Rate Last Admin     Current Facility-Administered Medications "   Medication Dose Route Frequency Provider Last Rate Last Admin    atorvastatin (LIPITOR) tablet 40 mg  40 mg Oral Daily Carina Willett MD        doxycycline (VIBRAMYCIN) 100 mg vial to attach to  mL bag  100 mg Intravenous Q12H Carina Willett MD   Stopped at 06/05/25 1026    fluticasone (FLONASE) 50 MCG/ACT spray 1 spray  1 spray Both Nostrils Daily Carina Willett MD   1 spray at 06/05/25 0834    [Held by provider] fluticasone-vilanterol (BREO ELLIPTA) 100-25 MCG/ACT inhaler 1 puff  1 puff Inhalation Daily Carina Willett MD   1 puff at 06/05/25 0833    And    [Held by provider] umeclidinium (INCRUSE ELLIPTA) 62.5 MCG/ACT inhaler 1 puff  1 puff Inhalation Daily Carina Willett MD   1 puff at 06/05/25 0833    insulin aspart (NovoLOG) injection (RAPID ACTING)  1-10 Units Subcutaneous TID AC Josephine Ramirez MD   4 Units at 06/05/25 1224    insulin aspart (NovoLOG) injection (RAPID ACTING)  1-7 Units Subcutaneous At Bedtime Josephine Ramirez MD        ipratropium - albuterol 0.5 mg/2.5 mg/3 mL (DUONEB) neb solution 3 mL  3 mL Nebulization 4x daily Abdirahman Stahl MD        methylPREDNISolone Na Suc (solu-MEDROL) injection 62.5 mg  62.5 mg Intravenous Q8H Abdirahman Stahl MD   62.5 mg at 06/05/25 0818    [START ON 6/6/2025] predniSONE (DELTASONE) tablet 40 mg  40 mg Oral Daily Abdirahman Stahl MD        Followed by    [START ON 6/9/2025] predniSONE (DELTASONE) tablet 30 mg  30 mg Oral Daily Abdirahman Stahl MD        Followed by    [START ON 6/12/2025] predniSONE (DELTASONE) tablet 20 mg  20 mg Oral Daily Abdirahman Stahl MD        Followed by    [START ON 6/15/2025] predniSONE (DELTASONE) tablet 10 mg  10 mg Oral Daily Abdirahman Stahl MD        sodium chloride 0.9% BOLUS 1,000 mL  1,000 mL Intravenous Once Josephine Ramirez  mL/hr at 06/05/25 1036 1,000 mL at 06/05/25 1036    tolterodine ER (DETROL LA) 24 hr capsule 4 mg  4 mg Oral Daily Carina Willett MD   4 mg at  06/05/25 0832         DATA  All laboratory and radiology has been personally reviewed by myself today.  Recent Labs   Lab 06/05/25  0408   WBC 15.4*   HGB 12.7   HCT 42.1        Recent Labs   Lab 06/05/25  0408 06/04/25  2254 05/30/25  1304    145 142   CO2 31* 42* 36*   BUN 12.6 12.2 10.5   ALKPHOS 87  --  87   ALT 19  --  18   AST 15  --  15       Imaging:  Chest CTA (6/4/25):  - images directly reviewed, formal interpretation follows:  FINDINGS:  ANGIOGRAM CHEST: Pulmonary arteries are normal caliber and negative for pulmonary emboli. Thoracic aorta is negative for dissection. No CT evidence of right heart strain.     LUNGS AND PLEURA: Mild atelectasis and scarring in the lower lungs. Chronic atelectasis in the right middle lobe anteriorly.     MEDIASTINUM/AXILLAE: There is no lymph node enlargement. The thoracic aorta is tortuous. No aortic aneurysm or dissection. The heart size is normal.     CORONARY ARTERY CALCIFICATION: None.     UPPER ABDOMEN: No acute upper abdominal abnormality.     MUSCULOSKELETAL: Degenerative disease and scoliosis in the spine.                                                                      IMPRESSION:  1.  There is no pulmonary embolus, aortic aneurysm or dissection. No acute abnormality.    Pulmonary Function Testing  March 2016:  Severe preserved-ratio impaired spirometry  Air trapping without hyperinflation  Unable to perform valid DLCO measurement

## 2025-06-05 NOTE — ED TRIAGE NOTES
60% RA (walked to ems cot without O2) could barely get a single word out. Has nebs at home has not been using them, has inhaler at home did not use that either.     Baseline on 2l NC    6L NC for EMS 80-85  Duoneb 97% tight to clear - full sentences   Albuterol neb   20G forearm

## 2025-06-06 ENCOUNTER — APPOINTMENT (OUTPATIENT)
Dept: PHYSICAL THERAPY | Facility: HOSPITAL | Age: 70
End: 2025-06-06
Attending: INTERNAL MEDICINE
Payer: COMMERCIAL

## 2025-06-06 LAB
ATRIAL RATE - MUSE: 108 BPM
BACTERIA SPEC CULT: NORMAL
BACTERIA SPEC CULT: NORMAL
DIASTOLIC BLOOD PRESSURE - MUSE: 85 MMHG
GLUCOSE BLDC GLUCOMTR-MCNC: 203 MG/DL (ref 70–99)
GLUCOSE BLDC GLUCOMTR-MCNC: 211 MG/DL (ref 70–99)
GLUCOSE BLDC GLUCOMTR-MCNC: 217 MG/DL (ref 70–99)
HOLD SPECIMEN: NORMAL
INTERPRETATION ECG - MUSE: NORMAL
LACTATE SERPL-SCNC: 1.3 MMOL/L (ref 0.7–2)
MAGNESIUM SERPL-MCNC: 2 MG/DL (ref 1.7–2.3)
P AXIS - MUSE: 73 DEGREES
POTASSIUM SERPL-SCNC: 4.8 MMOL/L (ref 3.4–5.3)
PR INTERVAL - MUSE: 140 MS
QRS DURATION - MUSE: 72 MS
QT - MUSE: 340 MS
QTC - MUSE: 455 MS
R AXIS - MUSE: 68 DEGREES
SYSTOLIC BLOOD PRESSURE - MUSE: 127 MMHG
T AXIS - MUSE: 61 DEGREES
VENTRICULAR RATE- MUSE: 108 BPM

## 2025-06-06 PROCEDURE — 94640 AIRWAY INHALATION TREATMENT: CPT

## 2025-06-06 PROCEDURE — 97116 GAIT TRAINING THERAPY: CPT | Mod: GP

## 2025-06-06 PROCEDURE — 83735 ASSAY OF MAGNESIUM: CPT | Performed by: INTERNAL MEDICINE

## 2025-06-06 PROCEDURE — 84132 ASSAY OF SERUM POTASSIUM: CPT | Performed by: INTERNAL MEDICINE

## 2025-06-06 PROCEDURE — 250N000012 HC RX MED GY IP 250 OP 636 PS 637: Performed by: REGISTERED NURSE

## 2025-06-06 PROCEDURE — 97530 THERAPEUTIC ACTIVITIES: CPT | Mod: GP

## 2025-06-06 PROCEDURE — 94660 CPAP INITIATION&MGMT: CPT

## 2025-06-06 PROCEDURE — 250N000009 HC RX 250: Performed by: INTERNAL MEDICINE

## 2025-06-06 PROCEDURE — 250N000013 HC RX MED GY IP 250 OP 250 PS 637: Performed by: REGISTERED NURSE

## 2025-06-06 PROCEDURE — 250N000012 HC RX MED GY IP 250 OP 636 PS 637: Performed by: INTERNAL MEDICINE

## 2025-06-06 PROCEDURE — 36415 COLL VENOUS BLD VENIPUNCTURE: CPT | Performed by: INTERNAL MEDICINE

## 2025-06-06 PROCEDURE — 97161 PT EVAL LOW COMPLEX 20 MIN: CPT | Mod: GP

## 2025-06-06 PROCEDURE — 250N000011 HC RX IP 250 OP 636: Performed by: INTERNAL MEDICINE

## 2025-06-06 PROCEDURE — 999N000157 HC STATISTIC RCP TIME EA 10 MIN

## 2025-06-06 PROCEDURE — 250N000013 HC RX MED GY IP 250 OP 250 PS 637: Performed by: INTERNAL MEDICINE

## 2025-06-06 PROCEDURE — 120N000001 HC R&B MED SURG/OB

## 2025-06-06 PROCEDURE — 99232 SBSQ HOSP IP/OBS MODERATE 35: CPT | Performed by: INTERNAL MEDICINE

## 2025-06-06 PROCEDURE — 83605 ASSAY OF LACTIC ACID: CPT | Performed by: INTERNAL MEDICINE

## 2025-06-06 RX ORDER — ACETAMINOPHEN 325 MG/1
650 TABLET ORAL EVERY 4 HOURS PRN
Status: DISCONTINUED
Start: 2025-06-06 | End: 2025-06-07 | Stop reason: HOSPADM

## 2025-06-06 RX ORDER — IPRATROPIUM BROMIDE AND ALBUTEROL SULFATE 2.5; .5 MG/3ML; MG/3ML
3 SOLUTION RESPIRATORY (INHALATION) 4 TIMES DAILY PRN
Status: DISCONTINUED
Start: 2025-06-06 | End: 2025-06-07 | Stop reason: HOSPADM

## 2025-06-06 RX ORDER — FLUTICASONE FUROATE AND VILANTEROL 100; 25 UG/1; UG/1
1 POWDER RESPIRATORY (INHALATION) DAILY
Status: CANCELLED
Start: 2025-06-06

## 2025-06-06 RX ORDER — FLUTICASONE PROPIONATE 50 MCG
1 SPRAY, SUSPENSION (ML) NASAL DAILY
Status: DISCONTINUED
Start: 2025-06-07 | End: 2025-06-07 | Stop reason: HOSPADM

## 2025-06-06 RX ORDER — TOLTERODINE 4 MG/1
4 CAPSULE, EXTENDED RELEASE ORAL DAILY
Status: CANCELLED
Start: 2025-06-06

## 2025-06-06 RX ORDER — PREDNISONE 20 MG/1
20 TABLET ORAL DAILY
Qty: 2 TABLET | Refills: 1 | Status: DISCONTINUED
Start: 2025-06-12 | End: 2025-06-07 | Stop reason: HOSPADM

## 2025-06-06 RX ORDER — ONDANSETRON 2 MG/ML
4 INJECTION INTRAMUSCULAR; INTRAVENOUS EVERY 12 HOURS PRN
Status: DISCONTINUED
Start: 2025-06-06 | End: 2025-06-07 | Stop reason: HOSPADM

## 2025-06-06 RX ORDER — ONDANSETRON 4 MG/1
4 TABLET, ORALLY DISINTEGRATING ORAL EVERY 12 HOURS PRN
Status: CANCELLED
Start: 2025-06-06

## 2025-06-06 RX ORDER — PREDNISONE 10 MG/1
10 TABLET ORAL DAILY
Status: CANCELLED
Start: 2025-06-14 | End: 2025-06-17

## 2025-06-06 RX ORDER — DOXYCYCLINE 100 MG/1
100 CAPSULE ORAL EVERY 12 HOURS SCHEDULED
Status: DISCONTINUED
Start: 2025-06-06 | End: 2025-06-07 | Stop reason: HOSPADM

## 2025-06-06 RX ORDER — PREDNISONE 20 MG/1
40 TABLET ORAL DAILY
Status: CANCELLED
Start: 2025-06-06 | End: 2025-06-08

## 2025-06-06 RX ORDER — IPRATROPIUM BROMIDE AND ALBUTEROL SULFATE 2.5; .5 MG/3ML; MG/3ML
3 SOLUTION RESPIRATORY (INHALATION) EVERY 4 HOURS PRN
Status: CANCELLED
Start: 2025-06-06

## 2025-06-06 RX ORDER — PREDNISONE 10 MG/1
30 TABLET ORAL DAILY
Status: CANCELLED
Start: 2025-06-08 | End: 2025-06-11

## 2025-06-06 RX ORDER — ATORVASTATIN CALCIUM 40 MG/1
40 TABLET, FILM COATED ORAL DAILY
Status: DISCONTINUED
Start: 2025-06-06 | End: 2025-06-07 | Stop reason: HOSPADM

## 2025-06-06 RX ORDER — FLUTICASONE PROPIONATE 50 MCG
1 SPRAY, SUSPENSION (ML) NASAL DAILY
Status: CANCELLED
Start: 2025-06-06

## 2025-06-06 RX ORDER — NICOTINE POLACRILEX 4 MG
15-30 LOZENGE BUCCAL
Qty: 14400 ML | Refills: 0 | Status: DISCONTINUED | OUTPATIENT
Start: 2025-06-06 | End: 2025-06-07 | Stop reason: HOSPADM

## 2025-06-06 RX ORDER — DEXTROSE MONOHYDRATE 25 G/50ML
25-50 INJECTION, SOLUTION INTRAVENOUS
Qty: 9600 ML | Refills: 0 | Status: DISCONTINUED | OUTPATIENT
Start: 2025-06-06 | End: 2025-06-07 | Stop reason: HOSPADM

## 2025-06-06 RX ORDER — ONDANSETRON 2 MG/ML
4 INJECTION INTRAMUSCULAR; INTRAVENOUS EVERY 12 HOURS PRN
Status: CANCELLED
Start: 2025-06-06

## 2025-06-06 RX ORDER — IPRATROPIUM BROMIDE AND ALBUTEROL SULFATE 2.5; .5 MG/3ML; MG/3ML
3 SOLUTION RESPIRATORY (INHALATION) 4 TIMES DAILY PRN
Status: DISCONTINUED | OUTPATIENT
Start: 2025-06-06 | End: 2025-06-06

## 2025-06-06 RX ORDER — FLUTICASONE FUROATE AND VILANTEROL 100; 25 UG/1; UG/1
1 POWDER RESPIRATORY (INHALATION) DAILY
Status: DISCONTINUED
Start: 2025-06-07 | End: 2025-06-07 | Stop reason: HOSPADM

## 2025-06-06 RX ORDER — DOXYCYCLINE 100 MG/1
100 CAPSULE ORAL EVERY 12 HOURS SCHEDULED
Status: DISCONTINUED | OUTPATIENT
Start: 2025-06-06 | End: 2025-06-06

## 2025-06-06 RX ORDER — PREDNISONE 10 MG/1
30 TABLET ORAL DAILY
Qty: 6 TABLET | Refills: 1 | Status: DISCONTINUED
Start: 2025-06-09 | End: 2025-06-07 | Stop reason: HOSPADM

## 2025-06-06 RX ORDER — SODIUM CHLORIDE 9 MG/ML
INJECTION, SOLUTION INTRAVENOUS CONTINUOUS
Status: CANCELLED
Start: 2025-06-06

## 2025-06-06 RX ORDER — ATORVASTATIN CALCIUM 40 MG/1
40 TABLET, FILM COATED ORAL DAILY
Status: CANCELLED
Start: 2025-06-06

## 2025-06-06 RX ORDER — NICOTINE POLACRILEX 4 MG
15-30 LOZENGE BUCCAL
Qty: 3600 ML | Refills: 0 | Status: CANCELLED | OUTPATIENT
Start: 2025-06-06

## 2025-06-06 RX ORDER — NICOTINE POLACRILEX 4 MG
15-30 LOZENGE BUCCAL
Qty: 3600 ML | Refills: 0 | Status: DISCONTINUED | OUTPATIENT
Start: 2025-06-06 | End: 2025-06-07 | Stop reason: HOSPADM

## 2025-06-06 RX ORDER — PREDNISONE 20 MG/1
40 TABLET ORAL DAILY
Qty: 4 TABLET | Refills: 0 | Status: DISCONTINUED
Start: 2025-06-07 | End: 2025-06-07 | Stop reason: HOSPADM

## 2025-06-06 RX ORDER — ONDANSETRON 4 MG/1
4 TABLET, ORALLY DISINTEGRATING ORAL EVERY 12 HOURS PRN
Status: DISCONTINUED
Start: 2025-06-06 | End: 2025-06-07 | Stop reason: HOSPADM

## 2025-06-06 RX ORDER — IPRATROPIUM BROMIDE AND ALBUTEROL SULFATE 2.5; .5 MG/3ML; MG/3ML
3 SOLUTION RESPIRATORY (INHALATION)
Status: CANCELLED
Start: 2025-06-06

## 2025-06-06 RX ORDER — TOLTERODINE 2 MG/1
4 CAPSULE, EXTENDED RELEASE ORAL DAILY
Status: DISCONTINUED
Start: 2025-06-07 | End: 2025-06-07 | Stop reason: HOSPADM

## 2025-06-06 RX ORDER — PREDNISONE 10 MG/1
10 TABLET ORAL DAILY
Qty: 2 TABLET | Refills: 1 | Status: DISCONTINUED
Start: 2025-06-15 | End: 2025-06-07 | Stop reason: HOSPADM

## 2025-06-06 RX ORDER — ACETAMINOPHEN 325 MG/1
650 TABLET ORAL EVERY 4 HOURS PRN
Status: CANCELLED
Start: 2025-06-06

## 2025-06-06 RX ORDER — PREDNISONE 20 MG/1
20 TABLET ORAL DAILY
Status: CANCELLED
Start: 2025-06-11 | End: 2025-06-14

## 2025-06-06 RX ADMIN — FLUTICASONE PROPIONATE 1 SPRAY: 50 SPRAY, METERED NASAL at 08:12

## 2025-06-06 RX ADMIN — INSULIN ASPART 3 UNITS: 100 INJECTION, SOLUTION INTRAVENOUS; SUBCUTANEOUS at 07:37

## 2025-06-06 RX ADMIN — DOXYCYCLINE 100 MG: 100 CAPSULE ORAL at 20:48

## 2025-06-06 RX ADMIN — PREDNISONE 40 MG: 20 TABLET ORAL at 07:45

## 2025-06-06 RX ADMIN — ATORVASTATIN CALCIUM 40 MG: 40 TABLET, FILM COATED ORAL at 20:48

## 2025-06-06 RX ADMIN — INSULIN ASPART 3 UNITS: 100 INJECTION, SOLUTION INTRAVENOUS; SUBCUTANEOUS at 11:57

## 2025-06-06 RX ADMIN — IPRATROPIUM BROMIDE AND ALBUTEROL SULFATE 3 ML: .5; 3 SOLUTION RESPIRATORY (INHALATION) at 08:15

## 2025-06-06 RX ADMIN — INSULIN ASPART 2 UNITS: 100 INJECTION, SOLUTION INTRAVENOUS; SUBCUTANEOUS at 18:19

## 2025-06-06 RX ADMIN — TOLTERODINE 4 MG: 2 CAPSULE, EXTENDED RELEASE ORAL at 07:45

## 2025-06-06 RX ADMIN — DOXYCYCLINE 100 MG: 100 INJECTION, POWDER, LYOPHILIZED, FOR SOLUTION INTRAVENOUS at 07:45

## 2025-06-06 ASSESSMENT — ACTIVITIES OF DAILY LIVING (ADL)
ADLS_ACUITY_SCORE: 44
ADLS_ACUITY_SCORE: 45
ADLS_ACUITY_SCORE: 43
ADLS_ACUITY_SCORE: 45
ADLS_ACUITY_SCORE: 42
ADLS_ACUITY_SCORE: 45
ADLS_ACUITY_SCORE: 45
ADLS_ACUITY_SCORE: 44
ADLS_ACUITY_SCORE: 44
ADLS_ACUITY_SCORE: 45
ADLS_ACUITY_SCORE: 42
ADLS_ACUITY_SCORE: 44
ADLS_ACUITY_SCORE: 45

## 2025-06-06 NOTE — PROGRESS NOTES
DME (Durable Medical Equipment) Orders and Documentation  Orders Placed This Encounter   Procedures    Pneumatic Compression Device (Equipment) - Bilateral Calf    Home Oxygen Order for DME - ONLY FOR DME      The patient was assessed and it was determined the patient is in need of the following listed DME Supplies/Equipment. Please complete supporting documentation below to demonstrate medical necessity.      Oxygen Documentation  I certify that this patient, Arvind Leong has been under my care (or a nurse practitioner or physican's assistant working with me). This is the face-to-face encounter for oxygen medical necessity.      At the time of this encounter, I have reviewed the qualifying testing and have determined that supplemental oxygen is reasonable and necessary and is expected to improve the patient's condition in a home setting.         Patient has continued oxygen desaturation due to Acute Respiratory Failure J96.01  Pneumonia J18.9.    If portability is ordered, is the patient mobile within the home? yes    Was this visit performed as a telehealth visit: Yes: What is the reason an in-person visit could not be done: Acute Hospital at Home transfer, remote telehealth provider    CAMERON Trejo CNP

## 2025-06-06 NOTE — PROGRESS NOTES
"   06/06/25 1310   Appointment Info   Signing Clinician's Name / Credentials (PT) Maida North, PT, DPT   Living Environment   People in Home   (mother)   Current Living Arrangements mobile home   Home Accessibility stairs to enter home   Number of Stairs, Main Entrance 6   Stair Railings, Main Entrance railing on right side (ascending)   Self-Care   Equipment Currently Used at Home walker, rolling  (4ww)   Fall history within last six months no   Activity/Exercise/Self-Care Comment pt reports being independet with functional mobility at baseline   General Information   Onset of Illness/Injury or Date of Surgery 06/04/25   Referring Physician Josephine Ramirez MD   Patient/Family Therapy Goals Statement (PT) Return to home   Pertinent History of Current Problem (include personal factors and/or comorbidities that impact the POC) Per Chart Review -\" 70 year old female with a medical history significant for multiple medical comorbidities which includes a history of COPD with chronic respiratory failure,  obstructive sleep apnea, cardiomyopathy, chronic low back pain, type II DM, bipolar disorder and other medical comorbidities who is admitted with COPD exacerbation and acute on chronic respiratory failure.\"   Existing Precautions/Restrictions fall;oxygen therapy device and L/min  (1L at rest, increased to 3L with activity per RN)   Range of Motion (ROM)   Range of Motion ROM is WFL   Strength (Manual Muscle Testing)   Strength (Manual Muscle Testing) Deficits observed during functional mobility   Bed Mobility   Bed Mobility supine-sit   Supine-Sit [place] (Bed Mobility) contact guard   Transfers   Transfers sit-stand transfer   Sit-Stand Transfer   Sit-Stand [place] (Transfers) contact guard   Assistive Device (Sit-Stand Transfers) walker, 4-wheeled   Gait/Stairs (Locomotion)   [place] Level (Gait) contact guard   Assistive Device (Gait) walker, 4-wheeled   Distance in Feet (Gait) 5   Pattern (Gait) " step-through;swing-through   Deviations/Abnormal Patterns (Gait) terence decreased;gait speed decreased   Maintains Weight-bearing Status (Gait) able to maintain   Negotiation (Stairs) stairs independence;stairs assistive device;handrail location;number of steps;ascending technique;descending technique;maintains weight-bearing status   [place] Level (Stairs) contact guard   Handrail Location (Stairs) right side (ascending)   Number of Steps (Stairs) 1   Ascending Technique (Stairs) step-to-step   Descending Technique (Stairs) step-to-step   Clinical Impression   Criteria for Skilled Therapeutic Intervention Yes, treatment indicated   PT Diagnosis (PT) Impaired Functional Mobility   Influenced by the following impairments weakness, impaired balance   Functional limitations due to impairments gait, transfers, stairs, bed mobility   Clinical Presentation (PT Evaluation Complexity) stable   Clinical Presentation Rationale pt presents as medically diagnosed   Clinical Decision Making (Complexity) low complexity   Planned Therapy Interventions (PT) balance training;bed mobility training;gait training;home exercise program;neuromuscular re-education;ROM (range of motion);stair training;strengthening;transfer training;home program guidelines   Risk & Benefits of therapy have been explained evaluation/treatment results reviewed;patient   PT Total Evaluation Time   PT Eval, Low Complexity Minutes (86399) 15   Physical Therapy Goals   PT Frequency Daily   PT Predicted Duration/Target Date for Goal Attainment 06/13/25   PT Goals Bed Mobility;Transfers;Gait;Stairs   PT: Bed Mobility Modified independent;Supine to/from sit   PT: Transfers Modified independent;Sit to/from stand;Assistive device;Within precautions   PT: Gait Modified independent;Rolling walker;Within precautions;100 feet   PT: Stairs Modified independent;6 stairs;Rail on right  (Ascend/descend 6 stairs lateral)   Interventions   Interventions Quick Adds Gait  Training;Therapeutic Activity   Therapeutic Activity   Therapeutic Activities: dynamic activities to improve functional performance Minutes (08623) 25   Symptoms Noted During/After Treatment Fatigue   Treatment Detail/Skilled Intervention sit to/from stand: cueing for safety/technique/hand placement/locking brakes, SBA; practice with transfer on/off 4WW to promote taking brakes with activity: cueing for safety/technique/locking brakes, SBA; sit to supine: cueing for safety/technique, SBA; Signifcant increased time required for monitoring vitals/therapeutic rest breaks. pt O2 dropping to low 80s post ambulation. Increased O2 from 1L to 3L per RN. pt able to quickly reccover to >90. pt goal O2 level is 88-92. O2 consistently decrease to 85-87 with activity . Improvements noted with PLB  extended therapeutic rest. Education on energy conservation. bed alarm on & call light within reach. Recommendations reviewed with RN & pt/pt mom.   Gait Training   Gait Training Minutes (95108) 12   Symptoms Noted During/After Treatment (Gait Training) fatigue;shortness of breath   Treatment Detail/Skilled Intervention pt ambulate in hallway with 4WW. cueing for safety/decreasing gait speed for energy conservation. seated rest break at end d/t shortness of breath. STAIRS: ascend/descend 3 stairs x3 with R railings - B UE support ascend/descend lateral. cueing for safety/technique/non reciprocol step pattern. CGA initially progressing to close SBA   Distance in Feet 135   [place] Level (Gait Training) stand-by assist   Physical Assistance Level (Gait Training) supervision   Weight Bearing (Gait Training) full weight-bearing   Assistive Device (Gait Training) rolling walker  (4ww)   Pattern Analysis (Gait Training) swing-through gait   Gait Analysis Deviations decreased terence;decreased step length   Impairments (Gait Analysis/Training) balance impaired;strength decreased   Stair Railings present on right side   Physical  Assist/Nonphysical Assist (Stairs) supervision;verbal cues   Level of [place] (Stairs) other (see comments)  (CGA initially progressing to close SBA)   Assistive Device (Stairs)   (no device)   PT Discharge Planning   PT Plan gait, transfers, stairs, stregnthening   PT Discharge Recommendation (DC Rec) home with assist;home with home care physical therapy   PT Rationale for DC Rec pending medical management of respiratory status, anticipate pt will be able to return to home with SBA at all times with activity/reminders for monitoring & modifying O2 with activity. pt mobilizing well SBA with gait/transfers/bed mobility, Close SBA/CGA stairs. Consistent cueing for monitoring O2/modifying as needed. Home PT for progressing strength/balance/activity tolerance.   PT Brief overview of current status pt mobilizing well SBA with gait/transfers/bed mobility, Close SBA/CGA stairs.   PT Total Distance Amb During Session (feet) 140   PT Equipment Needed at Discharge walker, rolling  (4WW - pt has access to)   Physical Therapy Time and Intention   Timed Code Treatment Minutes 37   Total Session Time (sum of timed and untimed services) 52

## 2025-06-06 NOTE — PLAN OF CARE
Goal Outcome Evaluation: Pt transferred to [facility] at Home Inpatient unit, report given to triage line RN. Pt transported with home O2, on 2L O2, mum to transport. Questions answered.       Plan of Care Reviewed With: patient    Overall Patient Progress: improvingOverall Patient Progress: improving

## 2025-06-06 NOTE — PROGRESS NOTES
Hospital at Home Coordination Note    Report received from DEANNE Keith     Diagnosis: COPD exacerbation    Oxygen needs: Yes Currently Using 2 LPM    Scheduled/standing labs: see orders    Therapy orders: Physical Therapy  Occupational Therapy    DME needs: oxygen    Ambulation status: walker    Lung sounds: diminished with wheezes intermittently     IV medications:Yes IV Medication (Name, Dose, Directions): IV lasix    TPN: No    IV access: Yes Peripheral     Pain:  NO    Wound Care: No    Tube Feeding: No     Food Insecurities: No    DEANNE Keith contacted triage to provide handoff report stating pt is a Full code, acute respiratory failure, DM2, sleep apnea, COPD exacerbation, 2L NC and desat with activity, IVF discontinue. PIV LAC. Lactic q4 1.3. carb diet, standby assist with walker, IV antibx, no pain, SOB with exacerbation, mg protocol (2.0), bruises with IV, mom involved in care, diminished - with wheezes but improves. Pt leaving now.     Bushra Taveras RN on 6/6/2025 at 3:44 PM

## 2025-06-06 NOTE — TREATMENT PLAN
RCAT Treatment Plan    Patient Score: 6  Patient Acuity: 4    Clinical Indication for Therapy: history of bronchospasm and atelectasis COPD    Therapy Ordered: Continue current therapy    Assessment Summary: Patient seen on 2L NC BS diminished pre and post neb. Patient wearing Bipap at night intermittently.     Altagracia Dixon, RT  6/6/2025

## 2025-06-06 NOTE — PROGRESS NOTES
Home Hospital Care Note    KAVITA Ontiveros contacted triage for handoff report stating, med admin a 2030 and 0800. Pt may in insulin during med admin. No additional concerns at this time. Note left in basket for team to follow up in the am for med admin.    Bushra Taveras RN on 6/6/2025 at 6:45 PM

## 2025-06-06 NOTE — PLAN OF CARE
Problem: Adult Inpatient Plan of Care  Goal: Optimal Comfort and Wellbeing  Outcome: Progressing   Goal Outcome Evaluation:       Patient alert and oriented and up with assist of one and walker. Patient oxygen titrated between 1-3L to maintain saturation between 88-92%. Patient did de-saturate during activity, requiring an increase in oxygen. Patient to discharge home later today with home hospital program.

## 2025-06-06 NOTE — PROGRESS NOTES
[facility]    Medicine Progress Note - Hospitalist Service    Date of Admission:  6/4/2025    Assessment & Plan     Arvind Leong is a 70 year old female with COPD with chronic respiratory failure on home oxygen 2 LPM,  obstructive sleep apnea, cardiomyopathy, chronic low back pain, type II DM, and bipolar disorder, who is admitted with COPD exacerbation and acute on chronic respiratory failure.     COPD exacerbation, Acute on chronic hypercapnic and hypoxemic respiratory failure  Presents with SOB for 2 days.  Found 60% on her 2 L home O2 per EMS.   She needed BiPAP in the ER.   Chest CT in ER showed no pulmonary embolus and no focal infiltrate.   Influenza, RSV and COVID tested negative.  - She received solumedrol x4 doses. Per pulmonary consult, switched to a tapering course of prednisone today.  - Continue doxycycline to complete 5 days  - Continue nebs prn  - Resume home inhalers  - Able to get weaned off BiPAP since yesterday. She has chronically elevated CO2 and her CO2 level has returned to her baseline level. She has been stable on home oxygen 2 LPM at rest. She needs the oxygen to increase to 3 LPM with activity today. Continue to wean as tolerated. SpO2 goal 88-92%.   - Follow up with Dr Dr. Balbuena in the pulmonary clinic in 1-2 weeks.   - Plan to transfer to home hospital program today.     Lactic acidosis:  No signs of infection. Likely due to her COPD exacerbation and hypoxia. Resolved with IVF.       Chronic stable conditions  Cardiomyopathy: no active issues.   Type II DM: Resume home medication. Monitor blood sugar while on steroids. Novolog sliding scale.   Obstructive sleep apnea: patient reports using CPAP on and off. Encourage to use it daily.   Severe obesity: BMI 41 with ABDIFATAH and diabetes. On mounjaro  Bipolar disorder: on Abilify injections every 28 days.   Anxiety disorder: Continue Abilify    Generalized weakness:  Evaluated by PT and recommends home PT.  "    Plan of care was discussed with her mother by the bedside today.           Diet: Moderate Consistent Carb (60 g CHO per Meal) Diet    DVT Prophylaxis: Low Risk/Ambulatory with no VTE prophylaxis indicated  Lara Catheter: Not present  Lines: None     Cardiac Monitoring: ACTIVE order. Indication: Tachyarrhythmias, acute (48 hours)  Code Status: Full Code      Clinically Significant Risk Factors                       # Acute Hypercapnic Respiratory Failure: based on venous blood gas results.  Continue supplemental oxygen and ventilatory support as indicated.        # DMII: A1C = 7.7 % (Ref range: 0.0 - 5.6 %) within past 6 months, PRESENT ON ADMISSION  # Morbid Obesity: Estimated body mass index is 41.05 kg/m  as calculated from the following:    Height as of this encounter: 1.397 m (4' 7\").    Weight as of this encounter: 80.1 kg (176 lb 9.6 oz)., PRESENT ON ADMISSION     # Financial/Environmental Concerns:    # Asthma: noted on problem list        Social Drivers of Health    Tobacco Use: Medium Risk (5/30/2025)    Patient History     Smoking Tobacco Use: Former     Smokeless Tobacco Use: Never     Passive Exposure: Past          Disposition Plan     Medically Ready for Discharge: Anticipated in 1-2 days. Plan to transfer to home hospital program today.             Josephine Ramirez MD  Hospitalist Service  [facility]  Securely message with [employer] (more info)  Text page via [employer] Paging/Directory   ______________________________________________________________________    Interval History   Patient reports that her SOB improved today. She feels some SOB with activity.     Physical Exam   Vital Signs: Temp: 98.7  F (37.1  C) Temp src: Oral BP: 112/69 Pulse: 82   Resp: 22 SpO2: 92 % O2 Device: Nasal cannula Oxygen Delivery: 2 LPM  Weight: 176 lbs 9.6 oz    General appearance: not in acute distress  HEENT: PERRL, EOMI  Lungs: Clear breath sounds in bilateral lung fields  Cardiovascular: Regular " rate and rhythm, normal S1-S2  Abdomen: Soft, non tender, no distension  Musculoskeletal: No joint swelling  Skin: No rash and no edema  Neurology: AAO ×3.  Cranial nerves II - XII normal.  Normal muscle strength in all four extremities.     Medical Decision Making       48 MINUTES SPENT BY ME on the date of service doing chart review, history, exam, documentation & further activities per the note.      Data     I have personally reviewed the following data over the past 24 hrs:    N/A  \   N/A   / N/A     N/A N/A N/A /  211 (H)   4.8 N/A N/A \     Procal: N/A CRP: N/A Lactic Acid: 1.3         Imaging results reviewed over the past 24 hrs:   No results found for this or any previous visit (from the past 24 hours).

## 2025-06-06 NOTE — CONSULTS
.. HOSPITAL IN HOME    Liaison Communication     Patient: Arvind Leong        MRN: [id number]  : 1955  Age: 70 year old     Hospital In Home service location and phone number:   [address] [address] [facility] [address] [facility] [address]  [phone] (home) [phone] cell    Is this a secure facility and can you get to the door to receive visits i.e., medication delivery? No pvt residence     to schedule appointments: Patient   PREFERRED CONTACT PHONE NUMBER:  [phone]   Can the pt make a cell phone call in the home or have a cellular connection?Yes  Does the patient have WiFI in the home? Yes     (Patient residence must have cellular connection or WiFi to be admitted into the program):  yes xfinity  Does the patient or a 24-hour caregiver have a smartphone with video capability that works inside the residence?  Yes   Patient caregiver during Home Hospital episode: Self and her mom  Patient consented to discuss care with caregiver: Yes  Verified caregiver willing/able to assist with patient cares and biometrics, if needed: Yes she needed freq ques but did well after we went over it a few times.  Patient/caregiver able to  prescription medications after hospital discharge, if needed: Yes  Does patient have transportation to medical appointments, if needed: Yes    Patient was informed and in agreement that [facility] medical transportation is the required mode for transport home  Yes  refused free [facility] transport prefers her mom to take her.    Does patient have transportation to home and to medical appointments, if needed: yes    Primary care provider verified:  Rema Whiting [place]   [phone]      Reason patient admitting to Home Hospital: PMH of COPD with chronic respiratory failure,  obstructive sleep apnea, cardiomyopathy, chronic low back pain, type II DM, bipolar disorder and other medical comorbiditie. CAP.  Hospital in Home consult provider that  reviewed case: Monique Garcias NP  Admitting physician (Hospitalist): Dr Mala Lara MD    Additional Medical Needs:   Supplemental Oxygen 2LPM, labs, PT/OT. Nebs.med monitor  (CHF, hx CHF pts or pts that need daily weights) DOES THE PATIENT HAVE A WORKING SCALE USED CONSISTENTLY TO OBTAIN DAILY WEIGHT IF NEEDED: yes she has her own.    Additional Care Management Needs:  Are there pets in the home:   Yes 1 cat cocoa.  Patient was informed that [place] policy is that all pets and fire arms are secured prior to our staff coming to your home:   Yes If patient is not agreeable to securing pets or firearms, the clinical team were notified. No fire arms.  Patient's Primary Language:  English   required:  No    Does patient have a home care agency providing services prior to admission?  Agency Name if applicable No    Chart review completed. Met with  patient and and her mom Jeri to discuss purpose and process of [facility]. Answered patient/family questions regarding program. Patient verbally consents to Hospital in Home program: Yes    Samantha Carrillo RN  Hospital in Home Liaison

## 2025-06-06 NOTE — PLAN OF CARE
"  Problem: Adult Inpatient Plan of Care  Goal: Plan of Care Review  Description: The Plan of Care Review/Shift note should be completed every shift.  The Outcome Evaluation is a brief statement about your assessment that the patient is improving, declining, or no change.  This information will be displayed automatically on your shift  note.  Outcome: Progressing  Goal: Patient-Specific Goal (Individualized)  Description: You can add care plan individualizations to a care plan. Examples of Individualization might be:  \"Parent requests to be called daily at 9am for status\", \"I have a hard time hearing out of my right ear\", or \"Do not touch me to wake me up as it startles  me\".  Outcome: Progressing  Goal: Absence of Hospital-Acquired Illness or Injury  Outcome: Progressing  Intervention: Identify and Manage Fall Risk  Recent Flowsheet Documentation  Taken 6/6/2025 0000 by Radha Ordonez RN  Safety Promotion/Fall Prevention:   assistive device/personal items within reach   activity supervised   mobility aid in reach   nonskid shoes/slippers when out of bed  Intervention: Prevent Skin Injury  Recent Flowsheet Documentation  Taken 6/6/2025 0000 by Radha Ordonez RN  Body Position: position changed independently  Goal: Optimal Comfort and Wellbeing  Outcome: Progressing  Goal: Readiness for Transition of Care  Outcome: Progressing     Problem: Delirium  Goal: Optimal Coping  Outcome: Progressing  Goal: Improved Behavioral Control  Outcome: Progressing  Intervention: Minimize Safety Risk  Recent Flowsheet Documentation  Taken 6/6/2025 0000 by Radha Ordonez RN  Enhanced Safety Measures: pain management  Goal: Improved Attention and Thought Clarity  Outcome: Progressing  Goal: Improved Sleep  Outcome: Progressing   Goal Outcome Evaluation:  Pt slept well overnight, up to commode as needed with AX1 and pivot. Pt is alert and able to make her needs known. Pt refused 0300 Lactic Acid so it was drawn this am. Result was 1.3.    "

## 2025-06-06 NOTE — PLAN OF CARE
"  Problem: Adult Inpatient Plan of Care  Goal: Plan of Care Review  Outcome: Progressing  Flowsheets (Taken 6/5/2025 2121)  Plan of Care Reviewed With: patient  Overall Patient Progress: improving  Goal: Patient-Specific Goal (Individualized)  Outcome: Progressing  Goal: Absence of Hospital-Acquired Illness or Injury  Outcome: Progressing  Intervention: Identify and Manage Fall Risk  Recent Flowsheet Documentation  Taken 6/5/2025 1623 by Adegun, Oluwadamilola, RN  Safety Promotion/Fall Prevention:   assistive device/personal items within reach   activity supervised   mobility aid in reach   nonskid shoes/slippers when out of bed  Intervention: Prevent Skin Injury  Recent Flowsheet Documentation  Taken 6/5/2025 1623 by Adegun, Oluwadamilola, RN  Body Position: position changed independently  Goal: Optimal Comfort and Wellbeing  Outcome: Progressing  Goal: Readiness for Transition of Care  Outcome: Progressing  Intervention: Mutually Develop Transition Plan  Recent Flowsheet Documentation  Taken 6/5/2025 1800 by Adegun, Oluwadamilola, RN  Equipment Currently Used at Home: (doesn't use)   walker, rolling   cane, straight     Problem: Delirium  Goal: Optimal Coping  Outcome: Progressing  Goal: Improved Behavioral Control  Outcome: Progressing  Intervention: Minimize Safety Risk  Recent Flowsheet Documentation  Taken 6/5/2025 1623 by Adegun, Oluwadamilola, RN  Enhanced Safety Measures: pain management  Goal: Improved Attention and Thought Clarity  Outcome: Progressing  Goal: Improved Sleep  Outcome: Progressing   Goal Outcome Evaluation: Pt alert and oriented. VSS,on O2 2L via NC. IV fluids running at 75ml/hr. Lactic checks Q4, improved to 3.7, recheck due at 2200.    2130 Pt refused lab draw for lactic recheck, requested lab to come later, lab to ty again at 2300.     Pt had BiPAP placed by RT but requested to have it off stating \"I'm scared of it\". Pt switched back to 3L NC, sats in the mid 90s.           Plan of Care " Reviewed With: patient    [facility]

## 2025-06-06 NOTE — PROGRESS NOTES
Physical Therapy Discharge Summary    Reason for therapy discharge:    Discharged to hospital at home    Progress towards therapy goal(s). See goals on Care Plan in [facility] electronic health record for goal details.  Goals not met.  Barriers to achieving goals:   discharge on same date as initial evaluation.    Therapy recommendation(s):    Continued therapy is recommended.  Rationale/Recommendations:  Home PT.

## 2025-06-07 VITALS
BODY MASS INDEX: 40.87 KG/M2 | SYSTOLIC BLOOD PRESSURE: 119 MMHG | HEIGHT: 55 IN | OXYGEN SATURATION: 89 % | HEART RATE: 84 BPM | TEMPERATURE: 98.5 F | RESPIRATION RATE: 20 BRPM | WEIGHT: 176.6 LBS | DIASTOLIC BLOOD PRESSURE: 73 MMHG

## 2025-06-07 PROCEDURE — 99239 HOSP IP/OBS DSCHRG MGMT >30: CPT | Mod: 95 | Performed by: HOSPITALIST

## 2025-06-07 PROCEDURE — 250N000012 HC RX MED GY IP 250 OP 636 PS 637: Performed by: REGISTERED NURSE

## 2025-06-07 PROCEDURE — 250N000013 HC RX MED GY IP 250 OP 250 PS 637: Performed by: REGISTERED NURSE

## 2025-06-07 RX ORDER — PREDNISONE 10 MG/1
TABLET ORAL
Qty: 22 TABLET | Refills: 0 | Status: SHIPPED | OUTPATIENT
Start: 2025-06-08 | End: 2025-06-18

## 2025-06-07 RX ORDER — DOXYCYCLINE 100 MG/1
100 CAPSULE ORAL EVERY 12 HOURS
Qty: 5 CAPSULE | Refills: 0 | Status: SHIPPED | OUTPATIENT
Start: 2025-06-07 | End: 2025-06-10

## 2025-06-07 RX ADMIN — FLUTICASONE PROPIONATE 1 SPRAY: 50 SPRAY, METERED NASAL at 08:44

## 2025-06-07 RX ADMIN — DOXYCYCLINE 100 MG: 100 CAPSULE ORAL at 08:44

## 2025-06-07 RX ADMIN — FLUTICASONE FUROATE AND VILANTEROL TRIFENATATE 1 PUFF: 100; 25 POWDER RESPIRATORY (INHALATION) at 08:44

## 2025-06-07 RX ADMIN — UMECLIDINIUM 1 PUFF: 62.5 AEROSOL, POWDER ORAL at 08:45

## 2025-06-07 RX ADMIN — TOLTERODINE TARTRATE 4 MG: 2 CAPSULE, EXTENDED RELEASE ORAL at 08:46

## 2025-06-07 RX ADMIN — PREDNISONE 40 MG: 20 TABLET ORAL at 08:46

## 2025-06-07 ASSESSMENT — ACTIVITIES OF DAILY LIVING (ADL)
ADLS_ACUITY_SCORE: 45

## 2025-06-07 NOTE — PROGRESS NOTES
Home Hospital Care Discharge    Patient discharged from Home Hospital Care. [facility] [employer] notified to call and coordinate pickup of biometric equipment.      Alva Duong RN on 6/7/2025 at 12:34 PM

## 2025-06-07 NOTE — PROGRESS NOTES
Home Hospital Care:    Reason for Visit: Medication Administration    Video visit with patient for medication administration. Successful self-administration of medications.   Most recent biometrics reviewed. Patient without acute concerns.  MAR updated.      Novolog not administered    Alva Duong RN on 6/7/2025 at 8:47 AM

## 2025-06-07 NOTE — PROGRESS NOTES
[facility] PARAMEDIC     Assessment Location: Home  Admit diagnosis:  Community Aquired Pnemonia  Admission date: 06/06/2025    Time arrived: 1648  Time left: 1850    Environment: patient resides With Family Has assist of mother    Physical Assessment:  Vital signs:  Blood Pressure: 101/72  Heart Rate: 90  Pulse Ox: 95% on 2ltr NC  Temperature: 97.2  Blood Glucose: 173    Physical assessment completed. Notable findings: WNL    Activity: Ambulates Independently     Medications:  Current medications: See MAR    New Medications: See Mar  Medication Access: Medications couriered to the patients home at 1800  Medications verified and placed in med boxes for administration via telehealth visit with [facility]: AM, PM, and PRN  Waiver lockbox left with patient:  yes  Medication pouches left: Yes:   Controlled Substances:  Yes:   CS documented in MAR:  No, N/A  Glucometer left with patient:  yes #6  Medication Instructions reviewed with patient or caregiver Yes: .  Further review needed: No      Additional Comments:  Understanding of illness: Yes  Safety concerns: No  Number of steps into home (accessibility for equipment): 6  Are there pets in the home:  Yes: one cat  Patient informed that all pets and fire arms are secured prior to our staff coming into home: Yes.  If patient is not agreeable to securing pets or firearms, the clinical team is notified  Patient Vulnerable: No    InHome technology:  Patient/family demonstrates understanding of biometric kit use: Yes  Tablet connected to patient WiFiNo  Doxy.Me setup and demonstrated on patient smartphone No  Guided Access Mode to lock tablet: Yes    Clinical Coordination:  Patient advised of upcoming nurse and telehealth provider appointments: Yes  Provided Home Hospital triage phone number [phone]  Yes  Called and spoke with [facility] [phone] for handoff of patient care: Yes      Rama Pratt NRP, CP on 6/6/2025 at 7:50 PM    2 seconds or less

## 2025-06-07 NOTE — DISCHARGE SUMMARY
HOSPITAL IN HOME DISCHARGE SUMMARY     [facility]     Admission Date: 6/4/2025  Discharge Date: 6/7/2025   PCP:  Rema Whiting  Code Status: Full Code  Discharge Disposition: Discharged to home.    Principal Diagnosis    Acute respiratory failure with hypoxia and hypercapnia (H) [J96.01, J96.02]  Exacerbation of asthma, unspecified asthma severity, unspecified whether persistent [J45.901]    Hospital Problem List   Active Problems:  Principal Problem:    Acute respiratory failure with hypoxia and hypercapnia (H)  Active Problems:    Exacerbation of asthma, unspecified asthma severity, unspecified whether persistent      Hospital In Home Course   Patient is a 71 y/o female with h/o COPD with chronic resp failure on 2 L o2, ABDIFATAH, Cardiomyopathy, Chronic low back pain, DMII, bipolar d/o who was admitted to hospital with copd exacerbation initially requiring bipap, she was evaluated by pulmonology in hospital eventually weaned down to 2 l o2 at rest and 3l o2 with activity. She was feeling well today at my visit. She was on baseline oxygen. Sob significantly improved. Pt declines home PT/OT and would like to be discharged from the program. She will finish doxycyline taper and steroid taper as prescribed.       Follow-Up         Specific recommendations to be addressed at the follow up visit: COPD follow up     Medication changes: None, finishing course of abx and steroids     Follow up labs/imaging: N/A     Other specialty follow-up not included in DC orders: Pulm    Discharge Medications      Discharge Medication List as of 6/7/2025 10:56 AM        START taking these medications    Details   doxycycline hyclate (VIBRAMYCIN) 100 MG capsule Take 1 capsule (100 mg) by mouth every 12 hours for 5 doses., Disp-5 capsule, R-0, E-Prescribe      predniSONE (DELTASONE) 10 MG tablet Take 4 tablets (40 mg) by mouth daily for 1 day, THEN 3 tablets (30 mg) daily for 3 days, THEN 2 tablets (20 mg) daily for 3 days,  THEN 1 tablet (10 mg) daily for 3 days., Disp-22 tablet, R-0, E-Prescribe           CONTINUE these medications which have NOT CHANGED    Details   albuterol (PROAIR HFA/PROVENTIL HFA/VENTOLIN HFA) 108 (90 Base) MCG/ACT inhaler INHALE 2 PUFFS INTO THE LUNGS EVERY 6 HOURS AS NEEDED FOR SHORTNESS OF BREATH OR WHEEZING, Disp-18 g, R-2, E-PrescribePharmacy may dispense brand covered by insurance (Proair, or proventil or ventolin or generic albuterol inhaler)      albuterol (PROVENTIL) (2.5 MG/3ML) 0.083% neb solution Take 1 vial (2.5 mg) by nebulization every 4 hours as needed for shortness of breath or wheezing. Take 1 vial four times a day for the next 5 days then go back to as needed, Disp-240 mL, R-12, E-Prescribe      ARIPiprazole ER (ABILIFY MAINTENA) 400 MG extended release inj syringe Inject 400 mg into the muscle every 28 days, Historical      atorvastatin (LIPITOR) 40 MG tablet Take 1 tablet (40 mg) by mouth daily., Disp-90 tablet, R-0, E-Prescribe      fluticasone (FLONASE) 50 MCG/ACT nasal spray Spray 1 spray into both nostrils daily, Disp-9.9 mL, R-1, E-Prescribe      Fluticasone-Umeclidin-Vilant (TRELEGY ELLIPTA) 100-62.5-25 MCG/ACT oral inhaler Inhale 1 puff into the lungs daily., Disp-60 each, R-5, E-Prescribe      melatonin 3 MG tablet Take 1 tablet (3 mg) by mouth nightly as needed for sleep., Disp-30 tablet, R-5, E-Prescribe      metFORMIN (GLUCOPHAGE) 1000 MG tablet Take 1 tablet (1,000 mg) by mouth 2 times daily (with meals), Disp-180 tablet, R-3, E-Prescribe      tirzepatide (MOUNJARO) 15 MG/0.5ML SOAJ auto-injector pen Inject 0.5 mLs (15 mg) subcutaneously once a week., Disp-2 mL, R-3, E-Prescribe      tolterodine ER (DETROL LA) 4 MG 24 hr capsule Take 1 capsule (4 mg) by mouth daily., Disp-90 capsule, R-3, E-Prescribe      blood glucose (NO BRAND SPECIFIED) lancets standard Use to test blood sugar 4 times daily before meals and at bedtimeDisp-1 each, [id number]      blood glucose (NO BRAND  SPECIFIED) test strip Use to test blood sugar 4 times daily before meals and at bedtime, Disp-100 strip, R-0, E-Prescribe      blood glucose monitoring (ONETOUCH VERIO) meter device kit Use to test blood sugar 4 times daily before meals and bedtieDisp-1 kit, [id number]      Lancets (ONETOUCH DELICA PLUS [id number]) MISC TEST FOUR TIMES DAILY BEFORE MEALS AND AT BEDTIME, Historical              Pertinent Findings / Procedures     Results for orders placed or performed during the hospital encounter of 06/04/25   CT Chest Pulmonary Embolism w Contrast    Narrative    EXAM: CT CHEST PULMONARY EMBOLISM W CONTRAST  LOCATION: [facility]  DATE: 6/5/2025    INDICATION: sob  COMPARISON: 5/7/2025.  TECHNIQUE: CT chest pulmonary angiogram during arterial phase injection of IV contrast. Multiplanar reformats and MIP reconstructions were performed. Dose reduction techniques were used.   CONTRAST: ISOVUE 370 80ML    FINDINGS:  ANGIOGRAM CHEST: Pulmonary arteries are normal caliber and negative for pulmonary emboli. Thoracic aorta is negative for dissection. No CT evidence of right heart strain.    LUNGS AND PLEURA: Mild atelectasis and scarring in the lower lungs. Chronic atelectasis in the right middle lobe anteriorly.    MEDIASTINUM/AXILLAE: There is no lymph node enlargement. The thoracic aorta is tortuous. No aortic aneurysm or dissection. The heart size is normal.    CORONARY ARTERY CALCIFICATION: None.    UPPER ABDOMEN: No acute upper abdominal abnormality.    MUSCULOSKELETAL: Degenerative disease and scoliosis in the spine.      Impression    IMPRESSION:  1.  There is no pulmonary embolus, aortic aneurysm or dissection. No acute abnormality.     *Note: Due to a large number of results and/or encounters for the requested time period, some results have not been displayed. A complete set of results can be found in Results Review.     Recent Labs   Lab 06/06/25  1147 06/06/25  0724 06/06/25  0512  06/06/25  0224 06/05/25  0745 06/05/25  0408 06/04/25  2254   WBC  --   --   --   --   --  15.4* 11.1*   HGB  --   --   --   --   --  12.7 13.2   MCV  --   --   --   --   --  95 96   PLT  --   --   --   --   --  201 201   NA  --   --   --   --   --  141 145   POTASSIUM  --   --  4.8  --   --  4.1 4.3   CHLORIDE  --   --   --   --   --  100 102   CO2  --   --   --   --   --  31* 42*   BUN  --   --   --   --   --  12.6 12.2   CR  --   --   --   --   --  0.77 0.79   ANIONGAP  --   --   --   --   --  10 1*   ARINA  --   --   --   --   --  9.0 9.0   * 203*  --  217*   < > 302* 166*   ALBUMIN  --   --   --   --   --  3.8  --    PROTTOTAL  --   --   --   --   --  6.4  --    BILITOTAL  --   --   --   --   --  0.3  --    ALKPHOS  --   --   --   --   --  87  --    ALT  --   --   --   --   --  19  --    AST  --   --   --   --   --  15  --     < > = values in this interval not displayed.     Diet / Activity / Follow-Up     Discharge diet: Regular   Discharge activity: Activity as tolerated   Discharge follow-up: Follow up with primary care provider in 7 days     Pending Studies      Unresulted Labs Ordered in the Past 30 Days of this Admission       Date and Time Order Name Status Description    6/5/2025  7:38 AM Blood Culture Peripheral blood (BC) Arm, Right Preliminary     6/5/2025  7:38 AM Blood Culture Peripheral blood (BC) Arm, Right Preliminary             TELEHEALTH ENCOUNTER ATTESTATION: As the provider for this telehealth service, I attest that I introduced myself to the patient, provided my credentials, disclosed my location, and determined that, based on a review of the patient's chart and/or a discussion with members of the patient's treatment team, telemedicine via a real-time, two-way, interactive audio and video platform is an appropriate and effective means of providing this service. The patient and I mutually agree that this visit is appropriate for telemedicine as well.  Originating site (patient  location): Patient home, [place]  Distant site (telehealth provider location): Provider home/office, [place]  Visit start time: 9:00 AM  Visit end time: 9:25 AM  Total time spent on discharge coordination:  50 minutes minutes.  Patient was seen and examined today.    Violette Bella MD   [facility] IN HOME

## 2025-06-07 NOTE — PROGRESS NOTES
Home Hospital Care Note    Writer contacted pt 3x on phone and call not going through, stating it does not accept spam calls. Message left on tablet. Writer contacted pt via cell phone for med admin. Writer unable to leave VM due to mailbox being full. Writer contacted pt via 2x tablet and no success. Med admin pending.      Bushra Taveras RN on 6/6/2025 at 8:11 PM    Home Hospital Care Note    Writer contacted pt via cell phone and states number is spam. Called house number, unable to leave VM due to being full. Called via tablet and response. Writer inquired if phone numbers work and pt does not respond.     Bushra Taveras RN on 6/6/2025 at 8:32 PM    Home Hospital Care:    Reason for Visit: Medication Administration    Video visit with patient for medication administration. Successful self-administration of medications insulin aspart (bgl 152 - 0 uts), Lipitor, doxycycline.   Most recent biometrics reviewed. Patient without acute concerns.  MAR updated.     Pt unable to follow simple commands. Pt states she is unable to see the medication. Writer tries to instruct pt to place medication at nose to verify medication, but unable to comply. Writer ask pt to turn TV down but does not.  Pt's eye closed during med admin and barely opens. Writer inquired if there is anyone there to assist pt with med admin and states no. Writer calls on call providerLaura for assistance and inquires if it is safe for pt to be home. Writer informed provider, that in report pt mother is involved but not seen in the house. 10 minutes into call pt puts medication at nose and once done, writer inquires if there are any other medications present and pt does not respond. Writer tries to rephrase question but pt still does not answer. Writer informs provider, that pt showed 2 medications but unsure if there are any others present. Pt informed to take the two medications just shown. Writer educated pt that she does not need insulin due to  bgl being 152. Writer inquires if pt needs anything and denies. On call provider, states discussion of care will discussed in the morning.       Bushra Taveras RN on 6/6/2025 at 8:34 PM

## 2025-06-07 NOTE — PLAN OF CARE
Goal Outcome Evaluation:      Plan of Care Reviewed With: patient, parent          Outcome Evaluation: Patient and mother express that she is very ready for discharge.     Vitals assessed, IV removed, Biometric kit packaged and removed. Medications removed for destruction. AVS printed in large print and reviewed [facility] patient and caregiver. Patient remains in home in stable condition with her mother.

## 2025-06-09 ENCOUNTER — RESULTS FOLLOW-UP (OUTPATIENT)
Dept: MEDSURG UNIT | Facility: HOSPITAL | Age: 70
End: 2025-06-09

## 2025-06-09 ENCOUNTER — NURSE TRIAGE (OUTPATIENT)
Dept: FAMILY MEDICINE | Facility: CLINIC | Age: 70
End: 2025-06-09
Payer: COMMERCIAL

## 2025-06-10 ENCOUNTER — PATIENT OUTREACH (OUTPATIENT)
Dept: NURSING | Facility: CLINIC | Age: 70
End: 2025-06-10
Payer: COMMERCIAL

## 2025-06-10 ENCOUNTER — OFFICE VISIT (OUTPATIENT)
Dept: FAMILY MEDICINE | Facility: CLINIC | Age: 70
End: 2025-06-10
Payer: COMMERCIAL

## 2025-06-10 VITALS
TEMPERATURE: 98.2 F | WEIGHT: 172 LBS | HEIGHT: 55 IN | HEART RATE: 80 BPM | RESPIRATION RATE: 24 BRPM | SYSTOLIC BLOOD PRESSURE: 126 MMHG | OXYGEN SATURATION: 95 % | BODY MASS INDEX: 39.81 KG/M2 | DIASTOLIC BLOOD PRESSURE: 75 MMHG

## 2025-06-10 DIAGNOSIS — G47.33 OSA ON CPAP: ICD-10-CM

## 2025-06-10 DIAGNOSIS — J96.01 ACUTE RESPIRATORY FAILURE WITH HYPOXIA AND HYPERCAPNIA (H): ICD-10-CM

## 2025-06-10 DIAGNOSIS — R06.02 SOB (SHORTNESS OF BREATH): ICD-10-CM

## 2025-06-10 DIAGNOSIS — J15.9 COMMUNITY ACQUIRED BACTERIAL PNEUMONIA: Primary | ICD-10-CM

## 2025-06-10 DIAGNOSIS — J44.9 CHRONIC OBSTRUCTIVE PULMONARY DISEASE, UNSPECIFIED COPD TYPE (H): ICD-10-CM

## 2025-06-10 DIAGNOSIS — J96.02 ACUTE RESPIRATORY FAILURE WITH HYPOXIA AND HYPERCAPNIA (H): ICD-10-CM

## 2025-06-10 DIAGNOSIS — R06.09 DOE (DYSPNEA ON EXERTION): Primary | ICD-10-CM

## 2025-06-10 LAB
BACTERIA SPEC CULT: NO GROWTH
BACTERIA SPEC CULT: NO GROWTH

## 2025-06-10 PROCEDURE — 1111F DSCHRG MED/CURRENT MED MERGE: CPT | Performed by: PHYSICIAN ASSISTANT

## 2025-06-10 PROCEDURE — G2211 COMPLEX E/M VISIT ADD ON: HCPCS | Performed by: PHYSICIAN ASSISTANT

## 2025-06-10 PROCEDURE — 99213 OFFICE O/P EST LOW 20 MIN: CPT | Performed by: PHYSICIAN ASSISTANT

## 2025-06-10 PROCEDURE — 3078F DIAST BP <80 MM HG: CPT | Performed by: PHYSICIAN ASSISTANT

## 2025-06-10 PROCEDURE — 3074F SYST BP LT 130 MM HG: CPT | Performed by: PHYSICIAN ASSISTANT

## 2025-06-10 NOTE — PROGRESS NOTES
Clinic Care Coordination Contact    Situation: Patient chart reviewed by care coordinator.    Background: [facility] reached out to pt for initial assessment     Assessment: Pt and [facility] talked about concerns and needs. Pt noted that they feel they have everything they needs : they do not want to look for other housing options. Pt's needs are met in terms of food, housing, transportation. Pt would like other transportation options just in case there are errands she would like to run on her own.     Plan/Recommendations: [facility] sent information to pt on transportation via mail. Pt can reach out and set up rides as she chooses. Pt can reach out to this CC anytime with any questions or needs. No further outreach from [facility] at this time, CCC program closed.     Transportation:     [facility] Senior Program - The [facility] Senior Program (RASP) is dedicated to providing [id number]-[id number] transportation service. We accommodate residents age 60 and older and disabled adults who live in the [facility] [place] boundaries and select municipalities. To enroll or for more information, please call [phone], Monday through Friday, 8 a.m. to 4 p.m

## 2025-06-10 NOTE — LETTER
[facility] CARE COORDINATION  [address]  [place] [address]   Светлана 10, 2025    Arvind Leong  [address] [place] [address]      Dear Arvind,    I am a clinic care coordinator who works with Rema Whiting MD with the [facility]. I wanted to thank you for spending the time to talk with me.  Below is a description of the resources we talked about together on the phone.     Transportation:     [facility] Senior Program - The [facility] Senior Program (RASP) is dedicated to providing [id number] transportation service. We accommodate residents age 60 and older and disabled adults who live in the [facility] [place] boundaries and select municipalities. To enroll or for more information, please call [phone], Monday through Friday, 8 a.m. to 4 p.m    Please feel free to contact me with any questions or concerns regarding care coordination and what we can offer.      We are focused on providing you with the highest-quality healthcare experience possible.    Sincerely,     Ashley Bajwa, [facility] Clinical Care Coordinator  [facility], [facility], and [facility]   [phone]     Additional Information: Patient feedback is important to us and helps us continue to improve the way we care for patients as well as celebrate the things we do well. You may receive a short survey from [facility] on behalf of the care coordination team. We would appreciate hearing from you!

## 2025-06-10 NOTE — PROGRESS NOTES
Assessment & Plan     Community acquired bacterial pneumonia  Acute respiratory failure with hypoxia and hypercapnia (H)  Chronic obstructive pulmonary disease, unspecified COPD type (H)  ABDIFATAH on CPAP  - PRIMARY CARE FOLLOW-UP SCHEDULING  - Pulmonary Rehab  Referral  Patient seen today for hospital discharge follow up for CAP, COPD exacerbation, respiratory failure exacerbation.  She was admitted and treated for the above diagnoses, sent home with oral antibiotics and steroids which she has nearly completed and recommendation that she be more compliant with inhalers and nebs. She notes she is feeling better today without fever, increased work of breathing, coughing. Doing well at 2L oxygen at rest and 3L with activity. Discussed importance of follow up with pulmonary medicine as well as the risk of her noncompliance with use of CPAP with her known sleep apnea. Recommend follow up discussion regarding face mask with pulmonary team.Patient and mother agreeable to plan of care, follow up if increasing SOB, cough or decreased mobility with oxygen. Referral for pulmonary rehab also given as patient would likely benefit.    Recommend physical with PCP with discussion about need for pap.      MED REC REQUIRED  Post Medication Reconciliation Status: discharge medications reconciled, continue medications without change    Risks, benefits and alternatives were discussed with patient. Agreeable to the plan of care.    The longitudinal plan of care for the diagnosis(es)/condition(s) as documented were addressed during this visit. Due to the added complexity in care, I will continue to support the patient in the subsequent management and ongoing continuity of care.      Wendy Samuels is a 70 year old, presenting for the following health issues:  Hospital F/U (Hospital follow up.  Gets out of breath with activity.  Uses 2lts of oxygen at rest and 3 lts with activity.  Has an increase of SOB with walking short  distance to bathroom or her bedroom.  Was in the hospital and then sent home as a inpatient.  They did not have a good experience.  Concerned about pneumonia.  )      6/10/2025     8:34 AM   Additional Questions   Roomed by MADI Middleton CMA([facility])   Accompanied by Mom     HPI      Hospital Follow-up Visit:    Hospital/Nursing Home/IP Rehab Facility: [facility]  Most Recent Admission Date: 6/4/2025   Most Recent Admission Diagnosis: Acute respiratory failure with hypoxia and hypercapnia (H) - J96.01, J96.02  Exacerbation of asthma, unspecified asthma severity, unspecified whether persistent - J45.901     Most Recent Discharge Date: 6/7/2025   Most Recent Discharge Diagnosis: Acute respiratory failure with hypoxia and hypercapnia (H) - J96.01, J96.02  Exacerbation of asthma, unspecified asthma severity, unspecified whether persistent - J45.901  Community acquired bacterial pneumonia - J15.9  Hypoxia - R09.02   Do you have any other stressors you would like to discuss with your provider? No    Problems taking medications regularly:  None  Medication changes since discharge: None  Problems adhering to non-medication therapy:  None    Summary of hospitalization:  [facility] hospital discharge summary reviewed  Diagnostic Tests/Treatments reviewed.  Follow up needed: PFTs  Other Healthcare Providers Involved in Patient s Care:         Specialist appointment - Pulmonary Medicine  Update since discharge: improved.         Plan of care communicated with patient and family           Patient is here today for hospital discharge follow up.  She was seen in ER for COPD exacerbation, admitted to the hospital from 6/4/25-6/6/25 and then did the hospital in home program until formal discharge on 6/7/25  She was treated with Doxycycline and Prednisone- notes she has one dose left.  She was instructed to use her Trelegy daily which she has been and to use her albuterol nebulizer and rescue inhaler. She  "is doing her rescue inhaler 1-2 times per day and nebulizer once per day. She was also instructed to use CPAP at night- she is not doing this. Lastly, using oxygen 2 L at rest and 3L with activity which is back to her baseline. She continues to endorse some shortness of breath but is back to her baseline. No significant cough. Is very fatigued.      Review of Systems  Constitutional, HEENT, cardiovascular, pulmonary, gi and gu systems are negative, except as otherwise noted.      Objective    /75   Pulse 80   Temp 98.2  F (36.8  C) (Oral)   Resp 24   Ht 1.397 m (4' 7\")   Wt 78 kg (172 lb)   SpO2 95%   BMI 39.98 kg/m    Body mass index is 39.98 kg/m .  Physical Exam   GENERAL: alert and no distress  NECK: no adenopathy, no asymmetry, masses, or scars  RESP: inspiratory wheezes R upper posterior and R mid posterior  CV: regular rate and rhythm, normal S1 S2, no S3 or S4, no murmur, click or rub, no peripheral edema  MS: no gross musculoskeletal defects noted, no edema  PSYCH: confused by conversation            Signed Electronically by: Sadia Luu PA-C    "

## 2025-06-12 ENCOUNTER — MEDICAL CORRESPONDENCE (OUTPATIENT)
Dept: HEALTH INFORMATION MANAGEMENT | Facility: CLINIC | Age: 70
End: 2025-06-12
Payer: COMMERCIAL

## 2025-06-20 ENCOUNTER — MEDICAL CORRESPONDENCE (OUTPATIENT)
Dept: HEALTH INFORMATION MANAGEMENT | Facility: CLINIC | Age: 70
End: 2025-06-20
Payer: COMMERCIAL

## 2025-06-20 DIAGNOSIS — Z53.9 DIAGNOSIS NOT YET DEFINED: Primary | ICD-10-CM

## 2025-06-20 PROCEDURE — G0179 MD RECERTIFICATION HHA PT: HCPCS | Performed by: FAMILY MEDICINE

## 2025-06-24 DIAGNOSIS — E78.5 HYPERLIPIDEMIA LDL GOAL <70: ICD-10-CM

## 2025-06-24 RX ORDER — ATORVASTATIN CALCIUM 40 MG/1
40 TABLET, FILM COATED ORAL DAILY
Qty: 90 TABLET | Refills: 2 | Status: SHIPPED | OUTPATIENT
Start: 2025-06-24

## 2025-06-26 NOTE — TELEPHONE ENCOUNTER
Patient: Gill Romero Age: 29 year old Sex: female   MRN: [id number] : 1995 Encounter Date: 2025     Subjective   History          Chief Complaint   Patient presents with    STD     Request for testing       29-year-old female presents to the ER with possible exposure to HSV.  She reports that her partner tested positive for HSV 1.  Her last sexual encounter with him was 10 days ago.  Denies any open sores or lesions for her last sexual encounter with him.  She reports that he noticed penile sore this week and went in for testing.  She denies vaginal discharge but reports some irritation/itching.  Denies vaginal lesions or sores.  LMP was approximately 21 days ago, she should be coming on her cycle any day now.    The history is provided by the patient.       Review of Systems  A comprehensive 10 point review of systems was completed. Pertinent positives and negatives are noted here or in the above HPI.     No past medical history on file.     No past surgical history on file.    No family history on file.    Social History     Tobacco Use    Smoking status: Never     Passive exposure: Never    Smokeless tobacco: Never   Vaping Use    Vaping status: never used   Substance Use Topics    Alcohol use: Not Currently    Drug use: Never           No Known Allergies    Objective    Physical Exam        Vitals:    25 2145   BP: (!) 177/117   BP Location: LUE - Left upper extremity   Patient Position: Sitting/High-Davison's   Pulse: (!) 120   Resp: 18   Temp: 98.8 °F (37.1 °C)   TempSrc: Oral   SpO2: 100%         Physical Exam  Vitals reviewed. Exam conducted with a chaperone present (RN).   Constitutional:       General: She is not in acute distress.     Appearance: Normal appearance. She is obese.   HENT:      Head: Normocephalic.      Nose: Nose normal.      Mouth/Throat:      Mouth: Mucous membranes are moist.   Eyes:      Pupils: Pupils are equal, round, and reactive to light.   Cardiovascular:  New Appointment Needed  What is the reason for the visit:    follow up blood sugar and discuss other concerns  Provider Preference: PCP only  How soon do you need to be seen?: as soon as she is able to fit her in the schedule  Waitlist offered?: No  Okay to leave a detailed message:  Yes          Rate and Rhythm: Normal rate.      Pulses: Normal pulses.   Pulmonary:      Effort: Pulmonary effort is normal.   Abdominal:      General: Abdomen is flat. Bowel sounds are normal.      Palpations: Abdomen is soft.   Genitourinary:     Exam position: Lithotomy position.      Labia:         Right: No rash or tenderness.         Left: No rash or tenderness.       Vagina: No signs of injury and foreign body. Vaginal discharge present. No erythema, tenderness or lesions.      Cervix: Normal.      Uterus: Normal.       Comments: white discharge in the vaginal canal  Skin:     General: Skin is warm.      Capillary Refill: Capillary refill takes less than 2 seconds.   Neurological:      Mental Status: She is alert.           Assessment & Plan     Results     Results for orders placed or performed during the hospital encounter of 06/25/25   WET MOUNT    Specimen: Vagina; Swab   Result Value Ref Range    CLUE CELLS, WET MOUNT None Seen None Seen    TRICHOMONAS, WET MOUNT None Seen None Seen    YEAST, WET MOUNT None Seen None Seen   Beta HCG Quantitative Pregnancy    Specimen: Blood, Venous   Result Value Ref Range    HCG, Quantitative <3 <=4 mUnits/mL       No results found for this or any previous visit.     Imaging Results    None           ED Course          ED Course as of 06/25/25 2337   Wed Jun 25, 2025 2330 WET MOUNT:    Clue Cells None Seen   Trichomonas None Seen   YEAST None Seen  Negative   [JK]      ED Course User Index  [JK] Cristal Molina, CNP       Procedures    ED Medication Orders (From admission, onward)      None                  Medical Decision Making  This is a well-appearing, nontoxic 29-year-old female that is requesting STD testing.  She reports that her partner cheated on her and recently tested positive for HSV 1.  She does not have any sores or lesions, but may have some vaginal irritation and would like testing.  Performed wet mount and gonorrhea/chlamydia via pelvic exam, wet mount  negative.  Labs drawn for HSV 1/2, HIV, syphilis and urine pregnancy.  Will discharge home and will contact with results and treat accordingly.  Discussed refraining from sexual activity until results return.             Summary of your Discharge Medications      You have not been prescribed any medications.         Clinical Impression     ED Diagnosis   1. Concern about STD in female without diagnosis            Disposition     Discharge 6/25/2025 11:32 PM  Gill Romero discharge to home/self care.             Cristal RAMON  [facility], Emergency Department  [address] [address]  internal:        Cristal Molina, CNP  06/25/25 3650

## 2025-07-07 ENCOUNTER — OFFICE VISIT (OUTPATIENT)
Dept: PULMONOLOGY | Facility: CLINIC | Age: 70
End: 2025-07-07
Payer: COMMERCIAL

## 2025-07-07 VITALS
DIASTOLIC BLOOD PRESSURE: 61 MMHG | OXYGEN SATURATION: 93 % | BODY MASS INDEX: 39.51 KG/M2 | HEART RATE: 95 BPM | WEIGHT: 170 LBS | SYSTOLIC BLOOD PRESSURE: 104 MMHG

## 2025-07-07 DIAGNOSIS — J44.89 COPD WITH ASTHMA (H): ICD-10-CM

## 2025-07-07 DIAGNOSIS — G47.33 OSA (OBSTRUCTIVE SLEEP APNEA): ICD-10-CM

## 2025-07-07 DIAGNOSIS — J31.0 CHRONIC RHINITIS: ICD-10-CM

## 2025-07-07 DIAGNOSIS — E66.9 OBESITY (BMI 30-39.9): ICD-10-CM

## 2025-07-07 DIAGNOSIS — R53.81 PHYSICAL DECONDITIONING: ICD-10-CM

## 2025-07-07 DIAGNOSIS — J44.9 CHRONIC OBSTRUCTIVE PULMONARY DISEASE, UNSPECIFIED COPD TYPE (H): ICD-10-CM

## 2025-07-07 DIAGNOSIS — I42.9 CARDIOMYOPATHY, UNSPECIFIED TYPE (H): ICD-10-CM

## 2025-07-07 DIAGNOSIS — J96.11 CHRONIC RESPIRATORY FAILURE WITH HYPOXIA (H): Primary | ICD-10-CM

## 2025-07-07 PROCEDURE — 3074F SYST BP LT 130 MM HG: CPT | Performed by: INTERNAL MEDICINE

## 2025-07-07 PROCEDURE — 99214 OFFICE O/P EST MOD 30 MIN: CPT | Performed by: INTERNAL MEDICINE

## 2025-07-07 PROCEDURE — G2211 COMPLEX E/M VISIT ADD ON: HCPCS | Performed by: INTERNAL MEDICINE

## 2025-07-07 PROCEDURE — 3078F DIAST BP <80 MM HG: CPT | Performed by: INTERNAL MEDICINE

## 2025-07-07 RX ORDER — IPRATROPIUM BROMIDE 42 UG/1
2 SPRAY, METERED NASAL 2 TIMES DAILY
Qty: 15 ML | Refills: 12 | Status: SHIPPED | OUTPATIENT
Start: 2025-07-07

## 2025-07-07 ASSESSMENT — ASTHMA QUESTIONNAIRES
QUESTION_5 LAST FOUR WEEKS HOW WOULD YOU RATE YOUR ASTHMA CONTROL: POORLY CONTROLLED
QUESTION_3 LAST FOUR WEEKS HOW OFTEN DID YOUR ASTHMA SYMPTOMS (WHEEZING, COUGHING, SHORTNESS OF BREATH, CHEST TIGHTNESS OR PAIN) WAKE YOU UP AT NIGHT OR EARLIER THAN USUAL IN THE MORNING: FOUR OR MORE NIGHTS A WEEK
ACT_TOTALSCORE: 9
QUESTION_1 LAST FOUR WEEKS HOW MUCH OF THE TIME DID YOUR ASTHMA KEEP YOU FROM GETTING AS MUCH DONE AT WORK, SCHOOL OR AT HOME: ALL OF THE TIME
QUESTION_4 LAST FOUR WEEKS HOW OFTEN HAVE YOU USED YOUR RESCUE INHALER OR NEBULIZER MEDICATION (SUCH AS ALBUTEROL): ONCE A WEEK OR LESS
ACT_TOTALSCORE: 9
QUESTION_2 LAST FOUR WEEKS HOW OFTEN HAVE YOU HAD SHORTNESS OF BREATH: MORE THAN ONCE A DAY

## 2025-07-07 NOTE — PATIENT INSTRUCTIONS
Continue O2 supplementation 2 LPM with activities.   ATROVENT 2 sprays each nostril twice a day   TRELEGY one puff daily, rinse your mouth with water after each use  Albuterol inhaler 2 puffs up to 6 times a day as needed  Albuterol nebs as needed  CPAP therapy   Increase activity as tolerated  Referral to pulmonary rehab  Weight loss  Follow up in 6 months

## 2025-07-09 NOTE — PROGRESS NOTES
PULMONARY OUTPATIENT FOLLOW UP NOTE     Assessment:     Chronic respiratory failure on home O2  Asthma / COPD overlap syndrome   Spirometry showed FEV1 0.52 L (31%), no significant post bronchodilator response.   Recently treated for asthma/COPD exacerbation.  Continue ICS/LAMA/LABA. Albuterol as needed.  Continue O2 2 LPM at rest and 3 LPM with activities.   Referral to pulmonary rehab   ABDIFATAH  Sleep Study done on 2/17/2014 showed AHI 14.7, RDI 14.7, lateral , REM AHI 45, Supine AHI 14.3. PML index 21.7. Mean O2 sat 87%, desaturation heide 61%, Time of SpO2 < 89% 44 minutes out of 138 minutes of diagnostic time. CPAP 7 cmH20 was therapeutic.   Sleep hygiene was discussed, continue CPAP therapy at night.   Bipolar / Anxiety disorder  Severe physical deconditioning         Referral to physical therapy   Obesity Body mass index is 39.51 kg/m .    Plan:     Continue O2 supplementation 2 LPM with activities.   ATROVENT 2 sprays each nostril twice a day   TRELEGY one puff daily, rinse your mouth with water after each use  Albuterol inhaler 2 puffs up to 6 times a day as needed  Albuterol nebs as needed  CPAP therapy   Increase activity as tolerated  Referral to pulmonary rehab  Weight loss  Follow up in 6 months     [facility]  Pulmonary / Critical Care  7/7/2025       Chief Complaint   Patient presents with    Follow Up     Hospital        HPI:      Wendy Leong is an 70 y.o. female who presents for follow up.   Patient has history of asthma / COPD on home O2, ABDIFATAH, bipolar / anxiety disorder, Meniere disease, obesity Body mass index is 39.51 kg/m .  Patient was seen last in the pulmonary clinic on 7/19/2022.  Recent hospitalization from 6/4 to 6/7/2025 for evaluation of dyspnea, diagnosed with acute hypercapnic and hypoxic respiratory failure, treated with BiPAP therapy, steroids, antibiotics.  O2 supplementation was weaned down to 2 LPM at rest and 3 LPM with activities.    Reports doing well since hospitalization. Reports exertional dyspnea. Activity level is down.   Difficulty walking over 1/2 block. Report occasional cough and wheezes.   Uses TRELEGY daily and albuterol HFA or albuterol nebs as needed.   No chest pain, mild swelling of LEs. Mild orthopnea or PND.   Uses CPAP machine.     No acid reflux symptoms.     Previous tobacco use 1/2 ppd for 47 years. Quit 2/2017.     Past Medical History:   Diagnosis Date    Acute on chronic respiratory failure with hypoxia (H) 11/15/2016    Bipolar 1 disorder (H)     CAP (community acquired pneumonia) 11/15/2016    Cervical high risk HPV (human papillomavirus) test positive 3/5/2018    3/5/18 NIL pap, +HR HPV (not 16/18) 5/21/21 NIL pap, neg HPV. Plan: await provider    COPD exacerbation (H) 4/24/2021    COPD with exacerbation (H) 11/15/2016    Diabetes mellitus, type II (H)     Hearing loss     Meniere's disease     Pneumonia 4/23/2021    Pneumonia of right lower lobe due to infectious organism 6/11/2019     Current Outpatient Medications   Medication Sig Dispense Refill    albuterol (PROAIR HFA/PROVENTIL HFA/VENTOLIN HFA) 108 (90 Base) MCG/ACT inhaler INHALE 2 PUFFS INTO THE LUNGS EVERY 6 HOURS AS NEEDED FOR SHORTNESS OF BREATH OR WHEEZING 18 g 2    albuterol (PROVENTIL) (2.5 MG/3ML) 0.083% neb solution Take 1 vial (2.5 mg) by nebulization every 4 hours as needed for shortness of breath or wheezing. Take 1 vial four times a day for the next 5 days then go back to as needed 240 mL 12    ARIPiprazole ER (ABILIFY MAINTENA) 400 MG extended release inj syringe Inject 400 mg into the muscle every 28 days      blood glucose (NO BRAND SPECIFIED) lancets standard Use to test blood sugar 4 times daily before meals and at bedtime 1 each 1    blood glucose (NO BRAND SPECIFIED) test strip Use to test blood sugar 4 times daily before meals and at bedtime 100 strip 0    blood glucose monitoring (ONETOUCH VERIO) meter device kit Use to test blood sugar  4 times daily before meals and bedtie 1 kit 1    fluticasone (FLONASE) 50 MCG/ACT nasal spray Spray 1 spray into both nostrils daily 9.9 mL 1    Fluticasone-Umeclidin-Vilant (TRELEGY ELLIPTA) 100-62.5-25 MCG/ACT oral inhaler Inhale 1 puff into the lungs daily. 60 each 5    ipratropium (ATROVENT) 0.06 % nasal spray Spray 2 sprays into both nostrils 2 times daily. 15 mL 12    Lancets (ONETOUCH DELICA PLUS [id number]) MISC TEST FOUR TIMES DAILY BEFORE MEALS AND AT BEDTIME      metFORMIN (GLUCOPHAGE) 1000 MG tablet Take 1 tablet (1,000 mg) by mouth 2 times daily (with meals). 180 tablet 3    tirzepatide (MOUNJARO) 15 MG/0.5ML SOAJ auto-injector pen Inject 0.5 mLs (15 mg) subcutaneously once a week. 2 mL 3    tolterodine ER (DETROL LA) 4 MG 24 hr capsule Take 1 capsule (4 mg) by mouth daily. 90 capsule 3    atorvastatin (LIPITOR) 40 MG tablet Take 1 tablet (40 mg) by mouth daily. (Patient not taking: Reported on 2025) 90 tablet 2    melatonin 3 MG tablet Take 1 tablet (3 mg) by mouth nightly as needed for sleep. (Patient not taking: Reported on 2025) 30 tablet 5     No current facility-administered medications for this visit.      Social History            Socioeconomic History    Marital status:        Spouse name: Not on file    Number of children: Not on file    Years of education: Not on file    Highest education level: Not on file   Occupational History    Occupation: /[employer]- quit 2019   Social Needs    Financial resource strain: Not on file    Food insecurity       Worry: Not on file       Inability: Not on file    Transportation needs       Medical: Not on file       Non-medical: Not on file   Tobacco Use    Smoking status: Former Smoker       Packs/day: 1.00       Years: 40.00       Pack years: 40.00       Quit date: 2017       Years since quittin.3    Smokeless tobacco: Never Used   Substance and Sexual Activity    Alcohol use: Yes       Comment: Occasional     Drug use: No     Sexual activity: Never   Lifestyle    Physical activity       Days per week: Not on file       Minutes per session: Not on file    Stress: Not on file   Relationships    Social connections       Talks on phone: Not on file       Gets together: Not on file       Attends Yazdanism service: Not on file       Active member of club or organization: Not on file       Attends meetings of clubs or organizations: Not on file       Relationship status: Not on file    Intimate partner violence       Fear of current or ex partner: Not on file       Emotionally abused: Not on file       Physically abused: Not on file       Forced sexual activity: Not on file   Other Topics Concern    Not on file   Social History Narrative     2019The patient lives with her mother.; had worked at [employer] as [employer] but quit 2019.      Quit smoking ; no etoh problems     / x 2 ; no kids            Family History   Problem Relation Age of Onset    Aneurysm Father      Heart disease Father 70         bypass in 70s, AAA rupture at age 77     Ulcers Father 76    Pacemaker Mother      Bipolar disorder Brother      Breast cancer Maternal Grandmother 51    Kidney failure Maternal Grandmother      Cancer Paternal Grandmother           ?? lung    Cancer Paternal Uncle           ?? lung    Mental illness Maternal Grandfather      Heart disease Other           uncle    No Medical Problems Sister           two siblings abuse chemicals    No Medical Problems Brother      Bipolar disorder Nephew        Review of Systems  A 12 point comprehensive review of systems was negative except as noted.        Objective:      /61   Pulse 95   Wt 77.1 kg (170 lb)   SpO2 93%   BMI 39.51 kg/m         Gen: obese, awake, alert, no distress  HEENT: pink conjunctiva, moist mucosa, Mallampati III/IV  Neck: no thyromegaly, masses or JVD  Lungs: clear  CV: regular, no murmurs or gallops appreciated  Abdomen: soft, distended, NT, BS wnl  Ext:  trace edema  Neuro: CN II-XII intact, non focal      Diagnostic tests:      Sleep Study (2/17/2014)  AHI 14.7, RDI 14.7, lateral , REM AHI 45, Supine   PML index 21.7  Mean O2 sat 87%, desaturation heide 61%, Time of SpO2 < 89% 44 minutes out of 138 minutes of diagnostic time.   CPAP 7 cmH20 was therapeutic      PFT's 3/8/16  FEV1/FVC is 81  FEV1 is 0.52L (31%) predicted and is reduced.  FVC is 0.64L (30%) predicted and reduced.  There was no improvement in spirometry after a single inhaled dose of bronchodilator.  TLC is 4.05L (118%) predicted and is normal.  RV is 3.26L (226%) predicted and is increased.  Flow volume loops indicate scooped expiratory limb.     CTA CHEST PE RUN 9/26/2017 12:03 PM  INDICATION: acute on chronic respiratory failure  COMPARISON: 10/31/2013  ANGIOGRAM CHEST: There is moderate motion artifact which results in regions of modeled vascular enhancement. In the highest degree of vascular blurring and mottled enhancement, it is impossible to exclude small emboli but no convincing evidence for emboli present. The overall appearance is very similar to the previous exam.  LUNGS AND PLEURA: Mild peripheral bilateral atelectasis or scar also similar to previous exam, no new pneumonia.  MEDIASTINUM: Small reactive lymph nodes within anterior mediastinum unchanged, no suspicious adenopathy. Heart size upper limits of normal. Tortuous thoracic aorta.  LIMITED UPPER ABDOMEN: Negative.  MUSCULOSKELETAL: S-shaped scoliosis of thoracic and lumbar spine unchanged.  CONCLUSION:  1.  No significant change. No convincing evidence of pulmonary embolism.  2.  Stable atelectasis or scar, left lung base. Scoliosis.     CT CHEST PULMONARY EMBOLISM W CONTRAST  LOCATION: [facility]  DATE: 6/5/2025  INDICATION: sob  COMPARISON: 5/7/2025.  FINDINGS:  ANGIOGRAM CHEST: Pulmonary arteries are normal caliber and negative for pulmonary emboli. Thoracic aorta is negative for  dissection. No CT evidence of right heart strain.  LUNGS AND PLEURA: Mild atelectasis and scarring in the lower lungs. Chronic atelectasis in the right middle lobe anteriorly.  MEDIASTINUM/AXILLAE: There is no lymph node enlargement. The thoracic aorta is tortuous. No aortic aneurysm or dissection. The heart size is normal.  CORONARY ARTERY CALCIFICATION: None.  UPPER ABDOMEN: No acute upper abdominal abnormality.   MUSCULOSKELETAL: Degenerative disease and scoliosis in the spine.  IMPRESSION:  1.  There is no pulmonary embolus, aortic aneurysm or dissection. No acute abnormality     Echocardiogram 1/3/2014  EF 75%, IVSd 1.3  Normal RV  No significant valvular disease.     Echocardiogram 10/17/2017    When compared to the previous study dated 1/3/2014, no significant change     Left ventricle ejection fraction is normal. The calculated left ventricular ejection fraction is 63%.     No significant valvular abnormalities

## 2025-07-11 ENCOUNTER — HOSPITAL ENCOUNTER (INPATIENT)
Facility: HOSPITAL | Age: 70
LOS: 3 days | Discharge: HOME | End: 2025-07-14
Attending: EMERGENCY MEDICINE | Admitting: HOSPITALIST
Payer: COMMERCIAL

## 2025-07-11 ENCOUNTER — APPOINTMENT (OUTPATIENT)
Dept: RADIOLOGY | Facility: HOSPITAL | Age: 70
End: 2025-07-11
Attending: EMERGENCY MEDICINE
Payer: COMMERCIAL

## 2025-07-11 DIAGNOSIS — J96.22 ACUTE ON CHRONIC RESPIRATORY FAILURE WITH HYPOXIA AND HYPERCAPNIA (H): ICD-10-CM

## 2025-07-11 DIAGNOSIS — J96.21 ACUTE ON CHRONIC RESPIRATORY FAILURE WITH HYPOXIA AND HYPERCAPNIA (H): ICD-10-CM

## 2025-07-11 DIAGNOSIS — G47.33 OSA ON CPAP: Primary | ICD-10-CM

## 2025-07-11 DIAGNOSIS — Z71.89 OTHER SPECIFIED COUNSELING: Chronic | ICD-10-CM

## 2025-07-11 DIAGNOSIS — J44.1 COPD EXACERBATION (H): ICD-10-CM

## 2025-07-11 PROBLEM — J45.901 EXACERBATION OF ASTHMA, UNSPECIFIED ASTHMA SEVERITY, UNSPECIFIED WHETHER PERSISTENT: Status: RESOLVED | Noted: 2025-06-05 | Resolved: 2025-07-11

## 2025-07-11 PROBLEM — J98.4 RESTRICTIVE LUNG DISEASE: Status: ACTIVE | Noted: 2025-07-11

## 2025-07-11 LAB
ANION GAP SERPL CALCULATED.3IONS-SCNC: 3 MMOL/L (ref 7–15)
BASE EXCESS BLDV CALC-SCNC: 7.7 MMOL/L (ref -3–3)
BASE EXCESS BLDV CALC-SCNC: 8.3 MMOL/L (ref -3–3)
BASE EXCESS BLDV CALC-SCNC: 8.5 MMOL/L (ref -3–3)
BASOPHILS # BLD AUTO: 0.1 10E3/UL (ref 0–0.2)
BASOPHILS NFR BLD AUTO: 1 %
BUN SERPL-MCNC: 8.6 MG/DL (ref 8–23)
CALCIUM SERPL-MCNC: 9.3 MG/DL (ref 8.8–10.4)
CHLORIDE SERPL-SCNC: 101 MMOL/L (ref 98–107)
CREAT SERPL-MCNC: 0.73 MG/DL (ref 0.51–0.95)
D DIMER PPP FEU-MCNC: 0.34 UG/ML FEU (ref 0–0.5)
EGFRCR SERPLBLD CKD-EPI 2021: 89 ML/MIN/1.73M2
EOSINOPHIL # BLD AUTO: 0.1 10E3/UL (ref 0–0.7)
EOSINOPHIL NFR BLD AUTO: 2 %
ERYTHROCYTE [DISTWIDTH] IN BLOOD BY AUTOMATED COUNT: 12.9 % (ref 10–15)
FLUAV RNA SPEC QL NAA+PROBE: NEGATIVE
FLUBV RNA RESP QL NAA+PROBE: NEGATIVE
GLUCOSE BLDC GLUCOMTR-MCNC: 361 MG/DL (ref 70–99)
GLUCOSE BLDC GLUCOMTR-MCNC: 377 MG/DL (ref 70–99)
GLUCOSE SERPL-MCNC: 178 MG/DL (ref 70–99)
HCO3 BLDV-SCNC: 38 MMOL/L (ref 21–28)
HCO3 BLDV-SCNC: 39 MMOL/L (ref 21–28)
HCO3 BLDV-SCNC: 39 MMOL/L (ref 21–28)
HCO3 SERPL-SCNC: 40 MMOL/L (ref 22–29)
HCT VFR BLD AUTO: 43.3 % (ref 35–47)
HGB BLD-MCNC: 13 G/DL (ref 11.7–15.7)
IMM GRANULOCYTES # BLD: 0 10E3/UL
IMM GRANULOCYTES NFR BLD: 0 %
LYMPHOCYTES # BLD AUTO: 3.5 10E3/UL (ref 0.8–5.3)
LYMPHOCYTES NFR BLD AUTO: 36 %
MAGNESIUM SERPL-MCNC: 1.9 MG/DL (ref 1.7–2.3)
MCH RBC QN AUTO: 28.5 PG (ref 26.5–33)
MCHC RBC AUTO-ENTMCNC: 30 G/DL (ref 31.5–36.5)
MCV RBC AUTO: 95 FL (ref 78–100)
MONOCYTES # BLD AUTO: 0.6 10E3/UL (ref 0–1.3)
MONOCYTES NFR BLD AUTO: 6 %
NEUTROPHILS # BLD AUTO: 5.3 10E3/UL (ref 1.6–8.3)
NEUTROPHILS NFR BLD AUTO: 55 %
NRBC # BLD AUTO: 0 10E3/UL
NRBC BLD AUTO-RTO: 0 /100
NT-PROBNP SERPL-MCNC: 102 PG/ML (ref 0–353)
NT-PROBNP SERPL-MCNC: 132 PG/ML (ref 0–353)
O2/TOTAL GAS SETTING VFR VENT: 35 %
O2/TOTAL GAS SETTING VFR VENT: 40 %
O2/TOTAL GAS SETTING VFR VENT: 60 %
OXYHGB MFR BLDV: 62 % (ref 70–75)
OXYHGB MFR BLDV: 70 % (ref 70–75)
OXYHGB MFR BLDV: 70 % (ref 70–75)
PCO2 BLDV: 81 MM HG (ref 40–50)
PCO2 BLDV: 88 MM HG (ref 40–50)
PCO2 BLDV: 90 MM HG (ref 40–50)
PH BLDV: 7.25 [PH] (ref 7.32–7.43)
PH BLDV: 7.25 [PH] (ref 7.32–7.43)
PH BLDV: 7.28 [PH] (ref 7.32–7.43)
PLATELET # BLD AUTO: 210 10E3/UL (ref 150–450)
PO2 BLDV: 36 MM HG (ref 25–47)
PO2 BLDV: 40 MM HG (ref 25–47)
PO2 BLDV: 41 MM HG (ref 25–47)
POTASSIUM SERPL-SCNC: 4.1 MMOL/L (ref 3.4–5.3)
RBC # BLD AUTO: 4.56 10E6/UL (ref 3.8–5.2)
RSV RNA SPEC NAA+PROBE: NEGATIVE
SAO2 % BLDV: 62.7 % (ref 70–75)
SAO2 % BLDV: 70.9 % (ref 70–75)
SAO2 % BLDV: 70.9 % (ref 70–75)
SARS-COV-2 RNA RESP QL NAA+PROBE: NEGATIVE
SODIUM SERPL-SCNC: 144 MMOL/L (ref 135–145)
TROPONIN T SERPL HS-MCNC: 12 NG/L
WBC # BLD AUTO: 9.6 10E3/UL (ref 4–11)

## 2025-07-11 PROCEDURE — 36415 COLL VENOUS BLD VENIPUNCTURE: CPT | Performed by: EMERGENCY MEDICINE

## 2025-07-11 PROCEDURE — 83880 ASSAY OF NATRIURETIC PEPTIDE: CPT | Performed by: EMERGENCY MEDICINE

## 2025-07-11 PROCEDURE — 250N000012 HC RX MED GY IP 250 OP 636 PS 637: Performed by: HOSPITALIST

## 2025-07-11 PROCEDURE — 99285 EMERGENCY DEPT VISIT HI MDM: CPT | Mod: 25

## 2025-07-11 PROCEDURE — 258N000003 HC RX IP 258 OP 636: Performed by: HOSPITALIST

## 2025-07-11 PROCEDURE — 120N000001 HC R&B MED SURG/OB

## 2025-07-11 PROCEDURE — 84484 ASSAY OF TROPONIN QUANT: CPT | Performed by: EMERGENCY MEDICINE

## 2025-07-11 PROCEDURE — 250N000011 HC RX IP 250 OP 636: Performed by: HOSPITALIST

## 2025-07-11 PROCEDURE — 87637 SARSCOV2&INF A&B&RSV AMP PRB: CPT | Performed by: EMERGENCY MEDICINE

## 2025-07-11 PROCEDURE — 94640 AIRWAY INHALATION TREATMENT: CPT | Mod: 76

## 2025-07-11 PROCEDURE — 82805 BLOOD GASES W/O2 SATURATION: CPT | Performed by: EMERGENCY MEDICINE

## 2025-07-11 PROCEDURE — 999N000157 HC STATISTIC RCP TIME EA 10 MIN

## 2025-07-11 PROCEDURE — 96374 THER/PROPH/DIAG INJ IV PUSH: CPT

## 2025-07-11 PROCEDURE — 80048 BASIC METABOLIC PNL TOTAL CA: CPT | Performed by: EMERGENCY MEDICINE

## 2025-07-11 PROCEDURE — 83735 ASSAY OF MAGNESIUM: CPT | Performed by: HOSPITALIST

## 2025-07-11 PROCEDURE — 99223 1ST HOSP IP/OBS HIGH 75: CPT | Performed by: HOSPITALIST

## 2025-07-11 PROCEDURE — 85004 AUTOMATED DIFF WBC COUNT: CPT | Performed by: EMERGENCY MEDICINE

## 2025-07-11 PROCEDURE — 71046 X-RAY EXAM CHEST 2 VIEWS: CPT

## 2025-07-11 PROCEDURE — 250N000011 HC RX IP 250 OP 636: Performed by: INTERNAL MEDICINE

## 2025-07-11 PROCEDURE — 94640 AIRWAY INHALATION TREATMENT: CPT

## 2025-07-11 PROCEDURE — 250N000009 HC RX 250: Performed by: HOSPITALIST

## 2025-07-11 PROCEDURE — 999N000156 HC STATISTIC RCP CONSULT EA 30 MIN

## 2025-07-11 PROCEDURE — 250N000009 HC RX 250: Performed by: EMERGENCY MEDICINE

## 2025-07-11 PROCEDURE — 250N000013 HC RX MED GY IP 250 OP 250 PS 637: Performed by: HOSPITALIST

## 2025-07-11 PROCEDURE — 99222 1ST HOSP IP/OBS MODERATE 55: CPT | Performed by: INTERNAL MEDICINE

## 2025-07-11 PROCEDURE — 85379 FIBRIN DEGRADATION QUANT: CPT | Performed by: EMERGENCY MEDICINE

## 2025-07-11 PROCEDURE — 82962 GLUCOSE BLOOD TEST: CPT

## 2025-07-11 PROCEDURE — 250N000011 HC RX IP 250 OP 636: Mod: JZ | Performed by: EMERGENCY MEDICINE

## 2025-07-11 PROCEDURE — 93005 ELECTROCARDIOGRAM TRACING: CPT | Performed by: EMERGENCY MEDICINE

## 2025-07-11 PROCEDURE — 94660 CPAP INITIATION&MGMT: CPT

## 2025-07-11 RX ORDER — ONDANSETRON 4 MG/1
4 TABLET, ORALLY DISINTEGRATING ORAL EVERY 6 HOURS PRN
Status: DISCONTINUED | OUTPATIENT
Start: 2025-07-11 | End: 2025-07-14 | Stop reason: HOSPADM

## 2025-07-11 RX ORDER — CALCIUM CARBONATE 500 MG/1
1000 TABLET, CHEWABLE ORAL 2 TIMES DAILY PRN
Status: DISCONTINUED | OUTPATIENT
Start: 2025-07-11 | End: 2025-07-14 | Stop reason: HOSPADM

## 2025-07-11 RX ORDER — NICOTINE POLACRILEX 4 MG
15-30 LOZENGE BUCCAL
Status: DISCONTINUED | OUTPATIENT
Start: 2025-07-11 | End: 2025-07-14 | Stop reason: HOSPADM

## 2025-07-11 RX ORDER — ENOXAPARIN SODIUM 100 MG/ML
40 INJECTION SUBCUTANEOUS EVERY 24 HOURS
Status: DISCONTINUED | OUTPATIENT
Start: 2025-07-11 | End: 2025-07-14 | Stop reason: HOSPADM

## 2025-07-11 RX ORDER — METHYLPREDNISOLONE SODIUM SUCCINATE 40 MG/ML
40 INJECTION INTRAMUSCULAR; INTRAVENOUS EVERY 12 HOURS
Status: DISCONTINUED | OUTPATIENT
Start: 2025-07-11 | End: 2025-07-12

## 2025-07-11 RX ORDER — HYDRALAZINE HYDROCHLORIDE 20 MG/ML
10 INJECTION INTRAMUSCULAR; INTRAVENOUS EVERY 4 HOURS PRN
Status: DISCONTINUED | OUTPATIENT
Start: 2025-07-11 | End: 2025-07-14 | Stop reason: HOSPADM

## 2025-07-11 RX ORDER — FUROSEMIDE 10 MG/ML
20 INJECTION INTRAMUSCULAR; INTRAVENOUS ONCE
Status: COMPLETED | OUTPATIENT
Start: 2025-07-11 | End: 2025-07-11

## 2025-07-11 RX ORDER — IPRATROPIUM BROMIDE AND ALBUTEROL SULFATE 2.5; .5 MG/3ML; MG/3ML
3 SOLUTION RESPIRATORY (INHALATION)
Status: DISCONTINUED | OUTPATIENT
Start: 2025-07-11 | End: 2025-07-14 | Stop reason: HOSPADM

## 2025-07-11 RX ORDER — POLYETHYLENE GLYCOL 3350 17 G/17G
17 POWDER, FOR SOLUTION ORAL 2 TIMES DAILY PRN
Status: DISCONTINUED | OUTPATIENT
Start: 2025-07-11 | End: 2025-07-14 | Stop reason: HOSPADM

## 2025-07-11 RX ORDER — ACETAMINOPHEN 650 MG/1
650 SUPPOSITORY RECTAL EVERY 4 HOURS PRN
Status: DISCONTINUED | OUTPATIENT
Start: 2025-07-11 | End: 2025-07-14 | Stop reason: HOSPADM

## 2025-07-11 RX ORDER — HYDRALAZINE HYDROCHLORIDE 10 MG/1
10 TABLET, FILM COATED ORAL EVERY 4 HOURS PRN
Status: DISCONTINUED | OUTPATIENT
Start: 2025-07-11 | End: 2025-07-14 | Stop reason: HOSPADM

## 2025-07-11 RX ORDER — AMOXICILLIN 250 MG
2 CAPSULE ORAL 2 TIMES DAILY PRN
Status: DISCONTINUED | OUTPATIENT
Start: 2025-07-11 | End: 2025-07-14 | Stop reason: HOSPADM

## 2025-07-11 RX ORDER — ACETAMINOPHEN 325 MG/1
650 TABLET ORAL EVERY 4 HOURS PRN
Status: DISCONTINUED | OUTPATIENT
Start: 2025-07-11 | End: 2025-07-14 | Stop reason: HOSPADM

## 2025-07-11 RX ORDER — METHYLPREDNISOLONE SODIUM SUCCINATE 125 MG/2ML
125 INJECTION INTRAMUSCULAR; INTRAVENOUS ONCE
Status: COMPLETED | OUTPATIENT
Start: 2025-07-11 | End: 2025-07-11

## 2025-07-11 RX ORDER — TOLTERODINE 2 MG/1
4 CAPSULE, EXTENDED RELEASE ORAL DAILY
Status: DISCONTINUED | OUTPATIENT
Start: 2025-07-11 | End: 2025-07-14 | Stop reason: HOSPADM

## 2025-07-11 RX ORDER — DEXTROSE MONOHYDRATE 25 G/50ML
25-50 INJECTION, SOLUTION INTRAVENOUS
Status: DISCONTINUED | OUTPATIENT
Start: 2025-07-11 | End: 2025-07-14 | Stop reason: HOSPADM

## 2025-07-11 RX ORDER — AZITHROMYCIN 250 MG/1
250 TABLET, FILM COATED ORAL DAILY
Status: DISCONTINUED | OUTPATIENT
Start: 2025-07-12 | End: 2025-07-14 | Stop reason: HOSPADM

## 2025-07-11 RX ORDER — BISACODYL 10 MG
10 SUPPOSITORY, RECTAL RECTAL DAILY PRN
Status: DISCONTINUED | OUTPATIENT
Start: 2025-07-11 | End: 2025-07-14 | Stop reason: HOSPADM

## 2025-07-11 RX ORDER — CETIRIZINE HYDROCHLORIDE 5 MG/1
5 TABLET ORAL DAILY
Status: DISCONTINUED | OUTPATIENT
Start: 2025-07-11 | End: 2025-07-14 | Stop reason: HOSPADM

## 2025-07-11 RX ORDER — ONDANSETRON 2 MG/ML
4 INJECTION INTRAMUSCULAR; INTRAVENOUS EVERY 6 HOURS PRN
Status: DISCONTINUED | OUTPATIENT
Start: 2025-07-11 | End: 2025-07-14 | Stop reason: HOSPADM

## 2025-07-11 RX ORDER — LIDOCAINE 40 MG/G
CREAM TOPICAL
Status: DISCONTINUED | OUTPATIENT
Start: 2025-07-11 | End: 2025-07-14 | Stop reason: HOSPADM

## 2025-07-11 RX ORDER — PROCHLORPERAZINE MALEATE 5 MG/1
5 TABLET ORAL EVERY 6 HOURS PRN
Status: DISCONTINUED | OUTPATIENT
Start: 2025-07-11 | End: 2025-07-14 | Stop reason: HOSPADM

## 2025-07-11 RX ORDER — IPRATROPIUM BROMIDE AND ALBUTEROL SULFATE 2.5; .5 MG/3ML; MG/3ML
3 SOLUTION RESPIRATORY (INHALATION)
Status: COMPLETED | OUTPATIENT
Start: 2025-07-11 | End: 2025-07-11

## 2025-07-11 RX ORDER — AMOXICILLIN 250 MG
1 CAPSULE ORAL 2 TIMES DAILY PRN
Status: DISCONTINUED | OUTPATIENT
Start: 2025-07-11 | End: 2025-07-14 | Stop reason: HOSPADM

## 2025-07-11 RX ADMIN — AZITHROMYCIN MONOHYDRATE 500 MG: 500 INJECTION, POWDER, LYOPHILIZED, FOR SOLUTION INTRAVENOUS at 13:36

## 2025-07-11 RX ADMIN — IPRATROPIUM BROMIDE AND ALBUTEROL SULFATE 3 ML: .5; 3 SOLUTION RESPIRATORY (INHALATION) at 06:53

## 2025-07-11 RX ADMIN — IPRATROPIUM BROMIDE AND ALBUTEROL SULFATE 3 ML: .5; 3 SOLUTION RESPIRATORY (INHALATION) at 12:37

## 2025-07-11 RX ADMIN — CETIRIZINE HYDROCHLORIDE 5 MG: 5 TABLET ORAL at 16:47

## 2025-07-11 RX ADMIN — INSULIN ASPART 10 UNITS: 100 INJECTION, SOLUTION INTRAVENOUS; SUBCUTANEOUS at 18:32

## 2025-07-11 RX ADMIN — IPRATROPIUM BROMIDE AND ALBUTEROL SULFATE 3 ML: .5; 3 SOLUTION RESPIRATORY (INHALATION) at 19:33

## 2025-07-11 RX ADMIN — IPRATROPIUM BROMIDE AND ALBUTEROL SULFATE 3 ML: .5; 3 SOLUTION RESPIRATORY (INHALATION) at 16:20

## 2025-07-11 RX ADMIN — ENOXAPARIN SODIUM 40 MG: 40 INJECTION SUBCUTANEOUS at 16:47

## 2025-07-11 RX ADMIN — IPRATROPIUM BROMIDE AND ALBUTEROL SULFATE 3 ML: .5; 3 SOLUTION RESPIRATORY (INHALATION) at 06:34

## 2025-07-11 RX ADMIN — METHYLPREDNISOLONE SODIUM SUCCINATE 40 MG: 40 INJECTION, POWDER, LYOPHILIZED, FOR SOLUTION INTRAMUSCULAR; INTRAVENOUS at 16:49

## 2025-07-11 RX ADMIN — IPRATROPIUM BROMIDE AND ALBUTEROL SULFATE 3 ML: .5; 3 SOLUTION RESPIRATORY (INHALATION) at 06:39

## 2025-07-11 RX ADMIN — FUROSEMIDE 20 MG: 10 INJECTION, SOLUTION INTRAMUSCULAR; INTRAVENOUS at 20:32

## 2025-07-11 RX ADMIN — METHYLPREDNISOLONE SODIUM SUCCINATE 125 MG: 125 INJECTION INTRAMUSCULAR; INTRAVENOUS at 06:58

## 2025-07-11 ASSESSMENT — ACTIVITIES OF DAILY LIVING (ADL)
ADLS_ACUITY_SCORE: 60
ADLS_ACUITY_SCORE: 58
DEPENDENT_IADLS:: CLEANING;COOKING;LAUNDRY;SHOPPING;MEDICATION MANAGEMENT;TRANSPORTATION
ADLS_ACUITY_SCORE: 58
ADLS_ACUITY_SCORE: 60

## 2025-07-11 NOTE — ED NOTES
Bed: JNED-10  Expected date:   Expected time:   Means of arrival:   Comments:  [facility]: 69 yo female COPD exacerbation

## 2025-07-11 NOTE — CONSULTS
Care Management Initial Consult    General Information  Assessment completed with: Patient, Parents, Arvind and mother Jeri  Type of CM/SW Visit: Initial Assessment    Primary Care Provider verified and updated as needed: Yes   Readmission within the last 30 days: no previous admission in last 30 days      Reason for Consult: discharge planning  Advance Care Planning: Advance Care Planning Reviewed: no concerns identified          Communication Assessment  Patient's communication style: spoken language (English or Bilingual)             Cognitive  Cognitive/Neuro/Behavioral:                        Living Environment:   People in home: parent(s)     Current living Arrangements: mobile home      Able to return to prior arrangements: other (see comments) (to be determined)       Family/Social Support:  Care provided by: self, parent(s)  Provides care for: no one, unable/limited ability to care for self  Marital Status:   Support system: Parent(s)          Description of Support System: Involved    Support Assessment: Adequate family and caregiver support    Current Resources:   Patient receiving home care services: No        Community Resources: DME  Equipment currently used at home: grab bar, tub/shower, walker, rolling  Supplies currently used at home: Oxygen tubing, CPAP    Employment/Financial:  Employment Status: retired        Financial Concerns: none   Referral to Financial Worker: No       Does the patient's insurance plan have a 3 day qualifying hospital stay waiver?  Yes     Which insurance plan 3 day waiver is available? Alternative insurance waiver    Will the waiver be used for post-acute placement? Undetermined at this time    Lifestyle & Psychosocial Needs:  Social Drivers of Health     Food Insecurity: Low Risk  (6/5/2025)    Food Insecurity     Within the past 12 months, did you worry that your food would run out before you got money to buy more?: No     Within the past 12 months, did the  food you bought just not last and you didn t have money to get more?: No   Depression: Not at risk (6/10/2025)    PHQ-2     PHQ-2 Score: 2   Housing Stability: Low Risk  (6/5/2025)    Housing Stability     Do you have housing? : Yes     Are you worried about losing your housing?: No   Tobacco Use: Medium Risk (7/7/2025)    Patient History     Smoking Tobacco Use: Former     Smokeless Tobacco Use: Never     Passive Exposure: Past   Financial Resource Strain: Low Risk  (6/5/2025)    Financial Resource Strain     Within the past 12 months, have you or your family members you live with been unable to get utilities (heat, electricity) when it was really needed?: No   Alcohol Use: Not on file   Transportation Needs: Low Risk  (6/5/2025)    Transportation Needs     Within the past 12 months, has lack of transportation kept you from medical appointments, getting your medicines, non-medical meetings or appointments, work, or from getting things that you need?: No   Physical Activity: Not on file   Interpersonal Safety: Low Risk  (6/5/2025)    Interpersonal Safety     Do you feel physically and emotionally safe where you currently live?: Yes     Within the past 12 months, have you been hit, slapped, kicked or otherwise physically hurt by someone?: No     Within the past 12 months, have you been humiliated or emotionally abused in other ways by your partner or ex-partner?: No   Stress: Not on file   Social Connections: Not on file   Health Literacy: Not on file       Functional Status:  Prior to admission patient needed assistance:   Dependent ADLs:: Ambulation-walker, Bathing  Dependent IADLs:: Cleaning, Cooking, Laundry, Shopping, Medication Management, Transportation       Mental Health Status:  Mental Health Status: Past Concern  Mental Health Management: Medication    Chemical Dependency Status:  Chemical Dependency Status: No Current Concerns             Values/Beliefs:  Spiritual, Cultural Beliefs, Mandaeism Practices,  "Values that affect care: no               Discussed  Partnership in Safe Discharge Planning  document with patient/family: No    Additional Information:  CM consult received for discharge planning.  Met with patient and mom Jeri at bedside to introduce CM role and perform initial assessment.  Patient hard of hearing so mom answered most questions.  Demographics verified and updated as needed.  Arvind and Jeri live in a mobile home.  Patient ambulates with a walker and reports she is independent with ADLs although showering is difficult for her.  Mom is primary caregiver and manages IADLs.  Mom reports she is about to turn 90yrs old but she takes care of everything around the house.  \"She is a lot of work, I do everything.\"  Patient reports she has monthly home nurse visits for an injection, but no other in-home services.  Per mom, \"we've had nurses come from the insurance company.  They say they have to come out, and want to know about her medications and see her walk but that's about it.\"  Patient is scheduled to start pulmonary rehab at end of July.  PT/OT recommendations pending.       Next Steps: CM to follow for medical progression of care, discharge recommendations, and final discharge plan.      Master Perez CRNI    Acute Care Management  [facility]  For further questions/concerns you can reach us at Vocera Web Console    "

## 2025-07-11 NOTE — CONSULTS
"PULMONARY/CRITICAL CARE CONSULT NOTE    Date / Time of Admission:  7/11/2025  6:18 AM  Assessment:   70F with what appears to be both obstructive and restrictive lung disease here again with hypercapneic respiratory failure in setting of Bipap non-compliance. I do not see any other reason for the decompensation (no signs of infection, no wheezing, no significant volume overload).     Her PFTs are difficult to interpret. Based on her imaging, body habitus and hypercapnea I would anticipate TLC to be low but she is borderline hyperinflated with air trapping. In addition, with the reported history of COPD I would anticipate to see a reduced FEV1/FVC ratio but it is normal. I wonder if she is unable to generate enough flow to even demonstrate obstruction.     Clinically Significant Risk Factors Present on Admission                       # Acute Hypercapnic Respiratory Failure: based on venous blood gas results.  Continue supplemental oxygen and ventilatory support as indicated.       # DMII: A1C = 7.7 % (Ref range: 0.0 - 5.6 %) within past 6 months    # Obesity: Estimated body mass index is 39.51 kg/m  as calculated from the following:    Height as of this encounter: 1.397 m (4' 7\").    Weight as of this encounter: 77.1 kg (170 lb).       # Financial/Environmental Concerns:    # Asthma: noted on problem list            Active Problems:    COPD exacerbation (H)    Acute on chronic respiratory failure with hypoxia and hypercapnia (H)            Plan:   Bipap at night.   We discussed the importance of wearing her home machine at least 4 hours per night.  Likely transition to prednisone tomorrow  Standing nebs  One-time dose of diuretic, ankles slightly swollen.           Subjective:    cc: shortness of breath    HPI: 70 year old female with history of obesity, what has been described as COPD but with a preserved ratio, presumed restrictive lung disease with hypercapnea, bipolar 1 disorder presents less than 1 month from " prior admission with shortness of breath.    The patient has been admitted in May,  and now July for the same problem.   She is followed by Dr. Balbuena and just saw her 5 days ago. Her regimen is Trelegy with as needed albuterol.  She was doing well then but suddenly 7/10 was acutely short of breath and came to the Emergency room. She remains on her baseline O2 (2L at rest, 3L with activity).     Past Medical History:   Diagnosis Date    Acute on chronic respiratory failure with hypoxia (H) 11/15/2016    Bipolar 1 disorder (H)     CAP (community acquired pneumonia) 11/15/2016    Cervical high risk HPV (human papillomavirus) test positive 3/5/2018    3/5/18 NIL pap, +HR HPV (not 16/18) 21 NIL pap, neg HPV. Plan: await provider    COPD exacerbation (H) 2021    COPD with exacerbation (H) 11/15/2016    Diabetes mellitus, type II (H)     Hearing loss     Meniere's disease     Pneumonia 2021    Pneumonia of right lower lobe due to infectious organism 2019       Social History     Tobacco Use    Smoking status: Former     Current packs/day: 0.00     Average packs/day: 1 pack/day for 40.0 years (40.0 ttl pk-yrs)     Types: Cigarettes     Start date: 1977     Quit date: 2017     Years since quittin.4     Passive exposure: Past    Smokeless tobacco: Never   Substance Use Topics    Alcohol use: Yes     Comment: Alcoholic Drinks/day: Occasional        Family History   Problem Relation Age of Onset    Aneurysm Father     Heart Disease Father 70.00        bypass in 70s, AAA rupture at age 77     Ulcers Father 76.00    Pacemaker Mother     Bipolar Disorder Brother     Breast Cancer Maternal Grandmother 51.00    Kidney failure Maternal Grandmother     Cancer Paternal Grandmother         ?? lung    Cancer Paternal Uncle         ?? lung    Mental Illness Maternal Grandfather     Heart Disease Other         uncle    No Known Problems Sister         two siblings abuse chemicals    No Known  Problems Brother     Bipolar Disorder Nephew        Current Facility-Administered Medications   Medication Dose Route Frequency Provider Last Rate Last Admin    acetaminophen (TYLENOL) tablet 650 mg  650 mg Oral Q4H PRN Daisy Tello MD        Or    acetaminophen (TYLENOL) Suppository 650 mg  650 mg Rectal Q4H PRN Daisy Tello MD        [START ON 7/12/2025] azithromycin (ZITHROMAX) tablet 250 mg  250 mg Oral Daily Daisy Tello MD        bisacodyl (DULCOLAX) suppository 10 mg  10 mg Rectal Daily PRN Daisy Tello MD        calcium carbonate (TUMS) chewable tablet 1,000 mg  1,000 mg Oral BID PRN Daisy Tello MD        cetirizine (zyrTEC) tablet 5 mg  5 mg Oral Daily Daisy Tello MD        glucose gel 15-30 g  15-30 g Oral Q15 Min PRN Daisy Tello MD        Or    dextrose 50 % injection 25-50 mL  25-50 mL Intravenous Q15 Min PRN Daisy Tello MD        Or    glucagon injection 1 mg  1 mg Subcutaneous Q15 Min PRN Daisy Tello MD        enoxaparin ANTICOAGULANT (LOVENOX) injection 40 mg  40 mg Subcutaneous Q24H Daisy Tello MD        hydrALAZINE (APRESOLINE) tablet 10 mg  10 mg Oral Q4H PRN Daiys Tello MD        Or    hydrALAZINE (APRESOLINE) injection 10 mg  10 mg Intravenous Q4H PRN Daisy Tello MD        insulin aspart (NovoLOG) injection (RAPID ACTING)  1-10 Units Subcutaneous TID Daisy Loo MD        insulin aspart (NovoLOG) injection (RAPID ACTING)  1-7 Units Subcutaneous At Bedtime Daisy Tello MD        [START ON 7/12/2025] insulin glargine (LANTUS PEN) injection 10 Units  10 Units Subcutaneous QAM Daisy Loo MD        ipratropium - albuterol 0.5 mg/2.5 mg (3mg)/3 mL (DUONEB) neb solution 3 mL  3 mL Nebulization 4x daily Daisy Tello MD   3 mL at 07/11/25 1237    lidocaine (LMX4) cream   Topical Q1H PRN Daisy Tello MD        lidocaine 1 % 0.1-1 mL  0.1-1 mL Other Q1H PRN Daisy Tello MD        melatonin tablet 3 mg  3 mg Oral At Bedtime PRN Daisy Tello MD         [Held by provider] metFORMIN (GLUCOPHAGE) tablet 1,000 mg  1,000 mg Oral BID w/meals Daisy Tello MD        methylPREDNISolone sodium succinate (SOLU-MEDROL) injection 40 mg  40 mg Intravenous Q12H Daisy Tello MD        ondansetron (ZOFRAN ODT) ODT tab 4 mg  4 mg Oral Q6H PRN Daisy Tello MD        Or    ondansetron (ZOFRAN) injection 4 mg  4 mg Intravenous Q6H PRN Daisy Tello MD        polyethylene glycol (MIRALAX) Packet 17 g  17 g Oral BID PRN Daisy Tello MD        prochlorperazine (COMPAZINE) injection 5 mg  5 mg Intravenous Q6H PRN Daisy Tello MD        Or    prochlorperazine (COMPAZINE) tablet 5 mg  5 mg Oral Q6H PRN Daisy Tello MD        senna-docusate (SENOKOT-S/PERICOLACE) 8.6-50 MG per tablet 1 tablet  1 tablet Oral BID PRN Daisy Tello MD        Or    senna-docusate (SENOKOT-S/PERICOLACE) 8.6-50 MG per tablet 2 tablet  2 tablet Oral BID PRN Daisy Tello MD        sodium chloride (PF) 0.9% PF flush 3 mL  3 mL Intracatheter Q8H Daisy Tello MD   3 mL at 07/11/25 1320    sodium chloride (PF) 0.9% PF flush 3 mL  3 mL Intracatheter q1 min prn Daisy Tello MD        [Held by provider] tolterodine ER (DETROL LA) 24 hr capsule 4 mg  4 mg Oral Daily Daisy Tello MD         Current Outpatient Medications   Medication Sig Dispense Refill    albuterol (PROAIR HFA/PROVENTIL HFA/VENTOLIN HFA) 108 (90 Base) MCG/ACT inhaler INHALE 2 PUFFS INTO THE LUNGS EVERY 6 HOURS AS NEEDED FOR SHORTNESS OF BREATH OR WHEEZING 18 g 2    albuterol (PROVENTIL) (2.5 MG/3ML) 0.083% neb solution Take 1 vial (2.5 mg) by nebulization every 4 hours as needed for shortness of breath or wheezing. Take 1 vial four times a day for the next 5 days then go back to as needed 240 mL 12    ARIPiprazole ER (ABILIFY MAINTENA) 400 MG extended release inj syringe Inject 400 mg into the muscle every 28 days      fluticasone (FLONASE) 50 MCG/ACT nasal spray Spray 1 spray into both nostrils daily (Patient taking differently: Spray  "1 spray into both nostrils daily as needed.) 9.9 mL 1    Fluticasone-Umeclidin-Vilant (TRELEGY ELLIPTA) 100-62.5-25 MCG/ACT oral inhaler Inhale 1 puff into the lungs daily. 60 each 5    ipratropium (ATROVENT) 0.06 % nasal spray Spray 2 sprays into both nostrils 2 times daily. 15 mL 12    metFORMIN (GLUCOPHAGE) 1000 MG tablet Take 1 tablet (1,000 mg) by mouth 2 times daily (with meals). 180 tablet 3    tirzepatide (MOUNJARO) 15 MG/0.5ML SOAJ auto-injector pen Inject 0.5 mLs (15 mg) subcutaneously once a week. 2 mL 3    tolterodine ER (DETROL LA) 4 MG 24 hr capsule Take 1 capsule (4 mg) by mouth daily. 90 capsule 3    blood glucose (NO BRAND SPECIFIED) lancets standard Use to test blood sugar 4 times daily before meals and at bedtime 1 each 1    blood glucose (NO BRAND SPECIFIED) test strip Use to test blood sugar 4 times daily before meals and at bedtime 100 strip 0    blood glucose monitoring (ONETOUCH VERIO) meter device kit Use to test blood sugar 4 times daily before meals and bedtie 1 kit 1    Lancets (ONETOUCH DELICA PLUS [id number]) MISC TEST FOUR TIMES DAILY BEFORE MEALS AND AT BEDTIME           Review of Systems: 12-point Review performed and negative aside from that noted in HPI.    Objective:    Vital signs:  /57   Pulse 103   Temp 98  F (36.7  C) (Oral)   Resp 30   Ht 1.397 m (4' 7\")   Wt 77.1 kg (170 lb)   SpO2 94%   BMI 39.51 kg/m      GENERAL APPEARANCE: healthy, alert and no distress     EYES: EOMI, - PERRL     NECK: no adenopathy, no asymmetry, masses, or scars and thyroid normal to palpation     RESP: tachypneic with minimal air movement.      CV: regular rates and rhythm, normal S1 S2, no S3 or S4 and no murmur, click or rub -     ABDOMEN:  soft, nontender, no HSM or masses and bowel sounds normal     MS: extremities normal- no gross deformities noted, no evidence of inflammation in joints, FROM in all extremities.     SKIN: no suspicious lesions or rashes     NEURO: Normal strength and " tone, sensory exam grossly normal, mentation intact and speech normal     PSYCH: mentation appears normal. and affect normal/bright     LYMPHATICS: No axillary, cervical, inguinal, or supraclavicular nodes        Data    CXR: personally reviewed  EXAM: XR CHEST 2 VIEWS  LOCATION: [facility]  DATE: 7/11/2025     INDICATION: sob  COMPARISON: 05/07/2025                                                                      IMPRESSION: Moderate soft tissue attenuation, which limits evaluation. Within this limitation:     Worsened pulmonary vascular congestion and probable pulmonary edema since the prior examination. Possible trace pleural effusions appearing. No other definite change.     Hyperinflation lungs, probable emphysematous lung changes. Cardiomegaly. No pneumothorax. Moderate dextroscoliosis thoracic spine.    Laboratory:  Results for orders placed or performed during the hospital encounter of 07/11/25   Basic metabolic panel    Collection Time: 07/11/25  6:54 AM   Result Value Ref Range    Sodium 144 135 - 145 mmol/L    Potassium 4.1 3.4 - 5.3 mmol/L    Chloride 101 98 - 107 mmol/L    Carbon Dioxide (CO2) 40 (H) 22 - 29 mmol/L    Anion Gap 3 (L) 7 - 15 mmol/L    Urea Nitrogen 8.6 8.0 - 23.0 mg/dL    Creatinine 0.73 0.51 - 0.95 mg/dL    GFR Estimate 89 >60 mL/min/1.73m2    Calcium 9.3 8.8 - 10.4 mg/dL    Glucose 178 (H) 70 - 99 mg/dL     Lab Results   Component Value Date    WBC 9.6 07/11/2025    HGB 13.0 07/11/2025    HCT 43.3 07/11/2025    MCV 95 07/11/2025     07/11/2025

## 2025-07-11 NOTE — PROGRESS NOTES
Respiratory Care Note    Pt remains on 2L NC and given Duoneb x3. Pt reports significant relief. RR 18-20, BS diminished with increased aeration. Vital signs remained WNL. Pt has a hx of COPD/asthma, chronically on 2L NC.     ~0735 Pt placed on V60 10/5 R18 40% due to VBG results. Tolerating well.     ~0850 VBG results: 7.25/90/41/39  Changed BiPAP settings to AVAPS R20  25/7 +6 35%.     Jenny Alexander, RT

## 2025-07-11 NOTE — PHARMACY-ADMISSION MEDICATION HISTORY
Pharmacist Admission Medication History    Admission medication history is complete. The information provided in this note is only as accurate as the sources available at the time of the update.    Information Source(s): Caregiver and CareEverywhere/SureScripts via phone     Pertinent Information: Spoke with patients mother Jeri ([phone]) who takes care of her.  Mounjaro is given every Wednesday by mother, and Abilify Maintena is given at [facility].    Changes made to PTA medication list:  Added: None  Deleted: Atorvastatin, Melatonin  Changed: Flonase now PRN per mother    Allergies reviewed with patient and updates made in EHR: yes    Medication History Completed By: Yeimi Orozco [facility] 7/11/2025 9:07 AM    PTA Med List   Medication Sig Last Dose/Taking    albuterol (PROAIR HFA/PROVENTIL HFA/VENTOLIN HFA) 108 (90 Base) MCG/ACT inhaler INHALE 2 PUFFS INTO THE LUNGS EVERY 6 HOURS AS NEEDED FOR SHORTNESS OF BREATH OR WHEEZING Taking As Needed    albuterol (PROVENTIL) (2.5 MG/3ML) 0.083% neb solution Take 1 vial (2.5 mg) by nebulization every 4 hours as needed for shortness of breath or wheezing. Take 1 vial four times a day for the next 5 days then go back to as needed Taking As Needed    ARIPiprazole ER (ABILIFY MAINTENA) 400 MG extended release inj syringe Inject 400 mg into the muscle every 28 days 6/28/2025    fluticasone (FLONASE) 50 MCG/ACT nasal spray Spray 1 spray into both nostrils daily (Patient taking differently: Spray 1 spray into both nostrils daily as needed.) Taking Differently    Fluticasone-Umeclidin-Vilant (TRELEGY ELLIPTA) 100-62.5-25 MCG/ACT oral inhaler Inhale 1 puff into the lungs daily. 7/11/2025 Morning    ipratropium (ATROVENT) 0.06 % nasal spray Spray 2 sprays into both nostrils 2 times daily. 7/10/2025 Evening    metFORMIN (GLUCOPHAGE) 1000 MG tablet Take 1 tablet (1,000 mg) by mouth 2 times daily (with meals). 7/10/2025 Evening    tolterodine ER (DETROL LA) 4 MG  24 hr capsule Take 1 capsule (4 mg) by mouth daily. 7/10/2025 Morning    tirzepatide (MOUNJARO) 15 MG/0.5ML SOAJ auto-injector pen Inject 0.5 mLs (15 mg) subcutaneously once a week. 7/9/2025

## 2025-07-11 NOTE — ED NOTES
Pt calling frequently to ask when Bipap will be coming off, when she can eat. Pt tearful about not being able to eat yet. Updated provider.

## 2025-07-11 NOTE — ED PROVIDER NOTES
EMERGENCY DEPARTMENT ENCOUNTER      NAME: Arvind Leong  AGE: 70 year old female  YOB: 1955  MRN: [id number]  EVALUATION DATE & TIME: 7/11/2025  6:18 AM    PCP: Rema Whiting    ED PROVIDER: Shanta Herrera MD    Chief Complaint   Patient presents with    Shortness of Breath         FINAL IMPRESSION:  1. COPD exacerbation (H)    2. Acute on chronic respiratory failure with hypoxia and hypercapnia (H)          ED COURSE & MEDICAL DECISION MAKING:    Pertinent Labs & Imaging studies reviewed. (See chart for details)  70 year old female with history of asthma/COPD with chronic hypoxia on 2 to 3 L home O2, ABDIFATAH on CPAP, DM, anxiety, cardiomyopathy though I do not see any recent echocardiogram on file nor is she on any diuretics at home who presents emergency department this morning for increasing shortness of breath just for the last hour or so prior to arrival.  Lives at home with mother who apparently helps care for her.  Was feeling normal yesterday, slept okay.  Was short of breath this morning with increasing wheezing, cough, sputum production.  Called EMS and when EMS arrived she was hypoxic in the 80s on her home O2.  Given DuoNeb en route.  Differential includes COPD/asthma exacerbation, viral syndrome, pneumonia, hypercapnia, symptomatic anemia, arrhythmia, ACS, CHF, PE.    Patient met by myself on EMS arrival, placed on monitor, IV established and blood obtained.  Twelve-lead EKG shows sinus rhythm.  Was initially requiring 4 L oxygen, but able to titrate her down to 2 to 3 L with rest.  Given Solu-Medrol, DuoNebs x 3.  CBC, BMP, BNP, troponin, D-dimer, VBG notable for hypercapnia with respiratory acidosis with PCO2 of 88, pH of 7.25.  Looking at her labs from when she was admitted in June her pH is overall fairly similar but her CO2 is up.  Discussed with patient, and given her shortness of breath would like to trial BiPAP relief for symptom control/hypercapnia and see if this improves  her symptoms.  Patient looks and feels improved on BiPAP.  VBG repeated after approximately 45 minutes without change.  Chest x-ray with question of pulmonary vascular congestion versus poor penetration.  BNP is however normal.  Admitted to medicine on BiPAP for ongoing symptoms.      ED Course as of 07/11/25 1025   Fri Jul 11, 2025   0621 I met with the patient and performed my initial exam   0717 PCO2 Venous(!!): 88  CO2 is up, though pH is similar to when she was hospitalized in June.  Discussed with patient, and she would like to trial BiPAP to see if this helps with her symptoms   0734 D-Dimer Quantitative: 0.34   0738 Glucose(!): 178  Known DM   0843 Admitted to Dr. Tello   0845 Chest XR,  PA & LAT  Chest x-ray independently interpreted by myself with poor penetration.  No focal infiltrate, question edema   0845 Blood gas venous(!!)  No change in VBG after approximately 45 minutes.  Will have RT, adjust her EPAP/IPAP       Medical Decision Making  I obtained history from EMS  I reviewed the EMR: Inpatient Record: 6/7/2025 discharge summary  Admit.    MIPS (CTPE, Dental pain, Lara, Sinusitis, Asthma/COPD, Head Trauma): Not Applicable    SEPSIS: None          At the conclusion of the encounter I discussed the results of all of the tests and the disposition. The questions were answered. The patient or family acknowledged understanding and was agreeable with the care plan.    CRITICAL CARE:  Critical Care  Performed by: Shanta Herrera MD  Authorized by: Shanta Herrera MD     Total critical care time: 42 minutes  Criticalcare time was exclusive of separately billable procedures and treating other patients.  Critical care was necessary to treat or prevent imminent or life-threatening deterioration of the following conditions: Acute on chronic hypercapnic/hypoxic respiratory failure  Critical care was time spent personally by me on the following activities: development of treatment plan with patient or surrogate,  discussions with consultants, examination of patient, evaluation of patient's response totreatment, obtaining history from patient or surrogate, ordering and performing treatments and interventions, ordering and review of laboratory studies, ordering and review of radiographic studies and re-evaluation ofpatient's condition, this excludes any separately billable procedures.      MEDICATIONS GIVEN IN THE EMERGENCY:  Medications   ipratropium - albuterol 0.5 mg/2.5 mg (3mg)/3 mL (DUONEB) neb solution 3 mL (3 mLs Nebulization $Given 7/11/25 0658)   methylPREDNISolone Na Suc (solu-MEDROL) injection 125 mg (125 mg Intravenous $Given 7/11/25 0658)       NEW PRESCRIPTIONS STARTED AT TODAY'S ER VISIT  New Prescriptions    No medications on file          =================================================================    HPI    Patient information was obtained from: patient    Use of Intrepreter: N/A        Arvind Leong is a 70 year old female with pertinent medical history of COPD, HLD, DM II, Hypoxia, VIOLET, Acute respiratory failure who presents shortness of breath. Pulmonary CT scan shows no pulmonary embolus, aortic aneurysm or dissection.     Per Chart Review, Patient was seen at [facility] where she was admitted 6/4/25 - 6/7/25 for shortness of breath. Pulmonary CT scan showed no pulmonary embolus, aortic aneurysm or dissection. Diagnosed with acute respiratory failure with hypoxia and hypercapnia. Discharged with Vibramycin and deltasone. Discharged with 2 L O2 at rest and 3 L with activity. Declined PT/OT follow-up. Discharged in stable condition.     Patient presents shortness of breath and wheezing that onset about 5 AM 7/11/25. She denies any shortness of breath last night. Increased cough, sputum.  No fever.  Nebulizer received from EMS relieved symptoms. She reports baseline of 2-3 L of home oxygen. No other concerns at this time.        PAST MEDICAL HISTORY:  Past Medical History:   Diagnosis Date     Acute on chronic respiratory failure with hypoxia (H) 11/15/2016    Bipolar 1 disorder (H)     CAP (community acquired pneumonia) 11/15/2016    Cervical high risk HPV (human papillomavirus) test positive 3/5/2018    3/5/18 NIL pap, +HR HPV (not 16/18) 5/21/21 NIL pap, neg HPV. Plan: await provider    COPD exacerbation (H) 4/24/2021    COPD with exacerbation (H) 11/15/2016    Diabetes mellitus, type II (H)     Hearing loss     Meniere's disease     Pneumonia 4/23/2021    Pneumonia of right lower lobe due to infectious organism 6/11/2019       PAST SURGICAL HISTORY:  Past Surgical History:   Procedure Laterality Date    CYST REMOVAL  01/23/2001    OH REMOVAL ANAL FISTULA,SUBCUTANEOUS      Description: Fistula-in-ano Repair;  Recorded: 01/08/2010; x2    TONSILLECTOMY         CURRENT MEDICATIONS:    Prior to Admission Medications   Prescriptions Last Dose Informant Patient Reported? Taking?   ARIPiprazole ER (ABILIFY MAINTENA) 400 MG extended release inj syringe 6/28/2025  Yes Yes   Sig: Inject 400 mg into the muscle every 28 days   Fluticasone-Umeclidin-Vilant (TRELEGY ELLIPTA) 100-62.5-25 MCG/ACT oral inhaler 7/11/2025 Morning  No Yes   Sig: Inhale 1 puff into the lungs daily.   Lancets (ONETOUCH DELICA PLUS [id number]) MISC   Yes No   Sig: TEST FOUR TIMES DAILY BEFORE MEALS AND AT BEDTIME   albuterol (PROAIR HFA/PROVENTIL HFA/VENTOLIN HFA) 108 (90 Base) MCG/ACT inhaler   No Yes   Sig: INHALE 2 PUFFS INTO THE LUNGS EVERY 6 HOURS AS NEEDED FOR SHORTNESS OF BREATH OR WHEEZING   albuterol (PROVENTIL) (2.5 MG/3ML) 0.083% neb solution   No Yes   Sig: Take 1 vial (2.5 mg) by nebulization every 4 hours as needed for shortness of breath or wheezing. Take 1 vial four times a day for the next 5 days then go back to as needed   blood glucose (NO BRAND SPECIFIED) lancets standard   No No   Sig: Use to test blood sugar 4 times daily before meals and at bedtime   blood glucose (NO BRAND SPECIFIED) test strip   No No   Sig: Use to  test blood sugar 4 times daily before meals and at bedtime   blood glucose monitoring (ONETOUCH VERIO) meter device kit   No No   Sig: Use to test blood sugar 4 times daily before meals and bedtie   fluticasone (FLONASE) 50 MCG/ACT nasal spray   No Yes   Sig: Spray 1 spray into both nostrils daily   Patient taking differently: Spray 1 spray into both nostrils daily as needed.   ipratropium (ATROVENT) 0.06 % nasal spray 7/10/2025 Evening  No Yes   Sig: Spray 2 sprays into both nostrils 2 times daily.   metFORMIN (GLUCOPHAGE) 1000 MG tablet 7/10/2025 Evening  No Yes   Sig: Take 1 tablet (1,000 mg) by mouth 2 times daily (with meals).   tirzepatide (MOUNJARO) 15 MG/0.5ML SOAJ auto-injector pen 2025  No Yes   Sig: Inject 0.5 mLs (15 mg) subcutaneously once a week.   tolterodine ER (DETROL LA) 4 MG 24 hr capsule 7/10/2025 Morning  No Yes   Sig: Take 1 capsule (4 mg) by mouth daily.      Facility-Administered Medications: None       ALLERGIES:  Allergies   Allergen Reactions    Lurasidone Other (See Comments)     Face turned red, (Brand name = Latuda) Face turned red, Face turned red       FAMILY HISTORY:  Family History   Problem Relation Age of Onset    Aneurysm Father     Heart Disease Father 70.00        bypass in 70s, AAA rupture at age 77     Ulcers Father 76.00    Pacemaker Mother     Bipolar Disorder Brother     Breast Cancer Maternal Grandmother 51.00    Kidney failure Maternal Grandmother     Cancer Paternal Grandmother         ?? lung    Cancer Paternal Uncle         ?? lung    Mental Illness Maternal Grandfather     Heart Disease Other         uncle    No Known Problems Sister         two siblings abuse chemicals    No Known Problems Brother     Bipolar Disorder Nephew        SOCIAL HISTORY:  Social History     Tobacco Use    Smoking status: Former     Current packs/day: 0.00     Average packs/day: 1 pack/day for 40.0 years (40.0 ttl pk-yrs)     Types: Cigarettes     Start date: 1977     Quit date:  "2017     Years since quittin.4     Passive exposure: Past    Smokeless tobacco: Never   Vaping Use    Vaping status: Never Used   Substance Use Topics    Alcohol use: Yes     Comment: Alcoholic Drinks/day: Occasional     Drug use: No        VITALS:  Patient Vitals for the past 24 hrs:   BP Temp Temp src Pulse Resp SpO2 Height Weight   25 1000 114/58 -- -- 98 -- 93 % -- --   2545 -- -- -- 99 -- 94 % -- --   25 0930 112/58 -- -- 95 -- 93 % -- --   25 0915 -- -- -- 96 -- 94 % -- --   25 0900 107/58 -- -- 98 -- 94 % -- --   25 0845 105/66 -- -- 89 -- 95 % -- --   2515 -- -- -- 99 -- 98 % -- --   25 0800 97/51 -- -- 101 -- 97 % -- --   2545 -- -- -- 104 -- 96 % -- --   2530 101/50 -- -- 102 -- 95 % -- --   2515 -- -- -- 112 30 (!) 91 % -- --   25 0635 109/51 -- -- 98 24 94 % -- --   25 0625 -- -- -- -- -- 97 % -- --   25 0621 122/55 98  F (36.7  C) Oral 110 22 -- 1.397 m (4' 7\") 77.1 kg (170 lb)       PHYSICAL EXAM    General Appearance: Looks older than stated age.   Head:  Normocephalic  ENT:  membranes are moist without pallor  Neck:  Supple  Cardio: Tachycardic in the 110s, regular rhythm, no murmur/gallop/rub  Pulm:  Expiratory wheeze. Mild respiratory dyspnea  Extremities:  Extremities normal, atraumatic, no cyanosis or edema, full ROM and motor tone intact, bilateral pulses intact upper and lower  : Urinary incontinence pad  Skin:  Skin warm, dry, no rashes.  Neuro:  Alert and oriented ×3     RADIOLOGY/LABS:  Reviewed all pertinent imaging. Please see official radiology report. All pertinent labs reviewed and interpreted.    Results for orders placed or performed during the hospital encounter of 25   Chest XR,  PA & LAT    Impression    IMPRESSION: Moderate soft tissue attenuation, which limits evaluation. Within this limitation:    Worsened pulmonary vascular congestion and probable pulmonary edema " since the prior examination. Possible trace pleural effusions appearing. No other definite change.    Hyperinflation lungs, probable emphysematous lung changes. Cardiomegaly. No pneumothorax. Moderate dextroscoliosis thoracic spine.   Basic metabolic panel   Result Value Ref Range    Sodium 144 135 - 145 mmol/L    Potassium 4.1 3.4 - 5.3 mmol/L    Chloride 101 98 - 107 mmol/L    Carbon Dioxide (CO2) 40 (H) 22 - 29 mmol/L    Anion Gap 3 (L) 7 - 15 mmol/L    Urea Nitrogen 8.6 8.0 - 23.0 mg/dL    Creatinine 0.73 0.51 - 0.95 mg/dL    GFR Estimate 89 >60 mL/min/1.73m2    Calcium 9.3 8.8 - 10.4 mg/dL    Glucose 178 (H) 70 - 99 mg/dL   NT-proBNP   Result Value Ref Range    NT-proBNP 102 0 - 353 pg/mL   Result Value Ref Range    Troponin T, High Sensitivity 12 <=14 ng/L   Blood gas venous   Result Value Ref Range    pH Venous 7.25 (L) 7.32 - 7.43    pCO2 Venous 88 (HH) 40 - 50 mm Hg    pO2 Venous 36 25 - 47 mm Hg    Bicarbonate Venous 39 (H) 21 - 28 mmol/L    Base Excess/Deficit Venous 8.3 (H) -3.0 - 3.0 mmol/L    FIO2 60     Oxyhemoglobin Venous 62 (L) 70 - 75 %    O2 Sat, Venous 62.7 (L) 70.0 - 75.0 %   D dimer quantitative   Result Value Ref Range    D-Dimer Quantitative 0.34 0.00 - 0.50 ug/mL FEU   CBC with platelets and differential   Result Value Ref Range    WBC Count 9.6 4.0 - 11.0 10e3/uL    RBC Count 4.56 3.80 - 5.20 10e6/uL    Hemoglobin 13.0 11.7 - 15.7 g/dL    Hematocrit 43.3 35.0 - 47.0 %    MCV 95 78 - 100 fL    MCH 28.5 26.5 - 33.0 pg    MCHC 30.0 (L) 31.5 - 36.5 g/dL    RDW 12.9 10.0 - 15.0 %    Platelet Count 210 150 - 450 10e3/uL    % Neutrophils 55 %    % Lymphocytes 36 %    % Monocytes 6 %    % Eosinophils 2 %    % Basophils 1 %    % Immature Granulocytes 0 %    NRBCs per 100 WBC 0 <1 /100    Absolute Neutrophils 5.3 1.6 - 8.3 10e3/uL    Absolute Lymphocytes 3.5 0.8 - 5.3 10e3/uL    Absolute Monocytes 0.6 0.0 - 1.3 10e3/uL    Absolute Eosinophils 0.1 0.0 - 0.7 10e3/uL    Absolute Basophils 0.1 0.0 - 0.2  10e3/uL    Absolute Immature Granulocytes 0.0 <=0.4 10e3/uL    Absolute NRBCs 0.0 10e3/uL   Blood gas venous   Result Value Ref Range    pH Venous 7.25 (L) 7.32 - 7.43    pCO2 Venous 90 (HH) 40 - 50 mm Hg    pO2 Venous 41 25 - 47 mm Hg    Bicarbonate Venous 39 (H) 21 - 28 mmol/L    Base Excess/Deficit Venous 8.5 (H) -3.0 - 3.0 mmol/L    FIO2 40     Oxyhemoglobin Venous 70 70 - 75 %    O2 Sat, Venous 70.9 70.0 - 75.0 %   NT-proBNP   Result Value Ref Range    NT-proBNP 132 0 - 353 pg/mL       EKG:  Performed at: 7/11/25 6:31:57    Impression: Sinus rhythm with premature supraventicular complexes. Low voltage QRS. Borderline ECG    Rate: 100 BPM  Rhythm: Sinus rhythm with premature supraventriicular complexes  Axis: 45  MI Interval: 148 ms  QRS Interval: 74 ms  QTc Interval: 466 ms  ST Changes: No ST changes  Comparison: When compared with ECG 6/4/25 no significant change was found.  I have independently reviewed and interpreted the EKG(s) documented above.        The creation of this record is based on the scribe s observations of the work being performed by Shanta Herrera MD and the provider s statements to them. It was created on her behalf by Jorge Villaseñor, a trained medical scribe. This document has been checked and approved by the attending provider.    Shanta Herrera MD  Emergency Medicine  [facility] EMERGENCY DEPARTMENT  [facility] [facility] [id number] [phone]  Dept: [phone]     Shanta Herrera MD  07/11/25 3308

## 2025-07-11 NOTE — PLAN OF CARE
Goal Outcome Evaluation:      Plan of Care Reviewed With: patient, parent          Outcome Evaluation: Discharge plans to be determined pending medical progression and PT/OT recommendations.

## 2025-07-11 NOTE — TREATMENT PLAN
"[facility]AT Treatment Plan    Patient Score: 9  Patient Acuity: 4    Clinical Indication for Therapy: history of bronchospasm    Therapy Ordered: Duoneb QID    Assessment Summary: Pt admitted for SOB and hypoxia, COPD exacerbation, chronically wears 2L NC. Hx of COPD/asthma overlap. BS diminished. RR 18-20, NPC. Pt reports using CPAP at home.   CXR states, \"Worsened pulmonary vascular congestion and probable pulmonary edema since the prior examination. Possible trace pleural effusions appearing. No other definite change. Hyperinflation lungs, probable emphysematous lung changes. Cardiomegaly. No pneumothorax. Moderate dextroscoliosis thoracic spine\"    Jenny Alexander, RT  7/11/2025    "

## 2025-07-11 NOTE — H&P
"Admission History and Physical   Arvind Leong,  1955, MRN [id number]    [facility]    PCP: Rema Whiting, [phone]          Extended Emergency Contact Information  Primary Emergency Contact: Jeri Leong   [facility]  Mobile Phone: [phone]  Relation: Mother  Secondary Emergency Contact: SaloUlysses Brunner   [facility]  Mobile Phone: [phone]  Relation: Brother       Assessment and Plan      70F with asthma/COPD overlap, chronic hypoxic respiratory failure (2l), ABDIFATAH on CPAP, bipolar, severe morbid obesity Body mass index is 39.51 kg/m .    #Acute on chronic hypoxic and hypercarbic respiratory failure  #ABDIFATAH  -solumedrol, duonebs, azithromycin, O2, BiPAP PRN (asymptomatic at this time and normal mentation.  Can trial off BiPAP and eat if able to remain off)  -NIPPV QHS  -carries diagnosis of asthma/COPD but this diagnosis is a little unclear to me.  Has preserved FEV1/FVC and clinical picture of restriction with severe scolioisis, chronic atelectasis, and absence of emphysematous changes on CT.  On the other hand has elevated TLC and residual volumes which which would fit with obstruction vs. Effort?  -consider repeat PFTs.  Follows with pulmonary, I think should see here given what seems to be chronic inadequate ventilation on her CPAP.  May need BiPAP with higher setting (noted only 250cc volumes on 10/5)  -cetrizine for reported possible allergies    #DM2  -home regimen: mounjaro, metformin  -inpatient: lantus, sliding scale insulin    #bipolar - abilify depo  #Hold detrol for now    Needs med rec and appropriate home meds resumed    Checklist:  Code Status:  full  Diet:  regular if able to maintain off BiPAP    Lara Catheter: Not present  Lines: None     DVT px:  Enoxaparin (Lovenox) SQ   Page 1 of 1        Auto-populated based on system request - if needs to be addressed in treatment plan they will be addressed above:  \"  Clinically " "Significant Risk Factors Present on Admission                         # Acute Hypercapnic Respiratory Failure: based on venous blood gas results.  Continue supplemental oxygen and ventilatory support as indicated.         # DMII: A1C = 7.7 % (Ref range: 0.0 - 5.6 %) within past 6 months      # Obesity: Estimated body mass index is 39.51 kg/m  as calculated from the following:    Height as of this encounter: 1.397 m (4' 7\").    Weight as of this encounter: 77.1 kg (170 lb).           # Financial/Environmental Concerns:    # Asthma: noted on problem list          \"  Advanced Care Planning  I anticipate the patient will be admitted to the hospital for at least 2 midnights for the evaluation and treatment of the conditions discussed above.     Chief Complaint: SOB     HPI:    Arvind Leong is a 70F with asthma/COPD overlap, chronic hypoxic respiratory failure (2l), ABDIFATAH on CPAP, bipolar, severe morbid obesity Body mass index is 39.51 kg/m . Who presents with 1 day of SOB in the setting of several days of nasal congestion that she attributes to the weather/possible alelrgies.      In the ER: hypercarbia chronic.  placed on BiPAP    History is provided by patient and chart review       Physical Exam:  Temp:  [98  F (36.7  C)] 98  F (36.7  C)  Pulse:  [] 99  Resp:  [22-30] 30  BP: ()/(50-55) 97/51  FiO2 (%):  [40 %] 40 %  SpO2:  [91 %-98 %] 98 %  BP 97/51   Pulse 99   Temp 98  F (36.7  C) (Oral)   Resp 30   Ht 1.397 m (4' 7\")   Wt 77.1 kg (170 lb)   SpO2 98%   BMI 39.51 kg/m       General:  Alert, cooperative, no distress,  Appears stated age  Neurologic:  oriented, facialsymmetry preserved, fluent speech. Moves all 4 spontaneously  Psych: calm, mood and affect appropriate to situation  HEENT:  Anicteric, MMM, unremarkable dentition  CV: RRR no MRG, normal S1 and S2, no edema  Lungs: CTAB on BiPAP.  Easy respirations  Skin: no rashes noted on exposed skin.   Central Lines and Tubes: None (no robles, " CVC, feeding tubes)         Pertinent Test Findings  Radiology Results (results reviewed):   No results found for this visit on 25.    EKG (personally interpreted): sinus tachycardia and no acute ischemic changes      Medical History  Past Medical History:   Diagnosis Date    Acute on chronic respiratory failure with hypoxia (H) 11/15/2016    Bipolar 1 disorder (H)     CAP (community acquired pneumonia) 11/15/2016    Cervical high risk HPV (human papillomavirus) test positive 3/5/2018    3/5/18 NIL pap, +HR HPV (not 16/18) 21 NIL pap, neg HPV. Plan: await provider    COPD exacerbation (H) 2021    COPD with exacerbation (H) 11/15/2016    Diabetes mellitus, type II (H)     Hearing loss     Meniere's disease     Pneumonia 2021    Pneumonia of right lower lobe due to infectious organism 2019        Surgical History  Past Surgical History:   Procedure Laterality Date    CYST REMOVAL  2001    MI REMOVAL ANAL FISTULA,SUBCUTANEOUS      Description: Fistula-in-ano Repair;  Recorded: 2010; x2    TONSILLECTOMY            Social History  Social History     Tobacco Use    Smoking status: Former     Current packs/day: 0.00     Average packs/day: 1 pack/day for 40.0 years (40.0 ttl pk-yrs)     Types: Cigarettes     Start date: 1977     Quit date: 2017     Years since quittin.4     Passive exposure: Past    Smokeless tobacco: Never   Vaping Use    Vaping status: Never Used   Substance Use Topics    Alcohol use: Yes     Comment: Alcoholic Drinks/day: Occasional     Drug use: No          Allergies  Allergies   Allergen Reactions    Lurasidone Other (See Comments)     Face turned red, (Brand name = Latuda) Face turned red, Face turned red    Family History  I have reviewed this patient's family history and updated it with pertinent information if needed.  Family History   Problem Relation Age of Onset    Aneurysm Father     Heart Disease Father 70.00        bypass in 70s, AAA rupture  at age 77     Ulcers Father 76.00    Pacemaker Mother     Bipolar Disorder Brother     Breast Cancer Maternal Grandmother 51.00    Kidney failure Maternal Grandmother     Cancer Paternal Grandmother         ?? lung    Cancer Paternal Uncle         ?? lung    Mental Illness Maternal Grandfather     Heart Disease Other         uncle    No Known Problems Sister         two siblings abuse chemicals    No Known Problems Brother     Bipolar Disorder Nephew                  Prior to Admission Medications   Prior to Admission Medications   Prescriptions Last Dose Informant Patient Reported? Taking?   ARIPiprazole ER (ABILIFY MAINTENA) 400 MG extended release inj syringe   Yes No   Sig: Inject 400 mg into the muscle every 28 days   Fluticasone-Umeclidin-Vilant (TRELEGY ELLIPTA) 100-62.5-25 MCG/ACT oral inhaler   No No   Sig: Inhale 1 puff into the lungs daily.   Lancets (ONETOUCH DELICA PLUS [id number]) MISC   Yes No   Sig: TEST FOUR TIMES DAILY BEFORE MEALS AND AT BEDTIME   albuterol (PROAIR HFA/PROVENTIL HFA/VENTOLIN HFA) 108 (90 Base) MCG/ACT inhaler   No No   Sig: INHALE 2 PUFFS INTO THE LUNGS EVERY 6 HOURS AS NEEDED FOR SHORTNESS OF BREATH OR WHEEZING   albuterol (PROVENTIL) (2.5 MG/3ML) 0.083% neb solution   No No   Sig: Take 1 vial (2.5 mg) by nebulization every 4 hours as needed for shortness of breath or wheezing. Take 1 vial four times a day for the next 5 days then go back to as needed   atorvastatin (LIPITOR) 40 MG tablet   No No   Sig: Take 1 tablet (40 mg) by mouth daily.   Patient not taking: Reported on 2025   blood glucose (NO BRAND SPECIFIED) lancets standard   No No   Sig: Use to test blood sugar 4 times daily before meals and at bedtime   blood glucose (NO BRAND SPECIFIED) test strip   No No   Sig: Use to test blood sugar 4 times daily before meals and at bedtime   blood glucose monitoring (ONETOUCH VERIO) meter device kit   No No   Sig: Use to test blood sugar 4 times daily before meals and bedtie    fluticasone (FLONASE) 50 MCG/ACT nasal spray   No No   Sig: Spray 1 spray into both nostrils daily   ipratropium (ATROVENT) 0.06 % nasal spray   No No   Sig: Spray 2 sprays into both nostrils 2 times daily.   melatonin 3 MG tablet   No No   Sig: Take 1 tablet (3 mg) by mouth nightly as needed for sleep.   Patient not taking: Reported on 7/7/2025   metFORMIN (GLUCOPHAGE) 1000 MG tablet   No No   Sig: Take 1 tablet (1,000 mg) by mouth 2 times daily (with meals).   tirzepatide (MOUNJARO) 15 MG/0.5ML SOAJ auto-injector pen   No No   Sig: Inject 0.5 mLs (15 mg) subcutaneously once a week.   tolterodine ER (DETROL LA) 4 MG 24 hr capsule   No No   Sig: Take 1 capsule (4 mg) by mouth daily.      Facility-Administered Medications: None              Pertinent Labs    Most Recent 3 CBC's:  Recent Labs   Lab Test 07/11/25  0654 06/05/25  0408 06/04/25  2254   WBC 9.6 15.4* 11.1*   HGB 13.0 12.7 13.2   MCV 95 95 96    201 201     Most Recent 3 BMP's:  Recent Labs   Lab Test 07/11/25  0654 06/06/25  1147 06/06/25  0724 06/06/25  0517 06/05/25  0745 06/05/25  0408 06/04/25  2254     --   --   --   --  141 145   POTASSIUM 4.1  --   --  4.8  --  4.1 4.3   CHLORIDE 101  --   --   --   --  100 102   CO2 40*  --   --   --   --  31* 42*   BUN 8.6  --   --   --   --  12.6 12.2   CR 0.73  --   --   --   --  0.77 0.79   ANIONGAP 3*  --   --   --   --  10 1*   ARINA 9.3  --   --   --   --  9.0 9.0   * 211* 203*  --    < > 302* 166*    < > = values in this interval not displayed.     Most Recent 2 LFT's:  Recent Labs   Lab Test 06/05/25 0408 05/30/25  1304   AST 15 15   ALT 19 18   ALKPHOS 87 87   BILITOTAL 0.3 0.5     Most Recent 3 INR's:  Recent Labs   Lab Test 05/07/25  1620 12/09/21  1322   INR 1.00 1.00     Most Recent 3 Troponin's:No lab results found.    Medical Decision Making        Medical Decision Making               Daisy Tello MD, [facility]  Internal Medicine Hospitalist  7/11/2025

## 2025-07-11 NOTE — ED TRIAGE NOTES
Pt brought in by EMS from home, lives at home with mother who takes care of her, when EMS arrived to her home, pt was 89% on 3L, wheezing, and c/o shortness of breath. Pt has COPD and on 2-3L n/c chronically. En route, duoneb given which pt stated it helped her breathing. Pt then stated last month she was in the hospital for pneumonia.      Triage Assessment (Adult)       Row Name 07/11/25 0625          Triage Assessment    Airway WDL WDL        Respiratory WDL    Respiratory WDL X        Cardiac WDL    Cardiac WDL WDL

## 2025-07-11 NOTE — ED NOTES
Bed: JNED-33  Expected date: 7/11/25  Expected time:   Means of arrival:   Comments:  Room 10 when clean

## 2025-07-12 ENCOUNTER — APPOINTMENT (OUTPATIENT)
Dept: OCCUPATIONAL THERAPY | Facility: HOSPITAL | Age: 70
End: 2025-07-12
Attending: HOSPITALIST
Payer: COMMERCIAL

## 2025-07-12 ENCOUNTER — APPOINTMENT (OUTPATIENT)
Dept: PHYSICAL THERAPY | Facility: HOSPITAL | Age: 70
End: 2025-07-12
Attending: HOSPITALIST
Payer: COMMERCIAL

## 2025-07-12 DIAGNOSIS — G47.33 OSA (OBSTRUCTIVE SLEEP APNEA): Primary | ICD-10-CM

## 2025-07-12 LAB
ANION GAP SERPL CALCULATED.3IONS-SCNC: 6 MMOL/L (ref 7–15)
BUN SERPL-MCNC: 14.8 MG/DL (ref 8–23)
CALCIUM SERPL-MCNC: 9 MG/DL (ref 8.8–10.4)
CHLORIDE SERPL-SCNC: 100 MMOL/L (ref 98–107)
CREAT SERPL-MCNC: 0.72 MG/DL (ref 0.51–0.95)
EGFRCR SERPLBLD CKD-EPI 2021: 90 ML/MIN/1.73M2
ERYTHROCYTE [DISTWIDTH] IN BLOOD BY AUTOMATED COUNT: 13.1 % (ref 10–15)
GLUCOSE BLDC GLUCOMTR-MCNC: 267 MG/DL (ref 70–99)
GLUCOSE BLDC GLUCOMTR-MCNC: 292 MG/DL (ref 70–99)
GLUCOSE BLDC GLUCOMTR-MCNC: 294 MG/DL (ref 70–99)
GLUCOSE BLDC GLUCOMTR-MCNC: 300 MG/DL (ref 70–99)
GLUCOSE BLDC GLUCOMTR-MCNC: 320 MG/DL (ref 70–99)
GLUCOSE BLDC GLUCOMTR-MCNC: 343 MG/DL (ref 70–99)
GLUCOSE SERPL-MCNC: 317 MG/DL (ref 70–99)
HCO3 SERPL-SCNC: 36 MMOL/L (ref 22–29)
HCT VFR BLD AUTO: 41.4 % (ref 35–47)
HGB BLD-MCNC: 12.9 G/DL (ref 11.7–15.7)
MAGNESIUM SERPL-MCNC: 2.1 MG/DL (ref 1.7–2.3)
MCH RBC QN AUTO: 28.8 PG (ref 26.5–33)
MCHC RBC AUTO-ENTMCNC: 31.2 G/DL (ref 31.5–36.5)
MCV RBC AUTO: 92 FL (ref 78–100)
PLATELET # BLD AUTO: 213 10E3/UL (ref 150–450)
POTASSIUM SERPL-SCNC: 4.9 MMOL/L (ref 3.4–5.3)
RBC # BLD AUTO: 4.48 10E6/UL (ref 3.8–5.2)
SODIUM SERPL-SCNC: 142 MMOL/L (ref 135–145)
WBC # BLD AUTO: 16.5 10E3/UL (ref 4–11)

## 2025-07-12 PROCEDURE — 97535 SELF CARE MNGMENT TRAINING: CPT | Mod: GO

## 2025-07-12 PROCEDURE — 94640 AIRWAY INHALATION TREATMENT: CPT

## 2025-07-12 PROCEDURE — 250N000013 HC RX MED GY IP 250 OP 250 PS 637: Performed by: HOSPITALIST

## 2025-07-12 PROCEDURE — 250N000012 HC RX MED GY IP 250 OP 636 PS 637: Performed by: INTERNAL MEDICINE

## 2025-07-12 PROCEDURE — 250N000012 HC RX MED GY IP 250 OP 636 PS 637: Performed by: HOSPITALIST

## 2025-07-12 PROCEDURE — 99233 SBSQ HOSP IP/OBS HIGH 50: CPT | Performed by: INTERNAL MEDICINE

## 2025-07-12 PROCEDURE — 999N000157 HC STATISTIC RCP TIME EA 10 MIN

## 2025-07-12 PROCEDURE — 80048 BASIC METABOLIC PNL TOTAL CA: CPT | Performed by: HOSPITALIST

## 2025-07-12 PROCEDURE — 85014 HEMATOCRIT: CPT | Performed by: HOSPITALIST

## 2025-07-12 PROCEDURE — 82962 GLUCOSE BLOOD TEST: CPT

## 2025-07-12 PROCEDURE — 250N000009 HC RX 250: Performed by: HOSPITALIST

## 2025-07-12 PROCEDURE — 94660 CPAP INITIATION&MGMT: CPT

## 2025-07-12 PROCEDURE — 36415 COLL VENOUS BLD VENIPUNCTURE: CPT | Performed by: HOSPITALIST

## 2025-07-12 PROCEDURE — 250N000011 HC RX IP 250 OP 636: Mod: JZ | Performed by: HOSPITALIST

## 2025-07-12 PROCEDURE — 83735 ASSAY OF MAGNESIUM: CPT | Performed by: HOSPITALIST

## 2025-07-12 PROCEDURE — 94640 AIRWAY INHALATION TREATMENT: CPT | Mod: 76

## 2025-07-12 PROCEDURE — 250N000011 HC RX IP 250 OP 636: Performed by: INTERNAL MEDICINE

## 2025-07-12 PROCEDURE — 120N000001 HC R&B MED SURG/OB

## 2025-07-12 PROCEDURE — 97165 OT EVAL LOW COMPLEX 30 MIN: CPT | Mod: GO

## 2025-07-12 PROCEDURE — 97161 PT EVAL LOW COMPLEX 20 MIN: CPT | Mod: GP

## 2025-07-12 RX ORDER — PREDNISONE 20 MG/1
40 TABLET ORAL DAILY
Status: DISCONTINUED | OUTPATIENT
Start: 2025-07-12 | End: 2025-07-14 | Stop reason: HOSPADM

## 2025-07-12 RX ORDER — FUROSEMIDE 10 MG/ML
20 INJECTION INTRAMUSCULAR; INTRAVENOUS ONCE
Status: COMPLETED | OUTPATIENT
Start: 2025-07-12 | End: 2025-07-12

## 2025-07-12 RX ORDER — PREDNISONE 20 MG/1
40 TABLET ORAL DAILY
Status: DISCONTINUED | OUTPATIENT
Start: 2025-07-13 | End: 2025-07-12

## 2025-07-12 RX ADMIN — INSULIN GLARGINE 10 UNITS: 100 INJECTION, SOLUTION SUBCUTANEOUS at 09:37

## 2025-07-12 RX ADMIN — FUROSEMIDE 20 MG: 10 INJECTION, SOLUTION INTRAMUSCULAR; INTRAVENOUS at 17:48

## 2025-07-12 RX ADMIN — AZITHROMYCIN DIHYDRATE 250 MG: 250 TABLET, FILM COATED ORAL at 14:04

## 2025-07-12 RX ADMIN — IPRATROPIUM BROMIDE AND ALBUTEROL SULFATE 3 ML: .5; 3 SOLUTION RESPIRATORY (INHALATION) at 08:45

## 2025-07-12 RX ADMIN — IPRATROPIUM BROMIDE AND ALBUTEROL SULFATE 3 ML: .5; 3 SOLUTION RESPIRATORY (INHALATION) at 19:23

## 2025-07-12 RX ADMIN — IPRATROPIUM BROMIDE AND ALBUTEROL SULFATE 3 ML: .5; 3 SOLUTION RESPIRATORY (INHALATION) at 11:39

## 2025-07-12 RX ADMIN — CETIRIZINE HYDROCHLORIDE 5 MG: 5 TABLET ORAL at 09:36

## 2025-07-12 RX ADMIN — IPRATROPIUM BROMIDE AND ALBUTEROL SULFATE 3 ML: .5; 3 SOLUTION RESPIRATORY (INHALATION) at 16:53

## 2025-07-12 RX ADMIN — METHYLPREDNISOLONE SODIUM SUCCINATE 40 MG: 40 INJECTION, POWDER, LYOPHILIZED, FOR SOLUTION INTRAMUSCULAR; INTRAVENOUS at 00:41

## 2025-07-12 RX ADMIN — INSULIN ASPART 7 UNITS: 100 INJECTION, SOLUTION INTRAVENOUS; SUBCUTANEOUS at 14:03

## 2025-07-12 RX ADMIN — INSULIN ASPART 7 UNITS: 100 INJECTION, SOLUTION INTRAVENOUS; SUBCUTANEOUS at 09:36

## 2025-07-12 RX ADMIN — INSULIN ASPART 6 UNITS: 100 INJECTION, SOLUTION INTRAVENOUS; SUBCUTANEOUS at 17:49

## 2025-07-12 RX ADMIN — PREDNISONE 40 MG: 20 TABLET ORAL at 14:05

## 2025-07-12 ASSESSMENT — ACTIVITIES OF DAILY LIVING (ADL)
ADLS_ACUITY_SCORE: 60

## 2025-07-12 NOTE — PROGRESS NOTES
07/12/25 0746   Appointment Info   Signing Clinician's Name / Credentials (OT) LASHELL Bar   Living Environment   People in Home parent(s)   Current Living Arrangements mobile home   Home Accessibility stairs to enter home   Number of Stairs, Main Entrance 5   Stair Railings, Main Entrance railing on right side (ascending)   Transportation Anticipated car, drives self   Living Environment Comments walk-in shower with grab bars   Self-Care   Usual Activity Tolerance moderate   Current Activity Tolerance fair   Equipment Currently Used at Home grab bar, tub/shower;walker, rolling;other (see comments)  (Home O2 2L)   Fall history within last six months no   Activity/Exercise/Self-Care Comment She is typically ind in all ADLs except for donning socks and styling hair. Pt have been receiving assist from mother in all I/ADLs as she reported she has been feeling fatigued and SOB.  Uses a 4WW to ambulate.   General Information   Onset of Illness/Injury or Date of Surgery 07/11/25   Referring Physician Jersey Kennedy   Additional Occupational Profile Info/Pertinent History of Current Problem 70 year old femle with PMH of asthma/COPD, chronic hypoxic respiratory failure, bipolar, and morbid obesity. Admirtted on 7/11 for SOB and nasal congestion.   Existing Precautions/Restrictions fall;oxygen therapy device and L/min  (O2 2 L NC)   Limitations/Impairments hearing;safety/cognitive   Cognitive Status Examination   Affect/Mental Status (Cognitive) flat/blunted affect   Follows Commands follows one-step commands;physical/tactile prompts required;repetition of directions required;verbal cues/prompting required   Cognitive Status Comments PMH pf bipolar, cognitive dysfunction, anxiety   Visual Perception   Visual Impairment/Limitations corrective lenses full-time   Posture   Posture forward head position   Range of Motion Comprehensive   General Range of Motion bilateral upper extremity ROM WFL   Strength  Comprehensive (MMT)   General Manual Muscle Testing (MMT) Assessment no strength deficits identified   Bed Mobility   Bed Mobility supine-sit   Supine-Sit Alexander (Bed Mobility) contact guard   Assistive Device (Bed Mobility) bed rails   Transfers   Transfers sit-stand transfer;toilet transfer   Sit-Stand Transfer   Sit-Stand Alexander (Transfers) contact guard   Assistive Device (Sit-Stand Transfers) walker, front-wheeled   Toilet Transfer   Alexander Level (Toilet Transfer) contact guard   Assistive Device (Toilet Transfer) grab bars/safety frame;walker, front-wheeled   Balance   Balance Assessment sitting static balance;standing static balance;standing dynamic balance   Sitting Balance: Static supervision   Standing Balance: Static contact guard   Standing Balance: Dynamic contact guard   Position/Device Used, Standing Balance walker, front-wheeled   Activities of Daily Living   BADL Assessment/Intervention lower body dressing;grooming;toileting   Lower Body Dressing Assessment/Training   Alexander Level (Lower Body Dressing) shoes/slippers;contact guard assist   Grooming Assessment/Training   Alexander Level (Grooming) contact guard assist   Toileting   Alexander Level (Toileting) moderate assist (50% patient effort)   Clinical Impression   Criteria for Skilled Therapeutic Interventions Met (OT) Yes, treatment indicated   OT Diagnosis Decreased ind and activity tolerance in ADLs   Influenced by the following impairments respiratory failure, SOB, weakness, cognition   OT Problem List-Impairments impacting ADL activity tolerance impaired;balance;cognition;mobility;hearing   Assessment of Occupational Performance 3-5 Performance Deficits   Identified Performance Deficits bathing, dressing, toileting, activity tolerance   Planned Therapy Interventions (OT) ADL retraining;balance training;bed mobility training;cognition;transfer training;home program guidelines;progressive activity/exercise;risk  factor education   Clinical Decision Making Complexity (OT) problem focused assessment/low complexity   Risk & Benefits of therapy have been explained evaluation/treatment results reviewed;patient   OT Total Evaluation Time   OT Eval, Low Complexity Minutes (81466) 8   OT Goals   Therapy Frequency (OT) 5 times/week   OT Predicted Duration/Target Date for Goal Attainment 07/19/25   OT Goals Hygiene/Grooming;Lower Body Dressing;Toilet Transfer/Toileting   OT: Hygiene/Grooming while standing;supervision/stand-by assist   OT: Lower Body Dressing Modified independent   OT: Toilet Transfer/Toileting Supervision/stand-by assist;toilet transfer;cleaning and garment management   Interventions   Interventions Quick Adds Self-Care/Home Management   Self-Care/Home Management   Self-Care/Home Mgmt/ADL, Compensatory, Meal Prep Minutes (82330) 26   Symptoms Noted During/After Treatment (Meal Preparation/Planning Training) shortness of breath;fatigue   Treatment Detail/Skilled Intervention Don socks CGA with set up. Sit>stand CGA, pt stoodup before FWW was placed. Ambulated to bathroom CGA FWW, VC to walk closer with FWW, required tacticle cues. Toilet transfer CGA FWW and GB. VC to stand and pull down brief, sit<>stand CGA, mod A to pull down brief, CGA for standing as pt wiped. VC doff shoe,thread brief max A as pt unable to achieve figure 4 tech, pt demo tendency to stand and thread brief despite LOB and limited trunk flexion. VC to pull up brief and don shoes. Ambulated to sink CGA, pt abandoned FWW and demo unsteadiness, VC to redirect use of FWW. VC walker placement while completing h/g, progressed to tactile cue. Completed h/g CGA, intermittent SBA as pt places UE on sink for support, VC initation of tasks. Ambulated in hallway CGA, VC and tacticle cue for walker safety. Stand>sit EOB CGA, VC . Pt was left in the room with call light and RN.   OT Discharge Planning   OT Plan Progress walker safety, fxl transfer,  mobility, h/g at sink , toileting, dressing   OT Discharge Recommendation (DC Rec) home with assist;home with home care occupational therapy   OT Rationale for DC Rec Pt requires assist with all I/ADLs. Mother can assist as needed. Recommend home OT services to improve activity tolerance and ind in ADLs.   OT Brief overview of current status CGA transfers, h/g, dressing and mod A toileting   OT Total Distance Amb During Session (feet) 70   Total Session Time   Timed Code Treatment Minutes 26   Total Session Time (sum of timed and untimed services) 34

## 2025-07-12 NOTE — PROGRESS NOTES
Pulmonary Progress Note  7/12/2025    Admit Date: 7/11/2025  CODE: Full Code    Reason for Consult: SOB, wheezing    Assessment/Plan:     70-year-old female with a 40-pack-year tobacco history, and diagnosis of asthma/COPD overlap, morbid obesity, bipolar disorder, ABDIFATAH on CPAP, chronic hypoxemic hypercapnic respiratory failure on 2 to 3 L of oxygen at baseline, who presented with wheezing, shortness of breath and hypoxia, consistent with exacerbation, and pulmonary edema on chest imaging.  Question raised of an adequate PAP use, with 1 hour per night or less.    PFTs from 2016 showing normal FEV1/FVC ratio, and reduced FEV1 and FVC.  With a normal TLC this is considered a nonspecific pattern.  Has a positive bronchodilator response consistent with the COPD overlap diagnosis. Ratio is likely pseudo normalized due to the reduced FVC.  Given these are almost a decade old can repeat as an outpatient.    Recommendations:  Will re-dose 20 mg IV Lasix x 1  Start prednisone 40 mg daily, with taper by 10 mg every 3 days to off.  Resume Trelegy 100, 1 puff daily upon discharge  From what I can see the patient is on home CPAP, auto 5-15 mmHg.  She is not clearly consistently using this.  Per her request an order was placed for new supplies/mask to [employer] [facility].  Given recurrent hospitalizations and baseline pCO2 in the 80s, nocturnal BiPAP at home could be considered.  Given her known ABDIFATAH diagnosis would likely benefit from a in-lab titration with end-tidal CO2 monitoring, will refer her back to her sleep provider who she saw 2 years ago and is overdue for follow-up with.    Patient is essentially at her respiratory baseline.  If she is stable tomorrow with transition to oral prednisone, no objection to discharge tomorrow.    Altagracia Thompson MD  Pulmonary and Critical Care Medicine  [facility]  Office: [phone]      Subjective/Interim Events:     Last VBG 7.28/81  Currently saturating in the mid to high 90s on 2  "L.  She is awake and alert but confused at times.  She notes she feels better.  No wheezing on exam.    Cannot provide clear history regarding if she is using PAP at home.  Also unclear to me what she has at home.  She last saw a sleep provider in March 2023 and at that time was prescribed auto CPAP 5-15 mm Hg.  Reports that she needs a new PAP mask, and that she uses [employer] [facility].    Medications:     Azithromycin  IV solumedrol 40 mg q12H  Duoneb QID    Exam/Data:   Vitals  /59 (BP Location: Right arm)   Pulse 85   Temp 98  F (36.7  C) (Axillary)   Resp (!) 55   Ht 1.397 m (4' 7\")   Wt 77.1 kg (170 lb)   SpO2 94%   BMI 39.51 kg/m    BP - Mean:  [71-78] 76  I/O last 3 completed shifts:  In: -   Out: 500 [Urine:500]  Weight change:     FiO2 (%): 35 %, Resp: (!) 55    EXAM:  Physical Exam  Gen: Alert, oriented, no distress  HEENT: NT, no ART  CV: RRR, no m/g/r  Resp: CTAB  Abd: soft, nontender, BS+  Skin: no rashes or lesions  Ext: no edema  Neuro: PERRL, nonfocal exam    LABS:  Reviewed in detail  No leukocytosis    VBG 7.25/88  Negative viral swab    IMAGING:  CXR: pulmonary vascular congestion and edema.   "

## 2025-07-12 NOTE — PROGRESS NOTES
[facility]    Medicine Progress Note - Hospitalist Service    Date of Admission:  7/11/2025    Assessment & Plan   70F with asthma/COPD overlap, chronic hypoxic respiratory failure (2l), ABDIFATAH on CPAP, bipolar, severe morbid obesity Body mass index is 39.51 kg/m .     #Acute on chronic hypoxic and hypercarbic respiratory failure  #ABDIFATAH  -solumedrol, duonebs, azithromycin, O2, BiPAP PRN   -NIPPV QHS  -carries diagnosis of asthma/COPD but this diagnosis is a little unclear to me.  Has preserved FEV1/FVC and clinical picture of restriction with severe scolioisis, chronic atelectasis, and absence of emphysematous changes on CT.  On the other hand has elevated TLC and residual volumes which which would fit with obstruction vs. Effort?  -appreciate pulm consult. Switched solumedrol to prednisone taper. IV lasix 20mg x 1. Given recurrent hospitalizations and baseline pCO2 in the 80s, nocturnal BiPAP at home could be considered. Given her known ABDIFATAH diagnosis would likely benefit from a in-lab titration with end-tidal CO2 monitoring, will refer her back to her sleep provider who she saw 2 years ago and is overdue for follow-up with.   -cetrizine for reported possible allergies     #DM2  -home regimen: mounjaro, metformin  -inpatient: lantus, sliding scale insulin     #bipolar - abilify depo  #Hold detrol for now          Diet: 2 Gram Sodium Diet    DVT Prophylaxis: Enoxaparin (Lovenox) SQ  Lara Catheter: Not present  Lines: None     Cardiac Monitoring: ACTIVE order. Indication: Electrolyte Imbalance (24 hours)- Magnesium <1.3 mg/ml; Potassium < =2.8 or > 5.5 mg/ml  Code Status: Full Code      Clinically Significant Risk Factors                       # Acute Hypercapnic Respiratory Failure: based on venous blood gas results.  Continue supplemental oxygen and ventilatory support as indicated.        # DMII: A1C = 7.7 % (Ref range: 0.0 - 5.6 %) within past 6 months, PRESENT ON ADMISSION  # Obesity:  "Estimated body mass index is 39.51 kg/m  as calculated from the following:    Height as of this encounter: 1.397 m (4' 7\").    Weight as of this encounter: 77.1 kg (170 lb)., PRESENT ON ADMISSION     # Financial/Environmental Concerns: none  # Asthma: noted on problem list        Social Drivers of Health    Tobacco Use: Medium Risk (7/7/2025)    Patient History     Smoking Tobacco Use: Former     Smokeless Tobacco Use: Never     Passive Exposure: Past          Disposition Plan     Medically Ready for Discharge: Anticipated Tomorrow             LUCIA Durbin  Hospitalist Service  [facility]  Securely message with [employer] (more info)  Text page via [facility] Paging/Directory   ______________________________________________________________________    Interval History   Patient is new to me today. Chart reviewed. Patient seen and examined while in ED. Mom at bedside during my visit. Patient reported doing better since she came in. Again, emphasized the need to use CPAP/BiPaP at night.     Physical Exam   Vital Signs: Temp: 98.2  F (36.8  C) Temp src: Oral BP: 95/46 Pulse: 85   Resp: (!) 55 SpO2: 94 % O2 Device: Nasal cannula Oxygen Delivery: 2 LPM  Weight: 170 lbs 0 oz      General: Not in obvious distress.  HEENT: NC, AT   Chest: Diminished breath sounds bilaterally  Heart: S1S2 normal, regular. No M/R/G  Abdomen: Soft. NT, ND. Bowel sounds- active.  Extremities: No legs swelling  Neuro: Awake, grossly non-focal      Medical Decision Making       50 MINUTES SPENT BY ME on the date of service doing chart review, history, exam, documentation & further activities per the note.      Data     I have personally reviewed the following data over the past 24 hrs:    16.5 (H)  \   12.9   / 213     142 100 14.8 /  294 (H)   4.9 36 (H) 0.72 \       Imaging results reviewed over the past 24 hrs:   No results found for this or any previous visit (from the past 24 hours).  "

## 2025-07-12 NOTE — PLAN OF CARE
"  Problem: Comorbidity Management  Goal: Maintenance of Asthma Control  Outcome: Not Progressing     Problem: Adult Inpatient Plan of Care  Goal: Plan of Care Review  Description: The Plan of Care Review/Shift note should be completed every shift.  The Outcome Evaluation is a brief statement about your assessment that the patient is improving, declining, or no change.  This information will be displayed automatically on your shift  note.  7/11/2025 2239 by Helen Antonio RN  Outcome: Progressing  7/11/2025 2238 by Helen Antonio RN  Outcome: Progressing  Goal: Patient-Specific Goal (Individualized)  Description: You can add care plan individualizations to a care plan. Examples of Individualization might be:  \"Parent requests to be called daily at 9am for status\", \"I have a hard time hearing out of my right ear\", or \"Do not touch me to wake me up as it startles  me\".  7/11/2025 2239 by Helen Antonio RN  Outcome: Progressing  7/11/2025 2238 by Helen Antonio RN  Outcome: Progressing  Goal: Absence of Hospital-Acquired Illness or Injury  7/11/2025 2239 by Helen Antonio RN  Outcome: Progressing  7/11/2025 2238 by Helen Antonio RN  Outcome: Progressing  Intervention: Prevent Skin Injury  Recent Flowsheet Documentation  Taken 7/11/2025 1830 by Helen Antonio RN  Body Position: position changed independently  Goal: Optimal Comfort and Wellbeing  7/11/2025 2239 by Helen Antonio RN  Outcome: Progressing  7/11/2025 2238 by Helen Antonio RN  Outcome: Progressing  Goal: Readiness for Transition of Care  7/11/2025 2239 by Helen Antonio RN  Outcome: Progressing  7/11/2025 2238 by Helen Antonio RN  Outcome: Progressing     Problem: Delirium  Goal: Optimal Coping  7/11/2025 2239 by Helen Antonio RN  Outcome: Progressing  7/11/2025 2238 by Helen Antonio RN  Outcome: Progressing  Goal: Improved Behavioral Control  7/11/2025 2239 by Helen Antonio RN  Outcome: Progressing  7/11/2025 2238 by Helen Antonio RN  Outcome: Progressing  Goal: " Improved Attention and Thought Clarity  7/11/2025 2239 by Day, Helen EDWARDS RN  Outcome: Progressing  7/11/2025 2238 by Day, Helen EDWARDS RN  Outcome: Progressing  Goal: Improved Sleep  7/11/2025 2239 by Day, Helen EDWARDS RN  Outcome: Progressing  7/11/2025 2238 by Marisela, Helen EDWARDS RN  Outcome: Progressing     Problem: Gas Exchange Impaired  Goal: Optimal Gas Exchange  7/11/2025 2239 by Marisela, Helen EDWARDS RN  Outcome: Progressing  7/11/2025 2238 by Day, Helen EDWARDS RN  Outcome: Progressing     Problem: Comorbidity Management  Goal: Maintenance of Behavioral Health Symptom Control  Outcome: Progressing  Goal: Maintenance of COPD Symptom Control  Outcome: Progressing  Goal: Blood Glucose Levels Within Targeted Range  Outcome: Progressing  Goal: Maintenance of Heart Failure Symptom Control  Outcome: Progressing  Goal: Blood Pressure in Desired Range  Outcome: Progressing  Goal: Maintenance of Osteoarthritis Symptom Control  Outcome: Progressing  Goal: Bariatric Home Regimen Maintained  Outcome: Progressing  Goal: Maintenance of Seizure Control  Outcome: Progressing   Goal Outcome Evaluation:       Bs 377-received 10  Units novolog. Dr Mills notified.  -received 7 Units of Novolog. Dr Mills notified. Patient on 2 L. Sats 90-93. At Hs patient put on BiPAP. Patient denies pain.

## 2025-07-12 NOTE — PROGRESS NOTES
07/12/25 1022   Appointment Info   Signing Clinician's Name / Credentials (PT) Jacqueline Franz PT   Living Environment   People in Home parent(s)   Current Living Arrangements mobile home   Home Accessibility stairs to enter home   Number of Stairs, Main Entrance 5   Stair Railings, Main Entrance railing on right side (ascending)   Self-Care   Equipment Currently Used at Home walker, rolling;other (see comments)  (4WW, home O2 2L)   Activity/Exercise/Self-Care Comment independent ADL's except mother assist with socks and IADL's   General Information   Onset of Illness/Injury or Date of Surgery 07/11/25   Referring Physician Dr. Kennedy   Patient/Family Therapy Goals Statement (PT) none stated   Pertinent History of Current Problem (include personal factors and/or comorbidities that impact the POC) resp. failure; bipolar, obesity   Existing Precautions/Restrictions oxygen therapy device and L/min  (O2 2L NC)   Cognition   Affect/Mental Status (Cognition) WFL   Orientation Status (Cognition) oriented x 4   Follows Commands (Cognition) WFL   Range of Motion (ROM)   Range of Motion ROM is WFL   Strength (Manual Muscle Testing)   Strength (Manual Muscle Testing) strength is WFL   Bed Mobility   Bed Mobility supine-sit;sit-supine   Supine-Sit Island (Bed Mobility) modified independence   Sit-Supine Island (Bed Mobility) modified independence   Transfers   Transfers sit-stand transfer   Sit-Stand Transfer   Sit-Stand Island (Transfers) modified independence   Assistive Device (Sit-Stand Transfers) walker, 4-wheeled   Gait/Stairs (Locomotion)   Island Level (Gait) modified independence   Assistive Device (Gait) walker, 4-wheeled   Distance in Feet (Gait) 60   Pattern (Gait) step-through   Deviations/Abnormal Patterns (Gait) other (see comments)  (O2 2L NC 88% post amb., recovery to 91% with rest)   Clinical Impression   Criteria for Skilled Therapeutic Intervention Evaluation only   Clinical  Presentation (PT Evaluation Complexity) stable   Clinical Presentation Rationale pt presents as medically diagnosed   Clinical Decision Making (Complexity) low complexity   PT Total Evaluation Time   PT Eval, Low Complexity Minutes (05251) 16   PT Discharge Planning   PT Plan d/c PT   PT Discharge Recommendation (DC Rec) home with assist   PT Rationale for DC Rec mother assist as per prior; pt moving well with walker   PT Brief overview of current status amb. 60 feet with 4WW mod independent, O2 2L NC   PT Total Distance Amb During Session (feet) 60   PT Equipment Needed at Discharge walker, rolling  (pt has 4WW at home)   Physical Therapy Time and Intention   Total Session Time (sum of timed and untimed services) 16

## 2025-07-12 NOTE — PLAN OF CARE
Problem: Adult Inpatient Plan of Care  Goal: Plan of Care Review  Description: The Plan of Care Review/Shift note should be completed every shift.  The Outcome Evaluation is a brief statement about your assessment that the patient is improving, declining, or no change.  This information will be displayed automatically on your shift  note.  Outcome: Progressing  Flowsheets (Taken 7/12/2025 0656)  Plan of Care Reviewed With: patient  Overall Patient Progress: improving   Goal Outcome Evaluation:      Plan of Care Reviewed With: patient    Overall Patient Progress: improvingOverall Patient Progress: improving           Patient admitted inpatient with acute on chronic respiratory failure with hypoxia and hypercapnia.  Patient used BIPAP till 0600.  Last PCO2 was 81.  RT following.  Magnesium and potassium protocol. Labs this morning.  Tele NSR. Sera. Assist of 1 with a walker.

## 2025-07-13 LAB
ANION GAP SERPL CALCULATED.3IONS-SCNC: 2 MMOL/L (ref 7–15)
BASE EXCESS BLDV CALC-SCNC: 13 MMOL/L (ref -3–3)
BUN SERPL-MCNC: 18.7 MG/DL (ref 8–23)
CALCIUM SERPL-MCNC: 8.8 MG/DL (ref 8.8–10.4)
CHLORIDE SERPL-SCNC: 100 MMOL/L (ref 98–107)
CREAT SERPL-MCNC: 0.68 MG/DL (ref 0.51–0.95)
EGFRCR SERPLBLD CKD-EPI 2021: >90 ML/MIN/1.73M2
ERYTHROCYTE [DISTWIDTH] IN BLOOD BY AUTOMATED COUNT: 13.4 % (ref 10–15)
GLUCOSE BLDC GLUCOMTR-MCNC: 146 MG/DL (ref 70–99)
GLUCOSE BLDC GLUCOMTR-MCNC: 246 MG/DL (ref 70–99)
GLUCOSE BLDC GLUCOMTR-MCNC: 263 MG/DL (ref 70–99)
GLUCOSE BLDC GLUCOMTR-MCNC: 310 MG/DL (ref 70–99)
GLUCOSE BLDC GLUCOMTR-MCNC: 341 MG/DL (ref 70–99)
GLUCOSE SERPL-MCNC: 161 MG/DL (ref 70–99)
HCO3 BLDV-SCNC: 43 MMOL/L (ref 21–28)
HCO3 SERPL-SCNC: 41 MMOL/L (ref 22–29)
HCT VFR BLD AUTO: 41.7 % (ref 35–47)
HGB BLD-MCNC: 12.9 G/DL (ref 11.7–15.7)
MAGNESIUM SERPL-MCNC: 2.3 MG/DL (ref 1.7–2.3)
MCH RBC QN AUTO: 28.6 PG (ref 26.5–33)
MCHC RBC AUTO-ENTMCNC: 30.9 G/DL (ref 31.5–36.5)
MCV RBC AUTO: 93 FL (ref 78–100)
O2/TOTAL GAS SETTING VFR VENT: 35 %
OXYHGB MFR BLDV: 70 % (ref 70–75)
PCO2 BLDV: 86 MM HG (ref 40–50)
PH BLDV: 7.31 [PH] (ref 7.32–7.43)
PLATELET # BLD AUTO: 211 10E3/UL (ref 150–450)
PO2 BLDV: 40 MM HG (ref 25–47)
POTASSIUM SERPL-SCNC: 4.2 MMOL/L (ref 3.4–5.3)
RBC # BLD AUTO: 4.51 10E6/UL (ref 3.8–5.2)
SAO2 % BLDV: 71 % (ref 70–75)
SODIUM SERPL-SCNC: 143 MMOL/L (ref 135–145)
WBC # BLD AUTO: 17.6 10E3/UL (ref 4–11)

## 2025-07-13 PROCEDURE — 82962 GLUCOSE BLOOD TEST: CPT

## 2025-07-13 PROCEDURE — 82805 BLOOD GASES W/O2 SATURATION: CPT | Performed by: INTERNAL MEDICINE

## 2025-07-13 PROCEDURE — 999N000157 HC STATISTIC RCP TIME EA 10 MIN

## 2025-07-13 PROCEDURE — 250N000012 HC RX MED GY IP 250 OP 636 PS 637: Performed by: INTERNAL MEDICINE

## 2025-07-13 PROCEDURE — 94640 AIRWAY INHALATION TREATMENT: CPT

## 2025-07-13 PROCEDURE — 99232 SBSQ HOSP IP/OBS MODERATE 35: CPT | Performed by: INTERNAL MEDICINE

## 2025-07-13 PROCEDURE — 250N000009 HC RX 250: Performed by: HOSPITALIST

## 2025-07-13 PROCEDURE — 83735 ASSAY OF MAGNESIUM: CPT | Performed by: INTERNAL MEDICINE

## 2025-07-13 PROCEDURE — 36415 COLL VENOUS BLD VENIPUNCTURE: CPT | Performed by: HOSPITALIST

## 2025-07-13 PROCEDURE — 80048 BASIC METABOLIC PNL TOTAL CA: CPT | Performed by: HOSPITALIST

## 2025-07-13 PROCEDURE — 120N000001 HC R&B MED SURG/OB

## 2025-07-13 PROCEDURE — 85027 COMPLETE CBC AUTOMATED: CPT | Performed by: HOSPITALIST

## 2025-07-13 PROCEDURE — 250N000013 HC RX MED GY IP 250 OP 250 PS 637: Performed by: HOSPITALIST

## 2025-07-13 PROCEDURE — 250N000011 HC RX IP 250 OP 636: Performed by: HOSPITALIST

## 2025-07-13 PROCEDURE — 94640 AIRWAY INHALATION TREATMENT: CPT | Mod: 76

## 2025-07-13 RX ADMIN — IPRATROPIUM BROMIDE AND ALBUTEROL SULFATE 3 ML: .5; 3 SOLUTION RESPIRATORY (INHALATION) at 11:24

## 2025-07-13 RX ADMIN — ENOXAPARIN SODIUM 40 MG: 40 INJECTION SUBCUTANEOUS at 12:55

## 2025-07-13 RX ADMIN — INSULIN GLARGINE 10 UNITS: 100 INJECTION, SOLUTION SUBCUTANEOUS at 08:06

## 2025-07-13 RX ADMIN — AZITHROMYCIN DIHYDRATE 250 MG: 250 TABLET, FILM COATED ORAL at 12:55

## 2025-07-13 RX ADMIN — INSULIN ASPART 1 UNITS: 100 INJECTION, SOLUTION INTRAVENOUS; SUBCUTANEOUS at 08:03

## 2025-07-13 RX ADMIN — IPRATROPIUM BROMIDE AND ALBUTEROL SULFATE 3 ML: .5; 3 SOLUTION RESPIRATORY (INHALATION) at 08:32

## 2025-07-13 RX ADMIN — INSULIN ASPART 9 UNITS: 100 INJECTION, SOLUTION INTRAVENOUS; SUBCUTANEOUS at 17:58

## 2025-07-13 RX ADMIN — IPRATROPIUM BROMIDE AND ALBUTEROL SULFATE 3 ML: .5; 3 SOLUTION RESPIRATORY (INHALATION) at 19:38

## 2025-07-13 RX ADMIN — IPRATROPIUM BROMIDE AND ALBUTEROL SULFATE 3 ML: .5; 3 SOLUTION RESPIRATORY (INHALATION) at 15:56

## 2025-07-13 RX ADMIN — PREDNISONE 40 MG: 20 TABLET ORAL at 08:03

## 2025-07-13 RX ADMIN — INSULIN ASPART 5 UNITS: 100 INJECTION, SOLUTION INTRAVENOUS; SUBCUTANEOUS at 12:55

## 2025-07-13 RX ADMIN — CETIRIZINE HYDROCHLORIDE 5 MG: 5 TABLET ORAL at 08:03

## 2025-07-13 ASSESSMENT — ACTIVITIES OF DAILY LIVING (ADL)
ADLS_ACUITY_SCORE: 61
ADLS_ACUITY_SCORE: 60
ADLS_ACUITY_SCORE: 62
ADLS_ACUITY_SCORE: 61
ADLS_ACUITY_SCORE: 60
ADLS_ACUITY_SCORE: 61
ADLS_ACUITY_SCORE: 60
ADLS_ACUITY_SCORE: 62
ADLS_ACUITY_SCORE: 61
ADLS_ACUITY_SCORE: 62
ADLS_ACUITY_SCORE: 60
ADLS_ACUITY_SCORE: 63
ADLS_ACUITY_SCORE: 60
ADLS_ACUITY_SCORE: 60
ADLS_ACUITY_SCORE: 63
ADLS_ACUITY_SCORE: 60
ADLS_ACUITY_SCORE: 62
ADLS_ACUITY_SCORE: 60
ADLS_ACUITY_SCORE: 63
ADLS_ACUITY_SCORE: 63
ADLS_ACUITY_SCORE: 61

## 2025-07-13 NOTE — PLAN OF CARE
"  Problem: Adult Inpatient Plan of Care  Goal: Plan of Care Review  Description: The Plan of Care Review/Shift note should be completed every shift.  The Outcome Evaluation is a brief statement about your assessment that the patient is improving, declining, or no change.  This information will be displayed automatically on your shift  note.  Outcome: Progressing  Goal: Patient-Specific Goal (Individualized)  Description: You can add care plan individualizations to a care plan. Examples of Individualization might be:  \"Parent requests to be called daily at 9am for status\", \"I have a hard time hearing out of my right ear\", or \"Do not touch me to wake me up as it startles  me\".  Outcome: Progressing  Goal: Absence of Hospital-Acquired Illness or Injury  Outcome: Progressing  Intervention: Prevent Skin Injury  Recent Flowsheet Documentation  Taken 7/12/2025 1500 by Lizzette Kirkland RN  Body Position: position changed independently  Taken 7/12/2025 0800 by Lizzette Kirkland RN  Body Position: position changed independently  Goal: Optimal Comfort and Wellbeing  Outcome: Progressing  Goal: Readiness for Transition of Care  Outcome: Progressing   Goal Outcome Evaluation:       Pt alert and oriented, forgetful, VSS, on 2L NC mother at bedside, assist of 1, makes needs known, purewick call light within reach                     "

## 2025-07-13 NOTE — PLAN OF CARE
Goal Outcome Evaluation:    Patient alert oriented, VSS, forgetful, on NC 2L/min, patient refused CPAP @ night, patient has no SOB, feel comfortable with NC,                       Problem: Adult Inpatient Plan of Care  Goal: Absence of Hospital-Acquired Illness or Injury  Intervention: Prevent Skin Injury  Recent Flowsheet Documentation  Taken 7/13/2025 0400 by Talita Mosquera RN  Body Position: turned         Problem: Adult Inpatient Plan of Care  Goal: Absence of Hospital-Acquired Illness or Injury  Intervention: Identify and Manage Fall Risk  Recent Flowsheet Documentation  Taken 7/13/2025 0200 by Talita Mosquera RN  Safety Promotion/Fall Prevention:   activity supervised   nonskid shoes/slippers when out of bed   mobility aid in reach   patient and family education  Intervention: Prevent Skin Injury  Recent Flowsheet Documentation  Taken 7/13/2025 0400 by Talita Mosquera RN  Body Position: turned  Taken 7/13/2025 0200 by Talita Mosquera RN  Body Position:   right   turned

## 2025-07-13 NOTE — PROGRESS NOTES
Care Management Follow Up    Length of Stay (days): 2    Expected Discharge Date: 7/14/2025     Concerns to be Addressed: discharge planning     Patient plan of care discussed at interdisciplinary rounds: No    Anticipated Discharge Disposition:  home with the support of her mother.              Anticipated Discharge Services:  in-home OT recommended but patient adamantly refused/declined.  Anticipated Discharge DME:  states she already has a functional cpap at home.     Patient/family educated on Medicare website which has current facility and service quality ratings:  N/A  Education Provided on the Discharge Plan:  yes  Patient/Family in Agreement with the Plan:  yes    Referrals Placed by CM/SW:  none, N/A  Private pay costs discussed: Not applicable    Discussed  Partnership in Safe Discharge Planning  document with patient/family: No     Handoff Completed: Yes, [facility] PCP: Internal handoff referral completed    Additional Information:  Met with patient to talk about discharge needs.   In-home OT recommended - offered to arrange for patient, she declined.   Home cpap - states she already has a functional one at home.    Next Steps: no further CM needs today. CM to continue to follow until patient discharges.    Fiona Dean RN  Acute Care Management  [facility]  For further questions/concerns you can reach us at Voc[employer] Web Console

## 2025-07-13 NOTE — PLAN OF CARE
Goal Outcome Evaluation:      Plan of Care Reviewed With: patient, parent          Outcome Evaluation: pt in bed all shift, using purewick device. denied pain. did note pco2 was in 80's  range but has remained high since admission. pt resistive to using hospital cpap & resists using bipap which would help.

## 2025-07-13 NOTE — PLAN OF CARE
Goal Outcome Evaluation:      Problem: Adult Inpatient Plan of Care  Goal: Absence of Hospital-Acquired Illness or Injury  Intervention: Prevent Skin Injury  Recent Flowsheet Documentation  Taken 7/12/2025 2210 by Robert José, RN  Body Position:   right   turned     Problem: Gas Exchange Impaired  Goal: Optimal Gas Exchange  Outcome: Progressing  Intervention: Optimize Oxygenation and Ventilation  Recent Flowsheet Documentation  Taken 7/12/2025 2100 by Robert José, RN  Head of Bed (HOB) Positioning: HOB at 20-30 degrees    Pt alert and oriented, also forgetful at times. Pt ate well. Maintaining o2 sats on O2 per NC at 2 liters. RT placing overnight CPAP now. Assisted pt to turn and assisted with pericares after incontinence. Pt had an HS snack sandwich after blood sugars were checked.

## 2025-07-13 NOTE — PROGRESS NOTES
[facility]    Medicine Progress Note - Hospitalist Service    Date of Admission:  7/11/2025    Assessment & Plan   70F with asthma/COPD overlap, chronic hypoxic respiratory failure (2l), ABDIFATAH on CPAP, bipolar, severe morbid obesity Body mass index is 39.51 kg/m .     #Acute on chronic hypoxic and hypercarbic respiratory failure  #ABDIFATAH  -Steroid, duonebs, azithromycin, O2, BiPAP PRN   -NIPPV QHS  -carries diagnosis of asthma/COPD but this diagnosis is a little unclear to me.  Has preserved FEV1/FVC and clinical picture of restriction with severe scolioisis, chronic atelectasis, and absence of emphysematous changes on CT.  On the other hand has elevated TLC and residual volumes which which would fit with obstruction vs. Effort?  -appreciate pulm consult. Switched solumedrol to prednisone taper. IV lasix 20mg x 1. Given recurrent hospitalizations and baseline pCO2 in the 80s, nocturnal BiPAP at home could be considered. Given her known ABDIFATAH diagnosis would likely benefit from a in-lab titration with end-tidal CO2 monitoring, will refer her back to her sleep provider who she saw 2 years ago and is overdue for follow-up with.   -cetrizine for reported possible allergies     #DM2  -home regimen: mounjaro, metformin  -inpatient: lantus, sliding scale insulin     #bipolar - abilify depo  #Hold detrol for now          Diet: 2 Gram Sodium Diet    DVT Prophylaxis: Enoxaparin (Lovenox) SQ  Lara Catheter: Not present  Lines: None     Cardiac Monitoring: ACTIVE order. Indication: Electrolyte Imbalance (24 hours)- Magnesium <1.3 mg/ml; Potassium < =2.8 or > 5.5 mg/ml  Code Status: Full Code      Clinically Significant Risk Factors                       # Acute Hypercapnic Respiratory Failure: based on venous blood gas results.  Continue supplemental oxygen and ventilatory support as indicated.        # DMII: A1C = 7.7 % (Ref range: 0.0 - 5.6 %) within past 6 months, PRESENT ON ADMISSION  # Obesity: Estimated  "body mass index is 39.51 kg/m  as calculated from the following:    Height as of this encounter: 1.397 m (4' 7\").    Weight as of this encounter: 77.1 kg (170 lb)., PRESENT ON ADMISSION       # Financial/Environmental Concerns: none  # Asthma: noted on problem list        Social Drivers of Health    Tobacco Use: Medium Risk (7/7/2025)    Patient History     Smoking Tobacco Use: Former     Smokeless Tobacco Use: Never     Passive Exposure: Past          Disposition Plan     Medically Ready for Discharge: Anticipated Tomorrow             LUCIA Durbin  Hospitalist Service  [facility]  Securely message with [employer] (more info)  Text page via [facility] Paging/Directory   ______________________________________________________________________    Interval History   Patient seen and examined this morning. No new issues overnight except that the patient refused to use the BiPaP last night. PCO2 is slightly higher today.     Physical Exam   Vital Signs: Temp: 98.2  F (36.8  C) Temp src: Oral BP: 97/52 Pulse: 87   Resp: (!) 56 SpO2: 94 % O2 Device: Nasal cannula Oxygen Delivery: 2 LPM  Weight: 170 lbs 0 oz      General: Not in obvious distress.  HEENT: NC, AT   Chest: Diminished breath sounds bilaterally  Heart: S1S2 normal, regular. No M/R/G  Abdomen: Soft. NT, ND. Bowel sounds- active.  Extremities: No legs swelling  Neuro: Awake, grossly non-focal      Medical Decision Making       50 MINUTES SPENT BY ME on the date of service doing chart review, history, exam, documentation & further activities per the note.      Data     I have personally reviewed the following data over the past 24 hrs:    17.6 (H)  \   12.9   / 211     143 100 18.7 /  263 (H)   4.2 41 (H) 0.68 \       Imaging results reviewed over the past 24 hrs:   No results found for this or any previous visit (from the past 24 hours).  "

## 2025-07-14 ENCOUNTER — PATIENT OUTREACH (OUTPATIENT)
Dept: CARE COORDINATION | Facility: CLINIC | Age: 70
End: 2025-07-14
Payer: COMMERCIAL

## 2025-07-14 ENCOUNTER — APPOINTMENT (OUTPATIENT)
Dept: OCCUPATIONAL THERAPY | Facility: HOSPITAL | Age: 70
End: 2025-07-14
Payer: COMMERCIAL

## 2025-07-14 VITALS
DIASTOLIC BLOOD PRESSURE: 74 MMHG | RESPIRATION RATE: 39 BRPM | TEMPERATURE: 98.2 F | HEIGHT: 55 IN | BODY MASS INDEX: 39.34 KG/M2 | HEART RATE: 92 BPM | SYSTOLIC BLOOD PRESSURE: 121 MMHG | WEIGHT: 170 LBS | OXYGEN SATURATION: 97 %

## 2025-07-14 LAB
ANION GAP SERPL CALCULATED.3IONS-SCNC: <1 MMOL/L (ref 7–15)
BUN SERPL-MCNC: 19.1 MG/DL (ref 8–23)
CALCIUM SERPL-MCNC: 9.2 MG/DL (ref 8.8–10.4)
CHLORIDE SERPL-SCNC: 101 MMOL/L (ref 98–107)
CREAT SERPL-MCNC: 0.73 MG/DL (ref 0.51–0.95)
EGFRCR SERPLBLD CKD-EPI 2021: 89 ML/MIN/1.73M2
ERYTHROCYTE [DISTWIDTH] IN BLOOD BY AUTOMATED COUNT: 13.6 % (ref 10–15)
GLUCOSE BLDC GLUCOMTR-MCNC: 103 MG/DL (ref 70–99)
GLUCOSE BLDC GLUCOMTR-MCNC: 233 MG/DL (ref 70–99)
GLUCOSE SERPL-MCNC: 134 MG/DL (ref 70–99)
HCO3 SERPL-SCNC: 43 MMOL/L (ref 22–29)
HCT VFR BLD AUTO: 43.5 % (ref 35–47)
HGB BLD-MCNC: 13.4 G/DL (ref 11.7–15.7)
MAGNESIUM SERPL-MCNC: 2.3 MG/DL (ref 1.7–2.3)
MCH RBC QN AUTO: 28.9 PG (ref 26.5–33)
MCHC RBC AUTO-ENTMCNC: 30.8 G/DL (ref 31.5–36.5)
MCV RBC AUTO: 94 FL (ref 78–100)
PLATELET # BLD AUTO: 225 10E3/UL (ref 150–450)
POTASSIUM SERPL-SCNC: 4.1 MMOL/L (ref 3.4–5.3)
RBC # BLD AUTO: 4.64 10E6/UL (ref 3.8–5.2)
SODIUM SERPL-SCNC: 144 MMOL/L (ref 135–145)
WBC # BLD AUTO: 12.4 10E3/UL (ref 4–11)

## 2025-07-14 PROCEDURE — 97110 THERAPEUTIC EXERCISES: CPT | Mod: GO

## 2025-07-14 PROCEDURE — 94640 AIRWAY INHALATION TREATMENT: CPT

## 2025-07-14 PROCEDURE — 250N000013 HC RX MED GY IP 250 OP 250 PS 637: Performed by: HOSPITALIST

## 2025-07-14 PROCEDURE — 36415 COLL VENOUS BLD VENIPUNCTURE: CPT | Performed by: HOSPITALIST

## 2025-07-14 PROCEDURE — 94660 CPAP INITIATION&MGMT: CPT

## 2025-07-14 PROCEDURE — 250N000009 HC RX 250: Performed by: HOSPITALIST

## 2025-07-14 PROCEDURE — 99239 HOSP IP/OBS DSCHRG MGMT >30: CPT | Performed by: INTERNAL MEDICINE

## 2025-07-14 PROCEDURE — 80048 BASIC METABOLIC PNL TOTAL CA: CPT | Performed by: HOSPITALIST

## 2025-07-14 PROCEDURE — 85018 HEMOGLOBIN: CPT | Performed by: HOSPITALIST

## 2025-07-14 PROCEDURE — 83735 ASSAY OF MAGNESIUM: CPT | Performed by: INTERNAL MEDICINE

## 2025-07-14 PROCEDURE — 82962 GLUCOSE BLOOD TEST: CPT

## 2025-07-14 PROCEDURE — 999N000157 HC STATISTIC RCP TIME EA 10 MIN

## 2025-07-14 PROCEDURE — 97535 SELF CARE MNGMENT TRAINING: CPT | Mod: GO

## 2025-07-14 PROCEDURE — 250N000012 HC RX MED GY IP 250 OP 636 PS 637: Performed by: INTERNAL MEDICINE

## 2025-07-14 RX ORDER — PREDNISONE 20 MG/1
TABLET ORAL
Qty: 11 TABLET | Refills: 0 | Status: SHIPPED | OUTPATIENT
Start: 2025-07-15 | End: 2025-07-25

## 2025-07-14 RX ORDER — AZITHROMYCIN 250 MG/1
250 TABLET, FILM COATED ORAL DAILY
Qty: 2 TABLET | Refills: 0 | Status: SHIPPED | OUTPATIENT
Start: 2025-07-14 | End: 2025-07-16

## 2025-07-14 RX ADMIN — INSULIN ASPART 4 UNITS: 100 INJECTION, SOLUTION INTRAVENOUS; SUBCUTANEOUS at 12:54

## 2025-07-14 RX ADMIN — AZITHROMYCIN DIHYDRATE 250 MG: 250 TABLET, FILM COATED ORAL at 12:54

## 2025-07-14 RX ADMIN — PREDNISONE 40 MG: 20 TABLET ORAL at 07:51

## 2025-07-14 RX ADMIN — INSULIN GLARGINE 10 UNITS: 100 INJECTION, SOLUTION SUBCUTANEOUS at 07:51

## 2025-07-14 RX ADMIN — IPRATROPIUM BROMIDE AND ALBUTEROL SULFATE 3 ML: .5; 3 SOLUTION RESPIRATORY (INHALATION) at 11:40

## 2025-07-14 RX ADMIN — CETIRIZINE HYDROCHLORIDE 5 MG: 5 TABLET ORAL at 07:51

## 2025-07-14 ASSESSMENT — ACTIVITIES OF DAILY LIVING (ADL)
ADLS_ACUITY_SCORE: 63

## 2025-07-14 NOTE — PLAN OF CARE
Problem: Gas Exchange Impaired  Goal: Optimal Gas Exchange  Intervention: Optimize Oxygenation and Ventilation  Recent Flowsheet Documentation  Taken 7/14/2025 0015 by Sammie Mays RN  Head of Bed (HOB) Positioning: HOB at 20-30 degrees     Problem: Comorbidity Management  Goal: Maintenance of Asthma Control  Outcome: Progressing  Intervention: Maintain Asthma Symptom Control  Recent Flowsheet Documentation  Taken 7/14/2025 0015 by Sammie Mays RN  Medication Review/Management: medications reviewed  Goal: Maintenance of Behavioral Health Symptom Control  Outcome: Progressing  Intervention: Maintain Behavioral Health Symptom Control  Recent Flowsheet Documentation  Taken 7/14/2025 0015 by Sammie Mays RN  Medication Review/Management: medications reviewed  Goal: Maintenance of COPD Symptom Control  Outcome: Progressing  Intervention: Maintain COPD Symptom Control  Recent Flowsheet Documentation  Taken 7/14/2025 0015 by Sammie Mays RN  Medication Review/Management: medications reviewed  Goal: Blood Glucose Levels Within Targeted Range  Outcome: Progressing  Intervention: Monitor and Manage Glycemia  Recent Flowsheet Documentation  Taken 7/14/2025 0015 by Sammie Mays RN  Medication Review/Management: medications reviewed  Goal: Maintenance of Heart Failure Symptom Control  Outcome: Progressing  Intervention: Maintain Heart Failure Management  Recent Flowsheet Documentation  Taken 7/14/2025 0015 by Sammie Mays RN  Medication Review/Management: medications reviewed  Goal: Blood Pressure in Desired Range  Outcome: Progressing  Intervention: Maintain Blood Pressure Management  Recent Flowsheet Documentation  Taken 7/14/2025 0015 by Sammie Mays RN  Medication Review/Management: medications reviewed  Goal: Maintenance of Osteoarthritis Symptom Control  Outcome: Progressing  Intervention: Maintain Osteoarthritis Symptom Control  Recent  Flowsheet Documentation  Taken 7/14/2025 0015 by Sammie Mays RN  Assistive Device Utilized: walker  Activity Management:   bedrest   activity adjusted per tolerance  Medication Review/Management: medications reviewed  Goal: Bariatric Home Regimen Maintained  Intervention: Maintain and Manage Postbariatric Surgery Care  Recent Flowsheet Documentation  Taken 7/14/2025 0015 by Sammie Mays RN  Medication Review/Management: medications reviewed  Goal: Maintenance of Seizure Control  Intervention: Maintain Seizure Symptom Control  Recent Flowsheet Documentation  Taken 7/14/2025 0015 by Sammie Mays RN  Medication Review/Management: medications reviewed   Goal Outcome Evaluation:       Pt is alert and oriented x 4, denies pain. Lung sounds diminished, on Bipap with FiO2 35%. SpO2 97 to 98%. At 0430 pt removed the Bipap said she will not go back to sleep . O2 placed at 2 LPM per nasal cannula, SpO2 98%. Tele is NSR with occ PAC's. Purewick in place, voiding well.

## 2025-07-14 NOTE — PLAN OF CARE
Goal Outcome Evaluation:       Patient denies pain. Patient on BiPAP 1.5 hour prior to supper. Patient then on 2L. Had patient sit up for dinner. Monitor shows SR with pac. Patient to have BiPAP at Hs

## 2025-07-14 NOTE — DISCHARGE SUMMARY
[facility] MEDICINE  DISCHARGE SUMMARY     Primary Care Physician: Rema Whiting  Admission Date: 7/11/2025   Discharge Provider: LUCIA Durbin Discharge Date: 7/14/2025   Diet: as below   Code Status: Full Code   Activity: DCACTIVITY: Activity as tolerated        Condition at Discharge: Stable     REASON FOR PRESENTATION(See Admission Note for Details)   Shortness of breath    PRINCIPAL & ACTIVE DISCHARGE DIAGNOSES     Principal Problem:    Acute on chronic respiratory failure with hypoxia and hypercapnia (H)  Active Problems:    ABDIFATAH on CPAP    Restrictive lung disease      PENDING LABS     Unresulted Labs Ordered in the Past 30 Days of this Admission       No orders found from 6/11/2025 to 7/12/2025.              PROCEDURES ( this hospitalization only)          RECOMMENDATIONS TO OUTPATIENT PROVIDER FOR F/U VISIT     Follow-up Appointments       Follow Up      Follow up with sleep MD. Referral made upon discharge.        Hospital Follow-up with Existing Primary Care Provider (PCP)          Schedule Primary Care visit within: 7 Days                   DISPOSITION     Home    SUMMARY OF HOSPITAL COURSE:    70F with asthma/COPD overlap, chronic hypoxic respiratory failure (2l), ABDIFATAH on CPAP, bipolar, severe morbid obesity Body mass index is 39.51 kg/m .     #Acute on chronic hypoxic and hypercarbic respiratory failure  #ABDIFATAH  -Steroid, duonebs, azithromycin, O2, BiPAP PRN   -NIPPV QHS  -Carries diagnosis of asthma/COPD but this diagnosis is a little unclear to me.  Has preserved FEV1/FVC and clinical picture of restriction with severe scolioisis, chronic atelectasis, and absence of emphysematous changes on CT.  On the other hand has elevated TLC and residual volumes which which would fit with obstruction vs. Effort?  -Appreciate pulm consult. Switched solumedrol to prednisone taper. IV lasix 20mg x 1. Given recurrent hospitalizations and baseline pCO2 in the 80s,  nocturnal BiPAP at home could be considered. Given her known ABDIFATAH diagnosis would likely benefit from a in-lab titration with end-tidal CO2 monitoring, will refer her back to her sleep provider who she saw 2 years ago and is overdue for follow-up with.   -Discussed with pulm. Patient/family to call the DME if the current mask is not fitting properly.      #DM2  -Resumed home meds     #bipolar - abilify depo      Discharge Medications with Med changes:     Current Discharge Medication List        START taking these medications    Details   azithromycin (ZITHROMAX) 250 MG tablet Take 1 tablet (250 mg) by mouth daily for 2 days.  Qty: 2 tablet, Refills: 0    Associated Diagnoses: COPD exacerbation (H)      predniSONE (DELTASONE) 20 MG tablet Take 2 tablets (40 mg) by mouth daily for 1 day, THEN 1.5 tablets (30 mg) daily for 3 days, THEN 1 tablet (20 mg) daily for 3 days, THEN 0.5 tablets (10 mg) daily for 3 days.  Qty: 11 tablet, Refills: 0    Associated Diagnoses: COPD exacerbation (H)           CONTINUE these medications which have NOT CHANGED    Details   albuterol (PROAIR HFA/PROVENTIL HFA/VENTOLIN HFA) 108 (90 Base) MCG/ACT inhaler INHALE 2 PUFFS INTO THE LUNGS EVERY 6 HOURS AS NEEDED FOR SHORTNESS OF BREATH OR WHEEZING  Qty: 18 g, Refills: 2    Comments: Pharmacy may dispense brand covered by insurance (Proair, or proventil or ventolin or generic albuterol inhaler)  Associated Diagnoses: Chronic obstructive pulmonary disease, unspecified COPD type (H)      albuterol (PROVENTIL) (2.5 MG/3ML) 0.083% neb solution Take 1 vial (2.5 mg) by nebulization every 4 hours as needed for shortness of breath or wheezing. Take 1 vial four times a day for the next 5 days then go back to as needed  Qty: 240 mL, Refills: 12    Associated Diagnoses: Chronic obstructive pulmonary disease, unspecified COPD type (H)      ARIPiprazole ER (ABILIFY MAINTENA) 400 MG extended release inj syringe Inject 400 mg into the muscle every 28 days       fluticasone (FLONASE) 50 MCG/ACT nasal spray Spray 1 spray into both nostrils daily  Qty: 9.9 mL, Refills: 1    Associated Diagnoses: Acute cough      Fluticasone-Umeclidin-Vilant (TRELEGY ELLIPTA) 100-62.5-25 MCG/ACT oral inhaler Inhale 1 puff into the lungs daily.  Qty: 60 each, Refills: 5    Associated Diagnoses: Chronic obstructive pulmonary disease, unspecified COPD type (H)      ipratropium (ATROVENT) 0.06 % nasal spray Spray 2 sprays into both nostrils 2 times daily.  Qty: 15 mL, Refills: 12    Associated Diagnoses: Chronic rhinitis      metFORMIN (GLUCOPHAGE) 1000 MG tablet Take 1 tablet (1,000 mg) by mouth 2 times daily (with meals).  Qty: 180 tablet, Refills: 3    Associated Diagnoses: Type 2 diabetes mellitus with other specified complication, without long-term current use of insulin (H)      tirzepatide (MOUNJARO) 15 MG/0.5ML SOAJ auto-injector pen Inject 0.5 mLs (15 mg) subcutaneously once a week.  Qty: 2 mL, Refills: 3    Associated Diagnoses: Type 2 diabetes mellitus with other specified complication, without long-term current use of insulin (H)      tolterodine ER (DETROL LA) 4 MG 24 hr capsule Take 1 capsule (4 mg) by mouth daily.  Qty: 90 capsule, Refills: 3    Associated Diagnoses: Urge incontinence of urine      blood glucose (NO BRAND SPECIFIED) lancets standard Use to test blood sugar 4 times daily before meals and at bedtime  Qty: 1 each, Refills: 1    Associated Diagnoses: Type 2 diabetes mellitus with other specified complication, without long-term current use of insulin (H)      blood glucose (NO BRAND SPECIFIED) test strip Use to test blood sugar 4 times daily before meals and at bedtime  Qty: 100 strip, Refills: 0    Associated Diagnoses: Type 2 diabetes mellitus with other specified complication, without long-term current use of insulin (H)      blood glucose monitoring (ONETOUCH VERIO) meter device kit Use to test blood sugar 4 times daily before meals and bedtie  Qty: 1 kit, Refills:  "1    Associated Diagnoses: Type 2 diabetes mellitus without complication, without long-term current use of insulin (H)      Lancets (ONETOUCH DELICA PLUS [id number]) MISC TEST FOUR TIMES DAILY BEFORE MEALS AND AT BEDTIME                   Rationale for medication changes:      Please see above        Consults   Pulm      Immunizations given this encounter     Most Recent Immunizations   Administered Date(s) Administered    COVID-19 Bivalent 12+ (Pfizer) 11/10/2022    COVID-19 MONOVALENT 12+ (Pfizer) 12/16/2021    COVID-19 Monovalent 18+ (Moderna) 03/15/2021    Flu, Unspecified 12/02/2005    Influenza (H1N1) 10/06/2021    Influenza Vaccine 18-64 (Flublok) 11/08/2018    Influenza Vaccine 65+ (Fluzone HD) 11/10/2022    Influenza Vaccine >6 months,quad, PF 09/12/2023    Influenza Vaccine, 6+MO IM (QUADRIVALENT W/PRESERVATIVES) 10/14/2016    Pneumo Conj 13-V (2010&after) 05/21/2021    Pneumococcal 20 valent Conjugate (Prevnar 20) 07/07/2022    Pneumococcal 23 valent 12/18/2012    TDAP (Adacel,Boostrix) 04/17/2018           Anticoagulation Information      Recent INR results: No results for input(s): \"INR\" in the last 168 hours.  Warfarin doses (if applicable) or name of other anticoagulant: NA      SIGNIFICANT IMAGING FINDINGS     Results for orders placed or performed during the hospital encounter of 07/11/25   Chest XR,  PA & LAT    Impression    IMPRESSION: Moderate soft tissue attenuation, which limits evaluation. Within this limitation:    Worsened pulmonary vascular congestion and probable pulmonary edema since the prior examination. Possible trace pleural effusions appearing. No other definite change.    Hyperinflation lungs, probable emphysematous lung changes. Cardiomegaly. No pneumothorax. Moderate dextroscoliosis thoracic spine.       SIGNIFICANT LABORATORY FINDINGS     Most Recent 3 CBC's:  Recent Labs   Lab Test 07/14/25  0930 07/13/25  0749 07/12/25  0724   WBC 12.4* 17.6* 16.5*   HGB 13.4 12.9 12.9   MCV 94 " "93 92    211 213     Most Recent 3 BMP's:  Recent Labs   Lab Test 07/14/25  1238 07/14/25  0930 07/14/25  0749 07/13/25  1233 07/13/25  0749 07/12/25  0822 07/12/25  0724   NA  --  144  --   --  143  --  142   POTASSIUM  --  4.1  --   --  4.2  --  4.9   CHLORIDE  --  101  --   --  100  --  100   CO2  --  43*  --   --  41*  --  36*   BUN  --  19.1  --   --  18.7  --  14.8   CR  --  0.73  --   --  0.68  --  0.72   ANIONGAP  --  <1*  --   --  2*  --  6*   ARINA  --  9.2  --   --  8.8  --  9.0   * 134* 103*   < > 161*   < > 317*    < > = values in this interval not displayed.     Most Recent 2 LFT's:  Recent Labs   Lab Test 06/05/25  0408 05/30/25  1304   AST 15 15   ALT 19 18   ALKPHOS 87 87   BILITOTAL 0.3 0.5     Most Recent 3 INR's:  Recent Labs   Lab Test 05/07/25  1620 12/09/21  1322   INR 1.00 1.00         Discharge Orders        Primary Care - Care Coordination Referral      Adult Sleep Eval & Management  Referral      Reason for your hospital stay    Shortness of breath     Activity    Your activity upon discharge: activity as tolerated     Follow Up    Follow up with sleep MD. Referral made upon discharge.     Diet    Follow this diet upon discharge: Current Diet:Orders Placed This Encounter      2 Gram Sodium Diet     Hospital Follow-up with Existing Primary Care Provider (PCP)            Examination   /74 (BP Location: Left arm)   Pulse 92   Temp 98.2  F (36.8  C) (Oral)   Resp (!) 39   Ht 1.397 m (4' 7\")   Wt 77.1 kg (170 lb)   SpO2 97%   BMI 39.51 kg/m        General: Not in obvious distress.  HEENT: NC, AT   Chest: Diminished breath sounds bilaterally  Heart: S1S2 normal, regular. No M/R/G  Abdomen: Soft. NT, ND. Bowel sounds- active.  Extremities: No legs swelling  Neuro: Awake, grossly non-focal      Please see EMR for more detailed significant labs, imaging, consultant notes etc.    IJersey MBBS, personally saw the patient today and spent greater than 30 " minutes discharging this patient.    LUCIA Durbin  [facility]    CC:Rema Whiting Tram

## 2025-07-14 NOTE — PROGRESS NOTES
Occupational Therapy Discharge Summary    Reason for therapy discharge:    Discharged to home.    Progress towards therapy goal(s). See goals on Care Plan in [facility] electronic health record for goal details.  Goals not met.  Barriers to achieving goals:   discharge from facility.    Therapy recommendation(s):    No further therapy is recommended. Mother will assist as needed with I/ADLs.

## 2025-07-15 ENCOUNTER — PATIENT OUTREACH (OUTPATIENT)
Dept: CARE COORDINATION | Facility: CLINIC | Age: 70
End: 2025-07-15
Payer: COMMERCIAL

## 2025-07-15 NOTE — PROGRESS NOTES
"Clinic Care Coordination Contact  Transitions of Care Outreach  Chief Complaint   Patient presents with    Clinic Care Coordination - Post Hospital       Most Recent Admission Date: 7/11/2025   Most Recent Admission Diagnosis: COPD exacerbation (H) - J44.1  Acute on chronic respiratory failure with hypoxia and hypercapnia (H) - J96.21, J96.22     Most Recent Discharge Date: 7/14/2025   Most Recent Discharge Diagnosis: COPD exacerbation (H) - J44.1  Acute on chronic respiratory failure with hypoxia and hypercapnia (H) - J96.21, J96.22  Other specified counseling - Z71.89  ABDIFATAH on CPAP - G47.33     Transitions of Care Assessment    Discharge Assessment  How are you doing now that you are home?: very brief conversation with Arvind.  She states she is feeling 'back to herself'  Denies any SOB.  Unable to complete full Post Disch Assessment as Arvind wanted to get off the phone.  Discussed pending Sleep Clinic referral.  She at first declined to schedule with sleep clinic provider.  She stated her \"muzzle\" was coming in the mail.  Writer clarified that she indeed meant a  Full Face Mask CPAP.  Writer instructed that in order for her to continue to receive supplies for the CPAP that she would need to schedule with the Sleep Provider.  Patient hesitantly took the number.  Unclear if she will schedule with this provider.  She has not picked up her medications yet but stated she will today.  Attempted to discuss 7 day PCP follow up and patient stating she needed to get off the phone.  Unable to discuss any further or this recommendation.  Unable to offer CCC.  Writer will forward note to PCP to notify regarding above as FYI  How are your symptoms? (Red Flag symptoms escalate to triage hotline per guidelines): Improved  Do you know how to contact your clinic care team if you have future questions or changes to your health status? : Yes  Does the patient have their discharge instructions? : Unknown  Does the patient have " questions regarding their discharge instructions? : No  Were you started on any new medications or were there changes to any of your previous medications? : Yes  Does the patient have all of their medications?: No (see comment) (patient stating she was going to pick them up today)  Do you have questions regarding any of your medications? : No  Do you have all of your needed medical supplies or equipment (DME)?  (i.e. oxygen tank, CPAP, cane, etc.): No - What equipment or supplies are needed? (Full Mask CPAP is waiting to be delivered)               Explained and offered Care Coordination support to eligible patients: No  See note above.  Unable to offer CCC due to the nature of the call.  Patient accepted? N/A    Follow up Plan     Discharge Follow-Up  Discharge follow up appointment scheduled in alignment with recommended follow up timeframe or Transitions of Risk Category? (Low = within 30 days; Moderate= within 14 days; High= within 7 days): No  Discharge Follow Up Appointment Scheduled with?:  (Reminded patient of Cardiac Rehab appointment.  She stated an understanding)  Patient's follow up appointment not scheduled: Patient declined scheduling support. Education on the importance of transitions of care follow up. Provided scheduling phone number.    Future Appointments   Date Time Provider Department Center   7/29/2025  8:00 AM [facility] CARDIAC REHAB [facility]   8/8/2025  1:10 PM Rema Whiting MD [facility]   9/4/2025  8:30 AM Rema Whiting MD [facility]   2/16/2026  2:30 PM [facility]       Outpatient Plan as outlined on AVS reviewed with patient.    For any urgent concerns, please contact our 24 hour nurse triage line: [phone] ([phone])       Ritu Foss RN

## 2025-07-16 ENCOUNTER — PATIENT OUTREACH (OUTPATIENT)
Dept: CARE COORDINATION | Facility: CLINIC | Age: 70
End: 2025-07-16
Payer: COMMERCIAL

## 2025-07-17 ENCOUNTER — PATIENT OUTREACH (OUTPATIENT)
Dept: CARE COORDINATION | Facility: CLINIC | Age: 70
End: 2025-07-17
Payer: COMMERCIAL

## 2025-07-28 ENCOUNTER — PATIENT OUTREACH (OUTPATIENT)
Dept: CARE COORDINATION | Facility: CLINIC | Age: 70
End: 2025-07-28
Payer: COMMERCIAL

## 2025-07-28 NOTE — PROGRESS NOTES
Clinic Care Coordination Contact  Community Health Worker Initial Outreach    CHW Initial Information Gathering:  Referral Source: PCP  Current living arrangement:: I live in a private home with family  Community Resources: DME  Supplies Currently Used at Home: Oxygen Tubing/Supplies, Other (CPAP)  Equipment Currently Used at Home: grab bar, tub/shower, walker, rolling  No PCP office visit in Past Year: No (7/25/25 with Dr. Whiting)  CHW Additional Questions  MyChart active?: Yes  Patient sent Social Drivers of Health questionnaire?: Yes    Patient accepts CC: Yes. Patient scheduled for assessment with BHAVNA Ramirez on 8/6/25 at 11:00. Patient noted desire to discuss:    - Resources and support finding an [facility].    ** It was noted on the referral for: RN follow up for numerous referrals.- Patient declined to schedule this appointment stating that she is fine scheduling and keeping track of referrals and appointments.     Kathy Zhang  Community Health Worker  [facility]  Clinic Care Coordination   [facility], [facility]  Office: [phone]

## 2025-07-29 ENCOUNTER — HOSPITAL ENCOUNTER (OUTPATIENT)
Dept: CARDIAC REHAB | Facility: HOSPITAL | Age: 70
Discharge: HOME | End: 2025-07-29
Attending: INTERNAL MEDICINE
Payer: COMMERCIAL

## 2025-07-29 DIAGNOSIS — J96.02 ACUTE RESPIRATORY FAILURE WITH HYPOXIA AND HYPERCAPNIA (H): ICD-10-CM

## 2025-07-29 DIAGNOSIS — R06.02 SOB (SHORTNESS OF BREATH): ICD-10-CM

## 2025-07-29 DIAGNOSIS — R06.09 DOE (DYSPNEA ON EXERTION): ICD-10-CM

## 2025-07-29 DIAGNOSIS — J15.9 COMMUNITY ACQUIRED BACTERIAL PNEUMONIA: ICD-10-CM

## 2025-07-29 DIAGNOSIS — J44.9 CHRONIC OBSTRUCTIVE PULMONARY DISEASE, UNSPECIFIED COPD TYPE (H): ICD-10-CM

## 2025-07-29 DIAGNOSIS — J96.01 ACUTE RESPIRATORY FAILURE WITH HYPOXIA AND HYPERCAPNIA (H): ICD-10-CM

## 2025-07-29 PROCEDURE — G0238 OTH RESP PROC, INDIV: HCPCS

## 2025-08-05 ENCOUNTER — HOSPITAL ENCOUNTER (OUTPATIENT)
Dept: CARDIAC REHAB | Facility: HOSPITAL | Age: 70
Discharge: HOME | End: 2025-08-05
Attending: INTERNAL MEDICINE
Payer: COMMERCIAL

## 2025-08-05 PROCEDURE — G0239 OTH RESP PROC, GROUP: HCPCS

## 2025-08-06 ENCOUNTER — HOSPITAL ENCOUNTER (OUTPATIENT)
Dept: GENERAL RADIOLOGY | Facility: HOSPITAL | Age: 70
Discharge: HOME | End: 2025-08-06
Attending: PHYSICIAN ASSISTANT
Payer: COMMERCIAL

## 2025-08-06 ENCOUNTER — PATIENT OUTREACH (OUTPATIENT)
Dept: NURSING | Facility: CLINIC | Age: 70
End: 2025-08-06
Payer: COMMERCIAL

## 2025-08-06 ENCOUNTER — OFFICE VISIT (OUTPATIENT)
Dept: URGENT CARE | Facility: URGENT CARE | Age: 70
End: 2025-08-06
Payer: COMMERCIAL

## 2025-08-06 VITALS
SYSTOLIC BLOOD PRESSURE: 120 MMHG | DIASTOLIC BLOOD PRESSURE: 72 MMHG | TEMPERATURE: 98.2 F | HEART RATE: 89 BPM | OXYGEN SATURATION: 94 % | RESPIRATION RATE: 20 BRPM

## 2025-08-06 DIAGNOSIS — M79.622 PAIN OF LEFT UPPER ARM: ICD-10-CM

## 2025-08-06 DIAGNOSIS — M79.622 PAIN OF LEFT UPPER ARM: Primary | ICD-10-CM

## 2025-08-06 PROCEDURE — 71046 X-RAY EXAM CHEST 2 VIEWS: CPT

## 2025-08-06 PROCEDURE — 3078F DIAST BP <80 MM HG: CPT | Performed by: PHYSICIAN ASSISTANT

## 2025-08-06 PROCEDURE — 73030 X-RAY EXAM OF SHOULDER: CPT | Mod: LT

## 2025-08-06 PROCEDURE — 3074F SYST BP LT 130 MM HG: CPT | Performed by: PHYSICIAN ASSISTANT

## 2025-08-06 PROCEDURE — 99213 OFFICE O/P EST LOW 20 MIN: CPT | Performed by: PHYSICIAN ASSISTANT

## 2025-08-06 PROCEDURE — 73060 X-RAY EXAM OF HUMERUS: CPT | Mod: LT

## 2025-08-06 PROCEDURE — A4565 SLINGS: HCPCS | Performed by: PHYSICIAN ASSISTANT

## 2025-08-08 ENCOUNTER — MEDICAL CORRESPONDENCE (OUTPATIENT)
Dept: HEALTH INFORMATION MANAGEMENT | Facility: CLINIC | Age: 70
End: 2025-08-08

## 2025-08-28 ENCOUNTER — VIRTUAL VISIT (OUTPATIENT)
Dept: PHARMACY | Facility: CLINIC | Age: 70
End: 2025-08-28
Attending: FAMILY MEDICINE
Payer: COMMERCIAL

## 2025-08-28 DIAGNOSIS — E11.8 TYPE 2 DIABETES MELLITUS WITH UNSPECIFIED COMPLICATIONS (H): Primary | ICD-10-CM

## 2025-08-29 ENCOUNTER — HOSPITAL ENCOUNTER (OUTPATIENT)
Dept: CARDIOLOGY | Facility: HOSPITAL | Age: 70
Discharge: HOME | End: 2025-08-29
Attending: FAMILY MEDICINE | Admitting: FAMILY MEDICINE
Payer: COMMERCIAL

## 2025-08-29 DIAGNOSIS — I42.9 CARDIOMYOPATHY, UNSPECIFIED TYPE (H): ICD-10-CM

## 2025-08-29 PROCEDURE — 255N000002 HC RX 255 OP 636: Performed by: FAMILY MEDICINE

## 2025-08-29 PROCEDURE — 999N000248 HC STATISTIC IV INSERT WITH US BY RN

## 2025-08-29 PROCEDURE — 999N000208 ECHOCARDIOGRAM COMPLETE

## 2025-08-29 PROCEDURE — 93306 TTE W/DOPPLER COMPLETE: CPT | Mod: 26 | Performed by: INTERNAL MEDICINE

## 2025-08-29 RX ADMIN — PERFLUTREN 2 ML: 6.52 INJECTION, SUSPENSION INTRAVENOUS at 13:10

## 2025-09-04 ENCOUNTER — VIRTUAL VISIT (OUTPATIENT)
Dept: FAMILY MEDICINE | Facility: CLINIC | Age: 70
End: 2025-09-04
Payer: COMMERCIAL

## 2025-09-04 VITALS
DIASTOLIC BLOOD PRESSURE: 58 MMHG | HEART RATE: 58 BPM | HEIGHT: 55 IN | WEIGHT: 165.4 LBS | SYSTOLIC BLOOD PRESSURE: 121 MMHG | BODY MASS INDEX: 38.28 KG/M2 | TEMPERATURE: 98.7 F | RESPIRATION RATE: 20 BRPM | OXYGEN SATURATION: 95 %

## 2025-09-04 DIAGNOSIS — F31.4 BIPOLAR DISORDER, CURRENT EPISODE DEPRESSED, SEVERE, WITHOUT PSYCHOTIC FEATURES (H): ICD-10-CM

## 2025-09-04 DIAGNOSIS — Z79.899 HIGH RISK MEDICATION USE: ICD-10-CM

## 2025-09-04 DIAGNOSIS — E11.9 TYPE 2 DIABETES MELLITUS WITHOUT COMPLICATION, WITHOUT LONG-TERM CURRENT USE OF INSULIN (H): Primary | ICD-10-CM

## 2025-09-04 DIAGNOSIS — Z13.21 ENCOUNTER FOR VITAMIN DEFICIENCY SCREENING: ICD-10-CM

## 2025-09-04 LAB
ALBUMIN SERPL BCG-MCNC: 4 G/DL (ref 3.5–5.2)
ALP SERPL-CCNC: 97 U/L (ref 40–150)
ALT SERPL W P-5'-P-CCNC: 14 U/L (ref 0–50)
ANION GAP SERPL CALCULATED.3IONS-SCNC: 7 MMOL/L (ref 7–15)
AST SERPL W P-5'-P-CCNC: 14 U/L (ref 0–45)
BASOPHILS # BLD AUTO: <0.04 10E3/UL (ref 0–0.2)
BASOPHILS NFR BLD AUTO: 0.3 %
BILIRUB SERPL-MCNC: 0.6 MG/DL
BUN SERPL-MCNC: 9.8 MG/DL (ref 8–23)
CALCIUM SERPL-MCNC: 9.2 MG/DL (ref 8.8–10.4)
CHLORIDE SERPL-SCNC: 100 MMOL/L (ref 98–107)
CHOLEST SERPL-MCNC: 158 MG/DL
CREAT SERPL-MCNC: 0.78 MG/DL (ref 0.51–0.95)
EGFRCR SERPLBLD CKD-EPI 2021: 82 ML/MIN/1.73M2
EOSINOPHIL # BLD AUTO: 0.17 10E3/UL (ref 0–0.7)
EOSINOPHIL NFR BLD AUTO: 1.6 %
ERYTHROCYTE [DISTWIDTH] IN BLOOD BY AUTOMATED COUNT: 13.1 % (ref 10–15)
EST. AVERAGE GLUCOSE BLD GHB EST-MCNC: 163 MG/DL
FASTING STATUS PATIENT QL REPORTED: YES
FASTING STATUS PATIENT QL REPORTED: YES
GLUCOSE SERPL-MCNC: 139 MG/DL (ref 70–99)
HBA1C MFR BLD: 7.3 % (ref 0–5.6)
HCO3 SERPL-SCNC: 34 MMOL/L (ref 22–29)
HCT VFR BLD AUTO: 46.4 % (ref 35–47)
HDLC SERPL-MCNC: 45 MG/DL
HGB BLD-MCNC: 14.6 G/DL (ref 11.7–15.7)
IMM GRANULOCYTES # BLD: <0.04 10E3/UL
IMM GRANULOCYTES NFR BLD: 0.1 %
LDLC SERPL CALC-MCNC: 75 MG/DL
LYMPHOCYTES # BLD AUTO: 2.49 10E3/UL (ref 0.8–5.3)
LYMPHOCYTES NFR BLD AUTO: 23.6 %
MCH RBC QN AUTO: 29.1 PG (ref 26.5–33)
MCHC RBC AUTO-ENTMCNC: 31.5 G/DL (ref 31.5–36.5)
MCV RBC AUTO: 92.6 FL (ref 78–100)
MONOCYTES # BLD AUTO: 0.61 10E3/UL (ref 0–1.3)
MONOCYTES NFR BLD AUTO: 5.8 %
NEUTROPHILS # BLD AUTO: 7.22 10E3/UL (ref 1.6–8.3)
NEUTROPHILS NFR BLD AUTO: 68.6 %
NONHDLC SERPL-MCNC: 113 MG/DL
PLATELET # BLD AUTO: 186 10E3/UL (ref 150–450)
POTASSIUM SERPL-SCNC: 4.4 MMOL/L (ref 3.4–5.3)
PROT SERPL-MCNC: 6.5 G/DL (ref 6.4–8.3)
RBC # BLD AUTO: 5.01 10E6/UL (ref 3.8–5.2)
SODIUM SERPL-SCNC: 141 MMOL/L (ref 135–145)
T4 FREE SERPL-MCNC: 1.04 NG/DL (ref 0.9–1.7)
TRIGL SERPL-MCNC: 188 MG/DL
TSH SERPL DL<=0.005 MIU/L-ACNC: 1.38 UIU/ML (ref 0.3–4.2)
VIT D+METAB SERPL-MCNC: 31 NG/ML (ref 20–50)
WBC # BLD AUTO: 10.53 10E3/UL (ref 4–11)